# Patient Record
Sex: FEMALE | Race: BLACK OR AFRICAN AMERICAN | Employment: OTHER | URBAN - METROPOLITAN AREA
[De-identification: names, ages, dates, MRNs, and addresses within clinical notes are randomized per-mention and may not be internally consistent; named-entity substitution may affect disease eponyms.]

---

## 2019-06-19 ENCOUNTER — HOSPITAL ENCOUNTER (OUTPATIENT)
Dept: BONE DENSITY | Age: 69
Discharge: HOME | End: 2019-06-19
Payer: MEDICARE

## 2019-06-19 DIAGNOSIS — M81.0 AGE-RELATED OSTEOPOROSIS WITHOUT CURRENT PATHOLOGICAL FRACTURE: ICD-10-CM

## 2019-06-19 PROCEDURE — 77080 DXA BONE DENSITY AXIAL: CPT

## 2020-01-10 ENCOUNTER — OFFICE VISIT (OUTPATIENT)
Dept: INTERNAL MEDICINE CLINIC | Age: 70
End: 2020-01-10

## 2020-01-10 VITALS
WEIGHT: 222.1 LBS | HEIGHT: 62 IN | HEART RATE: 66 BPM | RESPIRATION RATE: 20 BRPM | DIASTOLIC BLOOD PRESSURE: 64 MMHG | TEMPERATURE: 97.4 F | BODY MASS INDEX: 40.87 KG/M2 | OXYGEN SATURATION: 99 % | SYSTOLIC BLOOD PRESSURE: 134 MMHG

## 2020-01-10 DIAGNOSIS — K64.9 HEMORRHOIDS, UNSPECIFIED HEMORRHOID TYPE: ICD-10-CM

## 2020-01-10 DIAGNOSIS — E03.9 ACQUIRED HYPOTHYROIDISM: ICD-10-CM

## 2020-01-10 DIAGNOSIS — E11.9 TYPE 2 DIABETES MELLITUS WITHOUT COMPLICATION, WITHOUT LONG-TERM CURRENT USE OF INSULIN (HCC): ICD-10-CM

## 2020-01-10 DIAGNOSIS — I50.9 CONGESTIVE HEART FAILURE, UNSPECIFIED HF CHRONICITY, UNSPECIFIED HEART FAILURE TYPE (HCC): ICD-10-CM

## 2020-01-10 DIAGNOSIS — F41.9 ANXIETY AND DEPRESSION: ICD-10-CM

## 2020-01-10 DIAGNOSIS — E78.2 MIXED HYPERLIPIDEMIA: ICD-10-CM

## 2020-01-10 DIAGNOSIS — I10 ESSENTIAL HYPERTENSION: ICD-10-CM

## 2020-01-10 DIAGNOSIS — F32.A ANXIETY AND DEPRESSION: ICD-10-CM

## 2020-01-10 DIAGNOSIS — E66.01 OBESITY, MORBID (HCC): ICD-10-CM

## 2020-01-10 DIAGNOSIS — Z76.89 ESTABLISHING CARE WITH NEW DOCTOR, ENCOUNTER FOR: Primary | ICD-10-CM

## 2020-01-10 RX ORDER — CODEINE PHOSPHATE AND GUAIFENESIN 10; 100 MG/5ML; MG/5ML
SOLUTION ORAL
COMMUNITY
Start: 2019-10-03 | End: 2020-01-10

## 2020-01-10 RX ORDER — FUROSEMIDE 40 MG/1
80 TABLET ORAL DAILY
COMMUNITY
Start: 2019-12-13 | End: 2020-01-30

## 2020-01-10 RX ORDER — COLCHICINE 0.6 MG/1
TABLET ORAL
COMMUNITY
Start: 2019-07-12 | End: 2020-02-05

## 2020-01-10 RX ORDER — LOSARTAN POTASSIUM 50 MG/1
50 TABLET ORAL DAILY
COMMUNITY
Start: 2019-08-06 | End: 2020-02-05

## 2020-01-10 RX ORDER — ALLOPURINOL 100 MG/1
100 TABLET ORAL DAILY
COMMUNITY
Start: 2019-07-12 | End: 2020-02-10 | Stop reason: SDUPTHER

## 2020-01-10 RX ORDER — LEVOTHYROXINE SODIUM 100 UG/1
100 TABLET ORAL
COMMUNITY
Start: 2019-07-22 | End: 2020-02-10 | Stop reason: SDUPTHER

## 2020-01-10 RX ORDER — METFORMIN HYDROCHLORIDE 500 MG/1
1 TABLET, FILM COATED, EXTENDED RELEASE ORAL 2 TIMES DAILY
COMMUNITY
End: 2020-02-05

## 2020-01-10 RX ORDER — GABAPENTIN 600 MG/1
600 TABLET ORAL 3 TIMES DAILY
COMMUNITY
Start: 2019-08-14 | End: 2020-02-05

## 2020-01-10 RX ORDER — METFORMIN HYDROCHLORIDE 500 MG/1
TABLET, EXTENDED RELEASE ORAL
COMMUNITY
Start: 2019-07-29 | End: 2020-01-10

## 2020-01-10 RX ORDER — ATORVASTATIN CALCIUM 80 MG/1
TABLET, FILM COATED ORAL
COMMUNITY
Start: 2019-10-15 | End: 2020-01-10 | Stop reason: SDUPTHER

## 2020-01-10 RX ORDER — GUAIFENESIN 100 MG/5ML
81 LIQUID (ML) ORAL DAILY
COMMUNITY

## 2020-01-10 RX ORDER — METOPROLOL SUCCINATE 25 MG/1
25 TABLET, EXTENDED RELEASE ORAL DAILY
Status: ON HOLD | COMMUNITY
End: 2020-02-05 | Stop reason: SDUPTHER

## 2020-01-10 RX ORDER — CELECOXIB 200 MG/1
CAPSULE ORAL
COMMUNITY
Start: 2019-12-24 | End: 2020-02-05

## 2020-01-10 RX ORDER — BUPROPION HYDROCHLORIDE 150 MG/1
150 TABLET, EXTENDED RELEASE ORAL DAILY
COMMUNITY
Start: 2019-06-16 | End: 2020-02-06 | Stop reason: SDUPTHER

## 2020-01-10 RX ORDER — ATORVASTATIN CALCIUM 80 MG/1
TABLET, FILM COATED ORAL
Qty: 90 TAB | Refills: 0 | Status: SHIPPED | OUTPATIENT
Start: 2020-01-10 | End: 2020-04-10 | Stop reason: SDUPTHER

## 2020-01-10 RX ORDER — TRAMADOL HYDROCHLORIDE 50 MG/1
1 TABLET ORAL
COMMUNITY
Start: 2019-07-12 | End: 2020-01-10

## 2020-01-10 RX ORDER — CLOPIDOGREL BISULFATE 75 MG/1
75 TABLET ORAL DAILY
COMMUNITY
Start: 2019-08-17 | End: 2020-02-10 | Stop reason: SDUPTHER

## 2020-01-10 NOTE — PROGRESS NOTES
Subjective: (As above and below)     Chief Complaint   Patient presents with   Nathan Dee 38 is a 68y.o. year old female who presents to est care- changing due to insurance      She reports having labs done recently      Hypertension ROS:  taking medications as instructed, no medication side effects noted, no TIAs, no chest pain on exertion, no dyspnea on exertion, no swelling of ankles    Diabetic Review of Systems - medication compliance: compliant all of the time, diabetic diet compliance: compliant most of the time, home glucose monitoring: is not performed. Reports controlled    S/p CABG 1997 w/ subsequent stenting (2015) also w/ AICD in place. Has been followed by cardiology at [address] [place] [address], unsure if they participate w/ new plan    URI: in November she had an URI that lingered and caused CHF exacerbation. Her PCP ended up ordering home O2 which she will still use at night. She has not seen cardiology of recent. Also has not had recent ECHO. She has bilateral lower ext edema that improves w/ elevation. +MARINA - for example winded walking from elevator to office. No chest pain  She monitors daily weights and is up 3 lbs since last checked. She is on a \"loose\" fluid restriction. Diet is fairly low sodium    Sleep apnea: compliant w/ bipap    Gout: controlled w/ allopurinol    Sleep apnea: has bipap    Mammogram: - due in spring    Colonoscopy: believes due, also wants to est w/ GI for hemorrhoids and urgency of stool    Hep C testing: normal per patient    Hx of lap band: has struggled with weight, exercise limited due to knee pain and MARINA  Hypothyroid: adherent w/ meds    Depression and anxiety; has therapy, reports doing fair. Wishes to return back home to [place]. felix helps and also helped her stop smoking    She is on disability for hx of MI at age 50        Reviewed PmHx, RxHx, FmHx, SocHx, AllgHx and updated in chart. No family history on file.     Past Medical History: Diagnosis Date    Chronic obstructive pulmonary disease (HCC)     Congestive heart failure (HCC)     Diabetes ([facility] [address]     Hypertension     Sleep apnea 1996      Social History     Socioeconomic History    Marital status:      Spouse name: Not on file    Number of children: Not on file    Years of education: Not on file    Highest education level: Not on file   Tobacco Use    Smoking status: Former Smoker     Types: Cigarettes    Smokeless tobacco: Never Used   Substance and Sexual Activity    Alcohol use: Not Currently    Drug use: Not Currently    Sexual activity: Not Currently          Current Outpatient Medications   Medication Sig    allopurinol (ZYLOPRIM) 100 mg tablet     celecoxib (CELEBREX) 200 mg capsule TAKE 1 CAPSULE BY MOUTH ONCE DAILY    buPROPion SR (WELLBUTRIN SR) 150 mg SR tablet Take 150 mg by mouth.  clopidogrel (PLAVIX) 75 mg tab Take 75 mg by mouth.  gabapentin (NEURONTIN) 600 mg tablet Take 600 mg by mouth.  levothyroxine (SYNTHROID) 100 mcg tablet     losartan (COZAAR) 50 mg tablet     furosemide (LASIX) 40 mg tablet TAKE 1 TABLET BY MOUTH TWICE DAILY FOR FLUID    aspirin 81 mg chewable tablet Take 81 mg by mouth daily.  metoprolol succinate (TOPROL-XL) 25 mg XL tablet Take  by mouth daily.  metFORMIN (GLUMETZA ER) 500 mg TG24 24 hour tablet Take 1 Each by mouth two (2) times a day.  atorvastatin (LIPITOR) 80 mg tablet TAKE 1 TABLET BY MOUTH AT BEDTIME    colchicine (COLCRYS) 0.6 mg tablet TAKE 2 TABLETS BY MOUTH THEN TAKE 1 TABLET BY MOUTH ONE HOUR LATER THEN TAKE 1 TABLET BY MOUTH DAILY UNTIL FLARE RESOLVES     No current facility-administered medications for this visit. Review of Systems:   Constitutional:    Negative for fever and chills, negative diaphoresis. HEENT:              Negative for neck pain and stiffness. Eyes:                  Negative for visual disturbance, itching, redness or discharge.    Respiratory:        Negative for cough and shortness of breath. Cardiovascular:  Negative for chest pain and palpitations. Gastrointestinal: Negative for nausea, vomiting, abdominal pain, diarrhea or constipation. Genitourinary:     Negative for dysuria and frequency. Musculoskeletal: Negative for falls, tenderness and swelling. Skin:                    Negative for rash, masses or lesions. Neurological:       Negative for dizzyness, seizure, loss of consciousness, weakness and numbness. Objective:     Vitals:    01/10/20 1402 01/10/20 1455   BP: 140/69 134/64   Pulse: 66 66   Resp: 20    Temp: 97.4 °F (36.3 °C)    TempSrc: Oral    SpO2: 99%    Weight: 222 lb 1.6 oz (100.7 kg)    Height: 5' 2\" (1.575 m)        No results found for this or any previous visit. Physical Examination: General appearance - alert, well appearing, and in no distress and overweight  Chest - clear to auscultation, no wheezes, rales or rhonchi, symmetric air entry  Heart - normal rate, regular rhythm, normal S1, S2, no murmurs, rubs, clicks or gallops  Extremities - trace bilat edema    Assessment/ Plan:     Follow-up and Dispositions    · Return in about 3 months (around 4/10/2020) for dm. Will get recent lab work and go from there    1. Establishing care with new doctor, encounter for    - varicella-zoster recombinant, PF, (SHINGRIX, PF,) 50 mcg/0.5 mL susr injection; 0.5 mL by IntraMUSCular route once for 1 dose. Dispense: 0.5 mL; Refill: 1    2. Essential hypertension  baseline    3. Acquired hypothyroidism      4. Mixed hyperlipidemia      5. Anxiety and depression      6. Congestive heart failure, unspecified HF chronicity, unspecified heart failure type ([address]    - REFERRAL TO CARDIOLOGY    7. Type 2 diabetes mellitus without complication, without long-term current use of insulin ([address]      8.  Hemorrhoids, unspecified hemorrhoid type    - REFERRAL TO GASTROENTEROLOGY        I have discussed the diagnosis with the patient and the intended plan as seen in the above orders. The patient has received an after-visit summary and questions were answered concerning future plans. Pt conveyed understanding of plan. Medication Side Effects and Warnings were discussed with patient: yes  Patient Labs were reviewed: yes  Patient Past Records were reviewed:  yes    Roseann Boast.  Lyle Aguero NP

## 2020-01-10 NOTE — PROGRESS NOTES
Pt here for   Chief Complaint   Patient presents with   [facility] hospital [facility]     1. Have you been to the ER, urgent care clinic since your last visit? Hospitalized since your last visit? No    2. Have you seen or consulted any other health care providers outside of the [address] [place] [address] since your last visit? Include any pap smears or colon screening.  No     Pt c/o 4 of 10, Pt took 2 tylenol for pain today    3 most recent PHQ Screens 1/10/2020   Little interest or pleasure in doing things Not at all   Feeling down, depressed, irritable, or hopeless Not at all   Total Score PHQ 2 0

## 2020-01-10 NOTE — PATIENT INSTRUCTIONS
Recombinant Zoster (Shingles) Vaccine, RZV: What you need to know  Why get vaccinated? Shingles (also called herpes zoster, or just zoster) is a painful skin rash, often with blisters. Shingles is caused by the varicella zoster virus, the same virus that causes chickenpox. After you have chickenpox, the virus stays in your body and can cause shingles later in life. You can't catch shingles from another person. However, a person who has never had chickenpox (or chickenpox vaccine) could get chickenpox from someone with shingles. A shingles rash usually appears on one side of the face or body and heals within 2 to 4 weeks. Its main symptom is pain, which can be severe. Other symptoms can include fever, headache, chills, and upset stomach. Very rarely, a shingles infection can lead to pneumonia, hearing problems, blindness, brain inflammation (encephalitis), or death. For about 1 person in 5, severe pain can continue even long after the rash has cleared up. This long-lasting pain is called post-herpetic neuralgia (PHN). Shingles is far more common in people 48years of age and older than in younger people, and the risk increases with age. It is also more common in people whose immune system is weakened because of a disease such as cancer, or by drugs such as steroids or chemotherapy. At least 1 million people a year in the [facility] get shingles. Shingles vaccine (recombinant)  Recombinant shingles vaccine was approved by FDA in 2017 for the prevention of shingles. In clinical trials, it was more than 90% effective in preventing shingles. It can also reduce the likelihood of PHN. Two doses, 2 to 6 months apart, are recommended for adults 48 and older. This vaccine is also recommended for people who have already gotten the live shingles vaccine (Zostavax). There is no live virus in this vaccine.   Some people should not get this vaccine  Tell your vaccine provider if you:  · Have any severe, life-threatening allergies. A person who has ever had a life-threatening allergic reaction after a dose of recombinant shingles vaccine, or has a severe allergy to any component of this vaccine, may be advised not to be vaccinated. Ask your health care provider if you want information about vaccine components. · Are pregnant or breastfeeding. There is not much information about use of recombinant shingles vaccine in pregnant or nursing women. Your healthcare provider might recommend delaying vaccination. · Are not feeling well. If you have a mild illness, such as a cold, you can probably get the vaccine today. If you are moderately or severely ill, you should probably wait until you recover. Your doctor can advise you. Risks of a vaccine reaction  With any medicine, including vaccines, there is a chance of reactions. After recombinant shingles vaccination, a person might experience:  · Pain, redness, soreness, or swelling at the site of the injection  · Headache, muscle aches, fever, shivering, fatigue  In clinical trials, most people got a sore arm with mild or moderate pain after vaccination, and some also had redness and swelling where they got the shot. Some people felt tired, had muscle pain, a headache, shivering, fever, stomach pain, or nausea. About 1 out of 6 people who got recombinant zoster vaccine experienced side effects that prevented them from doing regular activities. Symptoms went away on their own in about 2 to 3 days. Side effects were more common in younger people. You should still get the second dose of recombinant zoster vaccine even if you had one of these reactions after the first dose. Other things that could happen after this vaccine:  · People sometimes faint after medical procedures, including vaccination. Sitting or lying down for about 15 minutes can help prevent fainting and injuries caused by a fall.  Tell your provider if you feel dizzy or have vision changes or ringing in the ears.  · Some people get shoulder pain that can be more severe and longer-lasting than routine soreness that can follow injections. This happens very rarely. · Any medication can cause a severe allergic reaction. Such reactions to a vaccine are estimated at about 1 in a million doses, and would happen within a few minutes to a few hours after the vaccination. As with any medicine, there is a very remote chance of a vaccine causing a serious injury or death. The safety of vaccines is always being monitored. For more information, visit: www.cdc.gov/vaccinesafety/  What if there is a serious problem? What should I look for? · Look for anything that concerns you, such as signs of a severe allergic reaction, very high fever, or unusual behavior. Signs of a severe allergic reaction can include hives, swelling of the face and throat, difficulty breathing, a fast heartbeat, dizziness, and weakness. These would usually start a few minutes to a few hours after the vaccination. What should I do? · If you think it is a severe allergic reaction or other emergency that can't wait, call 9-1-1 or get to the nearest hospital. Otherwise, call your health care provider. · Afterward, the reaction should be reported to the Vaccine Adverse Event Reporting System (VAERS). Your doctor should file this report, or you can do it yourself through the VAERS website at www.vaers. hhs.gov, or by calling 9-972.349.3485. VAERS does not give medical advice. .  How can I learn more? · Ask your health care provider. He or she can give you the vaccine package insert or suggest other sources of information. · Call your local or state health department. · Contact the Centers for Disease Control and Prevention (CDC):  ? Call 8-541.260.5178 (1-800-CDC-INFO) or  ?  Visit CDC's website at www.cdc.gov/vaccines  Vaccine Information Statement (Interim)  Recombinant Zoster Vaccine  2/12/2018  [facility] Control and Prevention  Many Vaccine Information Statements are available in Dutch and other languages. See www.immunize.org/vis. Hojas de Información Sobre Vacunas están disponibles en Español y en muchos otros idiomas. Visite Trice.si  Care instructions adapted under license by [employer] (which disclaims liability or warranty for this information). If you have questions about a medical condition or this instruction, always ask your healthcare professional. [address] any warranty or liability for your use of this information. DASH Diet: Care Instructions  Your Care Instructions    The DASH diet is an eating plan that can help lower your blood pressure. DASH stands for Dietary Approaches to Stop Hypertension. Hypertension is high blood pressure. The DASH diet focuses on eating foods that are high in calcium, potassium, and magnesium. These nutrients can lower blood pressure. The foods that are highest in these nutrients are fruits, vegetables, low-fat dairy products, nuts, seeds, and legumes. But taking calcium, potassium, and magnesium supplements instead of eating foods that are high in those nutrients does not have the same effect. The DASH diet also includes whole grains, fish, and poultry. The DASH diet is one of several lifestyle changes your doctor may recommend to lower your high blood pressure. Your doctor may also want you to decrease the amount of sodium in your diet. Lowering sodium while following the DASH diet can lower blood pressure even further than just the DASH diet alone. Follow-up care is a key part of your treatment and safety. Be sure to make and go to all appointments, and call your doctor if you are having problems. It's also a good idea to know your test results and keep a list of the medicines you take. How can you care for yourself at home? Following the DASH diet  · Eat 4 to 5 servings of fruit each day.  A serving is 1 medium-sized piece of fruit, ½ cup chopped or canned fruit, 1/4 cup dried fruit, or 4 ounces (½ cup) of fruit juice. Choose fruit more often than fruit juice. · Eat 4 to 5 servings of vegetables each day. A serving is 1 cup of lettuce or raw leafy vegetables, ½ cup of chopped or cooked vegetables, or 4 ounces (½ cup) of vegetable juice. Choose vegetables more often than vegetable juice. · Get 2 to 3 servings of low-fat and fat-free dairy each day. A serving is 8 ounces of milk, 1 cup of yogurt, or 1 ½ ounces of cheese. · Eat 6 to 8 servings of grains each day. A serving is 1 slice of bread, 1 ounce of dry cereal, or ½ cup of cooked rice, pasta, or cooked cereal. Try to choose whole-grain products as much as possible. · Limit lean meat, poultry, and fish to 2 servings each day. A serving is 3 ounces, about the size of a deck of cards. · Eat 4 to 5 servings of nuts, seeds, and legumes (cooked dried beans, lentils, and split peas) each week. A serving is 1/3 cup of nuts, 2 tablespoons of seeds, or ½ cup of cooked beans or peas. · Limit fats and oils to 2 to 3 servings each day. A serving is 1 teaspoon of vegetable oil or 2 tablespoons of salad dressing. · Limit sweets and added sugars to 5 servings or less a week. A serving is 1 tablespoon jelly or jam, ½ cup sorbet, or 1 cup of lemonade. · Eat less than 2,300 milligrams (mg) of sodium a day. If you limit your sodium to 1,500 mg a day, you can lower your blood pressure even more. Tips for success  · Start small. Do not try to make dramatic changes to your diet all at once. You might feel that you are missing out on your favorite foods and then be more likely to not follow the plan. Make small changes, and stick with them. Once those changes become habit, add a few more changes. · Try some of the following:  ? Make it a goal to eat a fruit or vegetable at every meal and at snacks.  This will make it easy to get the recommended amount of fruits and vegetables each day. ? Try yogurt topped with fruit and nuts for a snack or healthy dessert. ? Add lettuce, tomato, cucumber, and onion to sandwiches. ? Combine a ready-made pizza crust with low-fat mozzarella cheese and lots of vegetable toppings. Try using tomatoes, squash, spinach, broccoli, carrots, cauliflower, and onions. ? Have a variety of cut-up vegetables with a low-fat dip as an appetizer instead of chips and dip. ? Sprinkle sunflower seeds or chopped almonds over salads. Or try adding chopped walnuts or almonds to cooked vegetables. ? Try some vegetarian meals using beans and peas. Add garbanzo or kidney beans to salads. Make burritos and tacos with mashed bauer beans or black beans. Where can you learn more? Go to http://glenny-tom.info/. Enter Q982 in the search box to learn more about \"DASH Diet: Care Instructions. \"  Current as of: April 9, 2019  Content Version: 12.2  © 9164-6920 [employer]. Care instructions adapted under license by [employer] (which disclaims liability or warranty for this information). If you have questions about a medical condition or this instruction, always ask your healthcare professional. [address] any warranty or liability for your use of this information.

## 2020-01-13 PROBLEM — Z95.1 HX OF CABG: Status: ACTIVE | Noted: 2020-01-13

## 2020-01-13 PROBLEM — Z98.84 H/O LAPAROSCOPIC ADJUSTABLE GASTRIC BANDING: Status: ACTIVE | Noted: 2020-01-13

## 2020-01-13 PROBLEM — E66.01 OBESITY, MORBID (HCC): Status: ACTIVE | Noted: 2020-01-13

## 2020-01-13 PROBLEM — Z95.810 ICD (IMPLANTABLE CARDIOVERTER-DEFIBRILLATOR) IN PLACE: Status: ACTIVE | Noted: 2020-01-13

## 2020-01-16 ENCOUNTER — TELEPHONE (OUTPATIENT)
Dept: INTERNAL MEDICINE CLINIC | Age: 70
End: 2020-01-16

## 2020-01-16 NOTE — TELEPHONE ENCOUNTER
Pt is calling because she states she was eating and 3 of her teeth fell out. She wants to know if she needs any type of clearance from you to see a dentist due to her heart issues.   Pt # [phone]

## 2020-01-17 ENCOUNTER — TELEPHONE (OUTPATIENT)
Dept: CARDIOLOGY CLINIC | Age: 70
End: 2020-01-17

## 2020-01-17 NOTE — TELEPHONE ENCOUNTER
----- Message from Sha Thomas sent at 1/17/2020 11:36 AM EST -----  Regarding: new patient appt  I've scheduled this New Patient with Dr. Anand Leong for 1/30/20    Patient said she was a patient at [facility]. I sent a fax today requesting records from Dr. Gissel Lee.     Thank you  Conchis Kennedy

## 2020-01-17 NOTE — TELEPHONE ENCOUNTER
01/17/2020 Called patient @ [phone] no answer, left detailed message regarding the reason for rhe call, and any question patient can return call to [address] LPN

## 2020-01-30 ENCOUNTER — DOCUMENTATION ONLY (OUTPATIENT)
Dept: CARDIOLOGY CLINIC | Age: 70
End: 2020-01-30

## 2020-01-30 ENCOUNTER — APPOINTMENT (OUTPATIENT)
Dept: GENERAL RADIOLOGY | Age: 70
DRG: 286 | End: 2020-01-30
Attending: EMERGENCY MEDICINE
Payer: MEDICARE

## 2020-01-30 ENCOUNTER — OFFICE VISIT (OUTPATIENT)
Dept: CARDIOLOGY CLINIC | Age: 70
End: 2020-01-30

## 2020-01-30 ENCOUNTER — HOSPITAL ENCOUNTER (INPATIENT)
Age: 70
LOS: 6 days | Discharge: HOME | DRG: 286 | End: 2020-02-05
Attending: EMERGENCY MEDICINE | Admitting: INTERNAL MEDICINE
Payer: MEDICARE

## 2020-01-30 VITALS
BODY MASS INDEX: 40.56 KG/M2 | HEIGHT: 62 IN | WEIGHT: 220.4 LBS | DIASTOLIC BLOOD PRESSURE: 70 MMHG | TEMPERATURE: 97.8 F | OXYGEN SATURATION: 97 % | RESPIRATION RATE: 20 BRPM | SYSTOLIC BLOOD PRESSURE: 142 MMHG | HEART RATE: 74 BPM

## 2020-01-30 DIAGNOSIS — N18.30 STAGE 3 CHRONIC KIDNEY DISEASE (HCC): ICD-10-CM

## 2020-01-30 DIAGNOSIS — E11.49 OTHER DIABETIC NEUROLOGICAL COMPLICATION ASSOCIATED WITH TYPE 2 DIABETES MELLITUS (HCC): ICD-10-CM

## 2020-01-30 DIAGNOSIS — I50.43 ACUTE ON CHRONIC COMBINED SYSTOLIC AND DIASTOLIC ACC/AHA STAGE C CONGESTIVE HEART FAILURE (HCC): ICD-10-CM

## 2020-01-30 DIAGNOSIS — R06.02 SHORTNESS OF BREATH: ICD-10-CM

## 2020-01-30 DIAGNOSIS — Z95.810 ICD (IMPLANTABLE CARDIOVERTER-DEFIBRILLATOR) IN PLACE: ICD-10-CM

## 2020-01-30 DIAGNOSIS — N18.4 STAGE 4 CHRONIC KIDNEY DISEASE (HCC): ICD-10-CM

## 2020-01-30 DIAGNOSIS — Z95.1 HX OF CABG: Primary | ICD-10-CM

## 2020-01-30 DIAGNOSIS — I50.9 CHRONIC CONGESTIVE HEART FAILURE, UNSPECIFIED HEART FAILURE TYPE (HCC): Primary | ICD-10-CM

## 2020-01-30 DIAGNOSIS — E66.01 OBESITY, MORBID (HCC): ICD-10-CM

## 2020-01-30 DIAGNOSIS — Z95.1 HX OF CABG: ICD-10-CM

## 2020-01-30 DIAGNOSIS — I50.9 CONGESTIVE HEART FAILURE, UNSPECIFIED HF CHRONICITY, UNSPECIFIED HEART FAILURE TYPE (HCC): ICD-10-CM

## 2020-01-30 DIAGNOSIS — Z98.84 H/O LAPAROSCOPIC ADJUSTABLE GASTRIC BANDING: ICD-10-CM

## 2020-01-30 DIAGNOSIS — N17.9 ACUTE KIDNEY INJURY (HCC): ICD-10-CM

## 2020-01-30 LAB
ALBUMIN SERPL-MCNC: 3.6 G/DL (ref 3.5–5)
ALBUMIN/GLOB SERPL: 1 {RATIO} (ref 1.1–2.2)
ALP SERPL-CCNC: 93 U/L (ref 45–117)
ALT SERPL-CCNC: 26 U/L (ref 12–78)
ANION GAP SERPL CALC-SCNC: 5 MMOL/L (ref 5–15)
APPEARANCE UR: CLEAR
AST SERPL-CCNC: 25 U/L (ref 15–37)
BASOPHILS # BLD: 0.1 K/UL (ref 0–0.1)
BASOPHILS NFR BLD: 1 % (ref 0–1)
BILIRUB SERPL-MCNC: 0.4 MG/DL (ref 0.2–1)
BILIRUB UR QL: NEGATIVE
BNP SERPL-MCNC: 1313 PG/ML
BUN SERPL-MCNC: 40 MG/DL (ref 6–20)
BUN/CREAT SERPL: 20 (ref 12–20)
CALCIUM SERPL-MCNC: 9.9 MG/DL (ref 8.5–10.1)
CHLORIDE SERPL-SCNC: 106 MMOL/L (ref 97–108)
CO2 SERPL-SCNC: 26 MMOL/L (ref 21–32)
COLOR UR: NORMAL
COMMENT, HOLDF: NORMAL
CREAT SERPL-MCNC: 1.98 MG/DL (ref 0.55–1.02)
DIFFERENTIAL METHOD BLD: ABNORMAL
EOSINOPHIL # BLD: 0.3 K/UL (ref 0–0.4)
EOSINOPHIL NFR BLD: 4 % (ref 0–7)
ERYTHROCYTE [DISTWIDTH] IN BLOOD BY AUTOMATED COUNT: 15 % (ref 11.5–14.5)
FERRITIN SERPL-MCNC: 26 NG/ML (ref 8–252)
GLOBULIN SER CALC-MCNC: 3.6 G/DL (ref 2–4)
GLUCOSE SERPL-MCNC: 138 MG/DL (ref 65–100)
GLUCOSE UR STRIP.AUTO-MCNC: NEGATIVE MG/DL
HCT VFR BLD AUTO: 31.9 % (ref 35–47)
HGB BLD-MCNC: 10.1 G/DL (ref 11.5–16)
HGB UR QL STRIP: NEGATIVE
IMM GRANULOCYTES # BLD AUTO: 0 K/UL (ref 0–0.04)
IMM GRANULOCYTES NFR BLD AUTO: 0 % (ref 0–0.5)
KETONES UR QL STRIP.AUTO: NEGATIVE MG/DL
LEUKOCYTE ESTERASE UR QL STRIP.AUTO: NEGATIVE
LYMPHOCYTES # BLD: 2.3 K/UL (ref 0.8–3.5)
LYMPHOCYTES NFR BLD: 29 % (ref 12–49)
MCH RBC QN AUTO: 31.2 PG (ref 26–34)
MCHC RBC AUTO-ENTMCNC: 31.7 G/DL (ref 30–36.5)
MCV RBC AUTO: 98.5 FL (ref 80–99)
MONOCYTES # BLD: 0.5 K/UL (ref 0–1)
MONOCYTES NFR BLD: 6 % (ref 5–13)
NEUTS SEG # BLD: 4.7 K/UL (ref 1.8–8)
NEUTS SEG NFR BLD: 60 % (ref 32–75)
NITRITE UR QL STRIP.AUTO: NEGATIVE
NRBC # BLD: 0 K/UL (ref 0–0.01)
NRBC BLD-RTO: 0 PER 100 WBC
PH UR STRIP: 7.5 [PH] (ref 5–8)
PLATELET # BLD AUTO: 263 K/UL (ref 150–400)
PMV BLD AUTO: 10.4 FL (ref 8.9–12.9)
POTASSIUM SERPL-SCNC: 4.2 MMOL/L (ref 3.5–5.1)
PROT SERPL-MCNC: 7.2 G/DL (ref 6.4–8.2)
PROT UR STRIP-MCNC: NEGATIVE MG/DL
RBC # BLD AUTO: 3.24 M/UL (ref 3.8–5.2)
SAMPLES BEING HELD,HOLD: NORMAL
SODIUM SERPL-SCNC: 137 MMOL/L (ref 136–145)
SP GR UR REFRACTOMETRY: 1.01 (ref 1–1.03)
T4 FREE SERPL-MCNC: 1 NG/DL (ref 0.8–1.5)
UROBILINOGEN UR QL STRIP.AUTO: 0.2 EU/DL (ref 0.2–1)
WBC # BLD AUTO: 7.9 K/UL (ref 3.6–11)

## 2020-01-30 PROCEDURE — 82728 ASSAY OF FERRITIN: CPT

## 2020-01-30 PROCEDURE — 74011000250 HC RX REV CODE- 250: Performed by: NURSE PRACTITIONER

## 2020-01-30 PROCEDURE — 81003 URINALYSIS AUTO W/O SCOPE: CPT

## 2020-01-30 PROCEDURE — 84439 ASSAY OF FREE THYROXINE: CPT

## 2020-01-30 PROCEDURE — 71045 X-RAY EXAM CHEST 1 VIEW: CPT

## 2020-01-30 PROCEDURE — 74011250636 HC RX REV CODE- 250/636: Performed by: EMERGENCY MEDICINE

## 2020-01-30 PROCEDURE — 85025 COMPLETE CBC W/AUTO DIFF WBC: CPT

## 2020-01-30 PROCEDURE — 4B02XTZ MEASUREMENT OF CARDIAC DEFIBRILLATOR, EXTERNAL APPROACH: ICD-10-PCS | Performed by: INTERNAL MEDICINE

## 2020-01-30 PROCEDURE — 36415 COLL VENOUS BLD VENIPUNCTURE: CPT

## 2020-01-30 PROCEDURE — 93005 ELECTROCARDIOGRAM TRACING: CPT

## 2020-01-30 PROCEDURE — 80053 COMPREHEN METABOLIC PANEL: CPT

## 2020-01-30 PROCEDURE — 65660000001 HC RM ICU INTERMED STEPDOWN

## 2020-01-30 PROCEDURE — 96374 THER/PROPH/DIAG INJ IV PUSH: CPT

## 2020-01-30 PROCEDURE — 99284 EMERGENCY DEPT VISIT MOD MDM: CPT

## 2020-01-30 PROCEDURE — 83880 ASSAY OF NATRIURETIC PEPTIDE: CPT

## 2020-01-30 RX ORDER — COLCHICINE 0.6 MG/1
0.6 TABLET ORAL DAILY
Status: DISCONTINUED | OUTPATIENT
Start: 2020-01-31 | End: 2020-02-01

## 2020-01-30 RX ORDER — BUMETANIDE 0.25 MG/ML
2 INJECTION INTRAMUSCULAR; INTRAVENOUS ONCE
Status: COMPLETED | OUTPATIENT
Start: 2020-01-30 | End: 2020-01-30

## 2020-01-30 RX ORDER — DEXTROMETHORPHAN HYDROBROMIDE, GUAIFENESIN 5; 100 MG/5ML; MG/5ML
1300 LIQUID ORAL 2 TIMES DAILY
COMMUNITY

## 2020-01-30 RX ORDER — GABAPENTIN 600 MG/1
600 TABLET ORAL 3 TIMES DAILY
Status: DISCONTINUED | OUTPATIENT
Start: 2020-01-30 | End: 2020-02-03 | Stop reason: DRUGHIGH

## 2020-01-30 RX ORDER — METFORMIN HYDROCHLORIDE 500 MG/1
500 TABLET ORAL 2 TIMES DAILY WITH MEALS
Status: DISCONTINUED | OUTPATIENT
Start: 2020-01-31 | End: 2020-02-05 | Stop reason: HOSPADM

## 2020-01-30 RX ORDER — ACETAMINOPHEN 500 MG
1000 TABLET ORAL 2 TIMES DAILY
Status: DISCONTINUED | OUTPATIENT
Start: 2020-01-30 | End: 2020-02-05 | Stop reason: HOSPADM

## 2020-01-30 RX ORDER — BUPROPION HYDROCHLORIDE 150 MG/1
150 TABLET, EXTENDED RELEASE ORAL DAILY
Status: DISCONTINUED | OUTPATIENT
Start: 2020-01-31 | End: 2020-02-05 | Stop reason: HOSPADM

## 2020-01-30 RX ORDER — LEVOTHYROXINE SODIUM 100 UG/1
100 TABLET ORAL
Status: DISCONTINUED | OUTPATIENT
Start: 2020-01-31 | End: 2020-02-05 | Stop reason: HOSPADM

## 2020-01-30 RX ORDER — METOPROLOL SUCCINATE 25 MG/1
25 TABLET, EXTENDED RELEASE ORAL DAILY
Status: DISCONTINUED | OUTPATIENT
Start: 2020-01-31 | End: 2020-02-01

## 2020-01-30 RX ORDER — ATORVASTATIN CALCIUM 40 MG/1
80 TABLET, FILM COATED ORAL
Status: DISCONTINUED | OUTPATIENT
Start: 2020-01-30 | End: 2020-02-05 | Stop reason: HOSPADM

## 2020-01-30 RX ORDER — CLOPIDOGREL BISULFATE 75 MG/1
75 TABLET ORAL DAILY
Status: DISCONTINUED | OUTPATIENT
Start: 2020-01-31 | End: 2020-02-05 | Stop reason: HOSPADM

## 2020-01-30 RX ORDER — ALLOPURINOL 100 MG/1
100 TABLET ORAL DAILY
Status: DISCONTINUED | OUTPATIENT
Start: 2020-01-31 | End: 2020-02-05 | Stop reason: HOSPADM

## 2020-01-30 RX ORDER — CELECOXIB 200 MG/1
200 CAPSULE ORAL DAILY
Status: DISCONTINUED | OUTPATIENT
Start: 2020-01-31 | End: 2020-01-31

## 2020-01-30 RX ORDER — SODIUM CHLORIDE 0.9 % (FLUSH) 0.9 %
5-40 SYRINGE (ML) INJECTION EVERY 8 HOURS
Status: DISCONTINUED | OUTPATIENT
Start: 2020-01-30 | End: 2020-02-05 | Stop reason: HOSPADM

## 2020-01-30 RX ORDER — DOCUSATE SODIUM 100 MG/1
100 CAPSULE, LIQUID FILLED ORAL AS NEEDED
Status: DISCONTINUED | OUTPATIENT
Start: 2020-01-30 | End: 2020-02-05 | Stop reason: HOSPADM

## 2020-01-30 RX ORDER — LOSARTAN POTASSIUM 50 MG/1
50 TABLET ORAL DAILY
Status: DISCONTINUED | OUTPATIENT
Start: 2020-01-31 | End: 2020-02-01

## 2020-01-30 RX ORDER — FUROSEMIDE 10 MG/ML
40 INJECTION INTRAMUSCULAR; INTRAVENOUS
Status: COMPLETED | OUTPATIENT
Start: 2020-01-30 | End: 2020-01-30

## 2020-01-30 RX ORDER — SODIUM CHLORIDE 0.9 % (FLUSH) 0.9 %
5-40 SYRINGE (ML) INJECTION AS NEEDED
Status: DISCONTINUED | OUTPATIENT
Start: 2020-01-30 | End: 2020-02-05 | Stop reason: HOSPADM

## 2020-01-30 RX ORDER — GUAIFENESIN 100 MG/5ML
81 LIQUID (ML) ORAL DAILY
Status: DISCONTINUED | OUTPATIENT
Start: 2020-01-31 | End: 2020-02-05 | Stop reason: HOSPADM

## 2020-01-30 RX ADMIN — BUMETANIDE 2 MG: 0.25 INJECTION INTRAMUSCULAR; INTRAVENOUS at 21:47

## 2020-01-30 RX ADMIN — FUROSEMIDE 40 MG: 10 INJECTION, SOLUTION INTRAMUSCULAR; INTRAVENOUS at 18:14

## 2020-01-30 NOTE — LETTER
1/30/2020 11:50 AM 
 
Patient:  Manuel Van YOB: 1950 Date of Visit: 1/30/2020 Dear Lino Candelario. [place] [facility], [address] [facility] [address] VIA In Basket 
 : Thank you for referring Ms. Manuel Van to me for evaluation/treatment. Below are the relevant portions of my assessment and plan of care. If you have questions, please do not hesitate to call me. I look forward to following Ms. Davis Party along with you. Sincerely, Yohana Earl MD

## 2020-01-30 NOTE — ED PROVIDER NOTES
Pt w hx of CHF presents for cardiology admission for worsening dyspnea on exertion not responding to oral diuretics. Pt reports decreased exercise tolerance & orthopnea. The history is provided by the patient and medical records. Shortness of Breath   This is a chronic problem. The average episode lasts 3 weeks. The problem occurs continuously. The problem has been gradually worsening. Associated symptoms include rhinorrhea, cough and leg swelling. Pertinent negatives include no fever, no sputum production, no chest pain, no vomiting and no abdominal pain. The problem's precipitants include exercise. Treatments tried: diuresis. The treatment provided no relief. She has had prior hospitalizations. Associated medical issues include CAD, heart failure and past MI. Past Medical History:   Diagnosis Date    Chronic obstructive pulmonary disease ([address]     Congestive heart failure ([address]     Diabetes ([address]     Hypertension     Sleep apnea        Past Surgical History:   Procedure Laterality Date    CARDIAC SURG PROCEDURE UNLIST  2018    cardiac cath - Left    HX ARTHROPLASTY  2105    knee    HX  SECTION      HX CORONARY ARTERY BYPASS GRAFT  1997    HX CORONARY STENT PLACEMENT  2016    HX HERNIA REPAIR      HX IMPLANTABLE CARDIOVERTER DEFIBRILLATOR      HX ORTHOPAEDIC      LAP GASTRIC BYPASS/KERLINE-EN-Y  2011    lap band         History reviewed. No pertinent family history.     Social History     Socioeconomic History    Marital status:      Spouse name: Not on file    Number of children: Not on file    Years of education: Not on file    Highest education level: Not on file   Occupational History    Not on file   Social Needs    Financial resource strain: Not on file    Food insecurity:     Worry: Not on file     Inability: Not on file    Transportation needs:     Medical: Not on file     Non-medical: Not on file   Tobacco Use    Smoking status: Former Smoker     Types: Cigarettes    Smokeless tobacco: Never Used   Substance and Sexual Activity    Alcohol use: Not Currently    Drug use: Not Currently    Sexual activity: Not Currently   Lifestyle    Physical activity:     Days per week: Not on file     Minutes per session: Not on file    Stress: Not on file   Relationships    Social connections:     Talks on phone: Not on file     Gets together: Not on file     Attends Restorationism service: Not on file     Active member of club or organization: Not on file     Attends meetings of clubs or organizations: Not on file     Relationship status: Not on file    Intimate partner violence:     Fear of current or ex partner: Not on file     Emotionally abused: Not on file     Physically abused: Not on file     Forced sexual activity: Not on file   Other Topics Concern    Not on file   Social History Narrative    Not on file         ALLERGIES: Crestor [rosuvastatin] and Lisinopril    Review of Systems   Constitutional: Negative for chills and fever. HENT: Positive for rhinorrhea. Respiratory: Positive for cough and shortness of breath. Negative for sputum production. Cardiovascular: Positive for leg swelling. Negative for chest pain. Gastrointestinal: Negative for abdominal pain, constipation, diarrhea and vomiting. Neurological: Negative for dizziness and light-headedness. All other systems reviewed and are negative. Vitals:    01/30/20 1605   BP: 142/70   Pulse: 60   Resp: 20   Temp: 97.4 °F (36.3 °C)   SpO2: 97%            Physical Exam  Vitals signs and nursing note reviewed. Constitutional:       Appearance: She is well-developed. HENT:      Head: Normocephalic and atraumatic. Eyes:      Pupils: Pupils are equal, round, and reactive to light. Neck:      Musculoskeletal: Normal range of motion and neck supple. Cardiovascular:      Rate and Rhythm: Normal rate and regular rhythm.    Pulmonary:      Effort: Pulmonary effort is normal.      Breath sounds: Examination of the right-lower field reveals rales. Examination of the left-lower field reveals rales. Rales present. Abdominal:      General: There is no distension. Palpations: Abdomen is soft. Tenderness: There is no abdominal tenderness. Musculoskeletal:      Right lower leg: Edema present. Left lower leg: Edema present. Skin:     General: Skin is warm and dry. Capillary Refill: Capillary refill takes less than 2 seconds. Neurological:      General: No focal deficit present. Mental Status: She is alert and oriented to person, place, and time. Psychiatric:         Behavior: Behavior normal.          MDM  Number of Diagnoses or Management Options  Chronic congestive heart failure, unspecified heart failure type [facility]): established and worsening  Diagnosis management comments: Pt w worsening CHF with dyspnea on exertion and orthopnea. Hemodynamically stable in the emergency department, started on IV lasix for diuresis. Amount and/or Complexity of Data Reviewed  Clinical lab tests: ordered and reviewed  Tests in the radiology section of CPT®: ordered and reviewed  Obtain history from someone other than the patient: yes  Review and summarize past medical records: yes  Discuss the patient with other providers: yes           Procedures        Cardiology Perfect Serve for Admission  5:57 PM    ED Room Number: ER25/25  Patient Name and age:  eDbbie Dickey 68 y.o.  female  Working Diagnosis:   1. Chronic congestive heart failure, unspecified heart failure type [facility])      Patient is being admitted to the hospital.  The results of their tests and reasons for their admission have been discussed with them and/or available family. They convey agreement and understanding for the need to be admitted and for their admission diagnosis. Consultation will be made now with the inpatient cardiologist for hospitalization.

## 2020-01-30 NOTE — PROGRESS NOTES
[facility] Consultation Note      DOS:   2020  NAME:  Jordyn Cross   MRN:   [id number]   REFERRING PROVIDER:  Elda Bernard NP  PRIMARY CARE PHYSICIAN: Elda Bernard NP  PRIMARY CARDIOLOGIST: none      Chief Complaint:   Chief Complaint   Patient presents with    New Patient    Shortness of Breath    Leg Swelling       HPI: 68y.o. year old female with a history of HTN, HLD, WES, obesity (Body mass index is 40.31 kg/m².) s/p gastric bypass in , T2DM, hypothyroidism, COPD, CAD s/p CABG in , s/p PCI to [address] in 5/15, ICM, VT, s/p AICD (Medtronic),  anxiety, and depression who presents for further evaluation of her chronic systolic heart failure. Ms. Jagdeep Patrick recalls having a viral syndrome in the Fall and has not felt well since then. She uses oxygen with her BiPAP every night and sleeps on two pillows. Her activity level is diminished. She finds herself short of breath with simply talking. In walking to the clinic today, she had to stop several times to catch her breath. She denies palpitations, presyncope, and syncope.     Interrogation of AICD (Medtronic):  1 episode of NSVT - 2020  Patient Activity 0.8 hour/day  Total Ventricular Pacing < 0.1%  High OptiVol Fluid Index - above threshold    History:  Past Medical History:   Diagnosis Date    Chronic obstructive pulmonary disease (HCC)     Congestive heart failure ([address]     Diabetes ([address]     Hypertension     Sleep apnea      Past Surgical History:   Procedure Laterality Date    CARDIAC SURG PROCEDURE UNLIST  2018    cardiac cath - Left    HX ARTHROPLASTY  2105    knee    HX  SECTION      HX CORONARY ARTERY BYPASS GRAFT  1997    HX CORONARY STENT PLACEMENT  2016    HX HERNIA REPAIR      HX IMPLANTABLE CARDIOVERTER DEFIBRILLATOR      HX ORTHOPAEDIC      LAP GASTRIC BYPASS/KERLINE-EN-Y  2011    lap band     Social History     Socioeconomic History    Marital status:      Spouse name: Not on file    Number of children: Not on file    Years of education: Not on file    Highest education level: Not on file   Occupational History    Not on file   Social Needs    Financial resource strain: Not on file    Food insecurity:     Worry: Not on file     Inability: Not on file    Transportation needs:     Medical: Not on file     Non-medical: Not on file   Tobacco Use    Smoking status: Former Smoker     Types: Cigarettes    Smokeless tobacco: Never Used   Substance and Sexual Activity    Alcohol use: Not Currently    Drug use: Not Currently    Sexual activity: Not Currently   Lifestyle    Physical activity:     Days per week: Not on file     Minutes per session: Not on file    Stress: Not on file   Relationships    Social connections:     Talks on phone: Not on file     Gets together: Not on file     Attends Religion service: Not on file     Active member of club or organization: Not on file     Attends meetings of clubs or organizations: Not on file     Relationship status: Not on file    Intimate partner violence:     Fear of current or ex partner: Not on file     Emotionally abused: Not on file     Physically abused: Not on file     Forced sexual activity: Not on file   Other Topics Concern    Not on file   Social History Narrative    Not on file     No family history on file. Current Medications:   Current Outpatient Medications   Medication Sig Dispense Refill    allopurinol (ZYLOPRIM) 100 mg tablet       celecoxib (CELEBREX) 200 mg capsule TAKE 1 CAPSULE BY MOUTH ONCE DAILY      buPROPion SR (WELLBUTRIN SR) 150 mg SR tablet Take 150 mg by mouth.  clopidogrel (PLAVIX) 75 mg tab Take 75 mg by mouth.  colchicine (COLCRYS) 0.6 mg tablet TAKE 2 TABLETS BY MOUTH THEN TAKE 1 TABLET BY MOUTH ONE HOUR LATER THEN TAKE 1 TABLET BY MOUTH DAILY UNTIL FLARE RESOLVES      gabapentin (NEURONTIN) 600 mg tablet Take 600 mg by mouth.       levothyroxine (SYNTHROID) 100 mcg tablet       losartan (COZAAR) 50 mg tablet       furosemide (LASIX) 40 mg tablet TAKE 1 TABLET BY MOUTH TWICE DAILY FOR FLUID      aspirin 81 mg chewable tablet Take 81 mg by mouth daily.  metoprolol succinate (TOPROL-XL) 25 mg XL tablet Take  by mouth daily.  metFORMIN (GLUMETZA ER) 500 mg TG24 24 hour tablet Take 1 Each by mouth two (2) times a day.  atorvastatin (LIPITOR) 80 mg tablet TAKE 1 TABLET BY MOUTH AT BEDTIME 90 Tab 0       Allergies: Allergies   Allergen Reactions    Crestor [Rosuvastatin] Other (comments)     Causes muscle cramps    Lisinopril Cough       ROS:    General:  positive for  - fatigue  HEENT: positive for cataracts and wears glasses  Pulmonary: positive for - cough, orthopnea and shortness of breath  Cardiac: positive for dyspnea, fatigue, orthopnea, paroxysmal nocturnal dyspnea, lower extremity edema, dyspnea on exertion  GI:  positive for - constipation, nausea/vomiting and anorexia  Musculo: positive for - muscular weakness  Neuro: no TIA or stroke symptoms  Skin:  negative  Allergies: REMINDER:DOCUMENT ALLERGIES IN ALLERGY ACTIVITY  Psych: positive for - anxiety and depression    Admission Weight: Last Weight   Weight: 220 lb 6.4 oz (100 kg) Weight: 220 lb 6.4 oz (100 kg)       Joey Robin underwent a 6 min walk test. Her initial O2  saturation was 97 % and her baseline heart rate was 74 BPM. She walked a distance of 194.95 meters.  Her post O2  saturation was 94 % and her post walk heart rate was 124 BPM.      Physical Exam:   Vitals:    Visit Vitals  /70 (BP 1 Location: Left arm, BP Patient Position: Sitting)   Pulse 74   Temp 97.8 °F (36.6 °C) (Oral)   Resp 20   Ht 5' 2\" (1.575 m)   Wt 220 lb 6.4 oz (100 kg)   SpO2 97%   BMI 40.31 kg/m²       General:  fatigued, cooperative  HEENT: PERRLA, EOMI, Sclera clear, anicteric and Oropharynx clear, no lesions  Neck:  supple, no significant adenopathy, carotids upstroke normal bilaterally, no bruits  CVP:  10 cm  ( + ) HJR  Cardiac: regular rate, regular rhythm, S1, S2 normal, S4 present and systolic murmur present  Chest: breath sounds clear and equal bilaterally  Abdomen: soft, non-tender. Bowel sounds normal. No masses, mild hepatomegaly. Extremity: edema trace bilaterally  Neuro: Alert and oriented to person, place, and time; normal strength and tone. Normal symmetric reflexes. Normal coordination and gait  Skin:   no rashes, no ecchymoses, no petechiae    Recent Labs: No flowsheet data found. [id number]  Sinus  Rhythm, rate 67 bpm   -  Nonspecific T-abnormality. Echocardiogram:   [facility] at [facility]  LVEF 40-45% with mild lateral wall and septal wall HK  Mod LAE  Grade II diastolic dysfunction  Mod MR  Mild to mod TR with RVSP 36-45 mmHg    Cardiac Catheterization:   2018 ([facility])  Severe native 3vd  Patent LIMA-LAd, patent SVG-PDA  LVEDP 18 mmHg    5/19/15 ([facility])  Significant 3vd  Patent SVG-PDA with lesion in distal segment of graft  Patent LIMA-LAD  Mod pulm HTN  Depressed CI    PCI to distal SVG-PDA  Integrity BMS 3x9    RA 5, RV 55/11, PA 53/20/31, PCWP 11  TPG 17, /16, CO 4.17, CI 1.98      Impression / Plan:   1. ICM - Stage C, NYHA Class IIIb-IV, LVEF 40-45%   Elevated OptiVol today   On metoprolol succinate 25 mg daily, losartan 50 mg daily, furosemide 40 mg daily   Admit to [facility] for diuresis   Repeat TTE   Recommend [facility] and [facility]   Low sodium diet   Daily weights   Strict I/O   Check [employer] - ATTR   Check CMP, CBC, NT proBNP, INR, TFTs, CRP, vitamin D     2. CAD s/p CABG in , s/p PCI to [address] in 10/16   On ASA, BB, statin   Recommend [facility]    3. HTN - Stage 1   On losartan, metoprolol, furosemide   Low sodium diet   Compliant with BiPAP    4. VT - s/p AICD   No shocks   No arrhythmias on interrogation today    5. WES - on BiPAP with oxygen   Compliant with BiPAP   Follows up with her sleep medicine physician at [facility] every 6 months    6.  T2DM complicated by neuropathy   On metformin 500 mg BID   Check Hga1c    7. Hypothyroidism   On levothyroxine 100 mcgs daily   Check TFTs    8. Depression   On Wellbutrin  mg     9. History of Tobacco Abuse - 1/2 ppd x 20 years, quit 5 years ago   Counseled re: importance of continued abstinence    10. Gout    On allopurinol and colchicine    11. Obesity (Body mass index is 40.31 kg/m². s/p gastric bypass in 2011    12. Osteoarthritis - s/p right TKR   On celebrex   S/p left knee injection in 8/2019        Lilliam Wasserman MD, [facility], [address] [place] [address]  Chief of Cardiology, [address] [place] [address] Director  [address] [place] [address]  [address] [facility], [address] [place] [address], [address] [place] [address]  Office [phone]  Fax [phone]

## 2020-01-30 NOTE — ED TRIAGE NOTES
Patient comes to the ER from the heart failure clinic. Reports SOB increasing since November.  +Lethargy

## 2020-01-30 NOTE — PROGRESS NOTES
Met with the patient in [facility] today. She shared per Dr. Law Carrasco she needs to be admitted today for diuresis. She is concerned about being admitted and shared she does not think she can afford the hospital bill for the admission. Aubree Gibson, DHARMESH talked with her about why Dr. Law Carrasco medically is recommending inpatient admission.  completed the care card application with the patient and shared if she would like that [employer] can screen her for Medicaid. Will notify the inpatient care manager the patient would like to be screened for Medicaid.      Yamileth Alexandra, MSW, LCSW    Clinical    [id number]

## 2020-01-30 NOTE — PROGRESS NOTES
Patient needs admission to [facility]. Ming Buchanan from transport called to say there are no available rooms right now and wanted to know if patient could be taken to ED and then to the unit when a bed is available. Consulted with Dr. Loulou Bryant and she stated this would be fine. Jessica Caal RN took patient to ED.

## 2020-01-30 NOTE — PROGRESS NOTES
[facility] medical records request faxed to [facility]. Medical records contacted today requesting records be faxed over asa since patient is in the office.

## 2020-01-31 ENCOUNTER — APPOINTMENT (OUTPATIENT)
Dept: NON INVASIVE DIAGNOSTICS | Age: 70
DRG: 286 | End: 2020-01-31
Attending: NURSE PRACTITIONER
Payer: MEDICARE

## 2020-01-31 LAB
25(OH)D3 SERPL-MCNC: 15 NG/ML (ref 30–100)
ALBUMIN SERPL-MCNC: 3.9 G/DL (ref 3.5–5)
ALBUMIN/GLOB SERPL: 1.1 {RATIO} (ref 1.1–2.2)
ALP SERPL-CCNC: 86 U/L (ref 45–117)
ALT SERPL-CCNC: 27 U/L (ref 12–78)
ANION GAP SERPL CALC-SCNC: 6 MMOL/L (ref 5–15)
AST SERPL-CCNC: 28 U/L (ref 15–37)
ATRIAL RATE: 59 BPM
AV VELOCITY RATIO: 0.38
BILIRUB SERPL-MCNC: 0.4 MG/DL (ref 0.2–1)
BNP SERPL-MCNC: 1825 PG/ML
BUN SERPL-MCNC: 37 MG/DL (ref 6–20)
BUN/CREAT SERPL: 19 (ref 12–20)
CALCIUM SERPL-MCNC: 10 MG/DL (ref 8.5–10.1)
CALCULATED P AXIS, ECG09: 46 DEGREES
CALCULATED R AXIS, ECG10: 11 DEGREES
CALCULATED T AXIS, ECG11: 136 DEGREES
CHLORIDE SERPL-SCNC: 105 MMOL/L (ref 97–108)
CHOLEST SERPL-MCNC: 238 MG/DL
CO2 SERPL-SCNC: 27 MMOL/L (ref 21–32)
CREAT SERPL-MCNC: 1.95 MG/DL (ref 0.55–1.02)
CRP SERPL-MCNC: 0.32 MG/DL (ref 0–0.6)
DIAGNOSIS, 93000: NORMAL
ECHO AO ROOT DIAM: 2.95 CM
ECHO AV AREA PEAK VELOCITY: 1.1 CM2
ECHO AV PEAK GRADIENT: 6.8 MMHG
ECHO AV PEAK VELOCITY: 129.92 CM/S
ECHO LA AREA 4C: 35.5 CM2
ECHO LA MAJOR AXIS: 6.05 CM
ECHO LA TO AORTIC ROOT RATIO: 2.05
ECHO LA VOL 2C: 163.28 ML (ref 22–52)
ECHO LA VOL 4C: 141.93 ML (ref 22–52)
ECHO LA VOL BP: 160.83 ML (ref 22–52)
ECHO LA VOL/BSA BIPLANE: 81.59 ML/M2 (ref 16–28)
ECHO LA VOLUME INDEX A2C: 82.83 ML/M2 (ref 16–28)
ECHO LA VOLUME INDEX A4C: 72 ML/M2 (ref 16–28)
ECHO LV E' LATERAL VELOCITY: 6.83 CM/S
ECHO LV E' SEPTAL VELOCITY: 4.96 CM/S
ECHO LV INTERNAL DIMENSION DIASTOLIC: 6.85 CM (ref 3.9–5.3)
ECHO LV INTERNAL DIMENSION SYSTOLIC: 5.6 CM
ECHO LV IVSD: 0.78 CM (ref 0.6–0.9)
ECHO LV MASS 2D: 214.7 G (ref 67–162)
ECHO LV MASS INDEX 2D: 108.9 G/M2 (ref 43–95)
ECHO LV POSTERIOR WALL DIASTOLIC: 0.52 CM (ref 0.6–0.9)
ECHO LVOT DIAM: 1.94 CM
ECHO LVOT PEAK GRADIENT: 1 MMHG
ECHO LVOT PEAK VELOCITY: 49.15 CM/S
ECHO MV A VELOCITY: 54.91 CM/S
ECHO MV AREA PHT: 2.7 CM2
ECHO MV E DECELERATION TIME (DT): 215.7 MS
ECHO MV E VELOCITY: 112 CM/S
ECHO MV E/A RATIO: 2.04
ECHO MV E/E' LATERAL: 16.4
ECHO MV E/E' RATIO (AVERAGED): 19.49
ECHO MV E/E' SEPTAL: 22.58
ECHO MV MAX VELOCITY: 111.2 CM/S
ECHO MV MEAN GRADIENT: 1.5 MMHG
ECHO MV MEAN INFLOW VELOCITY: 0.54 M/S
ECHO MV PEAK GRADIENT: 4.9 MMHG
ECHO MV PRESSURE HALF TIME (PHT): 80.6 MS
ECHO MV VTI: 33.15 CM
ECHO PV MAX VELOCITY: 71.72 CM/S
ECHO PV PEAK GRADIENT: 2.1 MMHG
ECHO TV REGURGITANT MAX VELOCITY: 218.06 CM/S
ECHO TV REGURGITANT PEAK GRADIENT: 19 MMHG
ERYTHROCYTE [DISTWIDTH] IN BLOOD BY AUTOMATED COUNT: 15.2 % (ref 11.5–14.5)
EST. AVERAGE GLUCOSE BLD GHB EST-MCNC: 169 MG/DL
GLOBULIN SER CALC-MCNC: 3.5 G/DL (ref 2–4)
GLUCOSE SERPL-MCNC: 140 MG/DL (ref 65–100)
HBA1C MFR BLD: 7.5 % (ref 4–5.6)
HCT VFR BLD AUTO: 33.7 % (ref 35–47)
HDLC SERPL-MCNC: 93 MG/DL
HDLC SERPL: 2.6 {RATIO} (ref 0–5)
HGB BLD-MCNC: 10.7 G/DL (ref 11.5–16)
INR PPP: 1 (ref 0.9–1.1)
IRON SATN MFR SERPL: 13 % (ref 20–50)
IRON SERPL-MCNC: 55 UG/DL (ref 35–150)
LACTATE SERPL-SCNC: 1 MMOL/L (ref 0.4–2)
LDLC SERPL CALC-MCNC: 119 MG/DL (ref 0–100)
LIPASE SERPL-CCNC: 67 U/L (ref 73–393)
LIPID PROFILE,FLP: ABNORMAL
LVFS 2D: 18.25 %
MAGNESIUM SERPL-MCNC: 2 MG/DL (ref 1.6–2.4)
MCH RBC QN AUTO: 31.7 PG (ref 26–34)
MCHC RBC AUTO-ENTMCNC: 31.8 G/DL (ref 30–36.5)
MCV RBC AUTO: 99.7 FL (ref 80–99)
MV DEC SLOPE: 5.19
NRBC # BLD: 0 K/UL (ref 0–0.01)
NRBC BLD-RTO: 0 PER 100 WBC
P-R INTERVAL, ECG05: 148 MS
PHOSPHATE SERPL-MCNC: 2.8 MG/DL (ref 2.6–4.7)
PLATELET # BLD AUTO: 268 K/UL (ref 150–400)
PMV BLD AUTO: 10.6 FL (ref 8.9–12.9)
POTASSIUM SERPL-SCNC: 4.1 MMOL/L (ref 3.5–5.1)
PROT SERPL-MCNC: 7.4 G/DL (ref 6.4–8.2)
PROTHROMBIN TIME: 10.3 SEC (ref 9–11.1)
Q-T INTERVAL, ECG07: 430 MS
QRS DURATION, ECG06: 76 MS
QTC CALCULATION (BEZET), ECG08: 425 MS
RBC # BLD AUTO: 3.38 M/UL (ref 3.8–5.2)
SODIUM SERPL-SCNC: 138 MMOL/L (ref 136–145)
TIBC SERPL-MCNC: 422 UG/DL (ref 250–450)
TRIGL SERPL-MCNC: 130 MG/DL (ref ?–150)
TSH SERPL DL<=0.05 MIU/L-ACNC: 1.07 UIU/ML (ref 0.36–3.74)
URATE SERPL-MCNC: 6.3 MG/DL (ref 2.6–6)
VENTRICULAR RATE, ECG03: 59 BPM
VLDLC SERPL CALC-MCNC: 26 MG/DL
WBC # BLD AUTO: 6.4 K/UL (ref 3.6–11)

## 2020-01-31 PROCEDURE — 82306 VITAMIN D 25 HYDROXY: CPT

## 2020-01-31 PROCEDURE — 83880 ASSAY OF NATRIURETIC PEPTIDE: CPT

## 2020-01-31 PROCEDURE — 86140 C-REACTIVE PROTEIN: CPT

## 2020-01-31 PROCEDURE — 74011000250 HC RX REV CODE- 250: Performed by: NURSE PRACTITIONER

## 2020-01-31 PROCEDURE — 74011250637 HC RX REV CODE- 250/637: Performed by: NURSE PRACTITIONER

## 2020-01-31 PROCEDURE — 83540 ASSAY OF IRON: CPT

## 2020-01-31 PROCEDURE — 77010033678 HC OXYGEN DAILY

## 2020-01-31 PROCEDURE — 74011250636 HC RX REV CODE- 250/636: Performed by: NURSE PRACTITIONER

## 2020-01-31 PROCEDURE — 74011000258 HC RX REV CODE- 258: Performed by: NURSE PRACTITIONER

## 2020-01-31 PROCEDURE — 94760 N-INVAS EAR/PLS OXIMETRY 1: CPT

## 2020-01-31 PROCEDURE — 83695 ASSAY OF LIPOPROTEIN(A): CPT

## 2020-01-31 PROCEDURE — 84443 ASSAY THYROID STIM HORMONE: CPT

## 2020-01-31 PROCEDURE — 99233 SBSQ HOSP IP/OBS HIGH 50: CPT | Performed by: INTERNAL MEDICINE

## 2020-01-31 PROCEDURE — 83735 ASSAY OF MAGNESIUM: CPT

## 2020-01-31 PROCEDURE — 80061 LIPID PANEL: CPT

## 2020-01-31 PROCEDURE — 85027 COMPLETE CBC AUTOMATED: CPT

## 2020-01-31 PROCEDURE — 83690 ASSAY OF LIPASE: CPT

## 2020-01-31 PROCEDURE — 93356 MYOCRD STRAIN IMG SPCKL TRCK: CPT

## 2020-01-31 PROCEDURE — 80053 COMPREHEN METABOLIC PANEL: CPT

## 2020-01-31 PROCEDURE — 84550 ASSAY OF BLOOD/URIC ACID: CPT

## 2020-01-31 PROCEDURE — 83605 ASSAY OF LACTIC ACID: CPT

## 2020-01-31 PROCEDURE — 85610 PROTHROMBIN TIME: CPT

## 2020-01-31 PROCEDURE — 83036 HEMOGLOBIN GLYCOSYLATED A1C: CPT

## 2020-01-31 PROCEDURE — 84100 ASSAY OF PHOSPHORUS: CPT

## 2020-01-31 PROCEDURE — 36415 COLL VENOUS BLD VENIPUNCTURE: CPT

## 2020-01-31 PROCEDURE — 65660000001 HC RM ICU INTERMED STEPDOWN

## 2020-01-31 RX ORDER — MILRINONE LACTATE 0.2 MG/ML
0.2 INJECTION, SOLUTION INTRAVENOUS CONTINUOUS
Status: DISPENSED | OUTPATIENT
Start: 2020-01-31 | End: 2020-02-04

## 2020-01-31 RX ORDER — MELATONIN
2000 DAILY
Status: DISCONTINUED | OUTPATIENT
Start: 2020-01-31 | End: 2020-02-05 | Stop reason: HOSPADM

## 2020-01-31 RX ORDER — EZETIMIBE 10 MG/1
10 TABLET ORAL DAILY
Status: DISCONTINUED | OUTPATIENT
Start: 2020-01-31 | End: 2020-02-05 | Stop reason: HOSPADM

## 2020-01-31 RX ORDER — LIDOCAINE 50 MG/G
1 PATCH TOPICAL EVERY 24 HOURS
Status: DISCONTINUED | OUTPATIENT
Start: 2020-01-31 | End: 2020-02-05 | Stop reason: HOSPADM

## 2020-01-31 RX ORDER — SPIRONOLACTONE 25 MG/1
12.5 TABLET ORAL DAILY
Status: DISCONTINUED | OUTPATIENT
Start: 2020-01-31 | End: 2020-02-01

## 2020-01-31 RX ORDER — BUMETANIDE 0.25 MG/ML
2 INJECTION INTRAMUSCULAR; INTRAVENOUS 2 TIMES DAILY
Status: DISCONTINUED | OUTPATIENT
Start: 2020-01-31 | End: 2020-02-02

## 2020-01-31 RX ADMIN — MELATONIN 2 TABLET: at 11:55

## 2020-01-31 RX ADMIN — ATORVASTATIN CALCIUM 80 MG: 40 TABLET, FILM COATED ORAL at 21:06

## 2020-01-31 RX ADMIN — ATORVASTATIN CALCIUM 80 MG: 40 TABLET, FILM COATED ORAL at 00:22

## 2020-01-31 RX ADMIN — GABAPENTIN 600 MG: 600 TABLET, FILM COATED ORAL at 18:07

## 2020-01-31 RX ADMIN — BUMETANIDE 2 MG: 0.25 INJECTION INTRAMUSCULAR; INTRAVENOUS at 11:55

## 2020-01-31 RX ADMIN — Medication 5 ML: at 14:00

## 2020-01-31 RX ADMIN — GABAPENTIN 600 MG: 600 TABLET, FILM COATED ORAL at 09:21

## 2020-01-31 RX ADMIN — METFORMIN HYDROCHLORIDE 500 MG: 500 TABLET ORAL at 09:21

## 2020-01-31 RX ADMIN — BUPROPION HYDROCHLORIDE 150 MG: 150 TABLET, EXTENDED RELEASE ORAL at 09:21

## 2020-01-31 RX ADMIN — ASPIRIN 81 MG 81 MG: 81 TABLET ORAL at 09:21

## 2020-01-31 RX ADMIN — EZETIMIBE 10 MG: 10 TABLET ORAL at 18:07

## 2020-01-31 RX ADMIN — LOSARTAN POTASSIUM 50 MG: 50 TABLET, FILM COATED ORAL at 09:21

## 2020-01-31 RX ADMIN — IRON SUCROSE 200 MG: 20 INJECTION, SOLUTION INTRAVENOUS at 18:07

## 2020-01-31 RX ADMIN — CELECOXIB 200 MG: 200 CAPSULE ORAL at 09:21

## 2020-01-31 RX ADMIN — CLOPIDOGREL BISULFATE 75 MG: 75 TABLET, FILM COATED ORAL at 09:21

## 2020-01-31 RX ADMIN — SPIRONOLACTONE 12.5 MG: 25 TABLET ORAL at 18:07

## 2020-01-31 RX ADMIN — GABAPENTIN 600 MG: 600 TABLET, FILM COATED ORAL at 21:06

## 2020-01-31 RX ADMIN — BUMETANIDE 2 MG: 0.25 INJECTION INTRAMUSCULAR; INTRAVENOUS at 18:07

## 2020-01-31 RX ADMIN — Medication 10 ML: at 07:24

## 2020-01-31 RX ADMIN — MILRINONE LACTATE IN DEXTROSE 0.12 MCG/KG/MIN: 200 INJECTION, SOLUTION INTRAVENOUS at 18:41

## 2020-01-31 RX ADMIN — LEVOTHYROXINE SODIUM 100 MCG: 100 TABLET ORAL at 07:24

## 2020-01-31 RX ADMIN — Medication 10 ML: at 21:06

## 2020-01-31 RX ADMIN — DOCUSATE SODIUM 100 MG: 100 CAPSULE, LIQUID FILLED ORAL at 21:06

## 2020-01-31 RX ADMIN — ALLOPURINOL 100 MG: 100 TABLET ORAL at 09:21

## 2020-01-31 RX ADMIN — ACETAMINOPHEN 1000 MG: 500 TABLET ORAL at 18:06

## 2020-01-31 RX ADMIN — GABAPENTIN 600 MG: 600 TABLET, FILM COATED ORAL at 00:22

## 2020-01-31 NOTE — NURSE NAVIGATOR
Chart reviewed by Heart Failure Nurse Navigator. Heart Failure database completed. EF:  pending    ACEi/ARB/ARNi: Cozaar    BB: Toprol XL    Aldosterone Antagonist: echo pending    Obstructive Sleep Apnea Screening: Yes/ bipap   STOP-BANG score:   Referred to Sleep Medicine:     CRT AICD. NYHA Functional Class IIB      Heart Failure Teach Back in Patient Education. Heart Failure Avoiding Triggers on Discharge Instructions. Cardiologist: [facility]      Post discharge follow up phone call to be made within 48-72 hours of discharge.

## 2020-01-31 NOTE — PROGRESS NOTES
Problem: Falls - Risk of  Goal: *Absence of Falls  Description  Document Nick Perez Fall Risk and appropriate interventions in the flowsheet. Outcome: Progressing Towards Goal  Note: Fall Risk Interventions:  Mobility Interventions: Assess mobility with egress test, Communicate number of staff needed for ambulation/transfer, OT consult for ADLs, Patient to call before getting OOB, PT Consult for assist device competence, Utilize walker, cane, or other assistive device, Utilize gait belt for transfers/ambulation, Strengthening exercises (ROM-active/passive), PT Consult for mobility concerns         Medication Interventions: Assess postural VS orthostatic hypotension, Evaluate medications/consider consulting pharmacy, Patient to call before getting OOB, Teach patient to arise slowly, Utilize gait belt for transfers/ambulation    Elimination Interventions: Call light in reach, Elevated toilet seat, Patient to call for help with toileting needs, Toilet paper/wipes in reach, Toileting schedule/hourly rounds, Stay With Me (per policy)    History of Falls Interventions: Consult care management for discharge planning, Door open when patient unattended, Evaluate medications/consider consulting pharmacy, Investigate reason for fall       Bedside shift change report given to [address] [place], [place] [address]sukhjinder Edwards (oncoming nurse) by Parveen Che RN (offgoing nurse). Report included the following information SBAR, Kardex, ED Summary, Procedure Summary, Intake/Output, MAR, Recent Results, Med Rec Status, Cardiac Rhythm Sinus Tremayne Her, Alarm Parameters  and Quality Measures.

## 2020-01-31 NOTE — PROGRESS NOTES
Transitions of Care Plan:    Chart review and IDR evaluation    Patient is direct admit from [facility] for evaluation of advanced therapies    Reason for Admission:   Acute decompensated heart failure                   RUR Score:          8           Plan for utilizing home health:      No need at time of assessment. Current Advanced Directive/Advance Care Plan: Not on file                         Transition of Care Plan:                      Chart review initial assessment:    1. ADLs, self-care, orientation baseline - Cane for ambulation and BiPap. Independent with self-care. Alert and oriented. 2.  Social - Resides at address on file. 3.  Disposition - Anticipate home with family assistance. Manuel Morse, MPH    Care Management Interventions  PCP Verified by CM:  Yes  Palliative Care Criteria Met (RRAT>21 & CHF Dx)?: No  Mode of Transport at Discharge: Self  Transition of Care Consult (CM Consult): Discharge Planning  MyChart Signup: No  Discharge Durable Medical Equipment: No  Health Maintenance Reviewed: Yes  Physical Therapy Consult: No  Occupational Therapy Consult: No  Speech Therapy Consult: No  Current Support Network: Relative's Home  Confirm Follow Up Transport: Self  Discharge Location  Discharge Placement: Home

## 2020-01-31 NOTE — DIABETES MGMT
KENDALL CASTRO  CLINICAL NURSE SPECIALIST CONSULT  [facility]    Presentation   Lizet Young is a 68 y.o. female admitted with SOB; HF exacerbation. Current clinical course has been uncomplicated. Patient has known diabetes. Consulted by Provider for advanced diabetes nursing assessment and care, specifically related to   [] Transitioning off Ardyth Manzanilla   [x] Inpatient management strategy  [x] Home management assessment  [] Survival skill education      Diabetes-related medical history  Acute complications  NONE  Neurological complications  NONE  Microvascular disease  NONE  Macrovascular disease  CAD  Other associated conditions     HTN, CAD, COPD, hypothyroid, obesity ,WES, gout    Diabetes medication history  Drug class Currently in use Discontinued Never used   Biguanide Metformin 500mg bid     DDP-4 inhibitor       Sulfonylurea      Thiazolidinedione      GLP-1 RA      SGLT-2 inhibitors      Basal insulin      Bolus insulin        Subjective   I met Ms. Rosa Pugh today. She was very open about her struggle with managing her diabetes and weight loss. Lately she has not been eating what she should, not cooking and eating processed foods. Patient reports the following home diabetes self-care practices:  Eating pattern  [x] Breakfast Bagel, english muffin, doesn't eat bfast much  [x] Lunch  Chicken/ tuna fish sand. [x] Dinner  Same as lunch  [x] Snacks \" I like the chips too much\"  [x] Beverages Sugar free drinks, water with sugar free addititves  Physical activity pattern-has been difficult for her to do lately due to her recent respiratory illness and HF exac. She states does have knee problems as well. Monitoring pattern  [x] Breakfast most days and sometimes twice a day.   Usually runs 140s  Taking medications pattern  [x] Consistent administration  [x] Affordable    Social determinants of health impacting diabetes self-management practices   Concerned that you need to know more about how to stay healthy with diabetes    Objective   Physical exam  General  Alert, oriented and in no acute distress   Conversant and cooperative  Vital Signs   Visit Vitals  /51   Pulse 67   Temp 97.7 °F (36.5 °C)   Resp 13   Wt 97.9 kg (215 lb 13.3 oz)   SpO2 96%   BMI 39.48 kg/m²     Skin  Warm and dry. Neck   deferred  Heart   Regular rate and rhythm. No murmurs, rubs or gallops  Lungs  Clear without rales or rhonchi  Extremities Diabetic foot exam:    Left Foot     Visual Exam: normal    Pulse DP: 2+ (normal)   Filament test: normal sensation    Vibratory sensation: normal  Right Foot   Visual Exam: normal    Pulse DP: 2+ (normal)   Filament test: normal sensation    Vibratory sensation: normal DP & PT pulses +2. Laboratory  Lab Results   Component Value Date/Time    Hemoglobin A1c 7.5 (H) 01/31/2020 12:35 AM     Lab Results   Component Value Date/Time    LDL, calculated 119 (H) 01/31/2020 12:35 AM     Lab Results   Component Value Date/Time    Creatinine 1.95 (H) 01/31/2020 12:35 AM     Lab Results   Component Value Date/Time    Sodium 138 01/31/2020 12:35 AM    Potassium 4.1 01/31/2020 12:35 AM    Chloride 105 01/31/2020 12:35 AM    CO2 27 01/31/2020 12:35 AM    Anion gap 6 01/31/2020 12:35 AM    Glucose 140 (H) 01/31/2020 12:35 AM    BUN 37 (H) 01/31/2020 12:35 AM    Creatinine 1.95 (H) 01/31/2020 12:35 AM    BUN/Creatinine ratio 19 01/31/2020 12:35 AM    GFR est AA 31 (L) 01/31/2020 12:35 AM    GFR est non-AA 25 (L) 01/31/2020 12:35 AM    Calcium 10.0 01/31/2020 12:35 AM    Bilirubin, total 0.4 01/31/2020 12:35 AM    AST (SGOT) 28 01/31/2020 12:35 AM    Alk.  phosphatase 86 01/31/2020 12:35 AM    Protein, total 7.4 01/31/2020 12:35 AM    Albumin 3.9 01/31/2020 12:35 AM    Globulin 3.5 01/31/2020 12:35 AM    A-G Ratio 1.1 01/31/2020 12:35 AM    ALT (SGPT) 27 01/31/2020 12:35 AM     Lab Results   Component Value Date/Time    ALT (SGPT) 27 01/31/2020 12:35 AM       Blood glucose pattern  BG trends since admission 138, 140. Only on SSI and took Metformin this morning      Assessment and Plan   Nursing Diagnosis Risk for unstable blood glucose pattern   Nursing Intervention Domain 4514 Decision-making Support   Nursing Interventions Examined current inpatient diabetes control   Explored factors facilitating and impeding inpatient management  Identified self-management practices impeding diabetes control  Explored corrective strategies with patient and responsible inpatient provider   Informed patient of rational for insulin strategy while hospitalized  Instructed patient in importance of healthy diet ; will teach her some nutrition this afternoon. Evaluation   This woman, with Type 2 has achieved inpatient blood glucose target of 140-180mg/dl. Inpatient blood glucose management has been impacted by  [] Kidney dysfunction  [] Erratic meal consumption  [] Glucocorticoid use  []  ___________________    Basal insulin isn't in use. Bolus insulin isn't in use. Patient is eating meals  Corrective insulin is in use. Recommendations   Discontinue Metformin while in hospital. May want to consider discontinuing it all together due to her CHF history. Looking ahead to discharge we may need to consider either keeping her on daily insulin  Along with a GLP1.       Recommend her seeing endocrinologist      If BG trends >200 while hospitalized recommend starting:  Recommend:  Basal insulin   [x] 0.4 units/kg/D (insulin resistant dosing; her BMI is 39)   start Lantus insulin 30 units daily    Bolus insulin (IF EATING WELL)    [x] Insulin-resistant sensitivity (BMI > 27)  =6 units Humalog with each meal    Corrective insulin  [x] Insulin-resistant sensitivity (BMI >27)  Insulin Resistant Correctional Scale    200-249: 4 units Humalog  250-299: 8 units Humalog  300-349: 12 units Humalog  350-399: 16 units Humalog  over 400: 20 units Humalog    DO NOT hold correctional dosing if NPO  Give in addition of mealtime insulin      Referral   [] Behavioral health services  [] Inpatient nutrition services  [] Pharmacy services for medication management  [x] Diabetes Self-Management Training through [facility] (Phone [phone] to schedule appointment)    Billing Code(s)     [x] 70909 IP subsequent hospital care - 45 minutes   [] 82664 IP subsequent hospital care - 30 minutes   [] 93754 IP subsequent hospital care - 15 minutes  Signed By: Arleen Bolaños RN   Clinical Nurse Specialist  [facility]  [phone]    January 31, 2020

## 2020-01-31 NOTE — ROUTINE PROCESS
TRANSFER - OUT REPORT:    Verbal report given to [facility] RN(name) on Lorrain Hodgkins  being transferred to (unit) for routine progression of care       Report consisted of patients Situation, Background, Assessment and   Recommendations(SBAR). Information from the following report(s) SBAR, ED Summary, Procedure Summary, MAR and Recent Results was reviewed with the receiving nurse. Lines:   Peripheral IV 01/30/20 Left Antecubital (Active)        Opportunity for questions and clarification was provided.

## 2020-01-31 NOTE — PROGRESS NOTES
Admission Medication Reconciliation:    Information obtained from:  Patient   RxQuery data available¹:  YES    Comments/Recommendations: Updated PTA meds/reviewed patient's allergies. 1)  Patient provided med history and medication list from home. AM meds taken this morning     2)  Medication changes (since last review): Added  - None    Adjusted  - Bupropion clarified as once daily     Removed  - none     ¹RxQuery pharmacy benefit data reflects medications filled and processed through the patient's insurance, however   this data does NOT capture whether the medication was picked up or is currently being taken by the patient. Allergies:  Crestor [rosuvastatin] and Lisinopril    Significant PMH/Disease States:   Past Medical History:   Diagnosis Date    Chronic obstructive pulmonary disease ([facility] [address]     Congestive heart failure ([facility] [address]     Diabetes ([facility] [address]     Hypertension     Sleep apnea 1996     Chief Complaint for this Admission:    Chief Complaint   Patient presents with    Shortness of Breath     Prior to Admission Medications:   Prior to Admission Medications   Prescriptions Last Dose Informant Taking?   acetaminophen (TYLENOL ARTHRITIS PAIN) 650 mg TbER 1/30/2020 at Unknown time  Yes   Sig: Take 1,300 mg by mouth two (2) times a day. allopurinol (ZYLOPRIM) 100 mg tablet 1/30/2020 at Unknown time  Yes   Sig: Take 100 mg by mouth daily. aspirin 81 mg chewable tablet 1/30/2020 at Unknown time  Yes   Sig: Take 81 mg by mouth daily. atorvastatin (LIPITOR) 80 mg tablet 1/30/2020 at Unknown time  Yes   Sig: TAKE 1 TABLET BY MOUTH AT BEDTIME   buPROPion SR (WELLBUTRIN SR) 150 mg SR tablet 1/30/2020 at Unknown time  Yes   Sig: Take 150 mg by mouth daily. celecoxib (CELEBREX) 200 mg capsule 1/30/2020 at Unknown time  Yes   Sig: TAKE 1 CAPSULE BY MOUTH ONCE DAILY   clopidogrel (PLAVIX) 75 mg tab 1/30/2020 at Unknown time  Yes   Sig: Take 75 mg by mouth daily.    colchicine (COLCRYS) 0.6 mg tablet   Yes   Sig: As needed   gabapentin (NEURONTIN) 600 mg tablet 1/30/2020 at Unknown time  Yes   Sig: Take 600 mg by mouth three (3) times daily. levothyroxine (SYNTHROID) 100 mcg tablet 1/30/2020 at Unknown time  Yes   Sig: Take 100 mcg by mouth Daily (before breakfast). losartan (COZAAR) 50 mg tablet 1/30/2020 at Unknown time  Yes   Sig: Take 50 mg by mouth daily. metFORMIN (GLUMETZA ER) 500 mg TG24 24 hour tablet 1/30/2020 at Unknown time  Yes   Sig: Take 1 Each by mouth two (2) times a day. metoprolol succinate (TOPROL-XL) 25 mg XL tablet 1/30/2020 at Unknown time  Yes   Sig: Take 25 mg by mouth daily. Facility-Administered Medications: None       Please contact the main inpatient pharmacy with any questions or concerns at [phone] and we will direct you to the clinical pharmacist covering this patient's care while in-house.    Laura Barnett, CLYDED

## 2020-01-31 NOTE — CDMP QUERY
Patient admitted with SOB. Noted documentation of \" Pt w worsening CHF with dyspnea on exertion and orthopnea. Hemodynamically stable in the emergency department, started on IV lasix for diuresis per the ED MD, and  
Per the H&P documentation stated, \" being admitted for further evaluation of her chronic systolic heart failure. \" If possible, please document in progress notes and d/c summary if you are evaluating and /or treating any of the following: 
 
=>Acute on chronic systolic heart failure, POA 
=>Chronic systolic heart failure only, POA 
=>Other (please specify) =>Unable to determine The medical record reflects the following: 
  Risk Factors: Hx. Chronic systolic heart failure, HTN Clinical Indicators:  
Per ED MD-Pt w hx of CHF presents for cardiology admission for worsening dyspnea on exertion not responding to oral diuretics. BNP 1313 CXR-no acute process Per H&P-  She is being admitted for further evaluation of her chronic systolic heart failure. BNP 1825 Treatment: IV Lasix 40 mg in ED, IV bumex 2 mg bid, monitoring of labwork, I&O Thank you, Dayanna Mcqueen BSN, RN 
[facility]  
[phone]

## 2020-01-31 NOTE — H&P
[facility] Consult Note      DOS:   1/30/2020  NAME:  Manuel Van   MRN:   [id number]   REFERRING PROVIDER:  Matilda Gold NP  PRIMARY CARE PHYSICIAN: Matilda Gold NP  PRIMARY CARDIOLOGIST: None      Chief Complaint:   Chief Complaint   Patient presents with    Shortness of Breath       HPI: Manuel Van is a 68y.o. year old female with a history of HTN, HLD, WES, obesity (Body mass index is 40.31 kg/m².) s/p gastric bypass in 2011, T2DM, hypothyroidism, COPD, CAD s/p CABG in 1997, s/p PCI to [address] in 5/15, ICM, VT, s/p AICD (Medtr[employer]),  anxiety, and depression who presents for further evaluation of her chronic systolic heart failure.     Ms. Ryan Cedillo recalls having a viral syndrome in the Fall and has not felt well since then. She uses oxygen with her BiPAP every night and sleeps on two pillows. Her activity level is diminished. She finds herself short of breath with simply talking. In walking to the clinic today, she had to stop several times to catch her breath. She denies palpitations, presyncope, and syncope. She was advised to go to ED for further evaluation, labs were drawn and she received IV lasix. She is being admitted for further evaluation of her chronic systolic heart failure. Interrogation of AICD (Medtronic)(1/30/2020):  1 episode of NSVT - 1/4/2020  Patient Activity 0.8 hour/day  Total Ventricular Pacing < 0.1%  High OptiVol Fluid Index - above threshold    Impression / Plan:   Heart Failure Status: NYHA Class IIIb-IV    Impression / Plan:   1.  ICM - Stage C, NYHA Class IIIb-IV, LVEF 40-45%             Elevated OptiVol today             On metoprolol succinate 25 mg daily, losartan 50 mg daily   Admit to [facility] for diuresis             Received IV lasix 40mg in ED             Repeat TTE             Recommend LHC and RHC             EKG             Low sodium diet             Daily weights             Strict I/O             Check [employer]itate - ATTR             Check CMP, CBC, NT proBNP, INR, TFTs, CRP, vitamin D               2. CAD s/p CABG in , s/p PCI to [facility] in 10/16             On ASA, BB, statin             Recommend C     3. HTN - Stage 1             On losartan, metoprolol, furosemide             Low sodium diet             Compliant with BiPAP     4. VT - s/p AICD             No shocks             No arrhythmias on interrogation today     5. WES - on BiPAP with oxygen            BiPAP QHS     6. U9MB complicated by neuropathy             On metformin 500 mg BID             Check Hga1c    DTC consult    7. Hypothyroidism             On levothyroxine 100 mcgs daily             Check TFTs     8. Depression             On Wellbutrin  mg      9. History of Tobacco Abuse - / ppd x 20 years, quit 5 years ago             Counseled re: importance of continued abstinence     10. Gout              On allopurinol and colchicine    Check Uric acid level    11. Obesity -s/p gastric bypass in    Body mass index is 40.31 kg/m²    12. Osteoarthritis - s/p right TKR             On celebrex & tylenol for arthritis              S/p left knee injection in 2019    13.  KEYONNA on CKD- 3   CR 2.08   Avoid Nephrotoxic agents   Monitor labs      History:  Past Medical History:   Diagnosis Date    Chronic obstructive pulmonary disease ([address]     Congestive heart failure ([address]     Diabetes ([address]     Hypertension     Sleep apnea      Past Surgical History:   Procedure Laterality Date    CARDIAC SURG PROCEDURE UNLIST  2018    cardiac cath - Left    HX ARTHROPLASTY  2105    knee    HX  SECTION      HX CORONARY ARTERY BYPASS GRAFT  1997    HX CORONARY STENT PLACEMENT  2016    HX HERNIA REPAIR      HX IMPLANTABLE CARDIOVERTER DEFIBRILLATOR      HX ORTHOPAEDIC      LAP GASTRIC BYPASS/KERLINE-EN-Y  2011    lap band     Social History     Socioeconomic History    Marital status:      Spouse name: Not on file    Number of children: Not on file    Years of education: Not on file    Highest education level: Not on file   Occupational History    Not on file   Social Needs    Financial resource strain: Not on file    Food insecurity:     Worry: Not on file     Inability: Not on file    Transportation needs:     Medical: Not on file     Non-medical: Not on file   Tobacco Use    Smoking status: Former Smoker     Types: Cigarettes    Smokeless tobacco: Never Used   Substance and Sexual Activity    Alcohol use: Not Currently    Drug use: Not Currently    Sexual activity: Not Currently   Lifestyle    Physical activity:     Days per week: Not on file     Minutes per session: Not on file    Stress: Not on file   Relationships    Social connections:     Talks on phone: Not on file     Gets together: Not on file     Attends Muslim service: Not on file     Active member of club or organization: Not on file     Attends meetings of clubs or organizations: Not on file     Relationship status: Not on file    Intimate partner violence:     Fear of current or ex partner: Not on file     Emotionally abused: Not on file     Physically abused: Not on file     Forced sexual activity: Not on file   Other Topics Concern    Not on file   Social History Narrative    Not on file     History reviewed. No pertinent family history.     Current Medications:   Current Facility-Administered Medications   Medication Dose Route Frequency Provider Last Rate Last Dose    acetaminophen (TYLENOL) SR tablet 1,300 mg  1,300 mg Oral BID Nassar Media, NP        [START ON 1/31/2020] allopurinoL (ZYLOPRIM) tablet 100 mg  100 mg Oral DAILY Nassar Media, NP        [START ON 1/31/2020] aspirin chewable tablet 81 mg  81 mg Oral DAILY Nassar Media, NP        atorvastatin (LIPITOR) tablet 80 mg  80 mg Oral QHS Nassar Radha, NP        [START ON 1/31/2020] buPROPion SR [facility] SR) tablet 150 mg  150 mg Oral DAILY Ann Marie Elkins NP        Tiara Stuartck ON 1/31/2020] clopidogreL (PLAVIX) tablet 75 mg  75 mg Oral DAILY Ann Marie Elkins NP        [START ON 1/31/2020] celecoxib (CELEBREX) capsule 200 mg  200 mg Oral DAILY Ann Marie Elkins NP        [START ON 1/31/2020] colchicine tablet 0.6 mg  0.6 mg Oral DAILY Ann Marie Elkins NP        gabapentin (NEURONTIN) tablet 600 mg  600 mg Oral TID Ann Marie Elkins NP        [START ON 1/31/2020] levothyroxine (SYNTHROID) tablet 100 mcg  100 mcg Oral ACB Ann Marie Elkins NP        [START ON 1/31/2020] losartan (COZAAR) tablet 50 mg  50 mg Oral DAILY Ann Marie Elkins NP        metFORMIN (GLUCOPHAGE) tablet 500 mg  500 mg Oral BID WITH MEALS Ann Marie Elkins NP        [START ON 1/31/2020] metoprolol succinate (TOPROL-XL) XL tablet 25 mg  25 mg Oral DAILY Ann Marie Elkins NP        bumetanide (BUMEX) injection 2 mg  2 mg IntraVENous ONCE Ann Marie Elkins NP        docusate sodium (COLACE) capsule 100 mg  100 mg Oral PRN Ann Marie Elkins NP        sodium chloride (NS) flush 5-40 mL  5-40 mL IntraVENous Q8H Ann Marie Elkins NP        sodium chloride (NS) flush 5-40 mL  5-40 mL IntraVENous PRN Ann Marie Elkins NP         Current Outpatient Medications   Medication Sig Dispense Refill    acetaminophen (TYLENOL ARTHRITIS PAIN) 650 mg TbER Take 1,300 mg by mouth two (2) times a day.  allopurinol (ZYLOPRIM) 100 mg tablet Take 100 mg by mouth daily.  celecoxib (CELEBREX) 200 mg capsule TAKE 1 CAPSULE BY MOUTH ONCE DAILY      buPROPion SR (WELLBUTRIN SR) 150 mg SR tablet Take 150 mg by mouth.  clopidogrel (PLAVIX) 75 mg tab Take 75 mg by mouth daily.  colchicine (COLCRYS) 0.6 mg tablet As needed      gabapentin (NEURONTIN) 600 mg tablet Take 600 mg by mouth three (3) times daily.  levothyroxine (SYNTHROID) 100 mcg tablet Take 100 mcg by mouth Daily (before breakfast).       losartan (COZAAR) 50 mg tablet Take 50 mg by mouth daily.  aspirin 81 mg chewable tablet Take 81 mg by mouth daily.  metoprolol succinate (TOPROL-XL) 25 mg XL tablet Take 25 mg by mouth daily.  metFORMIN (GLUMETZA ER) 500 mg TG24 24 hour tablet Take 1 Each by mouth two (2) times a day.  atorvastatin (LIPITOR) 80 mg tablet TAKE 1 TABLET BY MOUTH AT BEDTIME 90 Tab 0       Allergies: Allergies   Allergen Reactions    Crestor [Rosuvastatin] Other (comments)     Causes muscle cramps    Lisinopril Cough       ROS:    Review of Systems   Constitutional: Positive for malaise/fatigue. HENT: Negative. Eyes:        +cataracts, wears glasses   Respiratory: Positive for cough and shortness of breath. Cardiovascular: Positive for orthopnea, leg swelling and PND.        +MARINA   Gastrointestinal: Positive for constipation, nausea and vomiting.        +anorexia   Musculoskeletal: Positive for joint pain. +osteoarthritis   Neurological: Positive for weakness. Endo/Heme/Allergies: Negative. Psychiatric/Behavioral: Positive for depression. The patient is nervous/anxious. Physical Exam:   Physical Exam  Vitals signs and nursing note reviewed. Constitutional:       Appearance: She is obese. HENT:      Mouth/Throat:      Mouth: Mucous membranes are moist.   Neck:      Comments: JVP +10CM  Cardiovascular:      Rate and Rhythm: Normal rate and regular rhythm. Heart sounds: S1 normal and S2 normal. Murmur present. Systolic murmur present. Gallop present. S4 sounds present. Pulmonary:      Effort: Tachypnea present. Breath sounds: Normal breath sounds. Abdominal:      Palpations: Abdomen is soft. Comments: obese   Musculoskeletal:      Right lower leg: Edema present. Left lower leg: Edema present. Skin:     General: Skin is warm and dry. Neurological:      Mental Status: She is alert and oriented to person, place, and time. Motor: Weakness present. Psychiatric:         Attention and Perception: Attention normal.         Mood and Affect: Mood is anxious. Speech: Speech normal.         Behavior: Behavior normal. Behavior is cooperative. Cognition and Memory: Cognition normal.         Vitals:   Visit Vitals  /70   Pulse 60   Temp 97.4 °F (36.3 °C)   Resp 20   SpO2 97%         Temp (24hrs), Av.6 °F (36.4 °C), Min:97.4 °F (36.3 °C), Max:97.8 °F (36.6 °C)          Admission Weight: Last Weight           Intake / Output / Drain:  Last 24 hrs.: No intake or output data in the 24 hours ending 20 1907        Oxygen Therapy:  Oxygen Therapy  O2 Sat (%): 97 % (20 1605)  O2 Device: Room air (20 1605)    CXR:   CXR Results  (Last 48 hours)               20 1733  XR CHEST PORT Final result    Impression:  IMPRESSION: No acute findings. Narrative:  EXAM: XR CHEST PORT       INDICATION: SOB, hx of CHF       COMPARISON: None. FINDINGS: A portable AP radiograph of the chest was obtained at 1719 hours. The   pacer seen in place. Patient is status post median sternotomy and CABG. The   patient is on a cardiac monitor. The lungs are clear. The cardiac and   mediastinal contours and pulmonary vascularity are normal.  The bones and soft   tissues are grossly within normal limits. Recent Labs:   Labs Latest Ref Rng & Units 2020   WBC 3.6 - 11.0 K/uL 7.9   RBC 3.80 - 5.20 M/uL 3.24(L)   Hemoglobin 11.5 - 16.0 g/dL 10. 1(L)   Hematocrit 35.0 - 47.0 % 31. 9(L)   MCV 80.0 - 99.0 FL 98.5   Platelets 697 - 048 K/uL 263   Lymphocytes 12 - 49 % 29   Monocytes 5 - 13 % 6   Eosinophils 0 - 7 % 4   Basophils 0 - 1 % 1   Albumin 3.5 - 5.0 g/dL 3.6   Calcium 8.5 - 10.1 MG/DL 9.9   SGOT 15 - 37 U/L 25   Glucose 65 - 100 mg/dL 138(H)   BUN 6 - 20 MG/DL 40(H)   Creatinine 0.55 - 1.02 MG/DL 1.98(H)   Sodium 136 - 145 mmol/L 137   Potassium 3.5 - 5.1 mmol/L 4.2   Some recent data might be hidden     EKG:   EKG Results Procedure 720 Value Units Date/Time    EKG, 12 LEAD, INITIAL [[id number]]     Order Status:  Sent     EKG 12 LEAD INITIAL [[id number]] Collected:  01/30/20 1615    Order Status:  Completed Updated:  01/30/20 1616     Ventricular Rate 59 BPM      Atrial Rate 59 BPM      P-R Interval 148 ms      QRS Duration 76 ms      Q-T Interval 430 ms      QTC Calculation (Bezet) 425 ms      Calculated P Axis 46 degrees      Calculated R Axis 11 degrees      Calculated T Axis 136 degrees      Diagnosis --     Sinus bradycardia with occasional premature ventricular complexes  Inferior infarct , age undetermined  T wave abnormality, consider lateral ischemia  No previous ECGs available          Echocardiogram:   [facility]/9/[facility] at [facility]  LVEF 40-45% with mild lateral wall and septal wall HK  Mod LAE  Grade II diastolic dysfunction  Mod MR  Mild to mod TR with RVSP 36-45 mmHg     Cardiac Catheterization:   9/13/2018 ([facility])  Severe native 3vd  Patent LIMA-LAd, patent SVG-PDA  LVEDP 18 mmHg     5/19/15 ([facility])  Significant 3vd  Patent SVG-PDA with lesion in distal segment of graft  Patent LIMA-LAD  Mod pulm HTN  Depressed CI     PCI to distal SVG-PDA  Integrity BMS 3x9     RA 5, RV 55/11, PA 53/20/31, PCWP 11  TPG 17, /16, CO 4.17, CI 1.98         Alicia Barrett, NP    [address] [facility] [address]  [address]  Office [phone]  Fax [phone]    60 hour VAD/HF Pager: [phone]

## 2020-01-31 NOTE — PROGRESS NOTES
Patient's care card application faxed to Ney Davis, [facility] [place] financial counselor, and [facility] on this date. Request also placed for [employer] to screen the patient for Medicaid.     Yamileth Alexandra, MSW, [facility]    Clinical    Gaurav Sky 0130

## 2020-01-31 NOTE — PROGRESS NOTES
Physical Therapy Screening:    [facility] based on results obtained during the nursing admission assessment. The patients chart was reviewed and the patient is appropriate for a skilled therapy evaluation if there is a decline in functional mobility from baseline. Please order a consult for physical therapy if you are in agreement and would like an evaluation to be completed. Thank you.     Luis Prakash, PT

## 2020-02-01 LAB
ALBUMIN SERPL-MCNC: 3.2 G/DL (ref 3.5–5)
ALBUMIN/GLOB SERPL: 0.9 {RATIO} (ref 1.1–2.2)
ALP SERPL-CCNC: 69 U/L (ref 45–117)
ALT SERPL-CCNC: 21 U/L (ref 12–78)
ANION GAP SERPL CALC-SCNC: 7 MMOL/L (ref 5–15)
AST SERPL-CCNC: 18 U/L (ref 15–37)
BILIRUB SERPL-MCNC: 0.3 MG/DL (ref 0.2–1)
BNP SERPL-MCNC: 917 PG/ML
BUN SERPL-MCNC: 44 MG/DL (ref 6–20)
BUN/CREAT SERPL: 21 (ref 12–20)
CALCIUM SERPL-MCNC: 10 MG/DL (ref 8.5–10.1)
CHLORIDE SERPL-SCNC: 105 MMOL/L (ref 97–108)
CO2 SERPL-SCNC: 24 MMOL/L (ref 21–32)
CREAT SERPL-MCNC: 2.08 MG/DL (ref 0.55–1.02)
ERYTHROCYTE [DISTWIDTH] IN BLOOD BY AUTOMATED COUNT: 15 % (ref 11.5–14.5)
GLOBULIN SER CALC-MCNC: 3.4 G/DL (ref 2–4)
GLUCOSE SERPL-MCNC: 183 MG/DL (ref 65–100)
HCT VFR BLD AUTO: 31.2 % (ref 35–47)
HGB BLD-MCNC: 10 G/DL (ref 11.5–16)
LPA SERPL-SCNC: 212.7 NMOL/L
MAGNESIUM SERPL-MCNC: 2.2 MG/DL (ref 1.6–2.4)
MCH RBC QN AUTO: 31.3 PG (ref 26–34)
MCHC RBC AUTO-ENTMCNC: 32.1 G/DL (ref 30–36.5)
MCV RBC AUTO: 97.8 FL (ref 80–99)
NRBC # BLD: 0 K/UL (ref 0–0.01)
NRBC BLD-RTO: 0 PER 100 WBC
PLATELET # BLD AUTO: 237 K/UL (ref 150–400)
PMV BLD AUTO: 10.2 FL (ref 8.9–12.9)
POTASSIUM SERPL-SCNC: 4 MMOL/L (ref 3.5–5.1)
PROT SERPL-MCNC: 6.6 G/DL (ref 6.4–8.2)
RBC # BLD AUTO: 3.19 M/UL (ref 3.8–5.2)
SODIUM SERPL-SCNC: 136 MMOL/L (ref 136–145)
WBC # BLD AUTO: 5.5 K/UL (ref 3.6–11)

## 2020-02-01 PROCEDURE — 36415 COLL VENOUS BLD VENIPUNCTURE: CPT

## 2020-02-01 PROCEDURE — 65660000001 HC RM ICU INTERMED STEPDOWN

## 2020-02-01 PROCEDURE — 74011250637 HC RX REV CODE- 250/637: Performed by: NURSE PRACTITIONER

## 2020-02-01 PROCEDURE — 83880 ASSAY OF NATRIURETIC PEPTIDE: CPT

## 2020-02-01 PROCEDURE — 85027 COMPLETE CBC AUTOMATED: CPT

## 2020-02-01 PROCEDURE — 83735 ASSAY OF MAGNESIUM: CPT

## 2020-02-01 PROCEDURE — 94760 N-INVAS EAR/PLS OXIMETRY 1: CPT

## 2020-02-01 PROCEDURE — 80053 COMPREHEN METABOLIC PANEL: CPT

## 2020-02-01 PROCEDURE — 74011000250 HC RX REV CODE- 250: Performed by: NURSE PRACTITIONER

## 2020-02-01 RX ORDER — HYDRALAZINE HYDROCHLORIDE 10 MG/1
10 TABLET, FILM COATED ORAL 3 TIMES DAILY
Status: DISCONTINUED | OUTPATIENT
Start: 2020-02-02 | End: 2020-02-04

## 2020-02-01 RX ADMIN — ASPIRIN 81 MG 81 MG: 81 TABLET ORAL at 10:06

## 2020-02-01 RX ADMIN — LEVOTHYROXINE SODIUM 100 MCG: 100 TABLET ORAL at 06:32

## 2020-02-01 RX ADMIN — ACETAMINOPHEN 1000 MG: 500 TABLET ORAL at 10:06

## 2020-02-01 RX ADMIN — SPIRONOLACTONE 12.5 MG: 25 TABLET ORAL at 10:06

## 2020-02-01 RX ADMIN — BUMETANIDE 2 MG: 0.25 INJECTION INTRAMUSCULAR; INTRAVENOUS at 10:05

## 2020-02-01 RX ADMIN — BUMETANIDE 2 MG: 0.25 INJECTION INTRAMUSCULAR; INTRAVENOUS at 17:31

## 2020-02-01 RX ADMIN — ACETAMINOPHEN 1000 MG: 500 TABLET ORAL at 17:31

## 2020-02-01 RX ADMIN — Medication 10 ML: at 21:16

## 2020-02-01 RX ADMIN — GABAPENTIN 600 MG: 600 TABLET, FILM COATED ORAL at 21:15

## 2020-02-01 RX ADMIN — BUPROPION HYDROCHLORIDE 150 MG: 150 TABLET, EXTENDED RELEASE ORAL at 10:06

## 2020-02-01 RX ADMIN — GABAPENTIN 600 MG: 600 TABLET, FILM COATED ORAL at 17:30

## 2020-02-01 RX ADMIN — ALLOPURINOL 100 MG: 100 TABLET ORAL at 10:05

## 2020-02-01 RX ADMIN — LOSARTAN POTASSIUM 50 MG: 50 TABLET, FILM COATED ORAL at 10:06

## 2020-02-01 RX ADMIN — EZETIMIBE 10 MG: 10 TABLET ORAL at 10:06

## 2020-02-01 RX ADMIN — GABAPENTIN 600 MG: 600 TABLET, FILM COATED ORAL at 10:06

## 2020-02-01 RX ADMIN — CLOPIDOGREL BISULFATE 75 MG: 75 TABLET, FILM COATED ORAL at 10:06

## 2020-02-01 RX ADMIN — MELATONIN 2 TABLET: at 10:06

## 2020-02-01 RX ADMIN — ATORVASTATIN CALCIUM 80 MG: 40 TABLET, FILM COATED ORAL at 21:15

## 2020-02-01 RX ADMIN — Medication 5 ML: at 14:00

## 2020-02-01 RX ADMIN — Medication 10 ML: at 06:32

## 2020-02-01 NOTE — PROGRESS NOTES
Orthostatics ordered by NP. Documented in flow sheets as well.   Vitals:    02/01/20 0918 02/01/20 1058 02/01/20 1102 02/01/20 1104   BP: 124/40 105/54 138/46 153/68   BP 1 Location:       BP Patient Position:  Supine Sitting Standing   Pulse:  81 66 84   Resp:       Temp:       SpO2:  98% 94% 99%   Weight:       Height:

## 2020-02-01 NOTE — PROGRESS NOTES
Spiritual Care Partner Volunteer visited patient in Rm 418 on 2/1/20. Documented by:   Chaplain Boyer MDiv, MACE  [phone])

## 2020-02-02 ENCOUNTER — APPOINTMENT (OUTPATIENT)
Dept: CT IMAGING | Age: 70
DRG: 286 | End: 2020-02-02
Attending: NURSE PRACTITIONER
Payer: MEDICARE

## 2020-02-02 LAB
ALBUMIN SERPL-MCNC: 3.3 G/DL (ref 3.5–5)
ALBUMIN/GLOB SERPL: 0.9 {RATIO} (ref 1.1–2.2)
ALP SERPL-CCNC: 74 U/L (ref 45–117)
ALT SERPL-CCNC: 20 U/L (ref 12–78)
ANION GAP SERPL CALC-SCNC: 4 MMOL/L (ref 5–15)
ANION GAP SERPL CALC-SCNC: 5 MMOL/L (ref 5–15)
AST SERPL-CCNC: 16 U/L (ref 15–37)
BILIRUB SERPL-MCNC: 0.2 MG/DL (ref 0.2–1)
BNP SERPL-MCNC: 614 PG/ML
BUN SERPL-MCNC: 39 MG/DL (ref 6–20)
BUN SERPL-MCNC: 43 MG/DL (ref 6–20)
BUN/CREAT SERPL: 16 (ref 12–20)
BUN/CREAT SERPL: 18 (ref 12–20)
CALCIUM SERPL-MCNC: 10.4 MG/DL (ref 8.5–10.1)
CALCIUM SERPL-MCNC: 10.5 MG/DL (ref 8.5–10.1)
CHLORIDE SERPL-SCNC: 105 MMOL/L (ref 97–108)
CHLORIDE SERPL-SCNC: 105 MMOL/L (ref 97–108)
CO2 SERPL-SCNC: 26 MMOL/L (ref 21–32)
CO2 SERPL-SCNC: 27 MMOL/L (ref 21–32)
CREAT SERPL-MCNC: 2.37 MG/DL (ref 0.55–1.02)
CREAT SERPL-MCNC: 2.43 MG/DL (ref 0.55–1.02)
ERYTHROCYTE [DISTWIDTH] IN BLOOD BY AUTOMATED COUNT: 15.1 % (ref 11.5–14.5)
GLOBULIN SER CALC-MCNC: 3.5 G/DL (ref 2–4)
GLUCOSE BLD STRIP.AUTO-MCNC: 235 MG/DL (ref 65–100)
GLUCOSE BLD STRIP.AUTO-MCNC: 274 MG/DL (ref 65–100)
GLUCOSE SERPL-MCNC: 244 MG/DL (ref 65–100)
GLUCOSE SERPL-MCNC: 283 MG/DL (ref 65–100)
HCT VFR BLD AUTO: 32.5 % (ref 35–47)
HGB BLD-MCNC: 10.3 G/DL (ref 11.5–16)
MAGNESIUM SERPL-MCNC: 2.2 MG/DL (ref 1.6–2.4)
MAGNESIUM SERPL-MCNC: 2.5 MG/DL (ref 1.6–2.4)
MCH RBC QN AUTO: 31 PG (ref 26–34)
MCHC RBC AUTO-ENTMCNC: 31.7 G/DL (ref 30–36.5)
MCV RBC AUTO: 97.9 FL (ref 80–99)
NRBC # BLD: 0 K/UL (ref 0–0.01)
NRBC BLD-RTO: 0 PER 100 WBC
PLATELET # BLD AUTO: 246 K/UL (ref 150–400)
PMV BLD AUTO: 10.1 FL (ref 8.9–12.9)
POTASSIUM SERPL-SCNC: 3.8 MMOL/L (ref 3.5–5.1)
POTASSIUM SERPL-SCNC: 3.9 MMOL/L (ref 3.5–5.1)
PROT SERPL-MCNC: 6.8 G/DL (ref 6.4–8.2)
RBC # BLD AUTO: 3.32 M/UL (ref 3.8–5.2)
SERVICE CMNT-IMP: ABNORMAL
SERVICE CMNT-IMP: ABNORMAL
SODIUM SERPL-SCNC: 136 MMOL/L (ref 136–145)
SODIUM SERPL-SCNC: 136 MMOL/L (ref 136–145)
WBC # BLD AUTO: 6.1 K/UL (ref 3.6–11)

## 2020-02-02 PROCEDURE — 80053 COMPREHEN METABOLIC PANEL: CPT

## 2020-02-02 PROCEDURE — 83735 ASSAY OF MAGNESIUM: CPT

## 2020-02-02 PROCEDURE — 94760 N-INVAS EAR/PLS OXIMETRY 1: CPT

## 2020-02-02 PROCEDURE — P9045 ALBUMIN (HUMAN), 5%, 250 ML: HCPCS | Performed by: NURSE PRACTITIONER

## 2020-02-02 PROCEDURE — 74011250637 HC RX REV CODE- 250/637: Performed by: NURSE PRACTITIONER

## 2020-02-02 PROCEDURE — 36415 COLL VENOUS BLD VENIPUNCTURE: CPT

## 2020-02-02 PROCEDURE — 74011000250 HC RX REV CODE- 250: Performed by: NURSE PRACTITIONER

## 2020-02-02 PROCEDURE — 83880 ASSAY OF NATRIURETIC PEPTIDE: CPT

## 2020-02-02 PROCEDURE — 85027 COMPLETE CBC AUTOMATED: CPT

## 2020-02-02 PROCEDURE — 74011636637 HC RX REV CODE- 636/637: Performed by: NURSE PRACTITIONER

## 2020-02-02 PROCEDURE — 65660000001 HC RM ICU INTERMED STEPDOWN

## 2020-02-02 PROCEDURE — 82962 GLUCOSE BLOOD TEST: CPT

## 2020-02-02 PROCEDURE — 70450 CT HEAD/BRAIN W/O DYE: CPT

## 2020-02-02 PROCEDURE — 74011250636 HC RX REV CODE- 250/636: Performed by: NURSE PRACTITIONER

## 2020-02-02 RX ORDER — MAGNESIUM SULFATE 100 %
4 CRYSTALS MISCELLANEOUS AS NEEDED
Status: DISCONTINUED | OUTPATIENT
Start: 2020-02-02 | End: 2020-02-05 | Stop reason: HOSPADM

## 2020-02-02 RX ORDER — ALBUMIN HUMAN 50 G/1000ML
12.5 SOLUTION INTRAVENOUS ONCE
Status: COMPLETED | OUTPATIENT
Start: 2020-02-02 | End: 2020-02-02

## 2020-02-02 RX ORDER — INSULIN LISPRO 100 [IU]/ML
INJECTION, SOLUTION INTRAVENOUS; SUBCUTANEOUS
Status: DISCONTINUED | OUTPATIENT
Start: 2020-02-02 | End: 2020-02-05 | Stop reason: HOSPADM

## 2020-02-02 RX ORDER — ALBUMIN HUMAN 50 G/1000ML
12.5 SOLUTION INTRAVENOUS 2 TIMES DAILY
Status: DISCONTINUED | OUTPATIENT
Start: 2020-02-02 | End: 2020-02-05 | Stop reason: HOSPADM

## 2020-02-02 RX ORDER — DEXTROSE MONOHYDRATE 100 MG/ML
0-250 INJECTION, SOLUTION INTRAVENOUS AS NEEDED
Status: DISCONTINUED | OUTPATIENT
Start: 2020-02-02 | End: 2020-02-05 | Stop reason: HOSPADM

## 2020-02-02 RX ORDER — INSULIN GLARGINE 100 [IU]/ML
30 INJECTION, SOLUTION SUBCUTANEOUS
Status: DISCONTINUED | OUTPATIENT
Start: 2020-02-02 | End: 2020-02-05 | Stop reason: HOSPADM

## 2020-02-02 RX ADMIN — GABAPENTIN 600 MG: 600 TABLET, FILM COATED ORAL at 22:03

## 2020-02-02 RX ADMIN — ACETAMINOPHEN 1000 MG: 500 TABLET ORAL at 16:46

## 2020-02-02 RX ADMIN — ALLOPURINOL 100 MG: 100 TABLET ORAL at 10:08

## 2020-02-02 RX ADMIN — Medication 10 ML: at 22:03

## 2020-02-02 RX ADMIN — HYDRALAZINE HYDROCHLORIDE 10 MG: 10 TABLET, FILM COATED ORAL at 22:03

## 2020-02-02 RX ADMIN — ALBUMIN (HUMAN) 12.5 G: 12.5 INJECTION, SOLUTION INTRAVENOUS at 15:18

## 2020-02-02 RX ADMIN — ATORVASTATIN CALCIUM 80 MG: 40 TABLET, FILM COATED ORAL at 22:03

## 2020-02-02 RX ADMIN — MELATONIN 2 TABLET: at 10:08

## 2020-02-02 RX ADMIN — Medication 10 ML: at 06:35

## 2020-02-02 RX ADMIN — ALBUMIN (HUMAN) 12.5 G: 12.5 INJECTION, SOLUTION INTRAVENOUS at 18:55

## 2020-02-02 RX ADMIN — ASPIRIN 81 MG 81 MG: 81 TABLET ORAL at 10:08

## 2020-02-02 RX ADMIN — INSULIN LISPRO 4 UNITS: 100 INJECTION, SOLUTION INTRAVENOUS; SUBCUTANEOUS at 17:29

## 2020-02-02 RX ADMIN — Medication 5 ML: at 13:41

## 2020-02-02 RX ADMIN — HYDRALAZINE HYDROCHLORIDE 10 MG: 10 TABLET, FILM COATED ORAL at 10:09

## 2020-02-02 RX ADMIN — INSULIN LISPRO 4 UNITS: 100 INJECTION, SOLUTION INTRAVENOUS; SUBCUTANEOUS at 22:03

## 2020-02-02 RX ADMIN — HYDRALAZINE HYDROCHLORIDE 10 MG: 10 TABLET, FILM COATED ORAL at 15:17

## 2020-02-02 RX ADMIN — BUPROPION HYDROCHLORIDE 150 MG: 150 TABLET, EXTENDED RELEASE ORAL at 10:09

## 2020-02-02 RX ADMIN — ACETAMINOPHEN 1000 MG: 500 TABLET ORAL at 10:09

## 2020-02-02 RX ADMIN — MILRINONE LACTATE IN DEXTROSE 0.2 MCG/KG/MIN: 200 INJECTION, SOLUTION INTRAVENOUS at 10:09

## 2020-02-02 RX ADMIN — CLOPIDOGREL BISULFATE 75 MG: 75 TABLET, FILM COATED ORAL at 10:08

## 2020-02-02 RX ADMIN — GABAPENTIN 600 MG: 600 TABLET, FILM COATED ORAL at 10:09

## 2020-02-02 RX ADMIN — MILRINONE LACTATE IN DEXTROSE 0.3 MCG/KG/MIN: 200 INJECTION, SOLUTION INTRAVENOUS at 22:16

## 2020-02-02 RX ADMIN — Medication 10 ML: at 18:54

## 2020-02-02 RX ADMIN — GABAPENTIN 600 MG: 600 TABLET, FILM COATED ORAL at 15:17

## 2020-02-02 RX ADMIN — INSULIN GLARGINE 30 UNITS: 100 INJECTION, SOLUTION SUBCUTANEOUS at 22:16

## 2020-02-02 RX ADMIN — EZETIMIBE 10 MG: 10 TABLET ORAL at 10:08

## 2020-02-02 RX ADMIN — LEVOTHYROXINE SODIUM 100 MCG: 100 TABLET ORAL at 06:35

## 2020-02-02 NOTE — PROGRESS NOTES
Problem: Falls - Risk of  Goal: *Absence of Falls  Description  Document Angelina Arriola Fall Risk and appropriate interventions in the flowsheet. Outcome: Progressing Towards Goal  Note: Fall Risk Interventions:  Mobility Interventions: Assess mobility with egress test, Communicate number of staff needed for ambulation/transfer, OT consult for ADLs, Strengthening exercises (ROM-active/passive), Utilize walker, cane, or other assistive device, Utilize gait belt for transfers/ambulation, Patient to call before getting OOB, PT Consult for mobility concerns, PT Consult for assist device competence         Medication Interventions: Assess postural VS orthostatic hypotension, Evaluate medications/consider consulting pharmacy, Patient to call before getting OOB, Teach patient to arise slowly, Utilize gait belt for transfers/ambulation    Elimination Interventions: Call light in reach, Elevated toilet seat, Patient to call for help with toileting needs, Stay With Me (per policy), Toilet paper/wipes in reach, Toileting schedule/hourly rounds    History of Falls Interventions: Evaluate medications/consider consulting pharmacy, Investigate reason for fall, Room close to nurse's station, Door open when patient unattended, Consult care management for discharge planning       Bedside shift change report given to Jennifer (oncoming nurse) by Pedro Luis Craig RN (offgoing nurse). Report included the following information SBAR, Kardex, ED Summary, Intake/Output, MAR, Recent Results, Med Rec Status, Cardiac Rhythm SR, Alarm Parameters  and Quality Measures.

## 2020-02-02 NOTE — PROGRESS NOTES
[facility] Progress Note      DOS:   2020  NAME:  Rock Borges   MRN:   [id number]   REFERRING PROVIDER:  Sid Linn NP  PRIMARY CARE PHYSICIAN: Sid Linn NP  PRIMARY CARDIOLOGIST: none      Chief Complaint:   Chief Complaint   Patient presents with    Shortness of Breath       HPI: 68y.o. year old female with a history of HTN, HLD, WES, obesity (Body mass index is 39.48 kg/m².) s/p gastric bypass in , T2DM, hypothyroidism, COPD, CAD s/p CABG in , s/p PCI to [address] in 5/15, ICM, VT, s/p AICD (Medtronic),  anxiety, and depression who presents for further evaluation of her chronic systolic heart failure. Ms. Myra Michaud recalls having a viral syndrome in the Fall and has not felt well since then. She uses oxygen with her BiPAP every night and sleeps on two pillows. Her activity level is diminished. She finds herself short of breath with simply talking. In walking to the clinic, she had to stop several times to catch her breath. She denies palpitations, presyncope, and syncope. She has been admitted to [facility] for further evaluation and treatment of her HFrEF.      Interrogation of AICD (Medtronic) 20:  1 episode of NSVT - 2020  Patient Activity 0.8 hour/day  Total Ventricular Pacing < 0.1%  High OptiVol Fluid Index - above threshold    History:  Past Medical History:   Diagnosis Date    Chronic obstructive pulmonary disease ([address]     Congestive heart failure ([address]     Diabetes ([facility]     Hypertension     Sleep apnea      Past Surgical History:   Procedure Laterality Date    CARDIAC SURG PROCEDURE UNLIST  2018    cardiac cath - Left    HX ARTHROPLASTY  2105    knee    HX  SECTION      HX CORONARY ARTERY BYPASS GRAFT  1997    HX CORONARY STENT PLACEMENT  2016    HX HERNIA REPAIR      HX IMPLANTABLE CARDIOVERTER DEFIBRILLATOR      HX ORTHOPAEDIC      LAP GASTRIC BYPASS/KERLINE-EN-Y   lap band     Social History     Socioeconomic History    Marital status:      Spouse name: Not on file    Number of children: Not on file    Years of education: Not on file    Highest education level: Not on file   Occupational History    Not on file   Social Needs    Financial resource strain: Not on file    Food insecurity:     Worry: Not on file     Inability: Not on file    Transportation needs:     Medical: Not on file     Non-medical: Not on file   Tobacco Use    Smoking status: Former Smoker     Types: Cigarettes    Smokeless tobacco: Never Used   Substance and Sexual Activity    Alcohol use: Not Currently    Drug use: Not Currently    Sexual activity: Not Currently   Lifestyle    Physical activity:     Days per week: Not on file     Minutes per session: Not on file    Stress: Not on file   Relationships    Social connections:     Talks on phone: Not on file     Gets together: Not on file     Attends Hinduism service: Not on file     Active member of club or organization: Not on file     Attends meetings of clubs or organizations: Not on file     Relationship status: Not on file    Intimate partner violence:     Fear of current or ex partner: Not on file     Emotionally abused: Not on file     Physically abused: Not on file     Forced sexual activity: Not on file   Other Topics Concern    Not on file   Social History Narrative    Not on file     History reviewed. No pertinent family history.     Current Medications:   Current Facility-Administered Medications   Medication Dose Route Frequency Provider Last Rate Last Dose    albumin human 5% (BUMINATE) solution 12.5 g  12.5 g IntraVENous ONCE Andrew Blankenship NP        insulin glargine (LANTUS) injection 30 Units  30 Units SubCUTAneous QHS Andrew Blankenship NP        insulin lispro (HUMALOG) injection   SubCUTAneous AC&HS Andrew Blankenship NP        glucose chewable tablet 16 g  4 Tab Oral PRN Andrew Blankenship NP Landry Edin glucagon (GLUCAGEN) injection 1 mg  1 mg IntraMUSCular PRN Ebonie Blankenship NP        dextrose 10% infusion 0-250 mL  0-250 mL IntraVENous PRN Ebonie Blankenship NP        hydrALAZINE (APRESOLINE) tablet 10 mg  10 mg Oral TID Ebonie Blankenship NP   10 mg at 02/02/20 1009    cholecalciferol (VITAMIN D3) (1000 Units /25 mcg) tablet 2 Tab  2,000 Units Oral DAILY Ebonie Blankenship NP   2 Tab at 02/02/20 1008    [Held by provider] bumetanide (BUMEX) injection 2 mg  2 mg IntraVENous BID Calvin CASANOVA NP   2 mg at 02/01/20 1731    ezetimibe (ZETIA) tablet 10 mg  10 mg Oral DAILY Ebonie Blankenship NP   10 mg at 02/02/20 1008    lidocaine (LIDODERM) 5 % patch 1 Patch  1 Patch TransDERmal Q24H Ebonie Blankenship NP   1 Patch at 02/01/20 1731    milrinone (PRIMACOR) 20 MG/100 ML D5W infusion  0.3 mcg/kg/min IntraVENous CONTINUOUS Ebonie Blankenship NP 5.9 mL/hr at 02/02/20 1009 0.2 mcg/kg/min at 02/02/20 1009    acetaminophen (TYLENOL) tablet 1,000 mg  1,000 mg Oral BID Nguyen Medicus, NP   1,000 mg at 02/02/20 1009    allopurinoL (ZYLOPRIM) tablet 100 mg  100 mg Oral DAILY Nguyen Medicus, NP   100 mg at 02/02/20 1008    aspirin chewable tablet 81 mg  81 mg Oral DAILY Nguyen Medicus, NP   81 mg at 02/02/20 1008    atorvastatin (LIPITOR) tablet 80 mg  80 mg Oral QHS Nguyen Medicus, NP   80 mg at 02/01/20 2115    buPROPion SR (WELLBUTRIN SR) tablet 150 mg  150 mg Oral DAILY Nguyen Medicus, NP   150 mg at 02/02/20 1009    clopidogreL (PLAVIX) tablet 75 mg  75 mg Oral DAILY Nguyen Medicus, NP   75 mg at 02/02/20 1008    gabapentin (NEURONTIN) tablet 600 mg  600 mg Oral TID Nguyen Medicus, NP   600 mg at 02/02/20 1009    levothyroxine (SYNTHROID) tablet 100 mcg  100 mcg Oral ACB Patrick Hopkins NP   100 mcg at 02/02/20 6118    [Held by provider] metFORMIN (GLUCOPHAGE) tablet 500 mg  500 mg Oral BID WITH MEALS Patrick Hopkins NP   Stopped at 01/31/20 1700    docusate sodium (COLACE) capsule 100 mg  100 mg Oral PRN Filipe Javon, NP   100 mg at 01/31/20 2106    sodium chloride (NS) flush 5-40 mL  5-40 mL IntraVENous Q8H Filipe Javon E, NP   5 mL at 02/02/20 1341    sodium chloride (NS) flush 5-40 mL  5-40 mL IntraVENous PRN Filipe Javon, NP           Allergies: Allergies   Allergen Reactions    Crestor [Rosuvastatin] Other (comments)     Causes muscle cramps    Lisinopril Cough       ROS:    General:  positive for  - fatigue  HEENT: positive for cataracts and wears glasses  Pulmonary: positive for - cough, orthopnea and shortness of breath  Cardiac: positive for dyspnea, fatigue, orthopnea, paroxysmal nocturnal dyspnea, lower extremity edema, dyspnea on exertion  GI:  positive for - constipation, nausea/vomiting and anorexia  Musculo: positive for - muscular weakness  Neuro: no TIA or stroke symptoms  Skin:  negative  Allergies: REMINDER:DOCUMENT ALLERGIES IN ALLERGY ACTIVITY  Psych: positive for - anxiety and depression    Admission Weight: Last Weight   Weight: 215 lb 13.3 oz (97.9 kg) Weight: 215 lb 13.3 oz (97.9 kg)       Physical Exam:   Vitals:    Visit Vitals  /60   Pulse 79   Temp 98.2 °F (36.8 °C)   Resp 18   Ht 5' 2\" (1.575 m)   Wt 215 lb 13.3 oz (97.9 kg)   SpO2 97%   BMI 39.48 kg/m²       General:  fatigued, cooperative  HEENT: PERRLA, EOMI, Sclera clear, anicteric and Oropharynx clear, no lesions  Neck:  supple, no significant adenopathy, carotids upstroke normal bilaterally, no bruits  CVP:  10 cm  ( + ) HJR  Cardiac: regular rate, regular rhythm, S1, S2 normal, S4 present and systolic murmur present  Chest: breath sounds clear and equal bilaterally  Abdomen: soft, non-tender. Bowel sounds normal. No masses, mild hepatomegaly. Extremity: edema trace bilaterally  Neuro: Alert and oriented to person, place, and time; normal strength and tone. Normal symmetric reflexes.  Normal coordination and gait  Skin: no rashes, no ecchymoses, no petechiae    Recent Labs:   Labs Latest Ref Rng & Units 2020   WBC 3.6 - 11.0 K/uL 6.1 5.5 6.4 7.9   RBC 3.80 - 5.20 M/uL 3.32(L) 3.19(L) 3.38(L) 3.24(L)   Hemoglobin 11.5 - 16.0 g/dL 10. 3(L) 10. 0(L) 10. 7(L) 10. 1(L)   Hematocrit 35.0 - 47.0 % 32. 5(L) 31. 2(L) 33. 7(L) 31. 9(L)   MCV 80.0 - 99.0 FL 97.9 97.8 99. 7(H) 98.5   Platelets 818 - 525 K/uL 246 237 268 263   Lymphocytes 12 - 49 % - - - 29   Monocytes 5 - 13 % - - - 6   Eosinophils 0 - 7 % - - - 4   Basophils 0 - 1 % - - - 1   Albumin 3.5 - 5.0 g/dL 3. 3(L) 3. 2(L) 3.9 3.6   Calcium 8.5 - 10.1 MG/DL 10. 4(H) 10.0 10.0 9.9   SGOT 15 - 37 U/L 16 18 28 25   Glucose 65 - 100 mg/dL 244(H) 183(H) 140(H) 138(H)   BUN 6 - 20 MG/DL 43(H) 44(H) 37(H) 40(H)   Creatinine 0.55 - 1.02 MG/DL 2.37(H) 2.08(H) 1.95(H) 1.98(H)   Sodium 136 - 145 mmol/L 136 136 138 137   Potassium 3.5 - 5.1 mmol/L 3.8 4.0 4.1 4.2   TSH 0.36 - 3.74 uIU/mL - - 1.07 -   Some recent data might be hidden        EK2020  Sinus  Rhythm, rate 67 bpm   -  Nonspecific T-abnormality. Ech[facility]ogram:   [facility]  LVEF 40-45% with mild lateral wall and septal wall HK  Mod LAE  Grade II diastolic dysfunction  Mod MR  Mild to mod TR with RVSP 36-45 mmHg    Cardiac Catheterization:   2018 ([facility])  Severe native 3vd  Patent LIMA-LAd, patent SVG-PDA  LVEDP 18 mmHg    5/19/15 ([facility])  Significant 3vd  Patent SVG-PDA with lesion in distal segment of graft  Patent LIMA-LAD  Mod pulm HTN  Depressed CI    PCI to distal SVG-PDA  Integrity BMS 3x9    RA 5, RV 55/11, PA 53/20/31, PCWP 11  TPG 17, /16, CO 4.17, CI 1.98      Impression / Plan:   1.  ICM - Stage C, NYHA Class IIIb-IV, LVEF 30-35%   Elevated OptiVol 20    TTE  shows decrease in EF to 30-35%   Increase milrinone to 0.3 mcg/kg/min   Hold BB due to bradycardia    Intolerant of ACE/ARB/ARNI due to KEYONNA on CKD   D/C Bumex   Albumin BID    Continue Hydralazine 10 mg PO TID Recommend C and RHC possibly Monday - d/w Dr. Nia Talamantes    Low sodium diet   Daily weights   Strict I/O   Check [employer]itate - ATTR     2. CAD s/p CABG in 1997, s/p PCI to DVG-RCA in 10/16   On ASA, statin   Hold BB due to bradycardia    Recommend [facility]    - add Zetia     3. HTN - Stage 1   Intolerant of GDMT due to KEYONNA and bradycardia   Continue hydralazine   Low sodium diet   Compliant with BiPAP    4. VT - s/p AICD   No shocks   No arrhythmias on interrogation     5. WES - on BiPAP with oxygen   Compliant with BiPAP   Follows up with her sleep medicine physician at [facility] every 6 months    6. U2IZ complicated by neuropathy   Hold metformin for upcoming [facility]     Begin insulin for BG > 200    HgA1C 7.5 - DTC consult    7. Hypothyroidism   On levothyroxine 100 mcgs daily    8. Depression   On Wellbutrin  mg     9. History of Tobacco Abuse - 1/2 ppd x 20 years, quit 5 years ago   Counseled re: importance of continued abstinence    10. Gout    D/C colchicine due to KEYONNA    Continue allopurinol     11. Obesity (Body mass index is 39.48 kg/m². s/p gastric bypass in 2011    12. Osteoarthritis - s/p right TKR   Discontinue Celebrex due to CKD, HFrEF   S/p left knee injection in 8/2019   Lidocaine patches to knees     13. Hyperlipidemia    ,    Lipoprotein-A 213   Continue Lipitor 80 mg daily    Continue Zetia 10 mg PO daily   Will likely need Repatha    Low chol diet     13. Iron deficiency    Venofer 200 mg IV x 1     14. KEYONNA on CKD   Cr 2.08   D/C ARB, AA   Diurese   Avoid nephrotoxic agents    Renal consult if worse in AM     15. Vitamin D deficiency   Continue cholecalciferol 2,000 units daily     16.   Expressive aphasia   STAT head CT    Shelia Randall, [facility]  [address]  [facility], [address] [facility], [address] [place] [address]  Office [phone]  Fax [phone]

## 2020-02-02 NOTE — PROGRESS NOTES
1550: Ede Grande NP notified of patient expressing concerning on not being able to speak clearly as she had been doing and having some expressive aphasia, bedside stroke assessment negative, orders received for STAT head CT. Problem: Falls - Risk of  Goal: *Absence of Falls  Description  Document Jose Barajas Fall Risk and appropriate interventions in the flowsheet.   2/2/2020 1052 by Evern Schlatter  Outcome: Progressing Towards Goal  Note: Fall Risk Interventions:  Mobility Interventions: Assess mobility with egress test, OT consult for ADLs, Communicate number of staff needed for ambulation/transfer, Patient to call before getting OOB, PT Consult for assist device competence, Strengthening exercises (ROM-active/passive), Utilize walker, cane, or other assistive device, PT Consult for mobility concerns, Utilize gait belt for transfers/ambulation         Medication Interventions: Assess postural VS orthostatic hypotension, Evaluate medications/consider consulting pharmacy, Patient to call before getting OOB, Teach patient to arise slowly, Utilize gait belt for transfers/ambulation    Elimination Interventions: Call light in reach, Stay With Me (per policy), Toilet paper/wipes in reach, Toileting schedule/hourly rounds, Elevated toilet seat, Patient to call for help with toileting needs    History of Falls Interventions: Consult care management for discharge planning, Door open when patient unattended, Investigate reason for fall, Evaluate medications/consider consulting pharmacy      2/2/2020 1050 by Prosper LUGO  Outcome: Progressing Towards Goal  Note: Fall Risk Interventions:  Mobility Interventions: Assess mobility with egress test, OT consult for ADLs, Communicate number of staff needed for ambulation/transfer, Patient to call before getting OOB, PT Consult for assist device competence, Strengthening exercises (ROM-active/passive), Utilize walker, cane, or other assistive device, PT Consult for mobility concerns, Utilize gait belt for transfers/ambulation         Medication Interventions: Assess postural VS orthostatic hypotension, Evaluate medications/consider consulting pharmacy, Patient to call before getting OOB, Teach patient to arise slowly, Utilize gait belt for transfers/ambulation    Elimination Interventions: Call light in reach, Stay With Me (per policy), Toilet paper/wipes in reach, Toileting schedule/hourly rounds, Elevated toilet seat, Patient to call for help with toileting needs    History of Falls Interventions: Consult care management for discharge planning, Door open when patient unattended, Investigate reason for fall, Evaluate medications/consider consulting pharmacy         Problem: Heart Failure: Day 4  Goal: Medications  Outcome: Progressing Towards Goal  Note:   Pt on milrinone gtt, monitor labs and adjusting medications by the heart failure team. Plans for cardiac cath on Monday, will continue to monitor the patient. Bedside shift change report given to Han Edwards (oncoming nurse) by Dez Pena (offgoing nurse). Report included the following information SBAR, Kardex, ED Summary, Procedure Summary, Intake/Output, MAR, Recent Results, Med Rec Status, Cardiac Rhythm SR, Alarm Parameters  and Quality Measures.

## 2020-02-03 LAB
ALBUMIN SERPL-MCNC: 3.6 G/DL (ref 3.5–5)
ALBUMIN/GLOB SERPL: 1.1 {RATIO} (ref 1.1–2.2)
ALP SERPL-CCNC: 66 U/L (ref 45–117)
ALT SERPL-CCNC: 20 U/L (ref 12–78)
ANION GAP SERPL CALC-SCNC: 6 MMOL/L (ref 5–15)
AST SERPL-CCNC: 16 U/L (ref 15–37)
BILIRUB SERPL-MCNC: 0.4 MG/DL (ref 0.2–1)
BNP SERPL-MCNC: 500 PG/ML
BUN SERPL-MCNC: 38 MG/DL (ref 6–20)
BUN/CREAT SERPL: 18 (ref 12–20)
CALCIUM SERPL-MCNC: 10.3 MG/DL (ref 8.5–10.1)
CHLORIDE SERPL-SCNC: 109 MMOL/L (ref 97–108)
CO2 SERPL-SCNC: 23 MMOL/L (ref 21–32)
CREAT SERPL-MCNC: 2.06 MG/DL (ref 0.55–1.02)
ERYTHROCYTE [DISTWIDTH] IN BLOOD BY AUTOMATED COUNT: 15.2 % (ref 11.5–14.5)
GLOBULIN SER CALC-MCNC: 3.2 G/DL (ref 2–4)
GLUCOSE BLD STRIP.AUTO-MCNC: 124 MG/DL (ref 65–100)
GLUCOSE BLD STRIP.AUTO-MCNC: 162 MG/DL (ref 65–100)
GLUCOSE BLD STRIP.AUTO-MCNC: 167 MG/DL (ref 65–100)
GLUCOSE BLD STRIP.AUTO-MCNC: 185 MG/DL (ref 65–100)
GLUCOSE BLD STRIP.AUTO-MCNC: 268 MG/DL (ref 65–100)
GLUCOSE SERPL-MCNC: 155 MG/DL (ref 65–100)
HCT VFR BLD AUTO: 32.8 % (ref 35–47)
HGB BLD-MCNC: 10 G/DL (ref 11.5–16)
MAGNESIUM SERPL-MCNC: 2.4 MG/DL (ref 1.6–2.4)
MCH RBC QN AUTO: 31.6 PG (ref 26–34)
MCHC RBC AUTO-ENTMCNC: 30.5 G/DL (ref 30–36.5)
MCV RBC AUTO: 103.8 FL (ref 80–99)
NRBC # BLD: 0 K/UL (ref 0–0.01)
NRBC BLD-RTO: 0 PER 100 WBC
PLATELET # BLD AUTO: 196 K/UL (ref 150–400)
PMV BLD AUTO: 10.5 FL (ref 8.9–12.9)
POTASSIUM SERPL-SCNC: 4 MMOL/L (ref 3.5–5.1)
PROT SERPL-MCNC: 6.8 G/DL (ref 6.4–8.2)
RBC # BLD AUTO: 3.16 M/UL (ref 3.8–5.2)
SERVICE CMNT-IMP: ABNORMAL
SODIUM SERPL-SCNC: 138 MMOL/L (ref 136–145)
WBC # BLD AUTO: 6 K/UL (ref 3.6–11)

## 2020-02-03 PROCEDURE — C1894 INTRO/SHEATH, NON-LASER: HCPCS | Performed by: INTERNAL MEDICINE

## 2020-02-03 PROCEDURE — 77030010221 HC SPLNT WR POS TELE -B: Performed by: INTERNAL MEDICINE

## 2020-02-03 PROCEDURE — 65660000001 HC RM ICU INTERMED STEPDOWN

## 2020-02-03 PROCEDURE — 74011250637 HC RX REV CODE- 250/637: Performed by: NURSE PRACTITIONER

## 2020-02-03 PROCEDURE — 85027 COMPLETE CBC AUTOMATED: CPT

## 2020-02-03 PROCEDURE — 77030013797 HC KT TRNSDUC PRSSR EDWD -A: Performed by: INTERNAL MEDICINE

## 2020-02-03 PROCEDURE — 93460 R&L HRT ART/VENTRICLE ANGIO: CPT | Performed by: INTERNAL MEDICINE

## 2020-02-03 PROCEDURE — B2131ZZ FLUOROSCOPY OF MULTIPLE CORONARY ARTERY BYPASS GRAFTS USING LOW OSMOLAR CONTRAST: ICD-10-PCS | Performed by: INTERNAL MEDICINE

## 2020-02-03 PROCEDURE — 77030013406 HC CATH CTRL EDWD -B: Performed by: INTERNAL MEDICINE

## 2020-02-03 PROCEDURE — C1751 CATH, INF, PER/CENT/MIDLINE: HCPCS | Performed by: INTERNAL MEDICINE

## 2020-02-03 PROCEDURE — 74011250636 HC RX REV CODE- 250/636: Performed by: NURSE PRACTITIONER

## 2020-02-03 PROCEDURE — 74011000250 HC RX REV CODE- 250: Performed by: INTERNAL MEDICINE

## 2020-02-03 PROCEDURE — 99153 MOD SED SAME PHYS/QHP EA: CPT | Performed by: INTERNAL MEDICINE

## 2020-02-03 PROCEDURE — 80053 COMPREHEN METABOLIC PANEL: CPT

## 2020-02-03 PROCEDURE — 83735 ASSAY OF MAGNESIUM: CPT

## 2020-02-03 PROCEDURE — 82962 GLUCOSE BLOOD TEST: CPT

## 2020-02-03 PROCEDURE — P9045 ALBUMIN (HUMAN), 5%, 250 ML: HCPCS | Performed by: NURSE PRACTITIONER

## 2020-02-03 PROCEDURE — 36415 COLL VENOUS BLD VENIPUNCTURE: CPT

## 2020-02-03 PROCEDURE — 74011636320 HC RX REV CODE- 636/320: Performed by: INTERNAL MEDICINE

## 2020-02-03 PROCEDURE — 77030004538 HC CATH ANGI DX MP BSC -A: Performed by: INTERNAL MEDICINE

## 2020-02-03 PROCEDURE — 99233 SBSQ HOSP IP/OBS HIGH 50: CPT | Performed by: INTERNAL MEDICINE

## 2020-02-03 PROCEDURE — 74011000250 HC RX REV CODE- 250: Performed by: NURSE PRACTITIONER

## 2020-02-03 PROCEDURE — 77030004532 HC CATH ANGI DX IMP BSC -A: Performed by: INTERNAL MEDICINE

## 2020-02-03 PROCEDURE — 77030019569 HC BND COMPR RAD TERU -B: Performed by: INTERNAL MEDICINE

## 2020-02-03 PROCEDURE — 4A023N8 MEASUREMENT OF CARDIAC SAMPLING AND PRESSURE, BILATERAL, PERCUTANEOUS APPROACH: ICD-10-PCS | Performed by: INTERNAL MEDICINE

## 2020-02-03 PROCEDURE — 83880 ASSAY OF NATRIURETIC PEPTIDE: CPT

## 2020-02-03 PROCEDURE — 74011636637 HC RX REV CODE- 636/637: Performed by: NURSE PRACTITIONER

## 2020-02-03 PROCEDURE — 74011250636 HC RX REV CODE- 250/636: Performed by: INTERNAL MEDICINE

## 2020-02-03 PROCEDURE — B2111ZZ FLUOROSCOPY OF MULTIPLE CORONARY ARTERIES USING LOW OSMOLAR CONTRAST: ICD-10-PCS | Performed by: INTERNAL MEDICINE

## 2020-02-03 PROCEDURE — 77030013744: Performed by: INTERNAL MEDICINE

## 2020-02-03 PROCEDURE — 99152 MOD SED SAME PHYS/QHP 5/>YRS: CPT | Performed by: INTERNAL MEDICINE

## 2020-02-03 PROCEDURE — C1892 INTRO/SHEATH,FIXED,PEEL-AWAY: HCPCS | Performed by: INTERNAL MEDICINE

## 2020-02-03 RX ORDER — HEPARIN SODIUM 200 [USP'U]/100ML
INJECTION, SOLUTION INTRAVENOUS
Status: COMPLETED | OUTPATIENT
Start: 2020-02-03 | End: 2020-02-03

## 2020-02-03 RX ORDER — GABAPENTIN 100 MG/1
200 CAPSULE ORAL 3 TIMES DAILY
Status: DISCONTINUED | OUTPATIENT
Start: 2020-02-03 | End: 2020-02-05 | Stop reason: HOSPADM

## 2020-02-03 RX ORDER — MIDAZOLAM HYDROCHLORIDE 1 MG/ML
INJECTION, SOLUTION INTRAMUSCULAR; INTRAVENOUS AS NEEDED
Status: DISCONTINUED | OUTPATIENT
Start: 2020-02-03 | End: 2020-02-03 | Stop reason: HOSPADM

## 2020-02-03 RX ORDER — HEPARIN SODIUM 1000 [USP'U]/ML
INJECTION, SOLUTION INTRAVENOUS; SUBCUTANEOUS AS NEEDED
Status: DISCONTINUED | OUTPATIENT
Start: 2020-02-03 | End: 2020-02-03 | Stop reason: HOSPADM

## 2020-02-03 RX ORDER — LIDOCAINE HYDROCHLORIDE 10 MG/ML
INJECTION INFILTRATION; PERINEURAL AS NEEDED
Status: DISCONTINUED | OUTPATIENT
Start: 2020-02-03 | End: 2020-02-03 | Stop reason: HOSPADM

## 2020-02-03 RX ORDER — FENTANYL CITRATE 50 UG/ML
INJECTION, SOLUTION INTRAMUSCULAR; INTRAVENOUS AS NEEDED
Status: DISCONTINUED | OUTPATIENT
Start: 2020-02-03 | End: 2020-02-03 | Stop reason: HOSPADM

## 2020-02-03 RX ORDER — SODIUM CHLORIDE 0.9 % (FLUSH) 0.9 %
5-40 SYRINGE (ML) INJECTION EVERY 8 HOURS
Status: DISCONTINUED | OUTPATIENT
Start: 2020-02-03 | End: 2020-02-03 | Stop reason: HOSPADM

## 2020-02-03 RX ORDER — SODIUM CHLORIDE 0.9 % (FLUSH) 0.9 %
5-40 SYRINGE (ML) INJECTION AS NEEDED
Status: DISCONTINUED | OUTPATIENT
Start: 2020-02-03 | End: 2020-02-03 | Stop reason: HOSPADM

## 2020-02-03 RX ADMIN — GABAPENTIN 200 MG: 100 CAPSULE ORAL at 17:15

## 2020-02-03 RX ADMIN — ATORVASTATIN CALCIUM 80 MG: 40 TABLET, FILM COATED ORAL at 22:16

## 2020-02-03 RX ADMIN — HYDRALAZINE HYDROCHLORIDE 10 MG: 10 TABLET, FILM COATED ORAL at 22:16

## 2020-02-03 RX ADMIN — Medication 10 ML: at 22:20

## 2020-02-03 RX ADMIN — GABAPENTIN 200 MG: 100 CAPSULE ORAL at 22:16

## 2020-02-03 RX ADMIN — HYDRALAZINE HYDROCHLORIDE 10 MG: 10 TABLET, FILM COATED ORAL at 17:15

## 2020-02-03 RX ADMIN — Medication 10 ML: at 07:17

## 2020-02-03 RX ADMIN — MILRINONE LACTATE IN DEXTROSE 0.3 MCG/KG/MIN: 200 INJECTION, SOLUTION INTRAVENOUS at 22:21

## 2020-02-03 RX ADMIN — INSULIN LISPRO 3 UNITS: 100 INJECTION, SOLUTION INTRAVENOUS; SUBCUTANEOUS at 17:16

## 2020-02-03 RX ADMIN — MILRINONE LACTATE IN DEXTROSE 0.3 MCG/KG/MIN: 200 INJECTION, SOLUTION INTRAVENOUS at 10:26

## 2020-02-03 RX ADMIN — ACETAMINOPHEN 1000 MG: 500 TABLET ORAL at 17:15

## 2020-02-03 RX ADMIN — ALBUMIN (HUMAN) 12.5 G: 12.5 INJECTION, SOLUTION INTRAVENOUS at 17:36

## 2020-02-03 RX ADMIN — Medication 10 ML: at 09:00

## 2020-02-03 RX ADMIN — INSULIN GLARGINE 30 UNITS: 100 INJECTION, SOLUTION SUBCUTANEOUS at 22:17

## 2020-02-03 RX ADMIN — INSULIN LISPRO 268 UNITS: 100 INJECTION, SOLUTION INTRAVENOUS; SUBCUTANEOUS at 22:19

## 2020-02-03 RX ADMIN — INSULIN LISPRO 3 UNITS: 100 INJECTION, SOLUTION INTRAVENOUS; SUBCUTANEOUS at 07:17

## 2020-02-03 NOTE — CDMP QUERY
Documentation of Sarahi Choi on CKD  is noted in the progress notes. Currently the patient does not meet KDIGO criteria for the KEYONNA portion of this diagnosis. If you are using another criteria to support this diagnosis, please document this in your progress note. Otherwise, please document in the progress notes the clinical indicators that support this diagnosis or can the condition be further clarified as: 
 
=>Acute Renal Insufficiency on CKD   
=>KEYONNA on CKD  as evidenced by (criteria used: KDIGO, RIFLE, AKIN; any calculations) 
=> Other Explanation of clinical findings 
=> Clinically Undetermined (no explanation for clinical findings) Risk Factors: DM, HTN Clinical Indicators: GFR range 30-  24; Cr range 1.98 on 1/30 - 2.43 on 2/2 Per PN of 2/1 -KEYONNA on CKD-Cr 2.08 Treatment: avoid nephrotoxic agents, dc aldactone, dc celebrex. Reference: [employer] In patients with chronic kidney disease (CKD), KEYONNA is defined according to CKD stage: STAGE 1: (CrCl >90) 0.3 mg/dl increase in Creatinine over 24hr or  0.5 mg/dl increase over 48hr STAGE 2: (CrCl 60-89) 0.5 mg/dl increase in Creatinine over 24hr or 1.0 mg/dl increase over 48hr STAGE 3: (CrCl 30-59) 1.0 mg/dl increase in Creatinine over 24hr or 1.5 mg/dl increase over 48hr STAGE 4: (CrCl 15-29) Based on research limits of original study, use stage 3 parameters Thank you, Sorin Carrasco BSN, RN 
CDI  
[phone]

## 2020-02-03 NOTE — DIABETES MGMT
KENDALL CASTRO  CLINICAL NURSE SPECIALIST   [facility]  Followup Progress Note  Presentation   Meryle Motto is a 68 y.o. female admitted with CHF exac. and consulted by Provider for advanced specialty nursing care related to inpatient diabetes management. Subjective   She is scheduled for more cardiac testing today for further evaluation of her HF. She is currently down in the cath lab. Objective     Vital Signs   Visit Vitals  /84 (BP 1 Location: Right arm, BP Patient Position: Post activity)   Pulse 81   Temp 98.4 °F (36.9 °C)   Resp 20   Ht 5' 2\" (1.575 m)   Wt 97.8 kg (215 lb 9.8 oz)   SpO2 98%   BMI 39.44 kg/m²     Laboratory  Cr+-2.06, GFR 29        BG trends past 24hrs: Increased to >200     Started on 30 units of daily Lantus last evening. Required 8 units Humalog yesterday    Currently NPO for testing today. RECOMMENDATIONS    Continue 30 units of daily Lantus (0.3 units/kg renal dosing)    If BG continue to trend >200 despite basal and SSI; consider mealtime bolus insulin-6units Humalog - WHEN SHE IS EATING AGAIN. Continue SSI.         Assessment and Plan   Nursing Diagnosis Risk for unstable blood glucose pattern   Nursing Intervention Domain 5081 Decision-making Support   Nursing Interventions Examined current inpatient diabetes control   Explored factors facilitating and impeding inpatient management  Identified self-management practices impeding diabetes control  Explored corrective strategies with patient and responsible inpatient provider         Signed By: Ngoc Krishna RN   Clinical Nurse Specialist  [facility]  [phone]    February 3, 2020

## 2020-02-03 NOTE — PROGRESS NOTES
Cardiac Cath Lab Recovery Arrival Note:      Destiny Cervantes arrived to Cardiac Cath Lab, Recovery Area. Staff introduced to patient. Patient identifiers verified with NAME and DATE OF BIRTH. Procedure verified with patient. Consent forms reviewed and signed by patient or authorized representative and verified. Allergies verified. Patient and family oriented to department. Patient and family informed of procedure and plan of care. Questions answered with review. Patient prepped for procedure, per orders from physician, prior to arrival.    Patient on cardiac monitor, non-invasive blood pressure, SPO2 monitor. On room air. Patient is A&Ox 4. Patient reports no complaints. Patient in stretcher, in low position, with side rails up, call bell within reach, patient instructed to call if assistance as needed. Patient prep in: [address], [place] [address]. Patient family has pager # 0  Family in: sister in pt room.    Prep by: Yovani Whitney RN  Pre cath teaching completed    Right groin has excoriation and skin tear  B/S 124  Nacl left forearm @ 25ml/hr  Milnirone gtt 8.8 ml/ hr  (0.3mcg)

## 2020-02-03 NOTE — CDMP QUERY
Patient admitted with SOB, noted to have documentation of CKD. If possible, please document in progress notes and d/c summary if you are evaluating and/or treating any of the following: - CKD Stage 1 GFR>90 
- CKD Stage 2 GFR 60-90 
- CKD Stage 3 GFR 30-59 
- CKD Stage 4 GFR 15-29 
- Other, please specify 
- Unable to Determine The medical record reflects the following: 
  Risk Factors: Hx. DM, HTN-no history of CKD documented Clinical Indicators: Per PN of 2/1-KEYONNA on CKD-Cr 2.08 Treatment: Treatment: avoid nephrotoxic agents, dc aldactone, dc celebrex. Thank you, Jesus DAVEN, RN 
CDI  
[phone]

## 2020-02-03 NOTE — PROGRESS NOTES
Cardiac Cath Lab Procedure Area Arrival Note:    Merna Hoyos arrived to Cardiac Cath Lab, Procedure Area. Patient identifiers verified with NAME and DATE OF BIRTH. Procedure verified with patient. Consent forms verified. Allergies verified. Patient informed of procedure and plan of care. Questions answered with review. Patient voiced understanding of procedure and plan of care. Patient on cardiac monitor, non-invasive blood pressure, SPO2 monitor. On RA and left on room air for the procedure. IV of NSS on pump at 25 ml/hr. Milrinone drip infusing at 0.3mcg/kg/min. Patient status doing well without problems. Patient is A&Ox 4. Patient reports no complaints of pain or shortness of breath. Patient medicated during procedure with orders obtained and verified by Dr. Roger Nath. Refer to patients Cardiac Cath Lab PROCEDURE REPORT for vital signs, assessment, status, and response during procedure, printed at end of case. Printed report on chart or scanned into chart. 1305 Transfer to [address] [place] [address] from Procedure Area    Verbal report given to MYLES Vargas on Calleen Soha being transferred to Cardiac Cath Lab  for routine progression of care   Patient is post [facility] procedure. Patient stable upon transfer to . Report consisted of patients Situation, Background, Assessment and   Recommendations(SBAR). Information from the following report(s) SBAR, Procedure Summary and MAR was reviewed with the receiving nurse. Opportunity for questions and clarification was provided. Patient medicated during procedure with orders obtained and verified by Dr. Roger Nath. Refer to patient PROCEDURE REPORT for vital signs, assessment, status, and response during procedure.

## 2020-02-03 NOTE — PROGRESS NOTES
[facility] Progress Note      DOS:   2/3/2020  NAME:  Constance Palacios   MRN:   [id number]   REFERRING PROVIDER:  Pankaj Senior NP  PRIMARY CARE PHYSICIAN: Pankaj Senior NP  PRIMARY CARDIOLOGIST: none      Chief Complaint:   Chief Complaint   Patient presents with    Shortness of Breath       HPI: 68y.o. year old female with a history of HTN, HLD, WES, obesity (Body mass index is 39.44 kg/m².) s/p gastric bypass in , T2DM, hypothyroidism, COPD, CAD s/p CABG in , s/p PCI to [address] in 5/15, ICM, VT, s/p AICD ([employer]),  anxiety, and depression who presents for further evaluation of her chronic systolic heart failure. Ms. Darien Jerez recalls having a viral syndrome in the Fall and has not felt well since then. She uses oxygen with her BiPAP every night and sleeps on two pillows. Her activity level is diminished. She finds herself short of breath with simply talking. In walking to the clinic, she had to stop several times to catch her breath. She denies palpitations, presyncope, and syncope. She has been admitted to [facility] for further evaluation and treatment of her HFrEF.      24Hr Event:  No acute overnight events  RHC/C this am     History:  Past Medical History:   Diagnosis Date    Chronic obstructive pulmonary disease (HCC)     [facility]     Diabetes [facility] [address] [facility]     Hypertension     Sleep apnea      Past Surgical History:   Procedure Laterality Date    CARDIAC SURG PROCEDURE UNLIST  2018    cardiac cath - Left    HX ARTHROPLASTY  2105    knee    HX  SECTION      HX CORONARY ARTERY BYPASS GRAFT  1997    HX CORONARY STENT PLACEMENT  2016    HX HERNIA REPAIR      HX IMPLANTABLE CARDIOVERTER DEFIBRILLATOR      HX ORTHOPAEDIC      LAP GASTRIC BYPASS/KERLINE-EN-Y  2011    lap band     Social History     Socioeconomic History    Marital status:      Spouse name: Not on file    Number of children: Not on file    Years of education: Not on file    Highest education level: Not on file   Occupational History    Not on file   Social Needs    Financial resource strain: Not on file    Food insecurity:     Worry: Not on file     Inability: Not on file    Transportation needs:     Medical: Not on file     Non-medical: Not on file   Tobacco Use    Smoking status: Former Smoker     Types: Cigarettes    Smokeless tobacco: Never Used   Substance and Sexual Activity    Alcohol use: Not Currently    Drug use: Not Currently    Sexual activity: Not Currently   Lifestyle    Physical activity:     Days per week: Not on file     Minutes per session: Not on file    Stress: Not on file   Relationships    Social connections:     Talks on phone: Not on file     Gets together: Not on file     Attends [employer] service: Not on file     Active member of club or organization: Not on file     Attends meetings of clubs or organizations: Not on file     Relationship status: Not on file    Intimate partner violence:     Fear of current or ex partner: Not on file     Emotionally abused: Not on file     Physically abused: Not on file     Forced sexual activity: Not on file   Other Topics Concern    Not on file   Social History Narrative    Not on file     History reviewed. No pertinent family history.     Current Medications:   Current Facility-Administered Medications   Medication Dose Route Frequency Provider Last Rate Last Dose    sodium chloride (NS) flush 5-40 mL  5-40 mL IntraVENous Q8H Chapo Call MD   10 mL at 02/03/20 0900    sodium chloride (NS) flush 5-40 mL  5-40 mL IntraVENous PRN Chapo Call MD        gabapentin (NEURONTIN) capsule 200 mg  200 mg Oral TID Parisa Blankenship NP        insulin glargine (LANTUS) injection 30 Units  30 Units SubCUTAneous QHS Parisa Blankenship NP   30 Units at 02/02/20 2216    insulin lispro (HUMALOG) injection   SubCUTAneous AC&HS Marcie CASANOVA, NP   3 Units at 02/03/20 0717    glucose chewable tablet 16 g  4 Tab Oral PRN Tamikoiard, Paddy Holes, NP        glucagon (GLUCAGEN) injection 1 mg  1 mg IntraMUSCular PRN Tamikoiard, Ricki Fernandes, NP        dextrose 10% infusion 0-250 mL  0-250 mL IntraVENous PRN TamikoiardRicki, NP        albumin human 5% (BUMINATE) solution 12.5 g  12.5 g IntraVENous BID Melodie CASANOVA, NP   12.5 g at 02/02/20 6028    hydrALAZINE (APRESOLINE) tablet 10 mg  10 mg Oral TID Yolanda Dash, NP   Stopped at 02/03/20 0900    cholecalciferol (VITAMIN D3) (1000 Units /25 mcg) tablet 2 Tab  2,000 Units Oral DAILY Tamikoiard, Ricki Holes, NP   Stopped at 02/03/20 0900    ezetimibe (ZETIA) tablet 10 mg  10 mg Oral DAILY Polliard, Paddy Holes, NP   Stopped at 02/03/20 0900    lidocaine (LIDODERM) 5 % patch 1 Patch  1 Patch TransDERmal Q24H ElviedRicki, NP   1 Patch at 02/02/20 1528    milrinone (PRIMACOR) 20 MG/100 ML D5W infusion  0.3 mcg/kg/min IntraVENous CONTINUOUS Ricki Blankenship, NP 8.8 mL/hr at 02/03/20 1026 0.3 mcg/kg/min at 02/03/20 1026    acetaminophen (TYLENOL) tablet 1,000 mg  1,000 mg Oral BID Elvira Garden, NP   Stopped at 02/03/20 0900    allopurinoL (ZYLOPRIM) tablet 100 mg  100 mg Oral DAILY Johnenyth Garden, NP   Stopped at 02/03/20 0900    aspirin chewable tablet 81 mg  81 mg Oral DAILY Johnenyth Garden, NP   Stopped at 02/03/20 0900    atorvastatin (LIPITOR) tablet 80 mg  80 mg Oral QHS [facility]   80 mg at 02/02/20 2203    buPROPion SR (WELLBUTRIN SR) tablet 150 mg  150 mg Oral DAILY Johnenyth Garden, NP   Stopped at 02/03/20 0900    clopidogreL (PLAVIX) tablet 75 mg  75 mg Oral DAILY Gwenyth Garden, NP   Stopped at 02/03/20 0900    levothyroxine (SYNTHROID) tablet 100 mcg  100 mcg Oral ACB Elvira Dsouza NP   Stopped at 02/03/20 0730    [Held by provider] metFORMIN (GLUCOPHAGE) tablet 500 mg  500 mg Oral BID WITH MEALS Elvira Dsouza NP   Stopped at 01/31/20 1700    docusate sodium (COLACE) capsule 100 mg  100 mg Oral PRN Damon Bigness, NP   100 mg at 01/31/20 2106    sodium chloride (NS) flush 5-40 mL  5-40 mL IntraVENous Q8H Damon Bigness E, NP   10 mL at 02/03/20 0717    sodium chloride (NS) flush 5-40 mL  5-40 mL IntraVENous PRN [place] Bigness, NP   10 mL at 02/02/20 1854       Allergies: Allergies   Allergen Reactions    Crestor [Rosuvastatin] Other (comments)     Causes muscle cramps    Lisinopril Cough       ROS:    General:  positive for  - fatigue  HEENT: positive for cataracts and wears glasses  Pulmonary: positive for - cough, orthopnea and shortness of breath  Cardiac: positive for dyspnea, fatigue, orthopnea, paroxysmal nocturnal dyspnea, lower extremity edema, dyspnea on exertion  GI:  positive for - constipation, nausea/vomiting and anorexia  Musculo: positive for - muscular weakness  Neuro: no TIA or stroke symptoms  Skin:  negative  Psych: positive for - anxiety and depression    Admission Weight: Last Weight   Weight: 215 lb 13.3 oz (97.9 kg) Weight: 215 lb 9.8 oz (97.8 kg)       Physical Exam:   Vitals:    Visit Vitals  /68   Pulse 80   Temp 98.4 °F (36.9 °C)   Resp 12   Ht 5' 2\" (1.575 m)   Wt 215 lb 9.8 oz (97.8 kg)   SpO2 98%   BMI 39.44 kg/m²       General:  fatigued, cooperative  HEENT: PERRLA, EOMI, Sclera clear, anicteric and Oropharynx clear, no lesions  Neck:  supple, no significant adenopathy, carotids upstroke normal bilaterally, no bruits  CVP:  10 cm  ( + ) HJR  Cardiac: regular rate, regular rhythm, S1, S2 normal, S4 present and systolic murmur present  Chest: breath sounds clear and equal bilaterally  Abdomen: soft, non-tender. Bowel sounds normal. No masses, mild hepatomegaly. Extremity: edema trace bilaterally  Neuro: Alert and oriented to person, place, and time; normal strength and tone. Normal symmetric reflexes.  Normal coordination and gait  Skin:   no rashes, no ecchymoses, no petpatricaiae    Recent Labs:   Labs Latest Ref Rng & Units 2/3/2020 2020 2020 2020 2020 2020   WBC 3.6 - 11.0 K/uL 6.0 - 6.1 5.5 6.4 7.9   RBC 3.80 - 5.20 M/uL 3.16(L) - 3.32(L) 3.19(L) 3.38(L) 3.24(L)   Hemoglobin 11.5 - 16.0 g/dL 10. 0(L) - 10. 3(L) 10. 0(L) 10. 7(L) 10. 1(L)   Hematocrit 35.0 - 47.0 % 32. 8(L) - 32. 5(L) 31. 2(L) 33. 7(L) 31. 9(L)   MCV 80.0 - 99.0 . 8(H) - 97.9 97.8 99. 7(H) 98.5   Platelets 606 - 718 K/uL 196 - 246 237 268 263   Lymphocytes 12 - 49 % - - - - - 29   Monocytes 5 - 13 % - - - - - 6   Eosinophils 0 - 7 % - - - - - 4   Basophils 0 - 1 % - - - - - 1   Albumin 3.5 - 5.0 g/dL 3.6 - 3. 3(L) 3. 2(L) 3.9 3.6   Calcium 8.5 - 10.1 MG/DL 10. 3(H) 10. 5(H) 10. 4(H) 10.0 10.0 9.9   SGOT 15 - 37 U/L 16 - 16 18 28 25   Glucose 65 - 100 mg/dL 155(H) 283(H) 244(H) 183(H) 140(H) 138(H)   BUN 6 - 20 MG/DL 38(H) 39(H) 43(H) 44(H) 37(H) 40(H)   Creatinine 0.55 - 1.02 MG/DL 2.06(H) 2.43(H) 2.37(H) 2.08(H) 1.95(H) 1.98(H)   Sodium 136 - 145 mmol/L 138 136 136 136 138 137   Potassium 3.5 - 5.1 mmol/L 4.0 3.9 3.8 4.0 4.1 4.2   TSH 0.36 - 3.74 uIU/mL - - - - 1.07 -   Some recent data might be hidden        EK2020  Sinus  Rhythm, rate 67 bpm   -  Nonspecific T-abnormality. Echocardiogram:   2019 at [facility]  LVEF 40-45% with mild lateral wall and septal wall HK  Mod LAE  Grade II diastolic dysfunction  Mod MR  Mild to mod TR with RVSP 36-45 mmHg    Cardiac Catheterization:   2018 ([facility])  Severe native 3vd  Patent LIMA-LAd, patent SVG-PDA  LVEDP 18 mmHg    5/19/15 ([facility])  Significant 3vd  Patent SVG-PDA with lesion in distal segment of graft  Patent LIMA-LAD  Mod pulm HTN  Depressed CI    PCI to distal SVG-PDA  Integrity BMS 3x9    RA 5, RV 55/11, PA 53/20/31, PCWP 11  TPG 17, /16, CO 4.17, CI 1.98      Impression / Plan:   1.  ICM - Stage C, NYHA Class IIIb-IV, LVEF 30-35%   Elevated OptiVol 20    TTE  shows decrease in EF to 30-35%   Milrinone to 0.3 mcg/kg/min   Hold BB due to bradycardia    Intolerant of ACE/ARB/ARNI due to KEYONNA on CKD   Continue Hydralazine 10 mg PO TID    Normal filling pressures, Malina CI 2.6 on RHC while on Milrinone    Low sodium diet   Daily weights   Strict I/O   Check [employer] - ATTR   Send gammopathy    PYP testing      2. CAD s/p CABG in 1997, s/p PCI to DVG-RCA in 10/16   On ASA, statin   Hold BB due to bradycardia    Severe 3V disease     - add Zetia     3. HTN - Stage 1   Intolerant of GDMT due to KEYONNA and bradycardia   Continue hydralazine   Low sodium diet   Compliant with BiPAP    4. VT - s/p AICD   No shocks   No arrhythmias on interrogation     5. WES - on BiPAP with oxygen   Compliant with BiPAP   Follows up with her sleep medicine physician at [facility] every 6 months    6. I2BI complicated by neuropathy   Hold metformin for upcoming LHC     Begin insulin for BG > 200    HgA1C 7.5 - appreciate PDH recommendations     7. Hypothyroidism   On levothyroxine 100 mcgs daily    8. Depression   On Wellbutrin  mg     9. History of Tobacco Abuse - 1/2 ppd x 20 years, quit 5 years ago   Counseled re: importance of continued abstinence    10. Gout    D/C colchicine due to KEYONNA    Continue allopurinol     11. Obesity (Body mass index is 39.44 kg/m². s/p gastric bypass in 2011    12. Osteoarthritis - s/p right TKR   Discontinue Celebrex due to CKD, HFrEF   S/p left knee injection in 8/2019   Lidocaine patches to knees     13. Hyperlipidemia    ,    Lipoprotein-A 213   Continue Lipitor 80 mg daily    Continue Zetia 10 mg PO daily   Will likely need Repatha    Low chol diet     13. Iron deficiency    Venofer 200 mg IV x 1     14. KEYONNA on CKD   Cr 2.08   D/C ARB, AA   Diurese   Avoid nephrotoxic agents     15. Vitamin D deficiency   Continue cholecalciferol 2,000 units daily     16. Expressive aphasia   Head CT negative for acute process     17.  CKD4 - suspect diabetic nephropathy and cardiorenal syndrome   Follow BUN/Cr closely with diuresis   RHC to assess filling pressures and CO   Avoid nephrotoxins    Genia Jon NP  [address] [facility]  [address]  Office [phone]  Fax [phone]      Patient seen and examined. Data and note reviewed. I have discussed and agree with the plans as noted. 68year old female with a history of CAD, CABG, ICM who was admitted with acute on chronic systolic heart failure. Her symptoms have improved with inotrope supported diuresis. Plan RHC and LHC today. Thank you for allowing us to participate in your patient's care. Lilliam Garnica MD, [facility], Olivia Castro  Chief of Cardiology, [address] [place] [address] Director  [address] [facility]  [address]  Office [phone]  Fax [phone]

## 2020-02-03 NOTE — PROCEDURES
Findings:  1)RHC performed while patient on Milrinone gtt(0.3)--normal right and left sided filling pressures, no pulmonary HTN, normal CI by blanche and thermodilution  2)Severe native 3 vessel CAD. Patent VG to RCA and LIMA to LAD. LCx is collateral dependent and native rPDA and D1 have small disease in small vessels. This are unlikely to contribute to heart failure. 3)Frequent PVCs    Recommendations:  1)GDMT for CAD    Access: left radial and right IJV--no issues    Contrast 25cc.

## 2020-02-03 NOTE — CARDIO/PULMONARY
Cardiac Rehab: Living with Heart Failure Booklet and CAD education folder to bedside. Pt in cath lab. Sister Hyacinth Mcburney at bedside. She said Chelly Brandt has a scale and weighs most every day. Discussed will attempt follow up for pt education in regards to HF information. Reviewed the Cardiac Rehab Program, benefits, format, and encouraged enrollment, when eligible. Hyacinth Mcburney said her sister is interested in something like that so we will follow up, by phone, once eligible. Discussed cardiac rehab will follow for results of catheterization today as should she have an intervention that would allow her to start sooner than the 6 week wait for HF diagnosis. Note, sister said [facility] has [facility] and CM team has filled out care card application with pt on 7/07/3616.    Jayce Guadalupe RN

## 2020-02-03 NOTE — PROGRESS NOTES
Problem: Heart Failure: Day 5  Goal: Diagnostic Test/Procedures  Outcome: Progressing Towards Goal     Pt heart rhythm NSR, HR 74. Pt preparing for cardiac cath today. Education provided on signs and symptoms of bleeding. Pt verbalized understanding. Bedside shift change report given to Wale Gomez (oncoming nurse) by Giovany Price RN (offgoing nurse). Report included the following information SBAR, Kardex, Intake/Output, MAR, Recent Results and Cardiac Rhythm NSR.

## 2020-02-04 ENCOUNTER — APPOINTMENT (OUTPATIENT)
Dept: NUCLEAR MEDICINE | Age: 70
DRG: 286 | End: 2020-02-04
Attending: EMERGENCY MEDICINE
Payer: MEDICARE

## 2020-02-04 ENCOUNTER — HOME HEALTH ADMISSION (OUTPATIENT)
Dept: HOME HEALTH SERVICES | Facility: HOME HEALTH | Age: 70
End: 2020-02-04

## 2020-02-04 LAB
ALBUMIN SERPL-MCNC: 3.5 G/DL (ref 3.5–5)
ALBUMIN/GLOB SERPL: 1.1 {RATIO} (ref 1.1–2.2)
ALP SERPL-CCNC: 61 U/L (ref 45–117)
ALT SERPL-CCNC: 20 U/L (ref 12–78)
ANION GAP SERPL CALC-SCNC: 6 MMOL/L (ref 5–15)
AST SERPL-CCNC: 17 U/L (ref 15–37)
BILIRUB SERPL-MCNC: 0.3 MG/DL (ref 0.2–1)
BNP SERPL-MCNC: 711 PG/ML
BUN SERPL-MCNC: 31 MG/DL (ref 6–20)
BUN/CREAT SERPL: 17 (ref 12–20)
CALCIUM SERPL-MCNC: 10.4 MG/DL (ref 8.5–10.1)
CHLORIDE SERPL-SCNC: 109 MMOL/L (ref 97–108)
CO2 SERPL-SCNC: 25 MMOL/L (ref 21–32)
CREAT SERPL-MCNC: 1.83 MG/DL (ref 0.55–1.02)
ERYTHROCYTE [DISTWIDTH] IN BLOOD BY AUTOMATED COUNT: 15 % (ref 11.5–14.5)
GLOBULIN SER CALC-MCNC: 3.1 G/DL (ref 2–4)
GLUCOSE BLD STRIP.AUTO-MCNC: 129 MG/DL (ref 65–100)
GLUCOSE BLD STRIP.AUTO-MCNC: 136 MG/DL (ref 65–100)
GLUCOSE BLD STRIP.AUTO-MCNC: 179 MG/DL (ref 65–100)
GLUCOSE BLD STRIP.AUTO-MCNC: 276 MG/DL (ref 65–100)
GLUCOSE SERPL-MCNC: 118 MG/DL (ref 65–100)
HCT VFR BLD AUTO: 30.8 % (ref 35–47)
HGB BLD-MCNC: 9.7 G/DL (ref 11.5–16)
MAGNESIUM SERPL-MCNC: 2.5 MG/DL (ref 1.6–2.4)
MCH RBC QN AUTO: 30.9 PG (ref 26–34)
MCHC RBC AUTO-ENTMCNC: 31.5 G/DL (ref 30–36.5)
MCV RBC AUTO: 98.1 FL (ref 80–99)
NRBC # BLD: 0 K/UL (ref 0–0.01)
NRBC BLD-RTO: 0 PER 100 WBC
PLATELET # BLD AUTO: 246 K/UL (ref 150–400)
PMV BLD AUTO: 10.1 FL (ref 8.9–12.9)
POTASSIUM SERPL-SCNC: 4.1 MMOL/L (ref 3.5–5.1)
PROT SERPL-MCNC: 6.6 G/DL (ref 6.4–8.2)
RBC # BLD AUTO: 3.14 M/UL (ref 3.8–5.2)
SERVICE CMNT-IMP: ABNORMAL
SODIUM SERPL-SCNC: 140 MMOL/L (ref 136–145)
STRESS TARGET HR: 149 BPM
WBC # BLD AUTO: 5.8 K/UL (ref 3.6–11)

## 2020-02-04 PROCEDURE — 85027 COMPLETE CBC AUTOMATED: CPT

## 2020-02-04 PROCEDURE — A9538 TC99M PYROPHOSPHATE: HCPCS

## 2020-02-04 PROCEDURE — 83735 ASSAY OF MAGNESIUM: CPT

## 2020-02-04 PROCEDURE — 74011250636 HC RX REV CODE- 250/636: Performed by: NURSE PRACTITIONER

## 2020-02-04 PROCEDURE — 83880 ASSAY OF NATRIURETIC PEPTIDE: CPT

## 2020-02-04 PROCEDURE — 74011250637 HC RX REV CODE- 250/637: Performed by: NURSE PRACTITIONER

## 2020-02-04 PROCEDURE — 65660000001 HC RM ICU INTERMED STEPDOWN

## 2020-02-04 PROCEDURE — 97161 PT EVAL LOW COMPLEX 20 MIN: CPT

## 2020-02-04 PROCEDURE — P9045 ALBUMIN (HUMAN), 5%, 250 ML: HCPCS | Performed by: NURSE PRACTITIONER

## 2020-02-04 PROCEDURE — 94621 CARDIOPULM EXERCISE TESTING: CPT

## 2020-02-04 PROCEDURE — 80053 COMPREHEN METABOLIC PANEL: CPT

## 2020-02-04 PROCEDURE — 94760 N-INVAS EAR/PLS OXIMETRY 1: CPT

## 2020-02-04 PROCEDURE — 82784 ASSAY IGA/IGD/IGG/IGM EACH: CPT

## 2020-02-04 PROCEDURE — 36415 COLL VENOUS BLD VENIPUNCTURE: CPT

## 2020-02-04 PROCEDURE — 82962 GLUCOSE BLOOD TEST: CPT

## 2020-02-04 PROCEDURE — 74011636637 HC RX REV CODE- 636/637: Performed by: NURSE PRACTITIONER

## 2020-02-04 PROCEDURE — 99232 SBSQ HOSP IP/OBS MODERATE 35: CPT | Performed by: INTERNAL MEDICINE

## 2020-02-04 PROCEDURE — 74011000250 HC RX REV CODE- 250: Performed by: NURSE PRACTITIONER

## 2020-02-04 RX ORDER — HYDRALAZINE HYDROCHLORIDE 25 MG/1
25 TABLET, FILM COATED ORAL 3 TIMES DAILY
Status: DISCONTINUED | OUTPATIENT
Start: 2020-02-04 | End: 2020-02-05

## 2020-02-04 RX ORDER — METOPROLOL SUCCINATE 25 MG/1
12.5 TABLET, EXTENDED RELEASE ORAL DAILY
Status: DISCONTINUED | OUTPATIENT
Start: 2020-02-04 | End: 2020-02-05 | Stop reason: HOSPADM

## 2020-02-04 RX ORDER — ISOSORBIDE MONONITRATE 30 MG/1
30 TABLET, EXTENDED RELEASE ORAL DAILY
Status: DISCONTINUED | OUTPATIENT
Start: 2020-02-04 | End: 2020-02-05 | Stop reason: HOSPADM

## 2020-02-04 RX ORDER — METOPROLOL SUCCINATE 25 MG/1
25 TABLET, EXTENDED RELEASE ORAL DAILY
Status: DISCONTINUED | OUTPATIENT
Start: 2020-02-04 | End: 2020-02-04

## 2020-02-04 RX ADMIN — INSULIN LISPRO 7 UNITS: 100 INJECTION, SOLUTION INTRAVENOUS; SUBCUTANEOUS at 12:34

## 2020-02-04 RX ADMIN — HYDRALAZINE HYDROCHLORIDE 25 MG: 25 TABLET, FILM COATED ORAL at 21:17

## 2020-02-04 RX ADMIN — GABAPENTIN 200 MG: 100 CAPSULE ORAL at 21:17

## 2020-02-04 RX ADMIN — GABAPENTIN 200 MG: 100 CAPSULE ORAL at 09:19

## 2020-02-04 RX ADMIN — MILRINONE LACTATE IN DEXTROSE 0.3 MCG/KG/MIN: 200 INJECTION, SOLUTION INTRAVENOUS at 08:14

## 2020-02-04 RX ADMIN — ACETAMINOPHEN 1000 MG: 500 TABLET ORAL at 17:09

## 2020-02-04 RX ADMIN — ASPIRIN 81 MG 81 MG: 81 TABLET ORAL at 09:20

## 2020-02-04 RX ADMIN — LEVOTHYROXINE SODIUM 100 MCG: 100 TABLET ORAL at 06:31

## 2020-02-04 RX ADMIN — INSULIN LISPRO 3 UNITS: 100 INJECTION, SOLUTION INTRAVENOUS; SUBCUTANEOUS at 17:19

## 2020-02-04 RX ADMIN — ALBUMIN (HUMAN) 12.5 G: 12.5 INJECTION, SOLUTION INTRAVENOUS at 11:03

## 2020-02-04 RX ADMIN — Medication 10 ML: at 14:00

## 2020-02-04 RX ADMIN — HYDRALAZINE HYDROCHLORIDE 25 MG: 25 TABLET, FILM COATED ORAL at 09:19

## 2020-02-04 RX ADMIN — GABAPENTIN 200 MG: 100 CAPSULE ORAL at 15:31

## 2020-02-04 RX ADMIN — INSULIN GLARGINE 30 UNITS: 100 INJECTION, SOLUTION SUBCUTANEOUS at 21:39

## 2020-02-04 RX ADMIN — ACETAMINOPHEN 1000 MG: 500 TABLET ORAL at 09:17

## 2020-02-04 RX ADMIN — ALBUMIN (HUMAN) 12.5 G: 12.5 INJECTION, SOLUTION INTRAVENOUS at 17:10

## 2020-02-04 RX ADMIN — Medication 10 ML: at 06:32

## 2020-02-04 RX ADMIN — ALLOPURINOL 100 MG: 100 TABLET ORAL at 09:21

## 2020-02-04 RX ADMIN — HYDRALAZINE HYDROCHLORIDE 25 MG: 25 TABLET, FILM COATED ORAL at 15:31

## 2020-02-04 RX ADMIN — BUPROPION HYDROCHLORIDE 150 MG: 150 TABLET, EXTENDED RELEASE ORAL at 09:18

## 2020-02-04 RX ADMIN — CLOPIDOGREL BISULFATE 75 MG: 75 TABLET, FILM COATED ORAL at 09:19

## 2020-02-04 RX ADMIN — Medication 10 ML: at 21:18

## 2020-02-04 RX ADMIN — ATORVASTATIN CALCIUM 80 MG: 40 TABLET, FILM COATED ORAL at 21:18

## 2020-02-04 RX ADMIN — MELATONIN 2 TABLET: at 09:19

## 2020-02-04 RX ADMIN — METOPROLOL SUCCINATE 12.5 MG: 25 TABLET, EXTENDED RELEASE ORAL at 12:35

## 2020-02-04 RX ADMIN — EZETIMIBE 10 MG: 10 TABLET ORAL at 09:18

## 2020-02-04 RX ADMIN — ISOSORBIDE MONONITRATE 30 MG: 30 TABLET ORAL at 12:35

## 2020-02-04 NOTE — PROGRESS NOTES
02/04/20 1425 02/04/20 1427 02/04/20 1429   RT Walking Oximetry   Stage Resting (Room Air) During Walk (Room Air) During Walk (Room Air)   SpO2 96 % 96 %   (94-96)   HR 80 bpm 90 bpm 100 bpm   Rate of Dyspnea 0 0 0   Symptoms  --   --   --    O2 Device None (Room air) None (Room air) None (Room air)   FIO2 (%) 21 % 21 % 21 %   Walk/Assistive Device  --   --   --    Distance Walked in Feet (ft)  --   --   --       02/04/20 1431   RT Walking Oximetry   Stage After Walk   SpO2 96 %    bpm   Rate of Dyspnea 0   Symptoms   (no symptoms)   O2 Device None (Room air)   FIO2 (%) 21 %   Walk/Assistive Device [employer] in Feet (ft) 768 ft     Pt passed six minute walk test.

## 2020-02-04 NOTE — CARDIO/PULMONARY
Cardiac Rehab: Living with Heart Failure Booklet at bedside of Ese Snow. Educated using teach back method. Discussed diagnosis definition and assessed patient understanding. Pt said she started reading material that was left for her yesterday. Transport here to take pt to a stress test. Pt said she will get back to her daily weights and low salt diet. Pt states she hopes to go home after the test. Discussed will try and see her again after her test, but that we will also call after discharge for cardiac rehab. She verbalized understanding.

## 2020-02-04 NOTE — PROGRESS NOTES
[facility] Progress Note      DOS:   2020  NAME:  Jim Olivarez   MRN:   [id number]   REFERRING PROVIDER:  Tomasz Gleason NP  PRIMARY CARE PHYSICIAN: Tomasz Gleason NP  PRIMARY CARDIOLOGIST: none      Chief Complaint:   Chief Complaint   Patient presents with    Shortness of Breath       HPI: 68y.o. year old female with a history of HTN, HLD, WES, obesity (Body mass index is 39.44 kg/m².) s/p gastric bypass in , T2DM, hypothyroidism, COPD, CAD s/p CABG in , s/p PCI to [address] in 5/15, ICM, VT, s/p AICD ([employer]),  anxiety, and depression who presents for further evaluation of her chronic systolic heart failure. Ms. Kwame De La Vega recalls having a viral syndrome in the Fall and has not felt well since then. She uses oxygen with her BiPAP every night and sleeps on two pillows. Her activity level is diminished. She finds herself short of breath with simply talking. In walking to the clinic, she had to stop several times to catch her breath. She denies palpitations, presyncope, and syncope. She has been admitted to [facility] for further evaluation and treatment of her HFrEF.      24Hr Event:  No acute overnight events  RHC/LHC  Weaning off milrinone     History:  Past Medical History:   Diagnosis Date    Chronic obstructive pulmonary disease (HCC)     [facility] ([facility] [address] [facility]     Diabetes ([facility] [address] [facility]     Hypertension     Sleep apnea      Past Surgical History:   Procedure Laterality Date    CARDIAC SURG PROCEDURE UNLIST  2018    cardiac cath - Left    HX ARTHROPLASTY  2105    knee    HX  SECTION      HX CORONARY ARTERY BYPASS GRAFT  1997    HX CORONARY STENT PLACEMENT  2016    HX HERNIA REPAIR      HX IMPLANTABLE CARDIOVERTER DEFIBRILLATOR      HX ORTHOPAEDIC      LAP GASTRIC BYPASS/KERLINE-EN-Y  2011    lap band     Social History     Socioeconomic History    Marital status:      Spouse name: Not on file    Number of children: Not on file    Years of education: Not on file    Highest education level: Not on file   Occupational History    Not on file   Social Needs    Financial resource strain: Not on file    Food insecurity:     Worry: Not on file     Inability: Not on file    Transportation needs:     Medical: Not on file     Non-medical: Not on file   Tobacco Use    Smoking status: Former Smoker     Types: Cigarettes    Smokeless tobacco: Never Used   Substance and Sexual Activity    Alcohol use: Not Currently    Drug use: Not Currently    Sexual activity: Not Currently   Lifestyle    Physical activity:     Days per week: Not on file     Minutes per session: Not on file    Stress: Not on file   Relationships    Social connections:     Talks on phone: Not on file     Gets together: Not on file     Attends Sabianism service: Not on file     Active member of club or organization: Not on file     Attends meetings of clubs or organizations: Not on file     Relationship status: Not on file    Intimate partner violence:     Fear of current or ex partner: Not on file     Emotionally abused: Not on file     Physically abused: Not on file     Forced sexual activity: Not on file   Other Topics Concern    Not on file   Social History Narrative    Not on file     History reviewed. No pertinent family history.     Current Medications:   Current Facility-Administered Medications   Medication Dose Route Frequency Provider Last Rate Last Dose    hydrALAZINE (APRESOLINE) tablet 25 mg  25 mg Oral TID Juan MCNEILL NP   25 mg at 02/04/20 0919    metoprolol succinate (TOPROL-XL) XL tablet 25 mg  25 mg Oral DAILY Mike, Lo B, NP        isosorbide mononitrate ER (IMDUR) tablet 30 mg  30 mg Oral DAILY Mike, Lo B, NP        gabapentin (NEURONTIN) capsule 200 mg  200 mg Oral TID Isabel Blankenship NP   200 mg at 02/04/20 0919    insulin glargine (LANTUS) injection 30 Units  30 Units SubCUTAneous QHS Polliard, Paddy Holes, NP   30 Units at 02/03/20 2217    insulin lispro (HUMALOG) injection   SubCUTAneous AC&HS Yolanda Ready, NP   Stopped at 02/04/20 0730    glucose chewable tablet 16 g  4 Tab Oral PRN Polliard, Paddy Holes, NP        glucagon (GLUCAGEN) injection 1 mg  1 mg IntraMUSCular PRN Polliard, Paddy Holes, NP        dextrose 10% infusion 0-250 mL  0-250 mL IntraVENous PRN Polliard, Paddy Holes, NP        albumin human 5% (BUMINATE) solution 12.5 g  12.5 g IntraVENous BID Polliard, Jenness Henrik T, NP   12.5 g at 02/03/20 1736    cholecalciferol (VITAMIN D3) (1000 Units /25 mcg) tablet 2 Tab  2,000 Units Oral DAILY Polliard, Paddy Holes, NP   2 Tab at 02/04/20 0919    ezetimibe (ZETIA) tablet 10 mg  10 mg Oral DAILY Polliard, Paddy Holes, NP   10 mg at 02/04/20 0918    lidocaine (LIDODERM) 5 % patch 1 Patch  1 Patch TransDERmal Q24H Polliard, Paddy Holes, NP   1 Patch at 02/03/20 1716    milrinone (PRIMACOR) 20 MG/100 ML D5W infusion  0.2 mcg/kg/min IntraVENous CONTINUOUS MikeLo melgar, NP 5.9 mL/hr at 02/04/20 0928 0.2 mcg/kg/min at 02/04/20 0973    acetaminophen (TYLENOL) tablet 1,000 mg  1,000 mg Oral BID Gwenyth Garden, NP   1,000 mg at 02/04/20 0991    allopurinoL (ZYLOPRIM) tablet 100 mg  100 mg Oral DAILY [facility], NP   100 mg at 02/04/20 1366    aspirin chewable tablet 81 mg  81 mg Oral DAILY [facility], NP   81 mg at 02/04/20 0920    atorvastatin (LIPITOR) tablet 80 mg  80 mg Oral QHS [facility], NP   80 mg at 02/03/20 2216    buPROPion SR (WELLBUTRIN SR) tablet 150 mg  150 mg Oral DAILY [facility], NP   150 mg at 02/04/20 3929    clopidogreL (PLAVIX) tablet 75 mg  75 mg Oral DAILY Elvira Dsouza NP   75 mg at 02/04/20 0919    levothyroxine (SYNTHROID) tablet 100 mcg  100 mcg Oral ACB Elvira Dsouza NP   100 mcg at 02/04/20 0631    [Held by provider] metFORMIN (GLUCOPHAGE) tablet 500 mg  500 mg Oral BID WITH MEALS Darcella Mons, NP   Stopped at 01/31/20 1700    docusate sodium (COLACE) capsule 100 mg  100 mg Oral PRN Darcella Mons, NP   100 mg at 01/31/20 2106    sodium chloride (NS) flush 5-40 mL  5-40 mL IntraVENous Q8H Darcella Mons, NP   10 mL at 02/04/20 1166    sodium chloride (NS) flush 5-40 mL  5-40 mL IntraVENous PRN Darcella Mons, NP   10 mL at 02/02/20 1854       Allergies: Allergies   Allergen Reactions    Crestor [Rosuvastatin] Other (comments)     Causes muscle cramps    Lisinopril Cough       ROS:    General:  positive for  - fatigue  HEENT: positive for cataracts and wears glasses  Pulmonary: positive for - cough, orthopnea and shortness of breath  Cardiac: Negative for MARINA, PND, orthopnea today   GI:  positive for - constipation, nausea/vomiting and anorexia  Musculo: positive for - muscular weakness  Neuro: no TIA or stroke symptoms  Skin:  negative  Psych: positive for - anxiety and depression    Admission Weight: Last Weight   Weight: 215 lb 13.3 oz (97.9 kg) Weight: 215 lb 9.8 oz (97.8 kg)       Physical Exam:   Vitals:    Visit Vitals  /70 (BP 1 Location: Right arm)   Pulse 71   Temp 98 °F (36.7 °C)   Resp 16   Ht 5' 2\" (1.575 m)   Wt 215 lb 9.8 oz (97.8 kg)   SpO2 97%   BMI 39.44 kg/m²       General:  fatigued, cooperative  HEENT: PERRLA, EOMI, Sclera clear, anicteric and Oropharynx clear, no lesions  Neck:  supple, no significant adenopathy, carotids upstroke normal bilaterally, no bruits  CVP:  10 cm  ( + ) HJR  Cardiac: regular rate, regular rhythm, S1, S2 normal, S4 present and systolic murmur present  Chest: breath sounds clear and equal bilaterally  Abdomen: soft, non-tender. Bowel sounds normal. No masses, mild hepatomegaly. Extremity: edema trace bilaterally  Neuro: Alert and oriented to person, place, and time; normal strength and tone. Normal symmetric reflexes.  Normal coordination and gait  Skin:   no rashes, no ecchymoses, no petechiae    Recent Labs:   Labs Latest Ref Rng & Units 2020 2/3/2020 2020 2020 2020 2020 2020   WBC 3.6 - 11.0 K/uL 5.8 6.0 - 6.1 5.5 6.4 7.9   RBC 3.80 - 5.20 M/uL 3.14(L) 3.16(L) - 3.32(L) 3.19(L) 3.38(L) 3.24(L)   Hemoglobin 11.5 - 16.0 g/dL 9.7(L) 10. 0(L) - 10. 3(L) 10. 0(L) 10. 7(L) 10. 1(L)   Hematocrit 35.0 - 47.0 % 30. 8(L) 32. 8(L) - 32. 5(L) 31. 2(L) 33. 7(L) 31. 9(L)   MCV 80.0 - 99.0 FL 98.1 103. 8(H) - 97.9 97.8 99. 7(H) 98.5   Platelets 948 - 581 K/uL 246 196 - 246 237 268 263   Lymphocytes 12 - 49 % - - - - - - 29   Monocytes 5 - 13 % - - - - - - 6   Eosinophils 0 - 7 % - - - - - - 4   Basophils 0 - 1 % - - - - - - 1   Albumin 3.5 - 5.0 g/dL 3.5 3.6 - 3. 3(L) 3. 2(L) 3.9 3.6   Calcium 8.5 - 10.1 MG/DL 10. 4(H) 10. 3(H) 10. 5(H) 10. 4(H) 10.0 10.0 9.9   SGOT 15 - 37 U/L 17 16 - 16 18 28 25   Glucose 65 - 100 mg/dL 118(H) 155(H) 283(H) 244(H) 183(H) 140(H) 138(H)   BUN 6 - 20 MG/DL 31(H) 38(H) 39(H) 43(H) 44(H) 37(H) 40(H)   Creatinine 0.55 - 1.02 MG/DL 1.83(H) 2.06(H) 2.43(H) 2.37(H) 2.08(H) 1.95(H) 1.98(H)   Sodium 136 - 145 mmol/L 140 138 136 136 136 138 137   Potassium 3.5 - 5.1 mmol/L 4.1 4.0 3.9 3.8 4.0 4.1 4.2   TSH 0.36 - 3.74 uIU/mL - - - - - 1.07 -   Some recent data might be hidden        EK2020  Sinus  Rhythm, rate 67 bpm   -  Nonspecific T-abnormality. Echocardiogram:   [facility]  LVEF 40-45% with mild lateral wall and septal wall HK  Mod LAE  Grade II diastolic dysfunction  Mod MR  Mild to mod TR with RVSP 36-45 mmHg    Cardiac Catheterization:   2018 ([facility])  Severe native 3vd  Patent LIMA-LAd, patent SVG-PDA  LVEDP 18 mmHg    5/19/15 ([facility])  Significant 3vd  Patent SVG-PDA with lesion in distal segment of graft  Patent LIMA-LAD  Mod pulm HTN  Depressed CI    PCI to distal SVG-PDA  Integrity BMS 3x9    RA 5, RV 55/11, PA 53/20/31, PCWP 11  TPG 17, /16, CO 4.17, CI 1.98      Impression / Plan:   1.  ICM - Stage C, NYHA Class IIIb-IV, LVEF 30-35%   Elevated OptiVol 1/30/20    TTE 1/31 shows decrease in EF to 30-35%   Stop milrinone today    Resume low dose BBrx   Intolerant of ACE/ARB/ARNI/MRA due to KEYONNA on CKD   Increase Hydralazine to 25 mg PO TID   Start Imdur 30mg daily     Normal filling pressures, Malina CI 2.6 on RHC while on Milrinone    Low sodium diet   Daily weights   Strict I/O   Check Invitate - ATTR   Send gammopathy- pending    PYP testing today    6 min walk today      2. CAD s/p CABG in 1997, s/p PCI to DVG-RCA in 10/16   On ASA, statin   Resume low dose BBrx    Severe 3V disease     - add Zetia     3. HTN - Stage 1   Intolerant of GDMT due to KEYONNA    Increase Hydralazine    Low sodium diet   Compliant with BiPAP    4. VT - s/p AICD   No shocks   No arrhythmias on interrogation     5. WES - on BiPAP with oxygen   Compliant with BiPAP   Follows up with her sleep medicine physician at [address] [place] [address] every 6 months    6. J8BT complicated by neuropathy   Hold metformin for KEYONNA   Begin insulin for BG > 200    HgA1C 7.5 - appreciate PDH recommendations     7. Hypothyroidism   On levothyroxine 100 mcgs daily    8. Depression   On Wellbutrin  mg     9. History of Tobacco Abuse - 1/2 ppd x 20 years, quit 5 years ago   Counseled re: importance of continued abstinence    10. Gout    D/C colchicine due to KEYONNA    Continue allopurinol     11. Obesity (Body mass index is 39.44 kg/m². s/p gastric bypass in 2011    12. Osteoarthritis - s/p right TKR   Discontinue Celebrex due to CKD, HFrEF   S/p left knee injection in 8/2019   Lidocaine patches to knees     13. Hyperlipidemia    ,    Lipoprotein-A 213   Continue Lipitor 80 mg daily    Continue Zetia 10 mg PO daily   Will likely need Repatha    Low chol diet     13. Iron deficiency    Venofer 200 mg IV x 1     14. KEYONNA on CKD4,suspect diabetic nephropathy and cardiorenal syndrome   Cr 1.8   D/C ARB, AA   Avoid nephrotoxic agents     15. Vitamin D deficiency   Continue cholecalciferol 2,000 units daily     16. Expressive aphasia   Head CT negative for acute process     17. [address], NP  [facility]  [address]  Office [phone]  Fax [phone]      Patient seen and examined. Data and note reviewed. I have discussed and agree with the plans as noted. 68year old female with a history of CAD, ICM, obesity, WES who was admitted with acute on chronic systolic heart failure. She diuresed well with IV inotropic support. Will discontinue IV milrinone and initiate hydralazine/nitrate due to KEYONNA on CKD. PYP equivocal for ATTR amyloidosis. Plan for discharge tomorrow with follow up in the [facility]. Thank you for allowing us to participate in your patient's care. Lilliam Mirza MD, [facility]  Chief of Cardiology, [address] [place] [address] Director  [facility]  [address]  Office [phone]  Fax [phone]

## 2020-02-04 NOTE — PROGRESS NOTES
Problem: Heart Failure: Day 5  Goal: Treatments/Interventions/Procedures  Outcome: Progressing Towards Goal     Educated pt on daily weights, I&O's, Low Na diet and exercise.          Last 3 Recorded Weights in this Encounter    02/02/20 0309 02/03/20 0717 02/04/20 8513   Weight: 97.9 kg (215 lb 13.3 oz) 97.8 kg (215 lb 9.8 oz) 97.8 kg (215 lb 9.8 oz)

## 2020-02-04 NOTE — PROGRESS NOTES
02/04/20 1031   Cristiana Fall Risk   Mobility 1.0   Mobility Interventions Communicate number of staff needed for ambulation/transfer   Mentation 0   Medication 1   Medication Interventions Evaluate medications/consider consulting pharmacy; Teach patient to arise slowly   Elimination 1.0   Elimination Interventions Call light in reach   Prior Fall History 1   History of Falls Interventions Door open when patient unattended   Total Score 4   Standard Fall Precautions Yes   High Fall Risk Yes     Pt is high fall risk 4, RT won't be able to perform six minute walk test.    Please coordinate with other healthcare provider.

## 2020-02-04 NOTE — PROGRESS NOTES
PHYSICAL THERAPY EVALUATION/DISCHARGE  Patient: Lorrain Hodgkins (10 y.o. female)  Date: 2/4/2020  Primary Diagnosis: Acute decompensated heart failure ([facility] [address] [I50.9]  Procedure(s) (LRB):  LEFT AND RIGHT HEART CATH / CORONARY ANGIOGRAPHY (N/A) 1 Day Post-Op   Precautions:     ASSESSMENT  Based on the objective data described below, the patient presents with improved pulmonary status following gtt and fluid management while admitted. Extensively reviewed CHF symptom management via exercise and daily mobility modifications (see below). Patient ambulated 700+ ft with RT prior to evaluation (patient reported feeling \"great\" compared to her attempt last Thursday). Patient demonstrated overall mod I functional tasks/ambulation for this therapist. Patient seemed to be extremely receptive to education provided as evident by clarifying questions and verbal gratitude. At this time, patient has no further skilled acute PT needs. Will complete orders. Patient, however, could benefit from a HHPT safety evaluation in order to maximize home safety and functional independence. Functional Outcome Measure: The patient scored 95/100 on the Barthel Index outcome measure which is indicative of 5% impairment in ADLs/functional mobility. Other factors to consider for discharge: Co-morbidities      Further skilled acute physical therapy is not indicated at this time. PLAN :  Recommendation for discharge: (in order for the patient to meet his/her long term goals)  HHPT Safety Evaluation    This discharge recommendation:  A follow-up discussion with the attending provider and/or case management is planned    IF patient discharges home will need the following DME: none       SUBJECTIVE:   Patient stated Ligia Lowe will take your advice.     OBJECTIVE DATA SUMMARY:   HISTORY:    Past Medical History:   Diagnosis Date    Chronic obstructive pulmonary disease ([facility] [address]     Congestive heart failure ([facility] [address]     Diabetes ([facility] [address]     Hypertension  Sleep apnea      Past Surgical History:   Procedure Laterality Date    CARDIAC SURG PROCEDURE UNLIST  2018    cardiac cath - Left    HX ARTHROPLASTY  2105    knee    HX  SECTION      HX CORONARY ARTERY BYPASS GRAFT  1997    HX CORONARY STENT PLACEMENT  2016    HX HERNIA REPAIR      HX IMPLANTABLE CARDIOVERTER DEFIBRILLATOR      HX ORTHOPAEDIC      LAP GASTRIC BYPASS/KERLINE-EN-Y      lap band       Prior level of function: Mod I with SPC in community and for stair negotiation. Furniture-surfer (reviewed consequences). Recommend shower chair for fall prevention and energy conservation during showers. Personal factors and/or comorbidities impacting plan of care: CHF, COPD, DM    Home Situation  Home Environment: Apartment  One/Two Story Residence: One story  Living Alone: No  Support Systems: Family member(s)  Patient Expects to be Discharged to[de-identified] Apartment  Current DME Used/Available at Home: Cane, straight  Tub or Shower Type: Tub/Shower combination(with a shower chair)    EXAMINATION/PRESENTATION/DECISION MAKING:   Critical Behavior:  Neurologic State: Alert, Appropriate for age           Hearing: Auditory  Auditory Impairment: None    Range Of Motion:  AROM: Generally decreased, functional(L knee limited by OA)                       Strength:    Strength: Generally decreased, functional                    Tone & Sensation:   Tone: Normal                              Coordination:  Coordination: Generally decreased, functional  Functional Mobility:  Bed Mobility:     Supine to Sit: Additional time;Bed Modified  Sit to Supine: Additional time;Bed Modified     Transfers:  Sit to Stand: Modified independent  Stand to Sit: Modified independent        Bed to Chair: Modified independent              Balance:   Sitting: Intact; Without support  Standing: Intact; With support(SPC)  Ambulation/Gait Training:  In-room ambulation without SPC - Mod I (additional time for safety/ cautiousness); 700+ ft with SPC (6 MWT)    Therapeutic Exercises/Activity:   Reviewed pacing strategies, morning LE strengthening exercises, nutrition-impact on energy level and fluid retention, diaphragmatic exercises to facilitate gas exchange and lung capacity/utilization. Functional Measure:  Barthel Index:    Bathin  Bladder: 10  Bowels: 10  Groomin  Dressing: 10  Feeding: 10  Mobility: 15  Stairs: 10  Toilet Use: 10  Transfer (Bed to Chair and Back): 15  Total: 95/100       The Barthel ADL Index: Guidelines  1. The index should be used as a record of what a patient does, not as a record of what a patient could do. 2. The main aim is to establish degree of independence from any help, physical or verbal, however minor and for whatever reason. 3. The need for supervision renders the patient not independent. 4. A patient's performance should be established using the best available evidence. Asking the patient, friends/relatives and nurses are the usual sources, but direct observation and common sense are also important. However direct testing is not needed. 5. Usually the patient's performance over the preceding 24-48 hours is important, but occasionally longer periods will be relevant. 6. Middle categories imply that the patient supplies over 50 per cent of the effort. 7. Use of aids to be independent is allowed. Jasmyne Le., Barthel, D.W. (1589). Functional evaluation: the Barthel Index. [address] [facility] (14)2. KARUNA MccloudF, Sha Huston., [facility], [address] [place] [address] (). Measuring the change indisability after inpatient rehabilitation; comparison of the responsiveness of the Barthel Index and Functional Greenlee Measure. Journal of Neurology, Neurosurgery, and Psychiatry, 66(4), 303-315. Siri Monroe, N.J.A, TITI Harvey.JOHANA, & Carmela Winters M.A. (2004.) Assessment of post-stroke quality of life in cost-effectiveness studies:  The usefulness of the Barthel Index and the EuroQoL-5D. Quality of Life Research, 13, 711-67     Based on the above components, the patient evaluation is determined to be of the following complexity level: LOW     Pain Rating:  Chronic L knee pain    Activity Tolerance:   Good  Please refer to the flowsheet for vital signs taken during this treatment. After treatment patient left in no apparent distress:   Supine in bed, Call bell within reach and Side rails x 3    COMMUNICATION/EDUCATION:   The patients plan of care was discussed with: Registered Nurse. Fall prevention education was provided and the patient/caregiver indicated understanding.     Thank you for this referral.  Corrie Sue, PT, DPT   Time Calculation: 10 mins

## 2020-02-04 NOTE — PROGRESS NOTES
Problem: Falls - Risk of  Goal: *Absence of Falls  Description  Document Bishop Singletary Fall Risk and appropriate interventions in the flowsheet. Outcome: Progressing Towards Goal  Note: Fall Risk Interventions:  Mobility Interventions: Communicate number of staff needed for ambulation/transfer, Patient to call before getting OOB, Utilize walker, cane, or other assistive device         Medication Interventions: Evaluate medications/consider consulting pharmacy, Patient to call before getting OOB, Teach patient to arise slowly    Elimination Interventions: Call light in reach, Elevated toilet seat, Patient to call for help with toileting needs, Stay With Me (per policy), Toilet paper/wipes in reach, Toileting schedule/hourly rounds    History of Falls Interventions: Door open when patient unattended, Evaluate medications/consider consulting pharmacy, Room close to nurse's station, Utilize gait belt for transfer/ambulation         Problem: Heart Failure: Day 5  Goal: Diagnostic Test/Procedures  Outcome: Progressing Towards Goal  Note:   Cardiac cath completed yesterday     Problem: Heart Failure: Discharge Outcomes  Goal: *Describes importance of continuing daily weights and changes to report to physician  Outcome: Progressing Towards Goal     Problem: Falls - Risk of  Goal: *Absence of Falls  Description  Document Bishop Singletary Fall Risk and appropriate interventions in the flowsheet.   Outcome: Progressing Towards Goal  Note: Fall Risk Interventions:  Mobility Interventions: Communicate number of staff needed for ambulation/transfer, Patient to call before getting OOB, Utilize walker, cane, or other assistive device         Medication Interventions: Evaluate medications/consider consulting pharmacy, Patient to call before getting OOB, Teach patient to arise slowly    Elimination Interventions: Call light in reach, Elevated toilet seat, Patient to call for help with toileting needs, Stay With Me (per policy), Toilet paper/wipes in reach, Toileting schedule/hourly rounds    History of Falls Interventions: Door open when patient unattended, Evaluate medications/consider consulting pharmacy, Room close to nurse's station, Utilize gait belt for transfer/ambulation         Problem: Heart Failure: Day 5  Goal: Diagnostic Test/Procedures  Outcome: Progressing Towards Goal  Note:   Cardiac cath completed yesterday     Problem: Heart Failure: Discharge Outcomes  Goal: *Describes importance of continuing daily weights and changes to report to physician  Outcome: Progressing Towards Goal

## 2020-02-04 NOTE — DIABETES MGMT
[facility]  CLINICAL NURSE SPECIALIST   PROGRAM FOR DIABETES HEALTH  Followup Progress Note  Presentation   Destiny Cervantes is a 68 y.o. female admitted with CHF exacerbation and consulted by Provider for advanced specialty nursing care related to inpatient diabetes management. Subjective   Patient is due to be discharged tomorrow. D/C recommendations below. Objective   Physical exam  General Alert, oriented and in no acute distress Conversant and cooperative  Vital Signs   Visit Vitals  /80   Pulse 87   Temp 97.8 °F (36.6 °C)   Resp 30   Ht 5' 2\" (1.575 m)   Wt 97.8 kg (215 lb 9.8 oz)   SpO2 97%   BMI 39.44 kg/m²     Skin Warm and dry  Heart Regular rate and rhythm. No murmurs, rubs or gallops  Lungs Clear to auscultation without rales or rhonchi  Extremities No foot wounds  Laboratory  GFR 33, Cr+ 1.83        BG trends >200. Getting 30 units Lantus daily with SSI (7-10 units)    D/C Planning: May want to consider discontinuing Metformin all together due to her CHF history and renal function (GFR 33)     Looking ahead to discharge :we need to consider either keeping her on daily insulin, basal and SSI. Cost is important to her so putting her on daily NPH twice daily (15units AM/ 15 units PM), or 70/30.         Recommend her seeing endocrinologist. Contact info imbedded in her discharge instructions.       Assessment and Plan   Nursing Diagnosis Risk for unstable blood glucose pattern   Nursing Intervention Domain 1342 Decision-making Support   Nursing Interventions Examined current inpatient diabetes control   Explored factors facilitating and impeding inpatient management  Identified self-management practices impeding diabetes control  Explored corrective strategies with patient and responsible inpatient provider   Informed patient of rational for basal bolus insulin strategy while hospitalized  Instructed patient in options that are affordable for her for insulins.  Also, discussed her seeing and endocrinologist for her diabetes care.      Signed By: Raven Braga RN   Clinical Nurse Specialist  Program for Diabetes Health   [phone]    February 4, 2020

## 2020-02-04 NOTE — PROGRESS NOTES
Transitions of Care Plan:    Anticipate discharge home tomorrow with [facility] for CHF    Patient will drive herself at time of discharge (direct admit from [facility])    [id number] -  offered [facility] for CHF services. Patient provided verbal agreement to plan. Patient states she will drive herself home and will walk with RN today to determine if she feels SOB. CM advised patient to update RN and AHF team if she feels SOB when walking today. Patient recently changed insurance from [facility] to [facility] - patient inquired into need to change DME company for home bipap and oxygen concentrator. CM will contact [address] [place] [address] for additional information. CM will continue to follow.     Kemi Valle MPH

## 2020-02-05 ENCOUNTER — APPOINTMENT (OUTPATIENT)
Dept: NON INVASIVE DIAGNOSTICS | Age: 70
DRG: 286 | End: 2020-02-05
Attending: NURSE PRACTITIONER
Payer: MEDICARE

## 2020-02-05 VITALS
TEMPERATURE: 97.9 F | RESPIRATION RATE: 16 BRPM | OXYGEN SATURATION: 96 % | BODY MASS INDEX: 40.04 KG/M2 | WEIGHT: 217.59 LBS | HEART RATE: 66 BPM | HEIGHT: 62 IN | SYSTOLIC BLOOD PRESSURE: 105 MMHG | DIASTOLIC BLOOD PRESSURE: 51 MMHG

## 2020-02-05 LAB
ALBUMIN SERPL-MCNC: 3.6 G/DL (ref 3.5–5)
ALBUMIN/GLOB SERPL: 1.2 {RATIO} (ref 1.1–2.2)
ALP SERPL-CCNC: 60 U/L (ref 45–117)
ALT SERPL-CCNC: 21 U/L (ref 12–78)
ANION GAP SERPL CALC-SCNC: 3 MMOL/L (ref 5–15)
AST SERPL-CCNC: 18 U/L (ref 15–37)
BILIRUB SERPL-MCNC: 0.3 MG/DL (ref 0.2–1)
BNP SERPL-MCNC: 990 PG/ML
BUN SERPL-MCNC: 28 MG/DL (ref 6–20)
BUN/CREAT SERPL: 15 (ref 12–20)
CALCIUM SERPL-MCNC: 10.3 MG/DL (ref 8.5–10.1)
CALCIUM SERPL-MCNC: 9.2 MG/DL (ref 8.5–10.1)
CHLORIDE SERPL-SCNC: 112 MMOL/L (ref 97–108)
CK SERPL-CCNC: 112 U/L (ref 26–192)
CO2 SERPL-SCNC: 24 MMOL/L (ref 21–32)
CREAT SERPL-MCNC: 1.89 MG/DL (ref 0.55–1.02)
ECHO LA MAJOR AXIS: 5.26 CM
ECHO LV INTERNAL DIMENSION DIASTOLIC: 5.94 CM (ref 3.9–5.3)
ECHO LV INTERNAL DIMENSION SYSTOLIC: 5.05 CM
ECHO LV IVSD: 1.06 CM (ref 0.6–0.9)
ECHO LV MASS 2D: 317.9 G (ref 67–162)
ECHO LV MASS INDEX 2D: 160.5 G/M2 (ref 43–95)
ECHO LV POSTERIOR WALL DIASTOLIC: 1.08 CM (ref 0.6–0.9)
ECHO MV AREA PISA: 0.2 CM2
ECHO MV EROA PISA: 0.2 CM2
ECHO MV MAX VELOCITY: 105.34 CM/S
ECHO MV MEAN GRADIENT: 1.4 MMHG
ECHO MV MEAN INFLOW VELOCITY: 0.54 M/S
ECHO MV PEAK GRADIENT: 4.4 MMHG
ECHO MV REGURGITANT PEAK GRADIENT: 129.8 MMHG
ECHO MV REGURGITANT PEAK VELOCITY: 569.6 CM/S
ECHO MV REGURGITANT RADIUS PISA: 0.62 CM
ECHO MV VTI: 30.75 CM
ECHO TV REGURGITANT MAX VELOCITY: 303.34 CM/S
ECHO TV REGURGITANT PEAK GRADIENT: 36.8 MMHG
ERYTHROCYTE [DISTWIDTH] IN BLOOD BY AUTOMATED COUNT: 15.3 % (ref 11.5–14.5)
GLOBULIN SER CALC-MCNC: 3.1 G/DL (ref 2–4)
GLUCOSE BLD STRIP.AUTO-MCNC: 209 MG/DL (ref 65–100)
GLUCOSE BLD STRIP.AUTO-MCNC: 68 MG/DL (ref 65–100)
GLUCOSE BLD STRIP.AUTO-MCNC: 90 MG/DL (ref 65–100)
GLUCOSE SERPL-MCNC: 80 MG/DL (ref 65–100)
HCT VFR BLD AUTO: 30 % (ref 35–47)
HGB BLD-MCNC: 9.5 G/DL (ref 11.5–16)
LVFS 2D: 14.93 %
MAGNESIUM SERPL-MCNC: 2.3 MG/DL (ref 1.6–2.4)
MCH RBC QN AUTO: 31.4 PG (ref 26–34)
MCHC RBC AUTO-ENTMCNC: 31.7 G/DL (ref 30–36.5)
MCV RBC AUTO: 99 FL (ref 80–99)
NRBC # BLD: 0 K/UL (ref 0–0.01)
NRBC BLD-RTO: 0 PER 100 WBC
PLATELET # BLD AUTO: 240 K/UL (ref 150–400)
PMV BLD AUTO: 10.3 FL (ref 8.9–12.9)
POTASSIUM SERPL-SCNC: 4.2 MMOL/L (ref 3.5–5.1)
PROT SERPL-MCNC: 6.7 G/DL (ref 6.4–8.2)
PTH-INTACT SERPL-MCNC: 170.2 PG/ML (ref 18.4–88)
RBC # BLD AUTO: 3.03 M/UL (ref 3.8–5.2)
SERVICE CMNT-IMP: ABNORMAL
SERVICE CMNT-IMP: NORMAL
SERVICE CMNT-IMP: NORMAL
SODIUM SERPL-SCNC: 139 MMOL/L (ref 136–145)
WBC # BLD AUTO: 6.9 K/UL (ref 3.6–11)

## 2020-02-05 PROCEDURE — P9045 ALBUMIN (HUMAN), 5%, 250 ML: HCPCS | Performed by: NURSE PRACTITIONER

## 2020-02-05 PROCEDURE — 85027 COMPLETE CBC AUTOMATED: CPT

## 2020-02-05 PROCEDURE — 83880 ASSAY OF NATRIURETIC PEPTIDE: CPT

## 2020-02-05 PROCEDURE — 74011636637 HC RX REV CODE- 636/637: Performed by: NURSE PRACTITIONER

## 2020-02-05 PROCEDURE — 82550 ASSAY OF CK (CPK): CPT

## 2020-02-05 PROCEDURE — 74011250637 HC RX REV CODE- 250/637: Performed by: NURSE PRACTITIONER

## 2020-02-05 PROCEDURE — 80053 COMPREHEN METABOLIC PANEL: CPT

## 2020-02-05 PROCEDURE — 83735 ASSAY OF MAGNESIUM: CPT

## 2020-02-05 PROCEDURE — 82962 GLUCOSE BLOOD TEST: CPT

## 2020-02-05 PROCEDURE — 36415 COLL VENOUS BLD VENIPUNCTURE: CPT

## 2020-02-05 PROCEDURE — 93308 TTE F-UP OR LMTD: CPT

## 2020-02-05 PROCEDURE — 83970 ASSAY OF PARATHORMONE: CPT

## 2020-02-05 PROCEDURE — 74011250636 HC RX REV CODE- 250/636: Performed by: NURSE PRACTITIONER

## 2020-02-05 RX ORDER — METOPROLOL SUCCINATE 25 MG/1
12.5 TABLET, EXTENDED RELEASE ORAL DAILY
Qty: 60 TAB | Refills: 2 | Status: ON HOLD | OUTPATIENT
Start: 2020-02-05 | End: 2020-11-13 | Stop reason: SDUPTHER

## 2020-02-05 RX ORDER — EZETIMIBE 10 MG/1
10 TABLET ORAL DAILY
Qty: 30 TAB | Refills: 1 | Status: SHIPPED | OUTPATIENT
Start: 2020-02-06 | End: 2020-03-31

## 2020-02-05 RX ORDER — GABAPENTIN 100 MG/1
200 CAPSULE ORAL 3 TIMES DAILY
Qty: 180 CAP | Refills: 1 | Status: SHIPPED | OUTPATIENT
Start: 2020-02-05 | End: 2020-05-15

## 2020-02-05 RX ORDER — MELATONIN
2000 DAILY
Qty: 60 TAB | Refills: 2 | Status: SHIPPED | OUTPATIENT
Start: 2020-02-06 | End: 2020-04-10 | Stop reason: SDUPTHER

## 2020-02-05 RX ORDER — HYDRALAZINE HYDROCHLORIDE 50 MG/1
50 TABLET, FILM COATED ORAL 3 TIMES DAILY
Qty: 90 TAB | Refills: 2 | Status: SHIPPED | OUTPATIENT
Start: 2020-02-05 | End: 2020-05-28 | Stop reason: SDUPTHER

## 2020-02-05 RX ORDER — ISOSORBIDE MONONITRATE 30 MG/1
30 TABLET, EXTENDED RELEASE ORAL DAILY
Qty: 30 TAB | Refills: 2 | Status: SHIPPED | OUTPATIENT
Start: 2020-02-06 | End: 2020-04-30

## 2020-02-05 RX ORDER — HYDRALAZINE HYDROCHLORIDE 50 MG/1
50 TABLET, FILM COATED ORAL 3 TIMES DAILY
Status: DISCONTINUED | OUTPATIENT
Start: 2020-02-05 | End: 2020-02-05 | Stop reason: HOSPADM

## 2020-02-05 RX ORDER — LIDOCAINE 50 MG/G
PATCH TOPICAL
Qty: 12 EACH | Refills: 2 | Status: SHIPPED | OUTPATIENT
Start: 2020-02-05 | End: 2020-02-19

## 2020-02-05 RX ADMIN — ASPIRIN 81 MG 81 MG: 81 TABLET ORAL at 08:51

## 2020-02-05 RX ADMIN — MELATONIN 2 TABLET: at 08:51

## 2020-02-05 RX ADMIN — ALLOPURINOL 100 MG: 100 TABLET ORAL at 08:51

## 2020-02-05 RX ADMIN — Medication 10 ML: at 06:51

## 2020-02-05 RX ADMIN — HYDRALAZINE HYDROCHLORIDE 25 MG: 25 TABLET, FILM COATED ORAL at 08:51

## 2020-02-05 RX ADMIN — INSULIN LISPRO 4 UNITS: 100 INJECTION, SOLUTION INTRAVENOUS; SUBCUTANEOUS at 12:21

## 2020-02-05 RX ADMIN — ACETAMINOPHEN 1000 MG: 500 TABLET ORAL at 08:51

## 2020-02-05 RX ADMIN — EZETIMIBE 10 MG: 10 TABLET ORAL at 09:00

## 2020-02-05 RX ADMIN — ALBUMIN (HUMAN) 12.5 G: 12.5 INJECTION, SOLUTION INTRAVENOUS at 08:52

## 2020-02-05 RX ADMIN — GABAPENTIN 200 MG: 100 CAPSULE ORAL at 08:51

## 2020-02-05 RX ADMIN — Medication 10 ML: at 14:00

## 2020-02-05 RX ADMIN — ISOSORBIDE MONONITRATE 30 MG: 30 TABLET ORAL at 08:50

## 2020-02-05 RX ADMIN — METOPROLOL SUCCINATE 12.5 MG: 25 TABLET, EXTENDED RELEASE ORAL at 08:51

## 2020-02-05 RX ADMIN — BUPROPION HYDROCHLORIDE 150 MG: 150 TABLET, EXTENDED RELEASE ORAL at 08:51

## 2020-02-05 RX ADMIN — LEVOTHYROXINE SODIUM 100 MCG: 100 TABLET ORAL at 06:51

## 2020-02-05 RX ADMIN — CLOPIDOGREL BISULFATE 75 MG: 75 TABLET, FILM COATED ORAL at 08:51

## 2020-02-05 NOTE — PROGRESS NOTES
[facility] Progress Note      DOS:   2020  NAME:  Debbie Dickey   MRN:   [id number]   REFERRING PROVIDER:  Rafa Faye NP  PRIMARY CARE PHYSICIAN: Rafa Faye NP  PRIMARY CARDIOLOGIST: none      Chief Complaint:   Chief Complaint   Patient presents with    Shortness of Breath       HPI: 68y.o. year old female with a history of HTN, HLD, WES, obesity (Body mass index is 39.8 kg/m².) s/p gastric bypass in , T2DM, hypothyroidism, COPD, CAD s/p CABG in , s/p PCI to [address] in 5/15, ICM, VT, s/p AICD ([employer]),  anxiety, and depression who presents for further evaluation of her chronic systolic heart failure. Ms. Bony Lauren recalls having a viral syndrome in the Fall and has not felt well since then. She uses oxygen with her BiPAP every night and sleeps on two pillows. Her activity level is diminished. She finds herself short of breath with simply talking. In walking to the clinic, she had to stop several times to catch her breath. She denies palpitations, presyncope, and syncope. She has been admitted to [facility] for further evaluation and treatment of her HFrEF.      24Hr Event:  No acute overnight events  Tolerated milrinone wean  HF low-normal with addition of BB   Hypoglycemic this AM     History:  Past Medical History:   Diagnosis Date    Chronic obstructive pulmonary disease (HCC)     [facility]     Diabetes ([facility]     Hypertension     Sleep apnea      Past Surgical History:   Procedure Laterality Date    CARDIAC SURG PROCEDURE UNLIST  2018    cardiac cath - Left    HX ARTHROPLASTY  2105    knee    HX  SECTION      HX CORONARY ARTERY BYPASS GRAFT  1997    HX CORONARY STENT PLACEMENT  2016    HX HERNIA REPAIR      HX IMPLANTABLE CARDIOVERTER DEFIBRILLATOR      HX ORTHOPAEDIC      LAP GASTRIC BYPASS/KERLINE-EN-Y  2011    lap band     Social History     Socioeconomic History  Marital status:      Spouse name: Not on file    Number of children: Not on file    Years of education: Not on file    Highest education level: Not on file   Occupational History    Not on file   Social Needs    Financial resource strain: Not on file    Food insecurity:     Worry: Not on file     Inability: Not on file    Transportation needs:     Medical: Not on file     Non-medical: Not on file   Tobacco Use    Smoking status: Former Smoker     Types: Cigarettes    Smokeless tobacco: Never Used   Substance and Sexual Activity    Alcohol use: Not Currently    Drug use: Not Currently    Sexual activity: Not Currently   Lifestyle    Physical activity:     Days per week: Not on file     Minutes per session: Not on file    Stress: Not on file   Relationships    Social connections:     Talks on phone: Not on file     Gets together: Not on file     Attends [employer] service: Not on file     Active member of club or organization: Not on file     Attends meetings of clubs or organizations: Not on file     Relationship status: Not on file    Intimate partner violence:     Fear of current or ex partner: Not on file     Emotionally abused: Not on file     Physically abused: Not on file     Forced sexual activity: Not on file   Other Topics Concern    Not on file   Social History Narrative    Not on file     History reviewed. No pertinent family history.     Current Medications:   Current Facility-Administered Medications   Medication Dose Route Frequency Provider Last Rate Last Dose    hydrALAZINE (APRESOLINE) tablet 50 mg  50 mg Oral TID Ria Blankenship NP        isosorbide mononitrate ER (IMDUR) tablet 30 mg  30 mg Oral DAILY Mike, Lo B, NP   30 mg at 02/05/20 0850    metoprolol succinate (TOPROL-XL) XL tablet 12.5 mg  12.5 mg Oral DAILY Mike, Lo B, NP   12.5 mg at 02/05/20 3144    gabapentin (NEURONTIN) capsule 200 mg  200 mg Oral TID Ria Blankenship NP   200 mg at 02/05/20 0851    insulin glargine (LANTUS) injection 30 Units  30 Units SubCUTAneous QHS Pattie [place] Caroli, NP   30 Units at 02/04/20 2139    insulin lispro (HUMALOG) injection   SubCUTAneous AC&HS [facility], NP   4 Units at 02/05/20 1221    glucose chewable tablet 16 g  4 Tab Oral PRN [place] Caroli, NP        glucagon (GLUCAGEN) injection 1 mg  1 mg IntraMUSCular PRN [place] Caroli, NP        dextrose 10% infusion 0-250 mL  0-250 mL IntraVENous PRN Pattie [place] VENKATESH Moralez        albumin human 5% (BUMINATE) solution 12.5 g  12.5 g IntraVENous BID Erika Blankenship NP   12.5 g at 02/05/20 7422    cholecalciferol (VITAMIN D3) (1000 Units /25 mcg) tablet 2 Tab  2,000 Units Oral DAILY Pattie [place] Caroli, NP   2 Tab at 02/05/20 0209    ezetimibe (ZETIA) tablet 10 mg  10 mg Oral DAILY [facility], [place] Kirt NP   10 mg at 02/05/20 0900    lidocaine (LIDODERM) 5 % patch 1 Patch  1 Patch TransDERmal Q24H Pattie [place] Caroli, NP   1 Patch at 02/04/20 1709    acetaminophen (TYLENOL) tablet 1,000 mg  1,000 mg Oral BID Smith Dikes, NP   1,000 mg at 02/05/20 8871    allopurinoL (ZYLOPRIM) tablet 100 mg  100 mg Oral DAILY Smith Dikes, NP   100 mg at 02/05/20 8658    aspirin chewable tablet 81 mg  81 mg Oral DAILY Smith Dikes, NP   81 mg at 02/05/20 2487    atorvastatin (LIPITOR) tablet 80 mg  80 mg Oral QHS Smith Dikes, NP   80 mg at 02/04/20 2118    buPROPion SR (WELLBUTRIN SR) tablet 150 mg  150 mg Oral DAILY Smtih Dikes, NP   150 mg at 02/05/20 1594    clopidogreL (PLAVIX) tablet 75 mg  75 mg Oral DAILY [place] Dikes, NP   75 mg at 02/05/20 5893    levothyroxine (SYNTHROID) tablet 100 mcg  100 mcg Oral ACB [place] Dikes, NP   100 mcg at 02/05/20 3069    [Held by provider] metFORMIN (GLUCOPHAGE) tablet 500 mg  500 mg Oral BID WITH MEALS [place] Shaun NP   Stopped at 01/31/20 1700    docusate sodium (COLACE) capsule 100 mg  100 mg Oral PRN Nassar Media, NP   100 mg at 01/31/20 2106    sodium chloride (NS) flush 5-40 mL  5-40 mL IntraVENous Q8H Nassar Media E, NP   10 mL at 02/05/20 1400    sodium chloride (NS) flush 5-40 mL  5-40 mL IntraVENous PRN Nassar Media, NP   10 mL at 02/02/20 1854       Allergies: Allergies   Allergen Reactions    Crestor [Rosuvastatin] Other (comments)     Causes muscle cramps    Lisinopril Cough       ROS:    General:  Negative - eager to go home   HEENT: positive for cataracts and wears glasses  Pulmonary: negative  Cardiac: Negative for MARINA, PND, orthopnea today   GI:  negative  Musculo: positive for - muscular weakness  Neuro: no TIA or stroke symptoms  Skin:  negative  Psych: positive for - anxiety and depression    Admission Weight: Last Weight   Weight: 215 lb 13.3 oz (97.9 kg) Weight: 217 lb 9.5 oz (98.7 kg)       Physical Exam:   Vitals:    Visit Vitals  /51 (BP 1 Location: Right arm, BP Patient Position: At rest)   Pulse 66   Temp 97.9 °F (36.6 °C)   Resp 16   Ht 5' 2\" (1.575 m)   Wt 217 lb 9.5 oz (98.7 kg)   SpO2 96%   BMI 39.80 kg/m²       General:  fatigued, cooperative  HEENT: PERRLA, EOMI, Sclera clear, anicteric and Oropharynx clear, no lesions  Neck:  supple, no significant adenopathy, carotids upstroke normal bilaterally, no bruits  CVP:  10 cm  ( - ) HJR  Cardiac: regular rate, regular rhythm, S1, S2 normal, S4 present and systolic murmur present  Chest: breath sounds clear and equal bilaterally  Abdomen: soft, non-tender. Bowel sounds normal. No masses, mild hepatomegaly. Extremity: edema trace bilaterally  Neuro: Alert and oriented to person, place, and time; normal strength and tone. Normal symmetric reflexes.  Normal coordination and gait  Skin:   no rashes, no ecchymoses, no petechiae    Recent Labs:   Labs Latest Ref Rng & Units 2/5/2020 2/5/2020 2/4/2020 2/3/2020 2/2/2020 2/2/2020 2/1/2020   WBC 3.6 - 11.0 K/uL - 6.9 5.8 6.0 - 6.1 5.5   RBC 3.80 - 5.20 M/uL - 3.03(L) 3.14(L) 3.16(L) - 3.32(L) 3.19(L)   Hemoglobin 11.5 - 16.0 g/dL - 9.5(L) 9.7(L) 10. 0(L) - 10. 3(L) 10. 0(L)   Hematocrit 35.0 - 47.0 % - 30. 0(L) 30. 8(L) 32. 8(L) - 32. 5(L) 31. 2(L)   MCV 80.0 - 99.0 FL - 99.0 98.1 103. 8(H) - 97.9 97.8   Platelets 923 - 879 K/uL - 240 246 196 - 246 237   Lymphocytes 12 - 49 % - - - - - - -   Monocytes 5 - 13 % - - - - - - -   Eosinophils 0 - 7 % - - - - - - -   Basophils 0 - 1 % - - - - - - -   Albumin 3.5 - 5.0 g/dL - 3.6 3.5 3.6 - 3. 3(L) 3. 2(L)   Calcium 8.5 - 10.1 MG/DL 9.2 10. 3(H) 10. 4(H) 10. 3(H) 10. 5(H) 10. 4(H) 10.0   SGOT 15 - 37 U/L - 18 17 16 - 16 18   Glucose 65 - 100 mg/dL - 80 118(H) 155(H) 283(H) 244(H) 183(H)   BUN 6 - 20 MG/DL - 28(H) 31(H) 38(H) 39(H) 43(H) 44(H)   Creatinine 0.55 - 1.02 MG/DL - 1.89(H) 1.83(H) 2.06(H) 2.43(H) 2.37(H) 2.08(H)   Sodium 136 - 145 mmol/L - 139 140 138 136 136 136   Potassium 3.5 - 5.1 mmol/L - 4.2 4.1 4.0 3.9 3.8 4.0   TSH 0.36 - 3.74 uIU/mL - - - - - - -   Some recent data might be hidden        EK2020  Sinus  Rhythm, rate 67 bpm   -  Nonspecific T-abnormality. Echocardiogram:   2019 at [facility]  LVEF 40-45% with mild lateral wall and septal wall HK  Mod LAE  Grade II diastolic dysfunction  Mod MR  Mild to mod TR with RVSP 36-45 mmHg    Cardiac Catheterization:   2018 ([facility])  Severe native 3vd  Patent LIMA-LAd, patent SVG-PDA  LVEDP 18 mmHg    5/19/15 ([facility])  Significant 3vd  Patent SVG-PDA with lesion in distal segment of graft  Patent LIMA-LAD  Mod pulm HTN  Depressed CI    PCI to distal SVG-PDA  Integrity BMS 3x9    RA 5, RV 55/11, PA 53//, PCWP 11  TPG 17, /16, CO 4.17, CI 1.98      Impression / Plan:   1.  ICM - Stage C, NYHA Class IIIb-IV, LVEF 30-35%   Elevated OptiVol 20    TTE  shows decrease in EF to 30-35%   Milrinone d/c'd 20    Continue low dose Toprol - EP consult for consideration of RA lead placement in order to increase BB dose due to HF and CAD    Intolerant of ACE/ARB/ARNI/MRA due to KEYONNA on CKD   Increase Hydralazine to 50 mg PO TID   Continue Imdur 30mg daily     Normal filling pressures, Malina CI 2.6 on RHC while on Milrinone    Goal weight ~216 lbs    Low sodium diet, daily weights, strict I/O   Check Invitate - ATTR   Gammopathy- pending    PYP testing equivocal for ATTR (Grade 1)    6 min walk - 786 feet    Follow up with [facility] on 2/11      2. CAD s/p CABG in 1997, s/p PCI to DVG-RCA in 10/16   On ASA, statin   Continue low dose BB - monitor HR carefully    Consider RA lead to allow for uptitration of BB    Severe 3V disease     - add Zetia     3. HTN - Stage 1   Intolerant of GDMT due to KEYONNA    Increase Hydralazine to 50 mg PO TID    Low sodium diet   Compliant with BiPAP    4. VT - s/p AICD   No shocks   No arrhythmias on interrogation     5. WES - on BiPAP with oxygen   Compliant with BiPAP   Follows up with her sleep medicine physician at [address] [place] [address] every 6 months    6. N2UD complicated by neuropathy   Discontinue metformin due to KEYONNA   Will d/c home off of insulin due to AM hypoglycemia    Refer to endocrinology as OP for BG management    HgA1C 7.5 - appreciate PDH recommendations    Reinforced importance of consistent CCHO diet     7. Hypothyroidism   On levothyroxine 100 mcgs daily    8. Depression   On Wellbutrin  mg     9. History of Tobacco Abuse - 1/2 ppd x 20 years, quit 5 years ago   Counseled re: importance of continued abstinence    10. Gout    D/C colchicine due to KEYONNA    Continue allopurinol     11. Obesity (Body mass index is 39.8 kg/m². s/p gastric bypass in 2011    12. Osteoarthritis - s/p right TKR   Intolerant of Celebrex due to CKD, HFrEF   S/p left knee injection in 8/2019   Lidocaine patches to knees     13. Hyperlipidemia    ,    Lipoprotein-A 213   Continue Lipitor 80 mg daily    Continue Zetia 10 mg PO daily   Check CK   Will likely need Repatha    Low chol diet      13. Iron deficiency    Venofer 200 mg IV x 1     14. KEYONNA on CKD4,suspect diabetic nephropathy and cardiorenal syndrome   Cr 1.89   Intolerant of ARB, AA   No metformin, colchicine    Avoid nephrotoxic agents     15. Vitamin D deficiency   Continue cholecalciferol 2,000 units daily     16. Expressive aphasia   Head CT negative for acute process     17. Dispo   Home today     Rafaela Clarke NP  [address] [facility] [place]  [address]  Office [phone]  Fax [phone]    [facility] ATTENDING ADDENDUM    Patient was seen and examined in person. Data and notes were reviewed. I have discussed and agree with the plan as noted in the NP note above without further additions.     Yumi Galdamez MD PhD  [address]

## 2020-02-05 NOTE — PROGRESS NOTES
Pt D/C, AVS given. Took IVs out. Problem: Heart Failure: Day 6  Goal: Treatments/Interventions/Procedures  Outcome: Progressing Towards Goal       Educated pt on daily weights, low NA diet, I&O's. Maintained 02 sats WDL on RA. BIPAP at night from home.        Last 3 Recorded Weights in this Encounter    02/03/20 0717 02/04/20 0623 02/05/20 0307   Weight: 97.8 kg (215 lb 9.8 oz) 97.8 kg (215 lb 9.8 oz) 98.7 kg (217 lb 9.5 oz)        Weight change: 0.9 kg (1 lb 15.7 oz)

## 2020-02-05 NOTE — CONSULTS
Cardiac Electrophysiology Hospital Consultation Note   REFERRING PROVIDER: Dr Weber Gone:      Cecile Cantu is a 68 y.o. 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  She is not on beta blocker due to bradycardia   She had PVCS  She had hx of VT and Medtr[employer] ICD implanted at [facility]  She has hx of ischemic cardiomyopathy and LVEF has been declined  She was admitted with fluid overload and has been diuresed well  She is ready to go home today when CHF team consulted for atrial lead placement to pace faster and reintroduce beta blocker      She had hx of CABG with patent grafts (SVG to PDA stented), LIMA to LAD  Hx of gastric bypass    01/30/20   ECHO ADULT FOLLOW-UP OR LIMITED 02/05/2020 2/5/2020    Narrative · Mildly dilated left ventricle. Severe global systolic dysfunction. Estimated left ventricular ejection fraction is 15 - 20%. Visually   measured ejection fraction. · Severely dilated left atrium. · Not well visualized. · Mitral valve thickening. Mild to moderate mitral valve regurgitation is   present. · Mild tricuspid valve regurgitation is present. · There is no evidence of pulmonary hypertension.         Signed by: Charleston Sacks, MD       Patient Active Problem List   Diagnosis Code    Hx of CABG Z95.1    ICD (implantable cardioverter-defibrillator) in place Z95.810    H/O laparoscopic adjustable gastric banding Z98.84    Obesity, morbid ([address] E66.01    Acute decompensated heart failure ([address] I50.9     Current Facility-Administered Medications   Medication Dose Route Frequency Provider Last Rate Last Dose    hydrALAZINE (APRESOLINE) tablet 50 mg  50 mg Oral TID Mara CASANOVA NP        isosorbide mononitrate ER (IMDUR) tablet 30 mg  30 mg Oral DAILY Mike, Lo B, NP   30 mg at 02/05/20 0850    metoprolol succinate (TOPROL-XL) XL tablet 12.5 mg  12.5 mg Oral DAILY Mike, Lo B, NP   12.5 mg at 02/05/20 0851    gabapentin (NEURONTIN) capsule 200 mg  200 mg Oral TID Ria Blankenship NP   200 mg at 02/05/20 0851    insulin glargine (LANTUS) injection 30 Units  30 Units SubCUTAneous QHS Ria Blankenship NP   30 Units at 02/04/20 2139    insulin lispro (HUMALOG) injection   SubCUTAneous AC&HS Gale Ismael, NP   4 Units at 02/05/20 1221    glucose chewable tablet 16 g  4 Tab Oral PRN Ria Blankenship NP        glucagon (GLUCAGEN) injection 1 mg  1 mg IntraMUSCular PRN Ria Blankenship NP        dextrose 10% infusion 0-250 mL  0-250 mL IntraVENous PRN Ria Blankenship NP        albumin human 5% (BUMINATE) solution 12.5 g  12.5 g IntraVENous BID Sreedhar Blankenship NP   12.5 g at 02/05/20 4990    cholecalciferol (VITAMIN D3) (1000 Units /25 mcg) tablet 2 Tab  2,000 Units Oral DAILY Ria Blankenship NP   2 Tab at 02/05/20 9458    ezetimibe (ZETIA) tablet 10 mg  10 mg Oral DAILY Ria Blankenship NP   10 mg at 02/05/20 0900    lidocaine (LIDODERM) 5 % patch 1 Patch  1 Patch TransDERmal Q24H Ria Blankenship NP   1 Patch at 02/04/20 1709    acetaminophen (TYLENOL) tablet 1,000 mg  1,000 mg Oral BID Marian Sethi NP   1,000 mg at 02/05/20 8183    allopurinoL (ZYLOPRIM) tablet 100 mg  100 mg Oral DAILY Marian Sethi NP   100 mg at 02/05/20 1542    aspirin chewable tablet 81 mg  81 mg Oral DAILY Marian Sethi NP   81 mg at 02/05/20 7096    atorvastatin (LIPITOR) tablet 80 mg  80 mg Oral QHS Marian Sethi NP   80 mg at 02/04/20 2118    buPROPion SR (WELLBUTRIN SR) tablet 150 mg  150 mg Oral DAILY Marian Sethi NP   150 mg at 02/05/20 0880    clopidogreL (PLAVIX) tablet 75 mg  75 mg Oral DAILY Marian Sethi NP   75 mg at 02/05/20 3348    levothyroxine (SYNTHROID) tablet 100 mcg  100 mcg Oral ACB Marian Sethi NP   100 mcg at 02/05/20 6050    [Held by provider] metFORMIN (GLUCOPHAGE) tablet 500 mg  500 mg Oral BID WITH MEALS Marian Sethi NP Stopped at 01/31/20 1700    docusate sodium (COLACE) capsule 100 mg  100 mg Oral PRN Ivin Ferns, NP   100 mg at 01/31/20 2106    sodium chloride (NS) flush 5-40 mL  5-40 mL IntraVENous Q8H Ivin Ferns, NP   10 mL at 02/05/20 1400    sodium chloride (NS) flush 5-40 mL  5-40 mL IntraVENous PRN Ivin Ferns, NP   10 mL at 02/02/20 1854     Current Outpatient Medications   Medication Sig Dispense Refill    gabapentin (NEURONTIN) 100 mg capsule Take 2 Caps by mouth three (3) times daily. Max Daily Amount: 600 mg. 180 Cap 1    metoprolol succinate (TOPROL-XL) 25 mg XL tablet Take 0.5 Tabs by mouth daily. 60 Tab 2    [START ON 2/6/2020] cholecalciferol (VITAMIN D3) (1000 Units /25 mcg) tablet Take 2 Tabs by mouth daily. 60 Tab 2    [START ON 2/6/2020] ezetimibe (ZETIA) 10 mg tablet Take 1 Tab by mouth daily. 30 Tab 1    hydrALAZINE (APRESOLINE) 50 mg tablet Take 1 Tab by mouth three (3) times daily. 90 Tab 2    [START ON 2/6/2020] isosorbide mononitrate ER (IMDUR) 30 mg tablet Take 1 Tab by mouth daily. 30 Tab 2    lidocaine (LIDODERM) 5 % Apply patch to the affected area for 12 hours a day and remove for 12 hours a day. 12 Each 2    acetaminophen (TYLENOL ARTHRITIS PAIN) 650 mg TbER Take 1,300 mg by mouth two (2) times a day.  allopurinol (ZYLOPRIM) 100 mg tablet Take 100 mg by mouth daily.  buPROPion SR (WELLBUTRIN SR) 150 mg SR tablet Take 150 mg by mouth daily.  clopidogrel (PLAVIX) 75 mg tab Take 75 mg by mouth daily.  levothyroxine (SYNTHROID) 100 mcg tablet Take 100 mcg by mouth Daily (before breakfast).  aspirin 81 mg chewable tablet Take 81 mg by mouth daily.       atorvastatin (LIPITOR) 80 mg tablet TAKE 1 TABLET BY MOUTH AT BEDTIME 90 Tab 0     Allergies   Allergen Reactions    Crestor [Rosuvastatin] Other (comments)     Causes muscle cramps    Lisinopril Cough     Past Medical History:   Diagnosis Date    [facility] obstructive pulmonary disease [facility]     Congestive heart failure ([facility]     Diabetes ([facility]     Hypertension     Sleep apnea      Past Surgical History:   Procedure Laterality Date    CARDIAC SURG PROCEDURE UNLIST  2018    cardiac cath - Left    HX ARTHROPLASTY  2105    knee    HX  SECTION      HX CORONARY ARTERY BYPASS GRAFT  1997    HX CORONARY STENT PLACEMENT  2016    HX HERNIA REPAIR      HX IMPLANTABLE CARDIOVERTER DEFIBRILLATOR      HX ORTHOPAEDIC      LAP GASTRIC BYPASS/KERLINE-EN-Y      lap band     No ICD CHF or sudden death in family history. Social History     Tobacco Use    Smoking status: Former Smoker     Types: Cigarettes    Smokeless tobacco: Never Used   Substance Use Topics    Alcohol use: Not Currently        Review of Systems:   Constitutional: Negative for fever, chills, weight loss, + malaise/fatigue. HEENT: Negative for nosebleeds, vision changes. Respiratory: Negative for cough, hemoptysis  Cardiovascular: Negative for chest pain, palpitations, orthopnea, claudication, + leg swelling, no syncope, and PND. Gastrointestinal: Negative for nausea, vomiting, diarrhea, blood in stool and melena. Genitourinary: Negative for dysuria, and hematuria. Musculoskeletal: Negative for myalgias, + arthralgia. Skin: Negative for rash. Heme: Does not bleed or bruise easily. Neurological: Negative for speech change and focal weakness     Objective:     Visit Vitals  /51 (BP 1 Location: Right arm, BP Patient Position: At rest)   Pulse 66   Temp 97.9 °F (36.6 °C)   Resp 16   Ht 5' 2\" (1.575 m)   Wt 217 lb 9.5 oz (98.7 kg)   SpO2 96%   BMI 39.80 kg/m²      Physical Exam:   Constitutional: well-developed and well-nourished. No respiratory distress. Head: Normocephalic and atraumatic. Eyes: Pupils are equal, round  ENT: hearing normal  Neck: supple. No JVD present. Cardiovascular: Normal rate, regular rhythm. Exam reveals no gallop and no friction rub.  No murmur heard.  Pulmonary/Chest: Effort normal and breath sounds normal. No wheezes. Abdominal: Soft, morbid obesity  Musculoskeletal: no edema. Neurological: alert,oriented. Skin: Skin is warm and dry  Psychiatric: normal mood and affect. Behavior is normal. Judgment and thought content normal.      BMP:   Lab Results   Component Value Date/Time     02/05/2020 04:03 AM    K 4.2 02/05/2020 04:03 AM     (H) 02/05/2020 04:03 AM    CO2 24 02/05/2020 04:03 AM    AGAP 3 (L) 02/05/2020 04:03 AM    GLU 80 02/05/2020 04:03 AM    BUN 28 (H) 02/05/2020 04:03 AM    CREA 1.89 (H) 02/05/2020 04:03 AM    GFRAA 32 (L) 02/05/2020 04:03 AM    GFRNA 26 (L) 02/05/2020 04:03 AM     CBC:   Lab Results   Component Value Date/Time    WBC 6.9 02/05/2020 04:03 AM    HGB 9.5 (L) 02/05/2020 04:03 AM    HCT 30.0 (L) 02/05/2020 04:03 AM     02/05/2020 04:03 AM       Assessment/Plan:       ICD-10-CM ICD-9-CM    1. Chronic congestive heart failure, systolic heart failure type (HCC) I50.9 428.0    2. Hx of CABG Z95.1 V45.81    3. ICD (implantable cardioverter-defibrillator) in place Z95.810 V45.02    4. Obesity, morbid (HCC) E66.01 278.01      428.0    5 Stage 4 chronic kidney disease (HCC) N18.4 585.4    6 H/O laparoscopic adjustable gastric banding Z98.84 V45.86    7.  Other diabetic neurological complication associated with type 2 diabetes mellitus ([facility]) E11.49 250.60 gabapentin (NEURONTIN) 100 mg capsule      She has had ischemic cardiomyopathy with progressively declined LVEF to < 20%  She has Medtronic ICD and is supposed to be single chamber  She is on very low dose of toprol due to sinus bradycardia tendency  Therefore atrial lead addition and upgrade of ICD to dual chamber was discussed and she agrees to proceed as outpatient  She is planned to go home today  Will get remote check transferred over from [address]  Since she is going home now, will check it remotely    Thank you for involving me in this patient's care and please call with further concerns or questions. Dina Walter M.D.   Electrophysiology/Cardiology  [facility]  [address] [place] [address]                                [phone]

## 2020-02-05 NOTE — PROGRESS NOTES
Problem: Heart Failure: Discharge Outcomes  Goal: *Demonstrates ability to perform prescribed activity without shortness of breath or discomfort  Outcome: Progressing Towards Goal  Goal: *Describes importance of continuing daily weights and changes to report to physician  Outcome: Progressing Towards Goal     Problem: Falls - Risk of  Goal: *Absence of Falls  Description  Document Tia Manus Fall Risk and appropriate interventions in the flowsheet.   Note: Fall Risk Interventions:  Mobility Interventions: Communicate number of staff needed for ambulation/transfer, Patient to call before getting OOB, PT Consult for mobility concerns         Medication Interventions: Evaluate medications/consider consulting pharmacy, Patient to call before getting OOB, Teach patient to arise slowly    Elimination Interventions: Call light in reach, Elevated toilet seat, Patient to call for help with toileting needs, Stay With Me (per policy), Toilet paper/wipes in reach, Toileting schedule/hourly rounds    History of Falls Interventions: Door open when patient unattended, Evaluate medications/consider consulting pharmacy, Investigate reason for fall, Room close to nurse's station

## 2020-02-05 NOTE — DISCHARGE INSTRUCTIONS
Diabetes Management:  1. Take a blood glucose reading twice daily:  First thing in the morning before you eat or drink anything. Second reading before dinner. Avoid snacks at night. 2. Make a follow up appointment with your endocrinologist or primary care physician. Please see him within the next 2-4 weeks. 4. Make sure to get a DILATED eye exam every year. This checks for retinal blood vessel damage caused by long term high blood sugar. 5. Make sure to examine your feet every day looking for any wound or foot irritation as sensation can be impaired in your feet. Always wear socks and/or slippers even while at home. This will protect your feet from injury. 6. Diabetes Self Management Training:  Outpatient appointments are available with a certified diabetic educator who can  you with meal planning, insulin administration and other diabetes management strategies. Please call [phone] to schedule at a location close to your home. Patient Education        Learning About Heart Failure Zones  What are heart failure zones? Heart failure zones give you an easy way to see changes in your heart failure symptoms. They also tell you when you need to get help. Check every day to see which zone you are in. Green zone. You are doing well. This is where you want to be. · Your weight is stable. This means it is not going up or down. · You breathe easily. · You are sleeping well. You are able to lie flat without shortness of breath. · You can do your usual activities. Yellow zone. Be careful. Your symptoms are changing. Call your doctor. · You have new or increased shortness of breath. · You are dizzy or lightheaded, or you feel like you may faint. · You have sudden weight gain, such as more than 2 to 3 pounds in a day or 5 pounds in a week. (Your doctor may suggest a different range of weight gain.)  · You have increased swelling in your legs, ankles, or feet.   · You are so tired or weak that you cannot do your usual activities. · You are not sleeping well. Shortness of breath wakes you up at night. You need extra pillows. Your doctor's name: ____________________________________________________________  Your doctor's contact information: _________________________________________________  Red zone. This is an emergency. Call 911. You have symptoms of sudden heart failure, such as:  · You have severe trouble breathing. · You cough up pink, foamy mucus. · You have a new irregular or fast heartbeat. You have symptoms of a heart attack. These may include:  · Chest pain or pressure, or a strange feeling in the chest.  · Sweating. · Shortness of breath. · Nausea or vomiting. · Pain, pressure, or a strange feeling in the back, neck, jaw, or upper belly or in one or both shoulders or arms. · Lightheadedness or sudden weakness. · A fast or irregular heartbeat. If you have symptoms of a heart attack: After you call 911, the  may tell you to chew 1 adult-strength or 2 to 4 low-dose aspirin. Wait for an ambulance. Do not try to drive yourself. Follow-up care is a key part of your treatment and safety. Be sure to make and go to all appointments, and call your doctor if you are having problems. It's also a good idea to know your test results and keep a list of the medicines you take. Where can you learn more? Go to http://glenny-tom.info/. Enter T174 in the search box to learn more about \"Learning About Heart Failure Zones. \"  Current as of: April 9, 2019  Content Version: 12.2  © 9588-4586 [employer]. Care instructions adapted under license by [employer] (which disclaims liability or warranty for this information). If you have questions about a medical condition or this instruction, always ask your healthcare professional. [place] [address] any warranty or liability for your use of this information.

## 2020-02-05 NOTE — CARDIO/PULMONARY
Cardiac Rehab: Living with HF education material is at the bedside. Patient is preparing for discharge. Teach back method used. Discussed the cardiac rehab program, format, and benefits. She has done cardiac rehab twice in the past and is eager to start again. Patient reports she has had assistance applying for Medicaid while she has been here. [facility] cardiac rehab contact information is on the AVS with instructions to call after 3/5/2020 is she has not heard from us yet. Cardiac rehab will plan on following up with her the week of 3/4/2020. Debbie Dickey verbalized understanding with questions answered.   Mela Zelaya RN

## 2020-02-05 NOTE — PROGRESS NOTES
[facility] on 2/5/2020. Rev.  Felecia Vega MDiv, [facility] service: [phone])

## 2020-02-05 NOTE — DIABETES MGMT
[facility]  CLINICAL NURSE SPECIALIST   PROGRAM FOR DIABETES HEALTH  Followup Progress Note  Presentation   Rock Borges is a 68 y.o. female admitted with CHF exacerbation and consulted by Provider for advanced specialty nursing care related to inpatient diabetes management. Subjective   Patient had a low this morning-68, snack give and BG came up to 90 within 20minutes. Objective   Physical exam  General Alert, oriented and in no acute distress. Conversant and cooperative  Vital Signs   Visit Vitals  /51 (BP 1 Location: Right arm, BP Patient Position: At rest)   Pulse 66   Temp 97.9 °F (36.6 °C)   Resp 16   Ht 5' 2\" (1.575 m)   Wt 98.7 kg (217 lb 9.5 oz)   SpO2 96%   BMI 39.80 kg/m²     Skin Warm and dry  Heart Regular rate and rhythm. No murmurs, rubs or gallops  Lungs Clear to auscultation without rales or rhonchi  Extremities No foot wounds  Laboratory  Cr+1.89, GFR 32    BG trends past [id number]. Receiving 30 units Lantus with SSI. Patient may have not eaten     D/C Planning:      May want to consider discontinuing Metformin all together due to her CHF history and renal function (GFR 33)     Looking ahead to discharge :we need to consider either keeping her on daily insulin, basal and SSI. Since she had a low this AM, may need to decrease basal dose to 25units.       Cost is important to her so putting her on daily NPH twice daily (15units AM/ 15 units PM), or 70/30.          Recommend her seeing endocrinologist. Contact info imbedded in her discharge instructions.     Assessment and Plan   Nursing Diagnosis Risk for unstable blood glucose pattern   Nursing Intervention Domain 6828 Decision-making Support   Nursing Interventions Examined current inpatient diabetes control   Explored factors facilitating and impeding inpatient management  Identified self-management practices impeding diabetes control  Explored corrective strategies with patient and responsible inpatient provider Informed patient of rational for basal bolus insulin strategy while hospitalized          Signed By: Francisco Ogden RN   Clinical Nurse Specialist  Program for Diabetes Health  [phone]    February 5, 2020

## 2020-02-05 NOTE — PROGRESS NOTES
Transitions of Care Plan:     Anticipate discharge home today; patient to drive home     J9W for CHF approved    1055 - CM met with patient to discuss discharge planning. Patient passed 6MWT yesterday; in good spirits; wants to drive home. CM advised patient to ask attending today if cleared to drive home. Patient asked if she needed to use her BiPap at night or can she return it - CM deferred question to attending MD.  If cleared to return DME - patient can call number on equipment to set up return. Patient acknowledged understanding of same. Medicare pt has received, reviewed, and signed 2nd IM letter informing them of their right to appeal the discharge. Signed copied has been placed on pt bedside chart. Patient cleared for discharge from CM standpoint.     Valentina Bergman, MPH

## 2020-02-05 NOTE — PROGRESS NOTES
2/5/2020      RE: Denisse Dubois      To Whom it May Concern: This is to certify that Denisse Dubois has been under my care since January 30th, 2020. She may return to work after February 14th, 2020. Please feel free to contact my office if you have any questions or concerns. Thank you for your assistance in this matter.     Sincerely,        Pedrito Gonzalez NP

## 2020-02-05 NOTE — PROGRESS NOTES
Hospital follow-up PCP transitional care appointment has been scheduled with Jim Charlton NP for Friday, 2/14/20 at 11:00 a.m. Pending patient discharge.   Judy Brown, Care Management Specialist.

## 2020-02-06 ENCOUNTER — TELEPHONE (OUTPATIENT)
Dept: CASE MANAGEMENT | Age: 70
End: 2020-02-06

## 2020-02-06 LAB
ALBUMIN SERPL ELPH-MCNC: 3.6 G/DL (ref 2.9–4.4)
ALBUMIN/GLOB SERPL: 1.5 {RATIO} (ref 0.7–1.7)
ALPHA1 GLOB SERPL ELPH-MCNC: 0.2 G/DL (ref 0–0.4)
ALPHA2 GLOB SERPL ELPH-MCNC: 0.6 G/DL (ref 0.4–1)
B-GLOBULIN SERPL ELPH-MCNC: 0.9 G/DL (ref 0.7–1.3)
GAMMA GLOB SERPL ELPH-MCNC: 0.7 G/DL (ref 0.4–1.8)
GLOBULIN SER-MCNC: 2.5 G/DL (ref 2.2–3.9)
IGA SERPL-MCNC: 116 MG/DL (ref 64–422)
IGG SERPL-MCNC: 792 MG/DL (ref 700–1600)
IGM SERPL-MCNC: 98 MG/DL (ref 26–217)
INTERPRETATION SERPL IEP-IMP: ABNORMAL
KAPPA LC FREE SER-MCNC: 49.5 MG/L (ref 3.3–19.4)
KAPPA LC FREE/LAMBDA FREE SER: 2.8 {RATIO} (ref 0.26–1.65)
LAMBDA LC FREE SERPL-MCNC: 17.7 MG/L (ref 5.7–26.3)
M PROTEIN SERPL ELPH-MCNC: ABNORMAL G/DL
PROT SERPL-MCNC: 6.1 G/DL (ref 6–8.5)

## 2020-02-06 RX ORDER — BUPROPION HYDROCHLORIDE 150 MG/1
150 TABLET, EXTENDED RELEASE ORAL DAILY
Qty: 90 TAB | Refills: 0 | Status: SHIPPED | OUTPATIENT
Start: 2020-02-06 | End: 2020-12-22

## 2020-02-06 NOTE — TELEPHONE ENCOUNTER
HEART FAILURE NURSE NAVIGATOR POST DISCHARGE FOLLOW UP PHONE CALL     HF NN contacted patient by telephone to perform post hospital discharge follow up call. Verified patient name and date of birth as identifiers. Provided introduction to self and role. Reviewed discharge instructions; confirmed patient is in receipt of all prescribed HF medications. Reinforced importance of daily weights and dietary restrictions, following low sodium diet. Reinforced signs/symptoms of HF and when to notify the physician. Confirmed knowledge of scheduled follow up appointment:Dr. Atif Price 2/11/20 at 2:00 PM    Confirmed patient has transportation to above appointment. Patient given opportunity to ask questions/express concerns. All questions answered with good understanding. Concerns:  Pt stated she did not receive a Rx for insulin, specifically Lantus. Discharge Summary not yet available. Lantus not listed on discharge meds on AVS.  Message sent to Vangie Marmolejo RN with Dr. Man Job office regarding no insulin Rx (Dr. Atif Price was attending MD). Pt reports that she left her cane in room 418 when she was discharged. She has called the unit and let them know she will  cane this afternoon. Patient further states she was supposed to get a work note. She received a note, however when she read the note to this writer it was determined that it was a Cardiac Rehab nurse note. It was also determined that a work note is available in chart and the incorrect note was most likely inadvertently printed. This HF NN printed the correct work note and took it to [facility] and spoke with charge nurse Marta Hansen who will place note with the cane for patient to  this afternoon. Patient was notified of this.

## 2020-02-06 NOTE — TELEPHONE ENCOUNTER
HF NN received clarification from Jacqualyn Smith, [place] regarding patient's insulin. Per Nati Bhardwaj NP, patient was not discharged on insulin and is to follow up with endocrinologist to determine need for insulin. HF NN called patient, Jordyn Cross, and notified her of same. Ms. Jagdeep Patrick acknowledged understanding and stated she would speak with her primary care nurse practitioner about this. Patient expressed appreciation for the call.

## 2020-02-06 NOTE — TELEPHONE ENCOUNTER
VENKATESH Guadalupe/refill   Received:  Today   Message Contents   Sharona Charter sent to [employer]   Phone Number: [phone]             Caller (if not patient): n/a   Relationship of caller (if not patient): n/a   Best contact number(s): [phone]   Name of medication and dosage if known: \"Bupropion\" 150mg   Is patient out of this medication (yes/no): yes   Pharmacy name: [facility] [employer] listed in chart? (yes/no): yes   Pharmacy phone number: [phone]   Date of last visit: 1/10/2020   Details to clarify the request: n/a

## 2020-02-07 ENCOUNTER — HOME CARE VISIT (OUTPATIENT)
Dept: SCHEDULING | Facility: HOME HEALTH | Age: 70
End: 2020-02-07

## 2020-02-07 ENCOUNTER — TELEPHONE (OUTPATIENT)
Dept: CARDIOLOGY CLINIC | Age: 70
End: 2020-02-07

## 2020-02-07 PROCEDURE — G0299 HHS/HOSPICE OF RN EA 15 MIN: HCPCS

## 2020-02-07 RX ORDER — CHLORHEXIDINE GLUCONATE 4 G/100ML
SOLUTION TOPICAL
Qty: 1 BOTTLE | Refills: 0 | Status: SHIPPED | OUTPATIENT
Start: 2020-02-07 | End: 2020-03-09

## 2020-02-07 NOTE — LETTER
2/7/2020 11:39 AM 
 
Ms. Yenny Duarte P.HANDY. [address] [facility] [id number] Your Dual Chamber ICD upgrade procedure has been scheduled for 3/6/20 at 10:45 am, at [facility]. 
 
Please report to Admitting Department by 8:45 am, or 2 hours prior to your scheduled procedure. Please bring a list of your current medications and medication bottles, if able, to the hospital on this day. You will be unable to drive after your procedure so please make sure to bring someone with you to your procedure. You will need to have nothing to eat or drink after midnight, the night prior to your procedure. You may have small sips of water, if needed, to take with your medication. You will also need to see Dr. Rex Leal Nurse Practitioner, [facility], in office prior to your procedure. An appointment has been scheduled for 2/26/20 at 9:40 am. 
 
You should not stop your medication prior to your scheduled procedure. After your procedure, you will need to follow up with Dr. Rex Leal nurse for a wound and device check. Your follow-up appointment has been scheduled for 3/20/20 at 10:45 am.  
 
Hibiclens 4% topical solution has been ordered and sent into your pharmacy Patient it start Hibiclens application 5 days prior to procedure date Directions Hibiclens 4%: Start cleanse 5 days prior to procedure 1. Rinse area (upper chest and upper arms) with water. 2. Apply minimum amount necessary to scrub the upper chest area from shoulder/neck to mid line of chest and to below the nipple each of  5 nights before the day of the procedure 3. Let solution dry.   
 
 
 
 
 
Sincerely, 
 
 
Guillermina Corral MD

## 2020-02-07 NOTE — TELEPHONE ENCOUNTER
----- Message from Jonathan James MD sent at 2/5/2020  2:07 PM EST -----  Seen at [facility]  Please call her and schedule atrial lead and upgrade ICD to dual chamber ICD  She said she has "[employer]  She wants to transfer out of [facility] [address] home monitoring  She said ok to move over      CHF team said she has [address] ICD

## 2020-02-07 NOTE — PROGRESS NOTES
Please inform patient about abnormal results, elevated kappa light chains. We can order the 24 hour UIFE at her next clinic visit. We can also discuss these results in detail at her next clinic visit.

## 2020-02-07 NOTE — TELEPHONE ENCOUNTER
Verified patient with two types of identifiers. Patient scheduled for Dual Chamber ICD Upgrade on 3/6/20 at 10:45 am. Previewed pre procedure instructions. Notified patient will mail letter with pre-procedure instructions. Verified address. Patient verbalized understanding and will call with any other questions.

## 2020-02-10 RX ORDER — LEVOTHYROXINE SODIUM 100 UG/1
100 TABLET ORAL
Qty: 90 TAB | Refills: 0 | Status: SHIPPED | OUTPATIENT
Start: 2020-02-10 | End: 2020-05-14

## 2020-02-10 RX ORDER — CLOPIDOGREL BISULFATE 75 MG/1
75 TABLET ORAL DAILY
Qty: 90 TAB | Refills: 0 | Status: SHIPPED | OUTPATIENT
Start: 2020-02-10 | End: 2020-05-22 | Stop reason: SDUPTHER

## 2020-02-10 RX ORDER — ALLOPURINOL 100 MG/1
100 TABLET ORAL DAILY
Qty: 90 TAB | Refills: 0 | Status: SHIPPED | OUTPATIENT
Start: 2020-02-10 | End: 2020-05-05

## 2020-02-11 ENCOUNTER — DOCUMENTATION ONLY (OUTPATIENT)
Dept: CARDIOLOGY CLINIC | Age: 70
End: 2020-02-11

## 2020-02-11 ENCOUNTER — OFFICE VISIT (OUTPATIENT)
Dept: CARDIOLOGY CLINIC | Age: 70
End: 2020-02-11

## 2020-02-11 VITALS
DIASTOLIC BLOOD PRESSURE: 58 MMHG | RESPIRATION RATE: 18 BRPM | SYSTOLIC BLOOD PRESSURE: 104 MMHG | HEART RATE: 62 BPM | OXYGEN SATURATION: 98 % | TEMPERATURE: 97.7 F | WEIGHT: 221.8 LBS | BODY MASS INDEX: 40.82 KG/M2 | HEIGHT: 62 IN

## 2020-02-11 DIAGNOSIS — R06.02 SHORTNESS OF BREATH: ICD-10-CM

## 2020-02-11 DIAGNOSIS — I50.9 CONGESTIVE HEART FAILURE, UNSPECIFIED HF CHRONICITY, UNSPECIFIED HEART FAILURE TYPE (HCC): Primary | ICD-10-CM

## 2020-02-11 NOTE — LETTER
[facility] [address], [address] [place] [id number] 
[phone] Work 
 
Date: 2/11/2020 To Whom It May concern: 
 
Larissa Penny was seen and treated today in the office for follow up of her recent hospitalization by the following Adrian Riddle MD. 
 
Larissa Penny should not return to work until March 1st, 2020. Ms. Kenny Casas will be evaluated by the Advanced Heart Failure team prior to March 1st, 2020 to assess ability to return to work. Should you have any further questions or concerns, please do not hesitate to contact the [facility] [address] at [phone]. Sincerely, Lilliam Loya MD

## 2020-02-11 NOTE — DISCHARGE SUMMARY
[facility] Discharge Summary     Patient ID:  Sandra Chamberlain  [id number]  48 y.o.  1950    Admit date: 1/30/2020    Discharge date: 2/52020     Admitting Physician: Elma Lopez MD     Referring Cardiologist:  None    PCP:  Chanda Lozoya NP    Admitting Diagnoses: acute decompensated heart failure, NYHA Class IV    Discharge Diagnoses: acute decompensated heart failure, initially NYHA Class IV improved to Class III    Hospital Problems  Never Reviewed          Codes Class Noted POA    Acute decompensated heart failure ([facility] [address] ICD-10-CM: I50.9  ICD-9-CM: 428.0  1/30/2020 Unknown              Discharged Condition: improved    Disposition: home, see patient instructions for treatment and plan    Procedures for this admission:  Procedure(s):  LEFT AND RIGHT HEART CATH / CORONARY ANGIOGRAPHY    Discharge Medications:      My Medications      START taking these medications      Instructions Each Dose to Equal Morning Noon Evening Bedtime   cholecalciferol (1000 Units /25 mcg) tablet  Commonly known as:  VITAMIN D3    Your last dose was: Your next dose is: Take 2 Tabs by mouth daily. 2,000 Units                 ezetimibe 10 mg tablet  Commonly known as:  ZETIA    Your last dose was: Your next dose is: Take 1 Tab by mouth daily. 10 mg                 gabapentin 100 mg capsule  Commonly known as:  NEURONTIN  Replaces:  gabapentin 600 mg tablet    Your last dose was: Your next dose is: Take 2 Caps by mouth three (3) times daily. Max Daily Amount: 600 mg.   200 mg                 hydrALAZINE 50 mg tablet  Commonly known as:  APRESOLINE    Your last dose was: Your next dose is: Take 1 Tab by mouth three (3) times daily. 50 mg                 isosorbide mononitrate ER 30 mg tablet  Commonly known as:  IMDUR    Your last dose was: Your next dose is: Take 1 Tab by mouth daily.    30 mg                 lidocaine 5 %  Commonly known as: [address]    Your last dose was: Your next dose is:          Apply patch to the affected area for 12 hours a day and remove for 12 hours a day. CHANGE how you take these medications      Instructions Each Dose to Equal Morning Noon Evening Bedtime   metoprolol succinate 25 mg XL tablet  Commonly known as:  TOPROL-XL  What changed:  how much to take    Your last dose was: Your next dose is: Take 0.5 Tabs by mouth daily. 12.5 mg                    CONTINUE taking these medications      Instructions Each Dose to Equal Morning Noon Evening Bedtime   aspirin 81 mg chewable tablet    Your last dose was: Your next dose is: Take 81 mg by mouth daily. 81 mg                 atorvastatin 80 mg tablet  Commonly known as:  LIPITOR    Your last dose was: Your next dose is:          TAKE 1 TABLET BY MOUTH AT BEDTIME                  Tylenol Arthritis Pain 650 mg Tber  Generic drug:  acetaminophen    Your last dose was: Your next dose is: Take 1,300 mg by mouth two (2) times a day.    1,300 mg                    STOP taking these medications    celecoxib 200 mg capsule  Commonly known as:  CELEBREX        Colcrys 0.6 mg tablet  Generic drug:  colchicine        gabapentin 600 mg tablet  Commonly known as:  NEURONTIN  Replaced by:  gabapentin 100 mg capsule        losartan 50 mg tablet  Commonly known as:  COZAAR        metFORMIN 500 mg Tg24 24 hour tablet  Commonly known as:  GLUMETZA ER              Where to Get Your Medications      These medications were sent to [address] [place] [address]    Phone:  [phone]   · cholecalciferol (1000 Units /25 mcg) tablet  · ezetimibe 10 mg tablet  · gabapentin 100 mg capsule  · hydrALAZINE 50 mg tablet  · isosorbide mononitrate ER 30 mg tablet  · lidocaine 5 %  · metoprolol succinate 25 mg XL tablet         HPI:  Lizet Young is a 68y.o. year old female with a history of HTN, HLD, WES, obesity (Body mass index is 40.31 kg/m².) s/p gastric bypass in 2011, T2DM, hypothyroidism, COPD, CAD s/p CABG in 1997, s/p PCI to SVG-RCA in 5/15, ICM, VT, s/p AICD ([employer]),  anxiety, and depression who was admitted to [facility] on 1/30 for further evaluation of her chronic systolic heart failure. presents for further evaluation of her chronic systolic heart failure.     Ms. Mohamud Banks recalls having a viral syndrome in the Fall and has not felt well since then. Upon admission, she was started on milrinone and IV Bumex. She developed worsening renal dysfunction and her ARB and AA were discontinued. She underwent L/RHC on 2/3/20 which showed severe native 3 vessel CAD with patent grafts; normal left and right sided filling pressures with normal CI and PA pressures on milrinone 0.3 mcg/kg/min. Her milrinone was weaned off, diuretics discontinued. She underwent TTE prior to D/C which showed decrease in EF 15-20% (from 30-35% 1/30/20) and mild-moderate MR.  EP was consulted for consideration of right atrial lead placement to allow uptitration off BB in setting of HF and CAD. She will follow with Dr. Kasey Corcoran as an outpatient. She was discharged on low dose BB (Toprol XL 12.5 mg PO daily), hydralazine, and isosorbide. She is intolerant of ACEi/ARB/ARNI/AA due to KEYONNA on CKD. She will follow closely with [facility] as an outpatient; her first appointment is with Dr. Lea Hamilton on 2/11. Discharge Vital Signs:   Visit Vitals  /51 (BP 1 Location: Right arm, BP Patient Position: At rest)   Pulse 66   Temp 97.9 °F (36.6 °C)   Resp 16   Ht 5' 2\" (1.575 m)   Wt 217 lb 9.5 oz (98.7 kg)   SpO2 96%   BMI 39.80 kg/m²       Labs: No results for input(s): WBC, HGB, HCT, PLT, NA, K, BUN, CREA, GLU, GLUCPOC, INR, HGBEXT, HCTEXT, PLTEXT, INREXT in the last 72 hours. No lab exists for component: [place]    Diagnostics: please see EMR.      Patient Instructions/Follow Up Care:  Discharge instructions were reviewed with the patient and family present. Questions were also answered at this time. Prescriptions and medications were reviewed. The patient has a follow up appointment with Dr. Yovana Marks on February 11, 2020. The patient was also instructed to follow up with her primary care physician as needed. The patient and family were encouraged to call with any questions or concerns. Signed:  Baljinder Gordon NP  2/11/2020  9:27 AM     F ATTENDING ADDENDUM    Patient was seen and examined in person. Data and notes were reviewed. I have discussed and agree with the plan as noted in the NP note above without further additions.     Duke Pena MD PhD  Candelaria Rios

## 2020-02-11 NOTE — PROGRESS NOTES
[facility] Clinic Note      DOS:   2/11/2020  NAME:  Janet Pandya   MRN:   [id number]   REFERRING PROVIDER:  Anabela Lacy NP  PRIMARY CARE PHYSICIAN: Anabela Lacy NP  PRIMARY CARDIOLOGIST: none      Chief Complaint:   Chief Complaint   Patient presents with   [facility] Follow Up    Ankle swelling    Shortness of Breath       HPI: 68y.o. year old female with a history of HTN, HLD, WES, obesity (Body mass index is 40.57 kg/m².) s/p gastric bypass in 2011, T2DM, hypothyroidism, COPD, CAD s/p CABG in 1997, s/p PCI to [address] in 5/15, ICM, VT, s/p AICD ([employer]),  anxiety, and depression who presents for further evaluation of her chronic systolic heart failure. Ms. Nhan Flores recalls having a viral syndrome in the Fall and has not felt well since then. She uses oxygen with her BiPAP every night and sleeps on two pillows. Her activity level is diminished. She finds herself short of breath with simply talking. In walking to the clinic, she had to stop several times to catch her breath. She denies palpitations, presyncope, and syncope. She was recently admitted to [facility] [place] for acute on chronic systolic heart failure. She returns to clinic today for follow up.     Recent Events:  Notes weight gain at home  Still eating canned foods that are high in sodium (hash and beef stroganoff; trying to deplete her pantry)  Does not have her insulin - blood sugars in the 200s  Wearing oxygen 24 hours daily  Ambulated to the clinic with the use of a rollator and had to stop several times  Denies dizziness, presyncope    Interrogation of AICD:  No arrhythmias  Patient activity 0.2 hours/day  OptiVol Fluid Index 30, elevated from discharge but remains below her threshold      History:  Past Medical History:   Diagnosis Date    Chronic obstructive pulmonary disease ([address]     Congestive heart failure ([address]     Diabetes ([address]     Hypertension     Sleep apnea 1996     Past Surgical History:   Procedure Laterality Date    CARDIAC SURG PROCEDURE UNLIST  2018    cardiac cath - Left    HX ARTHROPLASTY  2105    knee    HX  SECTION      HX CORONARY ARTERY BYPASS GRAFT  1997    HX CORONARY STENT PLACEMENT  2016    HX HERNIA REPAIR  1988    HX IMPLANTABLE CARDIOVERTER DEFIBRILLATOR      HX ORTHOPAEDIC      LAP GASTRIC BYPASS/KERLINE-EN-Y  2011    lap band     Social History     Socioeconomic History    Marital status:      Spouse name: Not on file    Number of children: Not on file    Years of education: Not on file    Highest education level: Not on file   Occupational History    Not on file   Social Needs    Financial resource strain: Not on file    Food insecurity:     Worry: Not on file     Inability: Not on file    Transportation needs:     Medical: Not on file     Non-medical: Not on file   Tobacco Use    Smoking status: Former Smoker     Types: Cigarettes    Smokeless tobacco: Never Used   Substance and Sexual Activity    Alcohol use: Not Currently    Drug use: Not Currently    Sexual activity: Not Currently   Lifestyle    Physical activity:     Days per week: Not on file     Minutes per session: Not on file    Stress: Not on file   Relationships    Social connections:     Talks on phone: Not on file     Gets together: Not on file     Attends Spiritism service: Not on file     Active member of club or organization: Not on file     Attends meetings of clubs or organizations: Not on file     Relationship status: Not on file    Intimate partner violence:     Fear of current or ex partner: Not on file     Emotionally abused: Not on file     Physically abused: Not on file     Forced sexual activity: Not on file   Other Topics Concern    Not on file   Social History Narrative    Not on file     Family History   Problem Relation Age of Onset    Hypertension Mother     Dementia Mother     Coronary Artery Disease Father 58    Sudden Death Father 58    Diabetes Son        Current Medications:   Current Outpatient Medications   Medication Sig Dispense Refill    Oxygen 2 L NC      levothyroxine (SYNTHROID) 100 mcg tablet Take 1 Tab by mouth Daily (before breakfast). 90 Tab 0    allopurinoL (ZYLOPRIM) 100 mg tablet Take 1 Tab by mouth daily. 90 Tab 0    clopidogreL (PLAVIX) 75 mg tab Take 1 Tab by mouth daily. 90 Tab 0    chlorhexidine (HIBICLENS) 4 % liquid Apply to the upper chest area from shoulder/neck to mid line of chest and to below the nipple every day, 5 days prior to the procedure. 1 Bottle 0    buPROPion SR (WELLBUTRIN SR) 150 mg SR tablet Take 1 Tab by mouth daily. 90 Tab 0    gabapentin (NEURONTIN) 100 mg capsule Take 2 Caps by mouth three (3) times daily. Max Daily Amount: 600 mg. 180 Cap 1    metoprolol succinate (TOPROL-XL) 25 mg XL tablet Take 0.5 Tabs by mouth daily. 60 Tab 2    cholecalciferol (VITAMIN D3) (1000 Units /25 mcg) tablet Take 2 Tabs by mouth daily. 60 Tab 2    ezetimibe (ZETIA) 10 mg tablet Take 1 Tab by mouth daily. 30 Tab 1    hydrALAZINE (APRESOLINE) 50 mg tablet Take 1 Tab by mouth three (3) times daily. 90 Tab 2    isosorbide mononitrate ER (IMDUR) 30 mg tablet Take 1 Tab by mouth daily. 30 Tab 2    lidocaine (LIDODERM) 5 % Apply patch to the affected area for 12 hours a day and remove for 12 hours a day. 12 Each 2    acetaminophen (TYLENOL ARTHRITIS PAIN) 650 mg TbER Take 1,300 mg by mouth two (2) times a day.  aspirin 81 mg chewable tablet Take 81 mg by mouth daily.  atorvastatin (LIPITOR) 80 mg tablet TAKE 1 TABLET BY MOUTH AT BEDTIME 90 Tab 0       Allergies:    Allergies   Allergen Reactions    Crestor [Rosuvastatin] Other (comments)     Causes muscle cramps    Lisinopril Cough    Nsaids (Non-Steroidal Anti-Inflammatory Drug) Other (comments)     Liver and Kidney       ROS:    General:  positive for  - fatigue  HEENT: positive for cataracts and wears glasses  Pulmonary: positive for - cough, orthopnea and shortness of breath  Cardiac: Negative for MARINA, PND, orthopnea today   GI:  positive for - constipation, nausea/vomiting and anorexia  Musculo: positive for - muscular weakness  Neuro: no TIA or stroke symptoms  Skin:  negative  Psych: positive for - anxiety and depression    Admission Weight: Last Weight   Weight: 221 lb 12.8 oz (100.6 kg) Weight: 221 lb 12.8 oz (100.6 kg)       Physical Exam:   Vitals:    Visit Vitals  /58 (BP 1 Location: Right arm, BP Patient Position: Sitting)   Pulse 62   Temp 97.7 °F (36.5 °C) (Oral)   Resp 18   Ht 5' 2\" (1.575 m)   Wt 221 lb 12.8 oz (100.6 kg)   SpO2 98%   BMI 40.57 kg/m²       General:  fatigued, cooperative  HEENT: PERRLA, EOMI, Sclera clear, anicteric and Oropharynx clear, no lesions  Neck:  supple, no significant adenopathy, carotids upstroke normal bilaterally, no bruits  CVP:  10 cm  ( + ) HJR  Cardiac: regular rate, regular rhythm, S1, S2 normal, S4 present and systolic murmur present  Chest: breath sounds clear and equal bilaterally  Abdomen: soft, non-tender. Bowel sounds normal. No masses, mild hepatomegaly. Extremity: No edema   Neuro: Alert and oriented to person, place, and time; normal strength and tone. Normal symmetric reflexes. Normal coordination and gait  Skin:   no rashes, no ecchymoses, no petechiae    Recent Labs:   Labs Latest Ref Rng & Units 2/5/2020 2/5/2020 2/4/2020 2/3/2020 2/2/2020 2/2/2020 2/1/2020   WBC 3.6 - 11.0 K/uL - 6.9 5.8 6.0 - 6.1 5.5   RBC 3.80 - 5.20 M/uL - 3.03(L) 3.14(L) 3.16(L) - 3.32(L) 3.19(L)   Hemoglobin 11.5 - 16.0 g/dL - 9.5(L) 9.7(L) 10. 0(L) - 10. 3(L) 10. 0(L)   Hematocrit 35.0 - 47.0 % - 30. 0(L) 30. 8(L) 32. 8(L) - 32. 5(L) 31. 2(L)   MCV 80.0 - 99.0 FL - 99.0 98.1 103. 8(H) - 97.9 97.8   Platelets 372 - 644 K/uL - 240 246 196 - 246 237   Lymphocytes 12 - 49 % - - - - - - -   Monocytes 5 - 13 % - - - - - - -   Eosinophils 0 - 7 % - - - - - - -   Basophils 0 - 1 % - - - - - - -   Albumin 3.5 - 5.0 g/dL - 3.6 3.5 3.6 - 3. 3(L) 3. 2(L)   Calcium 8.5 - 10.1 MG/DL 9.2 10. 3(H) 10. 4(H) 10. 3(H) 10. 5(H) 10. 4(H) 10.0   SGOT 15 - 37 U/L - 18 17 16 - 16 18   Glucose 65 - 100 mg/dL - 80 118(H) 155(H) 283(H) 244(H) 183(H)   BUN 6 - 20 MG/DL - 28(H) 31(H) 38(H) 39(H) 43(H) 44(H)   Creatinine 0.55 - 1.02 MG/DL - 1.89(H) 1.83(H) 2.06(H) 2.43(H) 2.37(H) 2.08(H)   Sodium 136 - 145 mmol/L - 139 140 138 136 136 136   Potassium 3.5 - 5.1 mmol/L - 4.2 4.1 4.0 3.9 3.8 4.0   TSH 0.36 - 3.74 uIU/mL - - - - - - -   Some recent data might be hidden     Calcium 9.2  .2    SFLC  Free kappa 49.5  Free lambda 17.7  Kappa/Lambda 2.8    EK2020  Sinus  Rhythm, rate 67 bpm   -  Nonspecific T-abnormality. Echocardiogram:   [facility]  LVEF 40-45% with mild lateral wall and septal wall HK  Mod LAE  Grade II diastolic dysfunction  Mod MR  Mild to mod TR with RVSP 36-45 mmHg    Cardiac Catheterization:  2018 ([facility])  Severe native 3vd  Patent LIMA-LAD, patent SVG-PDA  LVEDP 18 mmHg    5/19/15 ([facility])  Significant 3vd  Patent SVG-PDA with lesion in distal segment of graft  Patent LIMA-LAD  Mod pulm HTN  Depressed CI    PCI to distal SVG-PDA  Integrity BMS 3x9    RA 5, RV 55/11, PA 53/20/31, PCWP 11  TPG 17, /16, CO 4.17, CI 1.98      Impression / Plan:   1. ICM - Stage C, NYHA Class IIIb-IV, LVEF 30-35%   Elevated OptiVol today   Scheduled for BiV upgrade on 3/6/2020   TTE  shows decrease in EF to 30-35%   Tolerating Toprol Xl 12.5 mg daily   Intolerant of ACE/ARB/ARNI/MRA due to KEYONNA on CKD during her hospital stay   Recheck BMP today    Tolerating Hydralazine to 50 mg PO TID and Imdur 30mg daily     Start entresto 24/26 mg BID if serum Cr < 1.7   Low sodium diet   Daily weights   Strict I/O   Invitate - DSP (VUS)   Elevated SFLC   Equivocal PYP imaging   24 hour urine - CAROLINE     2.  CAD s/p CABG in , s/p PCI to [address] in 10/16   On ASA, statin, BB    Severe 3V disease    Lp(a) 212    - add Zetia     3. HTN - well controlled   On BB and hydralazine/nitrate   Low sodium diet   Compliant with BiPAP    4. VT - s/p AICD   No shocks   No arrhythmias on interrogation     5. WES - on BiPAP with oxygen   Compliant with BiPAP   Follows up with her sleep medicine physician at [facility] every 6 months    6. U0JL complicated by neuropathy   HgA1C 7.5    Appointment with [facility] tomorrow for diabetes management    7. Hypothyroidism   On levothyroxine 100 mcgs daily    8. Depression   On Wellbutrin  mg     9. History of Tobacco Abuse - 1/2 ppd x 20 years, quit 5 years ago   Counseled re: importance of continued abstinence    10. Gout    Continue allopurinol     11. Obesity (Body mass index is 40.57 kg/m². s/p gastric bypass in 2011    12. Osteoarthritis - s/p right TKR   Avoid Celebrex due to CKD, HFrEF   S/p left knee injection in 8/2019   Lidocaine patches to knees     13. Hyperlipidemia    ,    Lipoprotein-A 212   Continue Lipitor 80 mg daily    Continue Zetia 10 mg PO daily   Will likely need Repatha    Low chol diet     13. Iron deficiency    Venofer 200 mg IV x 1     14. KEYONNA on CKD4,suspect diabetic nephropathy and cardiorenal syndrome   Cr 1.89   D/C ARB, AA   Avoid nephrotoxic agents     15. Vitamin D deficiency   Continue cholecalciferol 2,000 units daily     16. Expressive aphasia   Head CT negative for acute process       Thank you for allowing us to participate in your patient's care. Lilliam Vallecillo MD, [facility]  Chief of Cardiology, [address] [place] [address] Director  [address] [place], [place] [id number]  [facility], [address], [address] [place] [address]  Office [phone]  Fax [phone]

## 2020-02-11 NOTE — PATIENT INSTRUCTIONS
No medication changes today    Samples of sacubitril-valsartan (Entresto) 24mg/26mg- One tablet by mouth twice daily. DO NOT START UNTIL ADVISED BY [facility] HEART FAILURE [place] ONCE LAB WORK RECEIVED. Testing Ordered:    Lab work drawn today. Our office will notify you of any abnormal results. Based on results, medication changes may be made. A 24 hour urine immunofixation lab has been ordered. A specimen collection container has been provided to you. To obtain the sample, you will be collecting your urine for 24 hours. Start in the morning and DO NOT USE your first urination of the day. Begin collecting each urination after that in the container. Make sure to keep your urine in the refrigerator. When the 24 hours is complete, please drop the container off at a [employer] of your choosing with the order provided to you. Ensure you label the specimen container with your name, date of birth, the start date and time, and the end date and time. Other Recommendations: You may walk around the house. Hold off on exercise programs until further advised. Ensure your drinking an adequate amount of water with a goal of 6-8 eight ounce glasses (1.5-2 liters) of fluid daily. Your urine should be clear and light yellow straw colored. If your blood pressure begins to consistently run below 90/60 and/or you begin to experience dizziness or lightheadedness, please contact the [facility] [address] at [phone]. Follow up 1 weeks with [facility] Sky with NP      Please monitor your blood pressures daily prior to medications and 2 hours after taking medications. Bring a written record of your blood pressures to your next appointment. Please monitor your weights daily upon waking and after using the bathroom. Keep a written records of your weights and bring to your next appointment.  If you have a weight gain of 3 or more pounds overnight OR 5 or more pounds in one week please contact our office. Thank you for allowing us the privilege of being a part of your healthcare team! Please do not hesitate to contact our office at [phone] with any questions or concerns. Sacubitril/Valsartan (By mouth)   Sacubitril ([id number]), Valsartan ([id number]-FLACO-[id number])  Treats chronic heart failure. Brand Name(s): Entresto   There may be other brand names for this medicine. When This Medicine Should Not Be Used: This medicine is not right for everyone. Do not use it if you had an allergic reaction to sacubitril or valsartan, if you had angioedema (swelling of the face, lips, tongue, or throat or trouble breathing) while taking an ACE inhibitor or ARB, or if you are pregnant. How to Use This Medicine:   Tablet  · Take your medicine as directed. Your dose may need to be changed several times to find what works best for you. · Read and follow the patient instructions that come with this medicine. Talk to your doctor or pharmacist if you have any questions. · Missed dose: Take a dose as soon as you remember. If it is almost time for your next dose, wait until then and take a regular dose. Do not take extra medicine to make up for a missed dose. · Store the medicine in a closed container at room temperature, away from heat, moisture, and direct light. Drugs and Foods to Avoid:   Ask your doctor or pharmacist before using any other medicine, including over-the-counter medicines, vitamins, and herbal products. · Do not take this medicine with aliskiren if you have diabetes. · Do not take this medicine together with an ACE inhibitor medicine or within 36 hours after you have taken an ACE inhibitor. · Some foods and medicines can affect how sacubitril/valsartan works.  Tell your doctor if you are using any of the following:  ¨ Lithium  ¨ Diuretic (water pill, including amiloride, spironolactone, triamterene)  ¨ Other medicines to treat high blood pressure or heart problems  ¨ NSAID pain or arthritis medicine (including aspirin, celecoxib, diclofenac, ibuprofen, naproxen)  · Ask your doctor before you use any medicine, supplement, or salt substitute that contains potassium. Warnings While Using This Medicine:   · It is not safe to take this medicine during pregnancy. It could harm an unborn baby. Tell your doctor right away if you become pregnant. · Tell your doctor if you are breastfeeding, or if you have kidney disease, liver disease, or diabetes. · This medicine may cause the following problems:   ¨ Kidney problems  ¨ Hyperkalemia (too much potassium in your blood)  · This medicine could lower your blood pressure too much, especially when you first use it or if you are dehydrated. Stand or sit up slowly if you feel dizzy or lightheaded. · Your doctor will do lab tests at regular visits to check on the effects of this medicine. Keep all appointments. · Keep all medicine out of the reach of children. Never share your medicine with anyone. Possible Side Effects While Using This Medicine:   Call your doctor right away if you notice any of these side effects:  · Allergic reaction: Itching or hives, swelling in your face or hands, swelling or tingling in your mouth or throat, chest tightness, trouble breathing  · Confusion, weakness, uneven heartbeat, trouble breathing, numbness in your hands, feet, or lips  · Decrease in how much or how often you urinate, bloody or cloudy urine, lower back or side pain  · Lightheadedness, dizziness, or fainting  If you notice other side effects that you think are caused by this medicine, tell your doctor. Call your doctor for medical advice about side effects. You may report side effects to FDA at 5-959-FDA-6443  © 2017 [facility] Information is for End User's use only and may not be sold, redistributed or otherwise used for commercial purposes. The above information is an  only.  It is not intended as medical advice for individual conditions or treatments. Talk to your doctor, nurse or pharmacist before following any medical regimen to see if it is safe and effective for you. Home Blood Pressure Test: About This Test  What is it? A home blood pressure test allows you to keep track of your blood pressure at home. Blood pressure is a measure of the force of blood against the walls of your arteries. Blood pressure readings include two numbers, such as 130/80 (say \"130 over 80\"). The first number is the systolic pressure. The second number is the diastolic pressure. Why is this test done? You may do this test at home to:  · Find out if you have high blood pressure. · Track your blood pressure if you have high blood pressure. · Track how well medicine is working to reduce high blood pressure. · Check how lifestyle changes, such as weight loss and exercise, are affecting blood pressure. How can you prepare for the test?  · Do not use caffeine, tobacco, or medicines known to raise blood pressure (such as nasal decongestant sprays) for at least 30 minutes before taking your blood pressure. · Do not exercise for at least 30 minutes before taking your blood pressure. What happens before the test?  Take your blood pressure while you feel comfortable and relaxed. Sit quietly with both feet on the floor for at least 5 minutes before the test.  What happens during the test?  · Sit with your arm slightly bent and resting on a table so that your upper arm is at the same level as your heart. · Roll up your sleeve or take off your shirt to expose your upper arm. · Wrap the blood pressure cuff around your upper arm so that the lower edge of the cuff is about 1 inch above the bend of your elbow. Proceed with the following steps depending on if you are using an automatic or manual pressure monitor.   Automatic blood pressure monitors  · Press the on/off button on the automatic monitor and wait until the ready-to-measure \"heart\" symbol appears next to zero in the display window. · Press the start button. The cuff will inflate and deflate by itself. · Your blood pressure numbers will appear on the screen. · Write your numbers in your log book, along with the date and time. Manual blood pressure monitors  · Place the earpieces of a stethoscope in your ears, and place the bell of the stethoscope over the artery, just below the cuff. · Close the valve on the rubber inflating bulb. · Squeeze the bulb rapidly with your opposite hand to inflate the cuff until the dial or column of mercury reads about 30 mm Hg higher than your usual systolic pressure. If you do not know your usual pressure, inflate the cuff to 210 mm Hg or until the pulse at your wrist disappears. · Open the pressure valve just slightly by twisting or pressing the valve on the bulb. · As you watch the pressure slowly fall, note the level on the dial at which you first start to hear a pulsing or tapping sound through the stethoscope. This is your systolic blood pressure. · Continue letting the air out slowly. The sounds will become muffled and will finally disappear. Note the pressure when the sounds completely disappear. This is your diastolic blood pressure. Let out all the remaining air. · Write your numbers in your log book, along with the date and time. What else should you know about the test?  It is more accurate to take the average of several readings made throughout the day than to rely on a single reading. It's normal for blood pressure to go up and down throughout the day. Follow-up care is a key part of your treatment and safety. Be sure to make and go to all appointments, and call your doctor if you are having problems. It's also a good idea to keep a list of the medicines you take. Where can you learn more? Go to http://glenny-tom.info/.   Enter C427 in the search box to learn more about \"Home Blood Pressure Test: About This Test.\"  Current as of: April 9, 2019  Content Version: 12.2  © 1036-8519 [employer]. Care instructions adapted under license by [employer] (which disclaims liability or warranty for this information). If you have questions about a medical condition or this instruction, always ask your healthcare professional. [address] any warranty or liability for your use of this information.

## 2020-02-12 ENCOUNTER — TELEPHONE (OUTPATIENT)
Dept: CARDIOLOGY CLINIC | Age: 70
End: 2020-02-12

## 2020-02-12 LAB
BUN SERPL-MCNC: 27 MG/DL (ref 8–27)
BUN/CREAT SERPL: 15 (ref 12–28)
CALCIUM SERPL-MCNC: 10.5 MG/DL (ref 8.7–10.3)
CHLORIDE SERPL-SCNC: 101 MMOL/L (ref 96–106)
CO2 SERPL-SCNC: 20 MMOL/L (ref 20–29)
CREAT SERPL-MCNC: 1.83 MG/DL (ref 0.57–1)
GLUCOSE SERPL-MCNC: 276 MG/DL (ref 65–99)
MAGNESIUM SERPL-MCNC: 2.1 MG/DL (ref 1.6–2.3)
NT-PROBNP SERPL-MCNC: 1676 PG/ML (ref 0–301)
POTASSIUM SERPL-SCNC: 5.1 MMOL/L (ref 3.5–5.2)
SODIUM SERPL-SCNC: 134 MMOL/L (ref 134–144)

## 2020-02-12 NOTE — TELEPHONE ENCOUNTER
Prior authorization for Entresto completed via [employer]. Authorization approved. Veronique Almanza RN.

## 2020-02-13 ENCOUNTER — HOME CARE VISIT (OUTPATIENT)
Dept: SCHEDULING | Facility: HOME HEALTH | Age: 70
End: 2020-02-13

## 2020-02-13 ENCOUNTER — TELEPHONE (OUTPATIENT)
Dept: CARDIOLOGY CLINIC | Age: 70
End: 2020-02-13

## 2020-02-13 PROCEDURE — G0299 HHS/HOSPICE OF RN EA 15 MIN: HCPCS

## 2020-02-13 RX ORDER — FUROSEMIDE 40 MG/1
40 TABLET ORAL DAILY
Qty: 30 TAB | Refills: 3
Start: 2020-02-13 | End: 2020-04-03 | Stop reason: SDUPTHER

## 2020-02-13 NOTE — TELEPHONE ENCOUNTER
Advised patient per Dr. Yovana Marks to stop Entresto and start Lasix 40 mg daily. She states understanding.

## 2020-02-14 ENCOUNTER — OFFICE VISIT (OUTPATIENT)
Dept: INTERNAL MEDICINE CLINIC | Age: 70
End: 2020-02-14

## 2020-02-14 VITALS
BODY MASS INDEX: 40.87 KG/M2 | SYSTOLIC BLOOD PRESSURE: 125 MMHG | DIASTOLIC BLOOD PRESSURE: 73 MMHG | RESPIRATION RATE: 20 BRPM | WEIGHT: 222.1 LBS | OXYGEN SATURATION: 98 % | HEIGHT: 62 IN | HEART RATE: 77 BPM | TEMPERATURE: 98.5 F

## 2020-02-14 DIAGNOSIS — Z79.4 TYPE 2 DIABETES MELLITUS WITH HYPERGLYCEMIA, WITH LONG-TERM CURRENT USE OF INSULIN (HCC): ICD-10-CM

## 2020-02-14 DIAGNOSIS — N18.30 STAGE 3 CHRONIC KIDNEY DISEASE (HCC): ICD-10-CM

## 2020-02-14 DIAGNOSIS — G47.33 OBSTRUCTIVE SLEEP APNEA SYNDROME: ICD-10-CM

## 2020-02-14 DIAGNOSIS — J44.9 CHRONIC OBSTRUCTIVE PULMONARY DISEASE, UNSPECIFIED COPD TYPE (HCC): ICD-10-CM

## 2020-02-14 DIAGNOSIS — I50.9 ACUTE ON CHRONIC CONGESTIVE HEART FAILURE, UNSPECIFIED HEART FAILURE TYPE (HCC): ICD-10-CM

## 2020-02-14 DIAGNOSIS — E11.65 TYPE 2 DIABETES MELLITUS WITH HYPERGLYCEMIA, WITH LONG-TERM CURRENT USE OF INSULIN (HCC): ICD-10-CM

## 2020-02-14 LAB
ALBUMIN UR QL STRIP: 150 MG/L
CREATININE, URINE POC: 100 MG/DL
GLUCOSE POC: 240 MG/DL
MICROALBUMIN/CREAT RATIO POC: >300 MG/G

## 2020-02-14 RX ORDER — GLIPIZIDE 10 MG/1
5 TABLET ORAL 2 TIMES DAILY
COMMUNITY
End: 2020-05-18 | Stop reason: SDUPTHER

## 2020-02-14 RX ORDER — ALBUTEROL SULFATE 90 UG/1
2 AEROSOL, METERED RESPIRATORY (INHALATION)
COMMUNITY

## 2020-02-14 NOTE — PROGRESS NOTES
Subjective: (As above and below)     Chief Complaint   Patient presents with   [facility] Follow Up     [address] is a 68y.o. year old female who presents for [facility]  She was admitted from 1/30 to 2/5 for CHF exacerbation    She was getting progressively more short of breath following a recent URI. She has a  history of HTN, HLD, WES,, s/p gastric bypass in 2011, T2DM, hypothyroidism, COPD, CAD s/p CABG in 1997, s/p PCI to SVG-RCA in 5/15, ICM, VT, s/p AICD ([employer]),  anxiety, and depression    Her EF on admission was down to 15%, she has had follow up with cardiology and reports doing fair. She was dc'd on home O2 which she continues to use, has trialed off of it at home and sats drop to high 80's. She continues to feel winded. No leg swelling. Daily weights are stable at home. She is trying to adhere to a 2000 cc fluid restriction    CKD: creatine increased to 1.9- per patient her baseline has been around 1.6. meds were adjusted for worsening renal function    Diabetes: metformin stopped for renal function- changed to glipizide - taking w/o problems, diet still needs improvement in terms of sodium and carbs. She goes to [employer] [facility] and will be attending their DM education program. Daily sugars in the high 100's    Sleep apnea adherent to bipap, does not have a pulm yet here    She is ambulating w/ walker. Denies chest pain. No difficulties w/ bowel mvmts  Has a sister here for assitance      Wt Readings from Last 3 Encounters:   02/14/20 222 lb 1.6 oz (100.7 kg)   02/11/20 221 lb 12.8 oz (100.6 kg)   02/05/20 217 lb 9.5 oz (98.7 kg)         Reviewed PmHx, RxHx, FmHx, SocHx, AllgHx and updated in chart.   Family History   Problem Relation Age of Onset    Hypertension Mother     Dementia Mother     Coronary Artery Disease Father 58    Sudden Death Father 58    Diabetes Son        Past Medical History:   Diagnosis Date    Chronic obstructive pulmonary disease ([facility] [address] [facility]     Cong[facility]ive heart failure ([facility]     Diabetes ([facility]     Hypertension     Sleep apnea 1996      Social History     Socioeconomic History    Marital status:      Spouse name: Not on file    Number of children: Not on file    Years of education: Not on file    Highest education level: Not on file   Tobacco Use    Smoking status: Former Smoker     Types: Cigarettes    Smokeless tobacco: Never Used   Substance and Sexual Activity    Alcohol use: Not Currently    Drug use: Not Currently    Sexual activity: Not Currently          Current Outpatient Medications   Medication Sig    glipiZIDE (GLUCOTROL) 10 mg tablet Take 10 mg by mouth two (2) times a day.  albuterol (PROVENTIL HFA, VENTOLIN HFA, PROAIR HFA) 90 mcg/actuation inhaler Take  by inhalation.  furosemide (LASIX) 40 mg tablet Take 1 Tab by mouth daily.  Oxygen 2 L NC    levothyroxine (SYNTHROID) 100 mcg tablet Take 1 Tab by mouth Daily (before breakfast).  allopurinoL (ZYLOPRIM) 100 mg tablet Take 1 Tab by mouth daily.  clopidogreL (PLAVIX) 75 mg tab Take 1 Tab by mouth daily.  chlorhexidine (HIBICLENS) 4 % liquid Apply to the upper chest area from shoulder/neck to mid line of chest and to below the nipple every day, 5 days prior to the procedure.  buPROPion SR (WELLBUTRIN SR) 150 mg SR tablet Take 1 Tab by mouth daily.  gabapentin (NEURONTIN) 100 mg capsule Take 2 Caps by mouth three (3) times daily. Max Daily Amount: 600 mg.    metoprolol succinate (TOPROL-XL) 25 mg XL tablet Take 0.5 Tabs by mouth daily.  cholecalciferol (VITAMIN D3) (1000 Units /25 mcg) tablet Take 2 Tabs by mouth daily.  ezetimibe (ZETIA) 10 mg tablet Take 1 Tab by mouth daily.  hydrALAZINE (APRESOLINE) 50 mg tablet Take 1 Tab by mouth three (3) times daily.  isosorbide mononitrate ER (IMDUR) 30 mg tablet Take 1 Tab by mouth daily.  lidocaine (LIDODERM) 5 % Apply patch to the affected area for 12 hours a day and remove for 12 hours a day.     acetaminophen (TYLENOL ARTHRITIS PAIN) 650 mg TbER Take 1,300 mg by mouth two (2) times a day.  aspirin 81 mg chewable tablet Take 81 mg by mouth daily.  atorvastatin (LIPITOR) 80 mg tablet TAKE 1 TABLET BY MOUTH AT BEDTIME     No current facility-administered medications for this visit. Review of Systems:   Constitutional:    Negative for fever and chills, negative diaphoresis. HEENT:              Negative for neck pain and stiffness. Eyes:                  Negative for visual disturbance, itching, redness or discharge. Respiratory:        Negative for cough and shortness of breath. Cardiovascular:  Negative for chest pain and palpitations. Gastrointestinal: Negative for nausea, vomiting, abdominal pain, diarrhea or constipation. Genitourinary:     Negative for dysuria and frequency. Musculoskeletal: Negative for falls, tenderness and swelling. Skin:                    Negative for rash, masses or lesions. Neurological:       Negative for dizzyness, seizure, loss of consciousness, weakness and numbness.      Objective:     Vitals:    02/14/20 1104   BP: 125/73   Pulse: 77   Resp: 20   Temp: 98.5 °F (36.9 °C)   TempSrc: Oral   SpO2: 98%   Weight: 222 lb 1.6 oz (100.7 kg)   Height: 5' 2\" (1.575 m)       Results for orders placed or performed in visit on 02/14/20   AMB POC GLUCOSE BLOOD, BY GLUCOSE MONITORING DEVICE   Result Value Ref Range    Glucose  mg/dL   AMB POC URINE, MICROALBUMIN, SEMIQUANT (3 RESULTS)   Result Value Ref Range    ALBUMIN, URINE  Negative mg/L    CREATININE, URINE  mg/dL    Microalbumin/creat ratio (POC) >300 <30 MG/G         Physical Examination: General appearance - alert, well appearing, and in no distress and acyanotic, in no respiratory distress  Chest - clear to auscultation, no wheezes, rales or rhonchi, symmetric air entry  Heart - normal rate, regular rhythm, normal S1, S2, no murmurs, rubs, clicks or gallops  Extremities - no pedal edema noted Assessment/ Plan:       1. Transition of care performed with sharing of clinical summary      2. Type 2 diabetes mellitus with hyperglycemia, with long-term current use of insulin (HCC)  Discussed diet improvements, home checks. - AMB POC GLUCOSE BLOOD, BY GLUCOSE MONITORING DEVICE  - AMB POC URINE, MICROALBUMIN, SEMIQUANT (3 RESULTS)    3. Obstructive sleep apnea syndrome    - REFERRAL TO PULMONARY DISEASE    4. Chronic obstructive pulmonary disease, unspecified COPD type ([facility]    - REFERRAL TO PULMONARY DISEASE    5. Stage 3 chronic kidney disease (HCC)  Avoids nsaids, close monitoring    6. Acute on chronic congestive heart failure, unspecified heart failure type ([facility]  Has close f/u w/ cardio        I have discussed the diagnosis with the patient and the intended plan as seen in the above orders. The patient has received an after-visit summary and questions were answered concerning future plans. Pt conveyed understanding of plan. Medication Side Effects and Warnings were discussed with patient: yes  Patient Labs were reviewed: yes  Patient Past Records were reviewed:  yes    Chanda Cyr.  Adrian Wise NP

## 2020-02-14 NOTE — PROGRESS NOTES
Pt here for   Chief Complaint   Patient presents with   [facility] Follow Up     REYNALDO     1. Have you been to the ER, urgent care clinic since your last visit? Hospitalized since your last visit? Yes When: [facility]    2. Have you seen or consulted any other health care providers outside of the [address] [place] [address] since your last visit? Include any pap smears or colon screening.  No       Pt c/o pain 5 of 10, Pt took Gabapentin for pain today, Two Arthritis tylenol    3 most recent PHQ Screens 2/14/2020   PHQ Not Done Medical Reason (indicate in comments)   Little interest or pleasure in doing things -   Feeling down, depressed, irritable, or hopeless -   Total Score PHQ 2 -

## 2020-02-17 ENCOUNTER — TELEPHONE (OUTPATIENT)
Dept: CARDIOLOGY CLINIC | Age: 70
End: 2020-02-17

## 2020-02-17 NOTE — TELEPHONE ENCOUNTER
Telephone Call RE:  Appointment reminder     Outcome:     [x] Patient confirmed appointment   [] Patient rescheduled appointment for    [] Unable to reach   [] Left message              [] Other:       Queenie Bolus   Reminder to arrive 15 mins for check-in.  No Travel-No Exposure

## 2020-02-19 ENCOUNTER — OFFICE VISIT (OUTPATIENT)
Dept: CARDIOLOGY CLINIC | Age: 70
End: 2020-02-19

## 2020-02-19 VITALS
SYSTOLIC BLOOD PRESSURE: 160 MMHG | HEART RATE: 76 BPM | OXYGEN SATURATION: 94 % | HEIGHT: 62 IN | WEIGHT: 219 LBS | BODY MASS INDEX: 40.3 KG/M2 | RESPIRATION RATE: 20 BRPM | DIASTOLIC BLOOD PRESSURE: 84 MMHG | TEMPERATURE: 97.8 F

## 2020-02-19 DIAGNOSIS — Z95.810 ICD (IMPLANTABLE CARDIOVERTER-DEFIBRILLATOR) IN PLACE: ICD-10-CM

## 2020-02-19 DIAGNOSIS — I25.5 CARDIOMYOPATHY, ISCHEMIC: ICD-10-CM

## 2020-02-19 DIAGNOSIS — Z95.1 HX OF CABG: ICD-10-CM

## 2020-02-19 DIAGNOSIS — I50.9 CONGESTIVE HEART FAILURE, UNSPECIFIED HF CHRONICITY, UNSPECIFIED HEART FAILURE TYPE (HCC): Primary | ICD-10-CM

## 2020-02-19 DIAGNOSIS — I73.9 PERIPHERAL VASCULAR DISEASE (HCC): ICD-10-CM

## 2020-02-19 DIAGNOSIS — R06.02 SHORTNESS OF BREATH: ICD-10-CM

## 2020-02-19 LAB
ALBUMIN 24H MFR UR ELPH: 65.6 %
ALPHA1 GLOB 24H MFR UR ELPH: 1.7 %
ALPHA2 GLOB 24H MFR UR ELPH: 8.8 %
B-GLOBULIN 24H MFR UR ELPH: 10.7 %
GAMMA GLOB 24H MFR UR ELPH: 13.2 %
Lab: ABNORMAL
M PROTEIN 24H MFR UR ELPH: ABNORMAL %
PLEASE NOTE:, 133800: ABNORMAL
PROT 24H UR-MRATE: 437 MG/24 HR (ref 30–150)
PROT UR-MCNC: 43.7 MG/DL

## 2020-02-19 NOTE — PATIENT INSTRUCTIONS
Medication changes:    Begin taking Entresto 24/26 mg by mouth, 1 tablet twice daily. Please take this to your pharmacy to notify them of the change in medications. Testing Ordered:    Please have your labwork done on Monday, February 24th. Other Recommendations:      Ensure your drinking an adequate amount of water with a goal of 6-8 eight ounce glasses (1.5-2 liters) of fluid daily. Your urine should be clear and light yellow straw colored. If your blood pressure begins to consistently run below 90/60 and/or you begin to experience dizziness or lightheadedness, please contact the [facility] [address] at [phone]. Follow up 2 weeks with [facility] [address]      Please monitor your blood pressures daily prior to medications and 2 hours after taking medications. Bring a written record of your blood pressures to your next appointment. Please monitor your weights daily upon waking and after using the bathroom. Keep a written records of your weights and bring to your next appointment. If you have a weight gain of 3 or more pounds overnight OR 5 or more pounds in one week please contact our office. Thank you for allowing us the privilege of being a part of your healthcare team! Please do not hesitate to contact our office at [phone] with any questions or concerns.

## 2020-02-19 NOTE — PROGRESS NOTES
[facility] Clinic Note      DOS:   2020  NAME:  Rock Borges   MRN:   [id number]   REFERRING PROVIDER:  Sid Linn NP  PRIMARY CARE PHYSICIAN: Sid Linn NP  PRIMARY CARDIOLOGIST: none      Chief Complaint:   Chief Complaint   Patient presents with    CHF     follow up    Shortness of Breath     \"mostly with activity\"    Leg Swelling     \"just a little bit\"       HPI: 68y.o. year old female with a history of HTN, HLD, WES, obesity (Body mass index is 40.06 kg/m².) s/p gastric bypass in , T2DM, hypothyroidism, COPD, CAD s/p CABG in , s/p PCI to [address] in 5/15, ICM, VT, s/p AICD (Medtronic),  anxiety, and depression who presents for further evaluation of her chronic systolic heart failure. Ms. Myra Michaud recalls having a viral syndrome in the Fall and has not felt well since then. She uses oxygen with her BiPAP every night and sleeps on two pillows. Her activity level is diminished. She was recently admitted to [facility] for acute on chronic systolic heart failure. She returns to clinic today for follow up.     Recent Events:  Increased activity tolerance  Limiting sodium in her diet   Wearing oxygen 24 hours daily  Ambulated to the clinic with the use of a rollator  Denies dizziness, presyncope    Interrogation of AICD:  No arrhythmias  Patient activity 0.2 hours/day  OptiVol Fluid Index remains below her threshold      History:  Past Medical History:   Diagnosis Date    Chronic obstructive pulmonary disease (HCC)     Congestive heart failure ([address]     Diabetes ([facility]     Hypertension     Sleep apnea      Past Surgical History:   Procedure Laterality Date    CARDIAC SURG PROCEDURE UNLIST  2018    cardiac cath - Left    HX ARTHROPLASTY  2105    knee    HX  SECTION      HX CORONARY ARTERY BYPASS GRAFT  1997    HX CORONARY STENT PLACEMENT  2016    HX HERNIA REPAIR  1988    HX IMPLANTABLE CARDIOVERTER DEFIBRILLATOR      HX ORTHOPAEDIC      LAP GASTRIC BYPASS/KERLINE-EN-Y  2011    lap band     Social History     Socioeconomic History    Marital status:      Spouse name: Not on file    Number of children: Not on file    Years of education: Not on file    Highest education level: Not on file   Occupational History    Not on file   Social Needs    Financial resource strain: Not on file    Food insecurity:     Worry: Not on file     Inability: Not on file    Transportation needs:     Medical: Not on file     Non-medical: Not on file   Tobacco Use    Smoking status: Former Smoker     Types: Cigarettes    Smokeless tobacco: Never Used   Substance and Sexual Activity    Alcohol use: Not Currently    Drug use: Not Currently    Sexual activity: Not Currently   Lifestyle    Physical activity:     Days per week: Not on file     Minutes per session: Not on file    Stress: Not on file   Relationships    Social connections:     Talks on phone: Not on file     Gets together: Not on file     Attends [employer] service: Not on file     Active member of club or organization: Not on file     Attends meetings of clubs or organizations: Not on file     Relationship status: Not on file    Intimate partner violence:     Fear of current or ex partner: Not on file     Emotionally abused: Not on file     Physically abused: Not on file     Forced sexual activity: Not on file   Other Topics Concern    Not on file   Social History Narrative    Not on file     Family History   Problem Relation Age of Onset    Hypertension Mother     Dementia Mother     Coronary Artery Disease Father 58    Sudden Death Father 58    Diabetes Son        Current Medications:   Current Outpatient Medications   Medication Sig Dispense Refill    glipiZIDE (GLUCOTROL) 10 mg tablet Take 10 mg by mouth two (2) times a day.  albuterol (PROVENTIL HFA, VENTOLIN HFA, PROAIR HFA) 90 mcg/actuation inhaler Take  by inhalation.  furosemide (LASIX) 40 mg tablet Take 1 Tab by mouth daily. 30 Tab 3    Oxygen 2 L NC      levothyroxine (SYNTHROID) 100 mcg tablet Take 1 Tab by mouth Daily (before breakfast). 90 Tab 0    allopurinoL (ZYLOPRIM) 100 mg tablet Take 1 Tab by mouth daily. 90 Tab 0    clopidogreL (PLAVIX) 75 mg tab Take 1 Tab by mouth daily. 90 Tab 0    buPROPion SR (WELLBUTRIN SR) 150 mg SR tablet Take 1 Tab by mouth daily. 90 Tab 0    gabapentin (NEURONTIN) 100 mg capsule Take 2 Caps by mouth three (3) times daily. Max Daily Amount: 600 mg. 180 Cap 1    metoprolol succinate (TOPROL-XL) 25 mg XL tablet Take 0.5 Tabs by mouth daily. 60 Tab 2    cholecalciferol (VITAMIN D3) (1000 Units /25 mcg) tablet Take 2 Tabs by mouth daily. 60 Tab 2    ezetimibe (ZETIA) 10 mg tablet Take 1 Tab by mouth daily. 30 Tab 1    hydrALAZINE (APRESOLINE) 50 mg tablet Take 1 Tab by mouth three (3) times daily. 90 Tab 2    isosorbide mononitrate ER (IMDUR) 30 mg tablet Take 1 Tab by mouth daily. 30 Tab 2    acetaminophen (TYLENOL ARTHRITIS PAIN) 650 mg TbER Take 1,300 mg by mouth two (2) times a day.  aspirin 81 mg chewable tablet Take 81 mg by mouth daily.  atorvastatin (LIPITOR) 80 mg tablet TAKE 1 TABLET BY MOUTH AT BEDTIME 90 Tab 0    chlorhexidine (HIBICLENS) 4 % liquid Apply to the upper chest area from shoulder/neck to mid line of chest and to below the nipple every day, 5 days prior to the procedure. 1 Bottle 0       Allergies:    Allergies   Allergen Reactions    Crestor [Rosuvastatin] Other (comments)     Causes muscle cramps    Lisinopril Cough    Nsaids (Non-Steroidal Anti-Inflammatory Drug) Other (comments)     Liver and Kidney       ROS:    General:  positive for  - fatigue  HEENT: positive for cataracts and wears glasses  Pulmonary: positive for - occasional cough  Cardiac: Negative for MARINA, PND, orthopnea today   GI:  positive for - constipation  Musculo: positive for - muscular weakness  Neuro: no TIA or stroke symptoms  Skin:  negative  Psych: positive for - anxiety and depression    Admission Weight: Last Weight   Weight: 219 lb (99.3 kg) Weight: 219 lb (99.3 kg)       Physical Exam:   Vitals:    Visit Vitals  /84 (BP 1 Location: Right arm, BP Patient Position: Sitting)   Pulse 76   Temp 97.8 °F (36.6 °C)   Resp 20   Ht 5' 2\" (1.575 m)   Wt 219 lb (99.3 kg)   SpO2 94%   BMI 40.06 kg/m²       General:  fatigued, cooperative  HEENT: PERRLA, EOMI, Sclera clear, anicteric and Oropharynx clear, no lesions  Neck:  supple, no significant adenopathy, carotids upstroke normal bilaterally, no bruits  CVP:  10 cm  ( + ) HJR  Cardiac: regular rate, regular rhythm, S1, S2 normal, S4 present and systolic murmur present  Chest: breath sounds clear and equal bilaterally  Abdomen: soft, non-tender. Bowel sounds normal. No masses, mild hepatomegaly. Extremity: No edema   Neuro: Alert and oriented to person, place, and time; normal strength and tone. Normal symmetric reflexes. Normal coordination and gait  Skin:   no rashes, no ecchymoses, no petechiae    Recent Labs:   Labs Latest Ref Rng & Units 2/11/2020 2/5/2020 2/5/2020 2/4/2020 2/3/2020 2/2/2020 2/2/2020   WBC 3.6 - 11.0 K/uL - - 6.9 5.8 6.0 - 6.1   RBC 3.80 - 5.20 M/uL - - 3.03(L) 3.14(L) 3.16(L) - 3.32(L)   Hemoglobin 11.5 - 16.0 g/dL - - 9. 5(L) 9.7(L) 10. 0(L) - 10. 3(L)   Hematocrit 35.0 - 47.0 % - - 30. 0(L) 30. 8(L) 32. 8(L) - 32. 5(L)   MCV 80.0 - 99.0 FL - - 99.0 98.1 103. 8(H) - 97.9   Platelets 994 - 661 K/uL - - 240 246 196 - 246   Lymphocytes 12 - 49 % - - - - - - -   Monocytes 5 - 13 % - - - - - - -   Eosinophils 0 - 7 % - - - - - - -   Basophils 0 - 1 % - - - - - - -   Albumin 3.5 - 5.0 g/dL - - 3.6 3.5 3.6 - 3. 3(L)   Calcium 8.7 - 10.3 mg/dL 10. 5(H) 9.2 10. 3(H) 10. 4(H) 10. 3(H) 10. 5(H) 10. 4(H)   SGOT 15 - 37 U/L - - 18 17 16 - 16   Glucose 65 - 99 mg/dL 276(H) - 80 118(H) 155(H) 283(H) 244(H)   BUN 8 - 27 mg/dL 27 - 28(H) 31(H) 38(H) 39(H) 43(H)   Creatinine 0.57 - 1.00 mg/dL 1.83(H) - 1.89(H) 1.83(H) 2.06(H) 2.43(H) 2.37(H)   Sodium 134 - 144 mmol/L 134 - 139 140 138 136 136   Potassium 3.5 - 5.2 mmol/L 5.1 - 4.2 4.1 4.0 3.9 3.8   TSH 0.36 - 3.74 uIU/mL - - - - - - -   Some recent data might be hidden     Calcium 9.2  .2    SFLC  Free kappa 49.5  Free lambda 17.7  Kappa/Lambda 2.8    EK2020  Sinus  Rhythm, rate 67 bpm   -  Nonspecific T-abnormality. Ech[facility]ogram:   [facility]  LVEF 40-45% with mild lateral wall and septal wall HK  Mod LAE  Grade II diastolic dysfunction  Mod MR  Mild to mod TR with RVSP 36-45 mmHg    Cardiac Catheterization:  2018 ([facility])  Severe native 3vd  Patent LIMA-LAD, patent SVG-PDA  LVEDP 18 mmHg    5/19/15 ([facility])  Significant 3vd  Patent SVG-PDA with lesion in distal segment of graft  Patent LIMA-LAD  Mod pulm HTN  Depressed CI    PCI to distal SVG-PDA  Integrity BMS 3x9    RA 5, RV 55/11, PA 53/20/31, PCWP 11  TPG 17, /16, CO 4.17, CI 1.98      Impression / Plan:   1. ICM - Stage C, NYHA Class IIIb-IV, LVEF 30-35%   Scheduled for BiV upgrade on 3/6/2020   TTE  shows decrease in EF to 30-35%   Tolerating Toprol Xl 12.5 mg daily   Begin Entresto 24/26 mg PO BID - watch Cr closely    Recheck BMP next week   Tolerating Hydralazine to 50 mg PO TID and Imdur 30mg daily     Low sodium diet   Daily weights   Strict I/O   Invitate - DSP (VUS)   Elevated SFLC   Equivocal PYP imaging     2. CAD s/p CABG in , s/p PCI to DVG-RCA in 10/16   On ASA, statin, BB    Severe 3V disease    Lp(a) 212    - add Zetia     3. HTN - well controlled   On BB and hydralazine/nitrate   Low sodium diet   Compliant with BiPAP    4. VT - s/p AICD   No shocks   No arrhythmias on interrogation     5. WES - on BiPAP with oxygen   Compliant with BiPAP   Follows up with her sleep medicine physician at [facility] every 6 months    6. U6XR complicated by neuropathy   HgA1C 7.5    Appointment with Nicolasa tomorrow for diabetes management    7. Hypothyroidism   On levothyroxine 100 mcgs daily    8. Depression   On Wellbutrin  mg     9. History of Tobacco Abuse - 1/2 ppd x 20 years, quit 5 years ago   Counseled re: importance of continued abstinence    10. Gout    Continue allopurinol     11. Obesity (Body mass index is 40.06 kg/m². s/p gastric bypass in 2011   Scheduled to see Nutritionist with [facility]    12. Osteoarthritis - s/p right TKR   Avoid Celebrex due to CKD, HFrEF   S/p left knee injection in 8/2019   Lidocaine patches to knees     13. Hyperlipidemia    ,    Lipoprotein-A 212   Continue Lipitor 80 mg daily    Continue Zetia 10 mg PO daily   Will likely need Repatha    Low chol diet     13. Iron deficiency    Venofer 200 mg IV x 1     14. KEYONNA on CKD4,suspect diabetic nephropathy and cardiorenal syndrome   Cr 1.83   Intolerant of ARB, AA   Avoid nephrotoxic agents     15. Vitamin D deficiency   Continue cholecalciferol 2,000 units daily       Thank you for allowing us to participate in your patient's care.     Kaylan Mathew, [facility] [facility] [place]  [address] [facility], [address]  [address]  Office [phone]  Fax [phone]

## 2020-02-24 NOTE — PROGRESS NOTES
Cardiac Electrophysiology OFFICE Note     Subjective:      Francis Gil is a 68 y.o. patient who is seen for H&P update prior to upcoming upgrade to dual chamber ICD (scheduled 03/06/2020). She currently has Medtronic single lead ICD that was implanted at [address] [place] [address] in 2014. VT previously noted. Admitted 01/2020 for ICM, LVEF 15-20% earlier this month with mild to moderate MR. NYHA III. Narrow QRS on ECG 01/30/2020. Has known COPD, no longer requires oxygen around the clock, only with significant exertion. Requires BiPAP nightly, chronic 2 pillow orthopnea. Discharged on beta blocker after recent admission, but limited due to bradycardia. Other GDMT includes Entresto, hydralazine, Imdur, & furosemide. Remains on ASA & Plavix. Denies bleeding issues. Previous:  Echo (02/05/2020): LVEF 15-20%. Severely dilated LA. Mild to mod MR. Mild TR. Nuclear amyloid scan (02/04/2020): Grade 1 activity & H/CL level equivocal for amyloidosis. LHC/RHC (02/03/2020): Severe native 3V disease. Patent SVG-RCA & LIMA-LAD. LCx collateral dependent, native rPDA & D1 have small disease in small vessels. Frequent PVCs. Admitted 01/30/2020-02/05/2020 for acute decompensated systolic CHF. NYHA IV on admit, NYHA III on discharge. Milrinone weaned. Diuresed. MDT single lead ICD implanted at [address] [place] [address] 07/21/2014 by Dr. Jayson Moreau. History VT. S/p PCI SVG-RCA 05/2015. CAD s/p CABG 1997. S/p gastric bypass.       Problem List  Never Reviewed          Codes Class Noted    Peripheral vascular disease ([facility] ICD-10-CM: I73.9  ICD-9-CM: 443.9  2/25/2020        Acute decompensated heart failure (HCC) ICD-10-CM: I50.9  ICD-9-CM: 428.0  1/30/2020        Hx of CABG ICD-10-CM: Z95.1  ICD-9-CM: V45.81  1/13/2020        ICD (implantable cardioverter-defibrillator) in place ICD-10-CM: Z95.810  ICD-9-CM: V45.02  1/13/2020        H/O laparoscopic adjustable gastric banding ICD-10-CM: I36.96  ICD-9-CM: V45.86 1/13/2020        Obesity, morbid ([facility] ICD-10-CM: E66.01  ICD-9-CM: 278.01  1/13/2020              Current Outpatient Medications   Medication Sig Dispense Refill    lidocaine (LIDODERM) 5 % by TransDERmal route every twenty-four (24) hours. Apply patch to the affected area for 12 hours a day and remove for 12 hours a day.  sacubitril-valsartan (ENTRESTO) 24 mg/26 mg tablet Take 1 Tab by mouth two (2) times a day. 28 Tab 0    glipiZIDE (GLUCOTROL) 10 mg tablet Take 10 mg by mouth two (2) times a day.  albuterol (PROVENTIL HFA, VENTOLIN HFA, PROAIR HFA) 90 mcg/actuation inhaler Take  by inhalation.  furosemide (LASIX) 40 mg tablet Take 1 Tab by mouth daily. 30 Tab 3    Oxygen 2 L NC      levothyroxine (SYNTHROID) 100 mcg tablet Take 1 Tab by mouth Daily (before breakfast). 90 Tab 0    allopurinoL (ZYLOPRIM) 100 mg tablet Take 1 Tab by mouth daily. 90 Tab 0    clopidogreL (PLAVIX) 75 mg tab Take 1 Tab by mouth daily. 90 Tab 0    chlorhexidine (HIBICLENS) 4 % liquid Apply to the upper chest area from shoulder/neck to mid line of chest and to below the nipple every day, 5 days prior to the procedure. 1 Bottle 0    buPROPion SR (WELLBUTRIN SR) 150 mg SR tablet Take 1 Tab by mouth daily. 90 Tab 0    gabapentin (NEURONTIN) 100 mg capsule Take 2 Caps by mouth three (3) times daily. Max Daily Amount: 600 mg. 180 Cap 1    metoprolol succinate (TOPROL-XL) 25 mg XL tablet Take 0.5 Tabs by mouth daily. 60 Tab 2    cholecalciferol (VITAMIN D3) (1000 Units /25 mcg) tablet Take 2 Tabs by mouth daily. 60 Tab 2    ezetimibe (ZETIA) 10 mg tablet Take 1 Tab by mouth daily. 30 Tab 1    hydrALAZINE (APRESOLINE) 50 mg tablet Take 1 Tab by mouth three (3) times daily. 90 Tab 2    isosorbide mononitrate ER (IMDUR) 30 mg tablet Take 1 Tab by mouth daily. 30 Tab 2    acetaminophen (TYLENOL ARTHRITIS PAIN) 650 mg TbER Take 1,300 mg by mouth two (2) times a day.       aspirin 81 mg chewable tablet Take 81 mg by mouth daily.  atorvastatin (LIPITOR) 80 mg tablet TAKE 1 TABLET BY MOUTH AT BEDTIME 90 Tab 0     Allergies   Allergen Reactions    Crestor [Rosuvastatin] Other (comments)     Causes muscle cramps    Lisinopril Cough    Nsaids (Non-Steroidal Anti-Inflammatory Drug) Other (comments)     Liver and Kidney     Past Medical History:   Diagnosis Date    Chronic obstructive pulmonary disease (HCC)     Congestive heart failure (HCC)     Diabetes ([facility]     Hypertension     Sleep apnea      Past Surgical History:   Procedure Laterality Date    CARDIAC SURG PROCEDURE UNLIST  2018    cardiac cath - Left    HX ARTHROPLASTY  2105    knee    HX  SECTION      HX CORONARY ARTERY BYPASS GRAFT  1997    HX CORONARY STENT PLACEMENT  2016    HX HERNIA REPAIR      HX IMPLANTABLE CARDIOVERTER DEFIBRILLATOR      HX ORTHOPAEDIC      LAP GASTRIC BYPASS/KERLINE-EN-Y  2011    lap band     Family History   Problem Relation Age of Onset    Hypertension Mother     Dementia Mother     Coronary Artery Disease Father 58    Sudden Death Father 58    Diabetes Son      Social History     Tobacco Use    Smoking status: Former Smoker     Types: Cigarettes    Smokeless tobacco: Never Used   Substance Use Topics    Alcohol use: Not Currently        Review of Systems:   Constitutional: Negative for fever, chills, weight loss, + malaise/fatigue. HEENT: Negative for nosebleeds, vision changes. Respiratory: Negative for cough, hemoptysis. + chronic supplemental oxygen prn  Cardiovascular: Negative for chest pain, palpitations, + chronic 2 pillow orthopnea, no claudication, + leg swelling, no syncope, and no PND. + dyspnea with exertion, sometimes with talking  Gastrointestinal: Negative for nausea, vomiting, diarrhea, blood in stool and melena. Genitourinary: Negative for dysuria, and hematuria. Musculoskeletal: Negative for myalgias, + knee arthralgia. Skin: Negative for rash.    Heme: Does not bleed or bruise easily. Neurological: Negative for speech change and focal weakness     Objective:     Visit Vitals  /62 (BP 1 Location: Right arm, BP Patient Position: Sitting)   Pulse 66   Ht 5' 2\" (1.575 m)   Wt 219 lb (99.3 kg)   BMI 40.06 kg/m²      Physical Exam:   Constitutional: Well-developed and well-nourished. No respiratory distress. Head: Normocephalic and atraumatic. Eyes: Pupils are equal, round  ENT: Hearing normal.    Neck: Supple. No JVD present. Cardiovascular: Normal rate, regular rhythm. Exam reveals no gallop and no friction rub. No murmur heard. Pulmonary/Chest: Effort normal and breath sounds normal. No wheezes. Abdominal: Soft, no tenderness. Musculoskeletal: No edema. Neurological: Alert,oriented. Skin: Skin is warm and dry. Left chest ICD site well healed. Psychiatric: Normal mood and affect. Behavior is normal. Judgment and thought content normal.      EKG (01/30/2019):  SB 59 with occasional PVCs. Narrow QRS. Assessment/Plan:       ICD-10-CM ICD-9-CM    1. Cardiomyopathy, ischemic I25.5 414.8 CBC WITH AUTOMATED DIFF   2. Hx of CABG Z95.1 V45.81 CBC WITH AUTOMATED DIFF   3. Congestive heart failure, unspecified HF chronicity, unspecified heart failure type (HCC) I50.9 428.0 CBC WITH AUTOMATED DIFF   4. ICD (implantable cardioverter-defibrillator), single, in situ Z95.810 V45.02 CBC WITH AUTOMATED DIFF   5. Pre-procedure lab exam Z01.812 V72.63 CBC WITH AUTOMATED DIFF       Ms. John Pascal has ICM (LVEF <20%), NYHA III chronic systolic CHF. Recently admitted for CHF exacerbation. Currently with Medtronic single lead ICD. Unable to increase beta blocker due to sinus bradycardia tendency. GDMT otherwise optimized. Atrial lead & upgrade to dual chamber ICD is indicated. Risks/benefits reviewed, & she agrees to proceed as scheduled. Lab slip given for CBC; BMP was done on 02/25/2020.   Cr elevated, but has history of CKD (has upcoming appointment with advanced heart failure team, may need to reconsider Entresto & current diuretic dose). Reviewed Hibiclens. Future Appointments   Date Time Provider [place]   3/9/2020 11:25 AM Parisa Blankenship NP [facility] SCHED   3/20/2020 10:45 AM Carljohana Pizano, [address]   3/20/2020 11:00 AM HOLTER, [address]   4/17/2020  2:00 PM Yokasta Siu NP [facility] SCHED     Thank you for involving me in this patient's care and please call with further concerns or questions.     KRIS Quinonez  [facility] [place]  02/26/20

## 2020-02-25 DIAGNOSIS — I50.9 CONGESTIVE HEART FAILURE, UNSPECIFIED HF CHRONICITY, UNSPECIFIED HEART FAILURE TYPE (HCC): ICD-10-CM

## 2020-02-25 DIAGNOSIS — R06.02 SHORTNESS OF BREATH: Primary | ICD-10-CM

## 2020-02-25 PROBLEM — I73.9 PERIPHERAL VASCULAR DISEASE (HCC): Status: ACTIVE | Noted: 2020-02-25

## 2020-02-26 ENCOUNTER — OFFICE VISIT (OUTPATIENT)
Dept: CARDIOLOGY CLINIC | Age: 70
End: 2020-02-26

## 2020-02-26 ENCOUNTER — TELEPHONE (OUTPATIENT)
Dept: CARDIOLOGY CLINIC | Age: 70
End: 2020-02-26

## 2020-02-26 VITALS
DIASTOLIC BLOOD PRESSURE: 62 MMHG | WEIGHT: 219 LBS | HEART RATE: 66 BPM | BODY MASS INDEX: 40.3 KG/M2 | SYSTOLIC BLOOD PRESSURE: 110 MMHG | HEIGHT: 62 IN

## 2020-02-26 DIAGNOSIS — Z01.812 PRE-PROCEDURE LAB EXAM: ICD-10-CM

## 2020-02-26 DIAGNOSIS — Z95.810 ICD (IMPLANTABLE CARDIOVERTER-DEFIBRILLATOR), SINGLE, IN SITU: ICD-10-CM

## 2020-02-26 DIAGNOSIS — I25.5 CARDIOMYOPATHY, ISCHEMIC: Primary | ICD-10-CM

## 2020-02-26 DIAGNOSIS — R06.02 SHORTNESS OF BREATH: ICD-10-CM

## 2020-02-26 DIAGNOSIS — I50.9 CONGESTIVE HEART FAILURE, UNSPECIFIED HF CHRONICITY, UNSPECIFIED HEART FAILURE TYPE (HCC): ICD-10-CM

## 2020-02-26 DIAGNOSIS — I50.9 CONGESTIVE HEART FAILURE, UNSPECIFIED HF CHRONICITY, UNSPECIFIED HEART FAILURE TYPE (HCC): Primary | ICD-10-CM

## 2020-02-26 DIAGNOSIS — Z95.1 HX OF CABG: ICD-10-CM

## 2020-02-26 LAB
BUN SERPL-MCNC: 45 MG/DL (ref 8–27)
BUN/CREAT SERPL: 19 (ref 12–28)
CALCIUM SERPL-MCNC: 10.5 MG/DL (ref 8.7–10.3)
CHLORIDE SERPL-SCNC: 102 MMOL/L (ref 96–106)
CO2 SERPL-SCNC: 21 MMOL/L (ref 20–29)
CREAT SERPL-MCNC: 2.31 MG/DL (ref 0.57–1)
GLUCOSE SERPL-MCNC: 197 MG/DL (ref 65–99)
MAGNESIUM SERPL-MCNC: 2.3 MG/DL (ref 1.6–2.3)
NT-PROBNP SERPL-MCNC: 359 PG/ML (ref 0–301)
POTASSIUM SERPL-SCNC: 4.8 MMOL/L (ref 3.5–5.2)
SODIUM SERPL-SCNC: 137 MMOL/L (ref 134–144)

## 2020-02-26 RX ORDER — LIDOCAINE 50 MG/G
PATCH TOPICAL EVERY 24 HOURS
Status: ON HOLD | COMMUNITY
End: 2020-03-06

## 2020-02-26 NOTE — PATIENT INSTRUCTIONS
Your Dual Chamber ICD upgrade procedure has been scheduled for 3/6/20 at 10:45 am, at [facility].    Please report to Admitting Department by 8:45 am, or 2 hours prior to your scheduled procedure. Please bring a list of your current medications and medication bottles, if able, to the hospital on this day. You will be unable to drive after your procedure so please make sure to bring someone with you to your procedure. You will need to have nothing to eat or drink after midnight, the night prior to your procedure. You may have small sips of water, if needed, to take with your medication. You should not stop your medication prior to your scheduled procedure. After your procedure, you will need to follow up with Dr. Brandan Castillo nurse for a wound and device check. Your follow-up appointment has been scheduled for 3/20/20 at 10:45 am.     Hibiclens 4% topical solution has been ordered and sent into your pharmacy  Patient it start Hibiclens application 5 days prior to procedure date    Directions Hibiclens 4%: Start cleanse 5 days prior to procedure   1. Rinse area (upper chest and upper arms) with water. 2. Apply minimum amount necessary to scrub the upper chest area from shoulder/neck to mid line of chest and to below the nipple each of  5 nights before the day of the procedure  3. Let solution dry.

## 2020-02-26 NOTE — TELEPHONE ENCOUNTER
I called patient, advised her per Maria Victoria Evans:  Please ask Ms. Faiza Kelsey to stop all lasix, drink a little more fluids and have her BMP checked on Monday. Her creatinine was up slightly. Lab orders placed. She states understanding. States she will return to work next Tuesday, Wednesday and Thursday.

## 2020-02-27 LAB
BASOPHILS # BLD AUTO: 0.1 X10E3/UL (ref 0–0.2)
BASOPHILS NFR BLD AUTO: 1 %
EOSINOPHIL # BLD AUTO: 0.4 X10E3/UL (ref 0–0.4)
EOSINOPHIL NFR BLD AUTO: 5 %
ERYTHROCYTE [DISTWIDTH] IN BLOOD BY AUTOMATED COUNT: 14.3 % (ref 11.7–15.4)
HCT VFR BLD AUTO: 34.4 % (ref 34–46.6)
HGB BLD-MCNC: 11 G/DL (ref 11.1–15.9)
IMM GRANULOCYTES # BLD AUTO: 0 X10E3/UL (ref 0–0.1)
IMM GRANULOCYTES NFR BLD AUTO: 0 %
LYMPHOCYTES # BLD AUTO: 2.1 X10E3/UL (ref 0.7–3.1)
LYMPHOCYTES NFR BLD AUTO: 32 %
MCH RBC QN AUTO: 31.2 PG (ref 26.6–33)
MCHC RBC AUTO-ENTMCNC: 32 G/DL (ref 31.5–35.7)
MCV RBC AUTO: 98 FL (ref 79–97)
MONOCYTES # BLD AUTO: 0.5 X10E3/UL (ref 0.1–0.9)
MONOCYTES NFR BLD AUTO: 7 %
NEUTROPHILS # BLD AUTO: 3.7 X10E3/UL (ref 1.4–7)
NEUTROPHILS NFR BLD AUTO: 55 %
PLATELET # BLD AUTO: 276 X10E3/UL (ref 150–450)
RBC # BLD AUTO: 3.53 X10E6/UL (ref 3.77–5.28)
WBC # BLD AUTO: 6.8 X10E3/UL (ref 3.4–10.8)

## 2020-02-27 RX ORDER — CEFAZOLIN SODIUM/WATER 2 G/20 ML
2 SYRINGE (ML) INTRAVENOUS ONCE
Status: CANCELLED | OUTPATIENT
Start: 2020-02-27 | End: 2020-02-27

## 2020-02-27 RX ORDER — SODIUM CHLORIDE 0.9 % (FLUSH) 0.9 %
5-40 SYRINGE (ML) INJECTION AS NEEDED
Status: CANCELLED | OUTPATIENT
Start: 2020-02-27

## 2020-02-27 RX ORDER — SODIUM CHLORIDE 0.9 % (FLUSH) 0.9 %
5-40 SYRINGE (ML) INJECTION EVERY 8 HOURS
Status: CANCELLED | OUTPATIENT
Start: 2020-02-27

## 2020-03-02 ENCOUNTER — TELEPHONE (OUTPATIENT)
Dept: CARDIOLOGY CLINIC | Age: 70
End: 2020-03-02

## 2020-03-02 NOTE — TELEPHONE ENCOUNTER
Patient called to report a weight gain of 5 pounds since Friday when she stopped her lasix. Her ankles are swollen, she has slightly increased shortness of breath, BP is 146/74, HR 75. She denies any dietary indiscretion. She is going to [address] [place] [address] for labs today. Please advise regarding lasix, thank you     Patient also states $40 copay for entresto is a hardship-I gave her number for PAN and she will call today. I called patient and spoke with Go Ingram, advised her per GUERA Blankenship:  Please have resume Lasix 40 mg PO daily, first dose now.  Further instructions to follow up on receipt of labs  She states understanding and has no questions. Will follow up with patient tomorrow. I called patient, she states her weight is 217.8 lb, BP  Is 110/72, HR 67, she is feeling well taking lasix once per day.

## 2020-03-03 LAB
BUN SERPL-MCNC: 38 MG/DL (ref 8–27)
BUN/CREAT SERPL: 22 (ref 12–28)
CALCIUM SERPL-MCNC: 10.5 MG/DL (ref 8.7–10.3)
CHLORIDE SERPL-SCNC: 108 MMOL/L (ref 96–106)
CO2 SERPL-SCNC: 20 MMOL/L (ref 20–29)
CREAT SERPL-MCNC: 1.7 MG/DL (ref 0.57–1)
GLUCOSE SERPL-MCNC: 148 MG/DL (ref 65–99)
NT-PROBNP SERPL-MCNC: 546 PG/ML (ref 0–301)
POTASSIUM SERPL-SCNC: 5.2 MMOL/L (ref 3.5–5.2)
SODIUM SERPL-SCNC: 139 MMOL/L (ref 134–144)

## 2020-03-04 ENCOUNTER — TELEPHONE (OUTPATIENT)
Dept: CARDIAC REHAB | Age: 70
End: 2020-03-04

## 2020-03-04 NOTE — TELEPHONE ENCOUNTER
3/4/2020 Cardiac Rehab: Called Ms. Kiera Caldwell  to discuss participation in the Cardiac Rehab Program following an admission for [facility] 1/30/2020. She is awaiting the care card approval and is overwhelmed with doctor's appointments at this time. She is also seeing doctor's with [facility]. Agreed that cardiac rehab will follow up with her in about 6 weeks to get an update on the care card application results. Will call the week of 4/15/2020.   Jonathan Keita RN

## 2020-03-06 ENCOUNTER — HOSPITAL ENCOUNTER (OUTPATIENT)
Age: 70
Setting detail: OUTPATIENT SURGERY
Discharge: HOME | End: 2020-03-06
Attending: INTERNAL MEDICINE | Admitting: INTERNAL MEDICINE

## 2020-03-06 VITALS
BODY MASS INDEX: 40.12 KG/M2 | SYSTOLIC BLOOD PRESSURE: 117 MMHG | DIASTOLIC BLOOD PRESSURE: 38 MMHG | OXYGEN SATURATION: 100 % | TEMPERATURE: 97.7 F | HEIGHT: 62 IN | HEART RATE: 54 BPM | WEIGHT: 218 LBS

## 2020-03-06 DIAGNOSIS — I25.5 CARDIOMYOPATHY, ISCHEMIC: ICD-10-CM

## 2020-03-06 RX ORDER — SODIUM CHLORIDE 0.9 % (FLUSH) 0.9 %
5-40 SYRINGE (ML) INJECTION EVERY 8 HOURS
Status: DISCONTINUED | OUTPATIENT
Start: 2020-03-06 | End: 2020-03-06 | Stop reason: HOSPADM

## 2020-03-06 RX ORDER — CEFAZOLIN SODIUM/WATER 2 G/20 ML
2 SYRINGE (ML) INTRAVENOUS ONCE
Status: DISCONTINUED | OUTPATIENT
Start: 2020-03-06 | End: 2020-03-06 | Stop reason: HOSPADM

## 2020-03-06 RX ORDER — SODIUM CHLORIDE 0.9 % (FLUSH) 0.9 %
5-40 SYRINGE (ML) INJECTION AS NEEDED
Status: DISCONTINUED | OUTPATIENT
Start: 2020-03-06 | End: 2020-03-06 | Stop reason: HOSPADM

## 2020-03-06 NOTE — PROGRESS NOTES
1000 - Medtronic representative to interrogate device. Patient does not need a device upgrade (see 's note).   Patient changed

## 2020-03-06 NOTE — PROGRESS NOTES
Cardiac Cath Lab Recovery Arrival Note:      Juan Lawton arrived to Cardiac Cath Lab, Recovery Area. Staff introduced to patient. Patient identifiers verified with NAME and DATE OF BIRTH. Procedure verified with patient. Consent forms reviewed and signed by patient or authorized representative and verified. Allergies verified. Patient and family oriented to department. Patient and family informed of procedure and plan of care. Questions answered with review. Patient prepped for procedure, per orders from physician, prior to arrival.    Patient on cardiac monitor, non-invasive blood pressure, SPO2 monitor. On room air. Patient is A&Ox 4. Patient reports no complaints. Patient in stretcher, in low position, with side rails up, call bell within reach, patient instructed to call if assistance as needed. Patient prep in: [address], [place] [address]. Family in: Joie Bhakta (sister) [phone].    Prep by: Lisa Bates RN

## 2020-03-06 NOTE — H&P
Cardiac Electrophysiology H/P Note     Subjective:      Jagruti Fermin is a 68 y.o. patient who is seen for upgrade of medtronic ICD to dual chamber   She is short of breaths and need rate to be higher  ECG with narrow QRS and sinus rhythm is not allowing more beta blocker  She was previously checked and followed up at [facility] so I did not have enough information about her ICD system  Today in EP lab recovery area I reviewed old chest xray and noted she had [employer] atrial lead and rechecking the medtronic ICD she has 34% atrial pacing history  She is still complaining about short of breaths on exertion    AICD that was implanted at [facility] in 2014. VT previously noted.     Admitted 01/2020 for ICM, LVEF 15-20% with mild to moderate MR. NYHA III.     Narrow QRS on ECG 01/30/2020.     Has known COPD, no longer requires oxygen around the clock, only with significant exertion. Requires BiPAP nightly, chronic 2 pillow orthopnea.     Discharged on beta blocker after recent admission, but limited due to bradycardia. Other GDMT includes Entresto, hydralazine, Imdur, & furosemide.     Remains on ASA & Plavix. Denies bleeding issues.        Previous:  Echo (02/05/2020): LVEF 15-20%. Severely dilated LA. Mild to mod MR. Mild TR.     Nuclear amyloid scan (02/04/2020): Grade 1 activity & H/CL level equivocal for amyloidosis.     LHC/RHC (02/03/2020): Severe native 3V disease. Patent SVG-RCA & LIMA-LAD. LCx collateral dependent, native rPDA & D1 have small disease in small vessels. Frequent PVCs.     Admitted 01/30/2020-02/05/2020 for acute decompensated systolic CHF. NYHA IV on admit, NYHA III on discharge. Milrinone weaned. Diuresed.       S/p PCI SVG-RCA 05/2015.     CAD s/p CABG 1997.     S/p gastric bypass.     Patient Active Problem List   Diagnosis Code    Hx of CABG Z95.1    ICD (implantable cardioverter-defibrillator) in place Z95.810    H/O laparoscopic adjustable gastric banding Z98.84    Obesity, morbid ([facility] E66.01    Acute decompensated heart failure ([facility] I50.9    Peripheral vascular disease (HCC) I73.9     Current Facility-Administered Medications   Medication Dose Route Frequency Provider Last Rate Last Dose    ceFAZolin (ANCEF) 2 g/20 mL in sterile water IV syringe  2 g IntraVENous Lory Abarca MD        sodium chloride (NS) flush 5-40 mL  5-40 mL IntraVENous Q8H Levy Harris MD        sodium chloride (NS) flush 5-40 mL  5-40 mL IntraVENous PRN Levy Harris MD        bacitracin 50,000 Units in sodium chloride irrigation 0.9 % 500 mL irrigation solution  50,000 Units Irrigation Lory Abarca MD         Allergies   Allergen Reactions    Crestor [Rosuvastatin] Other (comments)     Causes muscle cramps    Lisinopril Cough    Nsaids (Non-Steroidal Anti-Inflammatory Drug) Other (comments)     Liver and Kidney     No current facility-administered medications on file prior to encounter. Current Outpatient Medications on File Prior to Encounter   Medication Sig Dispense Refill    buPROPion SR (WELLBUTRIN SR) 150 mg SR tablet Take 1 Tab by mouth daily. 90 Tab 0    gabapentin (NEURONTIN) 100 mg capsule Take 2 Caps by mouth three (3) times daily. Max Daily Amount: 600 mg. 180 Cap 1    metoprolol succinate (TOPROL-XL) 25 mg XL tablet Take 0.5 Tabs by mouth daily. 60 Tab 2    cholecalciferol (VITAMIN D3) (1000 Units /25 mcg) tablet Take 2 Tabs by mouth daily. 60 Tab 2    ezetimibe (ZETIA) 10 mg tablet Take 1 Tab by mouth daily. 30 Tab 1    hydrALAZINE (APRESOLINE) 50 mg tablet Take 1 Tab by mouth three (3) times daily. 90 Tab 2    isosorbide mononitrate ER (IMDUR) 30 mg tablet Take 1 Tab by mouth daily. 30 Tab 2    acetaminophen (TYLENOL ARTHRITIS PAIN) 650 mg TbER Take 1,300 mg by mouth two (2) times a day.  aspirin 81 mg chewable tablet Take 81 mg by mouth daily.       atorvastatin (LIPITOR) 80 mg tablet TAKE 1 TABLET BY MOUTH AT BEDTIME 90 Tab 0       Past Medical History:   Diagnosis Date    Chronic obstructive pulmonary disease (HCC)     Congestive heart failure (HCC)     Diabetes ([facility]     Hypertension     Sleep apnea      Past Surgical History:   Procedure Laterality Date    CARDIAC SURG PROCEDURE UNLIST  2018    cardiac cath - Left    HX ARTHROPLASTY  2105    knee    HX  SECTION      HX CORONARY ARTERY BYPASS GRAFT  1997    HX CORONARY STENT PLACEMENT  2016    HX HERNIA REPAIR      HX IMPLANTABLE CARDIOVERTER DEFIBRILLATOR      HX ORTHOPAEDIC      LAP GASTRIC BYPASS/KERLINE-EN-Y  2011    lap band     Family History   Problem Relation Age of Onset    Hypertension Mother     Dementia Mother     Coronary Artery Disease Father 58    Sudden Death Father 58    Diabetes Son      Social History     Tobacco Use    Smoking status: Former Smoker     Types: Cigarettes    Smokeless tobacco: Never Used   Substance Use Topics    Alcohol use: Not Currently        Review of Systems:   Constitutional: Negative for fever, chills, weight loss, + malaise/fatigue. HEENT: Negative for nosebleeds, vision changes. Respiratory: Negative for cough, hemoptysis  Cardiovascular: Negative for chest pain, palpitations, orthopnea, claudication, leg swelling, syncope, and PND. +MARINA  Gastrointestinal: Negative for nausea, vomiting, diarrhea, blood in stool and melena. Genitourinary: Negative for dysuria, and hematuria. Musculoskeletal: Negative for myalgias, arthralgia. Skin: Negative for rash. Heme: Does not bleed or bruise easily. Neurological: Negative for speech change and focal weakness     Objective:     Visit Vitals  BP (!) 117/38 (BP 1 Location: Right arm, BP Patient Position: At rest)   Pulse (!) 54   Temp 97.7 °F (36.5 °C)   Ht 5' 2\" (1.575 m)   Wt 218 lb (98.9 kg)   SpO2 100%   Breastfeeding No   BMI 39.87 kg/m²      Physical Exam:   Constitutional: well-developed and well-nourished. No respiratory distress.    Head: Normocephalic and atraumatic. Eyes: Pupils are equal, round  ENT: hearing normal  Neck: supple. No JVD present. Cardiovascular: Normal rate, regular rhythm. Exam reveals no gallop and no friction rub. No murmur heard. Pulmonary/Chest: Effort normal and breath sounds normal. No wheezes. Abdominal: Soft, obese  Musculoskeletal: 1+ leg edema. Neurological: alert,oriented. Skin: Skin is warm and dry, unremarkable left sided AICD pocket  Psychiatric: normal mood and affect. Behavior is normal. Judgment and thought content normal.           Assessment/Plan:   Dual chamber Medtronic ICD in situ with [employer] RA lead-battery 5 years left. Lower pacing rate was set 50 bpm and today I checked the lead and they both captured at 0.875 V @ 0.4 ms. I have increased pacing rate from 50 to 70 bpm with rate response on and she has normal AV conduction with MVP R pacing mode. QRS is narrow so she is not candidate for BIV ICD upgrade    CAD and previous PCI/CABG    Chronic kidney disease so she is on hydralazine and isordil for chronic systolic CHF NYHA class 3    Will ask [facility] to transfer remote ICD monitoring and check over    Future Appointments   Date Time Provider [place]   3/9/2020 11:25 AM Rainer Fiore NP [facility] SCHED   3/20/2020 10:45 AM [address]   4/17/2020  2:00 PM Chanda Lozoya  [address]              Thank you for involving me in this patient's care and please call with further concerns or questions. Veronica Yadav M.D.   Electrophysiology/Cardiology  [facility]  [address] [place] [address]                             [phone]

## 2020-03-09 ENCOUNTER — OFFICE VISIT (OUTPATIENT)
Dept: CARDIOLOGY CLINIC | Age: 70
End: 2020-03-09

## 2020-03-09 VITALS
OXYGEN SATURATION: 98 % | WEIGHT: 217 LBS | HEART RATE: 72 BPM | BODY MASS INDEX: 39.93 KG/M2 | HEIGHT: 62 IN | TEMPERATURE: 97.8 F | SYSTOLIC BLOOD PRESSURE: 118 MMHG | DIASTOLIC BLOOD PRESSURE: 64 MMHG | RESPIRATION RATE: 20 BRPM

## 2020-03-09 DIAGNOSIS — N18.30 CONTROLLED TYPE 2 DIABETES MELLITUS WITH STAGE 3 CHRONIC KIDNEY DISEASE, WITHOUT LONG-TERM CURRENT USE OF INSULIN (HCC): Primary | ICD-10-CM

## 2020-03-09 DIAGNOSIS — E11.22 CONTROLLED TYPE 2 DIABETES MELLITUS WITH STAGE 3 CHRONIC KIDNEY DISEASE, WITHOUT LONG-TERM CURRENT USE OF INSULIN (HCC): Primary | ICD-10-CM

## 2020-03-09 NOTE — PATIENT INSTRUCTIONS
Medication changes:    Begin taking Jardiance 10 mg by mouth daily (1 tablet). Please complete the patient assistance request form and email it to [email or web address]     No changes to your other medications. Please take this to your pharmacy to notify them of the change in medications. Testing Ordered: We would like to get labwork done in 2 weeks. Other Recommendations:      Ensure your drinking an adequate amount of water with a goal of 6-8 eight ounce glasses (1.5-2 liters) of fluid daily. Your urine should be clear and light yellow straw colored. If your blood pressure begins to consistently run below 90/60 and/or you begin to experience dizziness or lightheadedness, please contact the [facility] at [phone]. Follow up in 1 month with [facility]      Please monitor your blood pressures daily prior to medications and 2 hours after taking medications. Bring a written record of your blood pressures to your next appointment. Please monitor your weights daily upon waking and after using the bathroom. Keep a written records of your weights and bring to your next appointment. If you have a weight gain of 3 or more pounds overnight OR 5 or more pounds in one week please contact our office. Thank you for allowing us the privilege of being a part of your healthcare team! Please do not hesitate to contact our office at [phone] with any questions or concerns.

## 2020-03-09 NOTE — PROGRESS NOTES
[facility] Clinic Note      DOS:   3/9/2020  NAME:  Malvin Salinas   MRN:   [id number]   REFERRING PROVIDER:  Yokasta Siu NP  PRIMARY CARE PHYSICIAN: Yokasta Siu NP  PRIMARY CARDIOLOGIST: none      Chief Complaint:   Chief Complaint   Patient presents with    CHF    Leg Swelling       HPI: 68y.o. year old female with a history of HTN, HLD, WES, obesity (Body mass index is 39.69 kg/m².) s/p gastric bypass in 2011, T2DM, hypothyroidism, COPD, CAD s/p CABG in 1997, s/p PCI to [address] in 5/15, ICM, VT, s/p AICD (Medtronic),  anxiety, and depression who presents for further evaluation of her chronic systolic heart failure. Ms. Kenny Casas recalls having a viral syndrome in the Fall and has not felt well since then. She uses oxygen with her BiPAP every night and sleeps on two pillows. She was recently admitted to [facility] for acute on chronic systolic heart failure. She returns to clinic today for follow up. She was scheduled to undergo BiV upgrade with Dr. Benjamin Harrison last week, however, her QRS was too narrow for the upgrade. He increased her low pacing threshold from 50 bpm to 70 bpm.  She feels well, is off of her oxygen. She reports that her symptoms have improved since Friday when her PM rate was increased. She weighs herself daily and is near her target weight of 216 lbs. She has been limiting her sodium consumption and is compliant with her medications. She recently went back to work.        Interrogation of AICD:  No arrhythmias  Patient activity increased to 0.7 hours/day  OptiVol Fluid Index remains above threshold, but is decreasing       History:  Past Medical History:   Diagnosis Date    Chronic obstructive pulmonary disease ([address]     Congestive heart failure ([address]     Diabetes ([address]     Hypertension     Sleep apnea 1996     Past Surgical History:   Procedure Laterality Date    CARDIAC SURG PROCEDURE UNLIST  09/13/2018    cardiac cath - Left  HX ARTHROPLASTY  2105    knee    HX  SECTION      HX CORONARY ARTERY BYPASS GRAFT  1997    HX CORONARY STENT PLACEMENT  2016    HX HERNIA REPAIR      HX IMPLANTABLE CARDIOVERTER DEFIBRILLATOR      HX ORTHOPAEDIC      LAP GASTRIC BYPASS/KERLINE-EN-Y  2011    lap band     Social History     Socioeconomic History    Marital status:      Spouse name: Not on file    Number of children: Not on file    Years of education: Not on file    Highest education level: Not on file   Occupational History    Not on file   Social Needs    Financial resource strain: Not on file    Food insecurity:     Worry: Not on file     Inability: Not on file    Transportation needs:     Medical: Not on file     Non-medical: Not on file   Tobacco Use    Smoking status: Former Smoker     Types: Cigarettes    Smokeless tobacco: Never Used   Substance and Sexual Activity    Alcohol use: Not Currently    Drug use: Not Currently    Sexual activity: Not Currently   Lifestyle    Physical activity:     Days per week: Not on file     Minutes per session: Not on file    Stress: Not on file   Relationships    Social connections:     Talks on phone: Not on file     Gets together: Not on file     Attends [employer] service: Not on file     Active member of club or organization: Not on file     Attends meetings of clubs or organizations: Not on file     Relationship status: Not on file    Intimate partner violence:     Fear of current or ex partner: Not on file     Emotionally abused: Not on file     Physically abused: Not on file     Forced sexual activity: Not on file   Other Topics Concern    Not on file   Social History Narrative    Not on file     Family History   Problem Relation Age of Onset    Hypertension Mother     Dementia Mother     Coronary Artery Disease Father 58    Sudden Death Father 58    Diabetes Son        Current Medications:   Current Outpatient Medications   Medication Sig Dispense Refill    sacubitril-valsartan (ENTRESTO) 24 mg/26 mg tablet Take 1 Tab by mouth two (2) times a day. 28 Tab 0    glipiZIDE (GLUCOTROL) 10 mg tablet Take 10 mg by mouth two (2) times a day.  albuterol (PROVENTIL HFA, VENTOLIN HFA, PROAIR HFA) 90 mcg/actuation inhaler Take  by inhalation.  furosemide (LASIX) 40 mg tablet Take 1 Tab by mouth daily. 30 Tab 3    Oxygen 2 L NC      levothyroxine (SYNTHROID) 100 mcg tablet Take 1 Tab by mouth Daily (before breakfast). 90 Tab 0    allopurinoL (ZYLOPRIM) 100 mg tablet Take 1 Tab by mouth daily. 90 Tab 0    clopidogreL (PLAVIX) 75 mg tab Take 1 Tab by mouth daily. 90 Tab 0    chlorhexidine (HIBICLENS) 4 % liquid Apply to the upper chest area from shoulder/neck to mid line of chest and to below the nipple every day, 5 days prior to the procedure. 1 Bottle 0    buPROPion SR (WELLBUTRIN SR) 150 mg SR tablet Take 1 Tab by mouth daily. 90 Tab 0    gabapentin (NEURONTIN) 100 mg capsule Take 2 Caps by mouth three (3) times daily. Max Daily Amount: 600 mg. 180 Cap 1    metoprolol succinate (TOPROL-XL) 25 mg XL tablet Take 0.5 Tabs by mouth daily. 60 Tab 2    cholecalciferol (VITAMIN D3) (1000 Units /25 mcg) tablet Take 2 Tabs by mouth daily. 60 Tab 2    ezetimibe (ZETIA) 10 mg tablet Take 1 Tab by mouth daily. 30 Tab 1    hydrALAZINE (APRESOLINE) 50 mg tablet Take 1 Tab by mouth three (3) times daily. 90 Tab 2    isosorbide mononitrate ER (IMDUR) 30 mg tablet Take 1 Tab by mouth daily. 30 Tab 2    acetaminophen (TYLENOL ARTHRITIS PAIN) 650 mg TbER Take 1,300 mg by mouth two (2) times a day.  aspirin 81 mg chewable tablet Take 81 mg by mouth daily.  atorvastatin (LIPITOR) 80 mg tablet TAKE 1 TABLET BY MOUTH AT BEDTIME 90 Tab 0       Allergies:    Allergies   Allergen Reactions    Crestor [Rosuvastatin] Other (comments)     Causes muscle cramps    Lisinopril Cough    Nsaids (Non-Steroidal Anti-Inflammatory Drug) Other (comments) Liver and Kidney       ROS:    General:  negative  HEENT: positive for cataracts and wears glasses  Pulmonary: positive for - occasional cough  Cardiac: Negative for MARINA, PND, orthopnea today   GI:  positive for - constipation  Musculo: positive for - muscular weakness  Neuro: no TIA or stroke symptoms  Skin:  negative  Psych: negative    Admission Weight: Last Weight   Weight: 217 lb (98.4 kg) Weight: 217 lb (98.4 kg)       Physical Exam:   Vitals:    Visit Vitals  Ht 5' 2\" (1.575 m)   Wt 217 lb (98.4 kg)   BMI 39.69 kg/m²       General:  fatigued, cooperative  HEENT: PERRLA, EOMI, Sclera clear, anicteric and Oropharynx clear, no lesions  Neck:  supple, no significant adenopathy, carotids upstroke normal bilaterally, no bruits  CVP:  10 cm  ( + ) HJR  Cardiac: regular rate, regular rhythm, S1, S2 normal, S4 present and systolic murmur present  Chest: breath sounds clear and equal bilaterally  Abdomen: soft, non-tender. Bowel sounds normal. No masses, mild hepatomegaly. Extremity: No edema   Neuro: Alert and oriented to person, place, and time; normal strength and tone. Normal symmetric reflexes. Normal coordination and gait  Skin:   no rashes, no ecchymoses, no petechiae    Recent Labs:   Labs Latest Ref Rng & Units 3/2/2020 2/26/2020 2/25/2020 2/11/2020 2/5/2020 2/5/2020 2/4/2020   WBC 3.4 - 10.8 x10E3/uL - 6.8 - - - 6.9 5.8   RBC 3.77 - 5.28 x10E6/uL - 3.53(L) - - - 3.03(L) 3.14(L)   Hemoglobin 11.1 - 15.9 g/dL - 11. 0(L) - - - 9. 5(L) 9.7(L)   Hematocrit 34.0 - 46.6 % - 34.4 - - - 30. 0(L) 30. 8(L)   MCV 79 - 97 fL - 98(H) - - - 99.0 98.1   Platelets 916 - 687 K12B1/WT - 276 - - - 240 246   Lymphocytes 12 - 49 % - - - - - - -   Monocytes Not Estab. % - 7 - - - - -   Eosinophils Not Estab. % - 5 - - - - -   Basophils Not Estab. % - 1 - - - - -   Albumin 3.5 - 5.0 g/dL - - - - - 3.6 3.5   Calcium 8.7 - 10.3 mg/dL 10. 5(H) - 10. 5(H) 10. 5(H) 9.2 10. 3(H) 10. 4(H)   SGOT 15 - 37 U/L - - - - - 18 17   Glucose 65 - 99 mg/dL 148(H) - 197(H) 276(H) - 80 118(H)   BUN 8 - 27 mg/dL 38(H) - 45(H) 27 - 28(H) 31(H)   Creatinine 0.57 - 1.00 mg/dL 1.70(H) - 2.31(H) 1.83(H) - 1.89(H) 1.83(H)   Sodium 134 - 144 mmol/L 139 - 137 134 - 139 140   Potassium 3.5 - 5.2 mmol/L 5.2 - 4.8 5.1 - 4.2 4.1   TSH 0.36 - 3.74 uIU/mL - - - - - - -   Some recent data might be hidden     EK2020  Sinus  Rhythm, rate 67 bpm   -  Nonspecific T-abnormality. Echocardiogram:   [facility]  LVEF 40-45% with mild lateral wall and septal wall HK  Mod LAE  Grade II diastolic dysfunction  Mod MR  Mild to mod TR with RVSP 36-45 mmHg    Cardiac Catheterization:  2018 ([facility])  Severe native 3vd  Patent LIMA-LAD, patent SVG-PDA  LVEDP 18 mmHg    5/19/15 ([facility])  Significant 3vd  Patent SVG-PDA with lesion in distal segment of graft  Patent LIMA-LAD  Mod pulm HTN  Depressed CI    PCI to distal SVG-PDA  Integrity BMS 3x9    RA 5, RV 55/11, PA 53/20/31, PCWP 11  TPG 17, /16, CO 4.17, CI 1.98    Impression / Plan:   1. ICM - Stage C, NYHA Class IIIb-IV, LVEF 30-35%   PPM rate increased from 50 bpm to 70 bpm with improvement in symptoms    TTE  shows decrease in EF to 30-35%   Tolerating Toprol Xl 12.5 mg daily   Continue Entresto 24/26 mg PO BID - watch Cr closely    Recheck labs in weeks   Tolerating Hydralazine 50 mg PO TID and Imdur 30mg daily     Low sodium diet, daily weights, strict I/O   Invitate - DSP (VUS)   Elevated SFLC   Equivocal PYP imaging   Return in 1 month for follow up      2. CAD s/p CABG in , s/p PCI to DVG-RCA in 10/16   On ASA, statin, BB    Severe 3V disease    Lp(a) 212    - added Zetia     3. HTN - well controlled   On BB, Entresto, and hydralazine/nitrate   Low sodium diet   Compliant with BiPAP    4. VT - s/p dual chamber AICD with RA lead    No shocks   No arrhythmias on interrogation    Dr. Chen Camilo increased rate from 50 bpm to 70 bpm     5.  WES - on BiPAP with oxygen   Compliant with BiPAP   Follows up with her sleep medicine physician at [facility] every 6 months    6. 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 Hypothyroidism   On levothyroxine 100 mcgs daily    8. Depression   On Wellbutrin  mg     9. History of Tobacco Abuse - 1/2 ppd x 20 years, quit 5 years ago   Counseled re: importance of continued abstinence    10. Gout    Continue allopurinol     11. Obesity (Body mass index is 39.69 kg/m². s/p gastric bypass in 2011   Scheduled to see Nutritionist with [employer]    12. Osteoarthritis - s/p right TKR   Avoid Celebrex due to CKD, HFrEF   S/p left knee injection in 8/2019   Lidocaine patches to knees     13. Hyperlipidemia    ,    Lipoprotein-A 212   Continue Lipitor 80 mg daily    Continue Zetia 10 mg PO daily   Will likely need Repatha    Low chol diet     14. KEYONNA on CKD4,suspect diabetic nephropathy and cardiorenal syndrome   Cr 1.70   Continue Entresto 24/26 mg PO BID    Avoid nephrotoxic agents     15. Vitamin D deficiency   Continue cholecalciferol 2,000 units daily       Thank you for allowing us to participate in your patient's care.     Carolin Dash, [facility]  [address] [facility] [place]  [address] [facility], [address]  [address]  Office [phone]  Fax [phone]

## 2020-03-24 ENCOUNTER — PATIENT MESSAGE (OUTPATIENT)
Dept: CARDIOLOGY CLINIC | Age: 70
End: 2020-03-24

## 2020-03-26 NOTE — TELEPHONE ENCOUNTER
From: Cristino Wise  To: Socrates George NP  Sent: 3/24/2020 5:39 PM EDT  Subject: Non-Urgent Medical Question    Hi Ms Alexander Luis. Jack Toscano I am sorry that I have not forwarded my form for patient assistence for Jardiance. I have had some computer issues that has interferred with my sending you the forms. While trying to figure things, I have misplaced your email address. Please sent it to me again so that I can complete this process. I am sorry for the delay. I am feeling much better and doing my best to stay protected and safe during these trying time. I hope you and the entire team are doing the same. I am not sure if I saw you the last visit or someone else. The forms are from [employer]. Feel free to call at your convenience if you have any questions. Home number is [phone]. Thank you for your help.

## 2020-03-31 RX ORDER — EZETIMIBE 10 MG/1
TABLET ORAL
Qty: 30 TAB | Refills: 0 | Status: SHIPPED | OUTPATIENT
Start: 2020-03-31 | End: 2020-04-27 | Stop reason: SDUPTHER

## 2020-04-06 ENCOUNTER — TELEPHONE (OUTPATIENT)
Dept: CARDIAC REHAB | Age: 70
End: 2020-04-06

## 2020-04-06 NOTE — TELEPHONE ENCOUNTER
4/6/2020 Cardiac Rehab: Called Ms. Keyshawn Bergeron  to discuss participation in the Cardiac Rehab Program following HF admit on 1/31/2020. Spoke with the pt. and made her aware of the current closing of the CWP due to Covid 19. She had a potential exposure from son and is very anxious so will be tested this week. Agreed to call in 3 weeks. Nelia Whyte RN

## 2020-04-09 ENCOUNTER — TELEPHONE (OUTPATIENT)
Dept: CARDIOLOGY CLINIC | Age: 70
End: 2020-04-09

## 2020-04-09 NOTE — TELEPHONE ENCOUNTER
Patient contacted to reschedule upcoming appt to a virtual/tmychart appt. Patient wanted to move appointment up to tomorrow. Appointment is scheduled for Friday April 10th at 1pm w/GUERA Blankenship NP. Patient was instructed to take their weight, BP, HR and temp before the appt. and have medications/list available. I informed the patient they would receive a call from our office 15 min before the appt. to provide readings. The following was confirmed with the patient:    \"We want to confirm that, for purposes of billing, this is a virtual visit with your provider for which we will submit a claim for reimbursement with your insurance company. You will be responsible for any co pays, coinsurance, amounts or other amounts not covered by your insurance company. If you do not accept this, unfortunately we will not be able to schedule a virtual visit with the provider. Do you accept? \"    Patient states that there is no co payment due with her insurance.

## 2020-04-10 ENCOUNTER — VIRTUAL VISIT (OUTPATIENT)
Dept: CARDIOLOGY CLINIC | Age: 70
End: 2020-04-10

## 2020-04-10 VITALS
SYSTOLIC BLOOD PRESSURE: 122 MMHG | TEMPERATURE: 97.4 F | WEIGHT: 214.6 LBS | HEART RATE: 73 BPM | BODY MASS INDEX: 39.25 KG/M2 | DIASTOLIC BLOOD PRESSURE: 80 MMHG

## 2020-04-10 DIAGNOSIS — I50.42 CHRONIC HEART FAILURE WITH REDUCED EJECTION FRACTION AND DIASTOLIC DYSFUNCTION (HCC): Primary | ICD-10-CM

## 2020-04-10 DIAGNOSIS — E11.22 CONTROLLED TYPE 2 DIABETES MELLITUS WITH STAGE 3 CHRONIC KIDNEY DISEASE, WITHOUT LONG-TERM CURRENT USE OF INSULIN (HCC): ICD-10-CM

## 2020-04-10 DIAGNOSIS — Z95.810 ICD (IMPLANTABLE CARDIOVERTER-DEFIBRILLATOR) IN PLACE: ICD-10-CM

## 2020-04-10 DIAGNOSIS — N18.30 CONTROLLED TYPE 2 DIABETES MELLITUS WITH STAGE 3 CHRONIC KIDNEY DISEASE, WITHOUT LONG-TERM CURRENT USE OF INSULIN (HCC): ICD-10-CM

## 2020-04-10 DIAGNOSIS — Z95.1 HX OF CABG: ICD-10-CM

## 2020-04-10 DIAGNOSIS — G47.30 SLEEP APNEA TREATED WITH NOCTURNAL BIPAP: ICD-10-CM

## 2020-04-10 DIAGNOSIS — R06.02 SHORTNESS OF BREATH: ICD-10-CM

## 2020-04-10 RX ORDER — MELATONIN
2000 DAILY
Qty: 60 TAB | Refills: 11 | Status: SHIPPED | OUTPATIENT
Start: 2020-04-10 | End: 2020-12-21 | Stop reason: ALTCHOICE

## 2020-04-10 RX ORDER — ATORVASTATIN CALCIUM 80 MG/1
TABLET, FILM COATED ORAL
Qty: 90 TAB | Refills: 3 | Status: SHIPPED | OUTPATIENT
Start: 2020-04-10 | End: 2021-04-22 | Stop reason: SDUPTHER

## 2020-04-10 NOTE — PROGRESS NOTES
[facility] Virtual Visit    Start: 1:00 pm  End: 1:43 pm    Consent: Ciro Woodruff, who was seen by synchronous (real-time) audio-video technology, and/or her healthcare decision maker, is aware that this patient-initiated, Telehealth encounter on 4/10/2020 is a billable service, with coverage as determined by her insurance carrier. She is aware that she may receive a bill and has provided verbal consent to proceed: Yes. I spent at least 40 minutes with this established patient, and >50% of the time was spent counseling and/or coordinating care regarding medications and s/s to report to [facility]      DOS:   4/10/2020  NAME:  Ciro Woodruff   MRN:   [id number]   REFERRING PROVIDER:  Abelardo Dumont NP  PRIMARY CARE PHYSICIAN: Abelardo Dumont NP  PRIMARY CARDIOLOGIST: none      Chief Complaint:   Chief Complaint   Patient presents with    Follow-up       HPI: 68y.o. year old female with a history of HTN, HLD, WES, obesity (There is no height or weight on file to calculate BMI.) s/p gastric bypass in 2011, T2DM, hypothyroidism, COPD, CAD s/p CABG in 1997, s/p PCI to [address] in 5/15, ICM, VT, s/p AICD ([employer]),  anxiety, and depression who presents via virtual visit for further evaluation of her chronic systolic heart failure. Ms. William Kc recalls having a viral syndrome last year and has not felt well since then. She uses oxygen with her BiPAP every night and sleeps on two pillows. She was recently admitted to [facility] for acute on chronic systolic heart failure, treated with IV inotropes and diuretics. She was scheduled to undergo BiV upgrade with Dr. Sonya Browning, however, her QRS was too narrow for the upgrade. He increased her low pacing threshold from 50 bpm to 70 bpm.      Since her discharge, she has done very well. She is off of home oxygen and only uses it at night, bled into her BiPAP.   She denies dyspnea, orthopnea, dizziness, lightheadedness, syncope, pre-syncope, LE edema, or abdominal distention. She had a 6 lb weight gain last week after eating canned beans and pretzels, but after doubling her lasix for one day she is back down to her goal weight of 214 lbs. She is compliant with her medications and weighs herself and measures her BP diligently. She reports home SBPs of 104-130. Of note, Mrs. Silas Flores' son recently stayed at her house. After he left, she learned that he was in direct contact with a co-worker who is positive for COVID-19. She denies any s/s infection including fevers, cough, dyspnea, but was tested at a local facility anyway. Those results are pending and she has self-quarantined for the time being.          History:  Past Medical History:   Diagnosis Date    Chronic obstructive pulmonary disease (HCC)     Congestive heart failure (HCC)     Diabetes ([facility] [address]     Hypertension     Sleep apnea      Past Surgical History:   Procedure Laterality Date    CARDIAC SURG PROCEDURE UNLIST  2018    cardiac cath - Left    HX ARTHROPLASTY  2105    knee    HX  SECTION      HX CORONARY ARTERY BYPASS GRAFT  1997    HX CORONARY STENT PLACEMENT  2016    HX HERNIA REPAIR      HX IMPLANTABLE CARDIOVERTER DEFIBRILLATOR      HX ORTHOPAEDIC      LAP GASTRIC BYPASS/KERLINE-EN-Y  2011    lap band     Social History     Socioeconomic History    Marital status:      Spouse name: Not on file    Number of children: Not on file    Years of education: Not on file    Highest education level: Not on file   Occupational History    Not on file   Social Needs    Financial resource strain: Not on file    Food insecurity     Worry: Not on file     Inability: Not on file   [employer] needs     Medical: Not on file     Non-medical: Not on file   Tobacco Use    Smoking status: Former Smoker     Types: Cigarettes    Smokeless tobacco: Never Used   Substance and Sexual Activity    Alcohol use: Not Currently    Drug use: Not Currently    Sexual activity: Not Currently   Lifestyle    Physical activity     Days per week: Not on file     Minutes per session: Not on file    Stress: Not on file   Relationships    Social connections     Talks on phone: Not on file     Gets together: Not on file     Attends [employer] service: Not on file     Active member of club or organization: Not on file     Attends meetings of clubs or organizations: Not on file     Relationship status: Not on file    Intimate partner violence     Fear of current or ex partner: Not on file     Emotionally abused: Not on file     Physically abused: Not on file     Forced sexual activity: Not on file   Other Topics Concern    Not on file   Social History Narrative    Not on file     Family History   Problem Relation Age of Onset    Hypertension Mother     Dementia Mother     Coronary Artery Disease Father 58    Sudden Death Father 58    Diabetes Son      Allergies: Allergies   Allergen Reactions    Crestor [Rosuvastatin] Other (comments)     Causes muscle cramps    Lisinopril Cough    Nsaids (Non-Steroidal Anti-Inflammatory Drug) Other (comments)     Liver and Kidney       ROS:    General:  negative  HEENT: positive for cataracts and wears glasses  Pulmonary: positive for - occasional cough  Cardiac: Negative for MARINA, PND, orthopnea today   GI:  positive for - constipation  Musculo: positive for - muscular weakness  Neuro: no TIA or stroke symptoms  Skin:  negative  Psych: negative    Admission Weight: Last Weight   Weight: 214 lb 9.6 oz (97.3 kg)         Physical Exam:   Vitals:    Visit Vitals  /80   Pulse 73   Temp 97.4 °F (36.3 °C)   Wt 214 lb 9.6 oz (97.3 kg)   BMI 39.25 kg/m²       Recent Labs:   Labs Latest Ref Rng & Units 3/2/2020 2/26/2020 2/25/2020 2/11/2020   WBC 3.4 - 10.8 x10E3/uL - 6.8 - -   RBC 3.77 - 5.28 x10E6/uL - 3.53(L) - -   Hemoglobin 11.1 - 15.9 g/dL - 11. 0(L) - -   Hematocrit 34.0 - 46.6 % - 34.4 - - MCV 79 - 97 fL - 98(H) - -   Platelets 507 - 869 U70T4/GREEN - 276 - -   Monocytes Not Estab. % - 7 - -   Eosinophils Not Estab. % - 5 - -   Basophils Not Estab. % - 1 - -   Calcium 8.7 - 10.3 mg/dL 10. 5(H) - 10. 5(H) 10. 5(H)   Glucose 65 - 99 mg/dL 148(H) - 197(H) 276(H)   BUN 8 - 27 mg/dL 38(H) - 45(H) 27   Creatinine 0.57 - 1.00 mg/dL 1.70(H) - 2.31(H) 1.83(H)   Sodium 134 - 144 mmol/L 139 - 137 134   Potassium 3.5 - 5.2 mmol/L 5.2 - 4.8 5.1   Some recent data might be hidden     EK2020  Sinus  Rhythm, rate 67 bpm   -  Nonspecific T-abnormality. Impression / Plan:   1. ICM - Stage C, NYHA Class II, LVEF 30-35%   TTE  shows decrease in EF to 30-35%   Tolerating Toprol XL 12.5 mg daily   Continue Entresto 24/26 mg PO BID - watch Cr closely    Continue hydralazine 50 mg PO TID and Imdur 30mg daily   Historically intolerant to AA due to hyperkalemia; repeat labs and reconsider      Low sodium diet, daily weights, strict I/O   Invitate - DSP (VUS)   Elevated SFLC   Equivocal PYP imaging   Return in 1 month for repeat Virtual Visit   Lab orders mailed to patient - instructed to request appointment at [facility] next week      2. CAD s/p CABG in , s/p PCI to [address] in 10/16   On ASA, statin, BB, Zetia   Severe 3V disease     3. HTN - well controlled   On BB, Entresto, and hydralazine/nitrate   Low sodium diet   Compliant with BiPAP    4. VT - s/p dual chamber AICD with RA lead    No shocks   No arrhythmias on interrogation    Dr. Mikhail Paz increased rate from 50 bpm to 70 bpm     5. WES - on BiPAP with oxygen   Compliant with BiPAP   Follows up with her sleep medicine physician at [facility] every 6 months    6. O0AH complicated by neuropathy   HgA1C 7.5    Continue Jardiance 10 mg PO daily - enrolled in patient assistance   Instructed to watch for  infections     7. Hypothyroidism   On levothyroxine 100 mcgs daily    8. Depression   On Wellbutrin  mg     9.  History of Tobacco Abuse - 1/2 ppd x 20 years, quit 5 years ago   Counseled re: importance of continued abstinence    10. Gout    Continue allopurinol    Discussed low purine foods     11. Obesity (Body mass index is 39.25 kg/m². s/p gastric bypass in 2011   Follows with Nutritionist with [employer]    12. Osteoarthritis - s/p right TKR   Avoid Celebrex due to CKD, HFrEF   S/p left knee injection in 8/2019   Lidocaine patches to knees     13. Hyperlipidemia    ,    Lipoprotein-A 212   Continue Lipitor 80 mg daily    Continue Zetia 10 mg PO daily   Will likely need Repatha    Low chol diet     14. KEYONNA on CKD4,suspect diabetic nephropathy and cardiorenal syndrome   Recent Cr 1.70   Continue Entresto 24/26 mg PO BID    Avoid nephrotoxic agents    Labs next week     15. Vitamin D deficiency   Continue cholecalciferol 2,000 units daily 71    Prior to Admission medications    Medication Sig Start Date End Date Taking? Authorizing Provider   furosemide (LASIX) 40 mg tablet Take 1 Tab by mouth daily. 4/3/20  Yes Serge Ip., NP   ezetimibe (ZETIA) 10 mg tablet Take 1 tablet by mouth once daily 3/31/20  Yes Miguel CASANOVA NP   empagliflozin (JARDIANCE) 10 mg tablet Take 1 Tab by mouth daily. 3/9/20  Yes Rebecca Blankenship, VENKATESH   sacubitril-valsartan (ENTRESTO) 24 mg/26 mg tablet Take 1 Tab by mouth two (2) times a day. 2/19/20  Yes Rebecca Blankenship NP   glipiZIDE (GLUCOTROL) 10 mg tablet Take 5 mg by mouth two (2) times a day. Yes Provider, Historical   albuterol (PROVENTIL HFA, VENTOLIN HFA, PROAIR HFA) 90 mcg/actuation inhaler Take  by inhalation every four (4) hours as needed. Yes Provider, Historical   levothyroxine (SYNTHROID) 100 mcg tablet Take 1 Tab by mouth Daily (before breakfast). 2/10/20  Yes Serge Ip., NP   allopurinoL (ZYLOPRIM) 100 mg tablet Take 1 Tab by mouth daily. 2/10/20  Yes Serge Ip., NP   clopidogreL (PLAVIX) 75 mg tab Take 1 Tab by mouth daily.  2/10/20  Yes Serge Ip., NP buPROPion SR (WELLBUTRIN SR) 150 mg SR tablet Take 1 Tab by mouth daily. 2/6/20  Yes Mela Crowell NP   gabapentin (NEURONTIN) 100 mg capsule Take 2 Caps by mouth three (3) times daily. Max Daily Amount: 600 mg. 2/5/20  Yes Olena Haney NP   metoprolol succinate (TOPROL-XL) 25 mg XL tablet Take 0.5 Tabs by mouth daily. 2/5/20  Yes Olena Haney NP   cholecalciferol (VITAMIN D3) (1000 Units /25 mcg) tablet Take 2 Tabs by mouth daily. 2/6/20  Yes Olena Haney NP   hydrALAZINE (APRESOLINE) 50 mg tablet Take 1 Tab by mouth three (3) times daily. 2/5/20  Yes Cady Blankenship NP   isosorbide mononitrate ER (IMDUR) 30 mg tablet Take 1 Tab by mouth daily. 2/6/20  Yes Olena Haney NP   acetaminophen (TYLENOL ARTHRITIS PAIN) 650 mg TbER Take 1,300 mg by mouth two (2) times a day. Yes Provider, Historical   aspirin 81 mg chewable tablet Take 81 mg by mouth daily. Yes Provider, Historical   atorvastatin (LIPITOR) 80 mg tablet TAKE 1 TABLET BY MOUTH AT BEDTIME 1/10/20  Yes Monae HOOD NP   empagliflozin (JARDIANCE) 10 mg tablet Take 1 Tab by mouth daily. 3/9/20   Cady Blankenship NP   Oxygen 2 L NC    Provider, Historical     Allergies   Allergen Reactions    Crestor [Rosuvastatin] Other (comments)     Causes muscle cramps    Lisinopril Cough    Nsaids (Non-Steroidal Anti-Inflammatory Drug) Other (comments)     Liver and Kidney       Past Medical History:   Diagnosis Date    Chronic obstructive pulmonary disease (HCC)     Congest[facility] failure ([facility] [address]     Diabetes ([facility] [address]     Hypertension     Sleep apnea 1996       Review of Systems   Constitutional: Negative. HENT: Negative. Eyes: Negative. Respiratory: Negative. Cardiovascular: Negative. Gastrointestinal: Negative. Genitourinary: Negative. Musculoskeletal: Positive for joint pain. Chronic knee pain   Skin: Negative. Neurological: Negative. Endo/Heme/Allergies: Negative. Psychiatric/Behavioral: Negative. Objective:     Visit Vitals  /80   Pulse 73   Temp 97.4 °F (36.3 °C)   Wt 214 lb 9.6 oz (97.3 kg)   BMI 39.25 kg/m²      General: alert, cooperative, no distress   Mental  status: normal mood, behavior, speech, dress, motor activity, and thought processes, able to follow commands   HENT: NCAT   Neck: no visualized mass   Resp: no respiratory distress   Neuro: no gross deficits   Skin: no discoloration or lesions of concern on visible areas   Psychiatric: normal affect, consistent with stated mood, no evidence of hallucinations     We discussed the expected course, resolution and complications of the diagnosis(es) in detail. Medication risks, benefits, costs, interactions, and alternatives were discussed as indicated. I advised her to contact the office if her condition worsens, changes or fails to improve as anticipated. She expressed understanding with the diagnosis(es) and plan. Tiffanie Boss is a 68 y.o. female being evaluated by a video visit encounter for concerns as above. A caregiver was present when appropriate. Due to this being a TeleHealth encounter ([facility] public health emergency), evaluation of the following organ systems was limited: Vitals/Constitutional/EENT/Resp/CV/GI//MS/Neuro/Skin/Heme-Lymph-Imm. Pursuant to the emergency declaration under the [facility], [facility]5 waiver authority and the [employer] General Act, this Virtual  Visit was conducted, with patient's (and/or legal guardian's) consent, to reduce the patient's risk of exposure to COVID-19 and provide necessary medical care. Services were provided through a video synchronous discussion virtually to substitute for in-person clinic visit. Patient and provider were located at their individual homes.     Ina Viramontes, [facility]  [address] [facility] [address] [facility], [address] [place] [address]  Office [phone]  Fax [phone]

## 2020-04-17 ENCOUNTER — VIRTUAL VISIT (OUTPATIENT)
Dept: INTERNAL MEDICINE CLINIC | Age: 70
End: 2020-04-17

## 2020-04-17 DIAGNOSIS — E11.9 CONTROLLED TYPE 2 DIABETES MELLITUS WITHOUT COMPLICATION, WITHOUT LONG-TERM CURRENT USE OF INSULIN (HCC): Primary | ICD-10-CM

## 2020-04-17 DIAGNOSIS — E03.9 ACQUIRED HYPOTHYROIDISM: ICD-10-CM

## 2020-04-17 DIAGNOSIS — Z11.59 SCREENING FOR VIRAL DISEASE: ICD-10-CM

## 2020-04-17 DIAGNOSIS — J44.9 CHRONIC OBSTRUCTIVE PULMONARY DISEASE, UNSPECIFIED COPD TYPE (HCC): ICD-10-CM

## 2020-04-17 NOTE — PROGRESS NOTES
Krystian Adame is a 68 y.o. female evaluated via telephone on 4/17/2020. Consent:  She and/or health care decision maker is aware that that she may receive a bill for this telephone service, depending on her insurance coverage, and has provided verbal consent to proceed: Yes      Documentation:  I communicated with the patient and/or health care decision maker about: fu plans  Details of this discussion including any medical advice provided:     Diabetes  Home values [id number], 130, 119- jardiance recently started. CHF, ICD has close fu w/ cards, reports doing well  Weight 215 213 220  bp 124/74 128/82  Hr 70s      Has bipap which she is adherent w/ - has not been able to fu w/ pulm or sleep med dt covid  She has 2L nc which she uses some of the time- needs pulm and sleep medicine but delayed due to covid, reports feeling well    Labs ordered by cards -will add on labs        I affirm this is a Patient Initiated Episode with an Established Patient who has not had a related appointment within my department in the past 7 days or scheduled within the next 24 hours. Total Time: minutes: 11-20 minutes    Note: not billable if this call serves to triage the patient into an appointment for the relevant concern      Mildred Junior.  Khalif Salazar NP

## 2020-04-17 NOTE — PROGRESS NOTES
Chief Complaint   Patient presents with    Diabetes     follow up       1. Have you been to the ER, urgent care clinic since your last visit? Hospitalized since your last visit? Yes When: 04/09/2020 Where: [facility] Reason for visit: covid testing    2. Have you seen or consulted any other health care providers outside of the [address] [place] [address] since your last visit? Include any pap smears or colon screening.  No

## 2020-04-27 RX ORDER — EZETIMIBE 10 MG/1
10 TABLET ORAL DAILY
Qty: 30 TAB | Refills: 0 | Status: SHIPPED | OUTPATIENT
Start: 2020-04-27 | End: 2020-06-02

## 2020-04-27 NOTE — TELEPHONE ENCOUNTER
Requested Prescriptions     Signed Prescriptions Disp Refills    ezetimibe (ZETIA) 10 mg tablet 30 Tab 0     Sig: Take 1 Tab by mouth daily.      Authorizing Provider: Kendall Chen     Ordering User: Marlin Mohs

## 2020-04-30 RX ORDER — ISOSORBIDE MONONITRATE 30 MG/1
TABLET, EXTENDED RELEASE ORAL
Qty: 30 TAB | Refills: 0 | Status: SHIPPED | OUTPATIENT
Start: 2020-04-30 | End: 2020-05-28 | Stop reason: SDUPTHER

## 2020-05-01 ENCOUNTER — TELEPHONE (OUTPATIENT)
Dept: CARDIOLOGY CLINIC | Age: 70
End: 2020-05-01

## 2020-05-01 LAB
ALBUMIN SERPL-MCNC: 4.2 G/DL (ref 3.7–4.7)
ALBUMIN/GLOB SERPL: 1.8 {RATIO} (ref 1.2–2.2)
ALP SERPL-CCNC: 88 IU/L (ref 39–117)
ALT SERPL-CCNC: 20 IU/L (ref 0–32)
AST SERPL-CCNC: 19 IU/L (ref 0–40)
BILIRUB SERPL-MCNC: 0.3 MG/DL (ref 0–1.2)
BUN SERPL-MCNC: 44 MG/DL (ref 8–27)
BUN/CREAT SERPL: 19 (ref 12–28)
CALCIUM SERPL-MCNC: 10.2 MG/DL (ref 8.7–10.3)
CHLORIDE SERPL-SCNC: 103 MMOL/L (ref 96–106)
CO2 SERPL-SCNC: 19 MMOL/L (ref 20–29)
CREAT SERPL-MCNC: 2.31 MG/DL (ref 0.57–1)
ERYTHROCYTE [DISTWIDTH] IN BLOOD BY AUTOMATED COUNT: 15.2 % (ref 11.7–15.4)
GLOBULIN SER CALC-MCNC: 2.4 G/DL (ref 1.5–4.5)
GLUCOSE SERPL-MCNC: 158 MG/DL (ref 65–99)
HCT VFR BLD AUTO: 34 % (ref 34–46.6)
HGB BLD-MCNC: 11.2 G/DL (ref 11.1–15.9)
MAGNESIUM SERPL-MCNC: 2.4 MG/DL (ref 1.6–2.3)
MCH RBC QN AUTO: 31.2 PG (ref 26.6–33)
MCHC RBC AUTO-ENTMCNC: 32.9 G/DL (ref 31.5–35.7)
MCV RBC AUTO: 95 FL (ref 79–97)
NT-PROBNP SERPL-MCNC: 464 PG/ML (ref 0–301)
PLATELET # BLD AUTO: 257 X10E3/UL (ref 150–450)
POTASSIUM SERPL-SCNC: 4.7 MMOL/L (ref 3.5–5.2)
PROT SERPL-MCNC: 6.6 G/DL (ref 6–8.5)
RBC # BLD AUTO: 3.59 X10E6/UL (ref 3.77–5.28)
SODIUM SERPL-SCNC: 138 MMOL/L (ref 134–144)
WBC # BLD AUTO: 6.8 X10E3/UL (ref 3.4–10.8)

## 2020-05-01 NOTE — TELEPHONE ENCOUNTER
Labs reviewed. Notable for significant elevation in Cr from 1.7 to 2.3, associated with decreased pro-BNP. Will inquire about weight, symptoms.

## 2020-05-01 NOTE — TELEPHONE ENCOUNTER
Contacted patient for update. She stated she did notice an increase in weight a few days ago and revealed she did eat a ham sandwich which she feels contributed to the weight gain. Weight readings as follows:     4/26/20: Wt: 214lb  4/27/20: Wt: 216lbs  4/28/20: Wt: 218.4lbs  4/29/20: Wt: 217.4lbs  4/30/20: Wt: 217.2lbs /60  5/1/20: Wt: 216.2lbs /68 HR 79    Patient confirmed all medications as written including lasix 40mg daily, entresto 24mg/26mg, toprol 12.5mg daily, jardiance 10mg daily, and hydralazine 50mg TID. She stated she has not taken any extra diuretics. She denied any swelling, dizziness, shortness of breath, chest apin, or lightheadedness. She reported she is drinking \"about 1/2 to 3/4 of the amount of water I should,\" and feels it is likely not enough. Patient educated on low sodium diet and adequate fluid intake with goal of 6/8 eight ounce glasses daily. Informed her will notify GUERA Blankenship NP and contact her with any further recommendations. Patient encouraged to contact [facility] with any further questions or concerns. She verbalized understanding and had no further questions. Lewanda Aschoff, RN.

## 2020-05-01 NOTE — TELEPHONE ENCOUNTER
Carlos Isaac NP  You 46 minutes ago (3:29 PM)      Agree with plan - thanks! Routing comment      Contacted patient to notify of no changes. She verbalized understanding and had no further questions. Zena Warren RN.

## 2020-05-03 LAB
EST. AVERAGE GLUCOSE BLD GHB EST-MCNC: 197 MG/DL
HBA1C MFR BLD: 8.5 % (ref 4.8–5.6)
HCV GENOTYPE: NORMAL
HCV RNA SERPL NAA+PROBE-ACNC: NORMAL IU/ML
HCV RNA SERPL NAA+PROBE-LOG IU: NORMAL LOG10 IU/ML
TEST INFORMATION: NORMAL
TSH SERPL DL<=0.005 MIU/L-ACNC: 1.62 UIU/ML (ref 0.45–4.5)

## 2020-05-04 ENCOUNTER — TELEPHONE (OUTPATIENT)
Dept: CARDIAC REHAB | Age: 70
End: 2020-05-04

## 2020-05-04 NOTE — TELEPHONE ENCOUNTER
5/4/2020 Cardiac Rehab: Called Ms. Arun Heller  to discuss participation in the Cardiac Rehab Program. Gave update on the program and agreed to call at the end of May. She may return to work at that time and said Monday's are best for connecting with her. Zoë Allen RN             []Hide copied text    []Hover for details  4/6/2020 Cardiac Rehab: Called Ms. Arun Heller  to discuss participation in the Cardiac Rehab Program following HF admit on 1/31/2020. Spoke with the pt. and made her aware of the current closing of the CWP due to Covid 19. She had a potential exposure from son and is very anxious so will be tested this week. Agreed to call in 3 weeks. Zoë Allen RN

## 2020-05-05 RX ORDER — ALLOPURINOL 100 MG/1
TABLET ORAL
Qty: 90 TAB | Refills: 0 | Status: SHIPPED | OUTPATIENT
Start: 2020-05-05 | End: 2020-05-28 | Stop reason: SDUPTHER

## 2020-05-08 ENCOUNTER — TELEPHONE (OUTPATIENT)
Dept: CARDIOLOGY CLINIC | Age: 70
End: 2020-05-08

## 2020-05-08 NOTE — TELEPHONE ENCOUNTER
Faxed device management form to [address] [place] [address] at fax number [phone]. Fax confirmation received.

## 2020-05-13 ENCOUNTER — TELEPHONE (OUTPATIENT)
Dept: CARDIOLOGY CLINIC | Age: 70
End: 2020-05-13

## 2020-05-13 NOTE — TELEPHONE ENCOUNTER
Spoke with patient in preparation for doxy. me appointment on 5/14/20. Instructions for performing test call on doxy provided to patient and requested they preform test call to ensure electronic device working properly for visit. Doxy link provided to patient to preform test call. Patient denies fever, chills, cough, shortness of breath in the past 2 weeks. Patient has not been exposed to anyone who has recently been sick. Patient denies any travel in the past month. Medication reconciliation reviewed with patient. No discrepancies noted. Patient was instructed to take their weight, BP, HR and temp before the appointment. I informed the patient they would receive a call from our office 15 min before the appointment to provide readings. Patient verbalized understanding and had no further questions. Silva Dasilva RN.

## 2020-05-14 ENCOUNTER — TELEPHONE (OUTPATIENT)
Dept: CARDIOLOGY CLINIC | Age: 70
End: 2020-05-14

## 2020-05-14 ENCOUNTER — VIRTUAL VISIT (OUTPATIENT)
Dept: CARDIOLOGY CLINIC | Age: 70
End: 2020-05-14

## 2020-05-14 VITALS
SYSTOLIC BLOOD PRESSURE: 117 MMHG | DIASTOLIC BLOOD PRESSURE: 71 MMHG | BODY MASS INDEX: 40.93 KG/M2 | WEIGHT: 222.4 LBS | HEIGHT: 62 IN | TEMPERATURE: 96.8 F | HEART RATE: 78 BPM

## 2020-05-14 DIAGNOSIS — Z95.810 ICD (IMPLANTABLE CARDIOVERTER-DEFIBRILLATOR) IN PLACE: ICD-10-CM

## 2020-05-14 DIAGNOSIS — Z95.1 HX OF CABG: ICD-10-CM

## 2020-05-14 DIAGNOSIS — N18.30 CONTROLLED TYPE 2 DIABETES MELLITUS WITH STAGE 3 CHRONIC KIDNEY DISEASE, WITHOUT LONG-TERM CURRENT USE OF INSULIN (HCC): ICD-10-CM

## 2020-05-14 DIAGNOSIS — Z79.899 ENCOUNTER FOR MONITORING STATIN THERAPY: ICD-10-CM

## 2020-05-14 DIAGNOSIS — E11.22 CONTROLLED TYPE 2 DIABETES MELLITUS WITH STAGE 3 CHRONIC KIDNEY DISEASE, WITHOUT LONG-TERM CURRENT USE OF INSULIN (HCC): ICD-10-CM

## 2020-05-14 DIAGNOSIS — G47.30 SLEEP APNEA TREATED WITH NOCTURNAL BIPAP: ICD-10-CM

## 2020-05-14 DIAGNOSIS — I50.42 CHRONIC HEART FAILURE WITH REDUCED EJECTION FRACTION AND DIASTOLIC DYSFUNCTION (HCC): Primary | ICD-10-CM

## 2020-05-14 DIAGNOSIS — R06.02 SHORTNESS OF BREATH: ICD-10-CM

## 2020-05-14 DIAGNOSIS — E55.9 VITAMIN D DEFICIENCY: ICD-10-CM

## 2020-05-14 DIAGNOSIS — Z51.81 ENCOUNTER FOR MONITORING STATIN THERAPY: ICD-10-CM

## 2020-05-14 RX ORDER — LEVOTHYROXINE SODIUM 100 UG/1
TABLET ORAL
Qty: 90 TAB | Refills: 0 | Status: SHIPPED | OUTPATIENT
Start: 2020-05-14 | End: 2020-08-21 | Stop reason: SDUPTHER

## 2020-05-14 NOTE — PROGRESS NOTES
[facility] Virtual Visit    Start: 1:00 pm  End: 1:43 pm    Consent: Gloria Catherine, who was seen by synchronous (real-time) audio-video technology, and/or her healthcare decision maker, is aware that this patient-initiated, Telehealth encounter on 4/10/2020 is a billable service, with coverage as determined by her insurance carrier. She is aware that she may receive a bill and has provided verbal consent to proceed: Yes. I spent at least 40 minutes with this established patient, and >50% of the time was spent counseling and/or coordinating care regarding medications and s/s to report to [facility]      DOS:   5/14/2020  NAME:  Gloria Catherine   MRN:   [id number]   REFERRING PROVIDER:  Yamileth Sorneson NP  PRIMARY CARE PHYSICIAN: Yamileth Sorenson NP  PRIMARY CARDIOLOGIST: none      Chief Complaint:   Chief Complaint   Patient presents with    Follow-up       HPI: 68y.o. year old female with a history of HTN, HLD, WES, obesity (Body mass index is 40.68 kg/m².) s/p gastric bypass in 2011, T2DM, hypothyroidism, COPD, CAD s/p CABG in 1997, s/p PCI to [address] in 5/15, ICM, VT, s/p AICD ([employer]),  anxiety, and depression who presents via virtual visit for further evaluation of her chronic systolic heart failure. Ms. Nani Salomon recalls having a viral syndrome last year and has not felt well since then. She uses oxygen with her BiPAP every night and sleeps on two pillows. She was recently admitted to [facility] for acute on chronic systolic heart failure, treated with IV inotropes and diuretics. She was scheduled to undergo BiV upgrade with Dr. Josefina Maria, however, her QRS was too narrow for the upgrade. He increased her low pacing threshold from 50 bpm to 70 bpm.      Since her discharge, she has done very well. She is off of home oxygen and only uses it at night, bled into her BiPAP.   She denies dyspnea, orthopnea, dizziness, lightheadedness, syncope, pre-syncope, LE edema, or abdominal distention. She has had a slow weight gain over the last month which she attributes to eating unhealthy snacks and an \"insatiable appetite\". She is compliant with her medications and weighs herself and measures her BP diligently.      History:  Past Medical History:   Diagnosis Date    Chronic obstructive pulmonary disease (HCC)     Congestive heart failure (HCC)     Diabetes ([facility]     Hypertension     Sleep apnea      Past Surgical History:   Procedure Laterality Date    CARDIAC SURG PROCEDURE UNLIST  2018    cardiac cath - Left    HX ARTHROPLASTY  2105    knee    HX  SECTION      HX CORONARY ARTERY BYPASS GRAFT  1997    HX CORONARY STENT PLACEMENT  2016    HX HERNIA REPAIR      HX IMPLANTABLE CARDIOVERTER DEFIBRILLATOR      HX ORTHOPAEDIC      LAP GASTRIC BYPASS/KERLINE-EN-Y  2011    lap band     Social History     Socioeconomic History    Marital status:      Spouse name: Not on file    Number of children: Not on file    Years of education: Not on file    Highest education level: Not on file   Occupational History    Not on file   Social Needs    Financial resource strain: Not on file    Food insecurity     Worry: Not on file     Inability: Not on file   [employer] needs     Medical: Not on file     Non-medical: Not on file   Tobacco Use    Smoking status: Former Smoker     Types: Cigarettes    Smokeless tobacco: Never Used   Substance and Sexual Activity    Alcohol use: Not Currently    Drug use: Not Currently    Sexual activity: Not Currently   Lifestyle    Physical activity     Days per week: Not on file     Minutes per session: Not on file    Stress: Not on file   Relationships    Social connections     Talks on phone: Not on file     Gets together: Not on file     Attends Faith service: Not on file     Active member of club or organization: Not on file     Attends meetings of clubs or organizations: Not on file     Relationship status: Not on file    Intimate partner violence     Fear of current or ex partner: Not on file     Emotionally abused: Not on file     Physically abused: Not on file     Forced sexual activity: Not on file   Other Topics Concern    Not on file   Social History Narrative    Not on file     Family History   Problem Relation Age of Onset    Hypertension Mother     Dementia Mother     Coronary Artery Disease Father 58    Sudden Death Father 58    Diabetes Son      Allergies:    Allergies   Allergen Reactions    Crestor [Rosuvastatin] Other (comments)     Causes muscle cramps    Lisinopril Cough    Nsaids (Non-Steroidal Anti-Inflammatory Drug) Other (comments)     Liver and Kidney       ROS:    General:  negative  HEENT: positive for cataracts and wears glasses  Pulmonary: positive for - occasional cough  Cardiac: Negative for MARINA, PND, orthopnea today   GI:  positive for - constipation  Musculo: positive for - muscular weakness  Neuro: no TIA or stroke symptoms  Skin:  negative  Psych: negative    Admission Weight: Last Weight   Weight: 222 lb 6.4 oz (100.9 kg) Weight: 222 lb 6.4 oz (100.9 kg)       Physical Exam:   Vitals:    Visit Vitals  /71 (BP 1 Location: Right arm, BP Patient Position: Sitting)   Pulse 78   Temp 96.8 °F (36 °C) (Oral)   Ht 5' 2\" (1.575 m)   Wt 222 lb 6.4 oz (100.9 kg)   BMI 40.68 kg/m²       Recent Labs:   Labs Latest Ref Rng & Units 4/30/2020   WBC 3.4 - 10.8 x10E3/uL 6.8   RBC 3.77 - 5.28 x10E6/uL 3.59(L)   Hemoglobin 11.1 - 15.9 g/dL 11.2   Hematocrit 34.0 - 46.6 % 34.0   MCV 79 - 97 fL 95   Platelets 979 - 330 A05G7/   Albumin 3.7 - 4.7 g/dL 4.2   Calcium 8.7 - 10.3 mg/dL 10.2   SGOT 0 - 40 IU/L 19   Glucose 65 - 99 mg/dL 158(H)   BUN 8 - 27 mg/dL 44(H)   Creatinine 0.57 - 1.00 mg/dL 2.31(H)   Sodium 134 - 144 mmol/L 138   Potassium 3.5 - 5.2 mmol/L 4.7   TSH 0.450 - 4.500 uIU/mL 1.620   Some recent data might be hidden     EKG: 1/30/2020  Sinus  Rhythm, rate 67 bpm   -  Nonspecific T-abnormality. Impression / Plan:   1. ICM - Stage C, NYHA Class II, LVEF 30-35%   TTE 1/31 shows decrease in EF to 30-35%   Continue Toprol XL 12.5 mg daily   Continue Entresto 24/26 mg PO BID - watch Cr closely    Continue hydralazine 50 mg PO TID and Imdur 30mg daily   Increase Lasix to 40 mg PO BID x 1 day, then return to 40 mg daily    Historically intolerant to AA due to hyperkalemia; repeat labs and reconsider      Low sodium diet, daily weights, strict I/O   Invitate - DSP (VUS)   Elevated SFLC   Equivocal PYP imaging   Return in 1 month for in person visit   Repeat TTE in July (~3 months after PM changes)   Lab orders mailed to patient - instructed to request appointment at [facility] next week      2. CAD s/p CABG in 1997, s/p PCI to [address] [place] [address] in 10/16   On ASA, statin, BB, Zetia   Severe 3V disease     3. HTN - well controlled   On BB, Entresto, and hydralazine/nitrate   Low sodium diet   Compliant with BiPAP    4. VT - s/p dual chamber AICD with RA lead    No shocks   No arrhythmias on interrogation    Dr. Valeri Solano increased rate from 50 bpm to 70 bpm     5. WES - on BiPAP with oxygen   Compliant with BiPAP   Follows up with her sleep medicine physician at [address] [place] [address] every 6 months    6. G9KJ complicated by neuropathy   HgA1C 7.5    Continue Jardiance 10 mg PO daily - enrolled in patient assistance   Instructed to watch for  infections     7. Hypothyroidism   On levothyroxine 100 mcgs daily    8. Depression   On Wellbutrin  mg     9. History of Tobacco Abuse - 1/2 ppd x 20 years, quit 5 years ago   Counseled re: importance of continued abstinence    10. Gout    Continue allopurinol    Discussed low purine foods     11. Obesity (Body mass index is 40.68 kg/m². s/p gastric bypass in 2011   Follows with Nutritionist with [facility]    12.  Osteoarthritis - s/p right TKR   Avoid Celebrex due to CKD, HFrEF   S/p left knee injection in 8/2019   Lidocaine patches to knees     13. Hyperlipidemia    ,    Lipoprotein-A 212   Continue Lipitor 80 mg daily    Continue Zetia 10 mg PO daily   Repeat fasting lipids   Will likely need Repatha    Low chol diet     14. KEYONNA on CKD4,suspect diabetic nephropathy and cardiorenal syndrome   Recent Cr 1.70   Continue Entresto 24/26 mg PO BID    Avoid nephrotoxic agents    Labs next week     15. Vitamin D deficiency   Continue cholecalciferol 2,000 units daily 71    Prior to Admission medications    Medication Sig Start Date End Date Taking? Authorizing Provider   levothyroxine (SYNTHROID) 100 mcg tablet TAKE 1 TABLET BY MOUTH ONCE DAILY BEFORE BREAKFAST 5/14/20   Luis HOOD, VENKATESH   allopurinoL (ZYLOPRIM) 100 mg tablet Take 1 tablet by mouth once daily 5/5/20   Gregor Lucero, VENKATESH   isosorbide mononitrate ER (IMDUR) 30 mg tablet Take 1 tablet by mouth once daily 4/30/20   Wilton Blankenship NP   ezetimibe (ZETIA) 10 mg tablet Take 1 Tab by mouth daily. 4/27/20   Wilton Blankenship NP   atorvastatin (LIPITOR) 80 mg tablet TAKE 1 TABLET BY MOUTH AT BEDTIME 4/10/20   Wilton Blankenship NP   cholecalciferol (VITAMIN D3) (1000 Units /25 mcg) tablet Take 2 Tabs by mouth daily. 4/10/20   Wilton Blankenship NP   furosemide (LASIX) 40 mg tablet Take 1 Tab by mouth daily. 4/3/20   Gregor Lucero NP   empagliflozin (JARDIANCE) 10 mg tablet Take 1 Tab by mouth daily. 3/9/20   Wilton Blankenship NP   sacubitril-valsartan (ENTRESTO) 24 mg/26 mg tablet Take 1 Tab by mouth two (2) times a day. 2/19/20   Wilton Blankenship NP   glipiZIDE (GLUCOTROL) 10 mg tablet Take 5 mg by mouth two (2) times a day. Provider, Historical   albuterol (PROVENTIL HFA, VENTOLIN HFA, PROAIR HFA) 90 mcg/actuation inhaler Take  by inhalation every four (4) hours as needed. Provider, Historical   Oxygen 2 L NC At night through BiPAP    Provider, Historical   clopidogreL (PLAVIX) 75 mg tab Take 1 Tab by mouth daily.  2/10/20   Collins Kathryn Interiano., NP   levothyroxine (SYNTHROID) 100 mcg tablet Take 1 Tab by mouth Daily (before breakfast). 2/10/20 5/14/20  Nilandkobe Fields., NP   buPROPion SR (WELLBUTRIN SR) 150 mg SR tablet Take 1 Tab by mouth daily. 2/6/20   Panfilokobe Fields., NP   gabapentin (NEURONTIN) 100 mg capsule Take 2 Caps by mouth three (3) times daily. Max Daily Amount: 600 mg. 2/5/20   Polliard, Sydni Knee, NP   metoprolol succinate (TOPROL-XL) 25 mg XL tablet Take 0.5 Tabs by mouth daily. 2/5/20   Polliard, Sydni Knee, NP   hydrALAZINE (APRESOLINE) 50 mg tablet Take 1 Tab by mouth three (3) times daily. 2/5/20   Polliard, Sydni Knee, NP   acetaminophen (TYLENOL ARTHRITIS PAIN) 650 mg TbER Take 1,300 mg by mouth two (2) times a day. Provider, Historical   aspirin 81 mg chewable tablet Take 81 mg by mouth daily. Provider, Historical     Allergies   Allergen Reactions    Crestor [Rosuvastatin] Other (comments)     Causes muscle cramps    Lisinopril Cough    Nsaids (Non-Steroidal Anti-Inflammatory Drug) Other (comments)     Liver and Kidney       Past Medical History:   Diagnosis Date    [facility] obstructive pulmonary disease ([facility]     Congestive heart failure ([facility]     [facility]     Hypertension     Sleep apnea 1996       Review of Systems   Constitutional: Negative. HENT: Negative. Eyes: Negative. Respiratory: Negative. Cardiovascular: Negative. Gastrointestinal: Negative. Genitourinary: Negative. Musculoskeletal: Positive for joint pain. Chronic knee pain   Skin: Negative. Neurological: Negative. Endo/Heme/Allergies: Negative. Psychiatric/Behavioral: Negative.           Objective:     Visit Vitals  /71 (BP 1 Location: Right arm, BP Patient Position: Sitting)   Pulse 78   Temp 96.8 °F (36 °C) (Oral)   Ht 5' 2\" (1.575 m)   Wt 222 lb 6.4 oz (100.9 kg)   BMI 40.68 kg/m²      General: alert, cooperative, no distress   Mental  status: normal mood, behavior, speech, dress, motor activity, and thought processes, able to follow commands   HENT: NCAT   Neck: no visualized mass   Resp: no respiratory distress   Neuro: no gross deficits   Skin: no discoloration or lesions of concern on visible areas   Psychiatric: normal affect, consistent with stated mood, no evidence of hallucinations     We discussed the expected course, resolution and complications of the diagnosis(es) in detail. Medication risks, benefits, costs, interactions, and alternatives were discussed as indicated. I advised her to contact the office if her condition worsens, changes or fails to improve as anticipated. She expressed understanding with the diagnosis(es) and plan. Maricel Carvajal is a 68 y.o. female being evaluated by a video visit encounter for concerns as above. A caregiver was present when appropriate. Due to this being a TeleHealth encounter (During [facility] public health emergency), evaluation of the following organ systems was limited: Vitals/Constitutional/EENT/Resp/CV/GI//MS/Neuro/Skin/Heme-Lymph-Imm. Pursuant to the emergency declaration under the [facility] Replaced by [facility] waiver authority and the [employer] Act, this Virtual  Visit was conducted, with patient's (and/or legal guardian's) consent, to reduce the patient's risk of exposure to COVID-19 and provide necessary medical care. Services were provided through a video synchronous discussion virtually to substitute for in-person clinic visit. Patient and provider were located at their individual homes.     Mert Bradley AGACNP-BC  [address] [facility] [place]  [address] [facility], [address]  [facility], [address] [place] [address]  Office [phone]  Fax [phone]

## 2020-05-14 NOTE — TELEPHONE ENCOUNTER
----- Message from Michael Olvera sent at 5/14/2020 10:28 AM EDT -----  Regarding: Visit Follow-Up Question  Contact: [phone]  Unable to verify [facility].doxy. me.  had issues last time as well    I will try to to connect with my smartphone. Cell number  [phone]. Dont know why my laptop doesn't work.   Again let's try with my cell [phone]  Sorry for confusion

## 2020-05-14 NOTE — TELEPHONE ENCOUNTER
Contacted patient to notify her link is incorrect. Provided correct link [facility]. doxy. me. She verbalized understanding and had no further questions. Jan Troy RN.

## 2020-05-15 ENCOUNTER — TELEPHONE (OUTPATIENT)
Dept: CARDIOLOGY CLINIC | Age: 70
End: 2020-05-15

## 2020-05-15 DIAGNOSIS — E11.49 OTHER DIABETIC NEUROLOGICAL COMPLICATION ASSOCIATED WITH TYPE 2 DIABETES MELLITUS (HCC): ICD-10-CM

## 2020-05-15 RX ORDER — GABAPENTIN 100 MG/1
CAPSULE ORAL
Qty: 180 CAP | Refills: 0 | Status: SHIPPED | OUTPATIENT
Start: 2020-05-15 | End: 2020-06-25

## 2020-05-15 NOTE — TELEPHONE ENCOUNTER
Recommendations for upcoming endoscopic procedure signed by Dr. Dueñas Or and faxed to [facility]. Per Dr. Dueñas Or V.O.RJocelynB patient to hold plavix for 5 days prior to procedure and hold entresto day of procedure. Attempted to contact patient at both home and mobile numbers to notify of recommendations and to obtain update on weights. Messages left. Will await return call. Edu Brady RN.

## 2020-05-15 NOTE — PATIENT INSTRUCTIONS
Medication changes: Please take an additional dose of furosemide today (80 mg total). We will re-assess your weight and breathing tomorrow. Please take this to your pharmacy to notify them of the change in medications. Testing Ordered: We have enclosed lab order forms. Please call the number on the top of the order form to make an appointment prior to having your bloodwork done. Please fast prior to your labs (nothing to eat or drink for 8 hrs prior). We will repeat your echocardiogram in July. Other Recommendations: We will get your medications set up through [facility], a delivery pharmacy. Please email us the home oxygen forms so we can help you. Ensure your drinking an adequate amount of water with a goal of 6-8 eight ounce glasses (1.5-2 liters) of fluid daily. Your urine should be clear and light yellow straw colored. If your blood pressure begins to consistently run below 90/60 and/or you begin to experience dizziness or lightheadedness, please contact the [facility] [address] at [phone]. Follow up in 1 month in person with the [facility] [address]. Please monitor your blood pressures daily prior to medications and 2 hours after taking medications. Bring a written record of your blood pressures to your next appointment. Please monitor your weights daily upon waking and after using the bathroom. Keep a written records of your weights and bring to your next appointment. If you have a weight gain of 3 or more pounds overnight OR 5 or more pounds in one week please contact our office. Thank you for allowing us the privilege of being a part of your healthcare team! Please do not hesitate to contact our office at [phone] with any questions or concerns. Heart Failure Patients and COVID-19 March 31, 2020 in [address] A Statement from the [address] The Heart Failure Society of [address]) wants to ensure that heart failure patients are appropriately informed during the novel Coronavirus COVID-19 pandemic. COVID-19 symptoms vary among infected people and can include cough, fever, shortness of breath, muscle aches, profound fatigue, loss of sense of smell and taste, and diarrhea. Not every infected person will have symptoms which is why social distancing is imperative. Most people have only mild symptoms, but others have severe symptoms that require hospitalization and occasionally intensive care. Because you are a patient with a heart failure, you are at higher risk of getting very sick if you contract Coronavirus and, therefore, it is important to practice good hand hygiene, social distancing, and staying at home as much as possible. If you are experiencing symptoms of COVID-19, please call your primary healthcare clinician. For your safety and the safety of others, and to reduce potential exposures to the Coronavirus, please do not go to an urgent care clinic or emergency room for non-urgent or non-emergency issues unless you have been instructed to do so by your primary healthcare clinician. However, if you have life-threatening symptoms like difficulty breathing, call 911. We want to reduce the spread of the Coronavirus as much as possible and make sure our healthcare resources are not overwhelmed with non-urgent cases. You may have noticed that your healthcare clinicians have converted your in-person appointments to telephone or video calls, and some hospitals are not allowing visitors. The goal is to keep patients from dominique the Coronavirus and to limit the number of healthcare workers in the hospital. If you are sick and need to be seen or get lab testing, you should still do so; otherwise, you can help by 1. Learning how to use your smartphone or computer to participate in telehealth video visits 2. Keeping track of missed visits and studies and working with your team to reschedule them once social distancing measures are relaxed Also, do not stop any of your medications unless instructed by your healthcare team to do so. It is important that you have an adequate supply of your heart failure medications and that you request extended duration of supplies and refills from your providers during these times. We have a lot to learn about COVID-19 and currently there are several ongoing clinical trials to discover therapies that might help treat the infection and vaccines that can prevent the infection. The [facility] and other scientific institutions are working hard, with our patients in mind, to get through this pandemic as quickly as possible. Finally, we recommend that you get your information from trusted, professional healthcare sources including national societies like the [employer], federal agencies like the [employer] for [employer], and your local hospitals and health departments. Please access updated patient information on the [facility] [phone]) [employer]. We wish you safety and health during this challenging time.

## 2020-05-15 NOTE — TELEPHONE ENCOUNTER
----- Message from Funmilayo Fox NP sent at 5/15/2020 12:04 PM EDT -----  Will someone please check in with Mal Iron re: weight today?

## 2020-05-15 NOTE — LETTER
05/18/20 [facility] [address] [id number] Dear Oseas Chaudhary, Included in this letter you will find the educational information regarding low sodium diet discussed during your recent telephone call with our office. Should you have any further questions or concerns, please do not hesitate to contact our office at [phone]. Thank you ,and have a great day! Sincerely, 
 
 
 
Kuldip Joshua, RN [address] [facility] Low Sodium Diet (2,000 Milligram): Care Instructions Your Care Instructions Too much sodium causes your body to hold on to extra water. This can raise your blood pressure and force your heart and kidneys to work harder. In very serious cases, this could cause you to be put in the hospital. It might even be life-threatening. By limiting sodium, you will feel better and lower your risk of serious problems. The most common source of sodium is salt. People get most of the salt in their diet from canned, prepared, and packaged foods. Fast food and restaurant meals also are very high in sodium. Your doctor will probably limit your sodium to less than 2,000 milligrams (mg) a day. This limit counts all the sodium in prepared and packaged foods and any salt you add to your food. Follow-up care is a key part of your treatment and safety. Be sure to make and go to all appointments, and call your doctor if you are having problems. It's also a good idea to know your test results and keep a list of the medicines you take. How can you care for yourself at home? Read food labels · Read labels on cans and food packages. The labels tell you how much sodium is in each serving. Make sure that you look at the serving size. If you eat more than the serving size, you have eaten more sodium. · Food labels also tell you the Percent Daily Value for sodium.  Choose products with low Percent Daily Values for sodium. · Be aware that sodium can come in forms other than salt, including monosodium glutamate (MSG), sodium citrate, and sodium bicarbonate (baking soda). MSG is often added to Asian food. When you eat out, you can sometimes ask for food without MSG or added salt. Buy low-sodium foods · Buy foods that are labeled \"unsalted\" (no salt added), \"sodium-free\" (less than 5 mg of sodium per serving), or \"low-sodium\" (less than 140 mg of sodium per serving). Foods labeled \"reduced-sodium\" and \"light sodium\" may still have too much sodium. Be sure to read the label to see how much sodium you are getting. · Buy fresh vegetables, or frozen vegetables without added sauces. Buy low-sodium versions of canned vegetables, soups, and other canned goods. Prepare low-sodium meals · Cut back on the amount of salt you use in cooking. This will help you adjust to the taste. Do not add salt after cooking. One teaspoon of salt has about 2,300 mg of sodium. · Take the salt shaker off the table. · Flavor your food with garlic, lemon juice, onion, vinegar, herbs, and spices. Do not use soy sauce, lite soy sauce, steak sauce, onion salt, garlic salt, celery salt, mustard, or ketchup on your food. · Use low-sodium salad dressings, sauces, and ketchup. Or make your own salad dressings and sauces without adding salt. · Use less salt (or none) when recipes call for it. You can often use half the salt a recipe calls for without losing flavor. Other foods such as rice, pasta, and grains do not need added salt. · Rinse canned vegetables, and cook them in fresh water. This removes somebut not allof the salt. · Avoid water that is naturally high in sodium or that has been treated with water softeners, which add sodium. Call your local water company to find out the sodium content of your water supply. If you buy bottled water, read the label and choose a sodium-free brand. Avoid high-sodium foods · Avoid eating: 
? Smoked, cured, salted, and canned meat, fish, and poultry. ? Ham, jc, hot dogs, and luncheon meats. ? Regular, hard, and processed cheese and regular peanut butter. ? Crackers with salted tops, and other salted snack foods such as pretzels, chips, and salted popcorn. ? Frozen prepared meals, unless labeled low-sodium. ? Canned and dried soups, broths, and bouillon, unless labeled sodium-free or low-sodium. ? Canned vegetables, unless labeled sodium-free or low-sodium. ? [employer] fries, pizza, tacos, and other fast foods. ? Pickles, olives, ketchup, and other condiments, especially soy sauce, unless labeled sodium-free or low-sodium. Where can you learn more? Go to http://glenny-tom.info/ Enter Z292 in the search box to learn more about \"Low Sodium Diet (2,000 Milligram): Care Instructions. \" Current as of: August 21, 2019Content Version: 12.4 © 7766-1016 [employer]. Care instructions adapted under license by [employer] (which disclaims liability or warranty for this information). If you have questions about a medical condition or this instruction, always ask your healthcare professional. [address] any warranty or liability for your use of this information. How to Read a Food Label to Limit Sodium: Care Instructions Your Care Instructions Sodium causes your body to hold on to extra water. This can raise your blood pressure and force your heart and kidneys to work harder. In very serious cases, this could cause you to be put in the hospital. It might even be life-threatening. By limiting sodium, you will feel better and lower your risk of serious problems. Processed foods, fast food, and restaurant foods are the major sources of dietary sodium. The most common name for sodium is salt. Try to limit how much sodium you eat to less than 2,300 milligrams (mg) a day.  If you limit your sodium to 1,500 mg a day, you can lower your blood pressure even more. This limit counts all the salt that you eat in foods you cook or in packaged foods. Keep a list of everything you eat and drink. Follow-up care is a key part of your treatment and safety. Be sure to make and go to all appointments, and call your doctor if you are having problems. It's also a good idea to know your test results and keep a list of the medicines you take. How can you care for yourself at home? Read ingredient lists on food labels · Read the list of ingredients on food labels to help you find how much sodium is in a food. The label lists the ingredients in a food in descending order (from the most to the least). If salt or sodium is high on the list, there may be a lot of sodium in the food. · Know that sodium has different names. Sodium is also called monosodium glutamate (MSG, common in [facility] food), sodium citrate, sodium alginate, sodium hydroxide, and sodium phosphate. Read Nutrition Facts labels · On most foods, there is a Nutrition Facts label. This will tell you how much sodium is in one serving of food. Look at both the serving size and the sodium amount. The serving size is located at the top of the label, usually right under the \"Nutrition Facts\" title. The amount of sodium is given in the list under the title. It is given in milligrams (mg). ? Check the serving size carefully. A single serving is often very small, and you may eat more than one serving. If this is the case, you will eat more sodium than listed on the label. For example, if the serving size for a canned soup is 1 cup and the sodium amount is 470 mg, if you have 2 cups you will eat 940 mg of sodium. · The nutrition facts for fresh fruits and vegetables are not listed on the food. They may be listed somewhere in the store. These foods usually have no sodium or low sodium.  
· The Nutrition Facts label also gives you the Percent Daily Value for sodium. This is how much of the recommended amount of sodium a serving contains. The daily value for sodium is less than 2,300 mg. So if the Percent Daily Value says 50%, this means one serving is giving you half of this, or 1,150 mg. Buy low-sodium foods · Look for foods that are made with less sodium. Watch for the following words on the label. ? \"Unsalted\" means there is no sodium added to the food. But there may be sodium already in the food naturally. ? \"Sodium-free\" means a serving has less than 5 mg of sodium. ? \"Very low sodium\" means a serving has 35 mg or less of sodium. ? \"Low-sodium\" means a serving has 140 mg or less of sodium. · \"Reduced-sodium\" means that there is 25% less sodium than what the food normally has. This is still usually too much sodium. Try not to buy foods with this on the label. · Buy fresh vegetables, or frozen vegetables without added sauces. Buy low-sodium versions of canned vegetables, soups, and other canned goods. Where can you learn more? Go to http://glenny-tom.info/ Enter [id number] in the search box to learn more about \"How to Read a Food Label to Limit Sodium: Care Instructions. \" Current as of: August 21, 2019Content Version: 12.4 © 0512-6347 [employer]. Heart-Healthy Diet: Care Instructions Your Care Instructions A heart-healthy diet has lots of vegetables, fruits, nuts, beans, and whole grains, and is low in salt. It limits foods that are high in saturated fat, such as meats, cheeses, and fried foods. It may be hard to change your diet, but even small changes can lower your risk of heart attack and heart disease. Follow-up care is a key part of your treatment and safety. Be sure to make and go to all appointments, and call your doctor if you are having problems. It's also a good idea to know your test results and keep a list of the medicines you take. How can you care for yourself at home? Watch your portions · Learn what a serving is. A \"serving\" and a \"portion\" are not always the same thing. Make sure that you are not eating larger portions than are recommended. For example, a serving of pasta is ½ cup. A serving size of meat is 2 to 3 ounces. A 3-ounce serving is about the size of a deck of cards. Measure serving sizes until you are good at [place]" them. Keep in mind that restaurants often serve portions that are 2 or 3 times the size of one serving. · To keep your energy level up and keep you from feeling hungry, eat often but in smaller portions. · Eat only the number of calories you need to stay at a healthy weight. If you need to lose weight, eat fewer calories than your body burns (through exercise and other physical activity). Eat more fruits and vegetables · Eat a variety of fruit and vegetables every day. Dark green, deep orange, red, or yellow fruits and vegetables are especially good for you. Examples include spinach, carrots, peaches, and berries. · Keep carrots, celery, and other veggies handy for snacks. Buy fruit that is in season and store it where you can see it so that you will be tempted to eat it. · Cook dishes that have a lot of veggies in them, such as stir-fries and soups. Limit saturated and trans fat · Read food labels, and try to avoid saturated and trans fats. They increase your risk of heart disease. Trans fat is found in many processed foods such as cookies and crackers. · Use olive or canola oil when you cook. Try cholesterol-lowering spreads, such as Benecol or Take Control. · Bake, broil, grill, or steam foods instead of frying them. · Choose lean meats instead of high-fat meats such as hot dogs and sausages. Cut off all visible fat when you prepare meat. · Eat fish, skinless poultry, and meat alternatives such as soy products instead of high-fat meats. Soy products, such as tofu, may be especially good for your heart. · Choose low-fat or fat-free milk and dairy products. Eat foods high in fiber · Eat a variety of grain products every day. Include whole-grain foods that have lots of fiber and nutrients. Examples of whole-grain foods include oats, whole wheat bread, and brown rice. · Buy whole-grain breads and cereals, instead of white bread or pastries. Limit salt and sodium · Limit how much salt and sodium you eat to help lower your blood pressure. · Taste food before you salt it. Add only a little salt when you think you need it. With time, your taste buds will adjust to less salt. · Eat fewer snack items, fast foods, and other high-salt, processed foods. Check food labels for the amount of sodium in packaged foods. · Choose low-sodium versions of canned goods (such as soups, vegetables, and beans). Limit sugar · Limit drinks and foods with added sugar. These include candy, desserts, and soda pop. Limit alcohol · Limit alcohol to no more than 2 drinks a day for men and 1 drink a day for women. Too much alcohol can cause health problems. When should you call for help? Watch closely for changes in your health, and be sure to contact your doctor if: 
  · You would like help planning heart-healthy meals. Where can you learn more? Go to http://glenny-tom.info/ Enter V137 in the search box to learn more about \"Heart-Healthy Diet: Care Instructions. \" Current as of: December 15, 2019Content Version: 12.4 © 8982-5755 Healthwise, Incorporated. Care instructions adapted under license by [employer] (which disclaims liability or warranty for this information). If you have questions about a medical condition or this instruction, always ask your healthcare professional. [place] [address] any warranty or liability for your use of this information. Limiting Sodium With Heart Failure: Care Instructions Your Care Instructions Sodium causes your body to hold on to extra water. This may cause your heart failure symptoms to get worse. Limiting sodium may help you feel better. People get most of their sodium from processed foods. Fast food and restaurant meals also tend to be very high in sodium. Your doctor may suggest that you limit sodium. Your doctor can tell you how much sodium is right for you. An example is less than 3,000 mg a day. This includes all the salt you eat in cooked or packaged foods. Follow-up care is a key part of your treatment and safety. Be sure to make and go to all appointments, and call your doctor if you are having problems. It's also a good idea to know your test results and keep a list of the medicines you take. How can you care for yourself at home? Read food labels · Read food labels on cans and food packages. The labels tell you how much sodium is in each serving. Make sure that you look at the serving size. If you eat more than the serving size, you have eaten more sodium than is listed for one serving. · Food labels also tell you the Percent Daily Value for sodium. Choose products with low Percent Daily Values for sodium. · Be aware that sodium can come in forms other than salt, including monosodium glutamate (MSG), sodium citrate, and sodium bicarbonate (baking soda). MSG is often added to Asian food. You can sometimes ask for food without MSG or salt. Buy low-sodium foods · Buy foods that are labeled \"unsalted\" (no salt added), \"sodium-free\" (less than 5 mg of sodium per serving), or \"low-sodium\" (less than 140 mg of sodium per serving). A food labeled \"light sodium\" has less than half of the full-sodium version of that food. Foods labeled \"reduced-sodium\" may still have too much sodium. · Buy fresh vegetables or plain, frozen vegetables. Buy low-sodium versions of canned vegetables, soups, and other canned goods. Prepare low-sodium meals · Use less salt each day when cooking. Reducing salt in this way will help you adjust to the taste. Do not add salt after cooking. Take the salt shaker off the table. · Flavor your food with garlic, lemon juice, onion, vinegar, herbs, and spices instead of salt. Do not use soy sauce, steak sauce, onion salt, garlic salt, mustard, or ketchup on your food. · Make your own salad dressings, sauces, and ketchup without adding salt. · Use less salt (or none) when recipes call for it. You can often use half the salt a recipe calls for without losing flavor. Other dishes like rice, pasta, and grains do not need added salt. · Rinse canned vegetables. This removes somebut not allof the salt. · Avoid water that has a naturally high sodium content or that has been treated with water softeners, which add sodium. Call your local water company to find out the sodium content of your water supply. If you buy bottled water, read the label and choose a sodium-free brand. Avoid high-sodium foods, such as: 
· Smoked, cured, salted, and canned meat, fish, and poultry. · Ham, jc, hot dogs, and luncheon meats. · Regular, hard, and processed cheese and regular peanut butter. · Crackers with salted tops. · Frozen prepared meals. · Canned and dried soups, broths, and bouillon, unless labeled sodium-free or low-sodium. · Canned vegetables, unless labeled sodium-free or low-sodium. · Salted snack foods such as chips and pretzels. · [employer] fries, pizza, tacos, and other fast foods. · Pickles, olives, ketchup, and other condiments, especially soy sauce, unless labeled sodium-free or low-sodium. If you cannot cook for yourself · Have family members or friends help you, or have someone cook low-sodium meals. · Check with your local senior nutrition program to find out where meals are served and whether they offer a low-sodium option.  You can often find these programs through your local health department or hospital. 
 · Have meals delivered to your home. Most [facility] have a Meals on [employer]. These programs provide one hot meal a day for older adults, delivered to their homes. Ask whether these meals are low-sodium. Let them know that you are on a low-sodium diet. Where can you learn more? Go to http://glenny-tom.info/ Enter A166 in the search box to learn more about \"Limiting Sodium With Heart Failure: Care Instructions. \"

## 2020-05-18 RX ORDER — GLIPIZIDE 10 MG/1
5 TABLET ORAL 2 TIMES DAILY
Qty: 90 TAB | Refills: 0 | Status: SHIPPED | OUTPATIENT
Start: 2020-05-18 | End: 2020-05-22 | Stop reason: SDUPTHER

## 2020-05-18 NOTE — TELEPHONE ENCOUNTER
Contacted patient for update. Patient reported vitals as follows:     5/12/20- WT: 222.4lbs BP: 110/62 HR: 66  5/13/20- No readings available  5/14/20- WT: 223.4lbs BP: No reading done HR: No reading done  5/15/20- WT: 220.2lbs BP: 118/68 HR: 76  5/16/20- WT: 220.2lbs BP: 122/78 HR: 76  5/17/20- WT: 222.2lbs BP: 117/76 HR: 72 Noted ankle swelling  5/18/20- WT: 221.2lbs BP: 129/43 HR: 71 Ankle swelling improved. Patient denied any dizziness, lightheadedness, swelling, shortness of breath, of chest pain. Swelling as noted above. Patient stated she does develop a cough in the evening for a short period of time that is improved by drinking water. Patient stated she has been taking furosemide 40mg BID on Friday, Saturday, and Sunday due to elevated weights. Patient reports baseline weight is \"around 214lb-216lbs,\" and is concerned about the weight gain. She reported she \"didn't notice much increase out,\" with the lasix, but \"I also wasn't drinking much. \"    Inquired about patient's diet. She stated that she has had multiple dietary indiscretions since mother's day as she has been eating cake. Inquired about sodium intake past two days and asked about specific meal intake. Yesterday patient had jc, grits and eggs for breakfast, a deli turkey sandwich with low sodium turkey for lunch, and stuffed peppers with hamburger and rice for dinner. On Saturday she had an english muffin with two eggs for breakfast, and almanza with beans for dinner. Inquired if she used table salt for her meals. She reported, \"I switched from regular salt to sea salt,\" and has been using sea salt at meals. Extensive education regarding low sodium diet, reading food labels, and the need for cutting salt intake to 2,000mg or less reviewed with patient. She was encouraged to start a food diary and record all intake daily as well as keep track of and add up sodium intake daily.  Information on low sodium diet, heart healthy diet, and reading food labels mailed to patient. Informed patient will review with GUERA Blankenship NP and contact her with further recommendations. Patient verbalized understanding and had no further questions. Pee Neal RN.

## 2020-05-18 NOTE — TELEPHONE ENCOUNTER
Reviewed with GUERA Blankenship NP who recommended V.O.R.B patient take furosemide 80mg by mouth once daily starting tomorrow morning, continue daily weights and BP, follow sodium restricted diet as discussed, and check in with [facility] on Thursday with update for further recommendations. Also notified patient of Dr. Aysha Johns recommendations to hold plavix for 5 days prior to upcoming endoscopic procedure and hold entresto day of procedure. Patient verbalized understanding of all instructions and had no further questions at this time. Ghazala Marion RN.

## 2020-05-22 ENCOUNTER — PATIENT MESSAGE (OUTPATIENT)
Dept: INTERNAL MEDICINE CLINIC | Age: 70
End: 2020-05-22

## 2020-05-22 ENCOUNTER — TELEPHONE (OUTPATIENT)
Dept: CARDIOLOGY CLINIC | Age: 70
End: 2020-05-22

## 2020-05-22 RX ORDER — GLIPIZIDE 10 MG/1
10 TABLET ORAL 2 TIMES DAILY
Qty: 180 TAB | Refills: 0 | Status: SHIPPED | OUTPATIENT
Start: 2020-05-22 | End: 2020-05-28 | Stop reason: DRUGHIGH

## 2020-05-22 RX ORDER — CLOPIDOGREL BISULFATE 75 MG/1
75 TABLET ORAL DAILY
Qty: 90 TAB | Refills: 0 | Status: SHIPPED | OUTPATIENT
Start: 2020-05-22 | End: 2020-08-25 | Stop reason: SDUPTHER

## 2020-05-22 NOTE — TELEPHONE ENCOUNTER
Jason Spine, NP  You; Miguel Ángel Matt 3 hours ago (12:07 PM)      She will need to have an in person visit next week so we can interrogate her ICD and assess her -     [place] - will you please notify the patient? Antonino Freed - will you please schedule her? Thanks ! Routing comment      Contacted patient to notify of recommendations. She is agreeable to schedule. Informed her PSR staff will contact her Tuesday to schedule. She verbalized understanding and had no further questions. Akua Acevedo RN.

## 2020-05-22 NOTE — TELEPHONE ENCOUNTER
Attempted to contact patient for update of weight, BP and symptoms per GUERA Blankenship NP. Message left. Will await return call. Shayne Ogden RN.

## 2020-05-22 NOTE — TELEPHONE ENCOUNTER
Patient returned call. Weights reported as follows:    5/16/20: 220.2lb BP: 118/74 HR 73  5/17/20: 222.2lb BP: 125/75 HR 82  5/18/20: 222.0lb /60 HR 74  5/19/20: 221.0lb BP: 109/43 HR 71  5/20/20: 220.0lb  5/21/22: 222.2lb  5/22/20: 220.2lb    She stated she has been taking Lasix 40mg BID and did not change to lasix 80mg once daily as advised 5/15/20. Inquired about recent diet. Patient stated yesterday she had an egg and english muffin for breakfast, a grilled cheese sandwich for lunch, and a chicken leg and thigh, rice, beans and corn on the cob from [employer] for dinner. Inquired if patient had started food diary to track sodium intake as previously discussed. She stated she had not. Extensive education provided on the importance of restricting sodium intake to no more than 2,000mg daily. Encouraged patient to keep food diary as previously discussed to better track sodium intake. She was provided telephone number to the outpatient nutrition center for dietary counseling. Also recommended patient visit heart. org for further educational tools on heart failure management and healthy lifestyle. Informed patient will review with GUERA Blankenship NP and contact her with further recommendations. She verbalized understanding and had no further questions. Alexandra Jackman RN.

## 2020-05-28 ENCOUNTER — OFFICE VISIT (OUTPATIENT)
Dept: CARDIOLOGY CLINIC | Age: 70
End: 2020-05-28

## 2020-05-28 VITALS
TEMPERATURE: 98.1 F | HEIGHT: 62 IN | SYSTOLIC BLOOD PRESSURE: 112 MMHG | WEIGHT: 225 LBS | OXYGEN SATURATION: 95 % | RESPIRATION RATE: 20 BRPM | HEART RATE: 82 BPM | BODY MASS INDEX: 41.41 KG/M2 | DIASTOLIC BLOOD PRESSURE: 66 MMHG

## 2020-05-28 DIAGNOSIS — E55.9 VITAMIN D DEFICIENCY: ICD-10-CM

## 2020-05-28 DIAGNOSIS — N18.30 CONTROLLED TYPE 2 DIABETES MELLITUS WITH STAGE 3 CHRONIC KIDNEY DISEASE, WITHOUT LONG-TERM CURRENT USE OF INSULIN (HCC): ICD-10-CM

## 2020-05-28 DIAGNOSIS — Z95.1 HX OF CABG: ICD-10-CM

## 2020-05-28 DIAGNOSIS — I50.42 CHRONIC HEART FAILURE WITH REDUCED EJECTION FRACTION AND DIASTOLIC DYSFUNCTION (HCC): ICD-10-CM

## 2020-05-28 DIAGNOSIS — E11.22 CONTROLLED TYPE 2 DIABETES MELLITUS WITH STAGE 3 CHRONIC KIDNEY DISEASE, WITHOUT LONG-TERM CURRENT USE OF INSULIN (HCC): ICD-10-CM

## 2020-05-28 DIAGNOSIS — Z95.810 ICD (IMPLANTABLE CARDIOVERTER-DEFIBRILLATOR) IN PLACE: ICD-10-CM

## 2020-05-28 DIAGNOSIS — E11.49 OTHER DIABETIC NEUROLOGICAL COMPLICATION ASSOCIATED WITH TYPE 2 DIABETES MELLITUS (HCC): Primary | ICD-10-CM

## 2020-05-28 RX ORDER — HYDRALAZINE HYDROCHLORIDE 50 MG/1
50 TABLET, FILM COATED ORAL 3 TIMES DAILY
Qty: 120 TAB | Refills: 3 | Status: SHIPPED | OUTPATIENT
Start: 2020-05-28 | End: 2020-11-13

## 2020-05-28 RX ORDER — ISOSORBIDE MONONITRATE 30 MG/1
TABLET, EXTENDED RELEASE ORAL
Qty: 30 TAB | Refills: 3 | Status: SHIPPED | OUTPATIENT
Start: 2020-05-28 | End: 2020-10-07 | Stop reason: SDUPTHER

## 2020-05-28 RX ORDER — GLIPIZIDE 5 MG/1
5 TABLET, FILM COATED, EXTENDED RELEASE ORAL DAILY
COMMUNITY
Start: 2020-04-18 | End: 2020-06-15

## 2020-05-28 RX ORDER — ALLOPURINOL 100 MG/1
TABLET ORAL
Qty: 90 TAB | Refills: 2 | Status: SHIPPED | OUTPATIENT
Start: 2020-05-28 | End: 2021-04-28 | Stop reason: SDUPTHER

## 2020-05-28 NOTE — PATIENT INSTRUCTIONS
Increase lasix to 40mg BID for 4 days Follow up in 1 month Cont all other medications as directed Please follow up with Dr. Karen Carvajal next week Please increase your activity Please watch your sodium intake and start taking a food diary

## 2020-05-28 NOTE — PROGRESS NOTES
[facility] Visit        DOS:   5/28/2020  NAME:  Helene Ghsoh   MRN:   [id number]   REFERRING PROVIDER:  Ralph Farley NP  PRIMARY CARE PHYSICIAN: Ralph Farley NP  PRIMARY CARDIOLOGIST: none      Chief Complaint:   Chief Complaint   Patient presents with    CHF    Leg Swelling       HPI: 68y.o. year old female with a history of HTN, HLD, WES, obesity (Body mass index is 41.15 kg/m².) s/p gastric bypass in 2011, T2DM, hypothyroidism, COPD, CAD s/p CABG in 1997, s/p PCI to [address] in 5/15, ICM, VT, s/p AICD ([employer]),  anxiety, and depression who presents today for a HF clinic visit for her chronic systolic heart failure. Ms. Adal Kaur recalls having a viral syndrome last year and has not felt well since then. She uses oxygen with her BiPAP every night and sleeps on two pillows. She was recently admitted to [facility] for acute on chronic systolic heart failure, treated with IV inotropes and diuretics. She was scheduled to undergo BiV upgrade with Dr. Karen Carvajal, however, her QRS was too narrow for the upgrade. He increased her low pacing threshold from 50 bpm to 70 bpm.      She presents today for follow up HF clinic, she is off of home oxygen and only uses it at night, bled into her BiPAP. She denies dyspnea, orthopnea, dizziness, lightheadedness, syncope, pre-syncope, LE edema, or abdominal distention. She has had a slow weight gain over the last month which she attributes to eating unhealthy snacks and an \"insatiable appetite\". She is compliant with her medications and weighs herself and measures her BP diligently. [employer] Interrogation 5/28/20:  Abelardo Jose Alfredo below threshold but trending up  Impedance trending down  Patient activity < 1hr/day  Time in AT/AF 0hr/day  No shock therapy      Impression / Plan:   1.  ICM - Stage C, NYHA Class II, LVEF 30-35%   TTE 1/31 shows decrease in EF to 30-35%   Continue Toprol XL 12.5 mg daily   Continue Entresto 24/26 mg PO BID - watch Cr closely    Continue hydralazine 50 mg PO TID and Imdur 30mg daily   Increase Lasix to 40 mg PO BID for 4 days then re evaluate    Historically intolerant to AA due to hyperkalemia; repeat labs and reconsider      Low sodium diet, daily weights, strict I/O   Invitate - DSP (VUS)   Elevated SFLC   Equivocal PYP imaging   Return in 1 months for in person visit   Repeat TTE in July (~3 months after PM changes)     2. CAD s/p CABG in 1997, s/p PCI to [address] in 10/16   On ASA, statin, BB, Zetia   Severe 3V disease     3. HTN - well controlled   On BB, Entresto, and hydralazine/nitrate   Low sodium diet   Compliant with BiPAP    4. VT - s/p dual chamber AICD with RA lead    No shocks   No arrhythmias on interrogation    Dr. La Mejia increased rate from 50 bpm to 70 bpm     5. WES - on BiPAP with oxygen   Compliant with BiPAP   Follows up with her sleep medicine physician at [facility] every 6 months    6. S9ZW complicated by neuropathy   HgA1C 8.4   Continue Jardiance 10 mg PO daily - enrolled in patient assistance   Instructed to watch for  infections     7. Hypothyroidism   On levothyroxine 100 mcgs daily    8. Depression   On Wellbutrin  mg     9. History of Tobacco Abuse - 1/2 ppd x 20 years, quit 5 years ago   Counseled re: importance of continued abstinence    10. Gout    Continue allopurinol    Discussed low purine foods     11. Obesity (Body mass index is 41.15 kg/m². s/p gastric bypass in 2011   Follows with Nutritionist with [employer]    12. Osteoarthritis - s/p right TKR   Avoid Celebrex due to CKD, HFrEF   S/p left knee injection in 8/2019   Lidocaine patches to knees     13. Hyperlipidemia    ,    Lipoprotein-A 212   Continue Lipitor 80 mg daily    Continue Zetia 10 mg PO daily   Repeat fasting lipids   Will likely need Repatha    Low chol diet     14.   KEYONNA on CKD4,suspect diabetic nephropathy and cardiorenal syndrome   Recent Cr 2.3   Continue Entresto 24/26 mg PO BID Avoid nephrotoxic agents    Labs today     15.   Vitamin D deficiency   Continue cholecalciferol 2,000 units daily 71      History:  Past Medical History:   Diagnosis Date    Chronic obstructive pulmonary disease (HCC)     Congestive heart failure (HCC)     Diabetes ([facility] [address]     Hypertension     Sleep apnea      Past Surgical History:   Procedure Laterality Date    CARDIAC SURG PROCEDURE UNLIST  2018    cardiac cath - Left    HX ARTHROPLASTY  2105    knee    HX  SECTION      HX CORONARY ARTERY BYPASS GRAFT  1997    HX CORONARY STENT PLACEMENT  2016    HX HERNIA REPAIR      HX IMPLANTABLE CARDIOVERTER DEFIBRILLATOR      HX ORTHOPAEDIC      LAP GASTRIC BYPASS/KERLINE-EN-Y  2011    lap band     Social History     Socioeconomic History    Marital status:      Spouse name: Not on file    Number of children: Not on file    Years of education: Not on file    Highest education level: Not on file   Occupational History    Not on file   Social Needs    Financial resource strain: Not on file    Food insecurity     Worry: Not on file     Inability: Not on file   [employer] needs     Medical: Not on file     Non-medical: Not on file   Tobacco Use    Smoking status: Former Smoker     Types: Cigarettes    Smokeless tobacco: Never Used   Substance and Sexual Activity    Alcohol use: Not Currently    Drug use: Not Currently    Sexual activity: Not Currently   Lifestyle    Physical activity     Days per week: Not on file     Minutes per session: Not on file    Stress: Not on file   Relationships    Social connections     Talks on phone: Not on file     Gets together: Not on file     Attends Protestant service: Not on file     Active member of club or organization: Not on file     Attends meetings of clubs or organizations: Not on file     Relationship status: Not on file    Intimate partner violence     Fear of current or ex partner: Not on file Emotionally abused: Not on file     Physically abused: Not on file     Forced sexual activity: Not on file   Other Topics Concern    Not on file   Social History Narrative    Not on file     Family History   Problem Relation Age of Onset    Hypertension Mother     Dementia Mother     Coronary Artery Disease Father 58    Sudden Death Father 58    Diabetes Son      Allergies: Allergies   Allergen Reactions    Crestor [Rosuvastatin] Other (comments)     Causes muscle cramps    Lisinopril Cough    Nsaids (Non-Steroidal Anti-Inflammatory Drug) Other (comments)     Liver and Kidney       ROS:    Review of Systems   Constitutional: Negative for chills, fever and malaise/fatigue. Eyes: Negative. Respiratory: Negative for cough, sputum production and shortness of breath. Cardiovascular: Positive for leg swelling and PND. Negative for chest pain and palpitations. Gastrointestinal: Negative for constipation, heartburn and nausea. Genitourinary: Negative. Musculoskeletal: Negative. Skin: Negative. Neurological: Negative for dizziness, weakness and headaches. Psychiatric/Behavioral: Positive for depression. Physical Exam:   Vitals:    Visit Vitals  /66 (BP 1 Location: Left arm, BP Patient Position: Sitting)   Pulse 82   Temp 98.1 °F (36.7 °C) (Oral)   Resp 20   Ht 5' 2\" (1.575 m)   Wt 225 lb (102.1 kg)   SpO2 95%   BMI 41.15 kg/m²         Physical Exam   Constitutional: She appears well-developed and well-nourished. No distress. HENT:   Head: Normocephalic. Neck: Normal range of motion. Neck supple. No JVD present. Cardiovascular: Normal rate, regular rhythm, normal heart sounds and intact distal pulses. Pulmonary/Chest: Effort normal and breath sounds normal. No respiratory distress. Abdominal: Soft. Bowel sounds are normal. She exhibits distension. Musculoskeletal: Normal range of motion. General: Edema present. Neurological: She is alert.    Skin: Skin is warm and dry. Psychiatric: She has a normal mood and affect. Vitals reviewed. Recent Labs:   Labs Latest Ref Rng & Units 2020   WBC 3.4 - 10.8 x10E3/uL 6.8   RBC 3.77 - 5.28 x10E6/uL 3.59(L)   Hemoglobin 11.1 - 15.9 g/dL 11.2   Hematocrit 34.0 - 46.6 % 34.0   MCV 79 - 97 fL 95   Platelets 318 - 468 [id number]/   Albumin 3.7 - 4.7 g/dL 4.2   Calcium 8.7 - 10.3 mg/dL 10.2   Glucose 65 - 99 mg/dL 158(H)   BUN 8 - 27 mg/dL 44(H)   Creatinine 0.57 - 1.00 mg/dL 2.31(H)   Sodium 134 - 144 mmol/L 138   Potassium 3.5 - 5.2 mmol/L 4.7   TSH 0.450 - 4.500 uIU/mL 1.620   Some recent data might be hidden     EK2020  Sinus  Rhythm, rate 67 bpm   -  Nonspecific T-abnormality. Prior to Admission medications    Medication Sig Start Date End Date Taking? Authorizing Provider   glipiZIDE SR (GLUCOTROL XL) 5 mg CR tablet Take 5 mg by mouth daily. 20   Provider, Historical   clopidogreL (PLAVIX) 75 mg tab Take 1 Tab by mouth daily. 20   Lenice Stable., NP   glipiZIDE (GLUCOTROL) 10 mg tablet Take 1 Tab by mouth two (2) times a day. With food 20  Lenice Stable., NP   gabapentin (NEURONTIN) 100 mg capsule TAKE 2 CAPSULES BY MOUTH THREE TIMES DAILY MAX  DAILY  AMOUNT  600MG 5/15/20   Sandra Blankenship NP   levothyroxine (SYNTHROID) 100 mcg tablet TAKE 1 TABLET BY MOUTH ONCE DAILY BEFORE BREAKFAST 20   Thressa Flax Z., NP   allopurinoL (ZYLOPRIM) 100 mg tablet Take 1 tablet by mouth once daily 20   Lenice Stable., NP   isosorbide mononitrate ER (IMDUR) 30 mg tablet Take 1 tablet by mouth once daily 20   Sandra Blankenship, NP   ezetimibe (ZETIA) 10 mg tablet Take 1 Tab by mouth daily. 20   Sandra Blankenship NP   atorvastatin (LIPITOR) 80 mg tablet TAKE 1 TABLET BY MOUTH AT BEDTIME 4/10/20   Sandra Blankenship NP   cholecalciferol (VITAMIN D3) (1000 Units /25 mcg) tablet Take 2 Tabs by mouth daily.  4/10/20   Sandra Blankenship NP   furosemide (LASIX) 40 mg tablet Take 1 Tab by mouth daily. 4/3/20   Umu Nolan NP   empagliflozin (JARDIANCE) 10 mg tablet Take 1 Tab by mouth daily. 3/9/20   Lobito Blankenship NP   sacubitril-valsartan (ENTRESTO) 24 mg/26 mg tablet Take 1 Tab by mouth two (2) times a day. 2/19/20   Lobito Blankenship NP   albuterol (PROVENTIL HFA, VENTOLIN HFA, PROAIR HFA) 90 mcg/actuation inhaler Take  by inhalation every four (4) hours as needed. Provider, Historical   Oxygen 2 L NC At night through BiPAP    Provider, Historical   buPROPion SR (WELLBUTRIN SR) 150 mg SR tablet Take 1 Tab by mouth daily. 2/6/20   Umu Nolan NP   metoprolol succinate (TOPROL-XL) 25 mg XL tablet Take 0.5 Tabs by mouth daily. 2/5/20   Lobito Blankenship NP   hydrALAZINE (APRESOLINE) 50 mg tablet Take 1 Tab by mouth three (3) times daily. 2/5/20   Lobito Blaknenship NP   acetaminophen (TYLENOL ARTHRITIS PAIN) 650 mg TbER Take 1,300 mg by mouth two (2) times a day. Provider, Historical   aspirin 81 mg chewable tablet Take 81 mg by mouth daily.     Provider, Historical     Allergies   Allergen Reactions    Crestor [Rosuvastatin] Other (comments)     Causes muscle cramps    Lisinopril Cough    Nsaids (Non-Steroidal Anti-Inflammatory Drug) Other (comments)     Liver and Kidney       Past Medical History:   Diagnosis Date    [facility] pulmonary disease ([facility] [address]     Congestive heart failure ([facility] [address]     Diabetes ([facility] [address]     Hypertension     Sleep apnea 1996         Rubi Beltran, VENKATESH  [address]  [address] [facility], [address] [place] [address]  Office [phone]  Fax [phone]

## 2020-05-29 ENCOUNTER — TELEPHONE (OUTPATIENT)
Dept: CARDIOLOGY CLINIC | Age: 70
End: 2020-05-29

## 2020-05-29 DIAGNOSIS — I50.42 CHRONIC HEART FAILURE WITH REDUCED EJECTION FRACTION AND DIASTOLIC DYSFUNCTION (HCC): Primary | ICD-10-CM

## 2020-05-29 DIAGNOSIS — R06.02 SHORTNESS OF BREATH: ICD-10-CM

## 2020-05-29 LAB
25(OH)D3+25(OH)D2 SERPL-MCNC: 25.7 NG/ML (ref 30–100)
ALBUMIN SERPL-MCNC: 4 G/DL (ref 3.7–4.7)
ALBUMIN/GLOB SERPL: 1.8 {RATIO} (ref 1.2–2.2)
ALP SERPL-CCNC: 82 IU/L (ref 39–117)
ALT SERPL-CCNC: 18 IU/L (ref 0–32)
AST SERPL-CCNC: 22 IU/L (ref 0–40)
BILIRUB SERPL-MCNC: 0.2 MG/DL (ref 0–1.2)
BUN SERPL-MCNC: 51 MG/DL (ref 8–27)
BUN/CREAT SERPL: 19 (ref 12–28)
CALCIUM SERPL-MCNC: 10.2 MG/DL (ref 8.7–10.3)
CHLORIDE SERPL-SCNC: 102 MMOL/L (ref 96–106)
CHOLEST SERPL-MCNC: 161 MG/DL (ref 100–199)
CK SERPL-CCNC: 260 U/L (ref 32–182)
CO2 SERPL-SCNC: 21 MMOL/L (ref 20–29)
CREAT SERPL-MCNC: 2.62 MG/DL (ref 0.57–1)
ERYTHROCYTE [DISTWIDTH] IN BLOOD BY AUTOMATED COUNT: 15.7 % (ref 11.7–15.4)
FERRITIN SERPL-MCNC: 50 NG/ML (ref 15–150)
GLOBULIN SER CALC-MCNC: 2.2 G/DL (ref 1.5–4.5)
GLUCOSE SERPL-MCNC: 176 MG/DL (ref 65–99)
HCT VFR BLD AUTO: 32.2 % (ref 34–46.6)
HDLC SERPL-MCNC: 78 MG/DL
HGB BLD-MCNC: 10.4 G/DL (ref 11.1–15.9)
IRON SATN MFR SERPL: 18 % (ref 15–55)
IRON SERPL-MCNC: 61 UG/DL (ref 27–139)
LDLC SERPL CALC-MCNC: 58 MG/DL (ref 0–99)
MCH RBC QN AUTO: 31.4 PG (ref 26.6–33)
MCHC RBC AUTO-ENTMCNC: 32.3 G/DL (ref 31.5–35.7)
MCV RBC AUTO: 97 FL (ref 79–97)
NT-PROBNP SERPL-MCNC: 469 PG/ML (ref 0–301)
PLATELET # BLD AUTO: 249 X10E3/UL (ref 150–450)
POTASSIUM SERPL-SCNC: 4.8 MMOL/L (ref 3.5–5.2)
PROT SERPL-MCNC: 6.2 G/DL (ref 6–8.5)
RBC # BLD AUTO: 3.31 X10E6/UL (ref 3.77–5.28)
SODIUM SERPL-SCNC: 140 MMOL/L (ref 134–144)
TIBC SERPL-MCNC: 335 UG/DL (ref 250–450)
TRIGL SERPL-MCNC: 127 MG/DL (ref 0–149)
UIBC SERPL-MCNC: 274 UG/DL (ref 118–369)
VLDLC SERPL CALC-MCNC: 25 MG/DL (ref 5–40)
WBC # BLD AUTO: 6.5 X10E3/UL (ref 3.4–10.8)

## 2020-05-29 RX ORDER — SACUBITRIL AND VALSARTAN 24; 26 MG/1; MG/1
1 TABLET, FILM COATED ORAL 2 TIMES DAILY
Qty: 60 TAB | Refills: 2 | Status: SHIPPED | OUTPATIENT
Start: 2020-05-29 | End: 2020-08-31

## 2020-05-29 NOTE — TELEPHONE ENCOUNTER
Confirmed with Manny Coleman NP who recommend V.O.R.B BMP, NT proBNP and magnesium be drawn in two weeks. Contacted patient to notify of results and recommendations. Patient encouraged to follow low sodium diet with goal of no more than 2,000mg sodium daily. She verbalized understanding, stated she will have lab work done in 2 weeks, and had no further questions at this time. Charley Cummings RN.

## 2020-05-29 NOTE — TELEPHONE ENCOUNTER
----- Message from Elvira Grace NP sent at 5/29/2020  2:44 PM EDT -----  Please have Ms. Kathy Lacey start an OTC Vit D supplement. Her creatinine is up to 2.6, please have her go back down her furosemide 40mg daily starting on Sunday.   She needs to have labs in 2 weeks

## 2020-05-29 NOTE — TELEPHONE ENCOUNTER
Patient receives Entresto through [employer]. Refill for entresto sent to pharmacy. Will call Monday for update. Pee Neal RN.

## 2020-05-29 NOTE — TELEPHONE ENCOUNTER
----- Message from Ivy Ramon NP sent at 5/28/2020  4:52 PM EDT -----  Where does Ms Curtis Alfaro get her [facility] from? If not the patient assistance can we refill this for her, please call her on Monday for her weights with the increased lasix.

## 2020-06-01 ENCOUNTER — TELEPHONE (OUTPATIENT)
Dept: CARDIOLOGY CLINIC | Age: 70
End: 2020-06-01

## 2020-06-01 DIAGNOSIS — I50.42 CHRONIC HEART FAILURE WITH REDUCED EJECTION FRACTION AND DIASTOLIC DYSFUNCTION (HCC): Primary | ICD-10-CM

## 2020-06-01 NOTE — TELEPHONE ENCOUNTER
----- Message from Sophie Cardoso NP sent at 5/28/2020  4:52 PM EDT -----  Where does Ms Jarvis Serrano get her [facility] from? If not the patient assistance can we refill this for her, please call her on Monday for her weights with the increased lasix. Contacted patient for update. She reported vital signs as follows:     5/28/20: Wt- 222.6lb /71 HR 74  5/29/20: Wt- 222.8lb /74 HR 72  5/30/20: Wt- 224.8lb /61 HR 73  5/31/20: Wt- 225.8lb /66 HR 72  6/1/20: Wt- 223.6lb No BP or HR readings yet    Patient stated she has been taking lasix 40mg BID starting Friday 5/29/20. She has only taken one dose today. She did state yesterday she noticed a more \"steady flow\" of output than she has been getting recently. She did report a \"tad bit\" of shortness of breath as well as \" a little bit\" of swelling in the ankles that has improved. Inquired about diet as patient did have weight gain over the weekend despite increased lasix dosage. She stated she did eat [address] with meat sauce, olive garden salad, and a breadstick on Saturday. She also had \"quite a bit of cheese,\" with an apple Saturday. She stated Sunday she had two eggs with toast for brunch, leftover [employer] Cheese ravioli for dinner, and some more cheese with an apple. RN looked up nutritional information from [employer] and informed patient the cheese ravioli with meat sauce alone had over 2,000mg of sodium. Conversation about sodium intake and keeping sodium intake to no more than 2,000mg daily, the importance of dietary tracking with a food diary, and looking at food labels reiterated with patient. Explained to patient that further intake of sodium will counteract the diuretics despite increasing the dose, and it may eventually adversely affect her renal function further. Patient verbalized understanding and stated she will start monitoring sodium and restricting intake.      Informed patient will review with Mukesh Roque NP. Will contact her with further recommendations. Patient verbalized understanding and had no further questions. Viki Sherwood RN.

## 2020-06-02 RX ORDER — EZETIMIBE 10 MG/1
TABLET ORAL
Qty: 30 TAB | Refills: 0 | Status: SHIPPED | OUTPATIENT
Start: 2020-06-02 | End: 2020-07-02

## 2020-06-02 RX ORDER — FUROSEMIDE 40 MG/1
40 TABLET ORAL 2 TIMES DAILY
Qty: 180 TAB | Refills: 1 | Status: SHIPPED | OUTPATIENT
Start: 2020-06-02 | End: 2020-07-27 | Stop reason: SDUPTHER

## 2020-06-02 NOTE — TELEPHONE ENCOUNTER
Jorge Streeter NP  You 12 minutes ago (11:52 AM)      The BID dosing thanks     Routing comment       You  Lo Mckeon OSITO NP 30 minutes ago (11:33 AM)      Would you like her to go back to her usual dose of 40mg daily, or would you like her to continue the lasix 40mg BID? Thank you! Dalia    Routing comment       Jorge Streeter NP  You 36 minutes ago (11:27 AM)      I don't think we can go up on her diuretics, she needs to watch sodium restriction on her current regimen     Routing comment      Contacted patient to notify of recommendations. Updated prescription sent to pharmacy. Low sodium diet again discussed with patient. Also educated the patient on importance of adequate hydration without fluid overload. Patient was encouraged to keep fluid goal of 6-8 eight ounce glasses of fluid day. She was reminded of lab work in 1 week. She would like to go to [facility] for draw. Orders faxed. She verbalized understanding and had no further questions. Edwige Elise RN.

## 2020-06-09 ENCOUNTER — TELEPHONE (OUTPATIENT)
Dept: CARDIOLOGY CLINIC | Age: 70
End: 2020-06-09

## 2020-06-09 NOTE — TELEPHONE ENCOUNTER
Telephone Call RE:  Lab Reminder      Outcome:     [] Patient verbalizes understanding    [] Unable to reach   [x] Left message              []       Jayne Spatz

## 2020-06-10 ENCOUNTER — PATIENT MESSAGE (OUTPATIENT)
Dept: INTERNAL MEDICINE CLINIC | Age: 70
End: 2020-06-10

## 2020-06-12 ENCOUNTER — TELEPHONE (OUTPATIENT)
Dept: CARDIOLOGY CLINIC | Age: 70
End: 2020-06-12

## 2020-06-12 LAB
BUN SERPL-MCNC: 52 MG/DL (ref 8–27)
BUN/CREAT SERPL: 23 (ref 12–28)
CALCIUM SERPL-MCNC: 10.5 MG/DL (ref 8.7–10.3)
CHLORIDE SERPL-SCNC: 102 MMOL/L (ref 96–106)
CO2 SERPL-SCNC: 20 MMOL/L (ref 20–29)
CREAT SERPL-MCNC: 2.22 MG/DL (ref 0.57–1)
GLUCOSE SERPL-MCNC: 120 MG/DL (ref 65–99)
MAGNESIUM SERPL-MCNC: 2.7 MG/DL (ref 1.6–2.3)
NT-PROBNP SERPL-MCNC: 441 PG/ML (ref 0–301)
POTASSIUM SERPL-SCNC: 4.6 MMOL/L (ref 3.5–5.2)
SODIUM SERPL-SCNC: 136 MMOL/L (ref 134–144)

## 2020-06-12 NOTE — TELEPHONE ENCOUNTER
Patient called and reported weight yesterday of 228 lb, today was 220.2 lb. She states on the 9th was 224, she has been taking lasix twice per day for over a week. Had labs drawn yesterday. She states she is trying to drink more water, watch her sodium. I called patient today, her weight is 220.6, she states she has had some fluctuations over the weekend. I advised her per Praveen Eaton:  I would keep her on the lasix 40mg BID   She states understanding and has no further questions.

## 2020-06-16 ENCOUNTER — TELEPHONE (OUTPATIENT)
Dept: CARDIOLOGY CLINIC | Age: 70
End: 2020-06-16

## 2020-06-16 ENCOUNTER — TELEPHONE (OUTPATIENT)
Dept: CARDIAC REHAB | Age: 70
End: 2020-06-16

## 2020-06-16 NOTE — TELEPHONE ENCOUNTER
Patient states that she is having a procedure done on Monday 6/22 and that they asked her to bring her pacemaker ID card and that she does not have one. Please advise.     Phone: [phone]

## 2020-06-16 NOTE — TELEPHONE ENCOUNTER
6/16/2020 Cardiac Rehab: Called Ms. Jeffie Hodgkin to discuss participation in the Cardiac Rehab Program following Heart Failure. Made intake appointment for 7/1/2020. Ms. Jeffie Hodgkin is without questions or concerns. Will have paperwork. Miguel Hairston     5/4/2020 Cardiac Rehab: Called Ms. Jeffie Hodgkin  to discuss participation in the Cardiac Rehab Program. Gave update on the program and agreed to call at the end of May. She may return to work at that time and said Monday's are best for connecting with her.  Velvet Bashir RN

## 2020-06-16 NOTE — TELEPHONE ENCOUNTER
Patient called because she is going for a colonoscopy this coming Monday, she needs to know what medications to hold-she was told to stop Plavix for 5 days and to hold entresto day of procedure and ASA 2 days before procedure. She would like to know about Metoprolol and other cardiac meds. I called patient and advised her to also hold Imdur and Hydralazine per Tootie Mckeon. She states understanding.

## 2020-06-18 ENCOUNTER — OFFICE VISIT (OUTPATIENT)
Dept: PRIMARY CARE CLINIC | Age: 70
End: 2020-06-18

## 2020-06-18 ENCOUNTER — OFFICE VISIT (OUTPATIENT)
Dept: CARDIOLOGY CLINIC | Age: 70
End: 2020-06-18

## 2020-06-18 ENCOUNTER — CLINICAL SUPPORT (OUTPATIENT)
Dept: CARDIOLOGY CLINIC | Age: 70
End: 2020-06-18

## 2020-06-18 VITALS
OXYGEN SATURATION: 98 % | DIASTOLIC BLOOD PRESSURE: 44 MMHG | RESPIRATION RATE: 20 BRPM | SYSTOLIC BLOOD PRESSURE: 88 MMHG | BODY MASS INDEX: 41.41 KG/M2 | WEIGHT: 225 LBS | HEIGHT: 62 IN | HEART RATE: 72 BPM

## 2020-06-18 DIAGNOSIS — I25.10 CORONARY ARTERY DISEASE INVOLVING NATIVE HEART WITHOUT ANGINA PECTORIS, UNSPECIFIED VESSEL OR LESION TYPE: ICD-10-CM

## 2020-06-18 DIAGNOSIS — Z95.810 AUTOMATIC IMPLANTABLE CARDIAC DEFIBRILLATOR IN SITU: Primary | ICD-10-CM

## 2020-06-18 DIAGNOSIS — Z20.822 EXPOSURE TO COVID-19 VIRUS: Primary | ICD-10-CM

## 2020-06-18 DIAGNOSIS — R06.02 SHORTNESS OF BREATH: ICD-10-CM

## 2020-06-18 DIAGNOSIS — I50.22 SYSTOLIC CHF, CHRONIC (HCC): ICD-10-CM

## 2020-06-18 DIAGNOSIS — I25.5 CARDIOMYOPATHY, ISCHEMIC: ICD-10-CM

## 2020-06-18 DIAGNOSIS — Z95.810 ICD (IMPLANTABLE CARDIOVERTER-DEFIBRILLATOR), DUAL, IN SITU: Primary | ICD-10-CM

## 2020-06-18 DIAGNOSIS — R06.02 SOB (SHORTNESS OF BREATH): ICD-10-CM

## 2020-06-18 DIAGNOSIS — Z95.1 HX OF CABG: ICD-10-CM

## 2020-06-18 NOTE — PROGRESS NOTES
Aniket Gage [facility]  [place], [address]  [phone]             Date of visit: 6/18/2020   Subjective:      History obtained from:  the patient. Nunu Steen is a 68 y.o. female who presents today for covid testing for colonoscopy to take place 6/22 with Dr. Keturah Sales      Seen in our outdoor flu clinic during COVID-19 pandemic    Feeling well    See scanned paper form    Assessment/Plan:       ICD-10-CM ICD-9-CM    1. Exposure to COVID-19 virus Z20.828 V01.79 NOVEL CORONAVIRUS (COVID-19)        Orders Placed This Encounter    NOVEL CORONAVIRUS (COVID-19)       Will send results to her and Keturah Sales    Discussed the diagnosis and plan and she expressed understanding. Follow-up and Dispositions    · Return if symptoms worsen or fail to improve.          Enma Yu MD

## 2020-06-18 NOTE — PATIENT INSTRUCTIONS
Learning About Coronavirus [phone])  Coronavirus [phone]): Overview  What is coronavirus (COVID-19)? The coronavirus disease (COVID-19) is caused by a virus. It is an illness that was first found in [place], [place], in December 2019. It has since spread worldwide. The virus can cause fever, cough, and trouble breathing. In severe cases, it can cause pneumonia and make it hard to breathe without help. It can cause death. Coronaviruses are a large group of viruses. They cause the common cold. They also cause more serious illnesses like Middle East respiratory syndrome (MERS) and severe acute respiratory syndrome (SARS). COVID-19 is caused by a novel coronavirus. That means it's a new type that has not been seen in people before. This virus spreads person-to-person through droplets from coughing and sneezing. It can also spread when you are close to someone who is infected. And it can spread when you touch something that has the virus on it, such as a doorknob or a tabletop. What can you do to protect yourself from coronavirus (COVID-19)? The best way to protect yourself from getting sick is to:  · Avoid areas where there is an outbreak. · Avoid contact with people who may be infected. · Wash your hands often with soap or alcohol-based hand sanitizers. · Avoid crowds and try to stay at least 6 feet away from other people. · Wash your hands often, especially after you cough or sneeze. Use soap and water, and scrub for at least 20 seconds. If soap and water aren't available, use an alcohol-based hand . · Avoid touching your mouth, nose, and eyes. What can you do to avoid spreading the virus to others? To help avoid spreading the virus to others:  · Cover your mouth with a tissue when you cough or sneeze. Then throw the tissue in the trash. · Use a disinfectant to clean things that you touch often. · Wear a cloth face cover if you have to go to public areas.   · Stay home if you are sick or have been exposed to the virus. Don't go to school, work, or public areas. And don't use public transportation, ride-shares, or taxis unless you have no choice. · If you are sick:  ? Leave your home only if you need to get medical care. But call the doctor's office first so they know you're coming. And wear a face cover. ? Wear the face cover whenever you're around other people. It can help stop the spread of the virus when you cough or sneeze. ? Clean and disinfect your home every day. Use household  and disinfectant wipes or sprays. Take special care to clean things that you grab with your hands. These include doorknobs, remote controls, phones, and handles on your refrigerator and microwave. And don't forget countertops, tabletops, bathrooms, and computer keyboards. When to call for help  [employer] anytime you think you may need emergency care. For example, call if:  · You have severe trouble breathing. (You can't talk at all.)  · You have constant chest pain or pressure. · You are severely dizzy or lightheaded. · You are confused or can't think clearly. · Your face and lips have a blue color. · You pass out (lose consciousness) or are very hard to wake up. Call your doctor now if you develop symptoms such as:  · Shortness of breath. · Fever. · Cough. If you need to get care, call ahead to the doctor's office for instructions before you go. Make sure you wear a face cover to prevent exposing other people to the virus. Where can you get the latest information? The following health organizations are tracking and studying this virus. Their websites contain the most up-to-date information. Maria Victoria Xavier also learn what to do if you think you may have been exposed to the virus. · U.S. Centers for Disease Control and Prevention (CDC): The CDC provides updated news about the disease and travel advice. The website also tells you how to prevent the spread of infection.  www.cdc.gov  · [address] Organization (WHO): WHO offers information about the virus outbreaks. WHO also has travel advice. www.who.int  Current as of: May 8, 2020               Content Version: 12.5  © 2006-2020 Healthwise[employer] Incorporated. Care instructions adapted under license by [employer] (which disclaims liability or warranty for this information). If you have questions about a medical condition or this instruction, always ask your healthcare professional. [place] [address] any warranty or liability for your use of this information.

## 2020-06-18 NOTE — PROGRESS NOTES
Cardiac Electrophysiology OFFICE Note     Subjective:      Ervin Emerson is a 68 y.o. patient who is seen for follow up of Medtronic dual chamber ICD (implanted at [facility] in 2014). Device check today shows proper lead & generator function. Generator longevity adequate. ICM, LVEF 15-20% via echo in 02/2020. NYHA II-III chronic systolic CHF. On appropriate GDMT. Weight has been creeping up, admits to dietary indiscretions with sodium. Notes mild worsening of SOB recently. Has known COPD, no longer requires oxygen around the clock, only with significant exertion. Requires BiPAP nightly, chronic 2 pillow orthopnea. Hypotensive today, states usually runs about 110/60. Feels a bit weaker than usual, admits to not hydrating well today. Remains on ASA & Plavix. Denies bleeding issues. Previous:  Echo (02/05/2020): LVEF 15-20%. Severely dilated LA. Mild to mod MR. Mild TR. Nuclear amyloid scan (02/04/2020): Grade 1 activity & H/CL level equivocal for amyloidosis. LHC/RHC (02/03/2020): Severe native 3V disease. Patent SVG-RCA & LIMA-LAD. LCx collateral dependent, native rPDA & D1 have small disease in small vessels. Frequent PVCs. Admitted 01/30/2020-02/05/2020 for acute decompensated systolic CHF. NYHA IV on admit, NYHA III on discharge. Milrinone weaned. Diuresed. MDT single lead ICD implanted at [facility] 07/21/2014 by Dr. Mata Nesbitt. History VT. S/p PCI SVG-RCA 05/2015. CAD s/p CABG 1997. S/p gastric bypass.       Problem List  Date Reviewed: 6/18/2020          Codes Class Noted    Peripheral vascular disease ([facility] [address] ICD-10-CM: I73.9  ICD-9-CM: 443.9  2/25/2020        Acute decompensated heart failure (HCC) ICD-10-CM: I50.9  ICD-9-CM: 428.0  1/30/2020        Hx of CABG ICD-10-CM: Z95.1  ICD-9-CM: V45.81  1/13/2020        ICD (implantable cardioverter-defibrillator) in place ICD-10-CM: Z95.810  ICD-9-CM: V45.02  1/13/2020        H/O laparoscopic adjustable gastric banding ICD-10-CM: Z98.84  ICD-9-CM: V45.86  1/13/2020        Obesity, morbid ([facility] [address] ICD-10-CM: E66.01  ICD-9-CM: 278.01  1/13/2020              Current Outpatient Medications   Medication Sig Dispense Refill    glipiZIDE (GLUCOTROL) 10 mg tablet Take 10 mg by mouth two (2) times a day.  ezetimibe (ZETIA) 10 mg tablet Take 1 tablet by mouth once daily 30 Tab 0    furosemide (LASIX) 40 mg tablet Take 1 Tab by mouth two (2) times a day. 180 Tab 1    sacubitriL-valsartan (Entresto) 24-26 mg tablet Take 1 Tab by mouth two (2) times a day. 60 Tab 2    allopurinoL (ZYLOPRIM) 100 mg tablet Take 1 tablet by mouth once daily 90 Tab 2    isosorbide mononitrate ER (IMDUR) 30 mg tablet Take 1 tablet by mouth once daily 30 Tab 3    hydrALAZINE (APRESOLINE) 50 mg tablet Take 1 Tab by mouth three (3) times daily. 120 Tab 3    clopidogreL (PLAVIX) 75 mg tab Take 1 Tab by mouth daily. 90 Tab 0    gabapentin (NEURONTIN) 100 mg capsule TAKE 2 CAPSULES BY MOUTH THREE TIMES DAILY MAX  DAILY  AMOUNT  600MG 180 Cap 0    levothyroxine (SYNTHROID) 100 mcg tablet TAKE 1 TABLET BY MOUTH ONCE DAILY BEFORE BREAKFAST 90 Tab 0    atorvastatin (LIPITOR) 80 mg tablet TAKE 1 TABLET BY MOUTH AT BEDTIME 90 Tab 3    cholecalciferol (VITAMIN D3) (1000 Units /25 mcg) tablet Take 2 Tabs by mouth daily. 60 Tab 11    empagliflozin (JARDIANCE) 10 mg tablet Take 1 Tab by mouth daily. 30 Tab 2    albuterol (PROVENTIL HFA, VENTOLIN HFA, PROAIR HFA) 90 mcg/actuation inhaler Take 2 Puffs by inhalation every four (4) hours as needed.  Oxygen 2 L NC At night through BiPAP      buPROPion SR (WELLBUTRIN SR) 150 mg SR tablet Take 1 Tab by mouth daily. 90 Tab 0    metoprolol succinate (TOPROL-XL) 25 mg XL tablet Take 0.5 Tabs by mouth daily. 60 Tab 2    acetaminophen (TYLENOL ARTHRITIS PAIN) 650 mg TbER Take 1,300 mg by mouth two (2) times a day.  aspirin 81 mg chewable tablet Take 81 mg by mouth daily.        Allergies   Allergen Reactions  Crestor [Rosuvastatin] Other (comments)     Causes muscle cramps    Lisinopril Cough    Nsaids (Non-Steroidal Anti-Inflammatory Drug) Other (comments)     Liver and Kidney     Past Medical History:   Diagnosis Date    Adverse effect of anesthesia     hard to wake up/uses BIPAP/\"try to avoid general if possible\"/intubated in past prior to going to sleep and it caused pt to be incontinent    Arthritis     Asthma     CAD (coronary artery disease)     Chronic kidney disease     elevataed creatinine    Chronic obstructive pulmonary disease (HCC)     Chronic pain     arthritis    Coagulation disorder (HCC)     on plavix    Congestive heart failure (HCC)     Diabetes ([address]     Hypertension     Morbid obesity ([address]     Sleep apnea     BIPAP with 2 liters oxygen    Thromboembolus ([address]     after heart surgery - left leg    Thyroid disease      Past Surgical History:   Procedure Laterality Date    CARDIAC SURG PROCEDURE UNLIST  2018    cardiac cath - Left    HX ARTHROPLASTY  2105    knee - right    HX  SECTION      x3    HX CORONARY ARTERY BYPASS GRAFT  1997    3 vessels    HX CORONARY STENT PLACEMENT  2016    HX HERNIA REPAIR  1988    HX IMPLANTABLE CARDIOVERTER DEFIBRILLATOR      HX KNEE REPLACEMENT Right 2015    once    LAP GASTRIC BYPASS/KERLINE-EN-Y  2011    lap band per pt and not a gastric bypass     Family History   Problem Relation Age of Onset    Hypertension Mother     Dementia Mother     Coronary Artery Disease Father 58    Sudden Death Father 58    Diabetes Son     Diabetes Brother      Social History     Tobacco Use    Smoking status: Former Smoker     Types: Cigarettes    Smokeless tobacco: Never Used    Tobacco comment: quit /started again (smoked 3 yrs) off and on/none since    Substance Use Topics    Alcohol use: Not Currently        Review of Systems:   Constitutional: Negative for fever, chills, weight loss, + malaise/fatigue. HEENT: Negative for nosebleeds, vision changes. Respiratory: Negative for cough, hemoptysis. + chronic supplemental oxygen prn  Cardiovascular: Negative for chest pain, palpitations, + chronic 2 pillow orthopnea, no claudication, + leg swelling, no syncope, and no PND. + dyspnea with exertion  Gastrointestinal: Negative for nausea, vomiting, diarrhea, blood in stool and melena. Genitourinary: Negative for dysuria, and hematuria. Musculoskeletal: Negative for myalgias, + knee arthralgia. Skin: Negative for rash. Heme: Does not bleed or bruise easily. Neurological: Negative for speech change and focal weakness     Objective:     Visit Vitals  BP (!) 88/44 (BP 1 Location: Left arm, BP Patient Position: Sitting)   Pulse 72   Resp 20   Ht 5' 2\" (1.575 m)   Wt 225 lb (102.1 kg)   SpO2 98%   BMI 41.15 kg/m²        Physical Exam:   Constitutional: Well-developed and well-nourished. No respiratory distress. Head: Normocephalic and atraumatic. Eyes: Pupils are equal, round  ENT: Hearing normal.    Neck: Supple. No JVD present. Cardiovascular: Normal rate, regular rhythm. Exam reveals no gallop and no friction rub. No murmur heard. Pulmonary/Chest: Effort normal and breath sounds normal. No wheezes. Abdominal: Soft, no tenderness. Obese. Musculoskeletal: No edema. Neurological: Alert,oriented. Skin: Skin is warm and dry. Left chest ICD site well healed. Sternal scar well healed. Psychiatric: Normal mood and affect. Behavior is normal. Judgment and thought content normal.      EKG (01/30/2020):  SB 59 with occasional PVCs. Narrow QRS. Assessment/Plan:       ICD-10-CM ICD-9-CM    1. ICD (implantable cardioverter-defibrillator), dual, in situ Z95.810 V45.02    2. Cardiomyopathy, ischemic I25.5 414.8    3. Systolic CHF, chronic (HCC) I50.22 428.22      428.0    4. Shortness of breath R06.02 786.05    5. SOB (shortness of breath) R06.02 786.05    6.  Coronary artery disease involving native heart without angina pectoris, unspecified vessel or lesion type I25.10 414.01        Ms. Samantha Warren has ICM (LVEF <20%), NYHA II-III chronic systolic CHF. Continue GDMT as managed by [facility]. Admitted for earlier this year for CHF exacerbation. Continue current GDMT. Dual chamber ICD check today shows proper lead & generator function. Generator longevity adequate. Previous CABG, PCI. Cardiac cath earlier this year did not show targets for revascularization. Hypotensive today, usually trends normal to low normal but not as low as in office. She will monitor BP at home, call if hypotension is frequent and/or causes significant symptoms then hydralazine/imdur will be stopped    Remote ICD checks q 3 months. EP clinic follow up in 1 year. Follow up with heart failure clinic as previously scheduled. Future Appointments   Date Time Provider [place]   6/19/2020  9:30 AM ECHO LAB 1 [facility]   6/29/2020 11:25 AM Essence Blankenship NP [facility] SCHED   6/30/2020  9:00 AM VENKATESH [place] [facility] SCHED   7/1/2020 12:00 PM [address] [place] [address]   10/5/2020  1:45 PM REMOTE1, [address]   1/6/2021  8:30 AM REMOTE1, [address]   5/10/2021  8:00 AM REMOTE1, [address]   7/6/2021  1:00  [place], [address]   7/6/2021  1:20 PM Arely Flores  [address]     Thank you for involving me in this patient's care and please call with further concerns or questions. Harsh Collins M.D.   Electrophysiology/Cardiology  [facility] [place]  [address] [place] [address]                             [phone]

## 2020-06-19 ENCOUNTER — HOSPITAL ENCOUNTER (OUTPATIENT)
Dept: NON INVASIVE DIAGNOSTICS | Age: 70
Discharge: HOME | End: 2020-06-19
Payer: MEDICARE

## 2020-06-19 VITALS
WEIGHT: 225 LBS | HEIGHT: 62 IN | DIASTOLIC BLOOD PRESSURE: 44 MMHG | BODY MASS INDEX: 41.41 KG/M2 | SYSTOLIC BLOOD PRESSURE: 88 MMHG

## 2020-06-19 DIAGNOSIS — E11.49 OTHER DIABETIC NEUROLOGICAL COMPLICATION ASSOCIATED WITH TYPE 2 DIABETES MELLITUS (HCC): ICD-10-CM

## 2020-06-19 DIAGNOSIS — E11.22 CONTROLLED TYPE 2 DIABETES MELLITUS WITH STAGE 3 CHRONIC KIDNEY DISEASE, WITHOUT LONG-TERM CURRENT USE OF INSULIN (HCC): ICD-10-CM

## 2020-06-19 DIAGNOSIS — E55.9 VITAMIN D DEFICIENCY: ICD-10-CM

## 2020-06-19 DIAGNOSIS — Z95.810 ICD (IMPLANTABLE CARDIOVERTER-DEFIBRILLATOR) IN PLACE: ICD-10-CM

## 2020-06-19 DIAGNOSIS — N18.30 CONTROLLED TYPE 2 DIABETES MELLITUS WITH STAGE 3 CHRONIC KIDNEY DISEASE, WITHOUT LONG-TERM CURRENT USE OF INSULIN (HCC): ICD-10-CM

## 2020-06-19 DIAGNOSIS — I50.42 CHRONIC HEART FAILURE WITH REDUCED EJECTION FRACTION AND DIASTOLIC DYSFUNCTION (HCC): ICD-10-CM

## 2020-06-19 DIAGNOSIS — Z95.1 HX OF CABG: ICD-10-CM

## 2020-06-19 PROCEDURE — 93306 TTE W/DOPPLER COMPLETE: CPT

## 2020-06-20 LAB
AV VELOCITY RATIO: 0.71
ECHO AV AREA PEAK VELOCITY: 2.2 CM2
ECHO AV PEAK GRADIENT: 9.5 MMHG
ECHO AV PEAK VELOCITY: 154.34 CM/S
ECHO LA AREA 4C: 27.4 CM2
ECHO LA VOL 4C: 107.67 ML (ref 22–52)
ECHO LA VOLUME INDEX A4C: 53.58 ML/M2 (ref 16–28)
ECHO LV E' LATERAL VELOCITY: 9.66 CM/S
ECHO LV E' SEPTAL VELOCITY: 5.8 CM/S
ECHO LV INTERNAL DIMENSION DIASTOLIC: 5.76 CM (ref 3.9–5.3)
ECHO LV INTERNAL DIMENSION SYSTOLIC: 4.92 CM
ECHO LV IVSD: 0.86 CM (ref 0.6–0.9)
ECHO LV MASS 2D: 225.4 G (ref 67–162)
ECHO LV MASS INDEX 2D: 112.2 G/M2 (ref 43–95)
ECHO LV POSTERIOR WALL DIASTOLIC: 0.87 CM (ref 0.6–0.9)
ECHO LVOT DIAM: 1.96 CM
ECHO LVOT PEAK GRADIENT: 4.9 MMHG
ECHO LVOT PEAK VELOCITY: 110.27 CM/S
ECHO MV A VELOCITY: 86.24 CM/S
ECHO MV E VELOCITY: 116.77 CM/S
ECHO MV E/A RATIO: 1.35
ECHO MV E/E' LATERAL: 12.09
ECHO MV E/E' RATIO (AVERAGED): 16.11
ECHO MV E/E' SEPTAL: 20.13
ECHO MV REGURGITANT RADIUS PISA: 0.65 CM
ECHO RV INTERNAL DIMENSION: 4.06 CM
ECHO RV TAPSE: 1.94 CM (ref 1.5–2)
ECHO TV REGURGITANT MAX VELOCITY: 320.67 CM/S
ECHO TV REGURGITANT PEAK GRADIENT: 41.1 MMHG
LVFS 2D: 14.65 %

## 2020-06-21 LAB — SARS-COV-2, NAA: NOT DETECTED

## 2020-06-23 ENCOUNTER — TELEPHONE (OUTPATIENT)
Dept: CARDIOLOGY CLINIC | Age: 70
End: 2020-06-23

## 2020-06-23 NOTE — TELEPHONE ENCOUNTER
Voicemail received from [facility] with [address] requesting an authorization for patient's upcoming appointment on 7/1/20. Referral from [facility]. Her number is [phone].

## 2020-06-24 DIAGNOSIS — E11.49 OTHER DIABETIC NEUROLOGICAL COMPLICATION ASSOCIATED WITH TYPE 2 DIABETES MELLITUS (HCC): ICD-10-CM

## 2020-06-24 NOTE — TELEPHONE ENCOUNTER
Contacted [employer] and spoke with Alexandra Devine. She stated prior auth from PCP will be needed as patient has [employer]. Per Alexandra Devine she stated the center has already initiated, and no further information needed from [facility] at this time. Encouraged her to contact [facility] if any further information needed. She verbalized understanding and had no further questions. Divine Trivedi RN.

## 2020-06-25 RX ORDER — GABAPENTIN 100 MG/1
CAPSULE ORAL
Qty: 180 CAP | Refills: 0 | Status: SHIPPED | OUTPATIENT
Start: 2020-06-25 | End: 2020-08-07

## 2020-06-25 NOTE — TELEPHONE ENCOUNTER
Will refill x 1 more month - please ask patient to transition management of this to her PCP's office. Thanks!

## 2020-06-29 ENCOUNTER — TELEPHONE (OUTPATIENT)
Dept: CARDIAC REHAB | Age: 70
End: 2020-06-29

## 2020-06-29 ENCOUNTER — OFFICE VISIT (OUTPATIENT)
Dept: CARDIOLOGY CLINIC | Age: 70
End: 2020-06-29

## 2020-06-29 VITALS
WEIGHT: 225.2 LBS | SYSTOLIC BLOOD PRESSURE: 94 MMHG | RESPIRATION RATE: 20 BRPM | BODY MASS INDEX: 41.44 KG/M2 | HEART RATE: 81 BPM | TEMPERATURE: 96.4 F | OXYGEN SATURATION: 98 % | DIASTOLIC BLOOD PRESSURE: 58 MMHG | HEIGHT: 62 IN

## 2020-06-29 DIAGNOSIS — E55.9 VITAMIN D DEFICIENCY: ICD-10-CM

## 2020-06-29 DIAGNOSIS — N18.30 CONTROLLED TYPE 2 DIABETES MELLITUS WITH STAGE 3 CHRONIC KIDNEY DISEASE, WITHOUT LONG-TERM CURRENT USE OF INSULIN (HCC): ICD-10-CM

## 2020-06-29 DIAGNOSIS — I50.42 CHRONIC HEART FAILURE WITH REDUCED EJECTION FRACTION AND DIASTOLIC DYSFUNCTION (HCC): Primary | ICD-10-CM

## 2020-06-29 DIAGNOSIS — Z95.810 ICD (IMPLANTABLE CARDIOVERTER-DEFIBRILLATOR) IN PLACE: ICD-10-CM

## 2020-06-29 DIAGNOSIS — E11.22 CONTROLLED TYPE 2 DIABETES MELLITUS WITH STAGE 3 CHRONIC KIDNEY DISEASE, WITHOUT LONG-TERM CURRENT USE OF INSULIN (HCC): ICD-10-CM

## 2020-06-29 NOTE — PROGRESS NOTES
Advanced Heart Failure Center Visit        DOS:   6/29/2020  NAME:  Krystian Adame   MRN:   [id number]   REFERRING PROVIDER:  Dhruv Shen NP  PRIMARY CARE PHYSICIAN: Dhruv Shen NP  PRIMARY CARDIOLOGIST: none      Chief Complaint:   Chief Complaint   Patient presents with    CHF    Leg Swelling       HPI: 65y.o. year old female with a history of HTN, HLD, WES, obesity (Body mass index is 41.19 kg/m².) s/p gastric bypass in 2011, T2DM, hypothyroidism, COPD, CAD s/p CABG in 1997, s/p PCI to [address] in 5/15, ICM, VT, s/p AICD (Medtronic),  anxiety, and depression who presents today for a HF clinic visit for her chronic systolic heart failure. Ms. Samantha Warren recalls having a viral syndrome last year and has not felt well since then. She uses oxygen with her BiPAP every night and sleeps on two pillows. She was recently admitted to [facility] for acute on chronic systolic heart failure, treated with IV inotropes and diuretics. She was scheduled to undergo BiV upgrade with Dr. Diaz Padilla, however, her QRS was too narrow for the upgrade. He increased her low pacing threshold from 50 bpm to 70 bpm.      She presents today for follow up HF clinic. Overall she states she is at her baseline, her weight has not changed in the last month but she is starting cardiac rehab this week and is looking forward to it. She denies dyspnea, orthopnea, dizziness, lightheadedness, syncope, pre-syncope, LE edema, or abdominal distention. She is compliant with her medications and weighs herself and measures her BP diligently. Optivol Interrogation 6/29/20:  Optivol below threshold and trending down  Impedance trending up  Total  0.1%  Patient activity 0.4hr/day  Time in AT/AF 0hr/day  No shock therapy      Impression / Plan:   1.  ICM - Stage C, NYHA Class II, LVEF 30-35%   TTE 1/31 shows decrease in EF to 30-35%   TTE 6/18/20- EF 25-30%, mod TR   Continue Toprol XL 12.5 mg daily   Continue Entresto 24/26 mg PO BID - watch Cr closely    Continue hydralazine 50 mg PO TID and Imdur 30mg daily   Cont maintenance lasix dosing    Intolerant to AA due to hyperkalemia   Low sodium diet, daily weights, strict I/O   Invitate - DSP (VUS)   Elevated SFLC   Equivocal PYP imaging   Will start cardiac rehab on 7/1/20   Return in 1 month with MD   Labs in 1 month before next appt     2. CAD s/p CABG in 1997, s/p PCI to [address] in 10/16   On ASA, statin, BB, Zetia   Severe 3V disease     3. HTN - well controlled   On BB, Entresto, and hydralazine/nitrate   Low sodium diet   Compliant with BiPAP    4. VT - s/p dual chamber AICD with RA lead    No shocks   No arrhythmias on interrogation    Dr. Payam Diane increased rate from 50 bpm to 70 bpm    Has new home monitor     5. WES - on BiPAP with oxygen   Compliant with BiPAP   Has not seen sleep medicine MD in 1 year   Will make an appt   Will need pulmonary appt as well    6. I0DH complicated by neuropathy   HgA1C 8.4   Continue Jardiance 10 mg PO daily - enrolled in patient assistance   Instructed to watch for  infections     7. Hypothyroidism   On levothyroxine 100 mcgs daily   TFTs in October     8. Depression   On Wellbutrin  mg     9. History of Tobacco Abuse - 1/2 ppd x 20 years, quit 5 years ago   Counseled re: importance of continued abstinence    10. Gout    Continue allopurinol    Discussed low purine foods     11. Obesity Body mass index is 41.19 kg/m². Encouraged to meet with dietician at cardiac rehab     12. Osteoarthritis - s/p right TKR   Avoid Celebrex due to CKD, HFrEF   S/p left knee injection in 8/2019   Lidocaine patches to knees     13. Hyperlipidemia    , LDL 58 on 5/28/20   Lipoprotein-A 212   Continue Lipitor 80 mg daily    Continue Zetia 10 mg PO daily   Low chol diet     14. CKD4,suspect diabetic nephropathy and cardiorenal syndrome   Recent Cr 2.2- stable    Continue Entresto 24/26 mg PO BID      15.   Vitamin D deficiency   Continue cholecalciferol 2,000 units daily 71      History:  Past Medical History:   Diagnosis Date    Adverse effect of anesthesia     hard to wake up/uses BIPAP/\"try to avoid general if possible\"/intubated in past prior to going to sleep and it caused pt to be incontinent    Arthritis     Asthma     CAD (coronary artery disease)     Chronic kidney disease     elevataed creatinine    Chronic obstructive pulmonary disease (HCC)     Chronic pain     arthritis    Coagulation disorder (HCC)     on plavix    Congestive heart failure (HCC)     Diabetes ([address]     Hypertension     Morbid obesity ([address]     Sleep apnea     BIPAP with 2 liters oxygen    Thromboembolus ([address]     after heart surgery - left leg    Thyroid disease      Past Surgical History:   Procedure Laterality Date    CARDIAC SURG PROCEDURE UNLIST  2018    cardiac cath - Left    HX ARTHROPLASTY  2105    knee - right    HX  SECTION      x3    HX CORONARY ARTERY BYPASS GRAFT  1997    3 vessels    HX CORONARY STENT PLACEMENT  2016    HX HERNIA REPAIR  1988    HX IMPLANTABLE CARDIOVERTER DEFIBRILLATOR      HX KNEE REPLACEMENT Right 2015    once    LAP GASTRIC BYPASS/KERLINE-EN-Y  2011    lap band per pt and not a gastric bypass     Social History     Socioeconomic History    Marital status:      Spouse name: Not on file    Number of children: Not on file    Years of education: Not on file    Highest education level: Not on file   Occupational History    Not on file   Social Needs    Financial resource strain: Not on file    Food insecurity     Worry: Not on file     Inability: Not on file    Transportation needs     Medical: Not on file     Non-medical: Not on file   Tobacco Use    Smoking status: Former Smoker     Types: Cigarettes    Smokeless tobacco: Never Used    Tobacco comment: quit /started again (smoked 3 yrs) off and on/none since    Substance and Sexual Activity    Alcohol use: Not Currently    Drug use: Not Currently    Sexual activity: Not Currently   Lifestyle    Physical activity     Days per week: Not on file     Minutes per session: Not on file    Stress: Not on file   Relationships    Social connections     Talks on phone: Not on file     Gets together: Not on file     Attends Faith service: Not on file     Active member of club or organization: Not on file     Attends meetings of clubs or organizations: Not on file     Relationship status: Not on file    Intimate partner violence     Fear of current or ex partner: Not on file     Emotionally abused: Not on file     Physically abused: Not on file     Forced sexual activity: Not on file   Other Topics Concern    Not on file   Social History Narrative    Not on file     Family History   Problem Relation Age of Onset    Hypertension Mother     Dementia Mother     Coronary Artery Disease Father 58    Sudden Death Father 58    Diabetes Son     Diabetes Brother      Allergies: Allergies   Allergen Reactions    Crestor [Rosuvastatin] Other (comments)     Causes muscle cramps    Lisinopril Cough    Nsaids (Non-Steroidal Anti-Inflammatory Drug) Other (comments)     Liver and Kidney       ROS:    Review of Systems   Constitutional: Positive for malaise/fatigue. Negative for chills and fever. Eyes: Negative. Respiratory: Negative for cough, sputum production and shortness of breath. Cardiovascular: Positive for leg swelling. Negative for chest pain, palpitations and PND. Gastrointestinal: Negative for constipation, heartburn and nausea. Genitourinary: Negative. Musculoskeletal: Positive for joint pain. Chronic knee pain    Skin: Negative. Neurological: Negative for dizziness, weakness and headaches. Psychiatric/Behavioral: Negative for depression. The patient is nervous/anxious.           Physical Exam:   Vitals:    Visit Vitals  BP 94/58 (BP 1 Location: Left arm, BP Patient Position: Sitting) Pulse 81   Temp (!) 96.4 °F (35.8 °C) (Oral)   Resp 20   Ht 5' 2\" (1.575 m)   Wt 225 lb 3.2 oz (102.2 kg)   SpO2 98%   BMI 41.19 kg/m²         Physical Exam   Constitutional: She appears well-developed and well-nourished. No distress. HENT:   Head: Normocephalic. Neck: Normal range of motion. Neck supple. No JVD present. Cardiovascular: Normal rate, regular rhythm, normal heart sounds and intact distal pulses. Pulmonary/Chest: Effort normal and breath sounds normal. No respiratory distress. Abdominal: Soft. Bowel sounds are normal. She exhibits no distension. Musculoskeletal: Normal range of motion. General: Edema present. Neurological: She is alert. Skin: Skin is warm and dry. Psychiatric: She has a normal mood and affect. Vitals reviewed. Recent Labs:   Labs Latest Ref Rng & Units 2020   WBC 3.4 - 10.8 x10E3/uL - 6.5 6.8   RBC 3.77 - 5.28 x10E6/uL - 3.31(L) 3.59(L)   Hemoglobin 11.1 - 15.9 g/dL - 10. 4(L) 11.2   Hematocrit 34.0 - 46.6 % - 32. 2(L) 34.0   MCV 79 - 97 fL - 97 95   Platelets 621 - 176 T67L5/BM - 249 257   Albumin 3.7 - 4.7 g/dL - 4.0 4.2   Calcium 8.7 - 10.3 mg/dL 10. 5(H) 10.2 10.2   Glucose 65 - 99 mg/dL 120(H) 176(H) 158(H)   BUN 8 - 27 mg/dL 52(H) 51(H) 44(H)   Creatinine 0.57 - 1.00 mg/dL 2.22(H) 2.62(H) 2.31(H)   Sodium 134 - 144 mmol/L 136 140 138   Potassium 3.5 - 5.2 mmol/L 4.6 4.8 4.7   TSH 0.450 - 4.500 uIU/mL - - 1.620   Some recent data might be hidden     EK2020  Sinus  Rhythm, rate 67 bpm   -  Nonspecific T-abnormality. 20   ECHO ADULT COMPLETE 2020    Narrative · Image quality for this study was technically difficult. · Mildly dilated left ventricle. Upper normal wall thickness. Severe   global systolic function. Estimated left ventricular ejection fraction is   25 - 30%. Suboptimal endocardial visualization limits wall motion   analysis. Inconclusive left ventricular diastolic function.   · Moderate tricuspid valve regurgitation is present. · Mild to moderate pulmonary hypertension. Pulmonary arterial systolic   pressure is 45 mmHg. · Moderately dilated left atrium. · Mitral valve non-specific thickening. Severe mitral valve regurgitation   is present. Signed by: Tobin Sosa MD         Prior to Admission medications    Medication Sig Start Date End Date Taking? Authorizing Provider   gabapentin (NEURONTIN) 100 mg capsule TAKE 2 CAPSULES BY MOUTH THREE TIMES DAILY . DO NOT EXCEED  6  PER  DAY 6/25/20   Yan Blankenship NP   glipiZIDE (GLUCOTROL) 10 mg tablet Take 10 mg by mouth two (2) times a day. Provider, Historical   ezetimibe (ZETIA) 10 mg tablet Take 1 tablet by mouth once daily 6/2/20   Yan Blankenship NP   furosemide (LASIX) 40 mg tablet Take 1 Tab by mouth two (2) times a day. 6/2/20   Luis Mckeon NP   sacubitriL-valsartan [place]) 24-26 mg tablet Take 1 Tab by mouth two (2) times a day. 5/29/20   Farley Phalen B, NP   allopurinoL (ZYLOPRIM) 100 mg tablet Take 1 tablet by mouth once daily 5/28/20   Farley Phalen B, NP   isosorbide mononitrate ER (IMDUR) 30 mg tablet Take 1 tablet by mouth once daily 5/28/20   Farley Phalen B, NP   hydrALAZINE (APRESOLINE) 50 mg tablet Take 1 Tab by mouth three (3) times daily. 5/28/20   Sonya Mckeon NP   clopidogreL (PLAVIX) 75 mg tab Take 1 Tab by mouth daily. 5/22/20   Lord Bryant NP   levothyroxine (SYNTHROID) 100 mcg tablet TAKE 1 TABLET BY MOUTH ONCE DAILY BEFORE BREAKFAST 5/14/20   Rae Caprice VENKATESH HOOD   atorvastatin (LIPITOR) 80 mg tablet TAKE 1 TABLET BY MOUTH AT BEDTIME 4/10/20   Yan Blankenship NP   cholecalciferol (VITAMIN D3) (1000 Units /25 mcg) tablet Take 2 Tabs by mouth daily. 4/10/20   Yan Blankenship NP   empagliflozin (JARDIANCE) 10 mg tablet Take 1 Tab by mouth daily.  3/9/20   Yan Blankenship, NP   albuterol (PROVENTIL HFA, VENTOLIN HFA, PROAIR HFA) 90 mcg/actuation inhaler Take 2 Puffs by inhalation every four (4) hours as needed. Provider, Historical   Oxygen 2 L NC At night through BiPAP    Provider, Historical   buPROPion SR (WELLBUTRIN SR) 150 mg SR tablet Take 1 Tab by mouth daily. 2/6/20   Gianfranco Fam NP   metoprolol succinate (TOPROL-XL) 25 mg XL tablet Take 0.5 Tabs by mouth daily. 2/5/20   Peterson Blankenship NP   acetaminophen (TYLENOL ARTHRITIS PAIN) 650 mg TbER Take 1,300 mg by mouth two (2) times a day. Provider, Historical   aspirin 81 mg chewable tablet Take 81 mg by mouth daily.     Provider, Historical     Allergies   Allergen Reactions    Crestor [Rosuvastatin] Other (comments)     Causes muscle cramps    Lisinopril Cough    Nsaids (Non-Steroidal Anti-Inflammatory Drug) Other (comments)     Liver and Kidney       Past Medical History:   Diagnosis Date    Adverse effect of anesthesia     hard to wake up/uses BIPAP/\"try to avoid general if possible\"/intubated in past prior to going to sleep and it caused pt to be incontinent    Arthritis     Asthma     CAD (coronary artery disease)     Chronic kidney disease     elevataed creatinine    Chronic obstructive pulmonary disease (HCC)     Chronic pain     arthritis    Coagulation disorder ([address]     on plavix    Congestive heart failure ([address]     Diabetes ([address]     Hypertension     Morbid obesity ([address]     Sleep apnea 1996    BIPAP with 2 liters oxygen    Thromboembolus ([address]     after heart surgery - left leg    Thyroid disease          Natalya Vogt NP  [facility] [place]  [facility], [address]  Office [phone]  Fax [phone]

## 2020-06-29 NOTE — PATIENT INSTRUCTIONS
Medication changes:    None      Testing Ordered:    labwork before your next appointment     Other Recommendations:     Please make an appointment with sleep medicine to titrate your Bipap machine and help with your oxygen at night. Please ask the cardiac rehab team to document your 6 min walk with them      Ensure your drinking an adequate amount of water with a goal of 6-8 eight ounce glasses (1.5-2 liters) of fluid daily. Your urine should be clear and light yellow straw colored. If your blood pressure begins to consistently run below 90/60 and/or you begin to experience dizziness or lightheadedness, please contact the [facility] at [phone]. Follow up in 1 month with [facility]      Please monitor your blood pressures daily prior to medications and 2 hours after taking medications. Bring a written record of your blood pressures to your next appointment. Please monitor your weights daily upon waking and after using the bathroom. Keep a written records of your weights and bring to your next appointment. If you have a weight gain of 3 or more pounds overnight OR 5 or more pounds in one week please contact our office. Thank you for allowing us the privilege of being a part of your healthcare team! Please do not hesitate to contact our office at [phone] with any questions or concerns.

## 2020-06-29 NOTE — TELEPHONE ENCOUNTER
6/29/2020 6/26/2020 Cardiac Rehab: Called Ms. Manju Millermarieloss to remind of intake appointment on Wednesday, 7/1/2020. Confirmed appointment with patient. Provided patient with contact information for [address] [place] [address] Cardiac Rehab. Also, reminded patient to bring a list of current medications, a personal schedule, and to wear comfortable clothes and shoes.  OrderBorder Dials

## 2020-06-30 ENCOUNTER — VIRTUAL VISIT (OUTPATIENT)
Dept: INTERNAL MEDICINE CLINIC | Age: 70
End: 2020-06-30

## 2020-06-30 DIAGNOSIS — E11.9 CONTROLLED TYPE 2 DIABETES MELLITUS WITHOUT COMPLICATION, WITHOUT LONG-TERM CURRENT USE OF INSULIN (HCC): Primary | ICD-10-CM

## 2020-06-30 DIAGNOSIS — M17.12 PRIMARY OSTEOARTHRITIS OF LEFT KNEE: ICD-10-CM

## 2020-06-30 DIAGNOSIS — Z12.31 SCREENING MAMMOGRAM, ENCOUNTER FOR: ICD-10-CM

## 2020-06-30 NOTE — PROGRESS NOTES
Chief Complaint   Patient presents with    Diabetes     follow up     Referral Request     Ortho,Podiatry, ophthalmology, PulmonologySleep Study    Medication Refill     test strips     Pt reports no pain at this time      1. Have you been to the ER, urgent care clinic since your last visit? Hospitalized since your last visit? No    2. Have you seen or consulted any other health care providers outside of the [address] [place] [address] since your last visit? Include any pap smears or colon screening.  No

## 2020-06-30 NOTE — PROGRESS NOTES
Levi Mejia is a 65 y.o. female who was seen by synchronous (real-time) audio-video technology on 6/30/2020 for Diabetes (follow up ) and Referral Request Paola Lopez, ophthalmology, PulmonologySleep Study)        Assessment & Plan:     Diagnoses and all orders for this visit:    1. Controlled type 2 diabetes mellitus without complication, without long-term current use of insulin (HCC)  -     CBC W/O DIFF  -     REFERRAL TO PODIATRY; Future  -     REFERRAL TO OPHTHALMOLOGY  -     HEMOGLOBIN A1C WITH EAG    2. Screening mammogram, encounter for  -     [facility] MAMMO BI SCREENING INCL CAD; Future    3. Primary osteoarthritis of left knee  -     REFERRAL TO ORTHOPEDICS      The complexity of medical decision making for this visit is moderate   Follow-up and Dispositions    · Return in about 3 months (around 9/30/2020) for dm virtual or in person. Labs  Mailed referral including previous sleep med ref      Subjective:       Prior to Admission medications    Medication Sig Start Date End Date Taking? Authorizing Provider   gabapentin (NEURONTIN) 100 mg capsule TAKE 2 CAPSULES BY MOUTH THREE TIMES DAILY . DO NOT EXCEED  6  PER  DAY 6/25/20  Yes Ashwini Blankenship NP   glipiZIDE (GLUCOTROL) 10 mg tablet Take 10 mg by mouth two (2) times a day. Yes Provider, Historical   ezetimibe (ZETIA) 10 mg tablet Take 1 tablet by mouth once daily 6/2/20  Yes Jaclyn Oneal NP   furosemide (LASIX) 40 mg tablet Take 1 Tab by mouth two (2) times a day. 6/2/20  Yes Hermann Mckeon NP   sacubitriL-valsartan Mary Tobar) 24-26 mg tablet Take 1 Tab by mouth two (2) times a day. 5/29/20  Yes Chel Mckeon, NP   allopurinoL (ZYLOPRIM) 100 mg tablet Take 1 tablet by mouth once daily 5/28/20  Yes Chel Mckoen, NP   isosorbide mononitrate ER (IMDUR) 30 mg tablet Take 1 tablet by mouth once daily 5/28/20  Yes Lo Mckeon NP   hydrALAZINE (APRESOLINE) 50 mg tablet Take 1 Tab by mouth three (3) times daily. 5/28/20  Yes Lo Mckeon NP   clopidogreL (PLAVIX) 75 mg tab Take 1 Tab by mouth daily. 5/22/20  Yes Ethel Rankin NP   levothyroxine (SYNTHROID) 100 mcg tablet TAKE 1 TABLET BY MOUTH ONCE DAILY BEFORE BREAKFAST 5/14/20  Yes Ethel Rankin NP   atorvastatin (LIPITOR) 80 mg tablet TAKE 1 TABLET BY MOUTH AT BEDTIME 4/10/20  Yes Maximino Ga NP   cholecalciferol (VITAMIN D3) (1000 Units /25 mcg) tablet Take 2 Tabs by mouth daily. 4/10/20  Yes Heather Blankenship NP   empagliflozin (JARDIANCE) 10 mg tablet Take 1 Tab by mouth daily. 3/9/20  Yes Maximino Ga NP   albuterol (PROVENTIL HFA, VENTOLIN HFA, PROAIR HFA) 90 mcg/actuation inhaler Take 2 Puffs by inhalation every four (4) hours as needed. Yes Provider, Historical   buPROPion SR (WELLBUTRIN SR) 150 mg SR tablet Take 1 Tab by mouth daily. 2/6/20  Yes Ethel Rankin NP   metoprolol succinate (TOPROL-XL) 25 mg XL tablet Take 0.5 Tabs by mouth daily. 2/5/20  Yes Maximino Ga NP   acetaminophen (TYLENOL ARTHRITIS PAIN) 650 mg TbER Take 1,300 mg by mouth two (2) times a day. Yes Provider, Historical   aspirin 81 mg chewable tablet Take 81 mg by mouth daily. Yes Provider, Historical   Oxygen 2 L NC At night through BiPAP    Provider, Historical         Diabetic Review of Systems - medication compliance: compliant all of the time, diabetic diet compliance: noncompliant some of the time, home glucose monitoring: is performed regularly.  Admits to poor diet at times - ate cake this morning  Sugars in the low 200s  Requests eye and podiatry referral    Hypertension ROS:  taking medications as instructed, no medication side effects noted, no TIAs, no chest pain on exertion, no dyspnea on exertion, no swelling of ankles  Home /80 and below    Mammogram:due    Knee DJD: has f/u w/ ortho soon, needs referral, was told in the past would likely need replacement but is hoping for injections as unlikely candidate due to cardiac status    Sleep apnea: cpap is old and has not had recent fu w/ sleep med. Colonoscopy scheduled    ROS     Review of Systems:   Constitutional:    Negative for fever and chills, negative diaphoresis. HEENT:              Negative for neck pain and stiffness. Eyes:                  Negative for visual disturbance, itching, redness or discharge. Respiratory:        Negative for cough and shortness of breath. Cardiovascular:  Negative for chest pain and palpitations. Gastrointestinal: Negative for nausea, vomiting, abdominal pain, diarrhea and             constipation. Genitourinary:     Negative for dysuria and frequency. Musculoskeletal: Negative for falls, tenderness and swelling. Skin:                    Negative for rash, masses or lesions. Neurological:       Negative for dizzyness, seizure, loss of consciousness, weakness and numbness. Objective:   No flowsheet data found.      [INSTRUCTIONS:  \"[x]\" Indicates a positive item  \"[]\" Indicates a negative item  -- DELETE ALL ITEMS NOT EXAMINED]    Constitutional: [x] Appears well-developed and well-nourished [x] No apparent distress      [] Abnormal -     Mental status: [x] Alert and awake  [x] Oriented to person/place/time [x] Able to follow commands    [] Abnormal -     Eyes:   EOM    [x]  Normal    [] Abnormal -   Sclera  [x]  Normal    [] Abnormal -          Discharge [x]  None visible   [] Abnormal -     HENT: [x] Normocephalic, atraumatic  [] Abnormal -   [x] Mouth/Throat: Mucous membranes are moist    External Ears [x] Normal  [] Abnormal -    Neck: [x] No visualized mass [] Abnormal -     Pulmonary/Chest: [x] Respiratory effort normal   [x] No visualized signs of difficulty breathing or respiratory distress        [] Abnormal -      Musculoskeletal:   [x] Normal gait with no signs of ataxia         [x] Normal range of motion of neck        [] Abnormal -     Neurological:        [x] No Facial Asymmetry (Cranial nerve 7 motor function) (limited exam due to video visit)          [x] No gaze palsy        [] Abnormal -          Skin:        [x] No significant exanthematous lesions or discoloration noted on facial skin         [] Abnormal -            Psychiatric:       [x] Normal Affect [] Abnormal -        [x] No Hallucinations    Other pertinent observable physical exam findings:-        We discussed the expected course, resolution and complications of the diagnosis(es) in detail. Medication risks, benefits, costs, interactions, and alternatives were discussed as indicated. I advised her to contact the office if her condition worsens, changes or fails to improve as anticipated. She expressed understanding with the diagnosis(es) and plan. Servando Ortiz, who was evaluated through a patient-initiated, synchronous (real-time) audio-video encounter, and/or her healthcare decision maker, is aware that it is a billable service, with coverage as determined by her insurance carrier. She provided verbal consent to proceed: Yes, and patient identification was verified. It was conducted pursuant to the emergency declaration under the [address] [place] [address] authority and the [employer] General Act. A caregiver was present when appropriate. Ability to conduct physical exam was limited. I was at home. The patient was at home. Keisha Tripp NP

## 2020-07-01 ENCOUNTER — HOSPITAL ENCOUNTER (OUTPATIENT)
Dept: CARDIAC REHAB | Age: 70
Discharge: HOME | End: 2020-07-01
Payer: MEDICARE

## 2020-07-01 VITALS — WEIGHT: 229.3 LBS | HEIGHT: 62 IN | BODY MASS INDEX: 42.2 KG/M2

## 2020-07-01 PROCEDURE — 93797 PHYS/QHP OP CAR RHAB WO ECG: CPT

## 2020-07-01 PROCEDURE — 93798 PHYS/QHP OP CAR RHAB W/ECG: CPT

## 2020-07-01 NOTE — CARDIO/PULMONARY
Erlinda Estrada 65 y.o. presented to cardiac wellness for orientation and exercise tolerance test today with a primary diagnosis of HF . EF is 25% . Erlinda Estrada has a history of MI, PCI, CABG, ICD/PM. Cardiac risk factors include former smoking/ tobacco exposure, family history, dyslipidemia, diabetes mellitus, obesity, hypertension, stress, prior MI and these were reviewed with Emery Lambert. 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 PHQ9, depression score, is 7 and this is considered mild. . The result was discussed with patient who affirms the score to be accurate. Patient denied chest pain or SOB during 6 minute walk and was in SR/ST with rare-occ pvc's, one couplet. Linette Coleman will attend a 60 minute class once a week when she comes in for exercise in cardiac wellness. Education manual and reviewed. She has pain in her left knee and uses a cane to walk. She is planning to have a injection into this left knee next week. Amada Nuñez RN 
7/1/2020

## 2020-07-02 RX ORDER — EZETIMIBE 10 MG/1
TABLET ORAL
Qty: 30 TAB | Refills: 0 | Status: SHIPPED | OUTPATIENT
Start: 2020-07-02 | End: 2020-07-21

## 2020-07-06 ENCOUNTER — HOSPITAL ENCOUNTER (OUTPATIENT)
Dept: CARDIAC REHAB | Age: 70
Discharge: HOME | End: 2020-07-06
Payer: MEDICARE

## 2020-07-06 VITALS — BODY MASS INDEX: 42.07 KG/M2 | WEIGHT: 230 LBS

## 2020-07-06 PROCEDURE — 93798 PHYS/QHP OP CAR RHAB W/ECG: CPT

## 2020-07-08 ENCOUNTER — HOSPITAL ENCOUNTER (OUTPATIENT)
Dept: CARDIAC REHAB | Age: 70
Discharge: HOME | End: 2020-07-08
Payer: MEDICARE

## 2020-07-08 VITALS — BODY MASS INDEX: 41.7 KG/M2 | WEIGHT: 228 LBS

## 2020-07-08 PROCEDURE — 93798 PHYS/QHP OP CAR RHAB W/ECG: CPT

## 2020-07-10 ENCOUNTER — HOSPITAL ENCOUNTER (OUTPATIENT)
Dept: CARDIAC REHAB | Age: 70
Discharge: HOME | End: 2020-07-10
Payer: MEDICARE

## 2020-07-10 VITALS — BODY MASS INDEX: 41.56 KG/M2 | WEIGHT: 227.2 LBS

## 2020-07-10 PROCEDURE — 93798 PHYS/QHP OP CAR RHAB W/ECG: CPT

## 2020-07-13 ENCOUNTER — TELEPHONE (OUTPATIENT)
Dept: CARDIOLOGY CLINIC | Age: 70
End: 2020-07-13

## 2020-07-13 ENCOUNTER — HOSPITAL ENCOUNTER (OUTPATIENT)
Dept: CARDIAC REHAB | Age: 70
Discharge: HOME | End: 2020-07-13
Payer: MEDICARE

## 2020-07-13 VITALS — WEIGHT: 226.4 LBS | BODY MASS INDEX: 41.41 KG/M2

## 2020-07-13 PROCEDURE — 93798 PHYS/QHP OP CAR RHAB W/ECG: CPT

## 2020-07-13 NOTE — TELEPHONE ENCOUNTER
Received a letter from [employer], patient applied for [employer] assistance for [employer]. Letter states patient may qualify for LIS. I called patient and gave her number for SS to apply for LIS.   She states she will call now     Patient called and states she has been approved for LIS

## 2020-07-14 ENCOUNTER — HOSPITAL ENCOUNTER (OUTPATIENT)
Dept: MAMMOGRAPHY | Age: 70
Discharge: HOME | End: 2020-07-14
Attending: NURSE PRACTITIONER
Payer: MEDICARE

## 2020-07-14 DIAGNOSIS — Z12.31 SCREENING MAMMOGRAM, ENCOUNTER FOR: ICD-10-CM

## 2020-07-14 PROCEDURE — 77067 SCR MAMMO BI INCL CAD: CPT

## 2020-07-15 ENCOUNTER — HOSPITAL ENCOUNTER (OUTPATIENT)
Dept: CARDIAC REHAB | Age: 70
Discharge: HOME | End: 2020-07-15
Payer: MEDICARE

## 2020-07-15 VITALS — BODY MASS INDEX: 41.81 KG/M2 | WEIGHT: 228.6 LBS

## 2020-07-15 PROCEDURE — 93798 PHYS/QHP OP CAR RHAB W/ECG: CPT

## 2020-07-17 ENCOUNTER — HOSPITAL ENCOUNTER (OUTPATIENT)
Dept: CARDIAC REHAB | Age: 70
Discharge: HOME | End: 2020-07-17
Payer: MEDICARE

## 2020-07-17 VITALS — WEIGHT: 226 LBS | BODY MASS INDEX: 41.34 KG/M2

## 2020-07-17 PROCEDURE — 93798 PHYS/QHP OP CAR RHAB W/ECG: CPT

## 2020-07-18 ENCOUNTER — HOSPITAL ENCOUNTER (OUTPATIENT)
Dept: PREADMISSION TESTING | Age: 70
Discharge: HOME | End: 2020-07-18
Attending: INTERNAL MEDICINE
Payer: MEDICARE

## 2020-07-18 DIAGNOSIS — Z20.822 ENCOUNTER FOR LABORATORY TESTING FOR COVID-19 VIRUS: ICD-10-CM

## 2020-07-18 PROCEDURE — 87635 SARS-COV-2 COVID-19 AMP PRB: CPT

## 2020-07-20 ENCOUNTER — HOSPITAL ENCOUNTER (OUTPATIENT)
Dept: CARDIAC REHAB | Age: 70
Discharge: HOME | End: 2020-07-20
Payer: MEDICARE

## 2020-07-20 VITALS — BODY MASS INDEX: 41.41 KG/M2 | WEIGHT: 226.4 LBS

## 2020-07-20 LAB — SARS-COV-2, COV2NT: NOT DETECTED

## 2020-07-20 PROCEDURE — 93798 PHYS/QHP OP CAR RHAB W/ECG: CPT

## 2020-07-21 RX ORDER — EZETIMIBE 10 MG/1
TABLET ORAL
Qty: 30 TAB | Refills: 0 | Status: SHIPPED | OUTPATIENT
Start: 2020-07-21 | End: 2020-09-02 | Stop reason: SDUPTHER

## 2020-07-22 ENCOUNTER — APPOINTMENT (OUTPATIENT)
Dept: CARDIAC REHAB | Age: 70
End: 2020-07-22
Payer: MEDICARE

## 2020-07-22 ENCOUNTER — ANESTHESIA (OUTPATIENT)
Dept: ENDOSCOPY | Age: 70
End: 2020-07-22
Payer: MEDICARE

## 2020-07-22 ENCOUNTER — HOSPITAL ENCOUNTER (OUTPATIENT)
Age: 70
Setting detail: OUTPATIENT SURGERY
Discharge: HOME | End: 2020-07-22
Attending: INTERNAL MEDICINE | Admitting: INTERNAL MEDICINE
Payer: MEDICARE

## 2020-07-22 ENCOUNTER — ANESTHESIA EVENT (OUTPATIENT)
Dept: ENDOSCOPY | Age: 70
End: 2020-07-22
Payer: MEDICARE

## 2020-07-22 VITALS
BODY MASS INDEX: 41.04 KG/M2 | HEART RATE: 70 BPM | WEIGHT: 223 LBS | OXYGEN SATURATION: 99 % | HEIGHT: 62 IN | TEMPERATURE: 97.6 F | RESPIRATION RATE: 14 BRPM | SYSTOLIC BLOOD PRESSURE: 129 MMHG | DIASTOLIC BLOOD PRESSURE: 68 MMHG

## 2020-07-22 PROCEDURE — 74011000250 HC RX REV CODE- 250: Performed by: NURSE ANESTHETIST, CERTIFIED REGISTERED

## 2020-07-22 PROCEDURE — 76040000019: Performed by: INTERNAL MEDICINE

## 2020-07-22 PROCEDURE — 74011250636 HC RX REV CODE- 250/636: Performed by: NURSE ANESTHETIST, CERTIFIED REGISTERED

## 2020-07-22 PROCEDURE — 76060000031 HC ANESTHESIA FIRST 0.5 HR: Performed by: INTERNAL MEDICINE

## 2020-07-22 RX ORDER — PROPOFOL 10 MG/ML
INJECTION, EMULSION INTRAVENOUS AS NEEDED
Status: DISCONTINUED | OUTPATIENT
Start: 2020-07-22 | End: 2020-07-22 | Stop reason: HOSPADM

## 2020-07-22 RX ORDER — EPINEPHRINE 0.1 MG/ML
1 INJECTION INTRACARDIAC; INTRAVENOUS
Status: DISCONTINUED | OUTPATIENT
Start: 2020-07-22 | End: 2020-07-22 | Stop reason: HOSPADM

## 2020-07-22 RX ORDER — NALOXONE HYDROCHLORIDE 0.4 MG/ML
0.4 INJECTION, SOLUTION INTRAMUSCULAR; INTRAVENOUS; SUBCUTANEOUS
Status: DISCONTINUED | OUTPATIENT
Start: 2020-07-22 | End: 2020-07-22 | Stop reason: HOSPADM

## 2020-07-22 RX ORDER — DEXTROMETHORPHAN/PSEUDOEPHED 2.5-7.5/.8
1.2 DROPS ORAL
Status: DISCONTINUED | OUTPATIENT
Start: 2020-07-22 | End: 2020-07-22 | Stop reason: HOSPADM

## 2020-07-22 RX ORDER — ATROPINE SULFATE 0.1 MG/ML
0.5 INJECTION INTRAVENOUS
Status: DISCONTINUED | OUTPATIENT
Start: 2020-07-22 | End: 2020-07-22 | Stop reason: HOSPADM

## 2020-07-22 RX ORDER — SODIUM CHLORIDE 0.9 % (FLUSH) 0.9 %
5-40 SYRINGE (ML) INJECTION EVERY 8 HOURS
Status: DISCONTINUED | OUTPATIENT
Start: 2020-07-22 | End: 2020-07-22 | Stop reason: HOSPADM

## 2020-07-22 RX ORDER — SODIUM CHLORIDE 9 MG/ML
50 INJECTION, SOLUTION INTRAVENOUS CONTINUOUS
Status: DISCONTINUED | OUTPATIENT
Start: 2020-07-22 | End: 2020-07-22 | Stop reason: HOSPADM

## 2020-07-22 RX ORDER — SODIUM CHLORIDE 9 MG/ML
INJECTION, SOLUTION INTRAVENOUS
Status: DISCONTINUED | OUTPATIENT
Start: 2020-07-22 | End: 2020-07-22 | Stop reason: HOSPADM

## 2020-07-22 RX ORDER — FLUMAZENIL 0.1 MG/ML
0.2 INJECTION INTRAVENOUS
Status: DISCONTINUED | OUTPATIENT
Start: 2020-07-22 | End: 2020-07-22 | Stop reason: HOSPADM

## 2020-07-22 RX ORDER — LIDOCAINE HYDROCHLORIDE 20 MG/ML
INJECTION, SOLUTION EPIDURAL; INFILTRATION; INTRACAUDAL; PERINEURAL AS NEEDED
Status: DISCONTINUED | OUTPATIENT
Start: 2020-07-22 | End: 2020-07-22 | Stop reason: HOSPADM

## 2020-07-22 RX ORDER — SODIUM CHLORIDE 0.9 % (FLUSH) 0.9 %
5-40 SYRINGE (ML) INJECTION AS NEEDED
Status: DISCONTINUED | OUTPATIENT
Start: 2020-07-22 | End: 2020-07-22 | Stop reason: HOSPADM

## 2020-07-22 RX ADMIN — PROPOFOL 50 MG: 10 INJECTION, EMULSION INTRAVENOUS at 12:06

## 2020-07-22 RX ADMIN — SODIUM CHLORIDE: 900 INJECTION, SOLUTION INTRAVENOUS at 11:11

## 2020-07-22 RX ADMIN — LIDOCAINE HYDROCHLORIDE 40 MG: 20 INJECTION, SOLUTION EPIDURAL; INFILTRATION; INTRACAUDAL; PERINEURAL at 11:57

## 2020-07-22 RX ADMIN — PROPOFOL 50 MG: 10 INJECTION, EMULSION INTRAVENOUS at 11:58

## 2020-07-22 RX ADMIN — PROPOFOL 50 MG: 10 INJECTION, EMULSION INTRAVENOUS at 12:01

## 2020-07-22 RX ADMIN — PROPOFOL 30 MG: 10 INJECTION, EMULSION INTRAVENOUS at 12:09

## 2020-07-22 NOTE — ANESTHESIA PREPROCEDURE EVALUATION
Relevant Problems   No relevant active problems       Anesthetic History               Review of Systems / Medical History  Patient summary reviewed, nursing notes reviewed and pertinent labs reviewed    Pulmonary    COPD    Sleep apnea: BiPAP    Asthma        Neuro/Psych              Cardiovascular    Hypertension      CHF: NYHA Classification III    CAD    Exercise tolerance: >4 METS  Comments: EF 25-30%   GI/Hepatic/Renal                Endo/Other    Diabetes  Hypothyroidism  Morbid obesity and arthritis     Other Findings            Physical Exam    Airway  Mallampati: II  TM Distance: > 6 cm  Neck ROM: normal range of motion   Mouth opening: Normal     Cardiovascular    Rhythm: regular  Rate: normal         Dental  No notable dental hx       Pulmonary  Breath sounds clear to auscultation               Abdominal         Other Findings            Anesthetic Plan    ASA: 3  Anesthesia type: MAC          Induction: Intravenous  Anesthetic plan and risks discussed with: Patient

## 2020-07-22 NOTE — ROUTINE PROCESS
Ligia Cove  1950  [id number]    Situation:  Verbal report received from: Corieelio Donovan  Procedure: Procedure(s):  COLONOSCOPY   :-    Background:    Preoperative diagnosis: FAMILY HX OF COLON POLYPS  PERSONAL HX OF COLON POLYPS  Postoperative diagnosis: Diverticulosis  internal hemrrhoids    :  Dr. Malik Claire  Assistant(s): Endoscopy Technician-1: Juan Ramon THOMPSON  Endoscopy RN-1: Pee Jane RN    Specimens: * No specimens in log *  H. Pylori  no    Assessment:  Intra-procedure medications   Anesthesia gave intra-procedure sedation and medications, see anesthesia flow sheet yes    Intravenous fluids: NS@ KVO     Vital signs stable     Abdominal assessment: round and soft     Recommendation:  Discharge patient per MD order.   Family or Friend [facility]  Permission to share finding with family or friend yes

## 2020-07-22 NOTE — H&P
[address]  [address] [facility] [address], [address]   [phone]                                History and Physical     NAME: Mita Dunn   :  1950   MRN:  [id number]     HPI:  The patient was seen and examined.     Past Surgical History:   Procedure Laterality Date    CARDIAC SURG PROCEDURE UNLIST  2018    cardiac cath - Left    HX ARTHROPLASTY  2105    knee - right    HX  SECTION      x3    HX CORONARY ARTERY BYPASS GRAFT  1997    3 vessels    HX CORONARY STENT PLACEMENT  2016    HX HERNIA REPAIR  1988    HX IMPLANTABLE CARDIOVERTER DEFIBRILLATOR      HX KNEE REPLACEMENT Right 2015    once    LAP GASTRIC BYPASS/KERLINE-EN-Y  2011    lap band per pt and not a gastric bypass     Past Medical History:   Diagnosis Date    Adverse effect of anesthesia     hard to wake up/uses BIPAP/\"try to avoid general if possible\"/intubated in past prior to going to sleep and it caused pt to be incontinent    Arthritis     Asthma     CAD (coronary artery disease)     Chronic kidney disease     elevataed creatinine    Chronic obstructive pulmonary disease (HCC)     Chronic pain     arthritis    Coagulation disorder (HCC)     on plavix    Congestive heart failure (HCC)     Diabetes ([address]     Hypertension     Morbid obesity ([address]     Sleep apnea     BIPAP with 2 liters oxygen    Thromboembolus ([address]     after heart surgery - left leg    Thyroid disease      Social History     Tobacco Use    Smoking status: Former Smoker     Types: Cigarettes    Smokeless tobacco: Never Used    Tobacco comment: quit /started again (smoked 3 yrs) off and on/none since    Substance Use Topics    Alcohol use: Not Currently    Drug use: Not Currently     Allergies   Allergen Reactions    Crestor [Rosuvastatin] Other (comments)     Causes muscle cramps    Lisinopril Cough    Nsaids (Non-Steroidal Anti-Inflammatory Drug) Other (comments)     Liver and Kidney     Family History   Problem Relation Age of Onset    Hypertension Mother     Dementia Mother     Coronary Artery Disease Father 58    Sudden Death Father 58    Diabetes Son     Diabetes Brother      Current Facility-Administered Medications   Medication Dose Route Frequency    0.9% sodium chloride infusion  50 mL/hr IntraVENous CONTINUOUS    sodium chloride (NS) flush 5-40 mL  5-40 mL IntraVENous Q8H    sodium chloride (NS) flush 5-40 mL  5-40 mL IntraVENous PRN    naloxone (NARCAN) injection 0.4 mg  0.4 mg IntraVENous Multiple    flumazeniL (ROMAZICON) 0.1 mg/mL injection 0.2 mg  0.2 mg IntraVENous Multiple    simethicone (MYLICON) 96AY/0.8WW oral drops 80 mg  1.2 mL Oral Multiple    atropine injection 0.5 mg  0.5 mg IntraVENous ONCE PRN    EPINEPHrine (ADRENALIN) 0.1 mg/mL syringe 1 mg  1 mg Endoscopically ONCE PRN     Facility-Administered Medications Ordered in Other Encounters   Medication Dose Route Frequency    0.9% sodium chloride infusion   IntraVENous CONTINUOUS         PHYSICAL EXAM:  General: WD, WN. Alert, cooperative, no acute distress    HEENT: NC, Atraumatic. PERRLA, EOMI. Anicteric sclerae. Lungs:  CTA Bilaterally. No Wheezing/Rhonchi/Rales. Heart:  Regular  rhythm,  No murmur, No Rubs, No Gallops  Abdomen: Soft, Non distended, Non tender. +Bowel sounds, no HSM  Extremities: No c/c/e  Neurologic:  CN 2-12 gi, Alert and oriented X 3. No acute neurological distress   Psych:   Good insight. Not anxious nor agitated. The heart, lungs and mental status were satisfactory for the administration of MAC sedation and for the procedure. Mallampati score: 3     The patient was counseled at length about the risks of dominique Covid-19 in the ziggy-operative and post-operative states including the recovery window of their procedure.   The patient was made aware that dominique Covid-19 after a surgical procedure may worsen their prognosis for recovering from the virus and lend to a higher morbidity and or mortality risk. The patient was given the options of postponing their procedure. All of the risks, benefits, and alternatives were discussed. The patient does  wish to proceed with the procedure.       Assessment:   · Constipation  · Personal history colon polyps    Plan:   · Endoscopic procedure  · MAC sedation   ·

## 2020-07-22 NOTE — ANESTHESIA POSTPROCEDURE EVALUATION
Post-Anesthesia Evaluation and Assessment    Patient: Ananth Arzola MRN: [id number]  SSN: [id number]    YOB: 1950  Age: 65 y.o. Sex: female      I have evaluated the patient and they are stable and ready for discharge from the PACU. Cardiovascular Function/Vital Signs  Visit Vitals  /68   Pulse 70   Temp 36.4 °C (97.6 °F)   Resp 14   Ht 5' 2\" (1.575 m)   Wt 101.2 kg (223 lb)   SpO2 99%   BMI 40.79 kg/m²       Patient is status post MAC anesthesia for Procedure(s):  COLONOSCOPY   :-.    Nausea/Vomiting: None    Postoperative hydration reviewed and adequate. Pain:  Pain Scale 1: Numeric (0 - 10) (07/22/20 1238)  Pain Intensity 1: 0 (07/22/20 1238)   Managed    Neurological Status: At baseline    Mental Status, Level of Consciousness: Alert and  oriented to person, place, and time    Pulmonary Status:   O2 Device: Room air (07/22/20 1238)   Adequate oxygenation and airway patent    Complications related to anesthesia: None    Post-anesthesia assessment completed. No concerns    Signed By: Teresa Camejo MD     July 22, 2020              Procedure(s):  COLONOSCOPY   :-.    MAC    <BSHSIANPOST>    INITIAL Post-op Vital signs:   Vitals Value Taken Time   BP 0/0 7/22/2020 12:37 PM   Temp 36.4 °C (97.6 °F) 7/22/2020 12:22 PM   Pulse 0 7/22/2020 12:37 PM   Resp 0 7/22/2020 12:39 PM   SpO2 99 % 7/22/2020 12:33 PM   Vitals shown include unvalidated device data.

## 2020-07-22 NOTE — DISCHARGE INSTRUCTIONS
Marielena Dozier 272  [address] [place] [address] [address], [facility] 75  [id number]  1950    COLON DISCHARGE INSTRUCTIONS    DISCOMFORT:  Redness at IV site- apply warm compress to area; if redness or soreness persist- contact your physician  There may be a slight amount of blood passed from the rectum  Gaseous discomfort- walking, belching will help relieve any discomfort      DIET:   High fiber diet. - however -  remember your colon is empty and a heavy meal will produce gas. Avoid these foods:  vegetables, fried / greasy foods, carbonated drinks for today  You may not  drink alcoholic beverages for at least 12 hours     ACTIVITY:  It is recommended that you spend the remainder of the day resting -  avoid any strenuous activity. You may not operate a vehicle for 12 hours  You may not  engage in an occupation involving machinery or appliances for rest of today    Avoid making any critical decisions for at least 24 hour    CALL M.D. ANY SIGN OF:   Increasing pain, nausea, vomiting  Abdominal distension (swelling)  New increased bleeding (oral or rectal)  Fever (chills)  Pain in chest area  Bloody discharge from nose or mouth  Shortness of breath    You may not  take any Advil, Aspirin, Ibuprofen, Motrin, Aleve, or Goodys for 3 days, ONLY  Tylenol as needed for pain.  -Take Metamucil 1 tablespoon in glass of water once daily  -Start Plavix tomorrow    Post procedure diagnosis: Diverticulosis  internal hemrrhoids      Follow-up Instructions:    Call Dr. Vivian Vargas for any questions or problems. If we took a biopsy please call the office within 2 weeks to discuss your  pathology results. Telephone # [phone]         Learning About Coronavirus [phone])  Coronavirus [phone]): Overview  What is coronavirus (COVID-19)? The coronavirus disease (COVID-19) is caused by a virus.  It is an illness that was first found in [place], [place], in December 2019. It has since spread worldwide. The virus can cause fever, cough, and trouble breathing. In severe cases, it can cause pneumonia and make it hard to breathe without help. It can cause death. Coronaviruses are a large group of viruses. They cause the common cold. They also cause more serious illnesses like Middle East respiratory syndrome (MERS) and severe acute respiratory syndrome (SARS). COVID-19 is caused by a novel coronavirus. That means it's a new type that has not been seen in people before. This virus spreads person-to-person through droplets from coughing and sneezing. It can also spread when you are close to someone who is infected. And it can spread when you touch something that has the virus on it, such as a doorknob or a tabletop. What can you do to protect yourself from coronavirus (COVID-19)? The best way to protect yourself from getting sick is to:  · Avoid areas where there is an outbreak. · Avoid contact with people who may be infected. · Wash your hands often with soap or alcohol-based hand sanitizers. · Avoid crowds and try to stay at least 6 feet away from other people. · Wash your hands often, especially after you cough or sneeze. Use soap and water, and scrub for at least 20 seconds. If soap and water aren't available, use an alcohol-based hand . · Avoid touching your mouth, nose, and eyes. What can you do to avoid spreading the virus to others? To help avoid spreading the virus to others:  · Cover your mouth with a tissue when you cough or sneeze. Then throw the tissue in the trash. · Use a disinfectant to clean things that you touch often. · Stay home if you are sick or have been exposed to the virus. Don't go to school, work, or public areas. And don't use public transportation. · If you are sick:  ? Leave your home only if you need to get medical care. But call the doctor's office first so they know you're coming.  And wear a face mask, if you have one.  ? If you have a face mask, wear it whenever you're around other people. It can help stop the spread of the virus when you cough or sneeze. ? Clean and disinfect your home every day. Use household  and disinfectant wipes or sprays. Take special care to clean things that you grab with your hands. These include doorknobs, remote controls, phones, and handles on your refrigerator and microwave. And don't forget countertops, tabletops, bathrooms, and computer keyboards. When to call for help  Call 911 anytime you think you may need emergency care. For example, call if:  · You have severe trouble breathing. (You can't talk at all.)  · You have constant chest pain or pressure. · You are severely dizzy or lightheaded. · You are confused or can't think clearly. · Your face and lips have a blue color. · You pass out (lose consciousness) or are very hard to wake up. Call your doctor now if you develop symptoms such as:  · Shortness of breath. · Fever. · Cough. If you need to get care, call ahead to the doctor's office for instructions before you go. Make sure you wear a face mask, if you have one, to prevent exposing other people to the virus. Where can you get the latest information? The following health organizations are tracking and studying this virus. Their websites contain the most up-to-date information. Hayde Sana also learn what to do if you think you may have been exposed to the virus. · U.S. Centers for Disease Control and Prevention (CDC): The CDC provides updated news about the disease and travel advice. The website also tells you how to prevent the spread of infection. www.cdc.gov  · World Health Organization [facility]): WHO offers information about the virus outbreaks. WHO also has travel advice. www.who.int  Current as of: April 1, 2020               Content Version: 12.4  © 5756-6034 Healthwise, Incorporated.    Care instructions adapted under license by your healthcare professional. If you have questions about a medical condition or this instruction, always ask your healthcare professional. [place] [address] any warranty or liability for your use of this information.

## 2020-07-22 NOTE — PERIOP NOTES

## 2020-07-22 NOTE — PROCEDURES
118 [facility].  [address], [address]  [phone]                              Colonoscopy Procedure Note      Indications:    Constipation, Personal history of colon polyps (screening only)     :  Sherly Mclean MD    Surgical Assistant: None    Implants: none    Referring Provider: Julio Overton NP    Sedation:  MAC anesthesia    Procedure Details:  After informed consent was obtained with all risks and benefits of procedure explained and preoperative exam completed, the patient was taken to the endoscopy suite and placed in the left lateral decubitus position. Upon sequential sedation as per above, a digital rectal exam was performed  And was normal.  The Olympus videocolonoscope  was inserted in the rectum and carefully advanced to the cecum, which was identified by the ileocecal valve and appendiceal orifice. The quality of preparation was good. The colonoscope was slowly withdrawn with careful evaluation between folds. Retroflexion in the rectum was performed and was normal..     Findings:   Rectum: no mucosal lesion appreciated  Grade 2 internal and external hemorrhoid(s); Sigmoid: no mucosal lesion appreciated      - Diverticulosis  Descending Colon: no mucosal lesion appreciated      - Diverticulosis  Transverse Colon: no mucosal lesion appreciated  Ascending Colon: no mucosal lesion appreciated      - Diverticulosis  Cecum: no mucosal lesion appreciated      - Diverticulosis  Terminal Ileum: not intubated    Interventions:  none    Specimen Removed:  * No specimens in log *    Complications: None. EBL:  None. Recommendations:   -Repeat colonoscopy in 5 years.  -High fiber diet.     -Resume normal medication(s). -Take Metamucil 1 tablespoon in glass of water once daily  -Start Plavix tomorrow    Discharge Disposition:  Home in the company of a  when able to ambulate.     Sherly Mclean MD  7/22/2020  12:18 PM

## 2020-07-24 ENCOUNTER — HOSPITAL ENCOUNTER (OUTPATIENT)
Dept: CARDIAC REHAB | Age: 70
Discharge: HOME | End: 2020-07-24
Payer: MEDICARE

## 2020-07-24 VITALS — WEIGHT: 225.6 LBS | BODY MASS INDEX: 41.26 KG/M2

## 2020-07-24 LAB
25(OH)D3+25(OH)D2 SERPL-MCNC: 35.8 NG/ML (ref 30–100)
ALBUMIN SERPL-MCNC: 4.1 G/DL (ref 3.7–4.7)
ALBUMIN/GLOB SERPL: 2 {RATIO} (ref 1.2–2.2)
ALP SERPL-CCNC: 68 IU/L (ref 39–117)
ALT SERPL-CCNC: 20 IU/L (ref 0–32)
AST SERPL-CCNC: 27 IU/L (ref 0–40)
BILIRUB SERPL-MCNC: 0.3 MG/DL (ref 0–1.2)
BUN SERPL-MCNC: 35 MG/DL (ref 8–27)
BUN/CREAT SERPL: 15 (ref 12–28)
CALCIUM SERPL-MCNC: 10.6 MG/DL (ref 8.7–10.3)
CHLORIDE SERPL-SCNC: 102 MMOL/L (ref 96–106)
CHOLEST SERPL-MCNC: 172 MG/DL (ref 100–199)
CK SERPL-CCNC: 273 U/L (ref 32–182)
CO2 SERPL-SCNC: 22 MMOL/L (ref 20–29)
CREAT SERPL-MCNC: 2.27 MG/DL (ref 0.57–1)
ERYTHROCYTE [DISTWIDTH] IN BLOOD BY AUTOMATED COUNT: 14.5 % (ref 11.7–15.4)
FERRITIN SERPL-MCNC: 59 NG/ML (ref 15–150)
GLOBULIN SER CALC-MCNC: 2.1 G/DL (ref 1.5–4.5)
GLUCOSE SERPL-MCNC: 125 MG/DL (ref 65–99)
HCT VFR BLD AUTO: 31.4 % (ref 34–46.6)
HDLC SERPL-MCNC: 75 MG/DL
HGB BLD-MCNC: 10.8 G/DL (ref 11.1–15.9)
INTERPRETATION, 910389: NORMAL
INTERPRETATION: NORMAL
IRON SATN MFR SERPL: 19 % (ref 15–55)
IRON SERPL-MCNC: 60 UG/DL (ref 27–139)
LDLC SERPL CALC-MCNC: 78 MG/DL (ref 0–99)
Lab: NORMAL
MCH RBC QN AUTO: 32.5 PG (ref 26.6–33)
MCHC RBC AUTO-ENTMCNC: 34.4 G/DL (ref 31.5–35.7)
MCV RBC AUTO: 95 FL (ref 79–97)
NT-PROBNP SERPL-MCNC: 669 PG/ML (ref 0–301)
PDF IMAGE, 910387: NORMAL
PLATELET # BLD AUTO: 253 X10E3/UL (ref 150–450)
POTASSIUM SERPL-SCNC: 4.5 MMOL/L (ref 3.5–5.2)
PROT SERPL-MCNC: 6.2 G/DL (ref 6–8.5)
RBC # BLD AUTO: 3.32 X10E6/UL (ref 3.77–5.28)
SODIUM SERPL-SCNC: 140 MMOL/L (ref 134–144)
TIBC SERPL-MCNC: 313 UG/DL (ref 250–450)
TRIGL SERPL-MCNC: 93 MG/DL (ref 0–149)
UIBC SERPL-MCNC: 253 UG/DL (ref 118–369)
VLDLC SERPL CALC-MCNC: 19 MG/DL (ref 5–40)
WBC # BLD AUTO: 4.9 X10E3/UL (ref 3.4–10.8)

## 2020-07-24 PROCEDURE — 93798 PHYS/QHP OP CAR RHAB W/ECG: CPT

## 2020-07-27 ENCOUNTER — HOSPITAL ENCOUNTER (OUTPATIENT)
Dept: CARDIAC REHAB | Age: 70
Discharge: HOME | End: 2020-07-27
Payer: MEDICARE

## 2020-07-27 ENCOUNTER — OFFICE VISIT (OUTPATIENT)
Dept: CARDIOLOGY CLINIC | Age: 70
End: 2020-07-27

## 2020-07-27 VITALS
HEART RATE: 81 BPM | WEIGHT: 224.4 LBS | OXYGEN SATURATION: 97 % | HEIGHT: 62 IN | SYSTOLIC BLOOD PRESSURE: 98 MMHG | RESPIRATION RATE: 20 BRPM | BODY MASS INDEX: 41.3 KG/M2 | DIASTOLIC BLOOD PRESSURE: 60 MMHG | TEMPERATURE: 96.6 F

## 2020-07-27 VITALS — WEIGHT: 224.1 LBS | BODY MASS INDEX: 40.99 KG/M2

## 2020-07-27 DIAGNOSIS — I50.42 CHRONIC HEART FAILURE WITH REDUCED EJECTION FRACTION AND DIASTOLIC DYSFUNCTION (HCC): ICD-10-CM

## 2020-07-27 DIAGNOSIS — I25.5 ISCHEMIC CARDIOMYOPATHY: Primary | ICD-10-CM

## 2020-07-27 PROCEDURE — 93798 PHYS/QHP OP CAR RHAB W/ECG: CPT

## 2020-07-27 RX ORDER — FUROSEMIDE 40 MG/1
40 TABLET ORAL DAILY
Qty: 180 TAB | Refills: 1 | Status: SHIPPED | OUTPATIENT
Start: 2020-07-27 | End: 2020-08-19 | Stop reason: ALTCHOICE

## 2020-07-27 NOTE — PROGRESS NOTES
[facility] Visit        DOS:   7/27/2020  NAME:  Servando Ortiz   MRN:   [id number]   REFERRING PROVIDER:  Tiffany Monsivais NP  PRIMARY CARE PHYSICIAN: Tiffany Monsivais NP  PRIMARY CARDIOLOGIST: none      Chief Complaint:   Chief Complaint   Patient presents with    CHF     BLE edema    Fatigue       HPI: 65y.o. year old female with a history of HTN, HLD, WES, obesity (Body mass index is 41.04 kg/m².) s/p gastric bypass in 2011, T2DM, hypothyroidism, COPD, CAD s/p CABG in 1997, s/p PCI to [address] in 5/15, ICM, VT, s/p AICD ([employer]),  anxiety, and depression who presents today for a HF clinic visit for her chronic systolic heart failure. Ms. Lyric Neri recalls having a viral syndrome last year and has not felt well since then. She uses oxygen with her BiPAP every night and sleeps on two pillows. She was recently admitted to [facility] for acute on chronic systolic heart failure, treated with IV inotropes and diuretics. She was scheduled to undergo BiV upgrade with Dr. Rhoda Tamayo, however, her QRS was too narrow for the upgrade. He increased her low pacing threshold from 50 bpm to 70 bpm.      She presents today for follow up HF clinic. Overall she states she is at her baseline, her weight has not changed in the last month but she is starting cardiac rehab this week and is looking forward to it. She denies dyspnea, orthopnea, dizziness, lightheadedness, syncope, pre-syncope, LE edema, or abdominal distention. She is compliant with her medications and weighs herself and measures her BP diligently. Optivol Interrogation 7/27/20:  Optivol below threshold and trending down  Impedance trending up  Total  0.2%  Patient activity 0.8 hr/day  Time in AT/AF 0 hr/day  No ventricular arrhythmias  No shock therapy      Impression / Plan:   1.  ICM - Stage C, NYHA Class II, LVEF 30-35%   TTE 1/31 shows decrease in EF to 30-35%   TTE 6/18/20- EF 25-30%, mod TR   Continue Toprol XL 12.5 mg daily   Continue Entresto 24/26 mg PO BID - watch Cr closely    Continue hydralazine 50 mg PO TID and Imdur 30mg daily   Cont maintenance lasix dosing    Intolerant to AA due to hyperkalemia   Low sodium diet, daily weights, strict I/O   Invitate - DSP (VUS)   Elevated SFLC   Equivocal PYP imaging   Resumed cardiac rehab on 7/1/20   Follow up in the [facility] in 2 weeks   Repeat BMP, Mg, NT pro-BNP prior to next clinic visit      2. CAD s/p CABG in 1997, s/p PCI to [address] in 10/16   On ASA, statin, BB, Zetia   Severe 3V disease     3. HTN - well controlled   On BB, Entresto, and hydralazine/nitrate   Low sodium diet   Compliant with BiPAP    4. VT - s/p dual chamber AICD with RA lead    No shocks   No arrhythmias on interrogation    Dr. Cherelle Conteh increased rate from 50 bpm to 70 bpm    Has new home monitor     5. WES - on BiPAP with oxygen   Compliant with BiPAP   Has not seen sleep medicine MD in 1 year   Will make an appt   Will need pulmonary appt as well    6. P7MA complicated by neuropathy   HgA1C 8.4   Continue Jardiance 10 mg PO daily - enrolled in patient assistance   Instructed to watch for  infections     7. Hypothyroidism   On levothyroxine 100 mcgs daily   TFTs in October     8. Depression   On Wellbutrin  mg     9. History of Tobacco Abuse - 1/2 ppd x 20 years, quit 5 years ago   Counseled re: importance of continued abstinence    10. Gout    Continue allopurinol    Discussed low purine foods     11. Obesity Body mass index is 41.04 kg/m². Encouraged to meet with dietician at cardiac rehab     12. Osteoarthritis - s/p right TKR   Avoid Celebrex due to CKD, HFrEF   S/p left knee injection in 8/2019   Lidocaine patches to knees     13. Hyperlipidemia    , LDL 58 on 5/28/20   Lipoprotein-A 212   Continue Lipitor 80 mg daily    Continue Zetia 10 mg PO daily   Low chol diet     14.    CKD4,suspect diabetic nephropathy and cardiorenal syndrome   Recent Cr 2.2- stable Continue Entresto 24/26 mg PO BID      15. Vitamin D deficiency   Continue cholecalciferol 2,000 units daily 71      Thank you for letting us see her with you,      Lilliam Delarosa MD, [facility]  Chief of Cardiology, [address] [facility]  [address] [facility]  200 [facility], [address] [place] [address], [address] [place] [address]  Office [phone]  Fax [phone]    History:  Past Medical History:   Diagnosis Date    Adverse effect of anesthesia     hard to wake up/uses BIPAP/\"try to avoid general if possible\"/intubated in past prior to going to sleep and it caused pt to be incontinent    Arthritis     Asthma     CAD (coronary artery disease)     Chronic kidney disease     elevataed creatinine    Chronic obstructive pulmonary disease (HCC)     Chronic pain     arthritis    Coagulation disorder ([facility] [address]     on plavix    Congestive heart failure ([facility]     Diabetes ([facility] [address] [facility]     Hypertension     Morbid obesity ([facility] [address]     Sleep apnea     BIPAP with 2 liters oxygen    Thromboembolus ([facility] [address]     after heart surgery - left leg    Thyroid disease      Past Surgical History:   Procedure Laterality Date    CARDIAC SURG PROCEDURE UNLIST  2018    cardiac cath - Left    COLONOSCOPY N/A 2020    COLONOSCOPY   :- performed by Jose Elias Chester MD [facility] [address] [address]  [address]    knee - right    HX  SECTION      x3    HX CORONARY ARTERY BYPASS GRAFT  1997    3 vessels    HX CORONARY STENT PLACEMENT  2016    HX HERNIA REPAIR      HX IMPLANTABLE CARDIOVERTER DEFIBRILLATOR      HX KNEE REPLACEMENT Right 2015    once    LAP GASTRIC BYPASS/KERLINE-EN-Y  2011    lap band per pt and not a gastric bypass     Social History     Socioeconomic History    Marital status:      Spouse name: Not on file    Number of children: Not on file    Years of education: Not on file    Highest education level: Not on file Occupational History    Not on file   Social Needs    Financial resource strain: Not on file    Food insecurity     Worry: Not on file     Inability: Not on file    Transportation needs     Medical: Not on file     Non-medical: Not on file   Tobacco Use    Smoking status: Former Smoker     Types: Cigarettes    Smokeless tobacco: Never Used    Tobacco comment: quit 2001/started again (smoked 3 yrs) off and on/none since 2012   Substance and Sexual Activity    Alcohol use: Not Currently    Drug use: Not Currently    Sexual activity: Not Currently   Lifestyle    Physical activity     Days per week: Not on file     Minutes per session: Not on file    Stress: Not on file   Relationships    Social connections     Talks on phone: Not on file     Gets together: Not on file     Attends Oriental orthodox service: Not on file     Active member of club or organization: Not on file     Attends meetings of clubs or organizations: Not on file     Relationship status: Not on file    Intimate partner violence     Fear of current or ex partner: Not on file     Emotionally abused: Not on file     Physically abused: Not on file     Forced sexual activity: Not on file   Other Topics Concern    Not on file   Social History Narrative    Not on file     Family History   Problem Relation Age of Onset    Hypertension Mother     Dementia Mother     Coronary Artery Disease Father 58    Sudden Death Father 58    Diabetes Son     Diabetes Brother      Allergies: Allergies   Allergen Reactions    Crestor [Rosuvastatin] Other (comments)     Causes muscle cramps    Lisinopril Cough    Nsaids (Non-Steroidal Anti-Inflammatory Drug) Other (comments)     Liver and Kidney       ROS:    Review of Systems   Constitutional: Positive for malaise/fatigue. Negative for chills and fever. Eyes: Negative. Respiratory: Negative for cough, sputum production and shortness of breath. Cardiovascular: Positive for leg swelling.  Negative for chest pain, palpitations and PND. Gastrointestinal: Negative for constipation, heartburn and nausea. Genitourinary: Negative. Musculoskeletal: Positive for joint pain. Chronic knee pain    Skin: Negative. Neurological: Negative for dizziness, weakness and headaches. Psychiatric/Behavioral: Negative for depression. The patient is nervous/anxious. Physical Exam:   Vitals:    Visit Vitals  BP 98/60 (BP 1 Location: Right arm, BP Patient Position: Sitting)   Pulse 81   Temp (!) 96.6 °F (35.9 °C) (Oral)   Resp 20   Ht 5' 2\" (1.575 m)   Wt 224 lb 6.4 oz (101.8 kg)   SpO2 97%   BMI 41.04 kg/m²         Physical Exam   Constitutional: She appears well-developed and well-nourished. No distress. HENT:   Head: Normocephalic. Neck: Normal range of motion. Neck supple. No JVD present. Cardiovascular: Normal rate, regular rhythm, normal heart sounds and intact distal pulses. Pulmonary/Chest: Effort normal and breath sounds normal. No respiratory distress. Abdominal: Soft. Bowel sounds are normal. She exhibits no distension. Musculoskeletal: Normal range of motion. General: Edema present. Neurological: She is alert. Skin: Skin is warm and dry. Psychiatric: She has a normal mood and affect. Vitals reviewed. Recent Labs:   Labs Latest Ref Rng & Units 2020   WBC 3.4 - 10.8 x10E3/uL 4.9 - 6.5   RBC 3.77 - 5.28 x10E6/uL 3.32(L) - 3.31(L)   Hemoglobin 11.1 - 15.9 g/dL 10. 8(L) - 10. 4(L)   Hematocrit 34.0 - 46.6 % 31. 4(L) - 32. 2(L)   MCV 79 - 97 fL 95 - 97   Platelets 002 - 407 T31T8/MASON 253 - 249   Albumin 3.7 - 4.7 g/dL 4.1 - 4.0   Calcium 8.7 - 10.3 mg/dL 10. 6(H) 10. 5(H) 10.2   Glucose 65 - 99 mg/dL 125(H) 120(H) 176(H)   BUN 8 - 27 mg/dL 35(H) 52(H) 51(H)   Creatinine 0.57 - 1.00 mg/dL 2.27(H) 2.22(H) 2.62(H)   Sodium 134 - 144 mmol/L 140 136 140   Potassium 3.5 - 5.2 mmol/L 4.5 4.6 4.8   Some recent data might be hidden     EK2020  Sinus Rhythm, rate 67 bpm   -  Nonspecific T-abnormality. 06/19/20   ECHO ADULT COMPLETE 06/20/2020 6/20/2020    Narrative · Image quality for this study was technically difficult. · Mildly dilated left ventricle. Upper normal wall thickness. Severe   global systolic function. Estimated left ventricular ejection fraction is   25 - 30%. Suboptimal endocardial visualization limits wall motion   analysis. Inconclusive left ventricular diastolic function. · Moderate tricuspid valve regurgitation is present. · Mild to moderate pulmonary hypertension. Pulmonary arterial systolic   pressure is 45 mmHg. · Moderately dilated left atrium. · Mitral valve non-specific thickening. Severe mitral valve regurgitation   is present. Signed by: Mohamud Palmer MD         Prior to Admission medications    Medication Sig Start Date End Date Taking? Authorizing Provider   furosemide (LASIX) 40 mg tablet Take 1 Tab by mouth daily. 7/27/20  Yes Clint Pineda MD   ezetimibe (ZETIA) 10 mg tablet Take 1 tablet by mouth once daily 7/21/20  Yes Algis Corporal, NP   gabapentin (NEURONTIN) 100 mg capsule TAKE 2 CAPSULES BY MOUTH THREE TIMES DAILY . DO NOT EXCEED  6  PER  DAY 6/25/20  Yes Micaela Blankenship, VENKATESH   glipiZIDE (GLUCOTROL) 10 mg tablet Take 10 mg by mouth two (2) times a day. Yes Provider, Historical   sacubitriL-valsartan (Entresto) 24-26 mg tablet Take 1 Tab by mouth two (2) times a day. 5/29/20  Yes Mike, Harpreet Gum B, NP   allopurinoL (ZYLOPRIM) 100 mg tablet Take 1 tablet by mouth once daily 5/28/20  Yes Mike, Harpreet Gum B, NP   isosorbide mononitrate ER (IMDUR) 30 mg tablet Take 1 tablet by mouth once daily 5/28/20  Yes Mike, Lo B, NP   hydrALAZINE (APRESOLINE) 50 mg tablet Take 1 Tab by mouth three (3) times daily. 5/28/20  Yes Mike, Lo B, NP   clopidogreL (PLAVIX) 75 mg tab Take 1 Tab by mouth daily.  5/22/20  Yes Corie Palomares., NP   levothyroxine (SYNTHROID) 100 mcg tablet TAKE 1 TABLET BY MOUTH ONCE DAILY BEFORE BREAKFAST 5/14/20  Yes Corie Ibrahim NP   atorvastatin (LIPITOR) 80 mg tablet TAKE 1 TABLET BY MOUTH AT BEDTIME 4/10/20  Yes Artie Irving NP   cholecalciferol (VITAMIN D3) (1000 Units /25 mcg) tablet Take 2 Tabs by mouth daily. 4/10/20  Yes Micaela Blankenship NP   empagliflozin (JARDIANCE) 10 mg tablet Take 1 Tab by mouth daily. 3/9/20  Yes Artie Irving NP   albuterol (PROVENTIL HFA, VENTOLIN HFA, PROAIR HFA) 90 mcg/actuation inhaler Take 2 Puffs by inhalation every four (4) hours as needed. Yes Provider, Historical   Oxygen 2 L NC At night through BiPAP   Yes Provider, Historical   buPROPion SR (WELLBUTRIN SR) 150 mg SR tablet Take 1 Tab by mouth daily. 2/6/20  Yes Corie Ibrahim NP   metoprolol succinate (TOPROL-XL) 25 mg XL tablet Take 0.5 Tabs by mouth daily. 2/5/20  Yes Artie Irving NP   acetaminophen (TYLENOL ARTHRITIS PAIN) 650 mg TbER Take 1,300 mg by mouth two (2) times a day. Yes Provider, Historical   aspirin 81 mg chewable tablet Take 81 mg by mouth daily.    Yes Provider, Historical     Allergies   Allergen Reactions    Crestor [Rosuvastatin] Other (comments)     Causes muscle cramps    Lisinopril Cough    Nsaids (Non-Steroidal Anti-Inflammatory Drug) Other (comments)     Liver and Kidney       Past Medical History:   Diagnosis Date    Adverse effect of anesthesia     hard to wake up/uses BIPAP/\"try to avoid general if possible\"/intubated in past prior to going to sleep and it caused pt to be incontinent    Arthritis     Asthma     CAD (coronary artery disease)     Chronic kidney disease     elevataed creatinine    Chronic obstructive pulmonary disease (HCC)     Chronic pain     arthritis    Coagulation disorder (HCC)     on plavix    Congestive heart failure (HCC)     Diabetes ([address]     Hypertension     Morbid obesity ([facility] [address]     Sleep apnea 1996    BIPAP with 2 liters oxygen    Thromboembolus ([address]     after heart surgery - left leg    Thyroid disease          MD Nurys [address], [address] [place] [address]  Office [phone]  Fax [phone]

## 2020-07-27 NOTE — LETTER
7/27/2020 2:55 PM 
 
Patient:  Crystal Colunga YOB: 1950 Date of Visit: 7/27/2020 Dear Laura Driscoll MD 
[place] [address] [address] [facility] [place] [address] VIA In Basket: Thank you for referring Ms. Oral Osler to me for evaluation/treatment. Below are the relevant portions of my assessment and plan of care. If you have questions, please do not hesitate to call me. I look forward to following Ms. Elena Borrego along with you. Sincerely, Audra Zhou MD

## 2020-07-27 NOTE — PATIENT INSTRUCTIONS
Medication changes:    Decrease furosemide to 40 mg, 1 tablet daily      Please take this to your pharmacy to notify them of the change in medications. Testing Ordered:    Lab work has been ordered. Please present to a [facility] location of your choice with the orders provided to have lab work done. Please wear gloves and a mask when you present to [facility] and practice diligent hand hygeine before and after your visit. Our office will notify you of any abnormal results. Other Recommendations:      Ensure your drinking an adequate amount of water with a goal of 6-8 eight ounce glasses (1.5-2 liters) of fluid daily. Your urine should be clear and light yellow straw colored. If your blood pressure begins to consistently run below 90/60 and/or you begin to experience dizziness or lightheadedness, please contact the [facility] [address] at [phone]. Follow up in 2 weeks with Gaurav Obregon1      Please monitor your blood pressures daily prior to medications and 2 hours after taking medications. Bring a written record of your blood pressures to your next appointment. Please monitor your weights daily upon waking and after using the bathroom. Keep a written records of your weights and bring to your next appointment. If you have a weight gain of 3 or more pounds overnight OR 5 or more pounds in one week please contact our office. Thank you for allowing us the privilege of being a part of your healthcare team! Please do not hesitate to contact our office at [phone] with any questions or concerns.

## 2020-07-29 ENCOUNTER — APPOINTMENT (OUTPATIENT)
Dept: CARDIAC REHAB | Age: 70
End: 2020-07-29
Payer: MEDICARE

## 2020-07-29 ENCOUNTER — TELEPHONE (OUTPATIENT)
Dept: CARDIOLOGY CLINIC | Age: 70
End: 2020-07-29

## 2020-07-29 ENCOUNTER — TELEPHONE (OUTPATIENT)
Dept: INTERNAL MEDICINE CLINIC | Age: 70
End: 2020-07-29

## 2020-07-29 NOTE — TELEPHONE ENCOUNTER
----- Message from Erica Bryant. Lane Alvarez NP sent at 7/27/2020  2:58 PM EDT -----  Can you let her now that for some reason the lab did not do the hemoglobin a1c. I see that her heart doctor ordered labs - if she has not gone yet- I can put in the A1c lab again. Just have her tell them to draw labs from both providers. .. Also, please let her know that her creatinine (kidney labs) are slightly increased from last time - has she seen a kidney doctor since coming to [facility]?  ----- Message -----  From: [facility] Lab Results In  Sent: 7/24/2020   7:37 AM EDT  To: Erica Bryant.  Lane Alvarez NP

## 2020-07-30 ENCOUNTER — TELEPHONE (OUTPATIENT)
Dept: INTERNAL MEDICINE CLINIC | Age: 70
End: 2020-07-30

## 2020-07-30 NOTE — TELEPHONE ENCOUNTER
----- Message from Yuly Swanson. Edwin Renee NP sent at 7/27/2020  2:58 PM EDT -----  Can you let her now that for some reason the lab did not do the hemoglobin a1c. I see that her heart doctor ordered labs - if she has not gone yet- I can put in the A1c lab again. Just have her tell them to draw labs from both providers. .. Also, please let her know that her creatinine (kidney labs) are slightly increased from last time - has she seen a kidney doctor since coming to [place]?  ----- Message -----  From: [facility] Lab Results In  Sent: 7/24/2020   7:37 AM EDT  To: Yuly Swanson.  Edwin Renee NP

## 2020-07-31 ENCOUNTER — TELEPHONE (OUTPATIENT)
Dept: CARDIOLOGY CLINIC | Age: 70
End: 2020-07-31

## 2020-07-31 ENCOUNTER — APPOINTMENT (OUTPATIENT)
Dept: CARDIAC REHAB | Age: 70
End: 2020-07-31
Payer: MEDICARE

## 2020-07-31 NOTE — TELEPHONE ENCOUNTER
Prior authorization for cardiac rehab faxed on this date. I called Claudean Ghee on 8/11/20 to follow up on prior auth and they state it is pending auth under nurse review, Mecca Hernandez number [id number]. Received auth for cardiac rehab-I notified cardiac rehab at [facility]-they will contact patient and set up appt.

## 2020-08-03 DIAGNOSIS — N18.4 CKD (CHRONIC KIDNEY DISEASE) STAGE 4, GFR 15-29 ML/MIN (HCC): Primary | ICD-10-CM

## 2020-08-03 NOTE — TELEPHONE ENCOUNTER
Gave pt referral information. Pt understands to let [facility] know she needs all labs collected that were previously ordered.

## 2020-08-03 NOTE — TELEPHONE ENCOUNTER
Informed pt a1c was not collected. Pt has not gotten labs done yet for cardio and would like the a1c lab placed.  Pt also states she has not seen a kidney dr and would like a referral.

## 2020-08-03 NOTE — TELEPHONE ENCOUNTER
She already has an order for the A1C, as well as other labs, she just needs to report to Yann Springeremiah to have done. She needs to call to schedule an appt and request that ALL labs be collected that were previously ordered. Also referred her to renal, please give her contact info to make appt.

## 2020-08-07 DIAGNOSIS — E11.49 OTHER DIABETIC NEUROLOGICAL COMPLICATION ASSOCIATED WITH TYPE 2 DIABETES MELLITUS (HCC): ICD-10-CM

## 2020-08-07 RX ORDER — GABAPENTIN 100 MG/1
CAPSULE ORAL
Qty: 180 CAP | Refills: 0 | Status: SHIPPED | OUTPATIENT
Start: 2020-08-07 | End: 2020-09-02 | Stop reason: SDUPTHER

## 2020-08-11 ENCOUNTER — TELEPHONE (OUTPATIENT)
Dept: CARDIAC REHAB | Age: 70
End: 2020-08-11

## 2020-08-11 NOTE — TELEPHONE ENCOUNTER
8/11/2020: [facility]: [address] to keep her updated about the prior auth still pending. Left voicemail message. Sweetie Found     Prior authorization for cardiac rehab faxed on this date. I called Gloria Mix on 8/11/20 to follow up on prior auth and they state it is pending auth under nurse review, Bharath Fernandes number [id number].        Electronically signed by Sanjuana Malik RN at 07/31/20 1308   Electronically signed by Sanjuana Malik RN at 08/11/20 1001

## 2020-08-12 DIAGNOSIS — E11.22 CONTROLLED TYPE 2 DIABETES MELLITUS WITH STAGE 3 CHRONIC KIDNEY DISEASE, WITHOUT LONG-TERM CURRENT USE OF INSULIN (HCC): ICD-10-CM

## 2020-08-12 DIAGNOSIS — I25.5 ISCHEMIC CARDIOMYOPATHY: Primary | ICD-10-CM

## 2020-08-12 DIAGNOSIS — N18.30 CONTROLLED TYPE 2 DIABETES MELLITUS WITH STAGE 3 CHRONIC KIDNEY DISEASE, WITHOUT LONG-TERM CURRENT USE OF INSULIN (HCC): ICD-10-CM

## 2020-08-12 DIAGNOSIS — R06.02 SHORTNESS OF BREATH: ICD-10-CM

## 2020-08-13 ENCOUNTER — TELEPHONE (OUTPATIENT)
Dept: CARDIOLOGY CLINIC | Age: 70
End: 2020-08-13

## 2020-08-13 NOTE — TELEPHONE ENCOUNTER
I called patient to schedule office visit. She didn't have her schedule with her. She will call me back this afternoon or tomorrow to schedule the appointment.

## 2020-08-18 ENCOUNTER — TELEPHONE (OUTPATIENT)
Dept: CARDIOLOGY CLINIC | Age: 70
End: 2020-08-18

## 2020-08-18 ENCOUNTER — TELEPHONE (OUTPATIENT)
Dept: INTERNAL MEDICINE CLINIC | Age: 70
End: 2020-08-18

## 2020-08-18 LAB
BUN SERPL-MCNC: 52 MG/DL (ref 8–27)
BUN/CREAT SERPL: 20 (ref 12–28)
CALCIUM SERPL-MCNC: 10.6 MG/DL (ref 8.7–10.3)
CHLORIDE SERPL-SCNC: 103 MMOL/L (ref 96–106)
CO2 SERPL-SCNC: 22 MMOL/L (ref 20–29)
CREAT SERPL-MCNC: 2.6 MG/DL (ref 0.57–1)
EST. AVERAGE GLUCOSE BLD GHB EST-MCNC: 177 MG/DL
GLUCOSE SERPL-MCNC: 143 MG/DL (ref 65–99)
HBA1C MFR BLD: 7.8 % (ref 4.8–5.6)
MAGNESIUM SERPL-MCNC: 2.7 MG/DL (ref 1.6–2.3)
NT-PROBNP SERPL-MCNC: 254 PG/ML (ref 0–301)
POTASSIUM SERPL-SCNC: 4.5 MMOL/L (ref 3.5–5.2)
SODIUM SERPL-SCNC: 140 MMOL/L (ref 134–144)

## 2020-08-18 NOTE — TELEPHONE ENCOUNTER
Pt called today stating she wants to know how to get her diabetic supplies and if her ins will cover them.  She also wants a referral to see  An eye doctor due to blurred vision and to a pulmonologist.  Pt # [phone]

## 2020-08-19 ENCOUNTER — OFFICE VISIT (OUTPATIENT)
Dept: CARDIOLOGY CLINIC | Age: 70
End: 2020-08-19
Payer: MEDICARE

## 2020-08-19 VITALS
TEMPERATURE: 98.5 F | HEIGHT: 62 IN | WEIGHT: 224.6 LBS | SYSTOLIC BLOOD PRESSURE: 128 MMHG | BODY MASS INDEX: 41.33 KG/M2 | HEART RATE: 82 BPM | OXYGEN SATURATION: 97 % | RESPIRATION RATE: 16 BRPM | DIASTOLIC BLOOD PRESSURE: 74 MMHG

## 2020-08-19 DIAGNOSIS — Z95.810 ICD (IMPLANTABLE CARDIOVERTER-DEFIBRILLATOR) IN PLACE: ICD-10-CM

## 2020-08-19 DIAGNOSIS — R06.02 SHORTNESS OF BREATH: Primary | ICD-10-CM

## 2020-08-19 DIAGNOSIS — I50.42 CHRONIC HEART FAILURE WITH REDUCED EJECTION FRACTION AND DIASTOLIC DYSFUNCTION (HCC): ICD-10-CM

## 2020-08-19 PROCEDURE — 3288F FALL RISK ASSESSMENT DOCD: CPT | Performed by: NURSE PRACTITIONER

## 2020-08-19 PROCEDURE — G8432 DEP SCR NOT DOC, RNG: HCPCS | Performed by: NURSE PRACTITIONER

## 2020-08-19 PROCEDURE — G9899 SCRN MAM PERF RSLTS DOC: HCPCS | Performed by: NURSE PRACTITIONER

## 2020-08-19 PROCEDURE — G8752 SYS BP LESS 140: HCPCS | Performed by: NURSE PRACTITIONER

## 2020-08-19 PROCEDURE — G8427 DOCREV CUR MEDS BY ELIG CLIN: HCPCS | Performed by: NURSE PRACTITIONER

## 2020-08-19 PROCEDURE — G8536 NO DOC ELDER MAL SCRN: HCPCS | Performed by: NURSE PRACTITIONER

## 2020-08-19 PROCEDURE — 99214 OFFICE O/P EST MOD 30 MIN: CPT | Performed by: NURSE PRACTITIONER

## 2020-08-19 PROCEDURE — 3017F COLORECTAL CA SCREEN DOC REV: CPT | Performed by: NURSE PRACTITIONER

## 2020-08-19 PROCEDURE — 1090F PRES/ABSN URINE INCON ASSESS: CPT | Performed by: NURSE PRACTITIONER

## 2020-08-19 PROCEDURE — G8399 PT W/DXA RESULTS DOCUMENT: HCPCS | Performed by: NURSE PRACTITIONER

## 2020-08-19 PROCEDURE — G8754 DIAS BP LESS 90: HCPCS | Performed by: NURSE PRACTITIONER

## 2020-08-19 PROCEDURE — 93298 REM INTERROG DEV EVAL SCRMS: CPT | Performed by: NURSE PRACTITIONER

## 2020-08-19 PROCEDURE — G8417 CALC BMI ABV UP PARAM F/U: HCPCS | Performed by: NURSE PRACTITIONER

## 2020-08-19 PROCEDURE — 1100F PTFALLS ASSESS-DOCD GE2>/YR: CPT | Performed by: NURSE PRACTITIONER

## 2020-08-19 RX ORDER — LANCETS
EACH MISCELLANEOUS
Qty: 1 EACH | Refills: 11 | Status: SHIPPED | OUTPATIENT
Start: 2020-08-19 | End: 2021-04-29 | Stop reason: SDUPTHER

## 2020-08-19 RX ORDER — FUROSEMIDE 20 MG/1
20 TABLET ORAL DAILY
Qty: 60 TAB | Refills: 2 | Status: SHIPPED | OUTPATIENT
Start: 2020-08-19 | End: 2020-09-30

## 2020-08-19 RX ORDER — FUROSEMIDE 40 MG/1
20 TABLET ORAL DAILY
Qty: 180 TAB | Refills: 1
Start: 2020-08-19 | End: 2020-08-19

## 2020-08-19 NOTE — PATIENT INSTRUCTIONS
Medication changes:    Decrease your furosemide to 20 mg by mouth daily. We have sent a new prescription to your pharmacy. Please take this to your pharmacy to notify them of the change in medications. Testing Ordered:    Labs in 1 week . Other Recommendations: We have referred you to a Nutritionist.      We will review your echocardiogram, and if appropriate, refer you to the [facility] to evaluate you for MitraClip placement. Ensure your drinking an adequate amount of water with a goal of 6-8 eight ounce glasses (1.5-2 liters) of fluid daily. Your urine should be clear and light yellow straw colored. If your blood pressure begins to consistently run below 90/60 and/or you begin to experience dizziness or lightheadedness, please contact the [facility] [address] at [phone]. Follow up 1 month with Gaurav Sky. Please monitor your blood pressures daily prior to medications and 2 hours after taking medications. Bring a written record of your blood pressures to your next appointment. Please monitor your weights daily upon waking and after using the bathroom. Keep a written records of your weights and bring to your next appointment. If you have a weight gain of 3 or more pounds overnight OR 5 or more pounds in one week please contact our office. Thank you for allowing us the privilege of being a part of your healthcare team! Please do not hesitate to contact our office at [phone] with any questions or concerns. Heart Failure Patients and COVID-19  March 31, 2020 in Heart Failure Research News, HFSA News     A Statement from the [address]) wants to ensure that heart failure patients are appropriately informed during the novel Coronavirus COVID-19 pandemic.      COVID-19 symptoms vary among infected people and can include cough, fever, shortness of breath, muscle aches, profound fatigue, loss of sense of smell and taste, and diarrhea. Not every infected person will have symptoms which is why social distancing is imperative. Most people have only mild symptoms, but others have severe symptoms that require hospitalization and occasionally intensive care. Because you are a patient with a heart failure, you are at higher risk of getting very sick if you contract Coronavirus and, therefore, it is important to practice good hand hygiene, social distancing, and staying at home as much as possible. If you are experiencing symptoms of COVID-19, please call your primary healthcare clinician. For your safety and the safety of others, and to reduce potential exposures to the Coronavirus, please do not go to an urgent care clinic or emergency room for non-urgent or non-emergency issues unless you have been instructed to do so by your primary healthcare clinician. However, if you have life-threatening symptoms like difficulty breathing, call 911. We want to reduce the spread of the Coronavirus as much as possible and make sure our healthcare resources are not overwhelmed with non-urgent cases. You may have noticed that your healthcare clinicians have converted your in-person appointments to telephone or video calls, and some hospitals are not allowing visitors. The goal is to keep patients from dominique the Coronavirus and to limit the number of healthcare workers in the hospital. If you are sick and need to be seen or get lab testing, you should still do so; otherwise, you can help by     1. Learning how to use your smartphone or computer to participate in telehealth video visits  2. Keeping track of missed visits and studies and working with your team to reschedule them once social distancing measures are relaxed     Also, do not stop any of your medications unless instructed by your healthcare team to do so.  It is important that you have an adequate supply of your heart failure medications and that you request extended duration of supplies and refills from your providers during these times. We have a lot to learn about COVID-19 and currently there are several ongoing clinical trials to discover therapies that might help treat the infection and vaccines that can prevent the infection. The [facility] and other scientific institutions are working hard, with our patients in mind, to get through this pandemic as quickly as possible. Finally, we recommend that you get your information from trusted, professional healthcare sources including national societies like the [employer], federal agencies like the [employer] for [employer], and your local hospitals and health departments. Please access updated patient information on the [address] [phone]) [facility]. We wish you safety and health during this challenging time.

## 2020-08-19 NOTE — PROGRESS NOTES
[facility] Failure [facility] Visit        DOS:   8/19/2020  NAME:  Mita Dunn   MRN:   [id number]   REFERRING PROVIDER:  Dorota Yan NP  PRIMARY CARE PHYSICIAN: Dorota Yan NP  PRIMARY CARDIOLOGIST: none      Chief Complaint:   Chief Complaint   Patient presents with    CHF    Dizziness    Fall     last week       HPI: 65y.o. year old female with a history of HTN, HLD, WES, obesity (Body mass index is 41.08 kg/m².) s/p gastric bypass in 2011, T2DM, hypothyroidism, COPD, CAD s/p CABG in 1997, s/p PCI to [address] in 5/15, ICM, VT, s/p AICD ([employer]),  anxiety, and depression who presents today for a HF clinic visit for her chronic systolic heart failure. Ms. Ebony Wen recalls having a viral syndrome last year and has not felt well since then. She uses oxygen with her BiPAP every night and sleeps on two pillows. She was recently admitted to [facility] for acute on chronic systolic heart failure, treated with IV inotropes and diuretics. She was scheduled to undergo BiV upgrade with Dr. Rafa Hackett, however, her QRS was too narrow for the upgrade. He increased her low pacing threshold from 50 bpm to 70 bpm.      She presents today for follow up HF clinic. Overall she states she is at her baseline, her weight has not changed in the last month but she is very interested in starting cardiac rehab and following with a nutritionist.  She has been working on BG management and her HbA1c has decreased from 8.4 to 7.8. Karissa Liz denies dyspnea, orthopnea, dizziness, lightheadedness, syncope, pre-syncope, LE edema, or abdominal distention. She is compliant with her medications and weighs herself and measures her BP diligently.      Optivol Interrogation 8/19/20:  Optivol below threshold and trending down  Impedance trending up  Patient activity 0.8 hr/day  Time in AT/AF 0 hr/day  No ventricular arrhythmias  No shock therapy    Impression / Plan:   ICM - Stage C, NYHA Class II, LVEF 30-35%   TTE 6/18/20- EF 25-30%, mod TR, severe MR   Continue Toprol XL 12.5 mg daily   Continue Entresto 24/26 mg PO BID   Continue hydralazine 50 mg PO TID and Imdur 30mg daily   Decrease furosemide to 20 mg PO daily   Intolerant to AA due to hyperkalemia   Low sodium diet, daily weights, strict I/O   Invitate - DSP (VUS)   Equivocal PYP imaging   Resume cardiac rehab - awaiting insurance auth   Follow up in the [facility] in 1 month   Repeat CMP, NT pro-BNP in 1 week      CAD s/p CABG in 1997, s/p PCI to DVG-RCA in 10/16   On ASA, statin, BB, Zetia   Severe 3V disease     MR, severe   Refer to [facility] for evaluation of MitraClip candidacy     HTN - well controlled   On BB, Entresto, and hydralazine/nitrate   Low sodium diet   Compliant with BiPAP    VT - s/p dual chamber AICD with RA lead    No shocks   No arrhythmias on interrogation    Dr. 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    Encouraged to meet with dietician at cardiac rehab     Osteoarthritis - s/p right TKR   Avoid Celebrex due to CKD, HFrEF   S/p left knee injection in 8/2019   Lidocaine patches to knees     Hyperlipidemia    , LDL 58 on 5/28/20   Lipoprotein-A 212   Continue Lipitor 80 mg daily    Continue Zetia 10 mg PO daily   Low chol diet     CKD4,suspect diabetic nephropathy and cardiorenal syndrome   Recent Cr 2.6   Continue Entresto 24/26 mg PO BID    Decrease furosemide and repeat labs in 1 week      Vitamin D deficiency    Continue cholecalciferol 2,000 units daily 71        History:  Past Medical History:   Diagnosis Date    Adverse effect of anesthesia     hard to wake up/uses BIPAP/\"try to avoid general if possible\"/intubated in past prior to going to sleep and it caused pt to be incontinent    Arthritis     Asthma     CAD (coronary artery disease)     Chronic kidney disease     elevataed creatinine    Chronic obstructive pulmonary disease (HCC)     Chronic pain     arthritis    Coagulation disorder (HCC)     on plavix    Congestive heart failure (HCC)     Diabetes ([address]     Hypertension     Morbid obesity ([address]     Sleep apnea     BIPAP with 2 liters oxygen    [address]     after heart surgery - left leg    Thyroid disease      Past Surgical History:   Procedure Laterality Date    CARDIAC SURG PROCEDURE UNLIST  2018    cardiac cath - Left    COLONOSCOPY N/A 2020    COLONOSCOPY   :- performed by Dotty Rosa MD at [address] HX ARTHROPLASTY  2105    knee - right    HX  SECTION      x3    HX CORONARY ARTERY BYPASS GRAFT  1997    3 vessels    HX CORONARY STENT PLACEMENT  2016    HX HERNIA REPAIR  1988    HX IMPLANTABLE CARDIOVERTER DEFIBRILLATOR      HX KNEE REPLACEMENT Right 2015    once    LAP GASTRIC BYPASS/KERLINE-EN-Y  2011    lap band per pt and not a gastric bypass     Social History     Socioeconomic History    Marital status:      Spouse name: Not on file    Number of children: Not on file    Years of education: Not on file    Highest education level: Not on file   Occupational History    Not on file   Social Needs    Financial resource strain: Not on file    Food insecurity     Worry: Not on file     Inability: Not on file    Transportation needs     Medical: Not on file     Non-medical: Not on file   Tobacco Use    Smoking status: Former Smoker     Types: Cigarettes    Smokeless tobacco: Never Used    Tobacco comment: quit 2001/started again (smoked 3 yrs) off and on/none since 2012   Substance and Sexual Activity    Alcohol use: Not Currently    Drug use: Not Currently    Sexual activity: Not Currently   Lifestyle    Physical activity     Days per week: Not on file     Minutes per session: Not on file    Stress: Not on file   Relationships    Social connections     Talks on phone: Not on file     Gets together: Not on file     Attends [employer] service: Not on file     Active member of club or organization: Not on file     Attends meetings of clubs or organizations: Not on file     Relationship status: Not on file    Intimate partner violence     Fear of current or ex partner: Not on file     Emotionally abused: Not on file     Physically abused: Not on file     Forced sexual activity: Not on file   Other Topics Concern    Not on file   Social History Narrative    Not on file     Family History   Problem Relation Age of Onset    Hypertension Mother     Dementia Mother     Coronary Artery Disease Father 58    Sudden Death Father 58    Diabetes Son     Diabetes Brother      Allergies: Allergies   Allergen Reactions    Crestor [Rosuvastatin] Other (comments)     Causes muscle cramps    Lisinopril Cough    Nsaids (Non-Steroidal Anti-Inflammatory Drug) Other (comments)     Liver and Kidney       ROS:    Review of Systems   Constitutional: Positive for malaise/fatigue. Negative for chills and fever. Eyes: Negative. Respiratory: Negative for cough, sputum production and shortness of breath. Cardiovascular: Positive for leg swelling. Negative for chest pain, palpitations and PND. Gastrointestinal: Negative for constipation, heartburn and nausea. Genitourinary: Negative. Musculoskeletal: Positive for joint pain. Chronic knee pain    Skin: Negative.     Neurological: Negative for dizziness, weakness and headaches. Psychiatric/Behavioral: Negative for depression. The patient is nervous/anxious. Physical Exam:   Vitals:    Visit Vitals  /74 (BP 1 Location: Left arm, BP Patient Position: Sitting)   Pulse 82   Temp 98.5 °F (36.9 °C) (Oral)   Resp 16   Ht 5' 2\" (1.575 m)   Wt 224 lb 9.6 oz (101.9 kg)   SpO2 97%   BMI 41.08 kg/m²         Physical Exam   Constitutional: She appears well-developed and well-nourished. No distress. HENT:   Head: Normocephalic. Neck: Normal range of motion. Neck supple. No JVD present. Cardiovascular: Normal rate, regular rhythm, normal heart sounds and intact distal pulses. Pulmonary/Chest: Effort normal and breath sounds normal. No respiratory distress. Abdominal: Soft. Bowel sounds are normal. She exhibits no distension. Musculoskeletal: Normal range of motion. General: Edema present. Neurological: She is alert. Skin: Skin is warm and dry. Psychiatric: She has a normal mood and affect. Vitals reviewed. Recent Labs:   Labs Latest Ref Rng & Units 2020   WBC 3.4 - 10.8 x10E3/uL - 4.9   RBC 3.77 - 5.28 x10E6/uL - 3.32(L)   Hemoglobin 11.1 - 15.9 g/dL - 10. 8(L)   Hematocrit 34.0 - 46.6 % - 31. 4(L)   MCV 79 - 97 fL - 95   Platelets 991 - 045 B49C1/FB - 253   Albumin 3.7 - 4.7 g/dL - 4.1   Calcium 8.7 - 10.3 mg/dL 10. 6(H) 10. 6(H)   Glucose 65 - 99 mg/dL 143(H) 125(H)   BUN 8 - 27 mg/dL 52(H) 35(H)   Creatinine 0.57 - 1.00 mg/dL 2.60(H) 2.27(H)   Sodium 134 - 144 mmol/L 140 140   Potassium 3.5 - 5.2 mmol/L 4.5 4.5   Some recent data might be hidden     EK2020  Sinus  Rhythm, rate 67 bpm   -  Nonspecific T-abnormality. 20   ECHO ADULT COMPLETE 2020    Narrative · Image quality for this study was technically difficult. · Mildly dilated left ventricle. Upper normal wall thickness. Severe   global systolic function. Estimated left ventricular ejection fraction is   25 - 30%.  Suboptimal endocardial visualization limits wall motion   analysis. Inconclusive left ventricular diastolic function. · Moderate tricuspid valve regurgitation is present. · Mild to moderate pulmonary hypertension. Pulmonary arterial systolic   pressure is 45 mmHg. · Moderately dilated left atrium. · Mitral valve non-specific thickening. Severe mitral valve regurgitation   is present. Signed by: Ashly Salter MD         Prior to Admission medications    Medication Sig Start Date End Date Taking? Authorizing Provider   lancets misc Use as directed. Dx:check sugar once daily. E11.65 8/19/20  Yes Barry Bills NP   glucose blood VI test strips (ASCENSIA AUTODISC VI, ONE TOUCH ULTRA TEST VI) strip E11.65 check sugar once daily 8/19/20  Yes Barry Bills NP   gabapentin (NEURONTIN) 100 mg capsule TAKE 2 CAPSULES BY MOUTH THREE TIMES DAILY . DO NOT EXCEED 6 PER 24 HOURS 8/7/20  Yes Krihsan Blankenship NP   furosemide (LASIX) 40 mg tablet Take 1 Tab by mouth daily. 7/27/20  Yes Cherelle Villa MD   ezetimibe (ZETIA) 10 mg tablet Take 1 tablet by mouth once daily 7/21/20  Yes Krishan Blankenship NP   glipiZIDE (GLUCOTROL) 10 mg tablet Take 10 mg by mouth two (2) times a day. Yes Provider, Historical   sacubitriL-valsartan (Entresto) 24-26 mg tablet Take 1 Tab by mouth two (2) times a day. 5/29/20  Yes Aquilino Mckeon NP   allopurinoL (ZYLOPRIM) 100 mg tablet Take 1 tablet by mouth once daily 5/28/20  Yes Aquilino Mckeon NP   isosorbide mononitrate ER (IMDUR) 30 mg tablet Take 1 tablet by mouth once daily 5/28/20  Yes Lo Mckeon NP   hydrALAZINE (APRESOLINE) 50 mg tablet Take 1 Tab by mouth three (3) times daily. 5/28/20  Yes Lo Mckeon NP   clopidogreL (PLAVIX) 75 mg tab Take 1 Tab by mouth daily.  5/22/20  Yes Barry Bills NP   levothyroxine (SYNTHROID) 100 mcg tablet TAKE 1 TABLET BY MOUTH ONCE DAILY BEFORE BREAKFAST 5/14/20  Yes Barry Lim., NP atorvastatin (LIPITOR) 80 mg tablet TAKE 1 TABLET BY MOUTH AT BEDTIME 4/10/20  Yes Emily Blankenship NP   cholecalciferol (VITAMIN D3) (1000 Units /25 mcg) tablet Take 2 Tabs by mouth daily. 4/10/20  Yes Emily Blankenship NP   empagliflozin (JARDIANCE) 10 mg tablet Take 1 Tab by mouth daily. 3/9/20  Yes Tomasz Miller NP   albuterol (PROVENTIL HFA, VENTOLIN HFA, PROAIR HFA) 90 mcg/actuation inhaler Take 2 Puffs by inhalation every four (4) hours as needed. Yes Provider, Historical   Oxygen 2 L NC At night through BiPAP   Yes Provider, Historical   buPROPion SR (WELLBUTRIN SR) 150 mg SR tablet Take 1 Tab by mouth daily. 2/6/20  Yes Jay Ochoa NP   metoprolol succinate (TOPROL-XL) 25 mg XL tablet Take 0.5 Tabs by mouth daily. 2/5/20  Yes Tomasz Miller NP   acetaminophen (TYLENOL ARTHRITIS PAIN) 650 mg TbER Take 1,300 mg by mouth two (2) times a day. Yes Provider, Historical   aspirin 81 mg chewable tablet Take 81 mg by mouth daily.    Yes Provider, Historical     Allergies   Allergen Reactions    Crestor [Rosuvastatin] Other (comments)     Causes muscle cramps    Lisinopril Cough    Nsaids (Non-Steroidal Anti-Inflammatory Drug) Other (comments)     Liver and Kidney       Past Medical History:   Diagnosis Date    Adverse effect of anesthesia     hard to wake up/uses BIPAP/\"try to avoid general if possible\"/intubated in past prior to going to sleep and it caused pt to be incontinent    Arthritis     Asthma     CAD (coronary artery disease)     Chronic kidney disease     elevataed creatinine    Chronic obstructive pulmonary disease (HCC)     Chronic pain     arthritis    Coagulation disorder (HCC)     on plavix    Congestive heart failure (HCC)     Diabetes ([address]     Hypertension     Morbid obesity ([address]     Sleep apnea 1996    BIPAP with 2 liters oxygen    Thromboembolus ([address]     after heart surgery - left leg    Thyroid disease          Darshana Antunez NP  Advanced Heart Failure [address]  [address] [place] [address], [address] [place] [address], [facility] [phone]  Fax [phone]

## 2020-08-21 RX ORDER — LEVOTHYROXINE SODIUM 100 UG/1
100 TABLET ORAL
Qty: 90 TAB | Refills: 1 | Status: SHIPPED | OUTPATIENT
Start: 2020-08-21 | End: 2021-01-05

## 2020-08-21 NOTE — TELEPHONE ENCOUNTER
Patient left voicemail requesting a 90 days supply of Synthroid 100 mcg to be sent to [address] [place] [address]. BCN: [phone]      Last visit 06/30/2020 Virtual visit [facility] [place]   Next appointment 10/01/2020 [facility]   Previous refill encounter(s) 05/14/2020 Synthroid #90     Requested Prescriptions     Pending Prescriptions Disp Refills    levothyroxine (SYNTHROID) 100 mcg tablet 90 Tab 1     Sig: Take 1 Tab by mouth Daily (before breakfast).

## 2020-08-24 ENCOUNTER — TELEPHONE (OUTPATIENT)
Dept: INTERNAL MEDICINE CLINIC | Age: 70
End: 2020-08-24

## 2020-08-24 NOTE — TELEPHONE ENCOUNTER
Pt called stating  Ins does not cover the test strips you sent over. She now needs a new glucometer-One touch machine .   Please send to [facility] on [address]    Pt # [phone]

## 2020-08-25 RX ORDER — INSULIN PUMP SYRINGE, 3 ML
EACH MISCELLANEOUS
Qty: 1 KIT | Refills: 0 | Status: SHIPPED | OUTPATIENT
Start: 2020-08-25

## 2020-08-26 RX ORDER — CLOPIDOGREL BISULFATE 75 MG/1
75 TABLET ORAL DAILY
Qty: 90 TAB | Refills: 0 | Status: SHIPPED | OUTPATIENT
Start: 2020-08-26 | End: 2020-11-25

## 2020-08-31 ENCOUNTER — OFFICE VISIT (OUTPATIENT)
Dept: CARDIOLOGY CLINIC | Age: 70
End: 2020-08-31
Payer: MEDICARE

## 2020-08-31 DIAGNOSIS — I34.0 SEVERE MITRAL REGURGITATION: Primary | ICD-10-CM

## 2020-08-31 PROCEDURE — G8754 DIAS BP LESS 90: HCPCS | Performed by: THORACIC SURGERY (CARDIOTHORACIC VASCULAR SURGERY)

## 2020-08-31 PROCEDURE — 99205 OFFICE O/P NEW HI 60 MIN: CPT | Performed by: THORACIC SURGERY (CARDIOTHORACIC VASCULAR SURGERY)

## 2020-08-31 PROCEDURE — G8752 SYS BP LESS 140: HCPCS | Performed by: THORACIC SURGERY (CARDIOTHORACIC VASCULAR SURGERY)

## 2020-08-31 PROCEDURE — 94618 PULMONARY STRESS TESTING: CPT | Performed by: THORACIC SURGERY (CARDIOTHORACIC VASCULAR SURGERY)

## 2020-08-31 PROCEDURE — 1090F PRES/ABSN URINE INCON ASSESS: CPT | Performed by: THORACIC SURGERY (CARDIOTHORACIC VASCULAR SURGERY)

## 2020-08-31 NOTE — PROGRESS NOTES
Patient: Lam Girard   Age: 65 y.o. Patient Care Team:  Edison Thomas NP as PCP - General (Nurse Practitioner)  Edison Thomas NP as PCP - [facility]  Oxana Singh MD (Cardiology)  Tanja Engel MD (Gastroenterology)    PCP: Edison Thomas NP    Cardiologist: Dr. Ethel Mcqueen    Diagnosis/Reason for Consultation: The encounter diagnosis was Severe mitral regurgitation. Problem List:   Patient Active Problem List   Diagnosis Code    Hx of CABG Z95.1    ICD (implantable cardioverter-defibrillator) in place Z95.810    H/O laparoscopic adjustable gastric banding Z98.84    Obesity, morbid ([facility] [address] [address] E66.01    Acute decompensated heart failure ([facility] [address] I50.9    Peripheral vascular disease (HCC) I73.9    Severe mitral regurgitation I34.0         HPI: 70y. o.female with PMHx of CAD with CAB x 3 in 1997 then subsequent stents X 4, HTN, HLD, palpitations, ICD 2007, DM, hypothyroid, COPD, WES-wears BiPAP, CKD, DVT 1997 left leg, right TKR, Lap band 2011. She is referred to the [address] [place] [address] by Dr. Ethel Mcqueen for interventional evaluation of  Severe mitral regurgitation and consideration of a MitraClip. She notes that her health began to change in November 2019 when she developed an upper respiratory illness which was attributed to a virus. She felt poorly through much of the winter then was hospitalized in early February after her initial consultation with Dr. Ethel Mcqueen for CHF. Since that time, she has improved with treatments and medication adjustments from the [facility] but has never recovered her previous level of activity. She does very little activity in a day and activity is mostly limited to activities of daily living.        SOB/MARINA: Yes   Fatigue: Yes   Palpitations: Yes   Chest pain: No:    Chest tightness with activity: No:    Lightheadedness: No:    Syncope: No:    Falls: No:    Orthopnea: Yes   PND: Yes   LE edema: Yes   Medication changes in past 3 months: Yes Blood/Blackness/Tarriness of stools: No:    Heart Failure Admission w/in past year:  Yes   Heart Failure Admission w/in past 2 weeks: No:     NYHA Classification: IIIA   Class I (Mild): No limitation of physical activity. Ordinary physical activity does not cause undue fatigue, palpitation, or dyspnea. Class II (Mild): Slight limitation of physical activity. Comfortable at rest, but ordinary physical activity results in fatigue, palpitation, or dyspnea. Class III (Moderate): Marked limitation of physical activity. Comfortable at rest, but less than ordinary activity causes fatigue, palpitation, or dyspnea   Class IV (Severe): Unable to carry out any physical activity without discomfort. Symptoms  of cardiac insufficiency at rest.  If any physical activity is undertaken, discomfort is increased.     Angina Classification: 0   Class 0: No symptoms   Class 1: Angina with strenuous exercise   Class 2: Angina with moderate exercise   Class 3: Angina with mild exertion   Walking 1-2 level blocks at normal pace; Climbing 1 flight of stairs at normal pace  Class 4: Angina at any level of physical exertion       Past Medical History:   Diagnosis Date    Adverse effect of anesthesia     hard to wake up/uses BIPAP/\"try to avoid general if possible\"/intubated in past prior to going to sleep and it caused pt to be incontinent    Arthritis     Asthma     CAD (coronary artery disease)     Chronic kidney disease     elevataed creatinine    Chronic obstructive pulmonary disease (HCC)     Chronic pain     arthritis    Coagulation disorder (HCC)     on plavix    Congestive heart failure (HCC)     Diabetes (HCC)     Hypertension     Morbid obesity (HCC)     Sleep apnea 1996    BIPAP with 2 liters oxygen    Thromboembolus ([address]     after heart surgery - left leg    Thyroid disease      Endocarditis: No:    Pulmonary HTN:  No:  Greater than 2/3 systemic: No:   Immunocompromised/Steroids: No:   Liver Dz/Cirrhosis: No: If yes, MELD score:  n/a  Last Dental Visit:  unknown   Any dental pain/concerns: none    Past Surgical History:   Procedure Laterality Date    CARDIAC SURG PROCEDURE UNLIST  2018    cardiac cath - Left    COLONOSCOPY N/A 2020    COLONOSCOPY   :- performed by Rafat Crespo MD at [address] HX ARTHROPLASTY  2105    knee - right    HX  SECTION      x3    HX CORONARY ARTERY BYPASS GRAFT  1997    3 vessels    HX CORONARY STENT PLACEMENT  2016    HX HERNIA REPAIR  1988    HX IMPLANTABLE CARDIOVERTER DEFIBRILLATOR      HX KNEE REPLACEMENT Right 2015    once    LAP GASTRIC BYPASS/KERLINE-EN-Y  2011    lap band per pt and not a gastric bypass      Social History     Tobacco Use    Smoking status: Former Smoker     Types: Cigarettes    Smokeless tobacco: Never Used    Tobacco comment: quit /started again (smoked 3 yrs) off and on/none since    Substance Use Topics    Alcohol use: Not Currently      Family History   Problem Relation Age of Onset    Hypertension Mother     Dementia Mother     Coronary Artery Disease Father 58    Sudden Death Father 58    Diabetes Son     Diabetes Brother      Prior to Admission medications    Medication Sig Start Date End Date Taking? Authorizing Provider   [facility] 24-26 mg tablet Take 1 tablet by mouth twice daily 20   Reynold MCNEILL NP   clopidogreL (PLAVIX) 75 mg tab Take 1 Tab by mouth daily. 20   Alicia Diaz NP   Blood-Glucose Meter monitoring kit Use as directed. Dx: E11.65 check sugar twice daily 20   Alicia Diaz NP   levothyroxine (SYNTHROID) 100 mcg tablet Take 1 Tab by mouth Daily (before breakfast). 20   Alicia Diaz NP   lancets misc Use as directed. Dx:check sugar once daily.  E11.65 20   Alicia Diaz NP   glucose blood VI test strips (ASCENSIA AUTODISC VI, ONE TOUCH ULTRA TEST VI) strip E11.65 check sugar once daily 20 Arielle Patino NP   furosemide (LASIX) 20 mg tablet Take 1 Tab by mouth daily. 8/19/20   Renetta Blankenship NP   gabapentin (NEURONTIN) 100 mg capsule TAKE 2 CAPSULES BY MOUTH THREE TIMES DAILY . DO NOT EXCEED 6 PER 24 HOURS 8/7/20   Renetta Blankenship NP   ezetimibe (ZETIA) 10 mg tablet Take 1 tablet by mouth once daily 7/21/20   Renetta Blankenship NP   glipiZIDE (GLUCOTROL) 10 mg tablet Take 10 mg by mouth two (2) times a day. Provider, Historical   sacubitriL-valsartan Towana Angelucci) 24-26 mg tablet Take 1 Tab by mouth two (2) times a day. 5/29/20 8/31/20  Jessica Mingle VENKATESH MCNEILL   allopurinoL (ZYLOPRIM) 100 mg tablet Take 1 tablet by mouth once daily 5/28/20   Jessica Minglmadonna MCNEILL NP   isosorbide mononitrate ER (IMDUR) 30 mg tablet Take 1 tablet by mouth once daily 5/28/20   Jessica Mingle VENKATESH MCNEILL   hydrALAZINE (APRESOLINE) 50 mg tablet Take 1 Tab by mouth three (3) times daily. 5/28/20   Mike, Lalo Forget OSITO NP   atorvastatin (LIPITOR) 80 mg tablet TAKE 1 TABLET BY MOUTH AT BEDTIME 4/10/20   eRnetta Blankenship NP   cholecalciferol (VITAMIN D3) (1000 Units /25 mcg) tablet Take 2 Tabs by mouth daily. 4/10/20   Renetta Blankenship NP   empagliflozin (JARDIANCE) 10 mg tablet Take 1 Tab by mouth daily. 3/9/20   Renetta Blankenship NP   albuterol (PROVENTIL HFA, VENTOLIN HFA, PROAIR HFA) 90 mcg/actuation inhaler Take 2 Puffs by inhalation every four (4) hours as needed. Provider, Historical   Oxygen 2 L NC At night through BiPAP    Provider, Historical   buPROPion SR (WELLBUTRIN SR) 150 mg SR tablet Take 1 Tab by mouth daily. 2/6/20   Arielle Patino NP   metoprolol succinate (TOPROL-XL) 25 mg XL tablet Take 0.5 Tabs by mouth daily. 2/5/20   PolliardRenetta, NP   acetaminophen (TYLENOL ARTHRITIS PAIN) 650 mg TbER Take 1,300 mg by mouth two (2) times a day. Provider, Historical   aspirin 81 mg chewable tablet Take 81 mg by mouth daily.     Provider, Historical       Allergies   Allergen Reactions    Crestor [Rosuvastatin] Other (comments)     Causes muscle cramps    Lisinopril Cough    Nsaids (Non-Steroidal Anti-Inflammatory Drug) Other (comments)     Liver and Kidney       Current Medications:   Current Outpatient Medications   Medication Sig Dispense Refill    Entresto 24-26 mg tablet Take 1 tablet by mouth twice daily 60 Tab 1    clopidogreL (PLAVIX) 75 mg tab Take 1 Tab by mouth daily. 90 Tab 0    Blood-Glucose Meter monitoring kit Use as directed. Dx: E11.65 check sugar twice daily 1 Kit 0    levothyroxine (SYNTHROID) 100 mcg tablet Take 1 Tab by mouth Daily (before breakfast). 90 Tab 1    lancets misc Use as directed. Dx:check sugar once daily. E11.65 1 Each 11    glucose blood VI test strips (ASCENSIA AUTODISC VI, ONE TOUCH ULTRA TEST VI) strip E11.65 check sugar once daily 100 Strip 1    furosemide (LASIX) 20 mg tablet Take 1 Tab by mouth daily. 60 Tab 2    gabapentin (NEURONTIN) 100 mg capsule TAKE 2 CAPSULES BY MOUTH THREE TIMES DAILY . DO NOT EXCEED 6 PER 24 HOURS 180 Cap 0    ezetimibe (ZETIA) 10 mg tablet Take 1 tablet by mouth once daily 30 Tab 0    glipiZIDE (GLUCOTROL) 10 mg tablet Take 10 mg by mouth two (2) times a day.  allopurinoL (ZYLOPRIM) 100 mg tablet Take 1 tablet by mouth once daily 90 Tab 2    isosorbide mononitrate ER (IMDUR) 30 mg tablet Take 1 tablet by mouth once daily 30 Tab 3    hydrALAZINE (APRESOLINE) 50 mg tablet Take 1 Tab by mouth three (3) times daily. 120 Tab 3    atorvastatin (LIPITOR) 80 mg tablet TAKE 1 TABLET BY MOUTH AT BEDTIME 90 Tab 3    cholecalciferol (VITAMIN D3) (1000 Units /25 mcg) tablet Take 2 Tabs by mouth daily. 60 Tab 11    empagliflozin (JARDIANCE) 10 mg tablet Take 1 Tab by mouth daily. 30 Tab 2    albuterol (PROVENTIL HFA, VENTOLIN HFA, PROAIR HFA) 90 mcg/actuation inhaler Take 2 Puffs by inhalation every four (4) hours as needed.       Oxygen 2 L NC At night through BiPAP      buPROPion SR (WELLBUTRIN SR) 150 mg SR tablet Take 1 Tab by mouth daily. 90 Tab 0    metoprolol succinate (TOPROL-XL) 25 mg XL tablet Take 0.5 Tabs by mouth daily. 60 Tab 2    acetaminophen (TYLENOL ARTHRITIS PAIN) 650 mg TbER Take 1,300 mg by mouth two (2) times a day.  aspirin 81 mg chewable tablet Take 81 mg by mouth daily. Vitals: Blood pressure (P) 160/80, pulse (P) 81, temperature (!) (P) 96.1 °F (35.6 °C), resp. rate (P) 20, SpO2 (P) 96 %. Allergies: is allergic to crestor [rosuvastatin]; lisinopril; and nsaids (non-steroidal anti-inflammatory drug). Review of Systems: Pertinent Positives per HPI   [] Unable to obtain  ROS due to  []mental status change  []sedated   []intubated   [x]Total of 13 systems reviewed as follows:  Constitutional: +weight gain, +fatigue  Eyes:   +glasses, +cataracts  ENT:   Negative sore throat,oral absecess  Endocrine Negative for thyroid replacement Rx; goiter; DM  Respiratory:  Negative chronic cough,sputum production. +shortness of breath on exertion  Cards:   Negative for palpitations, varicosities, claudication. +lower extremity edema,  GI:   Negative for dysphagia, bleeding, nausea, vomiting, diarrhea, and abdominal pain  Genitourinary: Negative for frequency, dysuria  Integument:  Negative for rash and pruritus  Hematologic:  Negative for bleeding dyscarsia. +easy bruising;  Musculoskel: +joint pain/swelling  Neurological:  Negative for stroke, TIA, syncope, dizziness. +memory loss, weakness in extremities  Behavl/Psych: Negative for feelings of anxiety, depression     Cardiovascular Testing:   EK20 Sinus bradycardia with occasional premature ventricular complexes   Inferior infarct , age undetermined   T wave abnormality, consider lateral ischemia at 59 bpm    TTE:  2020: Interpretation Summary     · Image quality for this study was technically difficult. · Mildly dilated left ventricle. Upper normal wall thickness. Severe global systolic function.  Estimated left ventricular ejection fraction is 25 - 30%. Suboptimal endocardial visualization limits wall motion analysis. Inconclusive left ventricular diastolic function. · Moderate tricuspid valve regurgitation is present. · Mild to moderate pulmonary hypertension. Pulmonary arterial systolic pressure is 45 mmHg. · Moderately dilated left atrium. · Mitral valve non-specific thickening. Severe mitral valve regurgitation is present. Echo Findings     Left Ventricle  Mildly dilated left ventricle. Upper normal wall thickness. Severe and global systolic dysfunction. The estimated ejection fraction is 25 - 30%. LV wall motion is noted to be suboptimal endocardial visualization limits wall motion analysis. There is inconclusive left ventricular diastolic function. Left Atrium  Moderately dilated left atrium. Right Ventricle  Normal cavity size, wall thickness and global systolic function. Right Atrium  Normal cavity size. Aortic Valve  Normal valve structure, trileaflet valve structure, no stenosis and no regurgitation. Mitral Valve  No stenosis. Mitral valve non-specific thickening. Severe regurgitation. The mitral regurgitant jet is eccentric. Tricuspid Valve  Tricuspid valve not well visualized. No stenosis. Moderate regurgitation. Pulmonic Valve  Pulmonic valve not well visualized. Aorta  The aorta was not well visualized. Pulmonary Artery  Pulmonary arterial systolic pressure (PASP) is 45 mmHg. Pulmonary hypertension found to be mild to moderate. Pericardium  Normal pericardium and no evidence of pericardial effusion. TTE procedure Findings     TTE Procedure Information  Image quality: technically difficult. The view(s) performed were parasternal, apical, subcostal and suprasternal. Color flow Doppler was performed and pulse wave and/or continuous wave Doppler was performed. Cardiomyopathy.  DX: Other diabetic neurological complications associated with type 2 diabetes mellitus, chf with reduced ejection fraction and diastolic dysNo contrast was given. Procedure Staff     Technologist/Clinician: Marcia Sandifer  Supporting Staff: None  Performing Physician/Midlevel: None     Exam Completion Date/Time: 6/19/20 11:35 AM   2D Volume Measurements      ESV  ESV Index    LA A4C  107. 67 mL (Range: 22 - 52)         53.58 ml/m2 (Range: 16 - 28)                JEFF: Scheduled for 10/2/2020 with Dr. Merrick Hopson    Cardiac catheterization: 2/3/2020:  Conclusion     Findings:  1)RHC performed while patient on Milrinone gtt(0.3)--normal right and left sided filling pressures, no pulmonary HTN, normal CI by blanche and thermodilution  2)Severe native 3 vessel CAD. Patent VG to RCA and LIMA to LAD. LCx is collateral dependent and native rPDA and D1 have small disease in small vessels. This are unlikely to contribute to heart failure. 3)Frequent PVCs     Recommendations:  1)GDMT for CAD     Access\" left radial and right IJV--no issues     Contrast 25cc.         Coronary Findings     Diagnostic   Dominance: Right   Left Anterior Descending    Mid LAD lesion 90% stenosed. .    First Diagonal Branch    1st Diag lesion 90% stenosed. .    Left Circumflex    Prox Cx lesion 90% stenosed. with 99% stenosed side branch in 1st Mrg.    First Obtuse Marginal Branch    Collaterals    1st Mrg filled by collaterals from 2nd Diag. Second Obtuse Marginal Branch    Collaterals    2nd Mrg filled by collaterals from 3rd Diag. Right Coronary Artery    Prox RCA lesion 100% stenosed. .    Right Posterior Descending Artery    Previously placed RPDA-1 stent (unknown type) is widely patent. RPDA-2 lesion 90% stenosed. .    Graft to RPDA    Previously placed Insertion stent (unknown type) is widely patent. Intervention     No interventions have been documented. Right Heart     Right Heart Cath  PA pressure = 32/20 mmHg. PA mean = 25 mmHg. PA Sat = 65 %. Mid RA pressure = 7/6 mmHg. RV pressure = 28/4 mmHg. LVEDP pressure = 6 mmHg.  AO Sat = 98 %. TDCO = 5.3 L/min. Malina CO = 4.4 L/min. TDCI 2.24  Malina CI 2.67    Normal right and left sided filling pressures. No pulmonary HTN. Normal cardiac output. Carotid Dopplers: None    PFTs: FEV1/Predicted:  None  Normal FEV1 > 1 Liter, Predicted = 100%, DLCO > 80%    Mild Lung Disease (60-70% Predicted)    Moderate Lung Disease (50-55% Predicted)    Severe Lung Disease (< 50% Predicted or PO2 < 60 or pCO2>50 on RA)    Gated C/A/P CTA: Not yet completed    Physical Exam:  Physical Exam   Constitutional: She is oriented to person, place, and time. She appears well-developed and well-nourished. HENT:   Head: Normocephalic and atraumatic. Eyes: EOM are normal. Right eye exhibits no discharge. Left eye exhibits no discharge. No scleral icterus. Neck: Normal range of motion. Neck supple. Cardiovascular: Normal rate, regular rhythm and intact distal pulses. Murmur heard. Pulmonary/Chest: Effort normal and breath sounds normal.   Abdominal: Soft. Bowel sounds are normal.   Musculoskeletal:         General: Edema present. Comments: Bilateral LE pitting edema +2 to the mid shin   Neurological: She is alert and oriented to person, place, and time. Skin: Skin is warm and dry. No rash noted. No erythema. Psychiatric: She has a normal mood and affect.  Her behavior is normal. Judgment and thought content normal.       Clinic Evaluation:   KCCQ-12: scanned into EMR    6 minute walk test: PreTest HR/02 sat:  See VS this visit             Post Test HR/02 sat: 110/97%             Distance Walked: 774 ft    5 meter gait: 6 seconds    Frality Survey:  Whitlock Index ADL - 6/6  scanned into EMR     STS 2.9 Risk Score / Predicted 30 day mortality: - calculations scanned into EMR  STS Adult Cardiac Surgery Database Version 2.9  RISK SCORES  Procedure: Isolated MVR  Risk of Mortality: 8.301%  Renal Failure: 25.349%  Permanent Stroke: 0.709%  Prolonged Ventilation: 41.484%  DSW Infection: 0.434%  Reoperation: 5.639%  Morbidity or Mortality: 52.848%  Short Length of Stay: 4.781%  Long Length of Stay: 25.170%    STS Adult Cardiac Surgery Database Version 2.9  RISK SCORES  Procedure: MV Repair  Risk of Mortality: 4.387%  Renal Failure: 25.940%  Permanent Stroke: 1.095%  Prolonged Ventilation: 30.314%  DSW Infection: 0.227%  Reoperation: 3.412%  Morbidity or Mortality: 41.499%  Short Length of Stay: 6.988%  Long Length of Stay: 26.983%      Assessment/Plan:   1. Severe Mitral Regurgitation: Patient seen by Dr. Ina Temple. Prohibitive risk for surgical correction of her MR. Needs a JEFF which is scheduled for 10/2/20 with Dr. Taylor Stanton. If her anatomy is suitable, would be a candidate for a MitraClip. 2. CAD s/p CAB and subsequent stents: On ASA, Plavix, BB, Entresto, Statin, Zetia    3. HTN: BP above goal in clinic today but states it is generally much lower at home. On BB, Imdur, Entresto, lasix, hydralazine. Continue to monitor. 4. HLD: On Statin, Zetia    5. DM: On Jardiance, Glipizide. Last A1c in August 202 was 7.8 which was improved from previous. 6. COPD: Has ProAir inhaler at home. 7. Chronic systolic HF: LVEF 33-98%. Followed by [facility]. On BB, Entresto, Hydralazine, lasix. Couldn't tolerate AA due to hyperkalemia. 8. Hypothyroid: On Synthroid    9. CKD4: Creatinine 2.6. Labs ordered next week per [facility]. 10. Obesity: weight loss hindered by activity restriction. Discussed calorie restriction. Hopeful that she will be able to increase activity after Mitral Valve intervention    11. Hx total knee replacement: No current treatment. 12. VT s/p ICD in 2007: continue BB    13. WES:  Wears BiPAP at home. Further plans per Dr. Ina Temple. Will follow for JEFF.     Further plan/care by Dr. Ina Temple

## 2020-09-01 ENCOUNTER — PATIENT MESSAGE (OUTPATIENT)
Dept: CARDIOLOGY CLINIC | Age: 70
End: 2020-09-01

## 2020-09-01 DIAGNOSIS — I25.10 CORONARY ARTERY DISEASE INVOLVING NATIVE HEART WITHOUT ANGINA PECTORIS, UNSPECIFIED VESSEL OR LESION TYPE: ICD-10-CM

## 2020-09-01 DIAGNOSIS — I25.5 ISCHEMIC CARDIOMYOPATHY: Primary | ICD-10-CM

## 2020-09-01 NOTE — PROGRESS NOTES
Full note reviewed and pt examined  See note by Monet Taylor NP  Echo reviewed   She appears to have moderately severe MR and symx  Likely functional in etiology   Will need JEFF  If anatomy suitable clip candidate  Her surgical risk would be prohibitive

## 2020-09-02 ENCOUNTER — TELEPHONE (OUTPATIENT)
Dept: CARDIOLOGY CLINIC | Age: 70
End: 2020-09-02

## 2020-09-02 DIAGNOSIS — I25.5 ISCHEMIC CARDIOMYOPATHY: ICD-10-CM

## 2020-09-02 DIAGNOSIS — I25.10 CORONARY ARTERY DISEASE INVOLVING NATIVE HEART WITHOUT ANGINA PECTORIS, UNSPECIFIED VESSEL OR LESION TYPE: ICD-10-CM

## 2020-09-02 DIAGNOSIS — E11.49 OTHER DIABETIC NEUROLOGICAL COMPLICATION ASSOCIATED WITH TYPE 2 DIABETES MELLITUS (HCC): ICD-10-CM

## 2020-09-02 RX ORDER — EZETIMIBE 10 MG/1
TABLET ORAL
Qty: 30 TAB | Refills: 0 | Status: SHIPPED | OUTPATIENT
Start: 2020-09-02 | End: 2020-12-22 | Stop reason: SDUPTHER

## 2020-09-02 RX ORDER — EZETIMIBE 10 MG/1
10 TABLET ORAL DAILY
Qty: 90 TAB | Refills: 0 | Status: SHIPPED | OUTPATIENT
Start: 2020-09-02 | End: 2020-09-02

## 2020-09-02 RX ORDER — GABAPENTIN 100 MG/1
200 CAPSULE ORAL 3 TIMES DAILY
Qty: 180 CAP | Refills: 0 | Status: SHIPPED | OUTPATIENT
Start: 2020-09-02 | End: 2020-10-13 | Stop reason: SDUPTHER

## 2020-09-02 NOTE — TELEPHONE ENCOUNTER
From: Arturo Hagan  To: Mat Molina NP  Sent: 9/1/2020 3:57 PM EDT  Subject: Prescription Question    Hi ms Latonia Piña        I was unable to submit request for a new rx for ezetimibe 10 mg on line. Pls send a refill to [facility] [address]. Had mg consult for mitral valve clip on Mon. Need to get a esophageal echo. Waiting for his office to set up. Seeing nutritionist on 9/15. Will keep you posted. Thank you.

## 2020-09-02 NOTE — TELEPHONE ENCOUNTER
Patient left voice message requesting a new prescription for Neurontin 100 mg to be sent to [facility]. Previous prescription was written by VENKATESH Blankenship on 08/07/2020. No access to . Last visit 06/30/2020 Virtual visit VENKATESH Pandya   Next appointment 10/01/2020 VENKATESH Guadalupe   Previous refill encounter(s) 08/07/2020 Neurontin #180. Requested Prescriptions     Pending Prescriptions Disp Refills    gabapentin (NEURONTIN) 100 mg capsule 180 Cap 0     Sig: Take 2 Caps by mouth three (3) times daily. Max Daily Amount: 600 mg.

## 2020-09-02 NOTE — TELEPHONE ENCOUNTER
Called patient. Two patient indentifiers verified. Pt was given instructions for procedure over the phone. Pt verbalized understanding. Pt stated she doesn't want to have to come to the office prior to the procedure. Will send [employer] message as well. Pt denies any further questions.      Future Appointments   Date Time Provider Skye Nancie   9/15/2020 11:00 AM Talya Solano, RD [address]   9/21/2020 11:00 AM Adri Blankenship, NP [facility] BS AMB   10/1/2020  9:30 AM Sakina HOOD NP [facility] BS AMB   10/2/2020  8:00 AM STRESS ECHO LAB 1 [facility]   10/5/2020  1:45 PM REMOTE1, RUDY SOUZA BS AMB   1/6/2021  8:30 AM REMOTE1, RUDY SOUZA BS AMB   5/10/2021  8:00 AM REMOTE1, RUDY SOUZA BS AMB   7/6/2021  1:00 PM PACEMAKER3, RUDY SOUZA BS AMB   7/6/2021  1:20 PM MD HARLEY Jones BS AMB

## 2020-09-14 ENCOUNTER — TELEPHONE (OUTPATIENT)
Dept: CARDIOLOGY CLINIC | Age: 70
End: 2020-09-14

## 2020-09-14 NOTE — TELEPHONE ENCOUNTER
Left a message for patient to call back about getting scheduled for a virtual visit prior to JEFF scheduled with Dr. Antonino Santana is Oct.

## 2020-09-14 NOTE — TELEPHONE ENCOUNTER
Future Appointments   Date Time Provider Skye Nancie   9/15/2020 11:00 AM Lana Palomino RD [address]   9/21/2020 11:00 AM Mera Blankenship, VENKATESH [facility] BS AMB   9/29/2020  4:00 PM MD HARLEY Salas BS AMB   10/1/2020  9:30 AM Jase Carrera, VENKATESH [facility] BS AMB   10/2/2020  8:00 AM STRESS ECHO LAB 1 [facility]   10/5/2020  1:45 PM REMOTE1, RUDY SOUZA BS AMB   1/6/2021  8:30 AM REMOTE1, RUDY SOUZA BS AMB   5/10/2021  8:00 AM REMOTE1, RUDY SOUZA BS AMB   7/6/2021  1:00 PM PACEMAKER3, RUDY SOUZA BS AMB   7/6/2021  1:20 PM Louella Goodell, MD [facility] BS AMB

## 2020-09-23 LAB
ALBUMIN SERPL-MCNC: 3.6 G/DL (ref 3.7–4.7)
ALBUMIN/GLOB SERPL: 1.4 {RATIO} (ref 1.2–2.2)
ALP SERPL-CCNC: 93 IU/L (ref 39–117)
ALT SERPL-CCNC: 17 IU/L (ref 0–32)
AST SERPL-CCNC: 20 IU/L (ref 0–40)
BILIRUB SERPL-MCNC: <0.2 MG/DL (ref 0–1.2)
BUN SERPL-MCNC: 41 MG/DL (ref 8–27)
BUN/CREAT SERPL: 19 (ref 12–28)
CALCIUM SERPL-MCNC: 10 MG/DL (ref 8.7–10.3)
CHLORIDE SERPL-SCNC: 104 MMOL/L (ref 96–106)
CO2 SERPL-SCNC: 22 MMOL/L (ref 20–29)
CREAT SERPL-MCNC: 2.18 MG/DL (ref 0.57–1)
GLOBULIN SER CALC-MCNC: 2.5 G/DL (ref 1.5–4.5)
GLUCOSE SERPL-MCNC: 172 MG/DL (ref 65–99)
NT-PROBNP SERPL-MCNC: 348 PG/ML (ref 0–301)
POTASSIUM SERPL-SCNC: 5.2 MMOL/L (ref 3.5–5.2)
PROT SERPL-MCNC: 6.1 G/DL (ref 6–8.5)
SODIUM SERPL-SCNC: 139 MMOL/L (ref 134–144)

## 2020-09-23 RX ORDER — CLOTRIMAZOLE AND BETAMETHASONE DIPROPIONATE 10; .64 MG/G; MG/G
CREAM TOPICAL 2 TIMES DAILY
Qty: 30 G | Refills: 0 | Status: SHIPPED | OUTPATIENT
Start: 2020-09-23

## 2020-09-24 RX ORDER — SODIUM CHLORIDE 0.9 % (FLUSH) 0.9 %
5-40 SYRINGE (ML) INJECTION EVERY 8 HOURS
Status: CANCELLED | OUTPATIENT
Start: 2020-09-24

## 2020-09-25 ENCOUNTER — TELEPHONE (OUTPATIENT)
Dept: CARDIAC REHAB | Age: 70
End: 2020-09-25

## 2020-09-25 ENCOUNTER — TELEPHONE (OUTPATIENT)
Dept: CARDIOLOGY CLINIC | Age: 70
End: 2020-09-25

## 2020-09-25 DIAGNOSIS — I50.42 CHRONIC HEART FAILURE WITH REDUCED EJECTION FRACTION AND DIASTOLIC DYSFUNCTION (HCC): Primary | ICD-10-CM

## 2020-09-25 DIAGNOSIS — R06.02 SHORTNESS OF BREATH: ICD-10-CM

## 2020-09-25 NOTE — TELEPHONE ENCOUNTER
Contacted patient to notify of results and recommendations. High potassium foods reviewed with patient over the phone. Low potassium diet education mailed to patient this date. Patient requested lab orders be sent to [facility] for lab work. Patient verbalized understanding, stated she will have lab work done in 2 weeks, and had no further questions or concerns. Isabel Alexander RN.

## 2020-09-25 NOTE — TELEPHONE ENCOUNTER
Labs reviewed, Cr improved from 2.42 to 2. 18. Pro-BNP stable. K+ high normal, 5.2. Repeat CMP and pro-BNP in 2 weeks. Will provide education on low K+ diet.

## 2020-09-25 NOTE — TELEPHONE ENCOUNTER
9/25/2020: [facility]: [address] to let her know I saw the prior auth approval finally from August and we would like to schedule her to come back for CRP. Left voicemail message. Raf Echeverria     8/11/2020: [facility]: [address] to keep her updated about the prior auth still pending. Left voicemail message.  Raf Echeverria      Prior authorization for cardiac rehab faxed on this date.   I called Lu Santana on 8/11/20 to follow up on prior auth and they state it is pending auth under nurse review, auth number [id number].       Electronically signed by Millie Mendes RN at 07/31/20 1308   Electronically signed by Millie Mendes RN at 08/11/20 1001         Electronically signed by Evelio Cole at 08/11/20 [phone]

## 2020-09-28 ENCOUNTER — HOSPITAL ENCOUNTER (OUTPATIENT)
Dept: PREADMISSION TESTING | Age: 70
Discharge: HOME | End: 2020-09-28
Payer: MEDICARE

## 2020-09-28 ENCOUNTER — TRANSCRIBE ORDER (OUTPATIENT)
Dept: REGISTRATION | Age: 70
End: 2020-09-28

## 2020-09-28 DIAGNOSIS — Z01.812 PRE-PROCEDURE LAB EXAM: ICD-10-CM

## 2020-09-28 DIAGNOSIS — Z01.812 PRE-PROCEDURE LAB EXAM: Primary | ICD-10-CM

## 2020-09-28 PROCEDURE — 87635 SARS-COV-2 COVID-19 AMP PRB: CPT

## 2020-09-29 ENCOUNTER — VIRTUAL VISIT (OUTPATIENT)
Dept: CARDIOLOGY CLINIC | Age: 70
End: 2020-09-29
Payer: SUBSIDIZED

## 2020-09-29 DIAGNOSIS — I34.0 SEVERE MITRAL REGURGITATION: ICD-10-CM

## 2020-09-29 DIAGNOSIS — I25.5 ISCHEMIC CARDIOMYOPATHY: ICD-10-CM

## 2020-09-29 DIAGNOSIS — Z95.810 ICD (IMPLANTABLE CARDIOVERTER-DEFIBRILLATOR) IN PLACE: ICD-10-CM

## 2020-09-29 DIAGNOSIS — I50.42 CHRONIC HEART FAILURE WITH REDUCED EJECTION FRACTION AND DIASTOLIC DYSFUNCTION (HCC): ICD-10-CM

## 2020-09-29 DIAGNOSIS — I50.20 NYHA CLASS 3 HEART FAILURE WITH REDUCED EJECTION FRACTION (HCC): ICD-10-CM

## 2020-09-29 DIAGNOSIS — Z95.1 HX OF CABG: ICD-10-CM

## 2020-09-29 LAB — SARS-COV-2, COV2NT: NOT DETECTED

## 2020-09-29 PROCEDURE — 99443 PR PHYS/QHP TELEPHONE EVALUATION 21-30 MIN: CPT | Performed by: INTERNAL MEDICINE

## 2020-09-30 ENCOUNTER — DOCUMENTATION ONLY (OUTPATIENT)
Dept: CARDIOLOGY CLINIC | Age: 70
End: 2020-09-30

## 2020-09-30 ENCOUNTER — OFFICE VISIT (OUTPATIENT)
Dept: CARDIOLOGY CLINIC | Age: 70
End: 2020-09-30
Payer: MEDICARE

## 2020-09-30 VITALS
OXYGEN SATURATION: 97 % | SYSTOLIC BLOOD PRESSURE: 106 MMHG | HEIGHT: 62 IN | BODY MASS INDEX: 41.96 KG/M2 | RESPIRATION RATE: 18 BRPM | WEIGHT: 228 LBS | DIASTOLIC BLOOD PRESSURE: 64 MMHG | TEMPERATURE: 98.3 F | HEART RATE: 72 BPM

## 2020-09-30 DIAGNOSIS — G47.30 SLEEP APNEA TREATED WITH NOCTURNAL BIPAP: ICD-10-CM

## 2020-09-30 DIAGNOSIS — Z95.810 ICD (IMPLANTABLE CARDIOVERTER-DEFIBRILLATOR) IN PLACE: ICD-10-CM

## 2020-09-30 DIAGNOSIS — I34.0 SEVERE MITRAL REGURGITATION: Primary | ICD-10-CM

## 2020-09-30 DIAGNOSIS — E66.01 OBESITY, MORBID (HCC): ICD-10-CM

## 2020-09-30 DIAGNOSIS — I50.20 NYHA CLASS 3 HEART FAILURE WITH REDUCED EJECTION FRACTION (HCC): ICD-10-CM

## 2020-09-30 DIAGNOSIS — I50.42 CHRONIC HEART FAILURE WITH REDUCED EJECTION FRACTION AND DIASTOLIC DYSFUNCTION (HCC): ICD-10-CM

## 2020-09-30 DIAGNOSIS — I25.5 ISCHEMIC CARDIOMYOPATHY: ICD-10-CM

## 2020-09-30 PROCEDURE — G8432 DEP SCR NOT DOC, RNG: HCPCS | Performed by: NURSE PRACTITIONER

## 2020-09-30 PROCEDURE — G8752 SYS BP LESS 140: HCPCS | Performed by: NURSE PRACTITIONER

## 2020-09-30 PROCEDURE — G8399 PT W/DXA RESULTS DOCUMENT: HCPCS | Performed by: NURSE PRACTITIONER

## 2020-09-30 PROCEDURE — G8754 DIAS BP LESS 90: HCPCS | Performed by: NURSE PRACTITIONER

## 2020-09-30 PROCEDURE — G8536 NO DOC ELDER MAL SCRN: HCPCS | Performed by: NURSE PRACTITIONER

## 2020-09-30 PROCEDURE — G9899 SCRN MAM PERF RSLTS DOC: HCPCS | Performed by: NURSE PRACTITIONER

## 2020-09-30 PROCEDURE — 3017F COLORECTAL CA SCREEN DOC REV: CPT | Performed by: NURSE PRACTITIONER

## 2020-09-30 PROCEDURE — 3288F FALL RISK ASSESSMENT DOCD: CPT | Performed by: NURSE PRACTITIONER

## 2020-09-30 PROCEDURE — 1090F PRES/ABSN URINE INCON ASSESS: CPT | Performed by: NURSE PRACTITIONER

## 2020-09-30 PROCEDURE — G8417 CALC BMI ABV UP PARAM F/U: HCPCS | Performed by: NURSE PRACTITIONER

## 2020-09-30 PROCEDURE — 1100F PTFALLS ASSESS-DOCD GE2>/YR: CPT | Performed by: NURSE PRACTITIONER

## 2020-09-30 PROCEDURE — 93298 REM INTERROG DEV EVAL SCRMS: CPT | Performed by: NURSE PRACTITIONER

## 2020-09-30 PROCEDURE — G8427 DOCREV CUR MEDS BY ELIG CLIN: HCPCS | Performed by: NURSE PRACTITIONER

## 2020-09-30 PROCEDURE — 99215 OFFICE O/P EST HI 40 MIN: CPT | Performed by: NURSE PRACTITIONER

## 2020-09-30 RX ORDER — FUROSEMIDE 20 MG/1
40 TABLET ORAL DAILY
Qty: 60 TAB | Refills: 2 | Status: ON HOLD | OUTPATIENT
Start: 2020-09-30 | End: 2020-11-13 | Stop reason: SDUPTHER

## 2020-09-30 NOTE — PROGRESS NOTES
[facility] Visit        DOS:   9/30/2020  NAME:  Arturo Hagan   MRN:   [id number]   REFERRING PROVIDER:  Guanakito Rios NP  PRIMARY CARE PHYSICIAN: Guanakito Rios NP  PRIMARY CARDIOLOGIST: none      Chief Complaint:   Chief Complaint   Patient presents with    CHF       HPI: 65y.o. year old female with a history of HTN, HLD, WES, obesity (Body mass index is 41.7 kg/m².) s/p gastric bypass in 2011, T2DM, hypothyroidism, COPD, CAD s/p CABG in 1997, s/p PCI to [address] in 5/15, ICM, VT, s/p AICD ([employer]),  anxiety, and depression who presents today for a HF clinic visit for her chronic systolic heart failure. Ms. Mccray  recalls having a viral syndrome last year and has not felt well since then. She uses oxygen with her BiPAP every night and sleeps on two pillows. She was recently admitted to [facility] for acute on chronic systolic heart failure, treated with IV inotropes and diuretics. She was scheduled to undergo BiV upgrade with Dr. Mark Clinton, however, her QRS was too narrow for the upgrade. He increased her low pacing threshold from 50 bpm to 70 bpm.      She presents today for follow up HF clinic. Overall she states she is more dyspneic than usual within the past few days. Her weight is up today 228lbs, was 224lbs last clinic visit. She did not take her lasix today but has been taking 40mg daily for the past 4 days. Reports not following a low K+or Na+ diet, often sausage and jc. She has an appointment to see nutritionist and has been cleared for cardiac rehab. She has been working on BG management and her HbA1c has decreased from 8.4 to 7.8. Venus Heller denies CP, orthopnea, dizziness, lightheadedness, syncope, pre-syncope, or abdominal distention. She is compliant with her medications and weighs herself and measures her BP diligently. She is schedule for JE[facility] on Friday, 10/2/2020.       Optivol Interrogation 9/30/20:  Optivol below threshold and trending down  Patient activity 0.6hr/day  Time in AT/AF 0 hr/day  No ventricular arrhythmias  No shocks  Total  0.2%    Impression / Plan:   ICM - Stage C, NYHA Class III, LVEF 30-35%   TTE 6/18/20- EF 25-30%, mod TR, severe MR   Continue Toprol XL 12.5 mg daily   Continue Entresto 24/26 mg PO BID   Continue hydralazine 50 mg PO TID and Imdur 30mg daily   Increase furosemide to 20 mg PO daily   Intolerant to AA due to hyperkalemia   Discussed Low sodium/potassium diet-handout given    daily weights   Invitate - DSP (VUS)   Equivocal PYP imaging   Resume cardiac rehab    Repeat CMP, NT pro-BNP, mag    Follow [facility] in the [facility] in 1 month         CAD s/p CABG in 1997, s/p PCI to DVG-RCA in 10/16   On ASA, statin, BB, Zetia   Severe 3V disease     MR, severe   Refer to [facility] for evaluation of MitraClip candidacy    JEFF scheduled 10/2/20      HTN - well controlled   On BB, Entresto, and hydralazine/nitrate   Low sodium diet   Compliant with BiPAP    VT - s/p dual chamber AICD with RA lead    OptiVol below threshold   No shocks   No arrhythmias on interrogation    Dr. Sarai Andrews increased rate from 50 bpm to 70 bpm     WES - on BiPAP with oxygen   Compliant with BiPAP   Recently seen by pulmonary- off nocturnal O2    L9VU complicated by neuropathy   HgA1C improved from 8.4 to 7.8    Continue Jardiance 10 mg PO daily - increase next visit   Instructed to watch for  infections     Hypothyroidism   On levothyroxine 100 mcgs daily   TFTs in October     Depression   On Wellbutrin  mg     History of Tobacco Abuse - 1/2 ppd x 20 years, quit 5 years ago   Counseled re: importance of continued abstinence    Gout    Continue allopurinol    Discussed low purine foods     Obesity Body mass index is 41.7 kg/m².    Encouraged to meet with dietician at cardiac rehab    Encourage physical activity   Nutritionist referral    Osteoarthritis - s/p right TKR   Avoid Celebrex due to CKD, HFrEF   S/p left knee injection in 2019   Lidocaine patches to knees     Hyperlipidemia    , LDL 58 on 20   Lipoprotein-A 212   Continue Lipitor 80 mg daily    Continue Zetia 10 mg PO daily   Low chol diet     CKD4, suspect diabetic nephropathy and cardiorenal syndrome   Recent Cr 2.6   Continue Entresto 24/26 mg PO BID    Avoid nephrotoxic agents   monitor     Vitamin D deficiency    Continue cholecalciferol 2,000 units daily 7heck VItamin   Check Vitamin D next labs        History:  Past Medical History:   Diagnosis Date    Adverse effect of anesthesia     hard to wake up/uses BIPAP/\"try to avoid general if possible\"/intubated in past prior to going to sleep and it caused pt to be incontinent    Arthritis     Asthma     CAD (coronary artery disease)     Chronic kidney disease     elevataed creatinine    Chronic obstructive pulmonary disease (HCC)     Chronic pain     arthritis    Coagulation disorder (HCC)     on plavix    Congestive heart failure (HCC)     Diabetes ([facility] [address]     Hypertension     Morbid obesity ([facility] [address] [facility]     Sleep apnea     BIPAP with 2 liters oxygen    Thromboembolus ([facility] [address]     after heart surgery - left leg    Thyroid disease      Past Surgical History:   Procedure Laterality Date    CARDIAC SURG PROCEDURE UNLIST  2018    cardiac cath - Left    COLONOSCOPY N/A 2020    COLONOSCOPY   :- performed by Uziel Woody MD at \[facility]"" [address]    knee - right    HX  SECTION      x3    HX CORONARY ARTERY BYPASS GRAFT  1997    3 vessels    HX CORONARY STENT PLACEMENT  2016    HX HERNIA REPAIR  1988    HX IMPLANTABLE CARDIOVERTER DEFIBRILLATOR      HX KNEE REPLACEMENT Right     once    LAP GASTRIC BYPASS/KERLINE-EN-Y  2011    lap band per pt and not a gastric bypass     Social History     Socioeconomic History    Marital status:      Spouse name: Not on file    Number of children: Not on file    Years of education: Not on file    Highest education level: Not on file   Occupational History    Not on file   Social Needs    Financial resource strain: Not on file    Food insecurity     Worry: Not on file     Inability: Not on file    Transportation needs     Medical: Not on file     Non-medical: Not on file   Tobacco Use    Smoking status: Former Smoker     Types: Cigarettes    Smokeless tobacco: Never Used    Tobacco comment: quit 2001/started again (smoked 3 yrs) off and on/none since 2012   Substance and Sexual Activity    Alcohol use: Not Currently    Drug use: Not Currently    Sexual activity: Not Currently   Lifestyle    Physical activity     Days per week: Not on file     Minutes per session: Not on file    Stress: Not on file   Relationships    Social connections     Talks on phone: Not on file     Gets together: Not on file     Attends Denominational service: Not on file     Active member of club or organization: Not on file     Attends meetings of clubs or organizations: Not on file     Relationship status: Not on file    Intimate partner violence     Fear of current or ex partner: Not on file     Emotionally abused: Not on file     Physically abused: Not on file     Forced sexual activity: Not on file   Other Topics Concern    Not on file   Social History Narrative    Not on file     Family History   Problem Relation Age of Onset    Hypertension Mother     Dementia Mother     Coronary Artery Disease Father 58    Sudden Death Father 58    Diabetes Son     Diabetes Brother      Allergies: Allergies   Allergen Reactions    Crestor [Rosuvastatin] Other (comments)     Causes muscle cramps    Lisinopril Cough    Nsaids (Non-Steroidal Anti-Inflammatory Drug) Other (comments)     Liver and Kidney       ROS:    Review of Systems   Constitutional: Positive for malaise/fatigue. Negative for chills, fever and weight loss. Eyes: Negative. Respiratory: Negative for cough, sputum production and shortness of breath. Cardiovascular: Positive for leg swelling. Negative for chest pain, palpitations and PND. Gastrointestinal: Negative for constipation, heartburn and nausea. Genitourinary: Negative. Musculoskeletal: Positive for joint pain. Chronic knee pain    Skin: Negative. Neurological: Negative for dizziness, weakness and headaches. Psychiatric/Behavioral: Negative for depression. The patient is nervous/anxious. Physical Exam:   Vitals:    Visit Vitals  /64 (BP 1 Location: Right arm, BP Patient Position: Sitting)   Pulse 72   Temp 98.3 °F (36.8 °C) (Oral)   Resp 18   Ht 5' 2\" (1.575 m)   Wt 228 lb (103.4 kg)   SpO2 97%   BMI 41.70 kg/m²         Physical Exam   Constitutional: She is oriented to person, place, and time. She appears well-developed and well-nourished. No distress. HENT:   Head: Normocephalic. Neck: Normal range of motion. Neck supple. No JVD present. Cardiovascular: Normal rate, regular rhythm, S1 normal, S2 normal and intact distal pulses. Murmur heard. Pulmonary/Chest: Effort normal and breath sounds normal. No respiratory distress. Abdominal: Soft. Bowel sounds are normal. She exhibits no distension. obese   Musculoskeletal: Normal range of motion. General: Edema present. Neurological: She is alert and oriented to person, place, and time. Skin: Skin is warm, dry and intact. Psychiatric: She has a normal mood and affect. Her speech is normal and behavior is normal. Thought content normal. Cognition and memory are normal.   Vitals reviewed. Recent Labs:   Labs Latest Ref Rng & Units 2020   Albumin 3.7 - 4.7 g/dL 3.6(L) -   Calcium 8.7 - 10.3 mg/dL 10.0 10. 6(H)   Glucose 65 - 99 mg/dL 172(H) 143(H)   BUN 8 - 27 mg/dL 41(H) 52(H)   Creatinine 0.57 - 1.00 mg/dL 2.18(H) 2.60(H)   Sodium 134 - 144 mmol/L 139 140   Potassium 3.5 - 5.2 mmol/L 5.2 4.5   Some recent data might be hidden     EK2020  Sinus  Rhythm, rate 67 bpm   - Nonspecific T-abnormality. 06/19/20   ECHO ADULT COMPLETE 06/20/2020 6/20/2020    Narrative · Image quality for this study was technically difficult. · Mildly dilated left ventricle. Upper normal wall thickness. Severe   global systolic function. Estimated left ventricular ejection fraction is   25 - 30%. Suboptimal endocardial visualization limits wall motion   analysis. Inconclusive left ventricular diastolic function. · Moderate tricuspid valve regurgitation is present. · Mild to moderate pulmonary hypertension. Pulmonary arterial systolic   pressure is 45 mmHg. · Moderately dilated left atrium. · Mitral valve non-specific thickening. Severe mitral valve regurgitation   is present. Signed by: Marito Martinez MD         Prior to Admission medications    Medication Sig Start Date End Date Taking? Authorizing Provider   furosemide (LASIX) 20 mg tablet Take 2 Tabs by mouth daily. 9/30/20  Yes Shirley Carrion NP   clotrimazole-betamethasone (LOTRISONE) topical cream Apply  to affected area two (2) times a day. 9/23/20  Yes Kristen Dinh NP   ezetimibe (ZETIA) 10 mg tablet Take 1 tablet by mouth once daily 9/2/20  Yes Federico Fox NP   gabapentin (NEURONTIN) 100 mg capsule Take 2 Caps by mouth three (3) times daily. Max Daily Amount: 600 mg. 9/2/20  Yes VENKATESH Batesynn Buttner 24-26 mg tablet Take 1 tablet by mouth twice daily 8/31/20  Yes Lo Mckeon NP   clopidogreL (PLAVIX) 75 mg tab Take 1 Tab by mouth daily. 8/26/20  Yes Kristen Dinh NP   Blood-Glucose Meter monitoring kit Use as directed. Dx: E11.65 check sugar twice daily 8/25/20  Yes Kristen Dinh NP   levothyroxine (SYNTHROID) 100 mcg tablet Take 1 Tab by mouth Daily (before breakfast). 8/21/20  Yes Kristen Dinh NP   lancets misc Use as directed. Dx:check sugar once daily.  E11.65 8/19/20  Yes Kristen Dinh NP   glucose blood VI test strips (ASCENSIA AUTODISC VI, ONE TOUCH ULTRA TEST VI) strip E11.65 check sugar once daily 8/19/20  Yes Isaura Morales., NP   glipiZIDE (GLUCOTROL) 10 mg tablet Take 10 mg by mouth two (2) times a day. Yes Provider, Historical   allopurinoL (ZYLOPRIM) 100 mg tablet Take 1 tablet by mouth once daily 5/28/20  Yes Mike Whittier Lasso B, NP   isosorbide mononitrate ER (IMDUR) 30 mg tablet Take 1 tablet by mouth once daily 5/28/20  Yes Mike Lo B, NP   hydrALAZINE (APRESOLINE) 50 mg tablet Take 1 Tab by mouth three (3) times daily. 5/28/20  Yes Lo Mckeon NP   atorvastatin (LIPITOR) 80 mg tablet TAKE 1 TABLET BY MOUTH AT BEDTIME 4/10/20  Yes Aurea Harley NP   cholecalciferol (VITAMIN D3) (1000 Units /25 mcg) tablet Take 2 Tabs by mouth daily. 4/10/20  Yes Zach Blankenship NP   empagliflozin (JARDIANCE) 10 mg tablet Take 1 Tab by mouth daily. 3/9/20  Yes Aurea Harley NP   albuterol (PROVENTIL HFA, VENTOLIN HFA, PROAIR HFA) 90 mcg/actuation inhaler Take 2 Puffs by inhalation every four (4) hours as needed. Yes Provider, Historical   buPROPion SR (WELLBUTRIN SR) 150 mg SR tablet Take 1 Tab by mouth daily. 2/6/20  Yes Isaura Lacey NP   metoprolol succinate (TOPROL-XL) 25 mg XL tablet Take 0.5 Tabs by mouth daily. 2/5/20  Yes Aurea Harley NP   acetaminophen (TYLENOL ARTHRITIS PAIN) 650 mg TbER Take 1,300 mg by mouth two (2) times a day. Yes Provider, Historical   aspirin 81 mg chewable tablet Take 81 mg by mouth daily. Yes Provider, Historical   furosemide (LASIX) 20 mg tablet Take 1 Tab by mouth daily.  8/19/20 9/30/20  Zach Blankenship NP     Allergies   Allergen Reactions    Crestor [Rosuvastatin] Other (comments)     Causes muscle cramps    Lisinopril Cough    Nsaids (Non-Steroidal Anti-Inflammatory Drug) Other (comments)     Liver and Kidney       Past Medical History:   Diagnosis Date    Adverse effect of anesthesia     hard to wake up/uses BIPAP/\"try to avoid general if possible\"/intubated in past prior to going to sleep and it caused pt to be incontinent    Arthritis     Asthma     CAD (coronary artery disease)     Chronic kidney disease     elevataed creatinine    Chronic obstructive pulmonary disease (HCC)     Chronic pain     arthritis    Coagulation disorder ([address]     on plavix    Congestive heart failure ([address]     Diabetes ([address]     Hypertension     Morbid obesity ([address]     Sleep apnea 1996    BIPAP with 2 liters oxygen    Thromboembolus ([address]     after heart surgery - left leg    Thyroid disease          Carolina Nicholas, NP  [address] [facility]  [address] [place] [address], [address] [facility], [address] [place] [address]  Office [phone]  Fax [phone]

## 2020-09-30 NOTE — LETTER
9/30/20 Patient: Sol Mclean YOB: 1950 Date of Visit: 9/30/2020 Keisha Alvarez, [address] [facility] [address] [facility] [id number] VIA In Basket Dear Erica Bryant. Lane Alvarez NP, Thank you for referring Ms. Steve Riddle to [address] [place] [address] for evaluation. My notes for this consultation are attached. If you have questions, please do not hesitate to call me. I look forward to following your patient along with you. Sincerely, Nita Frazier NP

## 2020-09-30 NOTE — PATIENT INSTRUCTIONS
Medication changes:    Increase lasix to 40mg daily,  If you have a weight gain of 3 or more pounds overnight OR 5 or more pounds in one week please contact our office. Please take this to your pharmacy to notify them of the change in medications. Testing Ordered:    Labs, [facility] will notify you of any abnormal results    Other Recommendations:      Ensure your drinking an adequate amount of water with a goal of 6-8 eight ounce glasses (1.5-2 liters) of fluid daily. Your urine should be clear and light yellow straw colored. If your blood pressure begins to consistently run below 90/60 and/or you begin to experience dizziness or lightheadedness, please contact the [facility] at [phone]. Follow up 4-6 weeks with [facility]      Please monitor your blood pressures daily prior to medications and 2 hours after taking medications. Bring a written record of your blood pressures to your next appointment. Please monitor your weights daily upon waking and after using the bathroom. Keep a written records of your weights and bring to your next appointment. Thank you for allowing us the privilege of being a part of your healthcare team! Please do not hesitate to contact our office at [phone] with any questions or concerns.

## 2020-10-01 ENCOUNTER — OFFICE VISIT (OUTPATIENT)
Dept: INTERNAL MEDICINE CLINIC | Age: 70
End: 2020-10-01
Payer: MEDICARE

## 2020-10-01 VITALS
DIASTOLIC BLOOD PRESSURE: 63 MMHG | HEIGHT: 62 IN | SYSTOLIC BLOOD PRESSURE: 116 MMHG | TEMPERATURE: 98.4 F | HEART RATE: 75 BPM | OXYGEN SATURATION: 99 % | WEIGHT: 225 LBS | RESPIRATION RATE: 18 BRPM | BODY MASS INDEX: 41.41 KG/M2

## 2020-10-01 DIAGNOSIS — Z00.00 MEDICARE ANNUAL WELLNESS VISIT, SUBSEQUENT: ICD-10-CM

## 2020-10-01 DIAGNOSIS — I34.0 MITRAL VALVE INSUFFICIENCY, UNSPECIFIED ETIOLOGY: ICD-10-CM

## 2020-10-01 DIAGNOSIS — I10 ESSENTIAL HYPERTENSION: ICD-10-CM

## 2020-10-01 DIAGNOSIS — J44.9 CHRONIC OBSTRUCTIVE PULMONARY DISEASE, UNSPECIFIED COPD TYPE (HCC): ICD-10-CM

## 2020-10-01 DIAGNOSIS — E03.9 ACQUIRED HYPOTHYROIDISM: ICD-10-CM

## 2020-10-01 DIAGNOSIS — E11.65 TYPE 2 DIABETES MELLITUS WITH HYPERGLYCEMIA, WITHOUT LONG-TERM CURRENT USE OF INSULIN (HCC): Primary | ICD-10-CM

## 2020-10-01 DIAGNOSIS — I25.810 CORONARY ARTERY DISEASE INVOLVING CORONARY BYPASS GRAFT OF NATIVE HEART WITHOUT ANGINA PECTORIS: ICD-10-CM

## 2020-10-01 LAB — GLUCOSE POC: 149 MG/DL

## 2020-10-01 PROCEDURE — G8417 CALC BMI ABV UP PARAM F/U: HCPCS | Performed by: NURSE PRACTITIONER

## 2020-10-01 PROCEDURE — 3051F HG A1C>EQUAL 7.0%<8.0%: CPT | Performed by: NURSE PRACTITIONER

## 2020-10-01 PROCEDURE — 3017F COLORECTAL CA SCREEN DOC REV: CPT | Performed by: NURSE PRACTITIONER

## 2020-10-01 PROCEDURE — 1100F PTFALLS ASSESS-DOCD GE2>/YR: CPT | Performed by: NURSE PRACTITIONER

## 2020-10-01 PROCEDURE — G8399 PT W/DXA RESULTS DOCUMENT: HCPCS | Performed by: NURSE PRACTITIONER

## 2020-10-01 PROCEDURE — G8432 DEP SCR NOT DOC, RNG: HCPCS | Performed by: NURSE PRACTITIONER

## 2020-10-01 PROCEDURE — 3288F FALL RISK ASSESSMENT DOCD: CPT | Performed by: NURSE PRACTITIONER

## 2020-10-01 PROCEDURE — G8752 SYS BP LESS 140: HCPCS | Performed by: NURSE PRACTITIONER

## 2020-10-01 PROCEDURE — G8427 DOCREV CUR MEDS BY ELIG CLIN: HCPCS | Performed by: NURSE PRACTITIONER

## 2020-10-01 PROCEDURE — G8754 DIAS BP LESS 90: HCPCS | Performed by: NURSE PRACTITIONER

## 2020-10-01 PROCEDURE — G9899 SCRN MAM PERF RSLTS DOC: HCPCS | Performed by: NURSE PRACTITIONER

## 2020-10-01 PROCEDURE — 82962 GLUCOSE BLOOD TEST: CPT | Performed by: NURSE PRACTITIONER

## 2020-10-01 PROCEDURE — 2022F DILAT RTA XM EVC RTNOPTHY: CPT | Performed by: NURSE PRACTITIONER

## 2020-10-01 PROCEDURE — G8536 NO DOC ELDER MAL SCRN: HCPCS | Performed by: NURSE PRACTITIONER

## 2020-10-01 PROCEDURE — G0439 PPPS, SUBSEQ VISIT: HCPCS | Performed by: NURSE PRACTITIONER

## 2020-10-01 PROCEDURE — 1090F PRES/ABSN URINE INCON ASSESS: CPT | Performed by: NURSE PRACTITIONER

## 2020-10-01 RX ORDER — GLIPIZIDE 10 MG/1
TABLET ORAL
Qty: 180 TAB | Refills: 0 | Status: SHIPPED | OUTPATIENT
Start: 2020-10-01 | End: 2020-12-28

## 2020-10-01 NOTE — PROGRESS NOTES
Subjective: (As above and below)     Chief Complaint   Patient presents with    Diabetes     Mahsa Leon is a 65y.o. year old female who presents for AW and fu on chronic conditions    Diabetic Review of Systems - medication compliance: compliant all of the time, diabetic diet compliance: noncompliant much of the time, home glucose monitoring: is performed sporadically. Utd on eye exam    Hypertension ROS:  taking medications as instructed, no medication side effects noted, no TIAs, no chest pain on exertion, no dyspnea on exertion, no swelling of ankles    CAD s/p CABG in 1997  Pacer and ICD in place    Severe MVR: is following w/ speciality for likely mitraclip procedure- has TTE on 10/2    COPD: followed by pulm, stable    Hyperlipidemia: tolerating statin    WES: stable    Hypothyroidism: stable    S/p gastric bypass 2011    Diverticulosis: no pain at this time, she asks about diet regarding this      Overall reports doing fairly well. She is struggling w/ diet. She lives w/ her sister who has multiple health issues and they struggle to balance this with eating healthy, often reach for quick foods. She has an appt w/ nutrition soon          Reviewed PmHx, RxHx, FmHx, SocHx, AllgHx and updated in chart.   Family History   Problem Relation Age of Onset   [facility] Hypertension Mother     Dementia Mother     Coronary Artery Disease Father 60    Sudden Death Father 58    Diabetes Son     Diabetes Brother        Past Medical History:   Diagnosis Date    Adverse effect of anesthesia     hard to wake up/uses BIPAP/\"try to avoid general if possible\"/intubated in past prior to going to sleep and it caused pt to be incontinent    Arthritis     Asthma     CAD (coronary artery disease)     Chronic kidney disease     elevataed creatinine    Chronic obstructive pulmonary disease (HCC)     Chronic pain     arthritis    Coagulation disorder ([address]     on plavix    Congestive heart failure ([address]     Diabetes ([address]  Hypertension     Morbid obesity ([facility]     Sleep apnea 1996    BIPAP with 2 liters oxygen    Thromboembolus (HCC)     after heart surgery - left leg    Thyroid disease       Social History     Socioeconomic History    Marital status:      Spouse name: Not on file    Number of children: Not on file    Years of education: Not on file    Highest education level: Not on file   Tobacco Use    Smoking status: Former Smoker     Types: Cigarettes    Smokeless tobacco: Never Used    Tobacco comment: quit 2001/started again (smoked 3 yrs) off and on/none since 2012   Substance and Sexual Activity    Alcohol use: Not Currently    Drug use: Not Currently    Sexual activity: Not Currently          Current Outpatient Medications   Medication Sig    furosemide (LASIX) 20 mg tablet Take 2 Tabs by mouth daily.  clotrimazole-betamethasone (LOTRISONE) topical cream Apply  to affected area two (2) times a day.  ezetimibe (ZETIA) 10 mg tablet Take 1 tablet by mouth once daily    gabapentin (NEURONTIN) 100 mg capsule Take 2 Caps by mouth three (3) times daily. Max Daily Amount: 600 mg.   Amalia.Stefany Entresto 24-26 mg tablet Take 1 tablet by mouth twice daily    clopidogreL (PLAVIX) 75 mg tab Take 1 Tab by mouth daily.  Blood-Glucose Meter monitoring kit Use as directed. Dx: E11.65 check sugar twice daily    levothyroxine (SYNTHROID) 100 mcg tablet Take 1 Tab by mouth Daily (before breakfast).  lancets misc Use as directed. Dx:check sugar once daily. E11.65    glucose blood VI test strips (ASCENSIA AUTODISC VI, ONE TOUCH ULTRA TEST VI) strip E11.65 check sugar once daily    glipiZIDE (GLUCOTROL) 10 mg tablet Take 10 mg by mouth two (2) times a day.  allopurinoL (ZYLOPRIM) 100 mg tablet Take 1 tablet by mouth once daily    isosorbide mononitrate ER (IMDUR) 30 mg tablet Take 1 tablet by mouth once daily    hydrALAZINE (APRESOLINE) 50 mg tablet Take 1 Tab by mouth three (3) times daily.     atorvastatin (LIPITOR) 80 mg tablet TAKE 1 TABLET BY MOUTH AT BEDTIME    cholecalciferol (VITAMIN D3) (1000 Units /25 mcg) tablet Take 2 Tabs by mouth daily.  empagliflozin (JARDIANCE) 10 mg tablet Take 1 Tab by mouth daily.  albuterol (PROVENTIL HFA, VENTOLIN HFA, PROAIR HFA) 90 mcg/actuation inhaler Take 2 Puffs by inhalation every four (4) hours as needed.  buPROPion SR (WELLBUTRIN SR) 150 mg SR tablet Take 1 Tab by mouth daily.  metoprolol succinate (TOPROL-XL) 25 mg XL tablet Take 0.5 Tabs by mouth daily.  acetaminophen (TYLENOL ARTHRITIS PAIN) 650 mg TbER Take 1,300 mg by mouth two (2) times a day.  aspirin 81 mg chewable tablet Take 81 mg by mouth daily. No current facility-administered medications for this visit. Review of Systems:   Constitutional:    Negative for fever and chills, negative diaphoresis. HEENT:              Negative for neck pain and stiffness. Eyes:                  Negative for visual disturbance, itching, redness or discharge. Respiratory:        Negative for cough and shortness of breath. Cardiovascular:  Negative for chest pain and palpitations. Gastrointestinal: Negative for nausea, vomiting, abdominal pain, diarrhea or constipation. Genitourinary:     Negative for dysuria and frequency. Musculoskeletal: Negative for falls, tenderness and swelling. Skin:                    Negative for rash, masses or lesions. Neurological:       Negative for dizzyness, seizure, loss of consciousness, weakness and numbness.      Objective:     Vitals:    10/01/20 0952   Resp: 18   Weight: 225 lb (102.1 kg)   Height: 5' 2\" (1.575 m)       Results for orders placed or performed during the hospital encounter of 09/28/20   NOVEL CORONAVIRUS (COVID-19)   Result Value Ref Range    SARS-CoV-2 Not Detected Not Detected           Physical Examination: General appearance - alert, well appearing, and in no distress and overweight  Chest - clear to auscultation, no wheezes, rales or rhonchi, symmetric air entry  Heart - normal rate, regular rhythm, normal S1, S2, no murmurs, rubs, clicks or gallops  Extremities - no pedal edema noted      Assessment/ Plan:      Believes vaccines utd thru mcv will check    1. Medicare annual wellness visit, subsequent      2. Type 2 diabetes mellitus with hyperglycemia, without long-term current use of insulin (HCC)  Last a1c improving, cont working on diet- nutrition fu  - AMB POC GLUCOSE BLOOD, BY GLUCOSE MONITORING DEVICE  - HEMOGLOBIN A1C WITH EAG    3. Acquired hypothyroidism    - TSH REFLEX TO T4    4. Coronary artery disease involving coronary bypass graft of native heart without angina pectoris  Fu cardio    5. Mitral valve insufficiency, unspecified etiology      6. Essential hypertension      7. Chronic obstructive pulmonary disease, unspecified COPD type ([facility]  Fu pulm         I have discussed the diagnosis with the patient and the intended plan as seen in the above orders. The patient has received an after-visit summary and questions were answered concerning future plans. Pt conveyed understanding of plan. Medication Side Effects and Warnings were discussed with patient: yes  Patient Labs were reviewed: yes  Patient Past Records were reviewed:  yes    Mayur Hinson. Ifeanyi Dasilva NP     This is the Subsequent Medicare Annual Wellness Exam, performed 12 months or more after the Initial AWV or the last Subsequent AWV    I have reviewed the patient's medical history in detail and updated the computerized patient record.      History     Patient Active Problem List   Diagnosis Code    Hx of CABG Z95.1    ICD (implantable cardioverter-defibrillator) in place Z80.65    H/O laparoscopic adjustable gastric banding Z98.84    Obesity, morbid ([facility] E66.01    NYHA class 3 heart failure with reduced ejection fraction (HCC) I50.20    Peripheral vascular disease (HCC) I73.9    Severe mitral regurgitation I34.0    Ischemic cardiomyopathy I25.5    Chronic heart failure with reduced ejection fraction and diastolic dysfunction ([facility]) I50.42    Sleep apnea treated with nocturnal BiPAP G47.30     Past Medical History:   Diagnosis Date    Adverse effect of anesthesia     hard to wake up/uses BIPAP/\"try to avoid general if possible\"/intubated in past prior to going to sleep and it caused pt to be incontinent    Arthritis     Asthma     CAD (coronary artery disease)     Chronic kidney disease     elevataed creatinine    Chronic obstructive pulmonary disease (HCC)     Chronic pain     arthritis    Coagulation disorder (HCC)     on plavix    Congestive heart failure (HCC)     Diabetes ([address]     Hypertension     Morbid obesity ([facility]     Sleep apnea     BIPAP with 2 liters oxygen    Thromboembolus ([facility] [address]     after heart surgery - left leg    Thyroid disease       Past Surgical History:   Procedure Laterality Date    CARDIAC SURG PROCEDURE UNLIST  2018    cardiac cath - Left    COLONOSCOPY N/A 2020    COLONOSCOPY   :- performed by Sammy Payne MD at [facility] [address]  2[address]    knee - right    HX  SECTION      x3    HX CORONARY ARTERY BYPASS GRAFT  1997    3 vessels    HX CORONARY STENT PLACEMENT  2016    HX HERNIA REPAIR  1988    HX IMPLANTABLE CARDIOVERTER DEFIBRILLATOR      HX KNEE REPLACEMENT Right 2015    once    LAP GASTRIC BYPASS/KERLINE-EN-Y  2011    lap band per pt and not a gastric bypass     Current Outpatient Medications   Medication Sig Dispense Refill    furosemide (LASIX) 20 mg tablet Take 2 Tabs by mouth daily. 60 Tab 2    clotrimazole-betamethasone (LOTRISONE) topical cream Apply  to affected area two (2) times a day. 30 g 0    ezetimibe (ZETIA) 10 mg tablet Take 1 tablet by mouth once daily 30 Tab 0    gabapentin (NEURONTIN) 100 mg capsule Take 2 Caps by mouth three (3) times daily.  Max Daily Amount: 600 mg. 180 Cap 0    Entresto 24-26 mg tablet Take 1 tablet by mouth twice daily 60 Tab 1    clopidogreL (PLAVIX) 75 mg tab Take 1 Tab by mouth daily. 90 Tab 0    Blood-Glucose Meter monitoring kit Use as directed. Dx: E11.65 check sugar twice daily 1 Kit 0    levothyroxine (SYNTHROID) 100 mcg tablet Take 1 Tab by mouth Daily (before breakfast). 90 Tab 1    lancets misc Use as directed. Dx:check sugar once daily. E11.65 1 Each 11    glucose blood VI test strips (ASCENSIA AUTODISC VI, ONE TOUCH ULTRA TEST VI) strip E11.65 check sugar once daily 100 Strip 1    allopurinoL (ZYLOPRIM) 100 mg tablet Take 1 tablet by mouth once daily 90 Tab 2    isosorbide mononitrate ER (IMDUR) 30 mg tablet Take 1 tablet by mouth once daily 30 Tab 3    hydrALAZINE (APRESOLINE) 50 mg tablet Take 1 Tab by mouth three (3) times daily. 120 Tab 3    atorvastatin (LIPITOR) 80 mg tablet TAKE 1 TABLET BY MOUTH AT BEDTIME 90 Tab 3    cholecalciferol (VITAMIN D3) (1000 Units /25 mcg) tablet Take 2 Tabs by mouth daily. 60 Tab 11    empagliflozin (JARDIANCE) 10 mg tablet Take 1 Tab by mouth daily. 30 Tab 2    albuterol (PROVENTIL HFA, VENTOLIN HFA, PROAIR HFA) 90 mcg/actuation inhaler Take 2 Puffs by inhalation every four (4) hours as needed.  buPROPion SR (WELLBUTRIN SR) 150 mg SR tablet Take 1 Tab by mouth daily. 90 Tab 0    metoprolol succinate (TOPROL-XL) 25 mg XL tablet Take 0.5 Tabs by mouth daily. 60 Tab 2    acetaminophen (TYLENOL ARTHRITIS PAIN) 650 mg TbER Take 1,300 mg by mouth two (2) times a day.  aspirin 81 mg chewable tablet Take 81 mg by mouth daily.       glipiZIDE (GLUCOTROL) 10 mg tablet Take 1 tablet by mouth twice daily with food 180 Tab 0     Facility-Administered Medications Ordered in Other Visits   Medication Dose Route Frequency Provider Last Rate Last Dose    sodium chloride (NS) flush 5-40 mL  5-40 mL IntraVENous Q8H Jaky Chandler MD         Allergies   Allergen Reactions    Crestor [Rosuvastatin] Other (comments)     Causes muscle cramps    Lisinopril Cough    Nsaids (Non-Steroidal Anti-Inflammatory Drug) Other (comments)     Liver and Kidney       Family History   Problem Relation Age of Onset    Hypertension Mother     Dementia Mother     Coronary Artery Disease Father 58    Sudden Death Father 58    Diabetes Son     Diabetes Brother      Social History     Tobacco Use    Smoking status: Former Smoker     Types: Cigarettes    Smokeless tobacco: Never Used    Tobacco comment: quit 2001/started again (smoked 3 yrs) off and on/none since 2012   Substance Use Topics    Alcohol use: Not Currently       Depression Risk Factor Screening:     3 most recent PHQ Screens 10/1/2020   PHQ Not Done -   Little interest or pleasure in doing things Not at all   Feeling down, depressed, irritable, or hopeless Not at all   Total Score PHQ 2 0   Trouble falling or staying asleep, or sleeping too much -   Feeling tired or having little energy -   Poor appetite, weight loss, or overeating -   Feeling bad about yourself - or that you are a failure or have let yourself or your family down -   Trouble concentrating on things such as school, work, reading, or watching TV -   Moving or speaking so slowly that other people could have noticed; or the opposite being so fidgety that others notice -   Thoughts of being better off dead, or hurting yourself in some way -   PHQ 9 Score -   How difficult have these problems made it for you to do your work, take care of your home and get along with others -       Alcohol Risk Screen   Do you average more than 1 drink per night or more than 7 drinks a week:  No    On any one occasion in the past three months have you have had more than 3 drinks containing alcohol:  No        Functional Ability and Level of Safety:   Hearing: she feels that hearing \"could be better\" but declines any referral at this time     Activities of Daily Living: The home contains: no safety equipment.   Patient does total self care     Ambulation: with no difficulty     Fall Risk:  Fall Risk Assessment, last 12 mths 10/1/2020   Able to walk? Yes   Fall in past 12 months? No   Fall with injury? -   Number of falls in past 12 months -   Fall Risk Score -     Abuse Screen:  Patient is not abused       Cognitive Screening   Has your family/caregiver stated any concerns about your memory: no     Cognitive Screening: Normal - based on H&P    Patient Care Team   Patient Care Team:  Sulaiman Patricio, NP as PCP - General (Nurse Practitioner)  Sulaiman Patricio NP as PCP - [facility]  Teja Plasencia MD (Cardiology)  Babar Engel MD (Gastroenterology)    Assessment/Plan   Education and counseling provided:  Are appropriate based on today's review and evaluation    Diagnoses and all orders for this visit:    1. Type 2 diabetes mellitus with hyperglycemia, without long-term current use of insulin (HCC)  -     AMB POC GLUCOSE BLOOD, BY GLUCOSE MONITORING DEVICE  -     HEMOGLOBIN A1C WITH EAG    2. Medicare annual wellness visit, subsequent    3. Acquired hypothyroidism  -     TSH REFLEX TO T4    4. Coronary artery disease involving coronary bypass graft of native heart without angina pectoris    5. Mitral valve insufficiency, unspecified etiology    6. Essential hypertension    7.  Chronic obstructive pulmonary disease, unspecified COPD type [facility])        Health Maintenance Due   Topic Date Due    Eye Exam Retinal or Dilated  06[birth year]20/1960    DTaP/Tdap/Td series (1 - Tdap) 06/20/1969    Shingrix Vaccine Age 50> (1 of 2) 06/20/1998    GLAUCOMA SCREENING Q2Y  06/20/2013    Pneumococcal 65+ years (1 of 1 - PPSV23) 06/20/2013    Pneumococcal 65+ yrs at Risk Vaccine (1 of 2 - PCV13) 06/20/2013    Medicare Yearly Exam  06/03/2019    Flu Vaccine (1) 09/01/2020

## 2020-10-01 NOTE — PROGRESS NOTES
Sukumar Brandt is a 65 y.o. female  HIPAA verified by two patient identifiers. Health Maintenance Due   Topic    Eye Exam Retinal or Dilated     DTaP/Tdap/Td series (1 - Tdap)    Shingrix Vaccine Age 50> (1 of 2)    GLAUCOMA SCREENING Q2Y     Pneumococcal 65+ years (1 of 1 - PPSV23)    Pneumococcal 65+ yrs at Risk Vaccine (1 of 2 - PCV13)    Medicare Yearly Exam     Flu Vaccine (1)     Chief Complaint   Patient presents with    Diabetes     Visit Vitals  /63 (BP 1 Location: Right arm, BP Patient Position: Sitting)   Pulse 75   Temp 98.4 °F (36.9 °C) (Oral)   Resp 18   Ht 5' 2\" (1.575 m)   Wt 225 lb (102.1 kg)   SpO2 99%   BMI 41.15 kg/m²       Pain Scale: 5/10  Pain Location: Knee (bilatera)  1. Have you been to the ER, urgent care clinic since your last visit? Hospitalized since your last visit? No    2. Have you seen or consulted any other health care providers outside of the [address] [place] [address] since your last visit? Include any pap smears or colon screening.  No

## 2020-10-02 ENCOUNTER — HOSPITAL ENCOUNTER (OUTPATIENT)
Dept: NON INVASIVE DIAGNOSTICS | Age: 70
Discharge: HOME | End: 2020-10-02
Attending: INTERNAL MEDICINE
Payer: MEDICARE

## 2020-10-02 ENCOUNTER — ANESTHESIA (OUTPATIENT)
Dept: NON INVASIVE DIAGNOSTICS | Age: 70
End: 2020-10-02
Payer: MEDICARE

## 2020-10-02 ENCOUNTER — ANESTHESIA EVENT (OUTPATIENT)
Dept: NON INVASIVE DIAGNOSTICS | Age: 70
End: 2020-10-02
Payer: MEDICARE

## 2020-10-02 VITALS
OXYGEN SATURATION: 96 % | BODY MASS INDEX: 41.96 KG/M2 | WEIGHT: 228 LBS | HEIGHT: 62 IN | HEART RATE: 71 BPM | DIASTOLIC BLOOD PRESSURE: 73 MMHG | SYSTOLIC BLOOD PRESSURE: 129 MMHG | RESPIRATION RATE: 11 BRPM

## 2020-10-02 DIAGNOSIS — I34.0 MITRAL VALVE INSUFFICIENCY, UNSPECIFIED ETIOLOGY: ICD-10-CM

## 2020-10-02 LAB
ANION GAP SERPL CALC-SCNC: 9 MMOL/L (ref 5–15)
ATRIAL RATE: 69 BPM
BUN SERPL-MCNC: 62 MG/DL (ref 6–20)
BUN/CREAT SERPL: 24 (ref 12–20)
CALCIUM SERPL-MCNC: 10.6 MG/DL (ref 8.5–10.1)
CALCULATED P AXIS, ECG09: 1 DEGREES
CALCULATED R AXIS, ECG10: 20 DEGREES
CALCULATED T AXIS, ECG11: 122 DEGREES
CHLORIDE SERPL-SCNC: 107 MMOL/L (ref 97–108)
CO2 SERPL-SCNC: 23 MMOL/L (ref 21–32)
CREAT SERPL-MCNC: 2.61 MG/DL (ref 0.55–1.02)
DIAGNOSIS, 93000: NORMAL
ERYTHROCYTE [DISTWIDTH] IN BLOOD BY AUTOMATED COUNT: 14.6 % (ref 11.5–14.5)
GLUCOSE SERPL-MCNC: 136 MG/DL (ref 65–100)
HCT VFR BLD AUTO: 36 % (ref 35–47)
HGB BLD-MCNC: 11.6 G/DL (ref 11.5–16)
MCH RBC QN AUTO: 32 PG (ref 26–34)
MCHC RBC AUTO-ENTMCNC: 32.2 G/DL (ref 30–36.5)
MCV RBC AUTO: 99.4 FL (ref 80–99)
NRBC # BLD: 0 K/UL (ref 0–0.01)
NRBC BLD-RTO: 0 PER 100 WBC
P-R INTERVAL, ECG05: 100 MS
PLATELET # BLD AUTO: 243 K/UL (ref 150–400)
PMV BLD AUTO: 9.9 FL (ref 8.9–12.9)
POTASSIUM SERPL-SCNC: 4.4 MMOL/L (ref 3.5–5.1)
Q-T INTERVAL, ECG07: 398 MS
QRS DURATION, ECG06: 80 MS
QTC CALCULATION (BEZET), ECG08: 426 MS
RBC # BLD AUTO: 3.62 M/UL (ref 3.8–5.2)
SODIUM SERPL-SCNC: 139 MMOL/L (ref 136–145)
VENTRICULAR RATE, ECG03: 69 BPM
WBC # BLD AUTO: 7.1 K/UL (ref 3.6–11)

## 2020-10-02 PROCEDURE — 93325 DOPPLER ECHO COLOR FLOW MAPG: CPT

## 2020-10-02 PROCEDURE — 80048 BASIC METABOLIC PNL TOTAL CA: CPT

## 2020-10-02 PROCEDURE — 77030011992 HC AIRWY NASOPHGL TELE -A: Performed by: ANESTHESIOLOGY

## 2020-10-02 PROCEDURE — 74011250636 HC RX REV CODE- 250/636: Performed by: NURSE ANESTHETIST, CERTIFIED REGISTERED

## 2020-10-02 PROCEDURE — 96372 THER/PROPH/DIAG INJ SC/IM: CPT

## 2020-10-02 PROCEDURE — 93005 ELECTROCARDIOGRAM TRACING: CPT

## 2020-10-02 PROCEDURE — 76060000032 HC ANESTHESIA 0.5 TO 1 HR

## 2020-10-02 PROCEDURE — 85027 COMPLETE CBC AUTOMATED: CPT

## 2020-10-02 PROCEDURE — 36415 COLL VENOUS BLD VENIPUNCTURE: CPT

## 2020-10-02 RX ORDER — PROPOFOL 10 MG/ML
INJECTION, EMULSION INTRAVENOUS AS NEEDED
Status: DISCONTINUED | OUTPATIENT
Start: 2020-10-02 | End: 2020-10-02 | Stop reason: HOSPADM

## 2020-10-02 RX ORDER — PHENYLEPHRINE HCL IN 0.9% NACL 0.4MG/10ML
SYRINGE (ML) INTRAVENOUS AS NEEDED
Status: DISCONTINUED | OUTPATIENT
Start: 2020-10-02 | End: 2020-10-02 | Stop reason: HOSPADM

## 2020-10-02 RX ORDER — SODIUM CHLORIDE 0.9 % (FLUSH) 0.9 %
5-40 SYRINGE (ML) INJECTION EVERY 8 HOURS
Status: DISCONTINUED | OUTPATIENT
Start: 2020-10-02 | End: 2020-10-06 | Stop reason: HOSPADM

## 2020-10-02 RX ORDER — SODIUM CHLORIDE 9 MG/ML
INJECTION, SOLUTION INTRAVENOUS
Status: DISCONTINUED | OUTPATIENT
Start: 2020-10-02 | End: 2020-10-02 | Stop reason: HOSPADM

## 2020-10-02 RX ORDER — PROPOFOL 10 MG/ML
INJECTION, EMULSION INTRAVENOUS
Status: DISCONTINUED | OUTPATIENT
Start: 2020-10-02 | End: 2020-10-02 | Stop reason: HOSPADM

## 2020-10-02 RX ADMIN — Medication 80 MCG: at 09:23

## 2020-10-02 RX ADMIN — PROPOFOL 20 MG: 10 INJECTION, EMULSION INTRAVENOUS at 08:52

## 2020-10-02 RX ADMIN — SODIUM CHLORIDE: 900 INJECTION, SOLUTION INTRAVENOUS at 08:16

## 2020-10-02 RX ADMIN — Medication 120 MCG: at 09:26

## 2020-10-02 RX ADMIN — PROPOFOL 30 MG: 10 INJECTION, EMULSION INTRAVENOUS at 08:44

## 2020-10-02 RX ADMIN — Medication 40 MCG: at 08:58

## 2020-10-02 RX ADMIN — Medication 80 MCG: at 08:55

## 2020-10-02 RX ADMIN — Medication 40 MCG: at 09:04

## 2020-10-02 RX ADMIN — PROPOFOL 20 MG: 10 INJECTION, EMULSION INTRAVENOUS at 09:00

## 2020-10-02 RX ADMIN — PROPOFOL 50 MCG/KG/MIN: 10 INJECTION, EMULSION INTRAVENOUS at 08:41

## 2020-10-02 RX ADMIN — PROPOFOL 30 MG: 10 INJECTION, EMULSION INTRAVENOUS at 08:41

## 2020-10-02 RX ADMIN — Medication 80 MCG: at 09:13

## 2020-10-02 RX ADMIN — Medication 80 MCG: at 09:10

## 2020-10-02 RX ADMIN — Medication 80 MCG: at 09:18

## 2020-10-02 RX ADMIN — Medication 120 MCG: at 09:28

## 2020-10-02 RX ADMIN — PROPOFOL 20 MG: 10 INJECTION, EMULSION INTRAVENOUS at 08:47

## 2020-10-02 NOTE — PROGRESS NOTES
Anesthesia name:  Jd Lopez CRNA     Anesthesia is present for case. Refer to anesthesia log for vitals.

## 2020-10-02 NOTE — ANESTHESIA PREPROCEDURE EVALUATION
Relevant Problems   No relevant active problems       Anesthetic History   No history of anesthetic complications            Review of Systems / Medical History  Patient summary reviewed, nursing notes reviewed and pertinent labs reviewed    Pulmonary  Within defined limits  COPD    Sleep apnea    Asthma        Neuro/Psych   Within defined limits           Cardiovascular  Within defined limits  Hypertension          CAD         GI/Hepatic/Renal  Within defined limits              Endo/Other  Within defined limits  Diabetes  Hypothyroidism  Morbid obesity and arthritis     Other Findings              Physical Exam    Airway  Mallampati: II  TM Distance: > 6 cm  Neck ROM: normal range of motion   Mouth opening: Normal     Cardiovascular  Regular rate and rhythm,  S1 and S2 normal,  no murmur, click, rub, or gallop             Dental  No notable dental hx       Pulmonary  Breath sounds clear to auscultation               Abdominal  GI exam deferred       Other Findings            Anesthetic Plan    ASA: 3  Anesthesia type: MAC          Induction: Intravenous  Anesthetic plan and risks discussed with: Patient

## 2020-10-02 NOTE — ANESTHESIA POSTPROCEDURE EVALUATION
* No procedures listed *. MAC    Anesthesia Post Evaluation      Multimodal analgesia: multimodal analgesia not used between 6 hours prior to anesthesia start to PACU discharge  Patient location during evaluation: PACU  Patient participation: complete - patient participated  Level of consciousness: awake  Pain score: 0  Pain management: adequate  Airway patency: patent  Anesthetic complications: no  Cardiovascular status: acceptable  Respiratory status: acceptable  Hydration status: acceptable  Comments: I have evaluated the patient and meets criteria for discharge from PACU. Ritchie Li MD  Post anesthesia nausea and vomiting:  controlled      INITIAL Post-op Vital signs: No vitals data found for the desired time range.

## 2020-10-02 NOTE — PROGRESS NOTES
[facility] [place]: Marsa Holter  [phone]          Cardiology valvular heart disease consult/Progress Note       TELEPHONE VISIT DOCUMENTATION      Pursuant to the emergency declaration under the [facility], [facility] waiver authority and the [employer] Act, this Telephone Visit was conducted, with patient's consent, to reduce the patient's risk of exposure to COVID-19 and provide continuity of care for an established patient. She and/or health care decision maker is aware that that she may receive a bill for this telephone service, depending on her insurance coverage, and has provided verbal consent to proceed. This is a Patient Initiated Episode with an Established Patient who has not had a related appointment within my department in the past 7 days or scheduled within the next 24 hours. Requesting/referring provider: Dr. Lissy Recio  Reason for Consult: Mitral valve disease    HPI: Eric Montiel, a 65y.o. year-old who presents for evaluation of mitral valve disease .70y. o.female with PMHx of CAD with CAB x 3 in 1997 then subsequent stents X 4, HTN, HLD, palpitations, ICD 2007, DM, hypothyroid, COPD, WES-wears BiPAP, CKD, DVT 1997 left leg, right TKR, Lap band 2011. Ms. Cy Schaeffer was previously seen by me in [facility] about 6 to 7 months ago when she was reporting progressive symptoms of congestive heart failure. A cardiac catheterization was performed at the time demonstrated patent LIMA to LAD and vein graft to RCA with collateral dependent circumflex and native right PDA. Her vessels were too small to achieve significant further revascularization. She has been optimized for heart failure reduced ejection fraction guideline directed medical therapy by advanced heart failure team over the course of last 6 to 8 months.   Despite that she continues to have significant functional intolerance with anything more than activities of daily daily living causing shortness of breath. She reports stable low-grade edema. Investigations personally reviewed by me  Cardiac catheterization February 2020: Severe native three-vessel disease. Patent LIMA to LAD and vein graft to RCA. Right PDA small diffusely diseased vessel. Left circumflex is chronically occluded and branches are filling by collaterals. Small diagonal is severely diffusely diseased. ECG January 2020: Sinus rhythm, prior inferior infarct, narrow QRS complex, single PVC noted. Echo June 2020: Severe LV systolic dysfunction. Overall EF about 30%. Global hypokinesis. Mid and basal inferolateral walls appear to be severely hypokinetic with possible prior infarct based on hyperechoic nature of this wall. Based on color Doppler it appears to be significant mitral regurgitation which is predominantly central with some degree of posteriorly directed nature of the jet. Mechanism of mitral regurgitation appears to be annular dilatation and lack of Mal coaptation secondary to predominantly posterior leaflet tethering. Moderate pulmonary hypertension is noted. Assessment/Plan:  1. Coronary disease manifested in the form of prior inferior MI, status post coronary bypass grafting with patent LIMA to LAD and vein graft to RCA and chronically occluded left circumflex with collaterals  2. Severe LV systolic dysfunction/heart failure reduced ejection fraction acute on chronic likely secondary to 1 with progressive adverse LV remodeling  3. Severe mitral regurgitation appears functional in nature secondary to progressive adverse LV remodeling and possible papillary muscle dysfunction from posterior myocardial infarction  4. Severe NYHA class III symptomatic limitations  5. History of lower extremity thromboembolism post coronary bypass grafting  6. CKD with baseline creatinine about 2  7.   Morbid obesity with suspected sleep apnea    Her most recent echocardiogram is consistent with severe functional mitral regurgitation with underlying severe LV systolic dysfunction from coronary artery disease. Despite optimization of her medical therapy she continues to have significant symptomatic limitation possibly contributed by her mitral regurgitation which appears prior inferolateral/posterior infarct and progressive LV remodeling with ventricular and annular dilatation. She does not appear to be a candidate for CRT. Hence I feel she would be an appropriate candidate for possible MitraClip/transcatheter [id number] repair of her mitral valve. She appears to be a poor candidate for any kind of surgical intervention. I suggested proceeding with transesophageal echocardiogram and subsequently considering TE ER if she appears to be a candidate. I discussed the risks/benefits/alternatives of the procedure with the patient. Risks include (but are not limited to) bleeding, infection,stroke,heart attack, need for emergency surgery/pericardiocentesis, need for dialysis or death. The patient understands and agrees to proceed. []    High complexity decision making was performed  []    Patient is at high-risk of decompensation with multiple organ involvement  She  has a past medical history of Adverse effect of anesthesia, Arthritis, Asthma, CAD (coronary artery disease), Chronic kidney disease, Chronic obstructive pulmonary disease ([address] Chronic pain, Coagulation disorder ([address] Congestive heart failure ([address] Diabetes ([address] Hypertension, Morbid obesity ([address] Sleep apnea (1996), Thromboembolus ([address] and Thyroid disease. Review of system:Patient reports no PND/Orthpnea/CP. sHe reports no cough/fever/focal neurological deficits/abdominal pain. All other systems negative except as above.    Family History   Problem Relation Age of Onset    Hypertension Mother     Dementia Mother     Coronary Artery Disease Father 60    Sudden Death Father 58    Diabetes Son     Diabetes Brother       Social History     Socioeconomic History    Marital status:      Spouse name: Not on file    Number of children: Not on file    Years of education: Not on file    Highest education level: Not on file   Tobacco Use    Smoking status: Former Smoker     Types: Cigarettes    Smokeless tobacco: Never Used    Tobacco comment: quit 2001/started again (smoked 3 yrs) off and on/none since 2012   Substance and Sexual Activity    Alcohol use: Not Currently    Drug use: Not Currently    Sexual activity: Not Currently      PE  There were no vitals filed for this visit. There is no height or weight on file to calculate BMI.     Recent Labs:  Lab Results   Component Value Date/Time    Cholesterol, total 172 07/23/2020 09:12 AM    HDL Cholesterol 75 07/23/2020 09:12 AM    LDL, calculated 78 07/23/2020 09:12 AM    Triglyceride 93 07/23/2020 09:12 AM    CHOL/HDL Ratio 2.6 01/31/2020 12:35 AM     Lab Results   Component Value Date/Time    Creatinine 2.18 (H) 09/22/2020 02:27 PM     Lab Results   Component Value Date/Time    BUN 41 (H) 09/22/2020 02:27 PM     Lab Results   Component Value Date/Time    Potassium 5.2 09/22/2020 02:27 PM     Lab Results   Component Value Date/Time    Hemoglobin A1c 7.8 (H) 08/17/2020 11:34 AM     Lab Results   Component Value Date/Time    HGB 10.8 (L) 07/23/2020 09:12 AM     Lab Results   Component Value Date/Time    PLATELET 741 36/93/3085 09:12 AM       Reviewed:  Past Medical History:   Diagnosis Date    Adverse effect of anesthesia     hard to wake up/uses BIPAP/\"try to avoid general if possible\"/intubated in past prior to going to sleep and it caused pt to be incontinent    Arthritis     Asthma     CAD (coronary artery disease)     Chronic kidney disease     elevataed creatinine    Chronic obstructive pulmonary disease (HCC)     Chronic pain     arthritis    Coagulation disorder ([address]     on plavix    [facility] failure [facility]     Diabetes ([facility]     Hypertension     Morbid obesity ([facility]     Sleep apnea 1996    BIPAP with 2 liters oxygen    Thromboembolus ([facility]     after heart surgery - left leg    Thyroid disease      Social History     Tobacco Use   Smoking Status Former Smoker    Types: Cigarettes   Smokeless Tobacco Never Used   Tobacco Comment    quit 2001/started again (smoked 3 yrs) off and on/none since 2012     Social History     Substance and Sexual Activity   Alcohol Use Not Currently     Allergies   Allergen Reactions    Crestor [Rosuvastatin] Other (comments)     Causes muscle cramps    Lisinopril Cough    Nsaids (Non-Steroidal Anti-Inflammatory Drug) Other (comments)     Liver and Kidney     Family History   Problem Relation Age of Onset    Hypertension Mother     Dementia Mother     Coronary Artery Disease Father 58    Sudden Death Father 58    Diabetes Son     Diabetes Brother         Current Outpatient Medications   Medication Sig    ezetimibe (ZETIA) 10 mg tablet Take 1 tablet by mouth once daily    gabapentin (NEURONTIN) 100 mg capsule Take 2 Caps by mouth three (3) times daily. Max Daily Amount: 600 mg.   [address] [place] [address] Manoj Entresto 24-26 mg tablet Take 1 tablet by mouth twice daily    clopidogreL (PLAVIX) 75 mg tab Take 1 Tab by mouth daily.  Blood-Glucose Meter monitoring kit Use as directed. Dx: E11.65 check sugar twice daily    levothyroxine (SYNTHROID) 100 mcg tablet Take 1 Tab by mouth Daily (before breakfast).  lancets misc Use as directed. Dx:check sugar once daily. E11.65    glucose blood VI test strips (ASCENSIA AUTODISC VI, ONE TOUCH ULTRA TEST VI) strip E11.65 check sugar once daily    allopurinoL (ZYLOPRIM) 100 mg tablet Take 1 tablet by mouth once daily    isosorbide mononitrate ER (IMDUR) 30 mg tablet Take 1 tablet by mouth once daily    hydrALAZINE (APRESOLINE) 50 mg tablet Take 1 Tab by mouth three (3) times daily.     atorvastatin (LIPITOR) 80 mg tablet TAKE 1 TABLET BY MOUTH AT BEDTIME    cholecalciferol (VITAMIN D3) (1000 Units /25 mcg) tablet Take 2 Tabs by mouth daily.  empagliflozin (JARDIANCE) 10 mg tablet Take 1 Tab by mouth daily.  albuterol (PROVENTIL HFA, VENTOLIN HFA, PROAIR HFA) 90 mcg/actuation inhaler Take 2 Puffs by inhalation every four (4) hours as needed.  buPROPion SR (WELLBUTRIN SR) 150 mg SR tablet Take 1 Tab by mouth daily.  metoprolol succinate (TOPROL-XL) 25 mg XL tablet Take 0.5 Tabs by mouth daily.  acetaminophen (TYLENOL ARTHRITIS PAIN) 650 mg TbER Take 1,300 mg by mouth two (2) times a day.  aspirin 81 mg chewable tablet Take 81 mg by mouth daily.  glipiZIDE (GLUCOTROL) 10 mg tablet Take 1 tablet by mouth twice daily with food    furosemide (LASIX) 20 mg tablet Take 2 Tabs by mouth daily.  clotrimazole-betamethasone (LOTRISONE) topical cream Apply  to affected area two (2) times a day. No current facility-administered medications for this visit. Oli Silver MD10/01/20 There are other unrelated non-urgent complaints, but due to the busy schedule and the amount of time I've already spent with her, time does not permit me to address these routine issues at today's visit. I've requested another appointment to review these additional issues. ATTENTION:   This medical record was transcribed using an electronic medical records/speech recognition system. Although proofread, it may and can contain electronic, spelling and other errors. Corrections may be executed at a later time. Please feel free to contact us for any clarifications as needed.     [address] [facility] [place]  [address] [facility], [address]

## 2020-10-05 ENCOUNTER — OFFICE VISIT (OUTPATIENT)
Dept: CARDIOLOGY CLINIC | Age: 70
End: 2020-10-05
Payer: SUBSIDIZED

## 2020-10-05 DIAGNOSIS — Z95.0 CARDIAC PACEMAKER IN SITU: Primary | ICD-10-CM

## 2020-10-05 PROCEDURE — 93296 REM INTERROG EVL PM/IDS: CPT | Performed by: INTERNAL MEDICINE

## 2020-10-05 PROCEDURE — 93294 REM INTERROG EVL PM/LDLS PM: CPT | Performed by: INTERNAL MEDICINE

## 2020-10-06 DIAGNOSIS — I25.5 ISCHEMIC CARDIOMYOPATHY: ICD-10-CM

## 2020-10-06 DIAGNOSIS — I50.20 NYHA CLASS 3 HEART FAILURE WITH REDUCED EJECTION FRACTION (HCC): ICD-10-CM

## 2020-10-06 DIAGNOSIS — I34.0 MITRAL VALVE INSUFFICIENCY, UNSPECIFIED ETIOLOGY: Primary | ICD-10-CM

## 2020-10-06 DIAGNOSIS — I34.0 SEVERE MITRAL REGURGITATION: ICD-10-CM

## 2020-10-06 DIAGNOSIS — I25.5 CARDIOMYOPATHY, ISCHEMIC: ICD-10-CM

## 2020-10-06 DIAGNOSIS — R06.02 SOB (SHORTNESS OF BREATH): ICD-10-CM

## 2020-10-06 DIAGNOSIS — I50.42 CHRONIC HEART FAILURE WITH REDUCED EJECTION FRACTION AND DIASTOLIC DYSFUNCTION (HCC): ICD-10-CM

## 2020-10-06 LAB
ECHO MV EROA PISA: 0.36 CM2
ECHO MV REGURGITANT RADIUS PISA: 0.68 CM
ECHO MV REGURGITANT VOLUME: 74.68 ML
ECHO MV REGURGITANT VTIA: 205.45 CM
ECHO TV REGURGITANT MAX VELOCITY: 365.29 CM/S
ECHO TV REGURGITANT MAX VELOCITY: 365.29 CM/S
ECHO TV REGURGITANT PEAK GRADIENT: 53.38 MMHG
ECHO TV REGURGITANT PEAK GRADIENT: 53.38 MMHG

## 2020-10-08 ENCOUNTER — TELEPHONE (OUTPATIENT)
Dept: INTERNAL MEDICINE CLINIC | Age: 70
End: 2020-10-08

## 2020-10-08 ENCOUNTER — OFFICE VISIT (OUTPATIENT)
Dept: CARDIOLOGY CLINIC | Age: 70
End: 2020-10-08
Payer: MEDICARE

## 2020-10-08 VITALS
OXYGEN SATURATION: 98 % | HEART RATE: 77 BPM | SYSTOLIC BLOOD PRESSURE: 130 MMHG | RESPIRATION RATE: 18 BRPM | DIASTOLIC BLOOD PRESSURE: 78 MMHG

## 2020-10-08 DIAGNOSIS — I34.0 SEVERE MITRAL REGURGITATION: Primary | ICD-10-CM

## 2020-10-08 PROCEDURE — 3017F COLORECTAL CA SCREEN DOC REV: CPT | Performed by: THORACIC SURGERY (CARDIOTHORACIC VASCULAR SURGERY)

## 2020-10-08 PROCEDURE — G8417 CALC BMI ABV UP PARAM F/U: HCPCS | Performed by: THORACIC SURGERY (CARDIOTHORACIC VASCULAR SURGERY)

## 2020-10-08 PROCEDURE — G8752 SYS BP LESS 140: HCPCS | Performed by: THORACIC SURGERY (CARDIOTHORACIC VASCULAR SURGERY)

## 2020-10-08 PROCEDURE — G9899 SCRN MAM PERF RSLTS DOC: HCPCS | Performed by: THORACIC SURGERY (CARDIOTHORACIC VASCULAR SURGERY)

## 2020-10-08 PROCEDURE — G8432 DEP SCR NOT DOC, RNG: HCPCS | Performed by: THORACIC SURGERY (CARDIOTHORACIC VASCULAR SURGERY)

## 2020-10-08 PROCEDURE — 1090F PRES/ABSN URINE INCON ASSESS: CPT | Performed by: THORACIC SURGERY (CARDIOTHORACIC VASCULAR SURGERY)

## 2020-10-08 PROCEDURE — G8536 NO DOC ELDER MAL SCRN: HCPCS | Performed by: THORACIC SURGERY (CARDIOTHORACIC VASCULAR SURGERY)

## 2020-10-08 PROCEDURE — 99215 OFFICE O/P EST HI 40 MIN: CPT | Performed by: THORACIC SURGERY (CARDIOTHORACIC VASCULAR SURGERY)

## 2020-10-08 PROCEDURE — G8754 DIAS BP LESS 90: HCPCS | Performed by: THORACIC SURGERY (CARDIOTHORACIC VASCULAR SURGERY)

## 2020-10-08 PROCEDURE — G8427 DOCREV CUR MEDS BY ELIG CLIN: HCPCS | Performed by: THORACIC SURGERY (CARDIOTHORACIC VASCULAR SURGERY)

## 2020-10-08 PROCEDURE — G8399 PT W/DXA RESULTS DOCUMENT: HCPCS | Performed by: THORACIC SURGERY (CARDIOTHORACIC VASCULAR SURGERY)

## 2020-10-08 PROCEDURE — 1100F PTFALLS ASSESS-DOCD GE2>/YR: CPT | Performed by: THORACIC SURGERY (CARDIOTHORACIC VASCULAR SURGERY)

## 2020-10-08 PROCEDURE — 3288F FALL RISK ASSESSMENT DOCD: CPT | Performed by: THORACIC SURGERY (CARDIOTHORACIC VASCULAR SURGERY)

## 2020-10-08 NOTE — PROGRESS NOTES
Pt seen and history reviewed and confirmed   Echo reviewed and discussed with Dr Rene Kaur   See full note by PRIYANKA Birch  She has class III symx and severe functional MR on optimal medical management   Would recommend mitral clip

## 2020-10-08 NOTE — TELEPHONE ENCOUNTER
Ad Wiggins with [facility] called [place] pt needs a [facility] referral. She does not have an appt yet because they need the referral first.  Dr. Orlando Guadarrama NPI #  [id number]  [address] [place] [address]  PH# [phone]  Fax# [phone]

## 2020-10-08 NOTE — PROGRESS NOTES
[facility]  Consultation    Subjective:     Patient Care Team:  Bhavin Narayanan, NP as PCP - General (Nurse Practitioner)  Bhavin Narayanan, NP as PCP - [facility] Provider  Brian Nuno MD (Cardiology)  Brittney Pretty MD (Gastroenterology)     PCP: Bhavin Narayanan, VENKATESH     Cardiologist: Dr. Lissy Recio     Diagnosis/Reason for Consultation: The encounter diagnosis was Severe mitral regurgitation.     Problem List:        Patient Active Problem List   Diagnosis Code    Hx of CABG Z95.1    ICD (implantable cardioverter-defibrillator) in place Z95.810    H/O laparoscopic adjustable gastric banding Z98.84    Obesity, morbid ([address] E66.01    Acute decompensated heart failure ([address] I50.9    Peripheral vascular disease ([facility] I73.9    Severe mitral regurgitation I34.0         HPI: 70y. o.female with PMHx of CAD with CAB x 3 in 1997 then subsequent stents X 4, HTN, HLD, palpitations, ICD 2007, DM, hypothyroid, COPD, WES-wears BiPAP, CKD, DVT 1997 left leg, right TKR, Lap band 2011. She is referred to the [address] [place] [address] by Dr. Yohana Fu for interventional evaluation of  Severe mitral regurgitation and consideration of a MitraClip. She notes that her health began to change in November 2019 when she developed an upper respiratory illness which was attributed to a virus. She felt poorly through much of the winter then was hospitalized in early February after her initial consultation with Dr. Yohana Fu for CHF. Since that time, she has improved with treatments and medication adjustments from the [facility] but has never recovered her previous level of activity. She does very little activity in a day and activity is mostly limited to activities of daily living. 10/8/2020: interval history involves JEFF with Dr. Marsa Holter on 10/2/2020 with results below. She also saw [facility] where her diuretic dose was increased.  She reports no change in her extremely limiting shortness of breath, fatigue, orthopnea. No chest pains. Still with 2+ LE pitting edema bilaterally, although patient reports this has improved some. Weight seems to be stable although slowly trending up as her activity levels remain limited.     SOB/MARINA: Yes   Fatigue: Yes   Palpitations: Yes   Chest pain: No:    Chest tightness with activity: No:    Lightheadedness: No:    Syncope: No:    Falls: No:    Orthopnea: Yes   PND: Yes   LE edema: Yes   Medication changes in past 3 months: Yes   Blood/Blackness/Tarriness of stools: No:    Heart Failure Admission w/in past year:  Yes   Heart Failure Admission w/in past 2 weeks: No:      NYHA Classification: IIIA              Class I (Mild): No limitation of physical activity. Ordinary physical activity does not cause undue fatigue, palpitation, or dyspnea. Class II (Mild): Slight limitation of physical activity. Comfortable at rest, but ordinary physical activity results in fatigue, palpitation, or dyspnea. Class III (Moderate): Marked limitation of physical activity. Comfortable at rest, but less than ordinary activity causes fatigue, palpitation, or dyspnea              Class IV (Severe): Unable to carry out any physical activity without discomfort.  Symptoms  of cardiac insufficiency at rest.  If any physical activity is undertaken, discomfort is increased.     Angina Classification: 0              Class 0: No symptoms              Class 1: Angina with strenuous exercise              Class 2: Angina with moderate exercise              Class 3: Angina with mild exertion              Walking 1-2 level blocks at normal pace; Climbing 1 flight of stairs at normal pace  Class 4: Angina at any level of physical exertion        Past Medical History:   Diagnosis Date    Adverse effect of anesthesia     hard to wake up/uses BIPAP/\"try to avoid general if possible\"/intubated in past prior to going to sleep and it caused pt to be incontinent    Arthritis     Asthma     CAD (coronary artery disease)     Chronic kidney disease     elevataed creatinine    Chronic obstructive pulmonary disease (HCC)     Chronic pain     arthritis    Coagulation disorder (HCC)     on plavix    Congestive heart failure (HCC)     Diabetes ([address]     Hypertension     Morbid obesity ([address]     Sleep apnea     BIPAP with 2 liters oxygen    Thromboembolus ([address]     after heart surgery - left leg    Thyroid disease      Past Surgical History:   Procedure Laterality Date    CARDIAC SURG PROCEDURE UNLIST  2018    cardiac cath - Left    COLONOSCOPY N/A 2020    COLONOSCOPY   :- performed by Inge Lamb MD at [facility] HX ARTHROPLASTY  2105    knee - right    HX  SECTION      x3    HX CORONARY ARTERY BYPASS GRAFT  1997    3 vessels    HX CORONARY STENT PLACEMENT  2016    HX HERNIA REPAIR  1988    HX IMPLANTABLE CARDIOVERTER DEFIBRILLATOR      HX KNEE REPLACEMENT Right 2015    once    LAP GASTRIC BYPASS/KERLINE-EN-Y      lap band per pt and not a gastric bypass      Social History     Tobacco Use    Smoking status: Former Smoker     Types: Cigarettes    Smokeless tobacco: Never Used    Tobacco comment: quit /started again (smoked 3 yrs) off and on/none since    Substance Use Topics    Alcohol use: Not Currently      Family History   Problem Relation Age of Onset    Hypertension Mother     Dementia Mother     Coronary Artery Disease Father 58    Sudden Death Father 58    Diabetes Son     Diabetes Brother      Prior to Admission medications    Medication Sig Start Date End Date Taking? Authorizing Provider   isosorbide mononitrate ER (IMDUR) 30 mg tablet Take 1 tablet by mouth once daily 10/7/20  Yes Camelia Friend., NP   glipiZIDE (GLUCOTROL) 10 mg tablet Take 1 tablet by mouth twice daily with food 10/1/20  Yes Camelia Friend., NP   furosemide (LASIX) 20 mg tablet Take 2 Tabs by mouth daily.  20  Yes Rossana Barrera Rosiland Jewel, NP   clotrimazole-betamethasone (LOTRISONE) topical cream Apply  to affected area two (2) times a day. 9/23/20  Yes Rosa Garcia NP   ezetimibe (ZETIA) 10 mg tablet Take 1 tablet by mouth once daily 9/2/20  Yes Fernando Mabry NP   gabapentin (NEURONTIN) 100 mg capsule Take 2 Caps by mouth three (3) times daily. Max Daily Amount: 600 mg. 9/2/20  Yes Rosa Garcia NP   [place] 24-26 mg tablet Take 1 tablet by mouth twice daily 8/31/20  Yes Lo Mckeon NP   clopidogreL (PLAVIX) 75 mg tab Take 1 Tab by mouth daily. 8/26/20  Yes Rosa Garcia NP   Blood-Glucose Meter monitoring kit Use as directed. Dx: E11.65 check sugar twice daily 8/25/20  Yes Rosa Garcia NP   levothyroxine (SYNTHROID) 100 mcg tablet Take 1 Tab by mouth Daily (before breakfast). 8/21/20  Yes Rosa Garcia NP   lancets misc Use as directed. Dx:check sugar once daily. E11.65 8/19/20  Yes Rosa Garcia NP   glucose blood VI test strips (ASCENSIA AUTODISC VI, ONE TOUCH ULTRA TEST VI) strip E11.65 check sugar once daily 8/19/20  Yes Rosa Garcia NP   allopurinoL (ZYLOPRIM) 100 mg tablet Take 1 tablet by mouth once daily 5/28/20  Yes Lo Mckeon NP   hydrALAZINE (APRESOLINE) 50 mg tablet Take 1 Tab by mouth three (3) times daily. 5/28/20  Yes Lo Mckeon NP   atorvastatin (LIPITOR) 80 mg tablet TAKE 1 TABLET BY MOUTH AT BEDTIME 4/10/20  Yes Fernando Mabry NP   cholecalciferol (VITAMIN D3) (1000 Units /25 mcg) tablet Take 2 Tabs by mouth daily. 4/10/20  Yes Zofia Blankenship NP   empagliflozin (JARDIANCE) 10 mg tablet Take 1 Tab by mouth daily. 3/9/20  Yes Fernando Mabry NP   albuterol (PROVENTIL HFA, VENTOLIN HFA, PROAIR HFA) 90 mcg/actuation inhaler Take 2 Puffs by inhalation every four (4) hours as needed. Yes Provider, Historical   buPROPion SR (WELLBUTRIN SR) 150 mg SR tablet Take 1 Tab by mouth daily.  2/6/20  Yes Rosa Rollins., NP   metoprolol succinate (TOPROL-XL) 25 mg XL tablet Take 0.5 Tabs by mouth daily. 2/5/20  Yes Kamila Tobar NP   acetaminophen (TYLENOL ARTHRITIS PAIN) 650 mg TbER Take 1,300 mg by mouth two (2) times a day. Yes Provider, Historical   aspirin 81 mg chewable tablet Take 81 mg by mouth daily. Yes Provider, Historical       Allergies   Allergen Reactions    Crestor [Rosuvastatin] Other (comments)     Causes muscle cramps    Lisinopril Cough    Nsaids (Non-Steroidal Anti-Inflammatory Drug) Other (comments)     Liver and Kidney     Review of Systems:    [] Unable to obtain  ROS due to  []mental status change  []sedated   []intubated   [x]Total of 13 systems reviewed as follows:  Constitutional: negative fever, negative chills  Eyes:   negative for amauroses fugax  ENT:   negative sore throat,oral absecess  Endocrine Negative for thyroid replacement Rx; goiter; DM  Respiratory:  negative chronic cough,sputum production  Cards:  negative for  palpitations, lower extremity edema, varicosities, Claudication  GI:   negative for dysphagia, bleeding, nausea, vomiting, diarrhea, and     abdominal pain  Genitourinary: negative for frequency, dysuria, BPH in men. Integument:  negative for rash and pruritus  Hematologic:  negative for easy bruising; bleeding dyscarsia  Musculoskel: negative for muscle weakness inhibiting ambulation  Neurological:  negative for stroke, TIA, syncope, dizziness  Behavl/Psych: negative for feelings of anxiety, depression     Objective:     VS:   Visit Vitals  /78 (BP 1 Location: Right arm, BP Patient Position: Sitting)   Pulse 77   Resp 18   SpO2 98%     Physical Exam:    General appearance: alert, cooperative, no distress  Head: normocephalic, without obvious abnormality; atraumatic  Eyes: conjunctivae/corneas clear; EOM's intact. Nose: nares normal; no drainage. Neck: no carotid bruit and no JVD  Lungs: diminished at the bases bilaterally. Good inspiratory effort.   Heart: regular rate and rhythm; +holosystolic murmur  Abdomen: soft, non-tender; bowel sounds normal  Extremities: moves all extremities; no weakness. Skin: Skin color normal; 2+pitting edema bilaterally  Neurologic: Grossly normal      Clinic Evaluation:   KCCQ-12: scanned into EMR     6 minute walk test: PreTest HR/02 sat:  See VS this visit                                   Post Test HR/02 sat: 110/97%                                   Distance Walked: 774 ft     5 meter gait: 6 seconds     Frality Survey:  Whitlock Index ADL - 6/6  scanned into [facility] 2.9 Risk Score / Predicted 30 day mortality: - calculations scanned into [facility] Adult Cardiac Surgery Database Version 2.9  RISK SCORES  Procedure: Isolated MVR  Risk of Mortality: 8.301%  Renal Failure: 25.349%  Permanent Stroke: 0.709%  Prolonged Ventilation: 41.484%  DSW Infection: 0.434%  Reoperation: 5.639%  Morbidity or Mortality: 52.848%  Short Length of Stay: 4.781%  Long Length of Stay: 25.170%     [facility] Adult Cardiac Surgery Database Version 2.9  RISK SCORES  Procedure: MV Repair  Risk of Mortality: 4.387%  Renal Failure: 25.940%  Permanent Stroke: 1.095%  Prolonged Ventilation: 30.314%  DSW Infection: 0.227%  Reoperation: 3.412%  Morbidity or Mortality: 41.499%  Short Length of Stay: 6.988%  Long Length of Stay: 26.983%    Imaging/Procedural Data  JEFF (10/2/2020, Dr. Dirk Merrill)  Left Ventricle  Moderately dilated left ventricle. Upper normal wall thickness. The estimated EF is 35 - 40%. Moderately reduced systolic function. There is moderate (grade 2) left ventricular diastolic dysfunction. Wall Scoring  The left ventricular wall motion is globally hypokinetic. Left Atrium  Moderately dilated left atrium. No atrial septal defect present. No patent foramen ovale visualized. There is no thrombus. Left atrial appendage size is large. There is no thrombus within the left atrial appendage. There is no mass within the left atrial appendage. Appendage velocity is normal (greater than 40 cm/sec). Right Ventricle  Normal cavity size. Borderline low systolic function. Right Atrium  Normal cavity size. Interatrial Septum  No atrial septal defect present. Aortic Valve  Normal valve structure and no stenosis. Trace aortic valve regurgitation. Mitral Valve  Normal valve structure and no stenosis. Moderate regurgitation. Mitral regurgitation appears to arise secondary to ventricular and annular dilatation. It appears to be dynamic in response to systemic blood pressures. While JEFF was performed systolic blood pressure was between 80-90 with relatively moderate mitral regurgitation. However as blood pressures were increased to about 100 mmHg, MR appears to be more prominent. After extubating the patient transthoracic echocardiogram suggested moderate to severe mitral regurgitation. Jet appears to be predominantly between A2 and P2 with adequate graspable leaflet lengths and no flail component. Coaptation length is adequate. MR appears to be less than what it was noted about 3 months ago. At its maximum, PISA based needle was 0.36 cm². The mitral regurgitant jet is posteriorly directed. There is no vegetation on the mitral valve. Tricuspid Valve  Normal valve structure and no stenosis. Moderate regurgitation. PASP is greater than 60 mmHg consistent with severe pulmonary hypertension    Pulmonic Valve  Pulmonic valve not well visualized. No stenosis. Trace regurgitation. Aorta  Normal aortic root and descending aorta. Pericardium  No evidence of pericardial effusion. EK20 Sinus bradycardia with occasional premature ventricular complexes   Inferior infarct , age undetermined   T wave abnormality, consider lateral ischemia at 59 bpm     TTE:  2020: Interpretation Summary   · Image quality for this study was technically difficult. · Mildly dilated left ventricle. Upper normal wall thickness.  Severe global systolic function. Estimated left ventricular ejection fraction is 25 - 30%. Suboptimal endocardial visualization limits wall motion analysis. Inconclusive left ventricular diastolic function. · Moderate tricuspid valve regurgitation is present. · Mild to moderate pulmonary hypertension. Pulmonary arterial systolic pressure is 45 mmHg. · Moderately dilated left atrium. · Mitral valve non-specific thickening. Severe mitral valve regurgitation is present.      Echo Findings   Left Ventricle  Mildly dilated left ventricle. Upper normal wall thickness. Severe and global systolic dysfunction. The estimated ejection fraction is 25 - 30%. LV wall motion is noted to be suboptimal endocardial visualization limits wall motion analysis. There is inconclusive left ventricular diastolic function. Left Atrium  Moderately dilated left atrium. Right Ventricle  Normal cavity size, wall thickness and global systolic function. Right Atrium  Normal cavity size. Aortic Valve  Normal valve structure, trileaflet valve structure, no stenosis and no regurgitation. Mitral Valve  No stenosis. Mitral valve non-specific thickening. Severe regurgitation. The mitral regurgitant jet is eccentric. Tricuspid Valve  Tricuspid valve not well visualized. No stenosis. Moderate regurgitation. Pulmonic Valve  Pulmonic valve not well visualized. Aorta  The aorta was not well visualized. Pulmonary Artery  Pulmonary arterial systolic pressure (PASP) is 45 mmHg. Pulmonary hypertension found to be mild to moderate. Pericardium  Normal pericardium and no evidence of pericardial effusion. TTE procedure Findings      TTE Procedure Information  Image quality: technically difficult. The view(s) performed were parasternal, apical, subcostal and suprasternal. Color flow Doppler was performed and pulse wave and/or continuous wave Doppler was performed. Cardiomyopathy.  DX: Other diabetic neurological complications associated with type 2 diabetes mellitus, chf with reduced ejection fraction and diastolic dysNo contrast was given. Procedure Staff      Technologist/Clinician: Nicky Serrano  Supporting Staff: None  Performing Physician/Midlevel: None     Exam Completion Date/Time: 6/19/20 11:35 AM   2D Volume Measurements        ESV  ESV Index    LA A4C  107. 67 mL (Range: 22 - 52)         53.58 ml/m2 (Range: 16 - 28)              Cardiac catheterization: 2/3/2020:  Conclusion   Findings:  1)RHC performed while patient on Milrinone gtt(0.3)--normal right and left sided filling pressures, no pulmonary HTN, normal CI by blanche and thermodilution  2)Severe native 3 vessel CAD. Patent VG to RCA and LIMA to LAD. LCx is collateral dependent and native rPDA and D1 have small disease in small vessels. This are unlikely to contribute to heart failure. 3)Frequent PVCs     Recommendations:  1)GDMT for CAD     Access\" left radial and right IJV--no issues     Contrast 25cc.         Coronary Findings   Diagnostic   Dominance: Right   Left Anterior Descending    Mid LAD lesion 90% stenosed. .    First Diagonal Branch    1st Diag lesion 90% stenosed. .    Left Circumflex    Prox Cx lesion 90% stenosed. with 99% stenosed side branch in 1st Mrg.    First Obtuse Marginal Branch    Collaterals    1st Mrg filled by collaterals from 2nd Diag.        Second Obtuse Marginal Branch    Collaterals    2nd Mrg filled by collaterals from 3rd Diag.        Right Coronary Artery    Prox RCA lesion 100% stenosed. .    Right Posterior Descending Artery    Previously placed RPDA-1 stent (unknown type) is widely patent. RPDA-2 lesion 90% stenosed. .    Graft to RPDA    Previously placed Insertion stent (unknown type) is widely patent. Intervention      No interventions have been documented. Right Heart      Right Heart Cath  PA pressure = 32/20 mmHg. PA mean = 25 mmHg. PA Sat = 65 %. Mid RA pressure = 7/6 mmHg. RV pressure = 28/4 mmHg. LVEDP pressure = 6 mmHg.  AO Sat = 98 %.   TDCO = 5.3 L/min. Malina CO = 4.4 L/min. TDCI 2.24  Malina CI 2.67    Normal right and left sided filling pressures. No pulmonary HTN. Normal cardiac output.             Carotid Dopplers: None     PFTs: FEV1/Predicted:  None  Normal FEV1 > 1 Liter, Predicted = 100%, DLCO > 80%                          Mild Lung Disease (60-70% Predicted)                          Moderate Lung Disease (50-55% Predicted)                          Severe Lung Disease (< 50% Predicted or PO2 < 60 or pCO2>50 on RA)     Gated C/A/P CTA: Not yet completed    Assessment:     Active Problems:    * No active hospital problems. *      Plan:   1. Severe Mitral Regurgitation: Patient seen by Dr. Barb Belcher. Prohibitive risk for surgical correction of her MR. JEFF completed 10/2/2020 as described above. Her anatomy appears suitable for mitralclip. Final plans per Anastasia Moreno/Ramy     2. CAD s/p CAB and subsequent stents: On ASA, Plavix, BB, Entresto, Statin, Zetia     3. HTN: BP above goal in clinic today but states it is generally much lower at home. On BB, Imdur, Entresto, lasix, hydralazine. Continue to monitor.      4. HLD: On Statin, Zetia     5. DM: On Jardiance, Glipizide. Last A1c in August 202 was 7.8 which was improved from previous.     6. COPD: Has ProAir inhaler at home.      7. Chronic systolic HF: LVEF 79-10%. Followed by [facility]. On BB, Entresto, Hydralazine, lasix. Couldn't tolerate AA due to hyperkalemia. Lasix increased at most recent visit.     8. Hypothyroid: On Synthroid     9. CKD4: Creatinine 2.6. Labs ordered next week per [facility].      10. Obesity: weight loss hindered by activity restriction. Discussed calorie restriction. Hopeful that she will be able to increase activity after Mitral Valve intervention     11. Hx total knee replacement: No current treatment.     12. VT s/p ICD in 2007: continue BB     13.  WES:  Wears BiPAP at home.     Further plan/care by Dr. Moriah Shafer By: Марина Rosa Shayla Charlton     October 8, 2020

## 2020-10-09 DIAGNOSIS — R06.02 SHORTNESS OF BREATH: ICD-10-CM

## 2020-10-09 DIAGNOSIS — I50.42 CHRONIC HEART FAILURE WITH REDUCED EJECTION FRACTION AND DIASTOLIC DYSFUNCTION (HCC): ICD-10-CM

## 2020-10-10 LAB
ALBUMIN SERPL-MCNC: 4.1 G/DL (ref 3.7–4.7)
ALBUMIN/GLOB SERPL: 1.6 {RATIO} (ref 1.2–2.2)
ALP SERPL-CCNC: 81 IU/L (ref 39–117)
ALT SERPL-CCNC: 14 IU/L (ref 0–32)
AST SERPL-CCNC: 19 IU/L (ref 0–40)
BILIRUB SERPL-MCNC: 0.4 MG/DL (ref 0–1.2)
BUN SERPL-MCNC: 50 MG/DL (ref 8–27)
BUN/CREAT SERPL: 20 (ref 12–28)
CALCIUM SERPL-MCNC: 10.5 MG/DL (ref 8.7–10.3)
CHLORIDE SERPL-SCNC: 106 MMOL/L (ref 96–106)
CO2 SERPL-SCNC: 21 MMOL/L (ref 20–29)
CREAT SERPL-MCNC: 2.48 MG/DL (ref 0.57–1)
GLOBULIN SER CALC-MCNC: 2.5 G/DL (ref 1.5–4.5)
GLUCOSE SERPL-MCNC: 153 MG/DL (ref 65–99)
NT-PROBNP SERPL-MCNC: 386 PG/ML (ref 0–301)
POTASSIUM SERPL-SCNC: 4.8 MMOL/L (ref 3.5–5.2)
PROT SERPL-MCNC: 6.6 G/DL (ref 6–8.5)
SODIUM SERPL-SCNC: 140 MMOL/L (ref 134–144)

## 2020-10-12 LAB
MAGNESIUM SERPL-MCNC: 2.7 MG/DL (ref 1.6–2.3)
SPECIMEN STATUS REPORT, ROLRST: NORMAL

## 2020-10-13 ENCOUNTER — HOSPITAL ENCOUNTER (OUTPATIENT)
Dept: CARDIAC REHAB | Age: 70
Discharge: HOME | End: 2020-10-13
Payer: MEDICARE

## 2020-10-13 VITALS — BODY MASS INDEX: 41.85 KG/M2 | WEIGHT: 228.8 LBS

## 2020-10-13 DIAGNOSIS — E11.49 OTHER DIABETIC NEUROLOGICAL COMPLICATION ASSOCIATED WITH TYPE 2 DIABETES MELLITUS (HCC): ICD-10-CM

## 2020-10-13 PROCEDURE — 93797 PHYS/QHP OP CAR RHAB WO ECG: CPT | Performed by: DIETITIAN, REGISTERED

## 2020-10-13 PROCEDURE — 93798 PHYS/QHP OP CAR RHAB W/ECG: CPT

## 2020-10-13 RX ORDER — GABAPENTIN 100 MG/1
200 CAPSULE ORAL 3 TIMES DAILY
Qty: 180 CAP | Refills: 3 | Status: SHIPPED | OUTPATIENT
Start: 2020-10-13 | End: 2021-03-31 | Stop reason: SDUPTHER

## 2020-10-13 NOTE — CARDIO/PULMONARY
Returned back today after being out since July. Says she will have a Mitral clip in a few weeks. Deconditioned. Exercising at a lower level.

## 2020-10-14 ENCOUNTER — TELEPHONE (OUTPATIENT)
Dept: CARDIOLOGY CLINIC | Age: 70
End: 2020-10-14

## 2020-10-14 DIAGNOSIS — I34.0 MITRAL VALVE INSUFFICIENCY, UNSPECIFIED ETIOLOGY: Primary | ICD-10-CM

## 2020-10-14 NOTE — TELEPHONE ENCOUNTER
Left message for patient to return call regarding application for patient assistance for Jardiance that we received fro [address] [place] [address]. I called patient, she states she does not know if she qualified for LIS or not, she states she was told by [address] [place] [address] that she needs to re-apply for patient assistance. She will come to office today to finish application. Patient assistance for Jardiance faxed on this date.

## 2020-10-15 ENCOUNTER — APPOINTMENT (OUTPATIENT)
Dept: CARDIAC REHAB | Age: 70
End: 2020-10-15
Payer: MEDICARE

## 2020-10-15 ENCOUNTER — HOSPITAL ENCOUNTER (OUTPATIENT)
Dept: CARDIAC REHAB | Age: 70
Discharge: HOME | End: 2020-10-15
Payer: MEDICARE

## 2020-10-15 VITALS — BODY MASS INDEX: 41.67 KG/M2 | WEIGHT: 227.8 LBS

## 2020-10-15 PROCEDURE — 93798 PHYS/QHP OP CAR RHAB W/ECG: CPT

## 2020-10-16 ENCOUNTER — HOSPITAL ENCOUNTER (OUTPATIENT)
Dept: CARDIAC REHAB | Age: 70
Discharge: HOME | End: 2020-10-16
Payer: MEDICARE

## 2020-10-16 VITALS — BODY MASS INDEX: 41.52 KG/M2 | WEIGHT: 227 LBS

## 2020-10-16 PROCEDURE — 93798 PHYS/QHP OP CAR RHAB W/ECG: CPT

## 2020-10-16 PROCEDURE — 93797 PHYS/QHP OP CAR RHAB WO ECG: CPT | Performed by: DIETITIAN, REGISTERED

## 2020-10-19 ENCOUNTER — HOSPITAL ENCOUNTER (OUTPATIENT)
Dept: CARDIAC REHAB | Age: 70
Discharge: HOME | End: 2020-10-19
Payer: MEDICARE

## 2020-10-19 VITALS — WEIGHT: 227.6 LBS | BODY MASS INDEX: 41.63 KG/M2

## 2020-10-19 PROCEDURE — 93798 PHYS/QHP OP CAR RHAB W/ECG: CPT

## 2020-10-21 ENCOUNTER — TELEPHONE (OUTPATIENT)
Dept: CARDIAC REHAB | Age: 70
End: 2020-10-21

## 2020-10-21 ENCOUNTER — APPOINTMENT (OUTPATIENT)
Dept: CARDIAC REHAB | Age: 70
End: 2020-10-21
Payer: MEDICARE

## 2020-10-21 DIAGNOSIS — I50.42 CHRONIC HEART FAILURE WITH REDUCED EJECTION FRACTION AND DIASTOLIC DYSFUNCTION (HCC): ICD-10-CM

## 2020-10-21 RX ORDER — SACUBITRIL AND VALSARTAN 24; 26 MG/1; MG/1
TABLET, FILM COATED ORAL
Qty: 60 TAB | Refills: 0 | Status: SHIPPED | OUTPATIENT
Start: 2020-10-21 | End: 2020-11-13

## 2020-10-21 NOTE — TELEPHONE ENCOUNTER
10/21/2020 Cardiac Wellness: Ms. Kelly Salcedo called to cancel today's appointment d/t not feeling well today. Will return for next appointment.  Poppy Nicholas

## 2020-10-23 ENCOUNTER — HOSPITAL ENCOUNTER (OUTPATIENT)
Dept: CARDIAC REHAB | Age: 70
Discharge: HOME | End: 2020-10-23
Payer: MEDICARE

## 2020-10-23 VITALS — BODY MASS INDEX: 41.85 KG/M2 | WEIGHT: 228.8 LBS

## 2020-10-23 PROCEDURE — 93798 PHYS/QHP OP CAR RHAB W/ECG: CPT

## 2020-10-26 ENCOUNTER — HOSPITAL ENCOUNTER (OUTPATIENT)
Dept: CARDIAC REHAB | Age: 70
Discharge: HOME | End: 2020-10-26
Payer: MEDICARE

## 2020-10-26 VITALS — WEIGHT: 229.4 LBS | BODY MASS INDEX: 41.96 KG/M2

## 2020-10-26 PROCEDURE — 93798 PHYS/QHP OP CAR RHAB W/ECG: CPT

## 2020-10-27 LAB
ALBUMIN SERPL-MCNC: 4.1 G/DL (ref 3.7–4.7)
ALBUMIN/GLOB SERPL: 1.8 {RATIO} (ref 1.2–2.2)
ALP SERPL-CCNC: 90 IU/L (ref 39–117)
ALT SERPL-CCNC: 16 IU/L (ref 0–32)
AST SERPL-CCNC: 20 IU/L (ref 0–40)
BILIRUB SERPL-MCNC: 0.3 MG/DL (ref 0–1.2)
BUN SERPL-MCNC: 43 MG/DL (ref 8–27)
BUN/CREAT SERPL: 19 (ref 12–28)
CALCIUM SERPL-MCNC: 10.2 MG/DL (ref 8.7–10.3)
CHLORIDE SERPL-SCNC: 102 MMOL/L (ref 96–106)
CO2 SERPL-SCNC: 23 MMOL/L (ref 20–29)
CREAT SERPL-MCNC: 2.31 MG/DL (ref 0.57–1)
EST. AVERAGE GLUCOSE BLD GHB EST-MCNC: 183 MG/DL
GLOBULIN SER CALC-MCNC: 2.3 G/DL (ref 1.5–4.5)
GLUCOSE SERPL-MCNC: 110 MG/DL (ref 65–99)
HBA1C MFR BLD: 8 % (ref 4.8–5.6)
MAGNESIUM SERPL-MCNC: 2.1 MG/DL (ref 1.6–2.3)
NT-PROBNP SERPL-MCNC: 495 PG/ML (ref 0–301)
POTASSIUM SERPL-SCNC: 5.1 MMOL/L (ref 3.5–5.2)
PROT SERPL-MCNC: 6.4 G/DL (ref 6–8.5)
SODIUM SERPL-SCNC: 136 MMOL/L (ref 134–144)
TSH SERPL DL<=0.005 MIU/L-ACNC: 1.46 UIU/ML (ref 0.45–4.5)

## 2020-10-29 ENCOUNTER — TELEPHONE (OUTPATIENT)
Dept: CARDIOLOGY CLINIC | Age: 70
End: 2020-10-29

## 2020-10-29 ENCOUNTER — HOSPITAL ENCOUNTER (OUTPATIENT)
Dept: GENERAL RADIOLOGY | Age: 70
Discharge: HOME | End: 2020-10-29
Attending: NURSE PRACTITIONER
Payer: MEDICARE

## 2020-10-29 ENCOUNTER — HOSPITAL ENCOUNTER (OUTPATIENT)
Dept: VASCULAR SURGERY | Age: 70
Discharge: HOME | End: 2020-10-29
Attending: NURSE PRACTITIONER
Payer: MEDICARE

## 2020-10-29 ENCOUNTER — TRANSCRIBE ORDER (OUTPATIENT)
Dept: SCHEDULING | Age: 70
End: 2020-10-29

## 2020-10-29 ENCOUNTER — HOSPITAL ENCOUNTER (OUTPATIENT)
Dept: PREADMISSION TESTING | Age: 70
Discharge: HOME | End: 2020-10-29
Attending: NURSE PRACTITIONER
Payer: MEDICARE

## 2020-10-29 VITALS
RESPIRATION RATE: 18 BRPM | WEIGHT: 232.37 LBS | SYSTOLIC BLOOD PRESSURE: 122 MMHG | HEIGHT: 62 IN | BODY MASS INDEX: 42.76 KG/M2 | HEART RATE: 70 BPM | DIASTOLIC BLOOD PRESSURE: 58 MMHG | TEMPERATURE: 97.9 F | OXYGEN SATURATION: 97 %

## 2020-10-29 DIAGNOSIS — I34.0 MITRAL VALVE INSUFFICIENCY, UNSPECIFIED ETIOLOGY: ICD-10-CM

## 2020-10-29 DIAGNOSIS — I50.42 CHRONIC HEART FAILURE WITH REDUCED EJECTION FRACTION AND DIASTOLIC DYSFUNCTION (HCC): Primary | ICD-10-CM

## 2020-10-29 DIAGNOSIS — N18.4 CHRONIC KIDNEY DISEASE, STAGE IV (SEVERE) (HCC): Primary | ICD-10-CM

## 2020-10-29 LAB
ALBUMIN SERPL-MCNC: 3.5 G/DL (ref 3.5–5)
ALBUMIN/GLOB SERPL: 1 {RATIO} (ref 1.1–2.2)
ALP SERPL-CCNC: 97 U/L (ref 45–117)
ALT SERPL-CCNC: 25 U/L (ref 12–78)
ANION GAP SERPL CALC-SCNC: 5 MMOL/L (ref 5–15)
APPEARANCE UR: CLEAR
APTT PPP: 28.3 SEC (ref 22.1–32)
ARTERIAL PATENCY WRIST A: YES
AST SERPL-CCNC: 24 U/L (ref 15–37)
BACTERIA URNS QL MICRO: NEGATIVE /HPF
BASE DEFICIT BLD-SCNC: 3 MMOL/L
BASOPHILS # BLD: 0.1 K/UL (ref 0–0.1)
BASOPHILS NFR BLD: 1 % (ref 0–1)
BDY SITE: ABNORMAL
BILIRUB SERPL-MCNC: 0.3 MG/DL (ref 0.2–1)
BILIRUB UR QL: NEGATIVE
BNP SERPL-MCNC: 406 PG/ML
BUN SERPL-MCNC: 49 MG/DL (ref 6–20)
BUN/CREAT SERPL: 20 (ref 12–20)
CA-I BLD-SCNC: 1.53 MMOL/L (ref 1.12–1.32)
CALCIUM SERPL-MCNC: 10.7 MG/DL (ref 8.5–10.1)
CHLORIDE SERPL-SCNC: 106 MMOL/L (ref 97–108)
CO2 SERPL-SCNC: 25 MMOL/L (ref 21–32)
COLOR UR: ABNORMAL
CREAT SERPL-MCNC: 2.41 MG/DL (ref 0.55–1.02)
DIFFERENTIAL METHOD BLD: ABNORMAL
EOSINOPHIL # BLD: 0.2 K/UL (ref 0–0.4)
EOSINOPHIL NFR BLD: 3 % (ref 0–7)
EPITH CASTS URNS QL MICRO: ABNORMAL /LPF
ERYTHROCYTE [DISTWIDTH] IN BLOOD BY AUTOMATED COUNT: 15.1 % (ref 11.5–14.5)
EST. AVERAGE GLUCOSE BLD GHB EST-MCNC: 171 MG/DL
GAS FLOW.O2 O2 DELIVERY SYS: ABNORMAL L/MIN
GLOBULIN SER CALC-MCNC: 3.5 G/DL (ref 2–4)
GLUCOSE SERPL-MCNC: 169 MG/DL (ref 65–100)
GLUCOSE UR STRIP.AUTO-MCNC: NEGATIVE MG/DL
HBA1C MFR BLD: 7.6 % (ref 4–5.6)
HCO3 BLD-SCNC: 21.9 MMOL/L (ref 22–26)
HCT VFR BLD AUTO: 34.2 % (ref 35–47)
HGB BLD-MCNC: 11.2 G/DL (ref 11.5–16)
HGB UR QL STRIP: NEGATIVE
HISTORY CHECKED?,CKHIST: NORMAL
HYALINE CASTS URNS QL MICRO: ABNORMAL /LPF (ref 0–5)
IMM GRANULOCYTES # BLD AUTO: 0 K/UL (ref 0–0.04)
IMM GRANULOCYTES NFR BLD AUTO: 0 % (ref 0–0.5)
INR PPP: 1 (ref 0.9–1.1)
KETONES UR QL STRIP.AUTO: NEGATIVE MG/DL
LEFT CCA DIST DIAS: 12.2 CM/S
LEFT CCA DIST SYS: 52.8 CM/S
LEFT CCA PROX DIAS: 11.2 CM/S
LEFT CCA PROX SYS: 41.5 CM/S
LEFT ECA DIAS: 10.3 CM/S
LEFT ECA SYS: 59.4 CM/S
LEFT ICA DIST DIAS: 17.9 CM/S
LEFT ICA DIST SYS: 39.6 CM/S
LEFT ICA MID DIAS: 18.8 CM/S
LEFT ICA MID SYS: 53.7 CM/S
LEFT ICA PROX DIAS: 13.1 CM/S
LEFT ICA PROX SYS: 41.5 CM/S
LEFT ICA/CCA SYS: 1.02
LEFT VERTEBRAL DIAS: 9.18 CM/S
LEFT VERTEBRAL SYS: 29.8 CM/S
LEUKOCYTE ESTERASE UR QL STRIP.AUTO: NEGATIVE
LYMPHOCYTES # BLD: 2 K/UL (ref 0.8–3.5)
LYMPHOCYTES NFR BLD: 34 % (ref 12–49)
MAGNESIUM SERPL-MCNC: 2.3 MG/DL (ref 1.6–2.4)
MCH RBC QN AUTO: 32.7 PG (ref 26–34)
MCHC RBC AUTO-ENTMCNC: 32.7 G/DL (ref 30–36.5)
MCV RBC AUTO: 100 FL (ref 80–99)
MONOCYTES # BLD: 0.4 K/UL (ref 0–1)
MONOCYTES NFR BLD: 8 % (ref 5–13)
NEUTS SEG # BLD: 3.2 K/UL (ref 1.8–8)
NEUTS SEG NFR BLD: 54 % (ref 32–75)
NITRITE UR QL STRIP.AUTO: NEGATIVE
NRBC # BLD: 0 K/UL (ref 0–0.01)
NRBC BLD-RTO: 0 PER 100 WBC
O2/TOTAL GAS SETTING VFR VENT: 21 %
PCO2 BLD: 37.3 MMHG (ref 35–45)
PH BLD: 7.38 [PH] (ref 7.35–7.45)
PH UR STRIP: 5 [PH] (ref 5–8)
PLATELET # BLD AUTO: 214 K/UL (ref 150–400)
PMV BLD AUTO: 9.6 FL (ref 8.9–12.9)
PO2 BLD: 83 MMHG (ref 80–100)
POTASSIUM SERPL-SCNC: 5 MMOL/L (ref 3.5–5.1)
PROT SERPL-MCNC: 7 G/DL (ref 6.4–8.2)
PROT UR STRIP-MCNC: 30 MG/DL
PROTHROMBIN TIME: 10.6 SEC (ref 9–11.1)
RBC # BLD AUTO: 3.42 M/UL (ref 3.8–5.2)
RBC #/AREA URNS HPF: ABNORMAL /HPF (ref 0–5)
RIGHT CCA DIST DIAS: 18.9 CM/S
RIGHT CCA DIST SYS: 54.1 CM/S
RIGHT CCA PROX DIAS: 13.4 CM/S
RIGHT CCA PROX SYS: 58.5 CM/S
RIGHT ECA DIAS: 7.92 CM/S
RIGHT ECA SYS: 55.2 CM/S
RIGHT ICA DIST DIAS: 15.3 CM/S
RIGHT ICA DIST SYS: 41.7 CM/S
RIGHT ICA MID DIAS: 25.2 CM/S
RIGHT ICA MID SYS: 65.9 CM/S
RIGHT ICA PROX DIAS: 23 CM/S
RIGHT ICA PROX SYS: 67 CM/S
RIGHT ICA/CCA SYS: 1.2
RIGHT VERTEBRAL DIAS: 9.18 CM/S
RIGHT VERTEBRAL SYS: 24.1 CM/S
SAO2 % BLD: 96 % (ref 92–97)
SODIUM SERPL-SCNC: 136 MMOL/L (ref 136–145)
SP GR UR REFRACTOMETRY: 1.01 (ref 1–1.03)
SPECIMEN TYPE: ABNORMAL
THERAPEUTIC RANGE,PTTT: NORMAL SECS (ref 58–77)
TOTAL RESP. RATE, ITRR: 12
TSH SERPL DL<=0.05 MIU/L-ACNC: 3.78 UIU/ML (ref 0.36–3.74)
UA: UC IF INDICATED,UAUC: ABNORMAL
UROBILINOGEN UR QL STRIP.AUTO: 0.2 EU/DL (ref 0.2–1)
WBC # BLD AUTO: 5.9 K/UL (ref 3.6–11)
WBC URNS QL MICRO: ABNORMAL /HPF (ref 0–4)

## 2020-10-29 PROCEDURE — 80053 COMPREHEN METABOLIC PANEL: CPT

## 2020-10-29 PROCEDURE — 36600 WITHDRAWAL OF ARTERIAL BLOOD: CPT

## 2020-10-29 PROCEDURE — 85610 PROTHROMBIN TIME: CPT

## 2020-10-29 PROCEDURE — 71046 X-RAY EXAM CHEST 2 VIEWS: CPT

## 2020-10-29 PROCEDURE — 85025 COMPLETE CBC W/AUTO DIFF WBC: CPT

## 2020-10-29 PROCEDURE — 81001 URINALYSIS AUTO W/SCOPE: CPT

## 2020-10-29 PROCEDURE — 85730 THROMBOPLASTIN TIME PARTIAL: CPT

## 2020-10-29 PROCEDURE — 93005 ELECTROCARDIOGRAM TRACING: CPT

## 2020-10-29 PROCEDURE — 83036 HEMOGLOBIN GLYCOSYLATED A1C: CPT

## 2020-10-29 PROCEDURE — 83735 ASSAY OF MAGNESIUM: CPT

## 2020-10-29 PROCEDURE — 84443 ASSAY THYROID STIM HORMONE: CPT

## 2020-10-29 PROCEDURE — 86900 BLOOD TYPING SEROLOGIC ABO: CPT

## 2020-10-29 PROCEDURE — 36415 COLL VENOUS BLD VENIPUNCTURE: CPT

## 2020-10-29 PROCEDURE — 93880 EXTRACRANIAL BILAT STUDY: CPT

## 2020-10-29 PROCEDURE — 86923 COMPATIBILITY TEST ELECTRIC: CPT

## 2020-10-29 PROCEDURE — 83880 ASSAY OF NATRIURETIC PEPTIDE: CPT

## 2020-10-29 PROCEDURE — 82803 BLOOD GASES ANY COMBINATION: CPT

## 2020-10-29 NOTE — TELEPHONE ENCOUNTER
Patient stopped in office to reschedule her missed appointment today. She was not able to make it due to having other doctor's appointments. She would like to know if her appointment should be scheduled for before or after her procedure with Dr. Javid Yadav on 11/12/20.

## 2020-10-29 NOTE — H&P
CSS   History and Physical    Subjective:      Cristino Wise is a 77y. o.female with PMHx of CAD with CAB x 3 in 1997 then subsequent stents X 4, HTN, HLD, palpitations, ICD 2007, DM, hypothyroid, COPD, WES-wears BiPAP, CKD, DVT 1997 left leg, right TKR, Lap band 2011.  She is referred to the [address] [place] [address] by Dr. Amrik Kimble interventional evaluation of  Severe mitral regurgitation and consideration of a MitraClip.  She notes that her health began to change in November 2019 when she developed an upper respiratory illness which was attributed to a virus. She felt poorly through much of the winter then was hospitalized in early February after her initial consultation with Dr. Emily Clark for CHF. Since that time, she has improved with treatments and medication adjustments from the [facility] but has never recovered her previous level of activity. She does very little activity in a day and activity is mostly limited to activities of daily living.      10/8/2020: interval history involves JEFF with Dr. Michael Roblero on 10/2/2020 with results below. She also saw [facility] where her diuretic dose was increased. She reports no change in her extremely limiting shortness of breath, fatigue, orthopnea. No chest pains. Still with 2+ LE pitting edema bilaterally, although patient reports this has improved some. Weight seems to be stable although slowly trending up as her activity levels remain limited.     Cardiac Testing    Cardiac catheterization: 2/3/20: Conclusion     Findings:  1)RHC performed while patient on Milrinone gtt(0.3)--normal right and left sided filling pressures, no pulmonary HTN, normal CI by blanche and thermodilution  2)Severe native 3 vessel CAD. Patent VG to RCA and LIMA to LAD. LCx is collateral dependent and native rPDA and D1 have small disease in small vessels. This are unlikely to contribute to heart failure.   3)Frequent PVCs     Recommendations:  1)GDMT for CAD     Access\" left radial and right IJV--no issues     Contrast 25cc.         Coronary Findings     Diagnostic   Dominance: Right   Left Anterior Descending    Mid LAD lesion 90% stenosed. .    First Diagonal Branch    1st Diag lesion 90% stenosed. .    Left Circumflex    Prox Cx lesion 90% stenosed. with 99% stenosed side branch in 1st Mrg.    First Obtuse Marginal Branch    Collaterals    1st Mrg filled by collaterals from 2nd Diag. Second Obtuse Marginal Branch    Collaterals    2nd Mrg filled by collaterals from 3rd Diag. Right Coronary Artery    Prox RCA lesion 100% stenosed. .    Right Posterior Descending Artery    Previously placed RPDA-1 stent (unknown type) is widely patent. RPDA-2 lesion 90% stenosed. .    Graft to RPDA    Previously placed Insertion stent (unknown type) is widely patent. Intervention     No interventions have been documented. Right Heart     Right Heart Cath  PA pressure = 32/20 mmHg. PA mean = 25 mmHg. PA Sat = 65 %. Mid RA pressure = 7/6 mmHg. RV pressure = 28/4 mmHg. LVEDP pressure = 6 mmHg. AO Sat = 98 %. TDCO = 5.3 L/min. Malina CO = 4.4 L/min. TDCI 2.24  Malina CI 2.67    Normal right and left sided filling pressures. No pulmonary HTN. Normal cardiac output. TTE 6/19/20: Interpretation Summary     · Image quality for this study was technically difficult. · Mildly dilated left ventricle. Upper normal wall thickness. Severe global systolic function. Estimated left ventricular ejection fraction is 25 - 30%. Suboptimal endocardial visualization limits wall motion analysis. Inconclusive left ventricular diastolic function. · Moderate tricuspid valve regurgitation is present. · Mild to moderate pulmonary hypertension. Pulmonary arterial systolic pressure is 45 mmHg. · Moderately dilated left atrium. · Mitral valve non-specific thickening. Severe mitral valve regurgitation is present. Echo Findings     Left Ventricle  Mildly dilated left ventricle. Upper normal wall thickness.  Severe and global systolic dysfunction. The estimated ejection fraction is 25 - 30%. LV wall motion is noted to be suboptimal endocardial visualization limits wall motion analysis. There is inconclusive left ventricular diastolic function. Left Atrium  Moderately dilated left atrium. Right Ventricle  Normal cavity size, wall thickness and global systolic function. Right Atrium  Normal cavity size. Aortic Valve  Normal valve structure, trileaflet valve structure, no stenosis and no regurgitation. Mitral Valve  No stenosis. Mitral valve non-specific thickening. Severe regurgitation. The mitral regurgitant jet is eccentric. Tricuspid Valve  Tricuspid valve not well visualized. No stenosis. Moderate regurgitation. Pulmonic Valve  Pulmonic valve not well visualized. Aorta  The aorta was not well visualized. Pulmonary Artery  Pulmonary arterial systolic pressure (PASP) is 45 mmHg. Pulmonary hypertension found to be mild to moderate. Pericardium  Normal pericardium and no evidence of pericardial effusion. TEE10/2/20:  Interpretation Summary     · LV: Estimated LVEF is 35 - 40%. Moderately dilated left ventricle. Upper normal wall thickness. Moderately reduced systolic function. Moderate (grade 2) left ventricular diastolic dysfunction. · LA: No atrial septal defect present. Moderately dilated left atrium. Left atrial appendage velocity is normal (greater than 40 cm/sec). · MV: Moderate mitral valve regurgitation is present. Mitral regurgitation appears to arise secondary to ventricular and annular dilatation. It appears to be dynamic in response to systemic blood pressures. While JEFF was performed systolic blood pressure was between 80-90 with relatively moderate mitral regurgitation. However as blood pressures were increased to about 100 mmHg, MR appears to be more prominent. After extubating the patient transthoracic echocardiogram suggested moderate to severe mitral regurgitation.  Jet appears to be predominantly between A2 and P2 with adequate graspable leaflet lengths and no flail component. Coaptation length is adequate. MR appears to be less than what it was noted about 3 months ago. At its maximum, PISA based needle was 0.36 cm². · TV: Moderate tricuspid valve regurgitation is present. Echo Findings     Left Ventricle  Moderately dilated left ventricle. Upper normal wall thickness. The estimated EF is 35 - 40%. Moderately reduced systolic function. There is moderate (grade 2) left ventricular diastolic dysfunction. Wall Scoring  The left ventricular wall motion is globally hypokinetic. Left Atrium  Moderately dilated left atrium. No atrial septal defect present. No patent foramen ovale visualized. There is no thrombus. Left atrial appendage size is large. There is no thrombus within the left atrial appendage. There is no mass within the left atrial appendage. Appendage velocity is normal (greater than 40 cm/sec). Right Ventricle  Normal cavity size. Borderline low systolic function. Right Atrium  Normal cavity size. Interatrial Septum  No atrial septal defect present. Aortic Valve  Normal valve structure and no stenosis. Trace aortic valve regurgitation. Mitral Valve  Normal valve structure and no stenosis. Moderate regurgitation. Mitral regurgitation appears to arise secondary to ventricular and annular dilatation. It appears to be dynamic in response to systemic blood pressures. While JEFF was performed systolic blood pressure was between 80-90 with relatively moderate mitral regurgitation. However as blood pressures were increased to about 100 mmHg, MR appears to be more prominent. After extubating the patient transthoracic echocardiogram suggested moderate to severe mitral regurgitation. Jet appears to be predominantly between A2 and P2 with adequate graspable leaflet lengths and no flail component. Coaptation length is adequate.   MR appears to be less than what it was noted about 3 months ago. At its maximum, PISA based needle was 0.36 cm². The mitral regurgitant jet is posteriorly directed. There is no vegetation on the mitral valve. Tricuspid Valve  Normal valve structure and no stenosis. Moderate regurgitation. PASP is greater than 60 mmHg consistent with severe pulmonary hypertension    Pulmonic Valve  Pulmonic valve not well visualized. No stenosis. Trace regurgitation. Aorta  Normal aortic root and descending aorta. Pericardium  No evidence of pericardial effusion.             Past Medical History:   Diagnosis Date    Adverse effect of anesthesia     hard to wake up/uses BIPAP/\"try to avoid general if possible\"/intubated in past prior to going to sleep and it caused pt to be incontinent    Arthritis     Asthma     CAD (coronary artery disease)     Chronic kidney disease     elevataed creatinine    Chronic obstructive pulmonary disease (HCC)     Chronic pain     arthritis    Coagulation disorder (HCC)     on plavix    Congestive heart failure (HCC)     Diabetes ([address]     Hypertension     Morbid obesity ([address]     Sleep apnea     BIPAP with 2 liters oxygen    Thromboembolus ([address]     after heart surgery - left leg    Thyroid disease      Past Surgical History:   Procedure Laterality Date    CARDIAC SURG PROCEDURE UNLIST  2018    cardiac cath - Left    COLONOSCOPY N/A 2020    COLONOSCOPY   :- performed by Yury Carter MD at [facility] [address]    knee - right    HX  SECTION      x3    HX CORONARY ARTERY BYPASS GRAFT  1997    3 vessels    HX CORONARY STENT PLACEMENT  2016    HX HERNIA REPAIR  1988    HX IMPLANTABLE CARDIOVERTER DEFIBRILLATOR      HX KNEE REPLACEMENT Right 2015    once    LAP GASTRIC BYPASS/KERLINE-EN-Y  2011    lap band per pt and not a gastric bypass      Social History     Tobacco Use    Smoking status: Former Smoker     Types: Cigarettes    Smokeless tobacco: Never Used    Tobacco comment: quit 2001/started again (smoked 3 yrs) off and on/none since 2012   Substance Use Topics    Alcohol use: Not Currently      Family History   Problem Relation Age of Onset    Hypertension Mother     Dementia Mother     Coronary Artery Disease Father 58    Sudden Death Father 58    Diabetes Son     Diabetes Brother      Prior to Admission medications    Medication Sig Start Date End Date Taking? Authorizing Provider   empagliflozin (JARDIANCE) 25 mg tablet Take 1 Tab by mouth daily. For diabetes 10/27/20   Madelaine Vences NP   Tonya Bread 24-26 mg tablet Take 1 tablet by mouth twice daily 10/21/20   Mahsa MCNEILL NP   gabapentin (NEURONTIN) 100 mg capsule Take 2 Caps by mouth three (3) times daily. Max Daily Amount: 600 mg. 10/13/20   Sukumar HOOD NP   isosorbide mononitrate ER (IMDUR) 30 mg tablet Take 1 tablet by mouth once daily 10/7/20   Madelaine Vences NP   glipiZIDE (GLUCOTROL) 10 mg tablet Take 1 tablet by mouth twice daily with food 10/1/20   Madelaine Vences, VENKATESH   furosemide (LASIX) 20 mg tablet Take 2 Tabs by mouth daily. 9/30/20   Basim Engel NP   clotrimazole-betamethasone (LOTRISONE) topical cream Apply  to affected area two (2) times a day. 9/23/20   Madelaine Vences NP   ezetimibe (ZETIA) 10 mg tablet Take 1 tablet by mouth once daily 9/2/20   Freddy Blankenship NP   clopidogreL (PLAVIX) 75 mg tab Take 1 Tab by mouth daily. 8/26/20   Madelaine Vences NP   Blood-Glucose Meter monitoring kit Use as directed. Dx: E11.65 check sugar twice daily 8/25/20   Madelaine Vences NP   levothyroxine (SYNTHROID) 100 mcg tablet Take 1 Tab by mouth Daily (before breakfast). 8/21/20   Madelaine Vences NP   lancets misc Use as directed. Dx:check sugar once daily.  E11.65 8/19/20   Sukumar HOOD NP   glucose blood VI test strips (ASCENSIA AUTODISC VI, ONE TOUCH ULTRA TEST VI) strip E11.65 check sugar once daily 8/19/20   Camelia Friend., NP   allopurinoL (ZYLOPRIM) 100 mg tablet Take 1 tablet by mouth once daily 5/28/20   Ann-Marie MCNEILL NP   hydrALAZINE (APRESOLINE) 50 mg tablet Take 1 Tab by mouth three (3) times daily. 5/28/20   Aracely Mckeon NP   atorvastatin (LIPITOR) 80 mg tablet TAKE 1 TABLET BY MOUTH AT BEDTIME 4/10/20   Michael Blankenship NP   cholecalciferol (VITAMIN D3) (1000 Units /25 mcg) tablet Take 2 Tabs by mouth daily. 4/10/20   Michael Blankenship NP   albuterol (PROVENTIL HFA, VENTOLIN HFA, PROAIR HFA) 90 mcg/actuation inhaler Take 2 Puffs by inhalation every four (4) hours as needed. Provider, Historical   buPROPion SR (WELLBUTRIN SR) 150 mg SR tablet Take 1 Tab by mouth daily. 2/6/20   Camelia Friend., NP   metoprolol succinate (TOPROL-XL) 25 mg XL tablet Take 0.5 Tabs by mouth daily. 2/5/20   Michael Blankenship NP   acetaminophen (TYLENOL ARTHRITIS PAIN) 650 mg TbER Take 1,300 mg by mouth two (2) times a day. Provider, Historical   aspirin 81 mg chewable tablet Take 81 mg by mouth daily. Provider, Historical       Allergies   Allergen Reactions    Crestor [Rosuvastatin] Other (comments)     Causes muscle cramps    Lisinopril Cough    Nsaids (Non-Steroidal Anti-Inflammatory Drug) Other (comments)     Liver and Kidney         Review of Systems:   Constitutional: +weight gain, +fatigue  Eyes:               +glasses, +cataracts  ENT:                Negative sore throat,oral absecess  Endocrine        Negative for thyroid replacement Rx; goiter; DM  Respiratory:     Negative chronic cough,sputum production. +shortness of breath on exertion  Cards:              Negative for palpitations, varicosities, claudication.  +lower extremity edema,  GI:                   Negative for dysphagia, bleeding, nausea, vomiting, diarrhea, and abdominal pain  Genitourinary: Negative for frequency, dysuria  Integument:     Negative for rash and pruritus  Hematologic: Negative for bleeding dyscarsia. +easy bruising;  Musculoskel:   +joint pain/swelling  Neurological:   Negative for stroke, TIA, syncope, dizziness. +memory loss, weakness in extremities  Behavl/Psych: Negative for feelings of anxiety, depression     Objective:     VS:   Ht: 5' 2\"  Wt: 232 lb  Temp: 97.9 F  /58  HR: 70  RR: 18  O2: 97% RA    Physical Exam:    Constitutional: She is oriented to person, place, and time. She appears well-developed and well-nourished. HENT:   Head: Normocephalic and atraumatic. Eyes: EOM are normal. Right eye exhibits no discharge. Left eye exhibits no discharge. No scleral icterus. Neck: Normal range of motion. Neck supple. Cardiovascular: Normal rate, regular rhythm and intact distal pulses. Murmur heard. Pulmonary/Chest: Effort normal and breath sounds normal.   Abdominal: Soft. Bowel sounds are normal.   Musculoskeletal:         General: Edema present. Comments: Bilateral LE pitting edema +2 to the mid shin   Neurological: She is alert and oriented to person, place, and time. Skin: Skin is warm and dry. No rash noted. No erythema. Psychiatric: She has a normal mood and affect. Her behavior is normal. Judgment and thought content normal.     Labs:   Recent Labs     10/29/20  1045   WBC 5.9   HGB 11.2*   HCT 34.2*         K 5.0   BUN 49*   CREA 2.41*   *   INR 1.0       Diagnostics:   PA and lateral:   CXR Results  (Last 48 hours)               10/29/20 1218  XR CHEST PA LAT Final result    Impression:  IMPRESSION:    1. Clear lungs. 2. Post gastric banding, with possible slipping of a gastric band (increased phi   angle). Narrative:  PA AND LATERAL CHEST RADIOGRAPHS: 10/29/2020 12:18 PM       INDICATION: Preop heart. COMPARISON: 1/30/2020. TECHNIQUE: Frontal and lateral radiographs of the chest.       FINDINGS:   The lungs are clear. The central airways are patent.  The heart is mildly   enlarged, particularly the left atrium. Post CABG and pacemaker/AICD placement. No pneumothorax or pleural effusion. Post gastric banding; the phi angle is   approximately 90 degrees, more horizontal than typical.                 Carotid doppler: 10/29/20  Interpretation Summary     · Evidence of mild, less than 50 percent, stenosis in the right and left internal carotid arteries. · The vertebrals were patent with antegrade flow     PFTS-FEV1: None    EKG: 10/29/20  Atrial-paced rhythm   Inferior infarct (cited on or before 30-JAN-2020)   Possible Anterolateral infarct , age undetermined   Abnormal ECG   When compared with ECG of 02-OCT-2020 07:44,   Electronic atrial pacemaker has replaced Sinus rhythm   Borderline criteria for Anterolateral infarct are now present    Assessment:     Active Problems:    Severe mitral regurgitation (8/31/2020)        Plan:   The risk and benefit of surgery were reviewed with patient and family and all questions answered and the patient wishes to proceed. Risk include infection, bleeding, stroke, heart attack, irregular heart rhythm, kidney failure and death. The patient was given instructions. The patient was instructed to stop Entresto and Lasix 24 hours prior to procedure. Surgery is scheduled for 11/12/20.     STS 2.9 Risk Score / Predicted 30 day mortality: - calculations scanned into EMR  STS Adult Cardiac Surgery Database Version 2.9  RISK SCORES  Procedure: Isolated MVR  Risk of Mortality: 8.301%  Renal Failure: 25.349%  Permanent Stroke: 0.709%  Prolonged Ventilation: 41.484%  DSW Infection: 0.434%  Reoperation: 5.639%  Morbidity or Mortality: 52.848%  Short Length of Stay: 4.781%  Long Length of Stay: 25.170%     STS Adult Cardiac Surgery Database Version 2.9  RISK SCORES  Procedure: MV Repair  Risk of Mortality: 4.387%  Renal Failure: 25.940%  Permanent Stroke: 1.095%  Prolonged Ventilation: 30.314%  DSW Infection: 0.227%  Reoperation: 3.412%  Morbidity or Mortality: 41.499%  Short Length of Stay: 6.988%  Long Length of Stay: 26.983%    Treatment Plan:      1. Severe Mitral Regurgitation:  Prohibitive risk for surgical correction of her MR. JEFF completed 10/2/2020 as described above. Her anatomy appears suitable for mitralclip. Patient is agreeable to proceed-scheduled for 11/12/20     2. CAD s/p CAB and subsequent stents: On ASA, Plavix, BB, Entresto, Statin, Zetia     3. HTN: BP controlled on current medications. On BB, Imdur, Entresto, lasix, hydralazine. Continue to monitor.      4. HLD: On Statin, Zetia     5. DM: On Jardiance, Glipizide. A1c of 7.6-improved from previous.     6. COPD: Has ProAir inhaler at home.      7. Chronic systolic HF: LVEF 10-13%. Followed by [facility]. On BB, Entresto, Hydralazine, lasix. Couldn't tolerate AA due to hyperkalemia.      8. Hypothyroid: On Synthroid     9. CKD4: Creatinine 2.4. Avoid nephrotoxic agents and hypotension.     10. Obesity: weight loss hindered by activity restriction. Discussed calorie restriction. Hopeful that she will be able to increase activity after Mitral Valve intervention     11. Hx total knee replacement: No current treatment.     12. VT s/p ICD in 2007: continue BB     13.  WES:  Wears BiPAP at home.         Signed By: Nasra Gupta NP     October 29, 2020

## 2020-10-30 ENCOUNTER — HOSPITAL ENCOUNTER (OUTPATIENT)
Dept: CARDIAC REHAB | Age: 70
Discharge: HOME | End: 2020-10-30
Payer: MEDICARE

## 2020-10-30 VITALS — WEIGHT: 229.6 LBS | BODY MASS INDEX: 41.99 KG/M2

## 2020-10-30 LAB
ATRIAL RATE: 70 BPM
CALCULATED P AXIS, ECG09: 41 DEGREES
CALCULATED R AXIS, ECG10: 5 DEGREES
CALCULATED T AXIS, ECG11: 134 DEGREES
DIAGNOSIS, 93000: NORMAL
P-R INTERVAL, ECG05: 154 MS
Q-T INTERVAL, ECG07: 392 MS
QRS DURATION, ECG06: 74 MS
QTC CALCULATION (BEZET), ECG08: 423 MS
VENTRICULAR RATE, ECG03: 70 BPM

## 2020-10-30 PROCEDURE — 93798 PHYS/QHP OP CAR RHAB W/ECG: CPT

## 2020-11-02 ENCOUNTER — APPOINTMENT (OUTPATIENT)
Dept: CARDIAC REHAB | Age: 70
End: 2020-11-02
Payer: MEDICARE

## 2020-11-02 ENCOUNTER — TRANSCRIBE ORDER (OUTPATIENT)
Dept: REGISTRATION | Age: 70
End: 2020-11-02

## 2020-11-02 DIAGNOSIS — Z01.812 PRE-PROCEDURE LAB EXAM: Primary | ICD-10-CM

## 2020-11-02 RX ORDER — ISOSORBIDE MONONITRATE 30 MG/1
TABLET, EXTENDED RELEASE ORAL
Qty: 90 TAB | Refills: 0 | Status: SHIPPED | OUTPATIENT
Start: 2020-11-02 | End: 2020-11-13

## 2020-11-03 ENCOUNTER — TELEPHONE (OUTPATIENT)
Dept: CARDIOLOGY CLINIC | Age: 70
End: 2020-11-03

## 2020-11-03 NOTE — TELEPHONE ENCOUNTER
I called patient to schedule office visit following procedure with Cardiac Surgery. Patient is scheduled 11/20/20 at 9:30am with Reji Alcantar. She also requested that I get a message to Ophelia Donald that she is out of [facility]. She has been speaking with the company to receive more but doesn't have any at this time,  going on the second week without taking it. She would like to come to the office today to receive samples if possible.     [phone]

## 2020-11-04 ENCOUNTER — HOSPITAL ENCOUNTER (OUTPATIENT)
Dept: ULTRASOUND IMAGING | Age: 70
Discharge: HOME | End: 2020-11-04
Attending: INTERNAL MEDICINE
Payer: MEDICARE

## 2020-11-04 ENCOUNTER — TELEPHONE (OUTPATIENT)
Dept: CARDIOLOGY CLINIC | Age: 70
End: 2020-11-04

## 2020-11-04 ENCOUNTER — HOSPITAL ENCOUNTER (OUTPATIENT)
Dept: CARDIAC REHAB | Age: 70
Discharge: HOME | End: 2020-11-04
Payer: MEDICARE

## 2020-11-04 VITALS — BODY MASS INDEX: 41.85 KG/M2 | WEIGHT: 228.8 LBS

## 2020-11-04 DIAGNOSIS — N18.4 CHRONIC KIDNEY DISEASE, STAGE IV (SEVERE) (HCC): ICD-10-CM

## 2020-11-04 PROCEDURE — 93798 PHYS/QHP OP CAR RHAB W/ECG: CPT

## 2020-11-04 PROCEDURE — 76770 US EXAM ABDO BACK WALL COMP: CPT

## 2020-11-04 NOTE — TELEPHONE ENCOUNTER
Left message for patient to return call regarding her application for Jardiance patient assistance-she will need to apply for [phone]    Patient returned call, she states she is unsure if she applied for LIS in past, she states she is \"always so busy, there is so much to do\". I advised that Garrett Muñoz will send her one month free medication until she applies, but she will need to advise this office if she is approved. She states she will call today when she gets home.

## 2020-11-04 NOTE — TELEPHONE ENCOUNTER
See other encounter from 11/4/20-patient is receiving one free month from [employer], she will need to call Social Security to apply for LIS

## 2020-11-05 ENCOUNTER — TELEPHONE (OUTPATIENT)
Dept: CARDIOLOGY CLINIC | Age: 70
End: 2020-11-05

## 2020-11-05 NOTE — TELEPHONE ENCOUNTER
Radha Cisneros was concerned that she wasn't told to hold her Plavix. I discussed that she does not need to hold her Plavix for a Mitraclip. Patient verbalized understanding and agreed.

## 2020-11-06 ENCOUNTER — HOSPITAL ENCOUNTER (OUTPATIENT)
Dept: CARDIAC REHAB | Age: 70
Discharge: HOME | End: 2020-11-06
Payer: MEDICARE

## 2020-11-06 VITALS — WEIGHT: 228 LBS | BODY MASS INDEX: 41.7 KG/M2

## 2020-11-06 PROCEDURE — 93798 PHYS/QHP OP CAR RHAB W/ECG: CPT

## 2020-11-08 ENCOUNTER — HOSPITAL ENCOUNTER (OUTPATIENT)
Dept: PREADMISSION TESTING | Age: 70
Discharge: HOME | End: 2020-11-08
Attending: THORACIC SURGERY (CARDIOTHORACIC VASCULAR SURGERY)
Payer: MEDICARE

## 2020-11-08 DIAGNOSIS — Z01.812 PRE-PROCEDURE LAB EXAM: ICD-10-CM

## 2020-11-08 PROCEDURE — 87635 SARS-COV-2 COVID-19 AMP PRB: CPT

## 2020-11-09 ENCOUNTER — NURSE TRIAGE (OUTPATIENT)
Dept: CARDIOLOGY CLINIC | Age: 70
End: 2020-11-09

## 2020-11-09 LAB — SARS-COV-2, COV2NT: NOT DETECTED

## 2020-11-11 ENCOUNTER — ANESTHESIA EVENT (OUTPATIENT)
Dept: CARDIOTHORACIC SURGERY | Age: 70
End: 2020-11-11
Payer: MEDICARE

## 2020-11-11 ENCOUNTER — TELEPHONE (OUTPATIENT)
Dept: CASE MANAGEMENT | Age: 70
End: 2020-11-11

## 2020-11-11 RX ORDER — SODIUM CHLORIDE 9 MG/ML
1.5-3 INJECTION, SOLUTION INTRAVENOUS
Status: DISCONTINUED | OUTPATIENT
Start: 2020-11-12 | End: 2020-11-12

## 2020-11-11 NOTE — TELEPHONE ENCOUNTER
Cardiac Surgery Care Coordinator-  Called Wendi Valdez to review plan of care and day of surgery expectations. Encouraged Wendi Valdez to verbalize and offered emotional support. She stated she will not have family at the hospital with her tomorrow. Her contact is Tim Last  [phone]  Wendi Valdez is without questions or concerns at this time.   Pat Watson RN

## 2020-11-12 ENCOUNTER — ANESTHESIA (OUTPATIENT)
Dept: CARDIOTHORACIC SURGERY | Age: 70
End: 2020-11-12
Payer: MEDICARE

## 2020-11-12 ENCOUNTER — APPOINTMENT (OUTPATIENT)
Dept: GENERAL RADIOLOGY | Age: 70
End: 2020-11-12
Attending: NURSE PRACTITIONER
Payer: MEDICARE

## 2020-11-12 ENCOUNTER — HOSPITAL ENCOUNTER (OUTPATIENT)
Age: 70
LOS: 1 days | Discharge: HOME | End: 2020-11-13
Attending: THORACIC SURGERY (CARDIOTHORACIC VASCULAR SURGERY) | Admitting: THORACIC SURGERY (CARDIOTHORACIC VASCULAR SURGERY)
Payer: MEDICARE

## 2020-11-12 ENCOUNTER — HOSPITAL ENCOUNTER (OUTPATIENT)
Dept: NON INVASIVE DIAGNOSTICS | Age: 70
Discharge: HOME | End: 2020-11-12
Attending: THORACIC SURGERY (CARDIOTHORACIC VASCULAR SURGERY)

## 2020-11-12 ENCOUNTER — APPOINTMENT (OUTPATIENT)
Dept: GENERAL RADIOLOGY | Age: 70
End: 2020-11-12
Attending: THORACIC SURGERY (CARDIOTHORACIC VASCULAR SURGERY)
Payer: MEDICARE

## 2020-11-12 DIAGNOSIS — I34.0 SEVERE MITRAL REGURGITATION: ICD-10-CM

## 2020-11-12 DIAGNOSIS — I50.42 CHRONIC HEART FAILURE WITH REDUCED EJECTION FRACTION AND DIASTOLIC DYSFUNCTION (HCC): ICD-10-CM

## 2020-11-12 DIAGNOSIS — Z95.1 HX OF CABG: ICD-10-CM

## 2020-11-12 DIAGNOSIS — I34.0 NONRHEUMATIC MITRAL VALVE REGURGITATION: ICD-10-CM

## 2020-11-12 LAB
ACT BLD: 224 SECS (ref 79–138)
ACT BLD: 230 SECS (ref 79–138)
ACT BLD: 241 SECS (ref 79–138)
ACT BLD: 285 SECS (ref 79–138)
ALBUMIN SERPL-MCNC: 3.4 G/DL (ref 3.5–5)
ALBUMIN/GLOB SERPL: 1.1 {RATIO} (ref 1.1–2.2)
ALP SERPL-CCNC: 78 U/L (ref 45–117)
ALT SERPL-CCNC: 22 U/L (ref 12–78)
ANION GAP SERPL CALC-SCNC: 6 MMOL/L (ref 5–15)
ANION GAP SERPL CALC-SCNC: 6 MMOL/L (ref 5–15)
APTT PPP: 29 SEC (ref 22.1–31)
AST SERPL-CCNC: 21 U/L (ref 15–37)
BASOPHILS # BLD: 0.1 K/UL (ref 0–0.1)
BASOPHILS NFR BLD: 1 % (ref 0–1)
BILIRUB SERPL-MCNC: 0.4 MG/DL (ref 0.2–1)
BUN SERPL-MCNC: 30 MG/DL (ref 6–20)
BUN SERPL-MCNC: 32 MG/DL (ref 6–20)
BUN/CREAT SERPL: 16 (ref 12–20)
BUN/CREAT SERPL: 16 (ref 12–20)
CALCIUM SERPL-MCNC: 10.1 MG/DL (ref 8.5–10.1)
CALCIUM SERPL-MCNC: 10.4 MG/DL (ref 8.5–10.1)
CHLORIDE SERPL-SCNC: 111 MMOL/L (ref 97–108)
CHLORIDE SERPL-SCNC: 112 MMOL/L (ref 97–108)
CO2 SERPL-SCNC: 22 MMOL/L (ref 21–32)
CO2 SERPL-SCNC: 25 MMOL/L (ref 21–32)
CREAT SERPL-MCNC: 1.88 MG/DL (ref 0.55–1.02)
CREAT SERPL-MCNC: 2.05 MG/DL (ref 0.55–1.02)
DIFFERENTIAL METHOD BLD: ABNORMAL
EOSINOPHIL # BLD: 0.2 K/UL (ref 0–0.4)
EOSINOPHIL NFR BLD: 2 % (ref 0–7)
ERYTHROCYTE [DISTWIDTH] IN BLOOD BY AUTOMATED COUNT: 14.9 % (ref 11.5–14.5)
GLOBULIN SER CALC-MCNC: 3.1 G/DL (ref 2–4)
GLUCOSE BLD STRIP.AUTO-MCNC: 126 MG/DL (ref 65–100)
GLUCOSE BLD STRIP.AUTO-MCNC: 132 MG/DL (ref 65–100)
GLUCOSE BLD STRIP.AUTO-MCNC: 145 MG/DL (ref 65–100)
GLUCOSE SERPL-MCNC: 141 MG/DL (ref 65–100)
GLUCOSE SERPL-MCNC: 158 MG/DL (ref 65–100)
HCT VFR BLD AUTO: 32.9 % (ref 35–47)
HGB BLD-MCNC: 10.8 G/DL (ref 11.5–16)
IMM GRANULOCYTES # BLD AUTO: 0.1 K/UL (ref 0–0.04)
IMM GRANULOCYTES NFR BLD AUTO: 1 % (ref 0–0.5)
INR PPP: 1.1 (ref 0.9–1.1)
LYMPHOCYTES # BLD: 1.6 K/UL (ref 0.8–3.5)
LYMPHOCYTES NFR BLD: 19 % (ref 12–49)
MAGNESIUM SERPL-MCNC: 2.3 MG/DL (ref 1.6–2.4)
MAGNESIUM SERPL-MCNC: 2.4 MG/DL (ref 1.6–2.4)
MCH RBC QN AUTO: 32.5 PG (ref 26–34)
MCHC RBC AUTO-ENTMCNC: 32.8 G/DL (ref 30–36.5)
MCV RBC AUTO: 99.1 FL (ref 80–99)
MONOCYTES # BLD: 0.2 K/UL (ref 0–1)
MONOCYTES NFR BLD: 2 % (ref 5–13)
NEUTS SEG # BLD: 6.2 K/UL (ref 1.8–8)
NEUTS SEG NFR BLD: 75 % (ref 32–75)
NRBC # BLD: 0 K/UL (ref 0–0.01)
NRBC BLD-RTO: 0 PER 100 WBC
PLATELET # BLD AUTO: 207 K/UL (ref 150–400)
PMV BLD AUTO: 9.6 FL (ref 8.9–12.9)
POTASSIUM SERPL-SCNC: 4.3 MMOL/L (ref 3.5–5.1)
POTASSIUM SERPL-SCNC: 5.1 MMOL/L (ref 3.5–5.1)
PROT SERPL-MCNC: 6.5 G/DL (ref 6.4–8.2)
PROTHROMBIN TIME: 11.8 SEC (ref 9–11.1)
RBC # BLD AUTO: 3.32 M/UL (ref 3.8–5.2)
SERVICE CMNT-IMP: ABNORMAL
SODIUM SERPL-SCNC: 140 MMOL/L (ref 136–145)
SODIUM SERPL-SCNC: 142 MMOL/L (ref 136–145)
THERAPEUTIC RANGE,PTTT: NORMAL SECS (ref 58–77)
WBC # BLD AUTO: 8.3 K/UL (ref 3.6–11)

## 2020-11-12 PROCEDURE — 74011250637 HC RX REV CODE- 250/637: Performed by: NURSE PRACTITIONER

## 2020-11-12 PROCEDURE — 77030008684 HC TU ET CUF COVD -B: Performed by: ANESTHESIOLOGY

## 2020-11-12 PROCEDURE — 85730 THROMBOPLASTIN TIME PARTIAL: CPT

## 2020-11-12 PROCEDURE — 74011250636 HC RX REV CODE- 250/636: Performed by: NURSE PRACTITIONER

## 2020-11-12 PROCEDURE — C1893 INTRO/SHEATH, FIXED,NON-PEEL: HCPCS | Performed by: THORACIC SURGERY (CARDIOTHORACIC VASCULAR SURGERY)

## 2020-11-12 PROCEDURE — 82962 GLUCOSE BLOOD TEST: CPT

## 2020-11-12 PROCEDURE — 77030019702 HC WRP THER MENM -C: Performed by: THORACIC SURGERY (CARDIOTHORACIC VASCULAR SURGERY)

## 2020-11-12 PROCEDURE — 74011000250 HC RX REV CODE- 250: Performed by: NURSE ANESTHETIST, CERTIFIED REGISTERED

## 2020-11-12 PROCEDURE — C1894 INTRO/SHEATH, NON-LASER: HCPCS | Performed by: THORACIC SURGERY (CARDIOTHORACIC VASCULAR SURGERY)

## 2020-11-12 PROCEDURE — 65660000000 HC RM CCU STEPDOWN

## 2020-11-12 PROCEDURE — C1892 INTRO/SHEATH,FIXED,PEEL-AWAY: HCPCS | Performed by: THORACIC SURGERY (CARDIOTHORACIC VASCULAR SURGERY)

## 2020-11-12 PROCEDURE — 80053 COMPREHEN METABOLIC PANEL: CPT

## 2020-11-12 PROCEDURE — 76010000109 HC CV SURG 2.5 TO 3 HR: Performed by: THORACIC SURGERY (CARDIOTHORACIC VASCULAR SURGERY)

## 2020-11-12 PROCEDURE — 99220 PR INITIAL OBSERVATION CARE/DAY 70 MINUTES: CPT | Performed by: INTERNAL MEDICINE

## 2020-11-12 PROCEDURE — 85347 COAGULATION TIME ACTIVATED: CPT

## 2020-11-12 PROCEDURE — 93355 ECHO TRANSESOPHAGEAL (TEE): CPT | Performed by: THORACIC SURGERY (CARDIOTHORACIC VASCULAR SURGERY)

## 2020-11-12 PROCEDURE — 74011250636 HC RX REV CODE- 250/636: Performed by: ANESTHESIOLOGY

## 2020-11-12 PROCEDURE — 74011250636 HC RX REV CODE- 250/636: Performed by: THORACIC SURGERY (CARDIOTHORACIC VASCULAR SURGERY)

## 2020-11-12 PROCEDURE — 85025 COMPLETE CBC W/AUTO DIFF WBC: CPT

## 2020-11-12 PROCEDURE — 33418 REPAIR TCAT MITRAL VALVE: CPT | Performed by: INTERNAL MEDICINE

## 2020-11-12 PROCEDURE — 36415 COLL VENOUS BLD VENIPUNCTURE: CPT

## 2020-11-12 PROCEDURE — 83735 ASSAY OF MAGNESIUM: CPT

## 2020-11-12 PROCEDURE — 77030013797 HC KT TRNSDUC PRSSR EDWD -A: Performed by: THORACIC SURGERY (CARDIOTHORACIC VASCULAR SURGERY)

## 2020-11-12 PROCEDURE — 85610 PROTHROMBIN TIME: CPT

## 2020-11-12 PROCEDURE — 77030002996 HC SUT SLK J&J -A: Performed by: THORACIC SURGERY (CARDIOTHORACIC VASCULAR SURGERY)

## 2020-11-12 PROCEDURE — 76210000000 HC OR PH I REC 2 TO 2.5 HR: Performed by: THORACIC SURGERY (CARDIOTHORACIC VASCULAR SURGERY)

## 2020-11-12 PROCEDURE — 71045 X-RAY EXAM CHEST 1 VIEW: CPT

## 2020-11-12 PROCEDURE — C1769 GUIDE WIRE: HCPCS | Performed by: THORACIC SURGERY (CARDIOTHORACIC VASCULAR SURGERY)

## 2020-11-12 PROCEDURE — 74011250636 HC RX REV CODE- 250/636: Performed by: NURSE ANESTHETIST, CERTIFIED REGISTERED

## 2020-11-12 PROCEDURE — 76060000036 HC ANESTHESIA 2.5 TO 3 HR: Performed by: THORACIC SURGERY (CARDIOTHORACIC VASCULAR SURGERY)

## 2020-11-12 PROCEDURE — 74011000250 HC RX REV CODE- 250: Performed by: NURSE PRACTITIONER

## 2020-11-12 PROCEDURE — 74011000258 HC RX REV CODE- 258: Performed by: NURSE PRACTITIONER

## 2020-11-12 PROCEDURE — 77030020263 HC SOL INJ SOD CL0.9% LFCR 1000ML: Performed by: THORACIC SURGERY (CARDIOTHORACIC VASCULAR SURGERY)

## 2020-11-12 PROCEDURE — 77030020506 HC NDL TRNSPTL NRG BAYL -F: Performed by: THORACIC SURGERY (CARDIOTHORACIC VASCULAR SURGERY)

## 2020-11-12 PROCEDURE — 77030041965 HC MITRACLP G4 DEL SYS ABBT -L: Performed by: THORACIC SURGERY (CARDIOTHORACIC VASCULAR SURGERY)

## 2020-11-12 PROCEDURE — 77030040361 HC SLV COMPR DVT MDII -B

## 2020-11-12 PROCEDURE — C1760 CLOSURE DEV, VASC: HCPCS | Performed by: THORACIC SURGERY (CARDIOTHORACIC VASCULAR SURGERY)

## 2020-11-12 PROCEDURE — 77030026438 HC STYL ET INTUB CARD -A: Performed by: ANESTHESIOLOGY

## 2020-11-12 PROCEDURE — 2709999900 HC NON-CHARGEABLE SUPPLY: Performed by: THORACIC SURGERY (CARDIOTHORACIC VASCULAR SURGERY)

## 2020-11-12 RX ORDER — ROCURONIUM BROMIDE 10 MG/ML
INJECTION, SOLUTION INTRAVENOUS AS NEEDED
Status: DISCONTINUED | OUTPATIENT
Start: 2020-11-12 | End: 2020-11-12 | Stop reason: HOSPADM

## 2020-11-12 RX ORDER — SODIUM CHLORIDE 0.9 % (FLUSH) 0.9 %
5-40 SYRINGE (ML) INJECTION EVERY 8 HOURS
Status: DISCONTINUED | OUTPATIENT
Start: 2020-11-12 | End: 2020-11-12 | Stop reason: HOSPADM

## 2020-11-12 RX ORDER — FENTANYL CITRATE 50 UG/ML
25 INJECTION, SOLUTION INTRAMUSCULAR; INTRAVENOUS
Status: DISCONTINUED | OUTPATIENT
Start: 2020-11-12 | End: 2020-11-12 | Stop reason: HOSPADM

## 2020-11-12 RX ORDER — SODIUM CHLORIDE, SODIUM LACTATE, POTASSIUM CHLORIDE, CALCIUM CHLORIDE 600; 310; 30; 20 MG/100ML; MG/100ML; MG/100ML; MG/100ML
50 INJECTION, SOLUTION INTRAVENOUS CONTINUOUS
Status: DISCONTINUED | OUTPATIENT
Start: 2020-11-12 | End: 2020-11-12 | Stop reason: HOSPADM

## 2020-11-12 RX ORDER — DEXAMETHASONE SODIUM PHOSPHATE 4 MG/ML
INJECTION, SOLUTION INTRA-ARTICULAR; INTRALESIONAL; INTRAMUSCULAR; INTRAVENOUS; SOFT TISSUE AS NEEDED
Status: DISCONTINUED | OUTPATIENT
Start: 2020-11-12 | End: 2020-11-12 | Stop reason: HOSPADM

## 2020-11-12 RX ORDER — SODIUM CHLORIDE 0.9 % (FLUSH) 0.9 %
5-40 SYRINGE (ML) INJECTION AS NEEDED
Status: DISCONTINUED | OUTPATIENT
Start: 2020-11-12 | End: 2020-11-12 | Stop reason: HOSPADM

## 2020-11-12 RX ORDER — ONDANSETRON 2 MG/ML
4 INJECTION INTRAMUSCULAR; INTRAVENOUS
Status: DISCONTINUED | OUTPATIENT
Start: 2020-11-12 | End: 2020-11-13 | Stop reason: HOSPADM

## 2020-11-12 RX ORDER — PROTAMINE SULFATE 10 MG/ML
INJECTION, SOLUTION INTRAVENOUS AS NEEDED
Status: DISCONTINUED | OUTPATIENT
Start: 2020-11-12 | End: 2020-11-12 | Stop reason: HOSPADM

## 2020-11-12 RX ORDER — ONDANSETRON 2 MG/ML
4 INJECTION INTRAMUSCULAR; INTRAVENOUS AS NEEDED
Status: DISCONTINUED | OUTPATIENT
Start: 2020-11-12 | End: 2020-11-12 | Stop reason: HOSPADM

## 2020-11-12 RX ORDER — TRAMADOL HYDROCHLORIDE 50 MG/1
50-100 TABLET ORAL
Status: DISCONTINUED | OUTPATIENT
Start: 2020-11-12 | End: 2020-11-13 | Stop reason: HOSPADM

## 2020-11-12 RX ORDER — HYDROMORPHONE HYDROCHLORIDE 1 MG/ML
0.2 INJECTION, SOLUTION INTRAMUSCULAR; INTRAVENOUS; SUBCUTANEOUS
Status: DISCONTINUED | OUTPATIENT
Start: 2020-11-12 | End: 2020-11-12 | Stop reason: HOSPADM

## 2020-11-12 RX ORDER — HEPARIN SODIUM 1000 [USP'U]/ML
INJECTION, SOLUTION INTRAVENOUS; SUBCUTANEOUS AS NEEDED
Status: DISCONTINUED | OUTPATIENT
Start: 2020-11-12 | End: 2020-11-12 | Stop reason: HOSPADM

## 2020-11-12 RX ORDER — GLYCOPYRROLATE 0.2 MG/ML
INJECTION INTRAMUSCULAR; INTRAVENOUS AS NEEDED
Status: DISCONTINUED | OUTPATIENT
Start: 2020-11-12 | End: 2020-11-12 | Stop reason: HOSPADM

## 2020-11-12 RX ORDER — LIDOCAINE HYDROCHLORIDE 10 MG/ML
0.1 INJECTION, SOLUTION EPIDURAL; INFILTRATION; INTRACAUDAL; PERINEURAL AS NEEDED
Status: DISCONTINUED | OUTPATIENT
Start: 2020-11-12 | End: 2020-11-12 | Stop reason: HOSPADM

## 2020-11-12 RX ORDER — GABAPENTIN 100 MG/1
200 CAPSULE ORAL 3 TIMES DAILY
Status: DISCONTINUED | OUTPATIENT
Start: 2020-11-12 | End: 2020-11-13 | Stop reason: HOSPADM

## 2020-11-12 RX ORDER — FENTANYL CITRATE 50 UG/ML
INJECTION, SOLUTION INTRAMUSCULAR; INTRAVENOUS AS NEEDED
Status: DISCONTINUED | OUTPATIENT
Start: 2020-11-12 | End: 2020-11-12 | Stop reason: HOSPADM

## 2020-11-12 RX ORDER — EZETIMIBE 10 MG/1
10 TABLET ORAL DAILY
Status: DISCONTINUED | OUTPATIENT
Start: 2020-11-13 | End: 2020-11-13 | Stop reason: HOSPADM

## 2020-11-12 RX ORDER — SUCCINYLCHOLINE CHLORIDE 20 MG/ML
INJECTION INTRAMUSCULAR; INTRAVENOUS AS NEEDED
Status: DISCONTINUED | OUTPATIENT
Start: 2020-11-12 | End: 2020-11-12 | Stop reason: HOSPADM

## 2020-11-12 RX ORDER — BUPROPION HYDROCHLORIDE 150 MG/1
150 TABLET, EXTENDED RELEASE ORAL DAILY
Status: DISCONTINUED | OUTPATIENT
Start: 2020-11-13 | End: 2020-11-13 | Stop reason: HOSPADM

## 2020-11-12 RX ORDER — MORPHINE SULFATE 2 MG/ML
2 INJECTION, SOLUTION INTRAMUSCULAR; INTRAVENOUS
Status: DISCONTINUED | OUTPATIENT
Start: 2020-11-12 | End: 2020-11-13 | Stop reason: HOSPADM

## 2020-11-12 RX ORDER — NALOXONE HYDROCHLORIDE 0.4 MG/ML
0.4 INJECTION, SOLUTION INTRAMUSCULAR; INTRAVENOUS; SUBCUTANEOUS AS NEEDED
Status: DISCONTINUED | OUTPATIENT
Start: 2020-11-12 | End: 2020-11-13 | Stop reason: HOSPADM

## 2020-11-12 RX ORDER — ACETAMINOPHEN 325 MG/1
650 TABLET ORAL
Status: DISCONTINUED | OUTPATIENT
Start: 2020-11-12 | End: 2020-11-13 | Stop reason: HOSPADM

## 2020-11-12 RX ORDER — ALLOPURINOL 100 MG/1
100 TABLET ORAL DAILY
Status: DISCONTINUED | OUTPATIENT
Start: 2020-11-13 | End: 2020-11-13 | Stop reason: HOSPADM

## 2020-11-12 RX ORDER — FENTANYL CITRATE 50 UG/ML
50 INJECTION, SOLUTION INTRAMUSCULAR; INTRAVENOUS AS NEEDED
Status: DISCONTINUED | OUTPATIENT
Start: 2020-11-12 | End: 2020-11-12 | Stop reason: HOSPADM

## 2020-11-12 RX ORDER — MIDAZOLAM HYDROCHLORIDE 1 MG/ML
INJECTION, SOLUTION INTRAMUSCULAR; INTRAVENOUS AS NEEDED
Status: DISCONTINUED | OUTPATIENT
Start: 2020-11-12 | End: 2020-11-12 | Stop reason: HOSPADM

## 2020-11-12 RX ORDER — ALBUTEROL SULFATE 0.83 MG/ML
2.5 SOLUTION RESPIRATORY (INHALATION)
Status: DISCONTINUED | OUTPATIENT
Start: 2020-11-12 | End: 2020-11-13 | Stop reason: HOSPADM

## 2020-11-12 RX ORDER — LEVOTHYROXINE SODIUM 100 UG/1
100 TABLET ORAL
Status: DISCONTINUED | OUTPATIENT
Start: 2020-11-13 | End: 2020-11-13 | Stop reason: HOSPADM

## 2020-11-12 RX ORDER — SODIUM CHLORIDE 0.9 % (FLUSH) 0.9 %
5-40 SYRINGE (ML) INJECTION AS NEEDED
Status: DISCONTINUED | OUTPATIENT
Start: 2020-11-12 | End: 2020-11-13 | Stop reason: HOSPADM

## 2020-11-12 RX ORDER — GUAIFENESIN 100 MG/5ML
81 LIQUID (ML) ORAL DAILY
Status: DISCONTINUED | OUTPATIENT
Start: 2020-11-13 | End: 2020-11-13 | Stop reason: HOSPADM

## 2020-11-12 RX ORDER — CEFAZOLIN SODIUM/WATER 2 G/20 ML
2 SYRINGE (ML) INTRAVENOUS
Status: COMPLETED | OUTPATIENT
Start: 2020-11-12 | End: 2020-11-12

## 2020-11-12 RX ORDER — CLOPIDOGREL BISULFATE 75 MG/1
75 TABLET ORAL DAILY
Status: DISCONTINUED | OUTPATIENT
Start: 2020-11-13 | End: 2020-11-13 | Stop reason: HOSPADM

## 2020-11-12 RX ORDER — ACETAMINOPHEN 325 MG/1
650 TABLET ORAL ONCE
Status: DISCONTINUED | OUTPATIENT
Start: 2020-11-12 | End: 2020-11-12 | Stop reason: HOSPADM

## 2020-11-12 RX ORDER — PROPOFOL 10 MG/ML
INJECTION, EMULSION INTRAVENOUS AS NEEDED
Status: DISCONTINUED | OUTPATIENT
Start: 2020-11-12 | End: 2020-11-12 | Stop reason: HOSPADM

## 2020-11-12 RX ORDER — SODIUM CHLORIDE 0.9 % (FLUSH) 0.9 %
5-40 SYRINGE (ML) INJECTION EVERY 8 HOURS
Status: DISCONTINUED | OUTPATIENT
Start: 2020-11-12 | End: 2020-11-13 | Stop reason: HOSPADM

## 2020-11-12 RX ORDER — SODIUM CHLORIDE 450 MG/100ML
10 INJECTION, SOLUTION INTRAVENOUS CONTINUOUS
Status: DISCONTINUED | OUTPATIENT
Start: 2020-11-12 | End: 2020-11-12

## 2020-11-12 RX ORDER — LIDOCAINE HYDROCHLORIDE 20 MG/ML
INJECTION, SOLUTION EPIDURAL; INFILTRATION; INTRACAUDAL; PERINEURAL AS NEEDED
Status: DISCONTINUED | OUTPATIENT
Start: 2020-11-12 | End: 2020-11-12 | Stop reason: HOSPADM

## 2020-11-12 RX ORDER — MIDAZOLAM HYDROCHLORIDE 1 MG/ML
1 INJECTION, SOLUTION INTRAMUSCULAR; INTRAVENOUS AS NEEDED
Status: DISCONTINUED | OUTPATIENT
Start: 2020-11-12 | End: 2020-11-12 | Stop reason: HOSPADM

## 2020-11-12 RX ORDER — NEOSTIGMINE METHYLSULFATE 1 MG/ML
INJECTION INTRAVENOUS AS NEEDED
Status: DISCONTINUED | OUTPATIENT
Start: 2020-11-12 | End: 2020-11-12 | Stop reason: HOSPADM

## 2020-11-12 RX ORDER — ATORVASTATIN CALCIUM 40 MG/1
80 TABLET, FILM COATED ORAL
Status: DISCONTINUED | OUTPATIENT
Start: 2020-11-12 | End: 2020-11-13 | Stop reason: HOSPADM

## 2020-11-12 RX ORDER — EPHEDRINE SULFATE/0.9% NACL/PF 50 MG/5 ML
SYRINGE (ML) INTRAVENOUS AS NEEDED
Status: DISCONTINUED | OUTPATIENT
Start: 2020-11-12 | End: 2020-11-12 | Stop reason: HOSPADM

## 2020-11-12 RX ADMIN — HEPARIN SODIUM 10000 UNITS: 1000 INJECTION, SOLUTION INTRAVENOUS; SUBCUTANEOUS at 11:13

## 2020-11-12 RX ADMIN — MIDAZOLAM 2 MG: 1 INJECTION INTRAMUSCULAR; INTRAVENOUS at 10:16

## 2020-11-12 RX ADMIN — LIDOCAINE HYDROCHLORIDE 100 MG: 20 INJECTION, SOLUTION EPIDURAL; INFILTRATION; INTRACAUDAL; PERINEURAL at 10:25

## 2020-11-12 RX ADMIN — GLYCOPYRROLATE 0.4 MG: 0.2 INJECTION INTRAMUSCULAR; INTRAVENOUS at 12:39

## 2020-11-12 RX ADMIN — Medication 10 MG: at 10:44

## 2020-11-12 RX ADMIN — GABAPENTIN 200 MG: 100 CAPSULE ORAL at 23:41

## 2020-11-12 RX ADMIN — PROPOFOL 150 MG: 10 INJECTION, EMULSION INTRAVENOUS at 10:25

## 2020-11-12 RX ADMIN — ROCURONIUM BROMIDE 30 MG: 10 SOLUTION INTRAVENOUS at 10:35

## 2020-11-12 RX ADMIN — PHENYLEPHRINE HYDROCHLORIDE 30 MCG/MIN: 10 INJECTION INTRAVENOUS at 10:31

## 2020-11-12 RX ADMIN — SUGAMMADEX 200 MG: 100 INJECTION, SOLUTION INTRAVENOUS at 12:52

## 2020-11-12 RX ADMIN — ROCURONIUM BROMIDE 5 MG: 10 SOLUTION INTRAVENOUS at 10:25

## 2020-11-12 RX ADMIN — ATORVASTATIN CALCIUM 80 MG: 40 TABLET, FILM COATED ORAL at 21:15

## 2020-11-12 RX ADMIN — SODIUM CHLORIDE, SODIUM LACTATE, POTASSIUM CHLORIDE, AND CALCIUM CHLORIDE 50 ML/HR: 600; 310; 30; 20 INJECTION, SOLUTION INTRAVENOUS at 09:53

## 2020-11-12 RX ADMIN — Medication 2 G: at 10:40

## 2020-11-12 RX ADMIN — PROTAMINE SULFATE 150 MG: 10 INJECTION, SOLUTION INTRAVENOUS at 12:32

## 2020-11-12 RX ADMIN — HEPARIN SODIUM 2000 UNITS: 1000 INJECTION, SOLUTION INTRAVENOUS; SUBCUTANEOUS at 11:29

## 2020-11-12 RX ADMIN — HEPARIN SODIUM 3000 UNITS: 1000 INJECTION, SOLUTION INTRAVENOUS; SUBCUTANEOUS at 11:43

## 2020-11-12 RX ADMIN — SUCCINYLCHOLINE CHLORIDE 140 MG: 20 INJECTION, SOLUTION INTRAMUSCULAR; INTRAVENOUS at 10:26

## 2020-11-12 RX ADMIN — SODIUM CHLORIDE 3 ML/KG/HR: 900 INJECTION, SOLUTION INTRAVENOUS at 09:53

## 2020-11-12 RX ADMIN — SODIUM CHLORIDE 10 ML/HR: 450 INJECTION, SOLUTION INTRAVENOUS at 13:27

## 2020-11-12 RX ADMIN — DEXAMETHASONE SODIUM PHOSPHATE 4 MG: 4 INJECTION, SOLUTION INTRAMUSCULAR; INTRAVENOUS at 10:36

## 2020-11-12 RX ADMIN — FAMOTIDINE 20 MG: 10 INJECTION INTRAVENOUS at 21:15

## 2020-11-12 RX ADMIN — NEOSTIGMINE METHYLSULFATE 2 MG: 1 INJECTION, SOLUTION INTRAVENOUS at 12:39

## 2020-11-12 RX ADMIN — FENTANYL CITRATE 100 MCG: 50 INJECTION, SOLUTION INTRAMUSCULAR; INTRAVENOUS at 10:25

## 2020-11-12 RX ADMIN — Medication 10 ML: at 21:15

## 2020-11-12 NOTE — PROCEDURES
Mitral Clip Procedure  Indication:  The patient was found to have moderate to severe/3+ functional mitral regurgitation in a patient at high surgical risk due to comorbid conditions. She had NYHA class III symptoms. Patient was evaluated by the heart fine and  transcatheter edge to edge repair was felt to be the preferred option. After informed consent was obtained from the patient, the patient was brought in the fasting state to the catheterization laboratory. The patient was prepped and draped in the usual sterile fashion. The patient was sedated and anesthetized, and intubated by the anesthesia service. Prophylactic antibiotic administration was done. A transesophageal echocardiogram was performed. Access  A 8 Fr sheath was placed in the right femoral vein for diagnostic right heart catheterization, obtaining hemodynamic evaluation of the pulmonary artery, wedge pressures. A transseptal puncture was performed using JEFF and fluoroscopic guidance, and then systemic heparinization was given. Procedure  Following the right heart catheterization and transseptal puncture, a 24 Haitian steerable guide catheter was introduced into the left atrium. Next, percutaneous transcatheter mitral valve repair was performed using MitraClip system and guided by JEFF. One NTW clip placed between A2/P2 resulted in substantial reduction in MR from 3+ to 1+. Final hemodynamics demonstrated mean transmitral graident of 4 mmHg. Hemodynamics  The inflow gradient by echo post-MitraClip was 3-4 mmHg mean by JEFF. Mean LA pressures reduced from 19 mm Hg to 15 mm Hg       Conclusion  1. Mitral regurgitation, severe  2. High surgical risk  3. Successful reduction in mitral regurgitation with mitraclip based transcatheter edge to edge mitral valve repair. At the completion of the case, catheters were removed. Hemostasis was obtained by perclose.   Mitral repair was successful with no complications. Estimated blood loss: 10 mL. ?  I was present for the entire procedure. I have edited the procedure note as appropriate.

## 2020-11-12 NOTE — Clinical Note
[employer] G4 CLIP DELIVERY SYSTEM LOT: [id number] REF: [id number] HYUN: [id number]

## 2020-11-12 NOTE — ANESTHESIA PROCEDURE NOTES
JEFF  Date/Time: 11/12/2020 12:45 PM      Procedure Details: probe placement, image aquisition & interpretation    Risks and benefits discussed with the patient and plans are to proceed    Procedure Note    Performed by: Jennifer oCrdero MD  Authorized by: Jennifer Cordero MD       Indications: assessment of ascending aorta and assessment of surgical repair  Modalities: 2D, CF, CWD, PWD  Probe Type: biplane and multiplane  Insertion: atraumatic  Patient Status: intubated and sedated    Echocardiographic and Doppler Measurements   Aorta  Size  Diam(cm)  Dissection PlaqueThick(mm)  Plaque Mobile    Ascending normal  No 0-3 No    Arch normal  No 0-3 No    Descending normal  No >3 Yes          Valves  Annulus  Stenosis  Area/Grad  Regurg  Leaflet   Morph  Leaflet   Motion    Aortic normal none  0 normal normal    Mitral dilated none  3+ normal normal    Tricuspid normal none  1+ normal normal          Atria  Size  SEC (smoke)  Thrombus  Tumor  Device    Rt Atrium normal No No  No    Lt Atrium dilated No No  No     Interatrial Septum Morphology: normal    Interventricular Septum Morphology: normal    Ventricle  Cavity Size  Cavity Dimension Hypertrophy  Thrombus  Gloal FXN  EF    RV normal  No no normal     LV dilated   No moderately impaired 25%       Regional Function  (1 = normal, 2 = mildly hypokinetic, 3 = severely hypokinetic, 4 = akinetic, 5 = dyskinetic) LAV - [place]   ME LAV = 0  ME LAV = 90  ME LAV = 130   Basal Sept:3 Basal Ant:3 Basal Post:4   Mid Sept:3 Mid Ant:3 Mid Post:4   Apical Sept:3 Apical Ant:3 Basal Ant Sept:3   Basal Lat:3 Basal Inf:3 Mid Ant Sept:3   Mid Lat:3 Mid Inf:3    Apical Lat:3 Apical Inf:3      Diastolic function: Grade 2 diastolic dysfunction.   Pericardium: normal    Post Intervention Follow-up Study  Ventricular Global Function: unchanged  Ventricular Regional Function: unchanged     Valve  Function  Regurgitation  Area    Aortic no change      Mitral improved 1+ Tricuspid no change 1+     Prosthetic        Complications: None  Comments: Exam done for placement of Mitraclip    Moderate LV dysfunction with LVEF 25%, global decrease, apex somewhat spared. Normal RV function. Normal appearing aortic valve. Mitral valve with dilated annulus, normal appearing leaflets, moderate MR from the A2/P2 region, blunted pulmonary veins. Trace TR. Dilated LA, CHRIS no thrombus, no PFO. RA with pacemaker wires. No effusion. Severe plaque in the descending aorta, moderate plaque in the arch and ascending aorta. Live guidance of intra atrial puncture and placement of mitraclip. 1 clip placed A2/P2 with significant reduction of MR to 1+. IAS with L to right shunt at end of the procedure. Rest unchanged.

## 2020-11-12 NOTE — ANESTHESIA PREPROCEDURE EVALUATION
Relevant Problems   No relevant active problems       Anesthetic History   No history of anesthetic complications            Review of Systems / Medical History  Patient summary reviewed, nursing notes reviewed and pertinent labs reviewed    Pulmonary  Within defined limits  COPD    Sleep apnea    Asthma        Neuro/Psych   Within defined limits           Cardiovascular  Within defined limits  Hypertension          CAD      Comments: · LV: Estimated LVEF is 35 - 40%. Moderately dilated left ventricle. Upper normal wall thickness. Moderately reduced systolic function. Moderate (grade 2) left ventricular diastolic dysfunction. · LA: No atrial septal defect present. Moderately dilated left atrium. Left atrial appendage velocity is normal (greater than 40 cm/sec). · MV: Moderate mitral valve regurgitation is present. Mitral regurgitation appears to arise secondary to ventricular and annular dilatation. It appears to be dynamic in response to systemic blood pressures. While JEFF was performed systolic blood pressure was between 80-90 with relatively moderate mitral regurgitation. However as blood pressures were increased to about 100 mmHg, MR appears to be more prominent. After extubating the patient transthoracic echocardiogram suggested moderate to severe mitral regurgitation. Jet appears to be predominantly between A2 and P2 with adequate graspable leaflet lengths and no flail component. Coaptation length is adequate. MR appears to be less than what it was noted about 3 months ago. At its maximum, PISA based needle was 0.36 cm². · TV: Moderate tricuspid valve regurgitation is present.    GI/Hepatic/Renal  Within defined limits              Endo/Other  Within defined limits  Diabetes  Hypothyroidism  Morbid obesity and arthritis     Other Findings              Physical Exam    Airway  Mallampati: III  TM Distance: > 6 cm  Neck ROM: normal range of motion   Mouth opening: Normal     Cardiovascular  Regular rate and rhythm,  S1 and S2 normal,  no murmur, click, rub, or gallop             Dental    Dentition: Poor dentition     Pulmonary  Breath sounds clear to auscultation               Abdominal  GI exam deferred       Other Findings            Anesthetic Plan    ASA: 4  Anesthesia type: general    Monitoring Plan: Arterial line      Induction: Intravenous  Anesthetic plan and risks discussed with: Patient      Discussed with the patient risks of anesthesia for the procedure and they wish to proceed. Plan for GETA with standard ASA monitors, 2 PIV, arterial line, possible central line, possible PA catheter, JFEF, blood transfusion likely, ICU post op and all other indicated procedures.

## 2020-11-12 NOTE — CONSULTS
[facility] [place]: Dung Alaniz  [phone]          Cardiology valvular heart disease consult/Progress Note      Requesting/referring provider: Dr. Jessica Francois  Reason for Consult: Mitral valve disease    HPI: Niko Thompson, a 65y.o. year-old who presents for evaluation of mitral valve disease .70y. o.female with PMHx of CAD with CAB x 3 in 1997 then subsequent stents X 4, HTN, HLD, palpitations, ICD 2007, DM, hypothyroid, COPD, WES-wears BiPAP, CKD, DVT 1997 left leg, right TKR, Lap band 2011. Ms. Becka Pascal was previously seen by me in [facility] about 10 months ago when she was reporting progressive symptoms of congestive heart failure. A cardiac catheterization was performed at the time demonstrated patent LIMA to LAD and vein graft to RCA with collateral dependent circumflex and native right PDA. Her vessels were too small to achieve significant further revascularization. She has been optimized for heart failure reduced ejection fraction guideline directed medical therapy by advanced heart failure team over the course of last 6 to 8 months. Despite that she continues to have significant functional intolerance with anything more than activities of daily daily living causing shortness of breath. She reports stable low-grade edema. Investigations personally reviewed by me  Cardiac catheterization February 2020: Severe native three-vessel disease. Patent LIMA to LAD and vein graft to RCA. Right PDA small diffusely diseased vessel. Left circumflex is chronically occluded and branches are filling by collaterals. Small diagonal is severely diffusely diseased. ECG January 2020: Sinus rhythm, prior inferior infarct, narrow QRS complex, single PVC noted. Echo June 2020: Severe LV systolic dysfunction. Overall EF about 30%. Global hypokinesis.   Mid and basal inferolateral walls appear to be severely hypokinetic with possible prior infarct based on hyperechoic nature of this wall. Based on color Doppler it appears to be significant mitral regurgitation which is predominantly central with some degree of posteriorly directed nature of the jet. Mechanism of mitral regurgitation appears to be annular dilatation and lack of Mal coaptation secondary to predominantly posterior leaflet tethering. Moderate pulmonary hypertension is noted. Assessment/Plan:  1. Coronary disease manifested in the form of prior inferior MI, status post coronary bypass grafting with patent LIMA to LAD and vein graft to RCA and chronically occluded left circumflex with collaterals  2. Severe LV systolic dysfunction/heart failure reduced ejection fraction acute on chronic likely secondary to 1 with progressive adverse LV remodeling  3. Severe mitral regurgitation appears functional in nature secondary to progressive adverse LV remodeling and possible papillary muscle dysfunction from posterior myocardial infarction  4. Severe NYHA class III symptomatic limitations  5. History of lower extremity thromboembolism post coronary bypass grafting  6. CKD with baseline creatinine about 2  7. Morbid obesity with suspected sleep apnea    Her most recent echocardiogram is consistent with severe functional mitral regurgitation with underlying severe LV systolic dysfunction from coronary artery disease. Despite optimization of her medical therapy she continues to have significant symptomatic limitation possibly contributed by her mitral regurgitation which appears prior inferolateral/posterior infarct and progressive LV remodeling with ventricular and annular dilatation. She does not appear to be a candidate for CRT. Hence I feel she would be an appropriate candidate for possible MitraClip/transcatheter [id number] repair of her mitral valve. She appears to be a poor candidate for any kind of surgical intervention.   She is deemed as a good candidate for mitraclip based transcatheter edge to edge repair. She has been deemed as a high risk patient for surgery. Anatomy of mitral valve of JEFF appears appropriate for VIOLA. I discussed the risks/benefits/alternatives of the procedure with the patient. Risks include (but are not limited to) bleeding, infection,stroke,heart attack, need for emergency surgery/pericardiocentesis, need for dialysis or death. The patient understands and agrees to proceed. [x]    High complexity decision making was performed  []    Patient is at high-risk of decompensation with multiple organ involvement  She  has a past medical history of Adverse effect of anesthesia, Arthritis, Asthma, CAD (coronary artery disease), Chronic kidney disease, Chronic obstructive pulmonary disease ([address] Chronic pain, Coagulation disorder ([address] Congestive heart failure ([address] Diabetes ([address] Hypertension, Morbid obesity ([address] Sleep apnea (1996), Thromboembolus ([address] and Thyroid disease. Review of system:Patient reports no PND/Orthpnea/CP. sHe reports no cough/fever/focal neurological deficits/abdominal pain. All other systems negative except as above.    Family History   Problem Relation Age of Onset    Hypertension Mother     Dementia Mother     Coronary Artery Disease Father 60    Sudden Death Father 58    Diabetes Son     Diabetes Brother       Social History     Socioeconomic History    Marital status:      Spouse name: Not on file    Number of children: Not on file    Years of education: Not on file    Highest education level: Not on file   Tobacco Use    Smoking status: Former Smoker     Types: Cigarettes    Smokeless tobacco: Never Used    Tobacco comment: quit 2001/started again (smoked 3 yrs) off and on/none since 2012   Substance and Sexual Activity    Alcohol use: Not Currently    Drug use: Not Currently    Sexual activity: Not Currently      PE  Vitals:    11/12/20 1309 11/12/20 1310 11/12/20 1315 11/12/20 1320   BP: 129/60      Pulse: 70 72 70 70 Resp: 14 (P) 16 (P) 17 12   Temp: 97.3 °F (36.3 °C)      SpO2: 98% 98% 98% 100%   Weight:        Body mass index is 41.69 kg/m².     Recent Labs:  Lab Results   Component Value Date/Time    Cholesterol, total 172 07/23/2020 09:12 AM    HDL Cholesterol 75 07/23/2020 09:12 AM    LDL, calculated 78 07/23/2020 09:12 AM    Triglyceride 93 07/23/2020 09:12 AM    CHOL/HDL Ratio 2.6 01/31/2020 12:35 AM     Lab Results   Component Value Date/Time    Creatinine 2.41 (H) 10/29/2020 10:45 AM     Lab Results   Component Value Date/Time    BUN 49 (H) 10/29/2020 10:45 AM     Lab Results   Component Value Date/Time    Potassium 5.0 10/29/2020 10:45 AM     Lab Results   Component Value Date/Time    Hemoglobin A1c 7.6 (H) 10/29/2020 10:45 AM     Lab Results   Component Value Date/Time    HGB 10.8 (L) 11/12/2020 01:12 PM     Lab Results   Component Value Date/Time    PLATELET 815 84/08/7702 01:12 PM       Reviewed:  Past Medical History:   Diagnosis Date    Adverse effect of anesthesia     hard to wake up/uses BIPAP/\"try to avoid general if possible\"/intubated in past prior to going to sleep and it caused pt to be incontinent    Arthritis     Asthma     CAD (coronary artery disease)     Chronic kidney disease     elevataed creatinine    Chronic obstructive pulmonary disease (HCC)     Chronic pain     arthritis    Coagulation disorder ([address]     on plavix    Congestive heart failure ([address]     Diabetes ([address]     Hypertension     Morbid obesity ([address]     Sleep apnea 1996    BIPAP with 2 liters oxygen    Thromboembolus ([address]     after heart surgery - left leg    Thyroid disease      Social History     Tobacco Use   Smoking Status Former Smoker    Types: Cigarettes   Smokeless Tobacco Never Used   Tobacco Comment    quit 2001/started again (smoked 3 yrs) off and on/none since 2012     Social History     Substance and Sexual Activity   Alcohol Use Not Currently     Allergies   Allergen Reactions    Crestor [Rosuvastatin] Other (comments)     Causes muscle cramps    Lisinopril Cough    Nsaids (Non-Steroidal Anti-Inflammatory Drug) Other (comments)     Liver and Kidney     Family History   Problem Relation Age of Onset    Hypertension Mother     Dementia Mother     Coronary Artery Disease Father 58    Sudden Death Father 58    Diabetes Son     Diabetes Brother         Current Facility-Administered Medications   Medication Dose Route Frequency    sodium chloride (NS) flush 5-40 mL  5-40 mL IntraVENous Q8H    sodium chloride (NS) flush 5-40 mL  5-40 mL IntraVENous PRN    fentaNYL citrate (PF) injection 25 mcg  25 mcg IntraVENous Multiple    HYDROmorphone (PF) (DILAUDID) injection 0.2 mg  0.2 mg IntraVENous Multiple    ondansetron (ZOFRAN) injection 4 mg  4 mg IntraVENous PRN    0.45% sodium chloride infusion  10 mL/hr IntraVENous CONTINUOUS    insulin regular (NOVOLIN R, HUMULIN R) 100 Units in 0.9% sodium chloride 100 mL infusion  1-50 Units/hr IntraVENous TITRATE    niCARdipine (CARDENE) 25 mg in 0.9% sodium chloride 250 mL infusion  0-15 mg/hr IntraVENous TITRATE    PHENYLephrine (AWILDA-SYNEPHRINE) 30 mg in 0.9% sodium chloride 250 mL infusion   mcg/min IntraVENous TITRATE       Mode Covarrubias MD11/12/20     ATTENTION:   This medical record was transcribed using an electronic medical records/speech recognition system. Although proofread, it may and can contain electronic, spelling and other errors. Corrections may be executed at a later time. Please feel free to contact us for any clarifications as needed.     [facility] [place]  [address], [address] [place] [address]

## 2020-11-12 NOTE — ANESTHESIA POSTPROCEDURE EVALUATION
Post-Anesthesia Evaluation and Assessment    Patient: Destiny Patterson MRN: [id number]  SSN: [id number]    YOB: 1950  Age: 65 y.o. Sex: female      I have evaluated the patient and they are stable and ready for discharge from the PACU. Cardiovascular Function/Vital Signs  Visit Vitals  /64   Pulse 70   Temp 36.3 °C (97.3 °F)   Resp 11   Wt 103.4 kg (227 lb 15.3 oz)   SpO2 100%   BMI 41.69 kg/m²       Patient is status post General anesthesia for Procedure(s):  TRANSCATHETER MITRAL VALVE REPAIR, JEFF BY DR Raij Tenorio. Nausea/Vomiting: None    Postoperative hydration reviewed and adequate. Pain:  Pain Scale 1: Numeric (0 - 10) (11/12/20 1310)  Pain Intensity 1: 0 (11/12/20 1310)   Managed    Neurological Status:   Neuro (WDL): Within Defined Limits (11/12/20 1310)  Neuro  Neurologic State: Drowsy(easily arousable, s/p anesthesia) (11/12/20 1310)  Orientation Level: Oriented X4 (11/12/20 1310)  Cognition: Follows commands (11/12/20 1310)  Speech: Clear (11/12/20 1310)  LUE Motor Response: Purposeful (11/12/20 1310)  LLE Motor Response: Purposeful (11/12/20 1310)  RUE Motor Response: Purposeful (11/12/20 1310)  RLE Motor Response: Purposeful (11/12/20 1310)   At baseline    Mental Status, Level of Consciousness: Alert and  oriented to person, place, and time    Pulmonary Status:   O2 Device: Nasal cannula (11/12/20 1330)   Adequate oxygenation and airway patent    Complications related to anesthesia: None    Post-anesthesia assessment completed.  No concerns    Signed By: Eleazar Combs MD     November 12, 2020

## 2020-11-12 NOTE — ANESTHESIA PROCEDURE NOTES
Arterial Line Placement    Start time: 11/12/2020 10:05 AM  Performed by: Feliberto Taylor MD  Authorized by: Feliberto Taylor MD     Pre-Procedure  Indications:  Arterial pressure monitoring and blood sampling  Preanesthetic Checklist: patient identified, risks and benefits discussed, anesthesia consent, site marked, patient being monitored, timeout performed and patient being monitored    Timeout Time: 10:05        Procedure:   Prep:  ChloraPrep  Seldinger Technique?: Yes    Orientation:  Right  Location:  Radial artery  Catheter size:  20 G  Number of attempts:  1    Assessment:   Post-procedure:  Line secured and sterile dressing applied  Patient Tolerance:  Patient tolerated the procedure well with no immediate complications  Comment:   Ultrasound guided

## 2020-11-12 NOTE — Clinical Note
MITRACLIP G4 STEERABLE GUIDE CATHETER [facility]:[id number] REF: [id number] [id number]

## 2020-11-13 ENCOUNTER — APPOINTMENT (OUTPATIENT)
Dept: NON INVASIVE DIAGNOSTICS | Age: 70
End: 2020-11-13
Attending: NURSE PRACTITIONER
Payer: MEDICARE

## 2020-11-13 VITALS
SYSTOLIC BLOOD PRESSURE: 96 MMHG | HEIGHT: 62 IN | WEIGHT: 228 LBS | DIASTOLIC BLOOD PRESSURE: 51 MMHG | HEART RATE: 70 BPM | RESPIRATION RATE: 18 BRPM | OXYGEN SATURATION: 96 % | BODY MASS INDEX: 41.96 KG/M2 | TEMPERATURE: 97.6 F

## 2020-11-13 LAB
ABO + RH BLD: NORMAL
ANION GAP SERPL CALC-SCNC: 4 MMOL/L (ref 5–15)
BLD PROD TYP BPU: NORMAL
BLD PROD TYP BPU: NORMAL
BLOOD GROUP ANTIBODIES SERPL: NORMAL
BPU ID: NORMAL
BPU ID: NORMAL
BUN SERPL-MCNC: 29 MG/DL (ref 6–20)
BUN/CREAT SERPL: 17 (ref 12–20)
CALCIUM SERPL-MCNC: 9.5 MG/DL (ref 8.5–10.1)
CHLORIDE SERPL-SCNC: 113 MMOL/L (ref 97–108)
CO2 SERPL-SCNC: 22 MMOL/L (ref 21–32)
COMMENT, HOLDF: NORMAL
CREAT SERPL-MCNC: 1.73 MG/DL (ref 0.55–1.02)
CROSSMATCH RESULT,%XM: NORMAL
CROSSMATCH RESULT,%XM: NORMAL
ECHO AO ROOT DIAM: 2.74 CM
ECHO AV AREA PEAK VELOCITY: 2.36 CM2
ECHO AV AREA/BSA PEAK VELOCITY: 1.2 CM2/M2
ECHO AV PEAK GRADIENT: 8.27 MMHG
ECHO AV PEAK VELOCITY: 143.76 CM/S
ECHO LA AREA 4C: 21.33 CM2
ECHO LA MAJOR AXIS: 5.51 CM
ECHO LA MINOR AXIS: 2.73 CM
ECHO LA VOL 2C: 111.85 ML (ref 22–52)
ECHO LA VOL 4C: 65.96 ML (ref 22–52)
ECHO LA VOL BP: 101.69 ML (ref 22–52)
ECHO LA VOL/BSA BIPLANE: 50.32 ML/M2 (ref 16–28)
ECHO LA VOLUME INDEX A2C: 55.34 ML/M2 (ref 16–28)
ECHO LA VOLUME INDEX A4C: 32.64 ML/M2 (ref 16–28)
ECHO LV E' LATERAL VELOCITY: 5.09 CM/S
ECHO LV E' SEPTAL VELOCITY: 3.26 CM/S
ECHO LV INTERNAL DIMENSION DIASTOLIC: 6.33 CM (ref 3.9–5.3)
ECHO LV INTERNAL DIMENSION SYSTOLIC: 5.53 CM
ECHO LV IVSD: 0.9 CM (ref 0.6–0.9)
ECHO LV MASS 2D: 228.5 G (ref 67–162)
ECHO LV MASS INDEX 2D: 113 G/M2 (ref 43–95)
ECHO LV POSTERIOR WALL DIASTOLIC: 0.85 CM (ref 0.6–0.9)
ECHO LVOT DIAM: 2.24 CM
ECHO LVOT PEAK GRADIENT: 2.95 MMHG
ECHO LVOT PEAK VELOCITY: 85.81 CM/S
ECHO MV A VELOCITY: 111.8 CM/S
ECHO MV AREA PHT: 1.77 CM2
ECHO MV E DECELERATION TIME (DT): 428.97 MS
ECHO MV E VELOCITY: 92.98 CM/S
ECHO MV E/A RATIO: 0.83
ECHO MV E/E' LATERAL: 18.27
ECHO MV E/E' RATIO (AVERAGED): 23.39
ECHO MV E/E' SEPTAL: 28.52
ECHO MV MAX VELOCITY: 118.77 CM/S
ECHO MV MEAN GRADIENT: 2.75 MMHG
ECHO MV PEAK GRADIENT: 5.65 MMHG
ECHO MV PRESSURE HALF TIME (PHT): 124.4 MS
ECHO MV VTI: 32.1 CM
ECHO PV MAX VELOCITY: 107.55 CM/S
ECHO PV PEAK INSTANTANEOUS GRADIENT SYSTOLIC: 4.63 MMHG
ECHO RV INTERNAL DIMENSION: 4.93 CM
ECHO RV TAPSE: 2.3 CM (ref 1.5–2)
ECHO TV REGURGITANT MAX VELOCITY: 271.14 CM/S
ECHO TV REGURGITANT PEAK GRADIENT: 29.41 MMHG
ERYTHROCYTE [DISTWIDTH] IN BLOOD BY AUTOMATED COUNT: 15.1 % (ref 11.5–14.5)
GLUCOSE BLD STRIP.AUTO-MCNC: 113 MG/DL (ref 65–100)
GLUCOSE BLD STRIP.AUTO-MCNC: 157 MG/DL (ref 65–100)
GLUCOSE SERPL-MCNC: 159 MG/DL (ref 65–100)
HCT VFR BLD AUTO: 30.5 % (ref 35–47)
HGB BLD-MCNC: 10 G/DL (ref 11.5–16)
MAGNESIUM SERPL-MCNC: 2.1 MG/DL (ref 1.6–2.4)
MCH RBC QN AUTO: 32.8 PG (ref 26–34)
MCHC RBC AUTO-ENTMCNC: 32.8 G/DL (ref 30–36.5)
MCV RBC AUTO: 100 FL (ref 80–99)
NRBC # BLD: 0 K/UL (ref 0–0.01)
NRBC BLD-RTO: 0 PER 100 WBC
PLATELET # BLD AUTO: 197 K/UL (ref 150–400)
PMV BLD AUTO: 10.5 FL (ref 8.9–12.9)
POTASSIUM SERPL-SCNC: 4.9 MMOL/L (ref 3.5–5.1)
RBC # BLD AUTO: 3.05 M/UL (ref 3.8–5.2)
SAMPLES BEING HELD,HOLD: NORMAL
SERVICE CMNT-IMP: ABNORMAL
SERVICE CMNT-IMP: ABNORMAL
SODIUM SERPL-SCNC: 139 MMOL/L (ref 136–145)
SPECIMEN EXP DATE BLD: NORMAL
STATUS OF UNIT,%ST: NORMAL
STATUS OF UNIT,%ST: NORMAL
UNIT DIVISION, %UDIV: 0
UNIT DIVISION, %UDIV: 0
WBC # BLD AUTO: 7 K/UL (ref 3.6–11)

## 2020-11-13 PROCEDURE — 80048 BASIC METABOLIC PNL TOTAL CA: CPT

## 2020-11-13 PROCEDURE — 85027 COMPLETE CBC AUTOMATED: CPT

## 2020-11-13 PROCEDURE — 74011250637 HC RX REV CODE- 250/637: Performed by: NURSE PRACTITIONER

## 2020-11-13 PROCEDURE — 82962 GLUCOSE BLOOD TEST: CPT

## 2020-11-13 PROCEDURE — 93005 ELECTROCARDIOGRAM TRACING: CPT

## 2020-11-13 PROCEDURE — 93306 TTE W/DOPPLER COMPLETE: CPT | Performed by: INTERNAL MEDICINE

## 2020-11-13 PROCEDURE — 74011250636 HC RX REV CODE- 250/636: Performed by: NURSE PRACTITIONER

## 2020-11-13 PROCEDURE — 83735 ASSAY OF MAGNESIUM: CPT

## 2020-11-13 PROCEDURE — 74011000258 HC RX REV CODE- 258: Performed by: NURSE PRACTITIONER

## 2020-11-13 PROCEDURE — 93306 TTE W/DOPPLER COMPLETE: CPT

## 2020-11-13 RX ORDER — FUROSEMIDE 40 MG/1
40 TABLET ORAL DAILY
Status: DISCONTINUED | OUTPATIENT
Start: 2020-11-13 | End: 2020-11-13 | Stop reason: HOSPADM

## 2020-11-13 RX ORDER — FUROSEMIDE 20 MG/1
20 TABLET ORAL DAILY
Qty: 60 TAB | Refills: 2 | Status: SHIPPED
Start: 2020-11-13 | End: 2020-12-22

## 2020-11-13 RX ORDER — MAGNESIUM SULFATE 100 %
4 CRYSTALS MISCELLANEOUS AS NEEDED
Status: DISCONTINUED | OUTPATIENT
Start: 2020-11-13 | End: 2020-11-13 | Stop reason: HOSPADM

## 2020-11-13 RX ORDER — INSULIN LISPRO 100 [IU]/ML
INJECTION, SOLUTION INTRAVENOUS; SUBCUTANEOUS
Status: DISCONTINUED | OUTPATIENT
Start: 2020-11-13 | End: 2020-11-13 | Stop reason: HOSPADM

## 2020-11-13 RX ORDER — METOPROLOL SUCCINATE 25 MG/1
12.5 TABLET, EXTENDED RELEASE ORAL DAILY
Qty: 60 TAB | Refills: 2 | Status: SHIPPED | OUTPATIENT
Start: 2020-11-13 | End: 2021-07-12 | Stop reason: SDUPTHER

## 2020-11-13 RX ORDER — DEXTROSE MONOHYDRATE 100 MG/ML
0-250 INJECTION, SOLUTION INTRAVENOUS AS NEEDED
Status: DISCONTINUED | OUTPATIENT
Start: 2020-11-13 | End: 2020-11-13 | Stop reason: HOSPADM

## 2020-11-13 RX ADMIN — FUROSEMIDE 40 MG: 40 TABLET ORAL at 10:28

## 2020-11-13 RX ADMIN — CEFAZOLIN SODIUM 1 G: 1 INJECTION, POWDER, FOR SOLUTION INTRAMUSCULAR; INTRAVENOUS at 01:07

## 2020-11-13 RX ADMIN — Medication 10 ML: at 01:07

## 2020-11-13 RX ADMIN — CLOPIDOGREL BISULFATE 75 MG: 75 TABLET ORAL at 08:42

## 2020-11-13 RX ADMIN — CEFAZOLIN SODIUM 1 G: 1 INJECTION, POWDER, FOR SOLUTION INTRAMUSCULAR; INTRAVENOUS at 12:38

## 2020-11-13 RX ADMIN — LEVOTHYROXINE SODIUM 100 MCG: 0.1 TABLET ORAL at 07:20

## 2020-11-13 RX ADMIN — EZETIMIBE 10 MG: 10 TABLET ORAL at 08:42

## 2020-11-13 RX ADMIN — ASPIRIN 81 MG: 81 TABLET, CHEWABLE ORAL at 08:42

## 2020-11-13 RX ADMIN — ALLOPURINOL 100 MG: 100 TABLET ORAL at 08:42

## 2020-11-13 RX ADMIN — GABAPENTIN 200 MG: 100 CAPSULE ORAL at 08:42

## 2020-11-13 RX ADMIN — Medication 10 ML: at 14:00

## 2020-11-13 RX ADMIN — Medication 10 ML: at 07:20

## 2020-11-13 RX ADMIN — BUPROPION HYDROCHLORIDE 150 MG: 150 TABLET, EXTENDED RELEASE ORAL at 08:42

## 2020-11-13 NOTE — PROGRESS NOTES
Transition of Care Plan  RUR 13%    Disposition   Home  Transportation  Partner   Medical Follow up   PCP and Cardiologist      Reason for Admission:  Mitral Valve Repair   CHF-- COPD  /Bipap at home, hypertension, asthma. Follow[facility] at [facility]                   RUR Score:   13%                  Plan for utilizing home health:      Not indicated    PCP: First and Last name:  Jude Bradley    Name of Practice: 1   Are you a current patient: Yes/No: yes   Approximate date of last visit: 10/1/2020   Can you participate in a virtual visit with your PCP: yes                    Current Advanced Directive/Advance Care Plan: Yes in Chart   Partner  Christian Bernal [phone]  Primary/ Son Yue Prieto [phone] Secondary                           Transition of Care Plan:       Home with medical follow up    Cm met with patient in her room to introduce self and explain role  Patient was alert and oriented. Confirmed Demographics, PCP and insurance ([employer])      Patient lives in one level apartment with her long time partner, Yasemin Min. Both are retired and receive SS benefits. Patient is self care and independent-uses no dme- Has Bipap at home. Good family support as well. Medicare pt has received, reviewed, and signed 1ST IM letter informing them of their right to appeal the discharge. Signed copy has been placed on pt bedside chart. Yasemin Min will transport home today. No identified transition of care needs identified. Care Management Interventions  PCP Verified by CM: Yes  Mode of Transport at Discharge: (car)  Transition of Care Consult (CM Consult): Discharge Planning  Discharge Durable Medical Equipment: No  Physical Therapy Consult: No  Occupational Therapy Consult: No  Speech Therapy Consult: No  Current Support Network:  Other(lives with partner in one level apartment   good  support-- independent and self care   AMD in chart)  Confirm Follow Up Transport: Self  Discharge Location  Discharge Placement: Home

## 2020-11-13 NOTE — PERIOP NOTES
TRANSFER - OUT REPORT:    Verbal report given to Ramez Beard RN(name) on Sukumar Brandt  being transferred to [facility] room 456 (unit) for routine post - op       Report consisted of patients Situation, Background, Assessment and   Recommendations(SBAR). Time Pre op antibiotic given:1040  Anesthesia Stop time: 3013  Duarte Present on Transfer to floor:n/a  Order for Duarte on Chart:n/a  Discharge Prescriptions with Chart:n/a    Information from the following report(s) SBAR, Kardex, OR Summary, Procedure Summary, Intake/Output, MAR, Recent Results and Cardiac Rhythm Paced was reviewed with the receiving nurse. Opportunity for questions and clarification was provided. Is the patient on 02? YES       L/Min 2    Is the patient on a monitor? YES    Is the nurse transporting with the patient? YES    Surgical Waiting Area notified of patient's transfer from PACU? YES      The following personal items collected during your admission accompanied patient upon transfer:   Dental Appliance: Dental Appliances: Other (comment)(caps)  Vision:    Hearing Aid:    Jewelry:    Clothing: Clothing: (will take upstairs to patient room once assigned)  Other Valuables:  Other Valuables: [employer], Eyeglasses(to patient assigned room)  Valuables sent to safe:

## 2020-11-13 NOTE — PROGRESS NOTES
1499 Bedside and Verbal shift change report given to Svetlana Watson (oncoming nurse) by Tani Paula RN (offgoing nurse). Report included the following information SBAR, Kardex, Intake/Output, MAR, Recent Results and Med Rec Status. 1300 EKG obtained. 1400 IVs and telemetry removed. 1415 Discharge medications reviewed with patient and appropriate educational materials and side effects teaching were provided. MEFs given to patient. I have reviewed discharge instructions with the patient. The patient verbalized understanding. Opportunity for questions provided. Signed AVS on chart. Problem: Diabetes Self-Management  Goal: *Disease process and treatment process  Description: Define diabetes and identify own type of diabetes; list 3 options for treating diabetes. Outcome: Progressing Towards Goal  Goal: *Incorporating nutritional management into lifestyle  Description: Describe effect of type, amount and timing of food on blood glucose; list 3 methods for planning meals. Outcome: Progressing Towards Goal  Goal: *Using medications safely  Description: State effect of diabetes medications on diabetes; name diabetes medication taking, action and side effects. Outcome: Progressing Towards Goal  Goal: *Monitoring blood glucose, interpreting and using results  Description: Identify recommended blood glucose targets  and personal targets. Outcome: Progressing Towards Goal  Goal: *Prevention, detection, treatment of acute complications  Description: List symptoms of hyper- and hypoglycemia; describe how to treat low blood sugar and actions for lowering  high blood glucose level. Outcome: Progressing Towards Goal  Goal: *Prevention, detection and treatment of chronic complications  Description: Define the natural course of diabetes and describe the relationship of blood glucose levels to long term complications of diabetes.   Outcome: Progressing Towards Goal     Problem: Falls - Risk of  Goal: *Absence of Falls  Description: Document [place] Fall Risk and appropriate interventions in the flowsheet.   Outcome: Progressing Towards Goal  Note: Fall Risk Interventions:  Mobility Interventions: Communicate number of staff needed for ambulation/transfer, Patient to call before getting OOB, OT consult for ADLs, PT Consult for mobility concerns, PT Consult for assist device competence, Utilize walker, cane, or other assistive device         Medication Interventions: Evaluate medications/consider consulting pharmacy, Patient to call before getting OOB, Teach patient to arise slowly    Elimination Interventions: Call light in reach, Patient to call for help with toileting needs, Stay With Me (per policy), Toileting schedule/hourly rounds    History of Falls Interventions: Consult care management for discharge planning, Door open when patient unattended, Evaluate medications/consider consulting pharmacy, Investigate reason for fall, Room close to nurse's station         Problem: Patient Education: Go to Patient Education Activity  Goal: Patient/Family Education  Outcome: Progressing Towards Goal     Problem: Post-procedure Day 1,TAVR  Goal: *Stable Cardiac Rhythm  Outcome: Progressing Towards Goal  Goal: *Hemodynamically stable  Outcome: Progressing Towards Goal  Goal: *Optimal pain control at patient's stated goal  Outcome: Progressing Towards Goal  Goal: *Lungs clear or at baseline  Outcome: Progressing Towards Goal  Goal: *No signs of infection or puncture site complication  Outcome: Progressing Towards Goal  Goal: Activity/Safety  Outcome: Progressing Towards Goal  Goal: Discharge Planning  Outcome: Progressing Towards Goal

## 2020-11-13 NOTE — DISCHARGE SUMMARY
[facility] Discharge Summary     Patient ID:  Cindy Perales  [id number]  49 y.o.  1950    Admit date: 11/12/2020    Discharge date: 11/13/2020     Admitting Physician: Remington Tran MD     Referring Cardiologist:  Dr. Ashley Barbosa    PCP:  Sulaiman Patricio NP     Admitting Diagnoses: Mitral regurgitation    Discharge Diagnoses:     Hospital Problems  Date Reviewed: 11/12/2020          Codes Class Noted POA    Mitral regurgitation ICD-10-CM: I34.0  ICD-9-CM: 424.0  11/12/2020 Unknown        Severe mitral regurgitation ICD-10-CM: I34.0  ICD-9-CM: 424.0  8/31/2020 Yes              Discharged Condition: improved    Disposition: home, see patient instructions for treatment and plan    Procedures for this admission:  Procedure(s):  TRANSCATHETER MITRAL VALVE REPAIR, JEFF BY DR Rolf Braga    Discharge Medications:      My Medications      CHANGE how you take these medications      Instructions Each Dose to Equal Morning Noon Evening Bedtime   furosemide 20 mg tablet  Commonly known as:  LASIX  What changed:  how much to take    Your last dose was: Your next dose is: Take 1 Tab by mouth daily. 20 mg                 metoprolol succinate 25 mg XL tablet  Commonly known as:  TOPROL-XL  What changed:  additional instructions    Your last dose was: Your next dose is: Take 0.5 Tabs by mouth daily. Hold for systolic BP less than 513   12.5 mg                    CONTINUE taking these medications      Instructions Each Dose to Equal Morning Noon Evening Bedtime   albuterol 90 mcg/actuation inhaler  Commonly known as:  PROVENTIL HFA, VENTOLIN HFA, PROAIR HFA    Your last dose was: Your next dose is: Take 2 Puffs by inhalation every four (4) hours as needed. 2 Puff                 allopurinoL 100 mg tablet  Commonly known as:  ZYLOPRIM    Your last dose was: Your next dose is:           Take 1 tablet by mouth once daily                  aspirin 81 mg chewable tablet    Your last dose was: Your next dose is: Take 81 mg by mouth daily. 81 mg                 atorvastatin 80 mg tablet  Commonly known as:  LIPITOR    Your last dose was: Your next dose is:          TAKE 1 TABLET BY MOUTH AT BEDTIME                  Blood-Glucose Meter monitoring kit    Your last dose was: Your next dose is:          Use as directed. Dx: E11.65 check sugar twice daily                  buPROPion  mg SR tablet  Commonly known as:  WELLBUTRIN SR    Your last dose was: Your next dose is: Take 1 Tab by mouth daily. 150 mg                 cholecalciferol (1000 Units /25 mcg) tablet  Commonly known as:  VITAMIN D3    Your last dose was: Your next dose is: Take 2 Tabs by mouth daily. 2,000 Units                 clopidogreL 75 mg Tab  Commonly known as:  PLAVIX    Your last dose was: Your next dose is: Take 1 Tab by mouth daily. 75 mg                 clotrimazole-betamethasone topical cream  Commonly known as:  LOTRISONE    Your last dose was: Your next dose is:          Apply  to affected area two (2) times a day. empagliflozin 25 mg tablet  Commonly known as:  Jardiance    Your last dose was: Your next dose is: Take 1 Tab by mouth daily. For diabetes   25 mg                 ezetimibe 10 mg tablet  Commonly known as:  ZETIA    Your last dose was: Your next dose is: Take 1 tablet by mouth once daily                  gabapentin 100 mg capsule  Commonly known as:  NEURONTIN    Your last dose was: Your next dose is: Take 2 Caps by mouth three (3) times daily. Max Daily Amount: 600 mg.   200 mg                 glipiZIDE 10 mg tablet  Commonly known as:  GLUCOTROL    Your last dose was: Your next dose is:           Take 1 tablet by mouth twice daily with food                  glucose blood VI test strips strip  Commonly known as:  ASCENSIA AUTODISC VI, ONE TOUCH ULTRA TEST VI    Your last dose was: Your next dose is:          E11.65 check sugar once daily                  lancets Misc    Your last dose was: Your next dose is:          Use as directed. Dx:check sugar once daily. E11.65                  levothyroxine 100 mcg tablet  Commonly known as:  SYNTHROID    Your last dose was: Your next dose is: Take 1 Tab by mouth Daily (before breakfast). 100 mcg                 Tylenol Arthritis Pain 650 mg Tber  Generic drug:  acetaminophen    Your last dose was: Your next dose is: Take 1,300 mg by mouth two (2) times a day. 1,300 mg                    STOP taking these medications    Entresto 24-26 mg tablet  Generic drug:  sacubitriL-valsartan        hydrALAZINE 50 mg tablet  Commonly known as:  APRESOLINE        isosorbide mononitrate ER 30 mg tablet  Commonly known as:  IMDUR              Where to Get Your Medications      These medications were sent to [address] [place] [address], [address] [place] [address]    Phone:  [phone]   · metoprolol succinate 25 mg XL tablet     Information on where to get these meds will be given to you by the nurse or doctor. Ask your nurse or doctor about these medications  · furosemide 20 mg tablet       HPI: Copied from H&P  Ashlyn Rankin is a 77y. o.female with PMHx of CAD with CAB x 3 in 1997 then subsequent stents X 4, HTN, HLD, palpitations, ICD 2007, DM, hypothyroid, COPD, WES-wears BiPAP, CKD, DVT 1997 left leg, right TKR, Lap band 2011.  She is referred to the [address] [place] [address] by Dr. Mesha Nava interventional evaluation of  Severe mitral regurgitation and consideration of a MitraClip.  She notes that her health began to change in November 2019 when she developed an upper respiratory illness which was attributed to a virus. She felt poorly through much of the winter then was hospitalized in early February after her initial consultation with Dr. Beryle Pih for CHF. Since that time, she has improved with treatments and medication adjustments from the [facility] but has never recovered her previous level of activity. She does very little activity in a day and activity is mostly limited to activities of daily living.      10/8/2020: interval history involves JEFF with Dr. Leeanna Perry on 10/2/2020 with results below. She also saw [facility] where her diuretic dose was increased. She reports no change in her extremely limiting shortness of breath, fatigue, orthopnea. No chest pains. Still with 2+ LE pitting edema bilaterally, although patient reports this has improved some. Weight seems to be stable although slowly trending up as her activity levels remain limited. Hospital Course: Radha Cisneros was taken to the OR on 11/12/20 for a transcatheter mitral valve repair using on NTW MitraClip. She was taken from the OR to the PACU in stable condition. When she was hemodynamically stable, she was transferred to the CV[facility]. She was monitored overnight. On POD #1, she was able to ambulate with assist of cane (her baseline), was tolerating a PO diet and had stable vital signs. Her blood pressure was on the low end of normal and she will be discharged on decreased doses of some of her antihypertensives with the plan to call her on Monday and add back her HF medications as able. She is also scheduled to see the [facility] on 11/20 to further titrate her medications. She was felt stable for discharge on POD #1 with the following assessment and plan:    1. Severe Mitral Regurgitation s/p mitralclip. Plan for Echo today. Discussed with Dr. Leeanna Perry. On ASA, Plavix. Echo complete-discussed with Dr. Leeanna Perry as well as BP from today. Ok to go home with plan to call on Monday and adjust medications as needed. Will see her back in the clinic in 1 month with an echo.     2.  CAD s/p CAB and subsequent stents: On ASA, Plavix, Statin, Zetia. BB, Entresto on hold due to soft BP. Add back as needed.     3.  HTN: BP a little soft this morning. Continue Lasix and add back other antihypertensives as needed.      4. HLD: On Statin, Zetia     5. DM: On Jardiance, Glipizide PTA. A1c of 7.6-improved from previous. SSI here. Will plan to restart home medications on discharge.      6. COPD: PRN Albuterol     7. Chronic systolic HF: LVEF 49-01%. Followed by [facility]. Couldn't tolerate AA due to hyperkalemia. On Lasix. BP is soft this morning. Restart BB, Entresto as able.     8. Hypothyroid: On Synthroid     9. CKD4: Creatinine 1.79 today-improved. Avoid nephrotoxic agents and hypotension.     10. Obesity: weight loss hindered by activity restriction. Discussed calorie restriction. Hopeful that she will be able to increase activity after Mitral Valve intervention     11. Hx total knee replacement: No current treatment.     12. VT s/p ICD in 2007: [address] home BB when BP will tolerate.     13. WES:  Wears BiPAP at home.     Dispo: PT/OT/Cardiac Rehab. Case Management for discharge planning. Likely discharge later today after echo has been reviewed. Referral to outpatient cardiac rehab made. Discharge Vital Signs:   Visit Vitals  BP (!) 96/51 (BP 1 Location: Left arm, BP Patient Position: At rest)   Pulse 70   Temp 97.6 °F (36.4 °C)   Resp 18   Ht 5' 2\" (1.575 m)   Wt 228 lb (103.4 kg)   SpO2 96%   BMI 41.70 kg/m²       Labs:   Recent Labs     11/13/20  1215  11/13/20  0115  11/12/20  1312   WBC  --   --  7.0  --  8.3   HGB  --   --  10.0*  --  10.8*   HCT  --   --  30.5*  --  32.9*   PLT  --   --  197  --  207   NA  --   --  139   < > 140   K  --   --  4.9   < > 4.3   BUN  --   --  29*   < > 30*   CREA  --   --  1.73*   < > 1.88*   GLU  --   --  159*   < > 141*   GLUCPOC 157*   < >  --    < >  --    INR  --   --   --   --  1.1    < > = values in this interval not displayed. Diagnostics:   CXR Results  (Last 48 hours)               11/12/20 1400  XR CHEST PORT Final result    Impression:  Impression: No acute process. Narrative: Indication: Mitral valve repair       Comparison: 10/29/2020       Portable exam of the chest obtained at 1353 demonstrates normal heart size. The   patient is status post CABG procedure. Pacer leads are unchanged in position. There is no acute process in the lung fields. The osseous structures are   unremarkable. Patient Instructions/Follow Up Care:  Discharge instructions were reviewed with the patient and family present. Questions were also answered at this time. Prescriptions and medications were reviewed. The patient has a follow up appointment with the Nurse Practitioner or Physician's Assistant on 11/16/20 11 by telephone and with Dr. Chet Valenzuela on 12/14/20 at 1:45pm. The patient was also instructed to follow up with her primary care physician as needed. The patient and family were encouraged to call with any questions or concerns.        Signed:  Cecile Bain NP  11/13/2020  12:49 PM

## 2020-11-13 NOTE — CARDIO/PULMONARY
Cardiac Rehab: Transcatheter education folder given to Jimena. She is eager to get back to cardiac rehab. Discussed various options for re-enrollment. Agreed to call OP and discuss plan then return and confirm with [facility] prior to scheduling an intake. Jimena verbalized understanding. Adarsh Taylor RN Spoke with Lewis Woo at OP cardiac rehab and she will contact [facility] after discharge to discuss options for returning. Jimena aware.  Adarsh Taylor RN

## 2020-11-13 NOTE — PROGRESS NOTES
1950: Bedside shift change report given to [employer] [place] (oncoming nurse) by Paulina Sue (offgoing nurse). Report included the following information SBAR, Intake/Output, MAR, Accordion, Recent Results, Med Rec Status and Cardiac Rhythm Paced . 2130: Pt with active bowel sounds, no N/V; diet advanced to clears    0345: Pt has tolerated clears well, active BS, diet advanced; R groin dressing changed with daily CHG bath to assess site, site remains stable with no bleeding/oozing    0750: Bedside shift change report given to Ne[place]u (oncoming nurse) by [address] [place] [address] (offgoing nurse). Report included the following information SBAR, Intake/Output, MAR, Accordion, Recent Results, Med Rec Status and Cardiac Rhythm Paced.

## 2020-11-13 NOTE — DISCHARGE INSTRUCTIONS
Cardiac Surgery Specialist    [address] [place] [address] [address]       [address] [address]                                       [address], [address]  Office- [phone]  Fax- [phone]       Office- [phone]  Fax- [phone]  _____________________________________________________________  Dr. Rodolfo Moore [facility]   Roselyn Harris, AGAPATRICK Encinas PA-C, PA-C, PA-C Caswell Ramsay, AGPCNP  _____________________________________________________________     Name:Mi Horne     Surgery & Date: Procedure(s):  [facility]    Discharge Date:  11/13/20     MEDICATIONS:  Please refer to your After Visit Summary for your medication list.     DO NOT TAKE ANY MEDICATIONS THAT ARE NOT ON THIS LIST    INSTRUCTIONS:  1. NO SMOKING OR TOBACCO PRODUCTS  2. Do not follow the activity/exercise instructions in your discharge book given to you as an inpatient. You have no activity restrictions. 3. You may shower. Wash all incisions twice daily with mild soap and water. No lotions, ointments or powder. 4. Call the office immediately for any redness, swelling, or drainage from your incision. 5. Take your temperature daily and call for a temperature of 101 degrees or higher or for any symptoms that make you think you have and infection. 6. Weigh yourself each morning. Call if you gain more than 5 pounds in 48 hours. 7. Use the incentive spirometer 6-8 times a day-10 breaths each time. 8. Walk several hundred feet several times daily. DIET  Eat an American Heart Association diet. If you are having trouble with your appetite, eat what you can. Try eating small, frequent meals throughout the day. ACTIVITY  1.  You have no activity restrictions. You may resume your daily activities at home, based on your comfort level. You may also drive. FOLLOW UP  1. Your first follow up appointment will be on 11/16/20 at 11am by Telephone. Our office is located in [address] [place] [address]. Your second follow up appointment will be in four weeks, on 12/14/20 at 1:45 pm in the Clinic. You will need to have an ECHO prior to your appointment time. Our office will set that up for you. Please call our office at [phone] if you are unable to make either one of these appointments. 2. You will be receiving a call before your 3 day follow up appointment to begin cardiac rehab. They are located in the [address] [place] [address] on [facility]. Their phone number is [phone]. Please call if you have not been contacted 2-3 weeks after discharge from the hospital.  3. We will make an appointment for you with your cardiologist in 4-5 weeks. 4. Consult you primary care physician regarding your influenza &   pneumovax vaccines. 5.   Please bring all medications with you to your appointment.     Signature:___________________________________________________

## 2020-11-13 NOTE — PROGRESS NOTES
[facility] FLOOR Progress Note    Admit Date: 2020  POD: 1 Day Post-Op      Procedure:  Procedure(s):  TRANSCATHETER MITRAL VALVE REPAIR, JEFF BY DR Martin Whitlock    Subjective:   Pt seen with Dr. Miguel A Alvarez. Afebrile, room air. States she feels great. Up in the chair. Objective:     Visit Vitals  BP (!) 96/51 (BP 1 Location: Left arm, BP Patient Position: At rest)   Pulse 70   Temp 97.6 °F (36.4 °C)   Resp 18   Ht 5' 2\" (1.575 m)   Wt 228 lb (103.4 kg)   SpO2 96%   BMI 41.70 kg/m²     Temp (24hrs), Av.8 °F (36.6 °C), Min:97.3 °F (36.3 °C), Max:98.3 °F (36.8 °C)      Last 24hr Input/Output:    Intake/Output Summary (Last 24 hours) at 2020 1125  Last data filed at 2020 0800  Gross per 24 hour   Intake 2006.66 ml   Output 1450 ml   Net 556.66 ml        EKG/Rhythm: Paced in the 70s    Oxygen: Room air    CXR:   CXR Results  (Last 48 hours)               20 1400  XR CHEST PORT Final result    Impression:  Impression: No acute process. Narrative: Indication: Mitral valve repair       Comparison: 10/29/2020       Portable exam of the chest obtained at 1353 demonstrates normal heart size. The   patient is status post CABG procedure. Pacer leads are unchanged in position. There is no acute process in the lung fields. The osseous structures are   unremarkable. Admission Weight: Last Weight   Weight: 227 lb 15.3 oz (103.4 kg) Weight: 228 lb (103.4 kg)       EXAM:  General: No acute distress. Up in the chair. Lungs:   Clear to auscultation bilaterally. Right groin site:  No erythema, drainage or swelling. Dressing removed. Heart:  Regular rate and rhythm, S1, S2 normal, no murmur, click, rub or gallop. Abdomen:   Soft, non-tender. Bowel sounds normal. No masses,  No organomegaly. Extremities:  Trace edema. PPP   Neurologic:  Gross motor and sensory apparatus intact.      Activity: Up with assist      Lab Data Reviewed:   Recent Labs     20  0720 20  0115 20  1312   WBC  --  7.0  --  8.3   HGB  --  10.0*  --  10.8*   HCT  --  30.5*  --  32.9*   PLT  --  197  --  207   NA  --  139   < > 140   K  --  4.9   < > 4.3   BUN  --  29*   < > 30*   CREA  --  1.73*   < > 1.88*   GLU  --  159*   < > 141*   GLUCPOC 113*  --    < >  --    INR  --   --   --  1.1    < > = values in this interval not displayed. Assessment:     Active Problems:    Severe mitral regurgitation (2020)      Mitral regurgitation (2020)             Plan/Recommendations/Medical Decision Makin. Severe Mitral Regurgitation s/p mitralclip. Plan for Echo today. Discussed with Dr. Michael Roblero. On ASA, Plavix     2.  CAD s/p CAB and subsequent stents: On ASA, Plavix, Statin, Zetia. BB, Entresto on hold due to soft BP. Add back as needed.     3. HTN: BP a little soft this morning. Continue Lasix and add back other antihypertensives as needed.      4. HLD: On Statin, Zetia     5. DM: On Jardiance, Glipizide PTA. A1c of 7.6-improved from previous. SSI here. Will plan to restart home medications on discharge.      6. COPD: PRN Albuterol     7. Chronic systolic HF: LVEF 09-87%. Followed by [facility]. Couldn't tolerate AA due to hyperkalemia. On Lasix. BP is soft this morning. Restart BB, Entresto as able.     8. Hypothyroid: On Synthroid     9. CKD4: Creatinine 1.79 today-improved. Avoid nephrotoxic agents and hypotension.     10. Obesity: weight loss hindered by activity restriction. Discussed calorie restriction. Hopeful that she will be able to increase activity after Mitral Valve intervention     11. Hx total knee replacement: No current treatment.     12. VT s/p ICD in : [address] home BB when BP will tolerate.     13. WES:  Wears BiPAP at home. Dispo: PT/OT/Cardiac Rehab. Case Management for discharge planning. Likely discharge later today after echo has been reviewed. Addendum: Echo complete-discussed with Dr. Michael Roblero as well as BP from today.  Ok to go home with plan to call on Monday and adjust medications as needed. Will see her back in the clinic in 1 month with an echo.     Signed By: Nitza Rose NP

## 2020-11-14 LAB
ATRIAL RATE: 70 BPM
CALCULATED P AXIS, ECG09: 2 DEGREES
CALCULATED R AXIS, ECG10: -4 DEGREES
CALCULATED T AXIS, ECG11: 138 DEGREES
DIAGNOSIS, 93000: NORMAL
P-R INTERVAL, ECG05: 96 MS
Q-T INTERVAL, ECG07: 392 MS
QRS DURATION, ECG06: 84 MS
QTC CALCULATION (BEZET), ECG08: 423 MS
VENTRICULAR RATE, ECG03: 70 BPM

## 2020-11-16 ENCOUNTER — OFFICE VISIT (OUTPATIENT)
Dept: CARDIOLOGY CLINIC | Age: 70
End: 2020-11-16
Payer: MEDICARE

## 2020-11-16 DIAGNOSIS — I34.0 SEVERE MITRAL REGURGITATION: Primary | ICD-10-CM

## 2020-11-16 PROCEDURE — 1111F DSCHRG MED/CURRENT MED MERGE: CPT | Performed by: THORACIC SURGERY (CARDIOTHORACIC VASCULAR SURGERY)

## 2020-11-16 PROCEDURE — 3017F COLORECTAL CA SCREEN DOC REV: CPT | Performed by: THORACIC SURGERY (CARDIOTHORACIC VASCULAR SURGERY)

## 2020-11-16 PROCEDURE — G8536 NO DOC ELDER MAL SCRN: HCPCS | Performed by: THORACIC SURGERY (CARDIOTHORACIC VASCULAR SURGERY)

## 2020-11-16 PROCEDURE — G8427 DOCREV CUR MEDS BY ELIG CLIN: HCPCS | Performed by: THORACIC SURGERY (CARDIOTHORACIC VASCULAR SURGERY)

## 2020-11-16 PROCEDURE — 99441 PR PHYS/QHP TELEPHONE EVALUATION 5-10 MIN: CPT | Performed by: THORACIC SURGERY (CARDIOTHORACIC VASCULAR SURGERY)

## 2020-11-16 PROCEDURE — 1100F PTFALLS ASSESS-DOCD GE2>/YR: CPT | Performed by: THORACIC SURGERY (CARDIOTHORACIC VASCULAR SURGERY)

## 2020-11-16 PROCEDURE — G8399 PT W/DXA RESULTS DOCUMENT: HCPCS | Performed by: THORACIC SURGERY (CARDIOTHORACIC VASCULAR SURGERY)

## 2020-11-16 PROCEDURE — G8417 CALC BMI ABV UP PARAM F/U: HCPCS | Performed by: THORACIC SURGERY (CARDIOTHORACIC VASCULAR SURGERY)

## 2020-11-16 PROCEDURE — G8756 NO BP MEASURE DOC: HCPCS | Performed by: THORACIC SURGERY (CARDIOTHORACIC VASCULAR SURGERY)

## 2020-11-16 PROCEDURE — G9899 SCRN MAM PERF RSLTS DOC: HCPCS | Performed by: THORACIC SURGERY (CARDIOTHORACIC VASCULAR SURGERY)

## 2020-11-16 PROCEDURE — 3288F FALL RISK ASSESSMENT DOCD: CPT | Performed by: THORACIC SURGERY (CARDIOTHORACIC VASCULAR SURGERY)

## 2020-11-16 PROCEDURE — G8432 DEP SCR NOT DOC, RNG: HCPCS | Performed by: THORACIC SURGERY (CARDIOTHORACIC VASCULAR SURGERY)

## 2020-11-16 PROCEDURE — 1090F PRES/ABSN URINE INCON ASSESS: CPT | Performed by: THORACIC SURGERY (CARDIOTHORACIC VASCULAR SURGERY)

## 2020-11-16 NOTE — PROGRESS NOTES
Patient: Sukumar Brandt   Age: 65 y.o. Patient Care Team:  Burdette Olszewski., NP as PCP - General (Nurse Practitioner)  Burdette Olszewski., NP as PCP - [facility]  Usman Barrett MD (Cardiology)  Ling Leo MD (Gastroenterology)  Cezar Bird MD (Cardiology)    Diagnosis: The encounter diagnosis was Severe mitral regurgitation. Problem List:   Patient Active Problem List   Diagnosis Code    Hx of CABG Z95.1    ICD (implantable cardioverter-defibrillator) in place Z95.810    H/O laparoscopic adjustable gastric banding Z98.84    Obesity, morbid ([facility] [address] E66.01    NYHA class 3 heart failure with reduced ejection fraction (HCC) I50.20    Peripheral vascular disease (HCC) I73.9    Severe mitral regurgitation I34.0    Ischemic cardiomyopathy I25.5    Chronic heart failure with reduced ejection fraction and diastolic dysfunction ([address] I50.42    Sleep apnea treated with nocturnal BiPAP G47.30    Mitral regurgitation I34.0        This service was provided thru telehealth, between Zunilda Oliveira NP  at Cardiac Surgery Specialist's office and Sukumar Brandt at their home. Date of Surgery: 11/12/2020     Surgery: transcatheter mitral valve repair using on NTW MitraClip with Anastasia Coronado and Ramy    HPI:  Sukumar Brandt was available by telephone for her initial postoperative visit. She denies fever, chills, chest pain, or worsening shortness of breath. She notes that her skin was irritated near her groin puncture site but that the actual puncture site is healing well. Several of her BP medications we held on discharge due to borderline hypotension. She has been checking her BP since discharge and has noted that her BP has been a little bit higher (130-140/70.)  She has been walking daily and is eager to return to her Cardiac Rehab program. She and her sister live together and her sister has been helping her as needed.  She did have a death in her family which has added some extra stress but she and her boys are working through this. Current Medications:   Current Outpatient Medications   Medication Sig Dispense Refill    furosemide (LASIX) 20 mg tablet Take 1 Tab by mouth daily. 60 Tab 2    metoprolol succinate (TOPROL-XL) 25 mg XL tablet Take 0.5 Tabs by mouth daily. Hold for systolic BP less than 373 60 Tab 2    empagliflozin (JARDIANCE) 25 mg tablet Take 1 Tab by mouth daily. For diabetes 90 Tab 0    gabapentin (NEURONTIN) 100 mg capsule Take 2 Caps by mouth three (3) times daily. Max Daily Amount: 600 mg. 180 Cap 3    glipiZIDE (GLUCOTROL) 10 mg tablet Take 1 tablet by mouth twice daily with food 180 Tab 0    clotrimazole-betamethasone (LOTRISONE) topical cream Apply  to affected area two (2) times a day. 30 g 0    ezetimibe (ZETIA) 10 mg tablet Take 1 tablet by mouth once daily 30 Tab 0    clopidogreL (PLAVIX) 75 mg tab Take 1 Tab by mouth daily. 90 Tab 0    Blood-Glucose Meter monitoring kit Use as directed. Dx: E11.65 check sugar twice daily 1 Kit 0    levothyroxine (SYNTHROID) 100 mcg tablet Take 1 Tab by mouth Daily (before breakfast). 90 Tab 1    lancets misc Use as directed. Dx:check sugar once daily. E11.65 1 Each 11    glucose blood VI test strips (ASCENSIA AUTODISC VI, ONE TOUCH ULTRA TEST VI) strip E11.65 check sugar once daily 100 Strip 1    allopurinoL (ZYLOPRIM) 100 mg tablet Take 1 tablet by mouth once daily 90 Tab 2    atorvastatin (LIPITOR) 80 mg tablet TAKE 1 TABLET BY MOUTH AT BEDTIME 90 Tab 3    cholecalciferol (VITAMIN D3) (1000 Units /25 mcg) tablet Take 2 Tabs by mouth daily. 60 Tab 11    albuterol (PROVENTIL HFA, VENTOLIN HFA, PROAIR HFA) 90 mcg/actuation inhaler Take 2 Puffs by inhalation every four (4) hours as needed.  buPROPion SR (WELLBUTRIN SR) 150 mg SR tablet Take 1 Tab by mouth daily. 90 Tab 0    acetaminophen (TYLENOL ARTHRITIS PAIN) 650 mg TbER Take 1,300 mg by mouth two (2) times a day.       aspirin 81 mg chewable tablet Take 81 mg by mouth daily. Vitals: There were no vitals taken for this visit. Allergies: is allergic to crestor [rosuvastatin]; lisinopril; and nsaids (non-steroidal anti-inflammatory drug). Physical Exam:  No physical exam completed due to telephone encounter. Assessment/Plan:     1. Severe Mitral Regurgitation s/p mitralclip.  On ASA, Plavix. Echo completed and discussed with Dr. Denice Sutherland. Will see her back in the clinic in 1 month with an echo-ordered.     2.  CAD s/p CAB and subsequent stents: On ASA, Plavix, Statin, Zetia, BB. Add back Entresto.     3. HTN: Continue Lasix, BB. Add back Entresto. Continue to monitor BP daily     4. HLD: On Statin, Zetia     5. DM: On Jardiance, Glipizide. A1c of 7.6-improved from previous.      6. COPD: PRN Albuterol     7. Chronic systolic HF: LVEF 07-93%. Followed by [facility]. Couldn't tolerate AA due to hyperkalemia. On Lasix, BB. Restart Entresto. She sees [facility] on Friday and they can further titrate her medications based on BP after adding Entresto     8. Hypothyroid: On Synthroid     9. CKD4: Creatinine 1.79 on discharge.  Avoid nephrotoxic agents and hypotension.     10. Obesity: weight loss hindered by activity restriction. Discussed calorie restriction. Hopeful that she will be able to increase activity after Mitral Valve intervention     11. Hx total knee replacement: No current treatment.     12. VT s/p ICD in 2007: BB.      13. WES:  Wears BiPAP at home. Pt is ready to start cardiac rehab. Rehab - May begin  Walking: Increase daily as able  Glucometer: Yes  Antibiotic card for valves: Yes    Time spent:  10 minutes spent on the telephone with the patient. This time is not inclusive on time spent on note preparation, diagnostic testing review, or coordination of care.

## 2020-11-18 ENCOUNTER — HOSPITAL ENCOUNTER (OUTPATIENT)
Dept: CARDIAC REHAB | Age: 70
Discharge: HOME | End: 2020-11-18
Payer: MEDICARE

## 2020-11-18 VITALS — WEIGHT: 227.6 LBS | BODY MASS INDEX: 41.63 KG/M2

## 2020-11-18 PROCEDURE — 93798 PHYS/QHP OP CAR RHAB W/ECG: CPT

## 2020-11-19 ENCOUNTER — APPOINTMENT (OUTPATIENT)
Dept: CARDIAC REHAB | Age: 70
End: 2020-11-19
Payer: MEDICARE

## 2020-11-19 ENCOUNTER — TELEPHONE (OUTPATIENT)
Dept: CARDIAC REHAB | Age: 70
End: 2020-11-19

## 2020-11-19 NOTE — TELEPHONE ENCOUNTER
11/19/2020 Cardiac Wellness: Ms. Novoa Adjutant called to cancel today's appointment d/t terrible knee pain this morning, maybe from such cold weather. Will return for next appointment.  José Hodges

## 2020-11-20 ENCOUNTER — OFFICE VISIT (OUTPATIENT)
Dept: CARDIOLOGY CLINIC | Age: 70
End: 2020-11-20
Payer: MEDICARE

## 2020-11-20 VITALS
RESPIRATION RATE: 20 BRPM | OXYGEN SATURATION: 100 % | HEIGHT: 62 IN | TEMPERATURE: 97.7 F | HEART RATE: 73 BPM | WEIGHT: 224.6 LBS | DIASTOLIC BLOOD PRESSURE: 68 MMHG | SYSTOLIC BLOOD PRESSURE: 122 MMHG | BODY MASS INDEX: 41.33 KG/M2

## 2020-11-20 DIAGNOSIS — I25.5 ISCHEMIC CARDIOMYOPATHY: ICD-10-CM

## 2020-11-20 DIAGNOSIS — I34.0 MITRAL VALVE INSUFFICIENCY, UNSPECIFIED ETIOLOGY: ICD-10-CM

## 2020-11-20 DIAGNOSIS — I50.42 CHRONIC HEART FAILURE WITH REDUCED EJECTION FRACTION AND DIASTOLIC DYSFUNCTION (HCC): Primary | ICD-10-CM

## 2020-11-20 PROCEDURE — 1111F DSCHRG MED/CURRENT MED MERGE: CPT | Performed by: NURSE PRACTITIONER

## 2020-11-20 PROCEDURE — 1100F PTFALLS ASSESS-DOCD GE2>/YR: CPT | Performed by: NURSE PRACTITIONER

## 2020-11-20 PROCEDURE — 93280 PM DEVICE PROGR EVAL DUAL: CPT | Performed by: NURSE PRACTITIONER

## 2020-11-20 PROCEDURE — G8536 NO DOC ELDER MAL SCRN: HCPCS | Performed by: NURSE PRACTITIONER

## 2020-11-20 PROCEDURE — 3288F FALL RISK ASSESSMENT DOCD: CPT | Performed by: NURSE PRACTITIONER

## 2020-11-20 PROCEDURE — 99214 OFFICE O/P EST MOD 30 MIN: CPT | Performed by: NURSE PRACTITIONER

## 2020-11-20 PROCEDURE — 1090F PRES/ABSN URINE INCON ASSESS: CPT | Performed by: NURSE PRACTITIONER

## 2020-11-20 PROCEDURE — G9899 SCRN MAM PERF RSLTS DOC: HCPCS | Performed by: NURSE PRACTITIONER

## 2020-11-20 PROCEDURE — G8432 DEP SCR NOT DOC, RNG: HCPCS | Performed by: NURSE PRACTITIONER

## 2020-11-20 PROCEDURE — G8427 DOCREV CUR MEDS BY ELIG CLIN: HCPCS | Performed by: NURSE PRACTITIONER

## 2020-11-20 PROCEDURE — G8399 PT W/DXA RESULTS DOCUMENT: HCPCS | Performed by: NURSE PRACTITIONER

## 2020-11-20 PROCEDURE — G8752 SYS BP LESS 140: HCPCS | Performed by: NURSE PRACTITIONER

## 2020-11-20 PROCEDURE — G8754 DIAS BP LESS 90: HCPCS | Performed by: NURSE PRACTITIONER

## 2020-11-20 PROCEDURE — G8417 CALC BMI ABV UP PARAM F/U: HCPCS | Performed by: NURSE PRACTITIONER

## 2020-11-20 PROCEDURE — 3017F COLORECTAL CA SCREEN DOC REV: CPT | Performed by: NURSE PRACTITIONER

## 2020-11-20 RX ORDER — OFLOXACIN 3 MG/ML
SOLUTION/ DROPS OPHTHALMIC
COMMUNITY
Start: 2020-10-22 | End: 2021-07-15 | Stop reason: ALTCHOICE

## 2020-11-20 RX ORDER — SACUBITRIL AND VALSARTAN 24; 26 MG/1; MG/1
1 TABLET, FILM COATED ORAL 2 TIMES DAILY
Qty: 180 TAB | Refills: 2 | Status: SHIPPED | OUTPATIENT
Start: 2020-11-20 | End: 2021-06-03

## 2020-11-20 RX ORDER — PREDNISOLONE ACETATE 10 MG/ML
SUSPENSION/ DROPS OPHTHALMIC
COMMUNITY
Start: 2020-10-22 | End: 2021-08-02

## 2020-11-20 RX ORDER — KETOROLAC TROMETHAMINE 5 MG/ML
SOLUTION OPHTHALMIC
COMMUNITY
Start: 2020-10-22 | End: 2021-07-15 | Stop reason: ALTCHOICE

## 2020-11-20 NOTE — PATIENT INSTRUCTIONS
Medication changes:    Resume [id number]/26mg 1 tab TWICE A DAY    Please take this to your pharmacy to notify them of the change in medications. Testing Ordered:    Blood work in 1 month- please do before your visit     Other Recommendations:     Please continue to keep your records- we will call you after the Thanksgiving Holiday to check on weights and BP. Ensure your drinking an adequate amount of water with a goal of 6-8 eight ounce glasses (1.5-2 liters) of fluid daily. Your urine should be clear and light yellow straw colored. If your blood pressure begins to consistently run below 90/60 and/or you begin to experience dizziness or lightheadedness, please contact the [facility] at [phone]. Follow up in 1 month with [facility]      Please monitor your blood pressures daily prior to medications and 2 hours after taking medications. Bring a written record of your blood pressures to your next appointment. Please monitor your weights daily upon waking and after using the bathroom. Keep a written records of your weights and bring to your next appointment. If you have a weight gain of 3 or more pounds overnight OR 5 or more pounds in one week please contact our office. Thank you for allowing us the privilege of being a part of your healthcare team! Please do not hesitate to contact our office at [phone] with any questions or concerns.

## 2020-11-23 ENCOUNTER — HOSPITAL ENCOUNTER (OUTPATIENT)
Dept: CARDIAC REHAB | Age: 70
Discharge: HOME | End: 2020-11-23
Payer: MEDICARE

## 2020-11-23 VITALS — WEIGHT: 226.6 LBS | BODY MASS INDEX: 41.45 KG/M2

## 2020-11-23 PROCEDURE — 93798 PHYS/QHP OP CAR RHAB W/ECG: CPT

## 2020-11-24 ENCOUNTER — HOSPITAL ENCOUNTER (OUTPATIENT)
Dept: CARDIAC REHAB | Age: 70
Discharge: HOME | End: 2020-11-24
Payer: MEDICARE

## 2020-11-24 VITALS — WEIGHT: 227.4 LBS | BODY MASS INDEX: 41.59 KG/M2

## 2020-11-24 PROCEDURE — 93798 PHYS/QHP OP CAR RHAB W/ECG: CPT

## 2020-11-25 RX ORDER — CLOPIDOGREL BISULFATE 75 MG/1
TABLET ORAL
Qty: 90 TAB | Refills: 0 | Status: SHIPPED | OUTPATIENT
Start: 2020-11-25 | End: 2021-03-03 | Stop reason: SDUPTHER

## 2020-11-30 ENCOUNTER — HOSPITAL ENCOUNTER (OUTPATIENT)
Dept: CARDIAC REHAB | Age: 70
Discharge: HOME | End: 2020-11-30
Payer: MEDICARE

## 2020-11-30 ENCOUNTER — TELEPHONE (OUTPATIENT)
Dept: CARDIOLOGY CLINIC | Age: 70
End: 2020-11-30

## 2020-11-30 VITALS — WEIGHT: 227.4 LBS | BODY MASS INDEX: 41.59 KG/M2

## 2020-11-30 DIAGNOSIS — I50.42 CHRONIC HEART FAILURE WITH REDUCED EJECTION FRACTION AND DIASTOLIC DYSFUNCTION (HCC): Primary | ICD-10-CM

## 2020-11-30 DIAGNOSIS — I10 HYPERTENSION, UNSPECIFIED TYPE: ICD-10-CM

## 2020-11-30 DIAGNOSIS — I25.5 ISCHEMIC CARDIOMYOPATHY: ICD-10-CM

## 2020-11-30 PROCEDURE — 93798 PHYS/QHP OP CAR RHAB W/ECG: CPT

## 2020-11-30 RX ORDER — ISOSORBIDE MONONITRATE 30 MG/1
30 TABLET, EXTENDED RELEASE ORAL
Qty: 30 TAB | Refills: 2 | Status: SHIPPED | OUTPATIENT
Start: 2020-11-30 | End: 2021-08-03

## 2020-11-30 RX ORDER — HYDRALAZINE HYDROCHLORIDE 25 MG/1
25 TABLET, FILM COATED ORAL 3 TIMES DAILY
Qty: 90 TAB | Refills: 2 | Status: SHIPPED | OUTPATIENT
Start: 2020-11-30 | End: 2021-05-07 | Stop reason: SDUPTHER

## 2020-11-30 NOTE — TELEPHONE ENCOUNTER
Patient return call. Inquired about weights and BP. Patient reported the following readings:     11/28/20: Wt- 224.8lbs; (Prior to meds) BP- 151/97 HR 72 (after meds) BP- 156/90 HR 73    11/29/20: Wt- 224.4lbs; (Prior to meds) BP- 129/81 HR 84 (after meds) BP- 139/70 HR 77    11/30/20: Wt- 222.4 lbs; (Prior to meds) BP- 149/79 HR 84 (after meds) BP- 148/85 HR 76    Patient noted she is \"a little more shortness of breath,\" but \"I think I'm starting to have less swelling. \" Patient denied chest pain, headache, dizziness or lightheadedness. Patient confirmed all medications as ordered. Inquired about patient diet. She stated she has been closely monitoring her sodium intake and preparing meals at home with low sodium and low sugar ingredients. She stated she has been avoiding eating out as well. Patient then stated she is having upcoming cataract surgery on 12/16/20 and was told by her surgeon it would be done under conscious sedation. She stated she will need cardiac clearance prior to surgery and is inquiring if appointment is needed. She also stated she is seeing Dr. Javid Yadav on 12/14/20 and will discuss with him as well. Informed patient will review with provider and contact her with further recommendations. She verbalized understanding and had no further questions. Michelle Stoner RN.

## 2020-11-30 NOTE — TELEPHONE ENCOUNTER
----- Message -----  From: Bhavani Khan NP  Sent: 11/20/2020  10:22 AM EST  To: Tom Peters RN, Haven Carvajal RN, #    Please call the Monday after Thanksgiving to check on weights and blood pressures, if they remain elevated we will start her Hydralazine/imdur back. Thanks!

## 2020-11-30 NOTE — TELEPHONE ENCOUNTER
Lo Mckeon NP  You 7 minutes ago (3:56 PM)       Please ask her to resume her hydralazine/Imdur and continue to check blood pressures. Start Hydralazine at half her previous dose and imdur is ok at 30mg daily. Message text      Previous dose of hydralazine 50mg TID. Updated prescription for hydralazine 25mg TID and imdur 30mg daily sent. Per Garret Gallegos NP patient to hold off on cataract surgery until after the beginning of the year and repeat echo is done. Contacted patient. Name and  verified. Informed her of recommendations. Patient stated /78 HR 71 and weight 222.4lbs today. Patient educated to continue monitoring daily weights, BP, and HR and contact the [facility] if BP less than 90/60 or if she develops dizziness/lightheadedness. She verbalized understanding and had no further questions. Daniel Nix RN.

## 2020-11-30 NOTE — TELEPHONE ENCOUNTER
Message received from patient returning call. Attempted to contact patient. Message left. Will await return call.

## 2020-12-02 ENCOUNTER — HOSPITAL ENCOUNTER (OUTPATIENT)
Dept: CARDIAC REHAB | Age: 70
Discharge: HOME | End: 2020-12-02
Payer: MEDICARE

## 2020-12-02 VITALS — BODY MASS INDEX: 41.41 KG/M2 | WEIGHT: 226.4 LBS

## 2020-12-02 PROCEDURE — 93798 PHYS/QHP OP CAR RHAB W/ECG: CPT

## 2020-12-04 ENCOUNTER — TRANSCRIBE ORDER (OUTPATIENT)
Dept: CARDIAC REHAB | Age: 70
End: 2020-12-04

## 2020-12-04 ENCOUNTER — HOSPITAL ENCOUNTER (OUTPATIENT)
Dept: CARDIAC REHAB | Age: 70
Discharge: HOME | End: 2020-12-04
Payer: MEDICARE

## 2020-12-04 VITALS — WEIGHT: 226.6 LBS | BODY MASS INDEX: 41.45 KG/M2

## 2020-12-04 DIAGNOSIS — Z95.4 S/P MITRAL VALVE ALLOGRAFT: Primary | ICD-10-CM

## 2020-12-04 PROCEDURE — 93798 PHYS/QHP OP CAR RHAB W/ECG: CPT

## 2020-12-07 ENCOUNTER — APPOINTMENT (OUTPATIENT)
Dept: CARDIAC REHAB | Age: 70
End: 2020-12-07
Payer: MEDICARE

## 2020-12-08 ENCOUNTER — HOSPITAL ENCOUNTER (OUTPATIENT)
Dept: CARDIAC REHAB | Age: 70
Discharge: HOME | End: 2020-12-08
Payer: MEDICARE

## 2020-12-08 VITALS — BODY MASS INDEX: 41.45 KG/M2 | WEIGHT: 226.6 LBS

## 2020-12-08 PROCEDURE — 93798 PHYS/QHP OP CAR RHAB W/ECG: CPT

## 2020-12-11 ENCOUNTER — HOSPITAL ENCOUNTER (OUTPATIENT)
Dept: CARDIAC REHAB | Age: 70
Discharge: HOME | End: 2020-12-11
Payer: MEDICARE

## 2020-12-11 ENCOUNTER — TELEPHONE (OUTPATIENT)
Dept: CARDIOLOGY CLINIC | Age: 70
End: 2020-12-11

## 2020-12-11 VITALS — BODY MASS INDEX: 41.34 KG/M2 | WEIGHT: 226 LBS

## 2020-12-11 DIAGNOSIS — E55.9 VITAMIN D DEFICIENCY: ICD-10-CM

## 2020-12-11 DIAGNOSIS — I10 HYPERTENSION, UNSPECIFIED TYPE: ICD-10-CM

## 2020-12-11 DIAGNOSIS — I50.42 CHRONIC HEART FAILURE WITH REDUCED EJECTION FRACTION AND DIASTOLIC DYSFUNCTION (HCC): ICD-10-CM

## 2020-12-11 DIAGNOSIS — R06.02 SHORTNESS OF BREATH: Primary | ICD-10-CM

## 2020-12-11 PROCEDURE — 93798 PHYS/QHP OP CAR RHAB W/ECG: CPT

## 2020-12-11 NOTE — TELEPHONE ENCOUNTER
Telephone Call RE:  Lab Reminder      Outcome:     [x] Patient verbalizes understanding    [] Unable to reach   [] Left message              []       [facility]

## 2020-12-13 NOTE — PROGRESS NOTES
Patient: Emery Carter   Age: 65 y.o. Patient Care Team:  Akshat Nichols NP as PCP - General (Nurse Practitioner)  Akshat Nichols NP as PCP - [facility]  Gretchen North MD (Cardiology)  Nissa Engel MD (Gastroenterology)  Rito Mckinley MD (Cardiology)    PCP: Akshat Nichols NP    Cardiologist: Dr. Zcah Krishna    Diagnosis/Reason for Consultation: The encounter diagnosis was Nonrheumatic mitral valve regurgitation. Problem List:   Patient Active Problem List   Diagnosis Code    Hx of CABG Z95.1    ICD (implantable cardioverter-defibrillator) in place Z95.810    H/O laparoscopic adjustable gastric banding Z98.84    Obesity, morbid ([address] E66.01    NYHA class 3 heart failure with reduced ejection fraction (HCC) I50.20    Peripheral vascular disease (HCC) I73.9    Severe mitral regurgitation I34.0    Ischemic cardiomyopathy I25.5    Chronic heart failure with reduced ejection fraction and diastolic dysfunction (HCC) I50.42    Sleep apnea treated with nocturnal BiPAP G47.30    Mitral regurgitation I34.0         HPI: 65 y.o. AA female  S/p TMVr with a Michaela Clip via R Femoral Artery approach on 11/12/20 for severe and symptomatic Mitral Regurgitation. There were no intra-op or post-op complications. She was discharged to Home on 11/13/20. She is here today for their One Month Post Op Appointment. She has continued with Cardiac rehab and is doing well there. However, in the new year she will need to pay a co-pay of $25 per rehab sessions and she isn't sure if she can afford this financially. She will talk to the people at Cardiac Rehab tomorrow when she goes to her appointment. She denies fever, chills, or issues with her incisions healing. She feels that she has improved since the surgery. She is still short of breath with activity but, now, she can walk farther or longer before she develops the shortness of breath.   She is able to walk on the treadmill for 15 minutes at 1.2 mph. She and her sister are looking for a new place to live which will be easier to get around-has fewer stairs, etc. This has been stressful. She also needs to have cataract surgery in the new year. PMHx:    Past Medical History:   Diagnosis Date    Adverse effect of anesthesia     hard to wake up/uses BIPAP/\"try to avoid general if possible\"/intubated in past prior to going to sleep and it caused pt to be incontinent    Arthritis     Asthma     CAD (coronary artery disease)     Chronic kidney disease     elevataed creatinine    Chronic obstructive pulmonary disease (HCC)     Chronic pain     arthritis    Coagulation disorder (HCC)     on plavix    Congestive heart failure (HCC)     Diabetes ([address]     Hypertension     Morbid obesity ([address]     Sleep apnea 1996    BIPAP with 2 liters oxygen    Thromboembolus ([address]     after heart surgery - left leg    Thyroid disease            NYHA Classification: IB   Class I (Mild): No limitation of physical activity. Ordinary physical activity does not cause undue fatigue, palpitation, or dyspnea. Class II (Mild): Slight limitation of physical activity. Comfortable at rest, but ordinary physical activity results in fatigue, palpitation, or dyspnea. Class III (Moderate): Marked limitation of physical activity. Comfortable at rest, but less than ordinary activity causes fatigue, palpitation, or dyspnea   Class IV (Severe): Unable to carry out any physical activity without discomfort. Symptoms  of cardiac insufficiency at rest.  If any physical activity is undertaken, discomfort is increased.     Angina Classification: 0   Class 0: No symptoms   Class 1: Angina with strenuous exercise   Class 2: Angina with moderate exercise   Class 3: Angina with mild exertion   Walking 1-2 level blocks at normal pace; Climbing 1 flight of stairs at normal pace  Class 4: Angina at any level of physical exertion       Past Medical History: Diagnosis Date    Adverse effect of anesthesia     hard to wake up/uses BIPAP/\"try to avoid general if possible\"/intubated in past prior to going to sleep and it caused pt to be incontinent    Arthritis     Asthma     CAD (coronary artery disease)     Chronic kidney disease     elevataed creatinine    Chronic obstructive pulmonary disease (HCC)     Chronic pain     arthritis    Coagulation disorder (HCC)     on plavix    Congestive heart failure (HCC)     Diabetes ([address]     Hypertension     Morbid obesity ([address]     Sleep apnea     BIPAP with 2 liters oxygen    Thromboembolus ([address]     after heart surgery - left leg    Thyroid disease        Last Dental Visit:  Many years ago   Any dental pain/concerns: none    Past Surgical History:   Procedure Laterality Date    CARDIAC SURG PROCEDURE UNLIST  2018    cardiac cath - Left    COLONOSCOPY N/A 2020    COLONOSCOPY   :- performed by Yury Carter MD at [address] HX ARTHROPLASTY  2105    knee - right    HX  SECTION      x3    HX CORONARY ARTERY BYPASS GRAFT  1997    3 vessels    HX CORONARY STENT PLACEMENT  2016    HX HERNIA REPAIR  1988    HX IMPLANTABLE CARDIOVERTER DEFIBRILLATOR      HX KNEE REPLACEMENT Right 2015    once    LAP GASTRIC BYPASS/KERLINE-EN-Y  2011    lap band per pt and not a gastric bypass      Social History     Tobacco Use    Smoking status: Former Smoker     Types: Cigarettes    Smokeless tobacco: Never Used    Tobacco comment: quit /started again (smoked 3 yrs) off and on/none since    Substance Use Topics    Alcohol use: Not Currently      Family History   Problem Relation Age of Onset    Hypertension Mother     Dementia Mother     Coronary Artery Disease Father 58    Sudden Death Father 58    Diabetes Son     Diabetes Brother      Prior to Admission medications    Medication Sig Start Date End Date Taking?  Authorizing Provider   Blood-Glucose Meter monitoring kit Check blood sugar daily. May substitute for insurance preferred meter 12/11/20  Yes Guido Amezquita NP   Blood-Glucose Meter monitoring kit Use as directed. Dx: E11.65 check sugar twice daily 8/25/20  Yes Luis Felipe Sorenson NP   allopurinoL (ZYLOPRIM) 100 mg tablet Take 1 tablet by mouth once daily 5/28/20  Yes Lo Mckeon NP   atorvastatin (LIPITOR) 80 mg tablet TAKE 1 TABLET BY MOUTH AT BEDTIME 4/10/20  Yes Svetlana Bright NP   glucose blood VI test strips (blood glucose test) strip Check blood sugar 2 times daily: E11.9 may substitute for insurance preferred strips. 12/11/20   Leandro Garcia NP   lancets misc Check blood sugar 2 times daily. May substitute for insurance preferred lancets. 12/11/20   Leandro Garcia NP   glucose blood VI test strips (ASCENSIA AUTODISC VI, ONE TOUCH ULTRA TEST VI) strip E11.65 check sugar once daily 12/7/20   Demarcus Pozo MD   hydrALAZINE (APRESOLINE) 25 mg tablet Take 1 Tab by mouth three (3) times daily. 11/30/20   [facility]   isosorbide mononitrate ER (IMDUR) 30 mg tablet Take 1 Tab by mouth every morning. 11/30/20   Tobin MCNEILL NP   clopidogreL (PLAVIX) 75 mg tab Take 1 tablet by mouth once daily 11/25/20   Lina HOOD NP   ketorolac (ACULAR) 0.5 % ophthalmic solution INSTILL 1 DROP 4 TIMES DAILY INTO EYE HAVING SURGERY START 2 DAYS PRIOR TO SURGERY 10/22/20   Provider, Historical   ofloxacin (FLOXIN) 0.3 % ophthalmic solution INSTILL 1 DROP 4 TIMES DAILY INTO SURGERY EYE START 2 DAYS PRIOR TO SURGERY 10/22/20   Provider, Historical   prednisoLONE acetate (PRED FORTE) 1 % ophthalmic suspension INSTILL 1 DROP 4 TIMES DAILY INTO SUREGRY EYE TAPER AS DIRECTED 10/22/20   Provider, Historical   sacubitriL-valsartan (Entresto) 24-26 mg tablet Take 1 Tab by mouth two (2) times a day. 11/20/20   [facility] V[facility]   furosemide (LASIX) 20 mg tablet Take 1 Tab by mouth daily.  11/13/20   Sola Redmond NP metoprolol succinate (TOPROL-XL) 25 mg XL tablet Take 0.5 Tabs by mouth daily. Hold for systolic BP less than 376 11/13/20   Sera Pa NP   empagliflozin (JARDIANCE) 25 mg tablet Take 1 Tab by mouth daily. For diabetes 10/27/20   Darylene Mini., NP   gabapentin (NEURONTIN) 100 mg capsule Take 2 Caps by mouth three (3) times daily. Max Daily Amount: 600 mg. 10/13/20   Dennis HOOD NP   glipiZIDE (GLUCOTROL) 10 mg tablet Take 1 tablet by mouth twice daily with food 10/1/20   Darylene Mini., NP   clotrimazole-betamethasone (LOTRISONE) topical cream Apply  to affected area two (2) times a day. 9/23/20   Darylene Mini., NP   ezetimibe (ZETIA) 10 mg tablet Take 1 tablet by mouth once daily 9/2/20   Jose Raul Blankenship NP   levothyroxine (SYNTHROID) 100 mcg tablet Take 1 Tab by mouth Daily (before breakfast). 8/21/20   Darylene Mini., NP   lancets misc Use as directed. Dx:check sugar once daily. E11.65 8/19/20   Darylene Mini., NP   cholecalciferol (VITAMIN D3) (1000 Units /25 mcg) tablet Take 2 Tabs by mouth daily. 4/10/20   Jose Raul Blankenship NP   albuterol (PROVENTIL HFA, VENTOLIN HFA, PROAIR HFA) 90 mcg/actuation inhaler Take 2 Puffs by inhalation every four (4) hours as needed. Provider, Historical   buPROPion SR (WELLBUTRIN SR) 150 mg SR tablet Take 1 Tab by mouth daily. 2/6/20   Darylene Mini., NP   acetaminophen (TYLENOL ARTHRITIS PAIN) 650 mg TbER Take 1,300 mg by mouth two (2) times a day. Provider, Historical   aspirin 81 mg chewable tablet Take 81 mg by mouth daily.     Provider, Historical       Allergies   Allergen Reactions    Crestor [Rosuvastatin] Other (comments)     Causes muscle cramps    Lisinopril Cough    Nsaids (Non-Steroidal Anti-Inflammatory Drug) Other (comments)     Liver and Kidney       Current Medications:   Current Outpatient Medications   Medication Sig Dispense Refill    Blood-Glucose Meter monitoring kit Check blood sugar daily. May substitute for insurance preferred meter 1 Kit 0    Blood-Glucose Meter monitoring kit Use as directed. Dx: E11.65 check sugar twice daily 1 Kit 0    allopurinoL (ZYLOPRIM) 100 mg tablet Take 1 tablet by mouth once daily 90 Tab 2    atorvastatin (LIPITOR) 80 mg tablet TAKE 1 TABLET BY MOUTH AT BEDTIME 90 Tab 3    glucose blood VI test strips (blood glucose test) strip Check blood sugar 2 times daily: E11.9 may substitute for insurance preferred strips. 200 Strip 3    lancets misc Check blood sugar 2 times daily. May substitute for insurance preferred lancets. 200 Each 3    glucose blood VI test strips (ASCENSIA AUTODISC VI, ONE TOUCH ULTRA TEST VI) strip E11.65 check sugar once daily 100 Strip 0    hydrALAZINE (APRESOLINE) 25 mg tablet Take 1 Tab by mouth three (3) times daily. 90 Tab 2    isosorbide mononitrate ER (IMDUR) 30 mg tablet Take 1 Tab by mouth every morning. 30 Tab 2    clopidogreL (PLAVIX) 75 mg tab Take 1 tablet by mouth once daily 90 Tab 0    ketorolac (ACULAR) 0.5 % ophthalmic solution INSTILL 1 DROP 4 TIMES DAILY INTO EYE HAVING SURGERY START 2 DAYS PRIOR TO SURGERY      ofloxacin (FLOXIN) 0.3 % ophthalmic solution INSTILL 1 DROP 4 TIMES DAILY INTO SURGERY EYE START 2 DAYS PRIOR TO SURGERY      prednisoLONE acetate (PRED FORTE) 1 % ophthalmic suspension INSTILL 1 DROP 4 TIMES DAILY INTO SUREGRY EYE TAPER AS DIRECTED      sacubitriL-valsartan (Entresto) 24-26 mg tablet Take 1 Tab by mouth two (2) times a day. 180 Tab 2    furosemide (LASIX) 20 mg tablet Take 1 Tab by mouth daily. 60 Tab 2    metoprolol succinate (TOPROL-XL) 25 mg XL tablet Take 0.5 Tabs by mouth daily. Hold for systolic BP less than 394 60 Tab 2    empagliflozin (JARDIANCE) 25 mg tablet Take 1 Tab by mouth daily. For diabetes 90 Tab 0    gabapentin (NEURONTIN) 100 mg capsule Take 2 Caps by mouth three (3) times daily.  Max Daily Amount: 600 mg. 180 Cap 3    glipiZIDE (GLUCOTROL) 10 mg tablet Take 1 tablet by mouth twice daily with food 180 Tab 0    clotrimazole-betamethasone (LOTRISONE) topical cream Apply  to affected area two (2) times a day. 30 g 0    ezetimibe (ZETIA) 10 mg tablet Take 1 tablet by mouth once daily 30 Tab 0    levothyroxine (SYNTHROID) 100 mcg tablet Take 1 Tab by mouth Daily (before breakfast). 90 Tab 1    lancets misc Use as directed. Dx:check sugar once daily. E11.65 1 Each 11    cholecalciferol (VITAMIN D3) (1000 Units /25 mcg) tablet Take 2 Tabs by mouth daily. 60 Tab 11    albuterol (PROVENTIL HFA, VENTOLIN HFA, PROAIR HFA) 90 mcg/actuation inhaler Take 2 Puffs by inhalation every four (4) hours as needed.  buPROPion SR (WELLBUTRIN SR) 150 mg SR tablet Take 1 Tab by mouth daily. 90 Tab 0    acetaminophen (TYLENOL ARTHRITIS PAIN) 650 mg TbER Take 1,300 mg by mouth two (2) times a day.  aspirin 81 mg chewable tablet Take 81 mg by mouth daily. Vitals: Blood pressure (!) 104/58, pulse 68, resp. rate 16, SpO2 98 %. Allergies: is allergic to crestor [rosuvastatin]; lisinopril; and nsaids (non-steroidal anti-inflammatory drug).     Review of Systems: Pertinent Positives per HPI   [x]Total of 13 systems reviewed as follows:  Constitutional: Negative fever, negative chills  Eyes:   Negative for amauroses fugax, +glasses  ENT:   Negative sore throat,oral absecess  Endocrine Negative for thyroid replacement Rx; goiter; DM  Respiratory:  Negative chronic cough,sputum production, +dyspnea on exertion  Cards:   Negative for palpitations, varicosities, claudication, +LE edema  GI:   Negative for dysphagia, bleeding, nausea, vomiting, diarrhea, and abdominal pain  Genitourinary: Negative for frequency, dysuria  Integument:  Negative for rash and pruritus  Hematologic:  Negative for easy bruising; bleeding dyscarsia  Musculoskel: Negative for muscle weakness inhibiting ambulation  Neurological:  Negative for stroke, TIA, syncope, dizziness  Behavl/Psych: Negative for feelings of anxiety, depression     Cardiovascular Testing:   EK20  Component  Value  Ref Range & Units  Status    Ventricular Rate  70  BPM  Final    Atrial Rate  70  BPM  Final    P-R Interval  96  ms  Final    QRS Duration  84  ms  Final    Q-T Interval  392  ms  Final    QTC Calculation (Bezet)  423  ms  Final    Calculated P Axis  2  degrees  Final    Calculated R Axis  -4  degrees  Final    Calculated T Axis  138  degrees  Final    Diagnosis    Final    ventricular pacing   Confirmed by Alirio Gibson M.D         TTE:  20 Completed-report pending    TTE completed 20  Interpretation Summary     · LV: Estimated LVEF is 20 - 25%. Severely dilated left ventricle. Wall thickness appears thin. Severely and globally reduced systolic function. · RA: Mildly dilated right atrium. LA: Severely dilated left atrium. · MV: Mitraclip is noted. Mean gradient is 3-4 mm hg. Mild mitral valve regurgitation is present. Mitral regurgitation appears to have substantially improved compared to pre mitraclip study from 2020. · TV: Mild to moderate tricuspid valve regurgitation is present. Echo Findings     Left Ventricle  Severely dilated left ventricle. Wall thickness appears thin. The estimated EF is 20 - 25%. Severely and globally reduced systolic function. Left Atrium  Severely dilated left atrium. Right Ventricle  Mildly dilated right ventricle. Borderline low systolic function. Right Atrium  Mildly dilated right atrium. Aortic Valve  Normal valve structure, no stenosis and no regurgitation. Mitral Valve  No stenosis. Mitraclip is noted. Mean gradient is 3-4 mm hg. Mild regurgitation. Mitral regurgitation appears to have substantially improved compared to pre mitraclip study from 2020. Tricuspid Valve  Normal valve structure and no stenosis. Mild to moderate regurgitation. Pulmonic Valve  Pulmonic valve not well visualized. Aorta  Normal aortic root.     Pericardium No evidence of pericardial effusion. Physical Exam:  General: Well nourished well groomed female appearing stated age unaccompanied  Neuro: A&OX3. NEGRETE. PERRL. Steady cane assisted gait  Head:Normocephalic. Atraumatic. Symmetrical  Neck: Trachea Midline  Resp: CTA B. No Adv BS/cough/sputum/tachypnea with seated conversation  CV: S1S2 RRR. No murmur. No JVD/carotid bruits. Pink/warm/dry extremities. trace LE peripheral edema  GI:Benign ab. Soft. NT/ND. Active BS  : Voids  Integ: No obvious s/s of infection or breakdown  Musculo/Skeletal: FROM in all major joints. good muscle tone    Clinic Evaluation:   K[facility]Q-12: scanned into EMR    Assessment/Plan:   1. Severe Mitral Regurgitation s/p mitralclip with Dr. Phillip Kelsey On ASA, Plavix.  Echo completed today for 1 month follow up-report pending     2.  CAD s/p CAB and subsequent stents: On ASA, Plavix, Statin, Zetia, BB,  Entresto.     3. HTN: Continue Lasix, BB, Entresto. Continue to monitor BP daily     4. HLD: On Statin, Zetia     5. DM: On Jardiance, Glipizide. A1c of 7.6-improved from previous. BS have been 110-130 range.     6. COPD: PRN Albuterol     7. Chronic systolic HF: LVEF 27-08%. Followed by [facility]. Couldn't tolerate AA due to hyperkalemia. On Lasix, BB,  Entresto.      8. Hypothyroid: On Synthroid     9. CKD4: Creatinine 1.73 at last draw.  Avoid nephrotoxic agents and hypotension.     10. Obesity: weight loss hindered by activity restriction. Discussed calorie restriction. Hopeful that she will be able to increase activity after Mitral Valve intervention     11. Hx total knee replacement: No current treatment.     12. VT s/p ICD in 2007: BB.      13. WES:  Wears BiPAP at home. SBE prophylax reviewed.    F/U with primary cardiologist Dr. Stuart Licona

## 2020-12-14 ENCOUNTER — OFFICE VISIT (OUTPATIENT)
Dept: CARDIOLOGY CLINIC | Age: 70
End: 2020-12-14
Payer: MEDICARE

## 2020-12-14 ENCOUNTER — HOSPITAL ENCOUNTER (OUTPATIENT)
Dept: NON INVASIVE DIAGNOSTICS | Age: 70
Discharge: HOME | End: 2020-12-14
Attending: NURSE PRACTITIONER
Payer: MEDICARE

## 2020-12-14 VITALS
OXYGEN SATURATION: 98 % | RESPIRATION RATE: 16 BRPM | HEART RATE: 68 BPM | DIASTOLIC BLOOD PRESSURE: 58 MMHG | SYSTOLIC BLOOD PRESSURE: 104 MMHG

## 2020-12-14 DIAGNOSIS — I34.0 SEVERE MITRAL REGURGITATION: ICD-10-CM

## 2020-12-14 DIAGNOSIS — I34.0 NONRHEUMATIC MITRAL VALVE REGURGITATION: Primary | ICD-10-CM

## 2020-12-14 LAB
ECHO AV AREA PEAK VELOCITY: 2.09 CM2
ECHO AV PEAK GRADIENT: 7.82 MMHG
ECHO AV PEAK VELOCITY: 139.79 CM/S
ECHO LA AREA 4C: 27.4 CM2
ECHO LA VOL 4C: 101.45 ML (ref 22–52)
ECHO LV EDV A2C: 147.39 ML
ECHO LV EDV A4C: 163.48 ML
ECHO LV EDV BP: 160.92 ML (ref 56–104)
ECHO LV EJECTION FRACTION A2C: 20 PERCENT
ECHO LV EJECTION FRACTION A4C: 30 PERCENT
ECHO LV EJECTION FRACTION BIPLANE: 25.2 PERCENT (ref 55–100)
ECHO LV ESV A2C: 118.08 ML
ECHO LV ESV A4C: 115.17 ML
ECHO LV ESV BP: 120.46 ML (ref 19–49)
ECHO LV INTERNAL DIMENSION DIASTOLIC: 6.09 CM (ref 3.9–5.3)
ECHO LV INTERNAL DIMENSION SYSTOLIC: 4.5 CM
ECHO LV IVSD: 0.54 CM (ref 0.6–0.9)
ECHO LV MASS 2D: 151.2 G (ref 67–162)
ECHO LV POSTERIOR WALL DIASTOLIC: 0.78 CM (ref 0.6–0.9)
ECHO LVOT DIAM: 1.99 CM
ECHO LVOT PEAK GRADIENT: 3.54 MMHG
ECHO LVOT PEAK VELOCITY: 94.06 CM/S
ECHO LVOT SV: 55.1 ML
ECHO LVOT VTI: 17.75 CM
ECHO MV A VELOCITY: 110.21 CM/S
ECHO MV AREA PHT: 1.68 CM2
ECHO MV AREA VTI: 1.41 CM2
ECHO MV E VELOCITY: 113.94 CM/S
ECHO MV E/A RATIO: 1.03
ECHO MV MAX VELOCITY: 120.38 CM/S
ECHO MV MEAN GRADIENT: 2.59 MMHG
ECHO MV PEAK GRADIENT: 5.8 MMHG
ECHO MV PRESSURE HALF TIME (PHT): 130.89 MS
ECHO MV REGURGITANT VTIA: 195.03 CM
ECHO MV VTI: 39.13 CM
ECHO RV INTERNAL DIMENSION: 3.98 CM
MR PISA PV: 445.08 CM/S

## 2020-12-14 PROCEDURE — 3017F COLORECTAL CA SCREEN DOC REV: CPT | Performed by: THORACIC SURGERY (CARDIOTHORACIC VASCULAR SURGERY)

## 2020-12-14 PROCEDURE — G8417 CALC BMI ABV UP PARAM F/U: HCPCS | Performed by: THORACIC SURGERY (CARDIOTHORACIC VASCULAR SURGERY)

## 2020-12-14 PROCEDURE — G8427 DOCREV CUR MEDS BY ELIG CLIN: HCPCS | Performed by: THORACIC SURGERY (CARDIOTHORACIC VASCULAR SURGERY)

## 2020-12-14 PROCEDURE — G8754 DIAS BP LESS 90: HCPCS | Performed by: THORACIC SURGERY (CARDIOTHORACIC VASCULAR SURGERY)

## 2020-12-14 PROCEDURE — G8399 PT W/DXA RESULTS DOCUMENT: HCPCS | Performed by: THORACIC SURGERY (CARDIOTHORACIC VASCULAR SURGERY)

## 2020-12-14 PROCEDURE — 93306 TTE W/DOPPLER COMPLETE: CPT

## 2020-12-14 PROCEDURE — G8752 SYS BP LESS 140: HCPCS | Performed by: THORACIC SURGERY (CARDIOTHORACIC VASCULAR SURGERY)

## 2020-12-14 PROCEDURE — G8536 NO DOC ELDER MAL SCRN: HCPCS | Performed by: THORACIC SURGERY (CARDIOTHORACIC VASCULAR SURGERY)

## 2020-12-14 PROCEDURE — 3288F FALL RISK ASSESSMENT DOCD: CPT | Performed by: THORACIC SURGERY (CARDIOTHORACIC VASCULAR SURGERY)

## 2020-12-14 PROCEDURE — G9899 SCRN MAM PERF RSLTS DOC: HCPCS | Performed by: THORACIC SURGERY (CARDIOTHORACIC VASCULAR SURGERY)

## 2020-12-14 PROCEDURE — 1090F PRES/ABSN URINE INCON ASSESS: CPT | Performed by: THORACIC SURGERY (CARDIOTHORACIC VASCULAR SURGERY)

## 2020-12-14 PROCEDURE — 1100F PTFALLS ASSESS-DOCD GE2>/YR: CPT | Performed by: THORACIC SURGERY (CARDIOTHORACIC VASCULAR SURGERY)

## 2020-12-14 PROCEDURE — G8432 DEP SCR NOT DOC, RNG: HCPCS | Performed by: THORACIC SURGERY (CARDIOTHORACIC VASCULAR SURGERY)

## 2020-12-14 PROCEDURE — 99214 OFFICE O/P EST MOD 30 MIN: CPT | Performed by: THORACIC SURGERY (CARDIOTHORACIC VASCULAR SURGERY)

## 2020-12-15 ENCOUNTER — TRANSCRIBE ORDER (OUTPATIENT)
Dept: INTERNAL MEDICINE CLINIC | Age: 70
End: 2020-12-15

## 2020-12-15 ENCOUNTER — HOSPITAL ENCOUNTER (OUTPATIENT)
Dept: CARDIAC REHAB | Age: 70
Discharge: HOME | End: 2020-12-15
Payer: MEDICARE

## 2020-12-15 VITALS — BODY MASS INDEX: 41.41 KG/M2 | WEIGHT: 226.4 LBS

## 2020-12-15 PROCEDURE — 93798 PHYS/QHP OP CAR RHAB W/ECG: CPT

## 2020-12-18 ENCOUNTER — HOSPITAL ENCOUNTER (OUTPATIENT)
Dept: CARDIAC REHAB | Age: 70
Discharge: HOME | End: 2020-12-18
Payer: MEDICARE

## 2020-12-18 VITALS — BODY MASS INDEX: 41.23 KG/M2 | WEIGHT: 225.4 LBS

## 2020-12-18 PROCEDURE — 93798 PHYS/QHP OP CAR RHAB W/ECG: CPT

## 2020-12-19 LAB
25(OH)D3+25(OH)D2 SERPL-MCNC: 49.6 NG/ML (ref 30–100)
ALBUMIN SERPL-MCNC: 4 G/DL (ref 3.7–4.7)
ALBUMIN/GLOB SERPL: 1.5 {RATIO} (ref 1.2–2.2)
ALP SERPL-CCNC: 94 IU/L (ref 39–117)
ALT SERPL-CCNC: 19 IU/L (ref 0–32)
AST SERPL-CCNC: 26 IU/L (ref 0–40)
BILIRUB SERPL-MCNC: 0.4 MG/DL (ref 0–1.2)
BUN SERPL-MCNC: 37 MG/DL (ref 8–27)
BUN/CREAT SERPL: 15 (ref 12–28)
CALCIUM SERPL-MCNC: 11 MG/DL (ref 8.7–10.3)
CHLORIDE SERPL-SCNC: 102 MMOL/L (ref 96–106)
CO2 SERPL-SCNC: 21 MMOL/L (ref 20–29)
CREAT SERPL-MCNC: 2.39 MG/DL (ref 0.57–1)
GLOBULIN SER CALC-MCNC: 2.7 G/DL (ref 1.5–4.5)
GLUCOSE SERPL-MCNC: 129 MG/DL (ref 65–99)
MAGNESIUM SERPL-MCNC: 2.3 MG/DL (ref 1.6–2.3)
NT-PROBNP SERPL-MCNC: 583 PG/ML (ref 0–301)
POTASSIUM SERPL-SCNC: 4.8 MMOL/L (ref 3.5–5.2)
PROT SERPL-MCNC: 6.7 G/DL (ref 6–8.5)
SODIUM SERPL-SCNC: 136 MMOL/L (ref 134–144)

## 2020-12-21 ENCOUNTER — TELEPHONE (OUTPATIENT)
Dept: CARDIOLOGY CLINIC | Age: 70
End: 2020-12-21

## 2020-12-21 ENCOUNTER — HOSPITAL ENCOUNTER (OUTPATIENT)
Dept: CARDIAC REHAB | Age: 70
Discharge: HOME | End: 2020-12-21
Payer: MEDICARE

## 2020-12-21 VITALS — WEIGHT: 224.2 LBS | BODY MASS INDEX: 41.01 KG/M2

## 2020-12-21 PROCEDURE — 93798 PHYS/QHP OP CAR RHAB W/ECG: CPT

## 2020-12-21 NOTE — TELEPHONE ENCOUNTER
Telephone Call RE:  Appointment reminder     Outcome:     [] Patient confirmed appointment   [] Patient rescheduled appointment for    [] Unable to reach   [x] Left message              [] Other:     Requesting a return call to discuss COVID screening    Hali Viramontes

## 2020-12-21 NOTE — TELEPHONE ENCOUNTER
Please call Gavin Alcantar with their normal lab results.  Please have her stop her Vit D supplement, recheck level in 12 weeks

## 2020-12-21 NOTE — TELEPHONE ENCOUNTER
----- Message from Jerica Farley NP sent at 12/21/2020  9:33 AM EST -----  Please call [facility] with their normal lab results. Please have her stop her Vit D supplement, recheck level in 12 weeks     I called patient and reviewed all lab results, she states understanding, she has follow up appt tomorrow in [facility].

## 2020-12-22 ENCOUNTER — OFFICE VISIT (OUTPATIENT)
Dept: CARDIOLOGY CLINIC | Age: 70
End: 2020-12-22
Payer: MEDICARE

## 2020-12-22 ENCOUNTER — HOSPITAL ENCOUNTER (OUTPATIENT)
Dept: CARDIAC REHAB | Age: 70
Discharge: HOME | End: 2020-12-22
Payer: MEDICARE

## 2020-12-22 VITALS — WEIGHT: 227 LBS | BODY MASS INDEX: 41.52 KG/M2

## 2020-12-22 VITALS
HEIGHT: 62 IN | OXYGEN SATURATION: 100 % | DIASTOLIC BLOOD PRESSURE: 82 MMHG | WEIGHT: 225.2 LBS | SYSTOLIC BLOOD PRESSURE: 116 MMHG | HEART RATE: 79 BPM | RESPIRATION RATE: 20 BRPM | TEMPERATURE: 99.3 F | BODY MASS INDEX: 41.44 KG/M2

## 2020-12-22 DIAGNOSIS — I25.10 CORONARY ARTERY DISEASE INVOLVING NATIVE HEART WITHOUT ANGINA PECTORIS, UNSPECIFIED VESSEL OR LESION TYPE: ICD-10-CM

## 2020-12-22 DIAGNOSIS — R06.02 SHORTNESS OF BREATH: Primary | ICD-10-CM

## 2020-12-22 DIAGNOSIS — I25.5 ISCHEMIC CARDIOMYOPATHY: ICD-10-CM

## 2020-12-22 DIAGNOSIS — I50.42 CHRONIC HEART FAILURE WITH REDUCED EJECTION FRACTION AND DIASTOLIC DYSFUNCTION (HCC): ICD-10-CM

## 2020-12-22 PROCEDURE — G8399 PT W/DXA RESULTS DOCUMENT: HCPCS | Performed by: NURSE PRACTITIONER

## 2020-12-22 PROCEDURE — G8432 DEP SCR NOT DOC, RNG: HCPCS | Performed by: NURSE PRACTITIONER

## 2020-12-22 PROCEDURE — G8427 DOCREV CUR MEDS BY ELIG CLIN: HCPCS | Performed by: NURSE PRACTITIONER

## 2020-12-22 PROCEDURE — 93798 PHYS/QHP OP CAR RHAB W/ECG: CPT

## 2020-12-22 PROCEDURE — 99214 OFFICE O/P EST MOD 30 MIN: CPT | Performed by: NURSE PRACTITIONER

## 2020-12-22 PROCEDURE — G8754 DIAS BP LESS 90: HCPCS | Performed by: NURSE PRACTITIONER

## 2020-12-22 PROCEDURE — G8417 CALC BMI ABV UP PARAM F/U: HCPCS | Performed by: NURSE PRACTITIONER

## 2020-12-22 PROCEDURE — G8752 SYS BP LESS 140: HCPCS | Performed by: NURSE PRACTITIONER

## 2020-12-22 PROCEDURE — 1090F PRES/ABSN URINE INCON ASSESS: CPT | Performed by: NURSE PRACTITIONER

## 2020-12-22 PROCEDURE — G8536 NO DOC ELDER MAL SCRN: HCPCS | Performed by: NURSE PRACTITIONER

## 2020-12-22 PROCEDURE — 1100F PTFALLS ASSESS-DOCD GE2>/YR: CPT | Performed by: NURSE PRACTITIONER

## 2020-12-22 PROCEDURE — 3288F FALL RISK ASSESSMENT DOCD: CPT | Performed by: NURSE PRACTITIONER

## 2020-12-22 PROCEDURE — 3017F COLORECTAL CA SCREEN DOC REV: CPT | Performed by: NURSE PRACTITIONER

## 2020-12-22 PROCEDURE — G9899 SCRN MAM PERF RSLTS DOC: HCPCS | Performed by: NURSE PRACTITIONER

## 2020-12-22 RX ORDER — FUROSEMIDE 20 MG/1
10 TABLET ORAL DAILY
Qty: 60 TAB | Refills: 2
Start: 2020-12-22 | End: 2021-03-26 | Stop reason: SDUPTHER

## 2020-12-22 RX ORDER — EZETIMIBE 10 MG/1
TABLET ORAL
Qty: 30 TAB | Refills: 2 | Status: SHIPPED | OUTPATIENT
Start: 2020-12-22 | End: 2021-03-26 | Stop reason: SDUPTHER

## 2020-12-22 NOTE — PROGRESS NOTES
[facility] Visit        DOS:   12/22/2020  NAME:  Emery Carter   MRN:   [id number]   REFERRING PROVIDER:  Akshat Nichols NP  PRIMARY CARE PHYSICIAN: Akshat Nichols NP  PRIMARY CARDIOLOGIST: none      Chief Complaint:   Chief Complaint   Patient presents with    CHF       HPI: 65y.o. year old female with a history of HTN, HLD, WES, obesity (Body mass index is 41.19 kg/m².) s/p gastric bypass in 2011, T2DM, hypothyroidism, COPD, CAD s/p CABG in 1997, s/p PCI to [address] in 5/15, ICM, VT, s/p AICD ([employer]),  anxiety, and depression who presents today for a HF clinic visit for her chronic systolic heart failure. She was scheduled to undergo BiV upgrade with Dr. Coreen Santana, however, her QRS was too narrow for the upgrade. He increased her low pacing threshold from 50 bpm to 70 bpm.      She presents today for HF follow up. She recently had a Mitral clip 11/12/20 with Dr. Zach Krishna and Dr. Gregg Rosenberg. She has done well since discharge and is following with the surgery team. Her most recent TTE shows improvement in EF 30-35% and reduction in MR to trace. Her activity is at baseline but she is doing cardiac rehab. She is compliant with her Bipap and her medications. Overall, she feels much better than prior to surgery. She has had some personal stressors recently and also is requesting surgical clearance for cataract surgery. She denies CP, palpitations, nausea vomiting, dyspnea at rest, orthopnea, abdominal distention, LE edema, or early satiety. She has mild MARINA which is improved from previous. No dizziness or lightheadedness.      Optivol Interrogation 12/22/20:  Stanislaw Chiu well below threshold   Thoracic impedance trending down   Patient activity 0.9hr/day  Time in AT/AF 0 hr/day  No ventricular arrhythmias  No shocks  Total  0.1%    Impression / Plan:   ICM - Stage C, NYHA Class III, LVEF 30-35%   TTE 6/18/20- EF 25-30%, mod TR, severe MR   S/p Mitral Clip, EF 11/16, 20-25%, mild MR   Repeat TTE 12/14 EF 30-35%, trace MR    Continue Toprol XL 12.5 mg daily   Continue Entresto 24/26 mg PO BID   Decrease furosemide to 10 mg by mouth daily    Intolerant to AA due to hyperkalemia   Discussed Low sodium/potassium diet-handout given    Daily weights   Invitate - DSP (VUS)   Equivocal PYP imaging   Cont cardiac rehab - next session beginning in January    Labs in 1 week   Follow up in the [facility] in 1 month      CAD s/p CABG in 1997, s/p PCI to DVG-RCA in 10/16   On ASA, statin, BB, Zetia   Severe 3V disease     Hx of severe MR s/p MitraClip and improvement to trace    S/p Mitral clip with Dr. 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    Encouraged to meet with dietician at cardiac rehab    Encourage physical activity   Nutritionist referral    Osteoarthritis - s/p right TKR   Avoid Celebrex due to CKD, HFrEF   S/p left knee injection in 8/2019   Lidocaine patches to knees     Hyperlipidemia    , LDL 58 on 5/28/20   Lipoprotein-A 212   Continue Lipitor 80 mg daily    Continue Zetia 10 mg PO daily   Low chol diet     CKD4, suspect diabetic nephropathy and cardiorenal syndrome   Recent Cr 2.39 from 1.73    Continue Entresto 24/26 mg PO BID   Reduce furosemide to 10 mg PO daily    Repeat CMP in 1 week   Has f/u with Nephrology beginning of January     Avoid nephrotoxic agents     Vitamin D deficiency in   Repeat Vitamin D level with next set of labs     Cataracts   Requesting surgical clearance for cataract surgery in    Will d/w Drs. Jannis Mcburney and Ramy        History:  Past Medical History:   Diagnosis Date    Adverse effect of anesthesia     hard to wake up/uses BIPAP/\"try to avoid general if possible\"/intubated in past prior to going to sleep and it caused pt to be incontinent    Arthritis     Asthma     CAD (coronary artery disease)     Chronic kidney disease     elevataed creatinine    Chronic obstructive pulmonary disease (HCC)     Chronic pain     arthritis    Coagulation disorder (HCC)     on plavix    Congestive heart failure (HCC)     Diabetes ([address]     Hypertension     Morbid obesity ([address]     Sleep apnea     BIPAP with 2 liters oxygen    Thromboembolus ([address]     after heart surgery - left leg    Thyroid disease      Past Surgical History:   Procedure Laterality Date    CARDIAC SURG PROCEDURE UNLIST  2018    cardiac cath - Left    COLONOSCOPY N/A 2020    COLONOSCOPY   :- performed by Nika Call MD at [address] HX ARTHROPLASTY  2105    knee - right    HX  SECTION      x3    HX CORONARY ARTERY BYPASS GRAFT  1997    3 vessels    HX CORONARY STENT PLACEMENT  2016    HX HERNIA REPAIR  1988    HX IMPLANTABLE CARDIOVERTER DEFIBRILLATOR      HX KNEE REPLACEMENT Right 2015    once    LAP GASTRIC BYPASS/KERLINE-EN-Y  2011    lap band per pt and not a gastric bypass     Social History     Socioeconomic History    Marital status:      Spouse name: Not on file    Number of children: Not on file    Years of education: Not on file    Highest education level: Not on file   Occupational History    Not on file   Social Needs    Financial resource strain: Not on file    Food insecurity     Worry: Not on file     Inability: Not on file    Transportation needs     Medical: Not on file Non-medical: Not on file   Tobacco Use    Smoking status: Former Smoker     Types: Cigarettes    Smokeless tobacco: Never Used    Tobacco comment: quit 2001/started again (smoked 3 yrs) off and on/none since 2012   Substance and Sexual Activity    Alcohol use: Not Currently    Drug use: Not Currently    Sexual activity: Not Currently   Lifestyle    Physical activity     Days per week: Not on file     Minutes per session: Not on file    Stress: Not on file   Relationships    Social connections     Talks on phone: Not on file     Gets together: Not on file     Attends Mandaeism service: Not on file     Active member of club or organization: Not on file     Attends meetings of clubs or organizations: Not on file     Relationship status: Not on file    Intimate partner violence     Fear of current or ex partner: Not on file     Emotionally abused: Not on file     Physically abused: Not on file     Forced sexual activity: Not on file   Other Topics Concern    Not on file   Social History Narrative    Not on file     Family History   Problem Relation Age of Onset    Hypertension Mother     Dementia Mother     Coronary Artery Disease Father 58    Sudden Death Father 58    Diabetes Son     Diabetes Brother      Allergies: Allergies   Allergen Reactions    Crestor [Rosuvastatin] Other (comments)     Causes muscle cramps    Lisinopril Cough    Nsaids (Non-Steroidal Anti-Inflammatory Drug) Other (comments)     Liver and Kidney       ROS:    Review of Systems   Constitutional: Negative for chills, fever, malaise/fatigue and weight loss. Eyes: Negative. Respiratory: Negative for cough, sputum production and shortness of breath. Cardiovascular: Negative for chest pain, palpitations, leg swelling and PND. Gastrointestinal: Negative for constipation, heartburn and nausea. Genitourinary: Negative. Musculoskeletal: Positive for joint pain. Chronic knee pain    Skin: Negative. Neurological: Negative for dizziness, weakness and headaches. Psychiatric/Behavioral: Negative for depression. The patient is not nervous/anxious. Physical Exam:   Vitals:    Visit Vitals  /82 (BP 1 Location: Right arm, BP Patient Position: Sitting)   Pulse 79   Temp 99.3 °F (37.4 °C) (Oral)   Resp 20   Ht 5' 2\" (1.575 m)   Wt 225 lb 3.2 oz (102.2 kg)   SpO2 100%   BMI 41.19 kg/m²         Physical Exam   Constitutional: She is oriented to person, place, and time. She appears well-developed and well-nourished. No distress. HENT:   Head: Normocephalic. Neck: Normal range of motion. Neck supple. No JVD present. Cardiovascular: Normal rate, regular rhythm, S1 normal, S2 normal and intact distal pulses. No murmur heard. Pulmonary/Chest: Effort normal and breath sounds normal. No respiratory distress. Abdominal: Soft. She exhibits no distension. obese   Musculoskeletal: Normal range of motion. General: No edema. Neurological: She is alert and oriented to person, place, and time. Skin: Skin is warm, dry and intact. Psychiatric: She has a normal mood and affect. Her speech is normal and behavior is normal. Thought content normal. Cognition and memory are normal.   Vitals reviewed. Recent Labs:   Labs Latest Ref Rng & Units 12/18/2020 11/13/2020 11/12/2020 11/12/2020 10/29/2020 10/26/2020   WBC 3.6 - 11.0 K/uL - 7.0 - 8.3 5.9 -   RBC 3.80 - 5.20 M/uL - 3.05(L) - 3.32(L) 3.42(L) -   Hemoglobin 11.5 - 16.0 g/dL - 10. 0(L) - 10. 8(L) 11. 2(L) -   Hematocrit 35.0 - 47.0 % - 30. 5(L) - 32. 9(L) 34. 2(L) -   MCV 80.0 - 99.0 FL - 100. 0(H) - 99. 1(H) 100. 0(H) -   Platelets 303 - 425 K/uL - 197 - 207 214 -   Lymphocytes 12 - 49 % - - - 19 34 -   Monocytes 5 - 13 % - - - 2(L) 8 -   Eosinophils 0 - 7 % - - - 2 3 -   Basophils 0 - 1 % - - - 1 1 -   Albumin 3.7 - 4.7 g/dL 4.0 - - 3. 4(L) 3.5 4.1   Calcium 8.7 - 10.3 mg/dL 11. 0(H) 9.5 10. 4(H) 10.1 10. 7(H) 10.2   Glucose 65 - 99 mg/dL 129(H) 159(H) 158(H) 141(H) 169(H) 110(H)   BUN 8 - 27 mg/dL 37(H) 29(H) 32(H) 30(H) 49(H) 43(H)   Creatinine 0.57 - 1.00 mg/dL 2.39(H) 1.73(H) 2.05(H) 1.88(H) 2.41(H) 2.31(H)   Sodium 134 - 144 mmol/L 136 139 142 140 136 136   Potassium 3.5 - 5.2 mmol/L 4.8 4.9 5.1 4.3 5.0 5.1   TSH 0.36 - 3.74 uIU/mL - - - - 3.78(H) 1.460   Some recent data might be hidden     EK2020  Sinus  Rhythm, rate 67 bpm   -  Nonspecific T-abnormality. Reason for Exam  Priority: Routine  s/p MitraCllip   Dx: Severe mitral regurgitation [I34.0 (ICD-10-CM)]   Comments: Echo to be done at [facility] and read only by Dr. Bouchra Draper. Vitals    Weight Height BSA (calculated - sq m) BP Pulse (Heart Rate)          Interpretation Summary    · LV: Estimated LVEF is 30 - 35%. Mildly dilated left ventricle. Mild concentric hypertrophy. Moderately reduced systolic function. Moderate (grade 2) left ventricular diastolic dysfunction. · Previously placed mitraclip is noted in secure position with residual trace to mild MR lateral to clip placement. Mean transmitral gradient is 2.6mmHg at heart rate of about 70 bpm.  · LA: Moderately dilated left atrium. Comparison Study Information    Prior Study    There is a prior study available for comparison. Compared to prior studies, the LVEF appears slightly improved and MR appears substantially better after mitraclip. Echo Findings    Left Ventricle Mildly dilated left ventricle. Mild concentric hypertrophy. The estimated EF is 30 - 35%. Moderately reduced systolic function. There is moderate (grade 2) left ventricular diastolic dysfunction. Wall Scoring The left ventricular wall motion is globally hypokinetic. Left Atrium Moderately dilated left atrium. Right Ventricle Normal cavity size and global systolic function. Right Atrium Mildly dilated right atrium. Aortic Valve Normal valve structure, no stenosis and no regurgitation. Mitral Valve No stenosis. Mild regurgitation. Previously placed mitraclip is noted in secure position with residual trace to mild MR lateral to clip placement. Mean transmitral gradient is 2.6mmHg at heart rate of about 70 bpm.   Tricuspid Valve Tricuspid valve not well visualized. No stenosis. Tricuspid regurgitation is inadequate for estimation of right ventricular systolic pressure. Pulmonic Valve Pulmonic valve not well visualized. Aorta Normal aortic root. Pericardium No evidence of pericardial effusion. Wall Scoring    Score Index: 2.000 Percent Normal: 0.0%             The left ventricular wall motion is globally hypokinetic. TTE procedure Findings    TTE Procedure Information Image quality: good. The view(s) performed were parasternal, apical, subcostal and suprasternal. Color flow Doppler was performed and pulse wave and/or continuous wave Doppler was performed. s/p Mitral clip DX: Severe Mitral RegurgitationNo contrast was given.    Procedure Staff    Technologist/Clinician: Kobe Tamayo  Supporting Staff: None  Performing Physician/Midlevel: None     Exam Completion Date/Time: 12/14/20  2:41 PM   2D Volume Measurements     ESV EDV EF   LV Biplane 120.46 mL (Range: 19 - 49)       160.92 mL (Range: 56 - 104)       25.2 percent (Range: 55 - 100)         LV A4C 115.17 mL       163.48 mL       30 percent         LV A2C 118.08 mL       147.39 mL       20 percent         LA A4C 101.45 mL (Range: 22 - 52)                 2D/M-Mode Measurements    Dimensions   Measurement Value (Range)   LVIDs 4.5 cm      LVIDd 6.09 cm (3.9 - 5.3)      IVSd 0.54 cm (0.6 - 0.9)      LVPWd 0.78 cm (0.6 - 0.9)      LV Mass .2 g (67 - 162)      LA Area 4C 27.4 cm2      RVIDd 3.98 cm                   Aortic Valve Measurements    Stenosis   Aortic Valve Systolic Peak Velocity 096.71 cm/s      AoV PG 7.82 mmHg      Aortic Valve Area by Continuity of Peak Velocity 2.09 cm2       LVOT   LVOT Peak Velocity 94.06 cm/s      LVOT Peak Gradient 3.54 mmHg LVOT VTI 17.75 cm      LVOT d 1.99 cm              Mitral Valve Measurements    Stenosis   MV Mean Gradient 2.59 mmHg      MV Peak Gradient 5.8 mmHg      Mitral Valve Pressure Half-time 130.89 ms      Mitral Valve Max Velocity 120.38 cm/s      Mitral Regurgitant Velocity Time Integral 195.03 cm      MVA (PHT) 1.68 cm2       Regurgitation   Mitral Regurgitant Velocity Time Integral 195.03 cm      MVA VTI 1.41 cm2                    Diastolic Filling/Shunts    Diastolic Filling   MV E Nelson 113.94 cm/s      MV A Nelson 110.21 cm/s      MV E/A 1.03        Shunt   LVOT SV 55.1 mL                  11/12/20   ECHO ADULT COMPLETE 11/13/2020 11/13/2020    Narrative · LV: Estimated LVEF is 20 - 25%. Severely dilated left ventricle. Wall   thickness appears thin. Severely and globally reduced systolic function. · RA: Mildly dilated right atrium. LA: Severely dilated left atrium. · MV: Mitraclip is noted. Mean gradient is 3-4 mm hg. Mild mitral valve   regurgitation is present. Mitral regurgitation appears to have   substantially improved compared to pre mitraclip study from June 2020. · TV: Mild to moderate tricuspid valve regurgitation is present. Signed by: Livia Thompson MD         06/19/20   ECHO ADULT COMPLETE 06/20/2020 6/20/2020    Narrative · Image quality for this study was technically difficult. · Mildly dilated left ventricle. Upper normal wall thickness. Severe   global systolic function. Estimated left ventricular ejection fraction is   25 - 30%. Suboptimal endocardial visualization limits wall motion   analysis. Inconclusive left ventricular diastolic function. · Moderate tricuspid valve regurgitation is present. · Mild to moderate pulmonary hypertension. Pulmonary arterial systolic   pressure is 45 mmHg. · Moderately dilated left atrium. · Mitral valve non-specific thickening. Severe mitral valve regurgitation   is present. Signed by:  Kalina Cota MD         Prior to Admission medications    Medication Sig Start Date End Date Taking? Authorizing Provider   glucose blood VI test strips (blood glucose test) strip Check blood sugar 2 times daily: E11.9 may substitute for insurance preferred strips. 12/11/20  Yes sEvin Amezquita NP   lancets misc Check blood sugar 2 times daily. May substitute for insurance preferred lancets. 12/11/20  Yes Esvin Amezquita NP   Blood-Glucose Meter monitoring kit Check blood sugar daily. May substitute for insurance preferred meter 12/11/20  Yes Esvin Amezquita NP   glucose blood VI test strips (ASCENSIA AUTODISC VI, ONE TOUCH ULTRA TEST VI) strip E11.65 check sugar once daily 12/7/20  Yes Kylah Dyer MD   hydrALAZINE (APRESOLINE) 25 mg tablet Take 1 Tab by mouth three (3) times daily. Patient taking differently: Take 25 mg by mouth three (3) times daily. 1/2 tab 11/30/20  Yes Mitzy Mckeon NP   isosorbide mononitrate ER (IMDUR) 30 mg tablet Take 1 Tab by mouth every morning. 11/30/20  Yes Ori Mckeon NP   clopidogreL (PLAVIX) 75 mg tab Take 1 tablet by mouth once daily 11/25/20  Yes Radha Torres NP   sacubitriL-valsartan Forrest Adie) 24-26 mg tablet Take 1 Tab by mouth two (2) times a day. 11/20/20  Yes Lo Mckeon NP   furosemide (LASIX) 20 mg tablet Take 1 Tab by mouth daily. 11/13/20  Yes Hugo Haas NP   metoprolol succinate (TOPROL-XL) 25 mg XL tablet Take 0.5 Tabs by mouth daily. Hold for systolic BP less than 844 11/13/20  Yes Hugo Haas NP   empagliflozin (JARDIANCE) 25 mg tablet Take 1 Tab by mouth daily. For diabetes 10/27/20  Yes Radha Torres NP   gabapentin (NEURONTIN) 100 mg capsule Take 2 Caps by mouth three (3) times daily.  Max Daily Amount: 600 mg. 10/13/20  Yes Radha Torres NP   glipiZIDE (GLUCOTROL) 10 mg tablet Take 1 tablet by mouth twice daily with food 10/1/20  Yes Radha Paris., NP   clotrimazole-betamethasone (LOTRISONE) topical cream Apply  to affected area two (2) times a day. 9/23/20  Yes Joyce Simth., NP   ezetimibe (ZETIA) 10 mg tablet Take 1 tablet by mouth once daily 9/2/20  Yes Devin Collazo NP   Blood-Glucose Meter monitoring kit Use as directed. Dx: E11.65 check sugar twice daily 8/25/20  Yes Joyce Matamoros, NP   levothyroxine (SYNTHROID) 100 mcg tablet Take 1 Tab by mouth Daily (before breakfast). 8/21/20  Yes Joyce Matamoros NP   lancets misc Use as directed. Dx:check sugar once daily. E11.65 8/19/20  Yes Joyce Matamoros, NP   allopurinoL (ZYLOPRIM) 100 mg tablet Take 1 tablet by mouth once daily 5/28/20  Yes Lo Mckeon NP   atorvastatin (LIPITOR) 80 mg tablet TAKE 1 TABLET BY MOUTH AT BEDTIME 4/10/20  Yes Devin Collazo NP   albuterol (PROVENTIL HFA, VENTOLIN HFA, PROAIR HFA) 90 mcg/actuation inhaler Take 2 Puffs by inhalation every four (4) hours as needed. Yes Provider, Historical   acetaminophen (TYLENOL ARTHRITIS PAIN) 650 mg TbER Take 1,300 mg by mouth two (2) times a day. Yes Provider, Historical   aspirin 81 mg chewable tablet Take 81 mg by mouth daily. Yes Provider, Historical   ketorolac (ACULAR) 0.5 % ophthalmic solution INSTILL 1 DROP 4 TIMES DAILY INTO EYE HAVING SURGERY START 2 DAYS PRIOR TO SURGERY 10/22/20   Provider, Historical   ofloxacin (FLOXIN) 0.3 % ophthalmic solution INSTILL 1 DROP 4 TIMES DAILY INTO SURGERY EYE START 2 DAYS PRIOR TO SURGERY 10/22/20   Provider, Historical   prednisoLONE acetate (PRED FORTE) 1 % ophthalmic suspension INSTILL 1 DROP 4 TIMES DAILY INTO SUREGRY EYE TAPER AS DIRECTED 10/22/20   Provider, Historical   buPROPion SR (WELLBUTRIN SR) 150 mg SR tablet Take 1 Tab by mouth daily.  2/6/20 12/22/20  Joyce Smith., NP     Allergies   Allergen Reactions    Crestor [Rosuvastatin] Other (comments)     Causes muscle cramps    Lisinopril Cough    Nsaids (Non-Steroidal Anti-Inflammatory Drug) Other (comments)     Liver and Kidney       Past Medical History: Diagnosis Date    Adverse effect of anesthesia     hard to wake up/uses BIPAP/\"try to avoid general if possible\"/intubated in past prior to going to sleep and it caused pt to be incontinent    Arthritis     Asthma     CAD (coronary artery disease)     Chronic kidney disease     elevataed creatinine    Chronic obstructive pulmonary disease (HCC)     Chronic pain     arthritis    Coagulation disorder ([address]     on plavix    Congestive heart failure ([address]     Diabetes ([address]     Hypertension     Morbid obesity ([address]     Sleep apnea 1996    BIPAP with 2 liters oxygen    Thromboembolus ([address]     after heart surgery - left leg    Thyroid disease          Socrates Paulino, NP  [address] [facility] [place]  [address] [facility], [place] [address], [address] [place] [address]  Office [phone]  Fax [phone]

## 2020-12-22 NOTE — PATIENT INSTRUCTIONS
Medication changes:    Decrease your furosemide to 10 mg daily (1/2 tablet daily). Please take this to your pharmacy to notify them of the change in medications. Testing Ordered:    Please have your labs drawn in 1 week. Other Recommendations:      Ensure your drinking an adequate amount of water with a goal of 6-8 eight ounce glasses (1.5-2 liters) of fluid daily. Your urine should be clear and light yellow straw colored. If your blood pressure begins to consistently run below 90/60 and/or you begin to experience dizziness or lightheadedness, please contact the [facility] at [phone]. Follow up in 1 month with [facility] [place]      Please monitor your blood pressures daily prior to medications and 2 hours after taking medications. Bring a written record of your blood pressures to your next appointment. Please monitor your weights daily upon waking and after using the bathroom. Keep a written records of your weights and bring to your next appointment. If you have a weight gain of 3 or more pounds overnight OR 5 or more pounds in one week please contact our office. Thank you for allowing us the privilege of being a part of your healthcare team! Please do not hesitate to contact our office at [phone] with any questions or concerns. Virtual Heart Failure [address] invites you to learn more about heart failure and to share your questions, ideas, and experiences with others. Each month, the Heart Failure Support Group features a new educational topic and a guest speaker, followed by an interactive discussion. Our Heart Failure Nurse Navigator will moderate each session. You will be able to participate by phone, tablet or computer through [address] [place] [address]. This support group takes place on the 3rd Thursday of each month from 6:00-7:30PM. All individuals living with heart failure and their caregivers are welcome to join.      If you are interested in participating, please contact us at [email or web address] and you will be sent the link to join the [employer].

## 2020-12-23 ENCOUNTER — TELEPHONE (OUTPATIENT)
Dept: CARDIAC REHAB | Age: 70
End: 2020-12-23

## 2020-12-23 NOTE — TELEPHONE ENCOUNTER
12/23/2020 Cardiac Rehab: Called Ms. Liam Quispe to remind of intake appointment on Monday, 12/28/2020. Confirmed appointment with patient. Provided patient with contact information for [facility]. Also, reminded patient to bring a list of current medications, a personal schedule, and to wear comfortable clothes and shoes.  Guillermina Meckel

## 2020-12-28 ENCOUNTER — HOSPITAL ENCOUNTER (OUTPATIENT)
Dept: CARDIAC REHAB | Age: 70
Discharge: HOME | End: 2020-12-28
Payer: MEDICARE

## 2020-12-28 VITALS — HEIGHT: 63 IN | BODY MASS INDEX: 40.5 KG/M2 | WEIGHT: 228.6 LBS

## 2020-12-28 PROCEDURE — 93798 PHYS/QHP OP CAR RHAB W/ECG: CPT

## 2020-12-28 PROCEDURE — 93797 PHYS/QHP OP CAR RHAB WO ECG: CPT

## 2020-12-28 RX ORDER — GLIPIZIDE 10 MG/1
TABLET ORAL
Qty: 180 TAB | Refills: 0 | Status: SHIPPED | OUTPATIENT
Start: 2020-12-28 | End: 2020-12-30

## 2020-12-28 NOTE — CARDIO/PULMONARY
Keyshawn Bergeron  65 y.o. presented to cardiac wellness for orientation and exercise tolerance test today with a primary diagnosis of MVR clip. EF is 30-35% . Keyshawn Bergeron has a history of (MI, PCI, CABG, HF, CVA etc). Cardiac risk factors include family history, dyslipidemia, diabetes mellitus, obesity, hypertension, stress, prior MI, valvular heart disease and these were reviewed with her. Keyshawn Bergeron lives with her sister. She is retired and has a grown son who lives out of state. PHQ9, depression score, is 2 and this is considered mild. The result was discussed with patient who affirms score to be accurate. Patient denied chest pain or SOB during 6 minute walk and was in A Paced rhythm with occasional PVCs. Keyshawn Bergeron will attend a 60 minute class once a week and will exercise 2 - 3 times weekly in cardiac wellness. An education manual was given to the patient and reviewed briefly.   Ifeoma Sofia RN  12/28/2020

## 2020-12-30 RX ORDER — GLIPIZIDE 10 MG/1
TABLET ORAL
Qty: 180 TAB | Refills: 0 | Status: SHIPPED | OUTPATIENT
Start: 2020-12-30 | End: 2021-03-31 | Stop reason: SDUPTHER

## 2021-01-04 ENCOUNTER — OFFICE VISIT (OUTPATIENT)
Dept: INTERNAL MEDICINE CLINIC | Age: 71
End: 2021-01-04
Payer: MEDICARE

## 2021-01-04 VITALS
WEIGHT: 225 LBS | BODY MASS INDEX: 39.87 KG/M2 | RESPIRATION RATE: 20 BRPM | HEIGHT: 63 IN | SYSTOLIC BLOOD PRESSURE: 110 MMHG | DIASTOLIC BLOOD PRESSURE: 60 MMHG | OXYGEN SATURATION: 99 % | TEMPERATURE: 97.5 F | HEART RATE: 71 BPM

## 2021-01-04 DIAGNOSIS — Z23 ENCOUNTER FOR IMMUNIZATION: ICD-10-CM

## 2021-01-04 DIAGNOSIS — N18.31 STAGE 3A CHRONIC KIDNEY DISEASE (HCC): ICD-10-CM

## 2021-01-04 DIAGNOSIS — I34.0 NONRHEUMATIC MITRAL VALVE REGURGITATION: ICD-10-CM

## 2021-01-04 DIAGNOSIS — I10 ESSENTIAL HYPERTENSION: ICD-10-CM

## 2021-01-04 DIAGNOSIS — E03.9 ACQUIRED HYPOTHYROIDISM: ICD-10-CM

## 2021-01-04 DIAGNOSIS — E11.9 CONTROLLED TYPE 2 DIABETES MELLITUS WITHOUT COMPLICATION, WITHOUT LONG-TERM CURRENT USE OF INSULIN (HCC): Primary | ICD-10-CM

## 2021-01-04 DIAGNOSIS — I50.22 SYSTOLIC CHF, CHRONIC (HCC): ICD-10-CM

## 2021-01-04 LAB — HBA1C MFR BLD HPLC: 7.2 %

## 2021-01-04 PROCEDURE — G0009 ADMIN PNEUMOCOCCAL VACCINE: HCPCS | Performed by: NURSE PRACTITIONER

## 2021-01-04 PROCEDURE — 3017F COLORECTAL CA SCREEN DOC REV: CPT | Performed by: NURSE PRACTITIONER

## 2021-01-04 PROCEDURE — 83036 HEMOGLOBIN GLYCOSYLATED A1C: CPT | Performed by: NURSE PRACTITIONER

## 2021-01-04 PROCEDURE — G8754 DIAS BP LESS 90: HCPCS | Performed by: NURSE PRACTITIONER

## 2021-01-04 PROCEDURE — G8399 PT W/DXA RESULTS DOCUMENT: HCPCS | Performed by: NURSE PRACTITIONER

## 2021-01-04 PROCEDURE — G8427 DOCREV CUR MEDS BY ELIG CLIN: HCPCS | Performed by: NURSE PRACTITIONER

## 2021-01-04 PROCEDURE — 90732 PPSV23 VACC 2 YRS+ SUBQ/IM: CPT | Performed by: NURSE PRACTITIONER

## 2021-01-04 PROCEDURE — 1090F PRES/ABSN URINE INCON ASSESS: CPT | Performed by: NURSE PRACTITIONER

## 2021-01-04 PROCEDURE — G8752 SYS BP LESS 140: HCPCS | Performed by: NURSE PRACTITIONER

## 2021-01-04 PROCEDURE — 1100F PTFALLS ASSESS-DOCD GE2>/YR: CPT | Performed by: NURSE PRACTITIONER

## 2021-01-04 PROCEDURE — G8536 NO DOC ELDER MAL SCRN: HCPCS | Performed by: NURSE PRACTITIONER

## 2021-01-04 PROCEDURE — G9899 SCRN MAM PERF RSLTS DOC: HCPCS | Performed by: NURSE PRACTITIONER

## 2021-01-04 PROCEDURE — 99214 OFFICE O/P EST MOD 30 MIN: CPT | Performed by: NURSE PRACTITIONER

## 2021-01-04 PROCEDURE — G8432 DEP SCR NOT DOC, RNG: HCPCS | Performed by: NURSE PRACTITIONER

## 2021-01-04 PROCEDURE — 3288F FALL RISK ASSESSMENT DOCD: CPT | Performed by: NURSE PRACTITIONER

## 2021-01-04 PROCEDURE — 3051F HG A1C>EQUAL 7.0%<8.0%: CPT | Performed by: NURSE PRACTITIONER

## 2021-01-04 PROCEDURE — G8417 CALC BMI ABV UP PARAM F/U: HCPCS | Performed by: NURSE PRACTITIONER

## 2021-01-04 PROCEDURE — 2022F DILAT RTA XM EVC RTNOPTHY: CPT | Performed by: NURSE PRACTITIONER

## 2021-01-04 RX ORDER — ZOSTER VACCINE RECOMBINANT, ADJUVANTED 50 MCG/0.5
0.5 KIT INTRAMUSCULAR ONCE
Qty: 0.5 ML | Refills: 1 | Status: SHIPPED | OUTPATIENT
Start: 2021-01-04 | End: 2021-01-04

## 2021-01-04 NOTE — PATIENT INSTRUCTIONS
Pneumococcal Polysaccharide Vaccine: What You Need to Know  Why get vaccinated? Pneumococcal polysaccharide vaccine (PPSV23) can prevent pneumococcal disease. Pneumococcal disease refers to any illness caused by pneumococcal bacteria. These bacteria can cause many types of illnesses, including pneumonia, which is an infection of the lungs. Pneumococcal bacteria are one of the most common causes of pneumonia. Besides pneumonia, pneumococcal bacteria can also cause:  · Ear infections,  · Sinus infections  · Meningitis (infection of the tissue covering the brain and spinal cord)  · Bacteremia (bloodstream infection)  Anyone can get pneumococcal disease, but children under 3years of age, people with certain medical conditions, adults 72 years or older, and cigarette smokers are at the highest risk. Most pneumococcal infections are mild. However, some can result in long-term problems, such as brain damage or hearing loss. Meningitis, bacteremia, and pneumonia caused by pneumococcal disease can be fatal.  PPSV23  PPSV23 protects against 23 types of bacteria that cause pneumococcal disease. PPSV23 is recommended for:  · All adults 72 years or older,  · Anyone 2 years or older with certain medical conditions that can lead to an increased risk for pneumococcal disease. Most people need only one dose of PPSV23. A second dose of PPSV23, and another type of pneumococcal vaccine called PCV13, are recommended for certain high-risk groups. Your health care provider can give you more information. People 65 years or older should get a dose of PPSV23 even if they have already gotten one or more doses of the vaccine before they turned 65. Talk with your health care provider  Tell your vaccine provider if the person getting the vaccine:  · Has had an allergic reaction after a previous dose of PPSV23, or has any severe, life-threatening allergies.   In some cases, your health care provider may decide to postpone PPSV23 vaccination to a future visit. People with minor illnesses, such as a cold, may be vaccinated. People who are moderately or severely ill should usually wait until they recover before getting PPSV23. Your health care provider can give you more information. Risks of a vaccine reaction  · Redness or pain where the shot is given, feeling tired, fever, or muscle aches can happen after PPSV23. People sometimes faint after medical procedures, including vaccination. Tell your provider if you feel dizzy or have vision changes or ringing in the ears. As with any medicine, there is a very remote chance of a vaccine causing a severe allergic reaction, other serious injury, or death. What if there is a serious problem? An allergic reaction could occur after the vaccinated person leaves the clinic. If you see signs of a severe allergic reaction (hives, swelling of the face and throat, difficulty breathing, a fast heartbeat, dizziness, or weakness), call 9-1-1 and get the person to the nearest hospital.  For other signs that concern you, call your health care provider. Adverse reactions should be reported to the Vaccine Adverse Event Reporting System (VAERS). Your health care provider will usually file this report, or you can do it yourself. Visit the VAERS website at www.vaers. hhs.gov at www.vaers. hhs.gov or call 7-357.330.6713. VAERS is only for reporting reactions, and VAERS staff do not give medical advice. How can I learn more? · Ask your health care provider. · Call your local or state health department. · Contact the Centers for Disease Control and Prevention (CDC):  ? Call 5-184.638.6782 (1-800-CDC-INFO) or  ? Visit CDC's website at www.cdc.gov/vaccines  Vaccine Information Statement  PPSV23 Vaccine  10/30/2019  [facility] and [facility] for Disease Control and Prevention  Many Vaccine Information Statements are available in Romanian and other languages. See www.immunize.org/vis.   Hojas de información Sobre Vacunas están disponibles en español y en muchos otros idiomas. Visite Trice.si. Care instructions adapted under license by [employer] (which disclaims liability or warranty for this information). If you have questions about a medical condition or this instruction, always ask your healthcare professional. [address] any warranty or liability for your use of this information. Recombinant Zoster (Shingles) Vaccine: What You Need to Know  Why get vaccinated? Recombinant zoster (shingles) vaccine can prevent shingles. Shingles (also called herpes zoster, or just zoster) is a painful skin rash, usually with blisters. In addition to the rash, shingles can cause fever, headache, chills, or upset stomach. More rarely, shingles can lead to pneumonia, hearing problems, blindness, brain inflammation (encephalitis), or death. The most common complication of shingles is long-term nerve pain called postherpetic neuralgia (PHN). PHN occurs in the areas where the shingles rash was, even after the rash clears up. It can last for months or years after the rash goes away. The pain from PHN can be severe and debilitating. About 10 to 18% of people who get shingles will experience PHN. The risk of PHN increases with age. An older adult with shingles is more likely to develop PHN and have longer lasting and more severe pain than a younger person with shingles. Shingles is caused by the varicella zoster virus, the same virus that causes chickenpox. After you have chickenpox, the virus stays in your body and can cause shingles later in life. Shingles cannot be passed from one person to another, but the virus that causes shingles can spread and cause chickenpox in someone who had never had chickenpox or received chickenpox vaccine. Recombinant shingles vaccine  Recombinant shingles vaccine provides strong protection against shingles.  By preventing shingles, recombinant shingles vaccine also protects against PHN. Recombinant shingles vaccine is the preferred vaccine for the prevention of shingles. However, a different vaccine, live shingles vaccine, may be used in some circumstances. The recombinant shingles vaccine is recommended for adults 50 years and older without serious immune problems. It is given as a two-dose series. This vaccine is also recommended for people who have already gotten another type of shingles vaccine, the live shingles vaccine. There is no live virus in this vaccine. Shingles vaccine may be given at the same time as other vaccines. Talk with your health care provider  Tell your vaccine provider if the person getting the vaccine:  · Has had an allergic reaction after a previous dose of recombinant shingles vaccine, or has any severe, life-threatening allergies. · Is pregnant or breastfeeding. · Is currently experiencing an episode of shingles. In some cases, your health care provider may decide to postpone shingles vaccination to a future visit. People with minor illnesses, such as a cold, may be vaccinated. People who are moderately or severely ill should usually wait until they recover before getting recombinant shingles vaccine. Your health care provider can give you more information. Risks of a vaccine reaction  · A sore arm with mild or moderate pain is very common after recombinant shingles vaccine, affecting about 80% of vaccinated people. Redness and swelling can also happen at the site of the injection. · Tiredness, muscle pain, headache, shivering, fever, stomach pain, and nausea happen after vaccination in more than half of people who receive recombinant shingles vaccine. In clinical trials, about 1 out of 6 people who got recombinant zoster vaccine experienced side effects that prevented them from doing regular activities. Symptoms usually went away on their own in 2 to 3 days.   You should still get the second dose of recombinant zoster vaccine even if you had one of these reactions after the first dose. People sometimes faint after medical procedures, including vaccination. Tell your provider if you feel dizzy or have vision changes or ringing in the ears. As with any medicine, there is a very remote chance of a vaccine causing a severe allergic reaction, other serious injury, or death. What if there is a serious problem? An allergic reaction could occur after the vaccinated person leaves the clinic. If you see signs of a severe allergic reaction (hives, swelling of the face and throat, difficulty breathing, a fast heartbeat, dizziness, or weakness), call 9-1-1 and get the person to the nearest hospital.  For other signs that concern you, call your health care provider. Adverse reactions should be reported to the Vaccine Adverse Event Reporting System (VAERS). Your health care provider will usually file this report, or you can do it yourself. Visit the VAERS website at www.vaers. [facility].gov or call 4-755.892.6405. VAERS is only for reporting reactions, and VAERS staff do not give medical advice. How can I learn more? · Ask your health care provider. · Call your local or state health department. · Contact the Centers for Disease Control and Prevention (CDC):  ? Call 8-876.469.1239 (1-800-CDC-INFO) or  ? Visit CDC's website at www.cdc.gov/vaccines  Vaccine Information Statement  Recombinant Zoster Vaccine  10/30/2019  [facility] and [facility] for Disease Control and Prevention  Many Vaccine Information Statements are available in Brazilian and other languages. See www.immunize.org/vis. Hojas de Información Sobre Vacunas están disponibles en Español y en muchos otros idiomas. Visite Trice.si   Care instructions adapted under license by [employer] (which disclaims liability or warranty for this information).  If you have questions about a medical condition or this instruction, always ask your healthcare professional. [address] any warranty or liability for your use of this information. Vaccine Information Statement    Pneumococcal Polysaccharide Vaccine (PPSV23): What You Need to Know    Many Vaccine Information Statements are available in Panamanian and other languages. See www.immunize.org/vis  Hojas de información sobre vacunas están disponibles en español y en muchos otros idiomas. Visite www.immunize.org/vis    1. Why get vaccinated? Pneumococcal polysaccharide vaccine (PPSV23) can prevent pneumococcal disease. Pneumococcal disease refers to any illness caused by pneumococcal bacteria. These bacteria can cause many types of illnesses, including pneumonia, which is an infection of the lungs. Pneumococcal bacteria are one of the most common causes of pneumonia. Besides pneumonia, pneumococcal bacteria can also cause:   Ear infections   Sinus infections   Meningitis (infection of the tissue covering the brain and spinal cord)   Bacteremia (bloodstream infection)    Anyone can get pneumococcal disease, but children under 3years of age, people with certain medical conditions, adults 72 years or older, and cigarette smokers are at the highest risk. Most pneumococcal infections are mild. However, some can result in long-term problems, such as brain damage or hearing loss. Meningitis, bacteremia, and pneumonia caused by pneumococcal disease can be fatal.     2. PPSV23     PPSV23 protects against 23 types of bacteria that cause pneumococcal disease. PPSV23 is recommended for:   All adults 72 years or older,   Anyone 2 years or older with certain medical conditions that can lead to an increased risk for pneumococcal disease. Most people need only one dose of PPSV23. A second dose of PPSV23, and another type of pneumococcal vaccine called PCV13, are recommended for certain high-risk groups.   Your health care provider can give you more information. People 65 years or older should get a dose of PPSV23 even if they have already gotten one or more doses of the vaccine before they turned 72.    3. Talk with your health care provider    Tell your vaccine provider if the person getting the vaccine:   Has had an allergic reaction after a previous dose of PPSV23, or has any severe, life-threatening allergies. In some cases, your health care provider may decide to postpone PPSV23 vaccination to a future visit. People with minor illnesses, such as a cold, may be vaccinated. People who are moderately or severely ill should usually wait until they recover before getting PPSV23. Your health care provider can give you more information. 4. Risks of a vaccine reaction     Redness or pain where the shot is given, feeling tired, fever, or muscle aches can happen after PPSV23. People sometimes faint after medical procedures, including vaccination. Tell your provider if you feel dizzy or have vision changes or ringing in the ears. As with any medicine, there is a very remote chance of a vaccine causing a severe allergic reaction, other serious injury, or death. 5. What if there is a serious problem? An allergic reaction could occur after the vaccinated person leaves the clinic. If you see signs of a severe allergic reaction (hives, swelling of the face and throat, difficulty breathing, a fast heartbeat, dizziness, or weakness), call 9-1-1 and get the person to the nearest hospital.    For other signs that concern you, call your health care provider. Adverse reactions should be reported to the Vaccine Adverse Event Reporting System (VAERS). Your health care provider will usually file this report, or you can do it yourself. Visit the VAERS website at www.vaers. hhs.gov or call 4-826.625.2670. VAERS is only for reporting reactions, and VAERS staff do not give medical advice. 6. How can I learn more?      Ask your health care provider.  Call your local or state health department.    Contact the Centers for Disease Control and Prevention (CDC):  - Call 8-508.469.8844 (1-800-CDC-INFO) or  - Visit CDCs website at www.cdc.gov/vaccines    Vaccine Information Statement   PPSV23   10/30/2019    [facility] for Disease Control and Prevention    Office Use Only

## 2021-01-04 NOTE — PROGRESS NOTES
Subjective: (As above and below)     Chief Complaint   Patient presents with    Follow-up     3 month      Koloa [place] is a 65y.o. year old female who presents for     Hypertension ROS:  taking medications as instructed, no medication side effects noted, no TIAs, no chest pain on exertion, no dyspnea on exertion, no swelling of ankles    Diabetic Review of Systems - medication compliance: compliant all of the time, diabetic diet compliance: compliant most of the time, home glucose monitoring: is performed regularly. Some diet \"issues\"- likes potatoes, is followed by a dietician    CHF: stable, denies cp, sob= followed by cardio    MVR: s/p mitral valve clip, she is in cardiac rehab, reports feeling wel    Hypothyroidism: med adherent    Sleep apnea: adherent w/ bipap    CKD: followed by nephrology, sees Dr. Kalin Kirk tomorrow    Wt Readings from Last 3 Encounters:   01/04/21 225 lb (102.1 kg)   12/28/20 228 lb 9.6 oz (103.7 kg)   12/22/20 227 lb (103 kg)         Reviewed PmHx, RxHx, FmHx, SocHx, AllgHx and updated in chart.   Family History   Problem Relation Age of Onset   [facility] Hypertension Mother     Dementia Mother     Coronary Artery Disease Father 60    Sudden Death Father 58    Diabetes Son     Diabetes Brother        Past Medical History:   Diagnosis Date    Adverse effect of anesthesia     hard to wake up/uses BIPAP/\"try to avoid general if possible\"/intubated in past prior to going to sleep and it caused pt to be incontinent    Arthritis     Asthma     CAD (coronary artery disease)     Chronic kidney disease     elevataed creatinine    Chronic obstructive pulmonary disease (HCC)     Chronic pain     arthritis    Coagulation disorder ([address]     on plavix    Congestive heart failure (HCC)     Diabetes ([address]     Hypertension     Morbid obesity ([address]     Sleep apnea 1996    BIPAP with 2 liters oxygen    Thromboembolus ([address]     after heart surgery - left leg    Thyroid disease       Social History     Socioeconomic History    Marital status:      Spouse name: Not on file    Number of children: Not on file    Years of education: Not on file    Highest education level: Not on file   Tobacco Use    Smoking status: Former Smoker     Packs/day: 0.50     Years: 45.00     Pack years: 22.50     Types: Cigarettes     Quit date: 2010     Years since quittin.0    Smokeless tobacco: Never Used    Tobacco comment: quit /started again (smoked 3 yrs) off and on/none since    Substance and Sexual Activity    Alcohol use: Not Currently    Drug use: Not Currently    Sexual activity: Not Currently   Other Topics Concern          Current Outpatient Medications   Medication Sig    varicella-zoster recombinant, PF, (Shingrix, PF,) 50 mcg/0.5 mL susr injection 0.5 mL by IntraMUSCular route once for 1 dose.  pneumococcal 23-valent (PNEUMOVAX 23) 25 mcg/0.5 mL injection 0.5 mL by IntraMUSCular route once for 1 dose.  glipiZIDE (GLUCOTROL) 10 mg tablet Take 1 tablet by mouth twice daily with food    ezetimibe (ZETIA) 10 mg tablet Take 1 tablet by mouth once daily    furosemide (LASIX) 20 mg tablet Take 0.5 Tabs by mouth daily.  hydrALAZINE (APRESOLINE) 25 mg tablet Take 1 Tab by mouth three (3) times daily. (Patient taking differently: Take 25 mg by mouth three (3) times daily. 1/2 tab)    isosorbide mononitrate ER (IMDUR) 30 mg tablet Take 1 Tab by mouth every morning.  clopidogreL (PLAVIX) 75 mg tab Take 1 tablet by mouth once daily    sacubitriL-valsartan (Entresto) 24-26 mg tablet Take 1 Tab by mouth two (2) times a day.  metoprolol succinate (TOPROL-XL) 25 mg XL tablet Take 0.5 Tabs by mouth daily. Hold for systolic BP less than 560    empagliflozin (JARDIANCE) 25 mg tablet Take 1 Tab by mouth daily. For diabetes    gabapentin (NEURONTIN) 100 mg capsule Take 2 Caps by mouth three (3) times daily. Max Daily Amount: 600 mg.     levothyroxine (SYNTHROID) 100 mcg tablet Take 1 Tab by mouth Daily (before breakfast).  allopurinoL (ZYLOPRIM) 100 mg tablet Take 1 tablet by mouth once daily    atorvastatin (LIPITOR) 80 mg tablet TAKE 1 TABLET BY MOUTH AT BEDTIME    albuterol (PROVENTIL HFA, VENTOLIN HFA, PROAIR HFA) 90 mcg/actuation inhaler Take 2 Puffs by inhalation every four (4) hours as needed.  acetaminophen (TYLENOL ARTHRITIS PAIN) 650 mg TbER Take 1,300 mg by mouth two (2) times a day.  aspirin 81 mg chewable tablet Take 81 mg by mouth daily.  glucose blood VI test strips (blood glucose test) strip Check blood sugar 2 times daily: E11.9 may substitute for insurance preferred strips.  lancets misc Check blood sugar 2 times daily. May substitute for insurance preferred lancets.  Blood-Glucose Meter monitoring kit Check blood sugar daily. May substitute for insurance preferred meter    glucose blood VI test strips (ASCENSIA AUTODISC VI, ONE TOUCH ULTRA TEST VI) strip E11.65 check sugar once daily    ketorolac (ACULAR) 0.5 % ophthalmic solution INSTILL 1 DROP 4 TIMES DAILY INTO EYE HAVING SURGERY START 2 DAYS PRIOR TO SURGERY    ofloxacin (FLOXIN) 0.3 % ophthalmic solution INSTILL 1 DROP 4 TIMES DAILY INTO SURGERY EYE START 2 DAYS PRIOR TO SURGERY    prednisoLONE acetate (PRED FORTE) 1 % ophthalmic suspension INSTILL 1 DROP 4 TIMES DAILY INTO SUREGRY EYE TAPER AS DIRECTED    clotrimazole-betamethasone (LOTRISONE) topical cream Apply  to affected area two (2) times a day.  Blood-Glucose Meter monitoring kit Use as directed. Dx: E11.65 check sugar twice daily    lancets misc Use as directed. Dx:check sugar once daily. E11.65     No current facility-administered medications for this visit. Review of Systems:   Constitutional:    Negative for fever and chills, negative diaphoresis. HEENT:              Negative for neck pain and stiffness. Eyes:                  Negative for visual disturbance, itching, redness or discharge.    Respiratory: Negative for cough and shortness of breath. Cardiovascular:  Negative for chest pain and palpitations. Gastrointestinal: Negative for nausea, vomiting, abdominal pain, diarrhea or constipation. Genitourinary:     Negative for dysuria and frequency. Musculoskeletal: Negative for falls, tenderness and swelling. Skin:                    Negative for rash, masses or lesions. Neurological:       Negative for dizzyness, seizure, loss of consciousness, weakness and numbness. Objective:     Vitals:    01/04/21 1046   BP: 110/60   Pulse: 71   Resp: 20   Temp: 97.5 °F (36.4 °C)   TempSrc: Temporal   SpO2: 99%   Weight: 225 lb (102.1 kg)   Height: 5' 3\" (1.6 m)       Results for orders placed or performed in visit on 01/04/21   AMB POC HEMOGLOBIN A1C   Result Value Ref Range    Hemoglobin A1c (POC) 7.2 %       Gen: Oriented to person, place and time and well-developed, well-nourished and in no distress. HEENT:    Head: normocephalic and atraumatic. Eyes:  EOM are normal. Pupils equal and round. Neck:  Normal range of motion. Neck supple. Cardiovascular: normal rate, regular rhythm and normal heart sounds. Pulmonary/Chest:  Effort normal and breath sounds normal.  No respiratory distress. No wheezes, no rales. Abdominal: soft, normal  bowel sounds. Musculoskeletal:  No edema, no tenderness. No calf tenderness or edema. Neurological:  Alert, oriented to person, place and time. Skin: skin is warm and dry. Assessment/ Plan:     Follow-up and Dispositions    · Return in about 3 months (around 4/4/2021) for dm. 1. Controlled type 2 diabetes mellitus without complication, without long-term current use of insulin ([address]  She is working w/ dietician at cardiac rehab, discussed lowering carbs I.e potatoes, physical activity    2. Essential hypertension      3. Systolic CHF, chronic ([address]      4. Nonrheumatic mitral valve regurgitation  Cont care w/ cardic rehab    5.  Acquired hypothyroidism    - TSH REFLEX TO T4    6. Encounter for immunization    - varicella-zoster recombinant, PF, (Shingrix, PF,) 50 mcg/0.5 mL susr injection; 0.5 mL by IntraMUSCular route once for 1 dose. Dispense: 0.5 mL; Refill: 1  - pneumococcal 23-valent (PNEUMOVAX 23) 25 mcg/0.5 mL injection; 0.5 mL by IntraMUSCular route once for 1 dose. Dispense: 0.5 mL; Refill: 0    7. Stage 3a chronic kidney disease  Follows w/ nephro          I have discussed the diagnosis with the patient and the intended plan as seen in the above orders. The patient has received an after-visit summary and questions were answered concerning future plans. Pt conveyed understanding of plan. Medication Side Effects and Warnings were discussed with patient: yes  Patient Labs were reviewed: yes  Patient Past Records were reviewed:  yes    Dari Abad.  Wander Morrison NP

## 2021-01-04 NOTE — PROGRESS NOTES
Chief Complaint   Patient presents with    Follow-up     3 month        1. Have you been to the ER, urgent care clinic since your last visit? Hospitalized since your last visit? No    2. Have you seen or consulted any other health care providers outside of the [address] [place] [address] since your last visit? Include any pap smears or colon screening. Vi Sierra is a 65 y.o. female who presents for routine immunizations. She denies any symptoms , reactions or allergies that would exclude them from being immunized today. Risks and adverse reactions were discussed and the VIS was given to them. All questions were addressed. She was observed for 10 min post injection. There were no reactions observed.     Kelly Estes

## 2021-01-05 ENCOUNTER — TELEPHONE (OUTPATIENT)
Dept: INTERNAL MEDICINE CLINIC | Age: 71
End: 2021-01-05

## 2021-01-05 DIAGNOSIS — E03.9 ACQUIRED HYPOTHYROIDISM: Primary | ICD-10-CM

## 2021-01-05 LAB
ALBUMIN SERPL-MCNC: 3.8 G/DL (ref 3.7–4.7)
ALBUMIN/GLOB SERPL: 1.4 {RATIO} (ref 1.2–2.2)
ALP SERPL-CCNC: 93 IU/L (ref 39–117)
ALT SERPL-CCNC: 15 IU/L (ref 0–32)
AST SERPL-CCNC: 21 IU/L (ref 0–40)
BILIRUB SERPL-MCNC: 0.2 MG/DL (ref 0–1.2)
BUN SERPL-MCNC: 37 MG/DL (ref 8–27)
BUN/CREAT SERPL: 15 (ref 12–28)
CALCIUM SERPL-MCNC: 10.2 MG/DL (ref 8.7–10.3)
CHLORIDE SERPL-SCNC: 107 MMOL/L (ref 96–106)
CO2 SERPL-SCNC: 21 MMOL/L (ref 20–29)
CREAT SERPL-MCNC: 2.41 MG/DL (ref 0.57–1)
GLOBULIN SER CALC-MCNC: 2.7 G/DL (ref 1.5–4.5)
GLUCOSE SERPL-MCNC: 151 MG/DL (ref 65–99)
MAGNESIUM SERPL-MCNC: 2.3 MG/DL (ref 1.6–2.3)
NT-PROBNP SERPL-MCNC: 697 PG/ML (ref 0–301)
POTASSIUM SERPL-SCNC: 4.9 MMOL/L (ref 3.5–5.2)
PROT SERPL-MCNC: 6.5 G/DL (ref 6–8.5)
SODIUM SERPL-SCNC: 137 MMOL/L (ref 134–144)
T4 SERPL-MCNC: 5.1 UG/DL (ref 4.5–12)
TSH SERPL DL<=0.005 MIU/L-ACNC: 7.13 UIU/ML (ref 0.45–4.5)

## 2021-01-05 RX ORDER — LEVOTHYROXINE SODIUM 150 UG/1
150 TABLET ORAL
Qty: 90 TAB | Refills: 0 | Status: SHIPPED | OUTPATIENT
Start: 2021-01-05 | End: 2021-02-18 | Stop reason: DRUGHIGH

## 2021-01-05 NOTE — TELEPHONE ENCOUNTER
jeremiah dennison  Reviewed labs  tsh high  She does not miss doses  Dose adjust, labs in 6 weeks  Pt verb understanding

## 2021-01-06 ENCOUNTER — OFFICE VISIT (OUTPATIENT)
Dept: CARDIOLOGY CLINIC | Age: 71
End: 2021-01-06
Payer: MEDICARE

## 2021-01-06 ENCOUNTER — TELEPHONE (OUTPATIENT)
Dept: CARDIOLOGY CLINIC | Age: 71
End: 2021-01-06

## 2021-01-06 DIAGNOSIS — Z95.810 AUTOMATIC IMPLANTABLE CARDIAC DEFIBRILLATOR IN SITU: Primary | ICD-10-CM

## 2021-01-06 PROCEDURE — 93296 REM INTERROG EVL PM/IDS: CPT | Performed by: INTERNAL MEDICINE

## 2021-01-06 PROCEDURE — 93295 DEV INTERROG REMOTE 1/2/MLT: CPT | Performed by: INTERNAL MEDICINE

## 2021-01-08 NOTE — TELEPHONE ENCOUNTER
Future Appointments   Date Time Provider Skye [place]   1/11/2021  2:00 PM [address]   1/12/2021  2:00  [address] [facility]   1/12/2021  3:00  [address] [facility]   1/15/2021  1:30 PM Scott Burroughs RD [facility]   1/15/2021  2:00  [address] [facility]   1/18/2021  2:00  [facility] [address] [facility] [facility]   1/19/2021 10:00 AM MD HARLEY Porter BS AMB   1/22/2021  2:00  [address] [facility]   1/25/2021  2:00  [address] [facility] [facility]   1/26/2021  2:00  [address] [facility]   1/26/2021  3:00 PM [address] [facility]   1/29/2021  2:00 PM [address] [facility]   2/1/2021  2:00 PM [address]   2/2/2021 10:30 AM Sandra Daly NP [facility] BS AMB   2/2/2021  2:00 PM [place]   2/2/2021  3:00  [address] [facility]   2/5/2021  2:00 PM [address]   2/8/2021  2:00  [address] [facility]   2/9/2021  2:00  [address] [facility]   2/9/2021  3:00  [address] [facility]   2/12/2021  2:00  [address] [facility] [facility]   2/15/2021  2:00  [address] [facility] [facility]   2/16/2021  2:00  [address] [facility]   2/16/2021  3:00  [address] [facility] [facility]   2/19/2021  2:00  [facility] [address] [facility]   2/22/2021  2:00  [address] [facility]   2/23/2021  2:00  [address] [facility]   2/23/2021  3[facility]   2/26/2021  2:00 PM [address]   3/1/2021  2:00 PM [address] [facility]   3/2/2021  2:00  [address] [facility]   3/2/2021  3:00 PM [address] [facility]   3/5/2021  2:00 PM [address] [facility]   3/8/2021  2:00 PM [address] [facility]   3/9/2021  2:00  [address] [facility]   3/9/2021  3:00 PM [address]   4/5/2021 10:45 AM Tadeo HOOD, VENKATESH STANTON BS AMB   5/10/2021  8:00 AM REMOTE1, RUDY SOUZA BS AMB   7/6/2021  1:00 PM PACEMAKER3, RUDY SOUZA BS AMB   7/6/2021  1:20 PM MD HARLEY Kelly BS AMB

## 2021-01-11 ENCOUNTER — HOSPITAL ENCOUNTER (OUTPATIENT)
Dept: CARDIAC REHAB | Age: 71
Discharge: HOME | End: 2021-01-11
Payer: MEDICARE

## 2021-01-11 VITALS — BODY MASS INDEX: 40.81 KG/M2 | WEIGHT: 230.4 LBS

## 2021-01-11 PROCEDURE — 93798 PHYS/QHP OP CAR RHAB W/ECG: CPT

## 2021-01-12 ENCOUNTER — TELEPHONE (OUTPATIENT)
Dept: CARDIAC REHAB | Age: 71
End: 2021-01-12

## 2021-01-12 NOTE — TELEPHONE ENCOUNTER
Keyshawndandre Bergeron was absent from cardiac rehab today. I called to check on her but the vm was full. She called back to say she had forgotten to come. I verified her schedule and she will return on Friday.   Ifeoma Sofia RN

## 2021-01-15 ENCOUNTER — HOSPITAL ENCOUNTER (OUTPATIENT)
Dept: CARDIAC REHAB | Age: 71
Discharge: HOME | End: 2021-01-15
Payer: MEDICARE

## 2021-01-15 VITALS — BODY MASS INDEX: 40.46 KG/M2 | WEIGHT: 228.4 LBS

## 2021-01-15 PROCEDURE — 93798 PHYS/QHP OP CAR RHAB W/ECG: CPT

## 2021-01-15 NOTE — CARDIO/PULMONARY
Cardiac Rehab Nutrition Assessment - 1:1 Evaluation NAME: Chastity Funk : 1950 AGE: 65 y.o. GENDER: female CARDIAC REHAB ADMITTING DIAGNOSIS: valve surgery Relevant Comorbidites: diabetes, hypertension, dyslipidemia, renal disease, cardiovascular disease and heart failure LABS:  
Lab Results Component Value Date/Time Hemoglobin A1c 7.6 (H) 10/29/2020 10:45 AM  
 Hemoglobin A1c (POC) 7.2 2021 12:03 PM  
 
Lab Results Component Value Date/Time Cholesterol, total 172 2020 09:12 AM  
 HDL Cholesterol 75 2020 09:12 AM  
 LDL, calculated 78 2020 09:12 AM  
 VLDL, calculated 19 2020 09:12 AM  
 Triglyceride 93 2020 09:12 AM  
 CHOL/HDL Ratio 2.6 2020 12:35 AM  
 
SMBG daily MEDICATIONS/SUPPLEMENTS:  
@K@ Prior to Admission medications Medication Sig Start Date End Date Taking? Authorizing Provider  
levothyroxine (SYNTHROID) 150 mcg tablet Take 1 Tab by mouth Daily (before breakfast). 21   Leisa Francis., VENKATESH  
glipiZIDE (GLUCOTROL) 10 mg tablet Take 1 tablet by mouth twice daily with food 20   Leisa Francis., VENKATESH  
ezetimibe (ZETIA) 10 mg tablet Take 1 tablet by mouth once daily 20   Polliard, Corinne Raja, NP  
furosemide (LASIX) 20 mg tablet Take 0.5 Tabs by mouth daily. 20   Peter Blankenship NP  
glucose blood VI test strips (blood glucose test) strip Check blood sugar 2 times daily: E11.9 may substitute for insurance preferred strips. 20   Whitney Hilliard NP  
lancets misc Check blood sugar 2 times daily. May substitute for insurance preferred lancets. 20   Whitney Hilliard NP Blood-Glucose Meter monitoring kit Check blood sugar daily.  May substitute for insurance preferred meter 20   Whitney Hilliard NP  
glucose blood VI test strips (ASCENSIA AUTODISC VI, ONE TOUCH ULTRA TEST VI) strip E11.65 check sugar once daily 20   Venkatesh Jessica MD  
 hydrALAZINE (APRESOLINE) 25 mg tablet Take 1 Tab by mouth three (3) times daily. Patient taking differently: Take 25 mg by mouth three (3) times daily. 1/2 tab 11/30/20   Farley Phalen B, NP  
isosorbide mononitrate ER (IMDUR) 30 mg tablet Take 1 Tab by mouth every morning. 11/30/20   Farley Phalen B, NP  
clopidogreL (PLAVIX) 75 mg tab Take 1 tablet by mouth once daily 11/25/20   Rae HOOD NP  
ketorolac (ACULAR) 0.5 % ophthalmic solution INSTILL 1 DROP 4 TIMES DAILY INTO EYE HAVING SURGERY START 2 DAYS PRIOR TO SURGERY 10/22/20   Provider, Historical  
ofloxacin (FLOXIN) 0.3 % ophthalmic solution INSTILL 1 DROP 4 TIMES DAILY INTO SURGERY EYE START 2 DAYS PRIOR TO SURGERY 10/22/20   Provider, Historical  
prednisoLONE acetate (PRED FORTE) 1 % ophthalmic suspension INSTILL 1 DROP 4 TIMES DAILY INTO SUREGRY EYE TAPER AS DIRECTED 10/22/20   Provider, Historical  
sacubitriL-valsartan (Entresto) 24-26 mg tablet Take 1 Tab by mouth two (2) times a day. 11/20/20   Sonya Mckeon NP  
metoprolol succinate (TOPROL-XL) 25 mg XL tablet Take 0.5 Tabs by mouth daily. Hold for systolic BP less than 987 11/13/20   Lesli Lacey NP  
empagliflozin (JARDIANCE) 25 mg tablet Take 1 Tab by mouth daily. For diabetes 10/27/20   Lord Bryant NP  
gabapentin (NEURONTIN) 100 mg capsule Take 2 Caps by mouth three (3) times daily. Max Daily Amount: 600 mg. 10/13/20   Lord Dasilva., NP  
clotrimazole-betamethasone (LOTRISONE) topical cream Apply  to affected area two (2) times a day. 9/23/20   Lord Dasilva., NP Blood-Glucose Meter monitoring kit Use as directed. Dx: E11.65 check sugar twice daily 8/25/20   Lord Dasilva., NP  
lancets misc Use as directed. Dx:check sugar once daily.  E11.65 8/19/20   Lord Hurtado, NP  
allopurinoL (ZYLOPRIM) 100 mg tablet Take 1 tablet by mouth once daily 5/28/20   Donald Congress, NP  
 atorvastatin (LIPITOR) 80 mg tablet TAKE 1 TABLET BY MOUTH AT BEDTIME 4/10/20   Rebecca Blankenship, NP  
albuterol (PROVENTIL HFA, VENTOLIN HFA, PROAIR HFA) 90 mcg/actuation inhaler Take 2 Puffs by inhalation every four (4) hours as needed. Provider, Historical  
acetaminophen (TYLENOL ARTHRITIS PAIN) 650 mg TbER Take 1,300 mg by mouth two (2) times a day. Provider, Historical  
aspirin 81 mg chewable tablet Take 81 mg by mouth daily. Provider, Historical  
 
 
 
 
ANTHROPOMETRICS:   
Ht Readings from Last 1 Encounters:  
01/04/21 5' 3\" (1.6 m) Wt Readings from Last 1 Encounters:  
01/11/21 104.5 kg (230 lb 6.4 oz) IBW:115 # +/- 10%  %IBW: 200 % +/- 10% BMI: 40.8 kg/M2 Category: morbid obesity Waist: 44.5 inches Reported Wt Hx: maintained weight throughout cardiac rehab last fall; gained a few pounds over the holidays Reported Diet Hx: 
 
Rate Your Plate Score: 59 (Score 58-72: Making many healthy choices; 41-57: Some choices need improving 24-40: many choices need improving) 24 Hour Diet Recall Breakfast Oatmeal with a little brown sugar & butter Lunch Dinner [employer], rice, mixed veggies (used the portion plate) Snacks Beverages Kassy Spencer states using the portion plate provided during cardiac rehab has been very helpful. She knows that planning her meals and cutting back on fast food would help her. She also is working on eating breakfast consistently as this helps her with portions and choices later in the day. She never eats 3 meals a day, but she might have fruit between meals or at night. Environmental/Social: Annika Merida lives with her sister, they are in the midst of moving. Her sister often requests fast food and Annika Merida has a hard time turning it down. NUTRITION INTERVENTION: 
Nutrition 30 minute one-on-one education & goal setting with Kassy Spencer Reviewed with Kassy Spencer relevant labs compared to ideals. Reviewed weight history and patient's verbalized weight goal as well as any real or perceived barriers to obtaining the goal. Collaborated with patient to set a specific short and long term weight goal.  
 
Reviewed Rate Your Plate and conducted a verbal diet recall. Assessed for environmental, financial, psychosocial, physical and comorbidities that may impact the food and eating patterns / behaviors of Caity Mina Collaborated with patient to set specific nutrient goals as well as specific food / behavior changes that will help patient meet the overall goal of following a heart healthy eating pattern (using guidelines as set forth by the American Heart Association and modeled after healthful eating patterns as recognized by the USDA Dietary Guidelines such as DASH, Mediterranean or plant-based). Briefly reviewed with Caity Mina the nutrition information in the Cardiac Rehab patient education book and encouraged Caity Mina to read thoroughly, ask questions as needed, and use for future reference for heart healthy nutrition information. Caity Mina is scheduled to participate in Cardiac Rehab group nutrition classes. PATIENT GOALS: 
 
Weight Goals: 
Short Term Weight Goal:<220 lbs Long Term Weight Goal:<200 lbs Nutrition Goals: 
Daily Recommendations: 
Calories: 3700-2202 /day 
(using [address] with AF 1.35 and deducting 545-745 for weight loss) Saturated Fat: no more than 9-10 g/day Trans Fat: 0 g/day Sodium: no more than 1500 mg/day Fruit: 1-1.5 cups / day Vegetables: 1.5 cups/day Other: 
- read and compare food labels 
- eat breakfast at least 5 days per week  
- use the plate for portion control - plan / prep ahead to decrease reliance on fast food; provided meal planner tool 
- use provided fast food guide to help make better choices Keeping a food diary was recommended. Questions addressed. Follow-up plans discussed. Keyshawn Bergeron verbalized understanding.  
 
        Balbir Santos RD

## 2021-01-18 ENCOUNTER — HOSPITAL ENCOUNTER (OUTPATIENT)
Dept: CARDIAC REHAB | Age: 71
Discharge: HOME | End: 2021-01-18
Payer: MEDICARE

## 2021-01-18 VITALS — BODY MASS INDEX: 40.49 KG/M2 | WEIGHT: 228.6 LBS

## 2021-01-18 PROCEDURE — 93798 PHYS/QHP OP CAR RHAB W/ECG: CPT

## 2021-01-19 ENCOUNTER — APPOINTMENT (OUTPATIENT)
Dept: CARDIAC REHAB | Age: 71
End: 2021-01-19
Payer: MEDICARE

## 2021-01-22 ENCOUNTER — HOSPITAL ENCOUNTER (OUTPATIENT)
Dept: CARDIAC REHAB | Age: 71
Discharge: HOME | End: 2021-01-22
Payer: MEDICARE

## 2021-01-22 VITALS — WEIGHT: 230.4 LBS | BODY MASS INDEX: 40.81 KG/M2

## 2021-01-22 PROCEDURE — 93798 PHYS/QHP OP CAR RHAB W/ECG: CPT

## 2021-01-25 ENCOUNTER — HOSPITAL ENCOUNTER (OUTPATIENT)
Dept: CARDIAC REHAB | Age: 71
Discharge: HOME | End: 2021-01-25
Payer: MEDICARE

## 2021-01-25 VITALS — WEIGHT: 228.4 LBS | BODY MASS INDEX: 40.46 KG/M2

## 2021-01-25 PROCEDURE — 93798 PHYS/QHP OP CAR RHAB W/ECG: CPT

## 2021-01-26 ENCOUNTER — HOSPITAL ENCOUNTER (OUTPATIENT)
Dept: CARDIAC REHAB | Age: 71
Discharge: HOME | End: 2021-01-26
Payer: MEDICARE

## 2021-01-26 ENCOUNTER — OFFICE VISIT (OUTPATIENT)
Dept: CARDIOLOGY CLINIC | Age: 71
End: 2021-01-26
Payer: MEDICARE

## 2021-01-26 VITALS
HEIGHT: 63 IN | HEART RATE: 81 BPM | DIASTOLIC BLOOD PRESSURE: 60 MMHG | RESPIRATION RATE: 18 BRPM | SYSTOLIC BLOOD PRESSURE: 120 MMHG | WEIGHT: 227 LBS | BODY MASS INDEX: 40.22 KG/M2 | OXYGEN SATURATION: 99 %

## 2021-01-26 VITALS — BODY MASS INDEX: 40.46 KG/M2 | WEIGHT: 228.4 LBS

## 2021-01-26 DIAGNOSIS — I34.0 SEVERE MITRAL REGURGITATION: ICD-10-CM

## 2021-01-26 DIAGNOSIS — I34.0 NONRHEUMATIC MITRAL VALVE REGURGITATION: Primary | ICD-10-CM

## 2021-01-26 PROCEDURE — 1100F PTFALLS ASSESS-DOCD GE2>/YR: CPT | Performed by: INTERNAL MEDICINE

## 2021-01-26 PROCEDURE — 99204 OFFICE O/P NEW MOD 45 MIN: CPT | Performed by: INTERNAL MEDICINE

## 2021-01-26 PROCEDURE — G8754 DIAS BP LESS 90: HCPCS | Performed by: INTERNAL MEDICINE

## 2021-01-26 PROCEDURE — 93798 PHYS/QHP OP CAR RHAB W/ECG: CPT

## 2021-01-26 PROCEDURE — G8510 SCR DEP NEG, NO PLAN REQD: HCPCS | Performed by: INTERNAL MEDICINE

## 2021-01-26 PROCEDURE — 93797 PHYS/QHP OP CAR RHAB WO ECG: CPT | Performed by: DIETITIAN, REGISTERED

## 2021-01-26 PROCEDURE — G9899 SCRN MAM PERF RSLTS DOC: HCPCS | Performed by: INTERNAL MEDICINE

## 2021-01-26 PROCEDURE — 3288F FALL RISK ASSESSMENT DOCD: CPT | Performed by: INTERNAL MEDICINE

## 2021-01-26 PROCEDURE — G8427 DOCREV CUR MEDS BY ELIG CLIN: HCPCS | Performed by: INTERNAL MEDICINE

## 2021-01-26 PROCEDURE — G8536 NO DOC ELDER MAL SCRN: HCPCS | Performed by: INTERNAL MEDICINE

## 2021-01-26 PROCEDURE — G8399 PT W/DXA RESULTS DOCUMENT: HCPCS | Performed by: INTERNAL MEDICINE

## 2021-01-26 PROCEDURE — 1090F PRES/ABSN URINE INCON ASSESS: CPT | Performed by: INTERNAL MEDICINE

## 2021-01-26 PROCEDURE — 3017F COLORECTAL CA SCREEN DOC REV: CPT | Performed by: INTERNAL MEDICINE

## 2021-01-26 PROCEDURE — G8417 CALC BMI ABV UP PARAM F/U: HCPCS | Performed by: INTERNAL MEDICINE

## 2021-01-26 PROCEDURE — G8752 SYS BP LESS 140: HCPCS | Performed by: INTERNAL MEDICINE

## 2021-01-26 NOTE — LETTER
1/26/2021 Patient: Sameera Vides YOB: 1950 Date of Visit: 1/26/2021 Keisha Worley, [address] [facility] [address] [facility] [address] [id number] [address] [employer] Dear Delonte Sanchez. Zahraa Worley NP, Thank you for referring Ms. Bal Temple to [address] for evaluation. My notes for this consultation are attached. If you have questions, please do not hesitate to call me. I look forward to following your patient along with you.  
 
 
Sincerely, 
 
Celina Clinton MD

## 2021-01-26 NOTE — PROGRESS NOTES
[facility] [place]: Ena Mcclendon  [phone]          Cardiology valvular heart disease consult/Progress Note      Requesting/referring provider: Chantel Reyes., Kareem. Fan Monae  Reason for Consult: Mitral valve disease    HPI: Arun Hellre, a 65y.o. year-old who presents for evaluation of mitral valve disease .70y. o.female with PMHx of CAD with CAB x 3 in 1997 then subsequent stents X 4, HTN, HLD, palpitations, ICD 2007, DM, hypothyroid, COPD, WES-wears BiPAP, CKD, DVT 1997 left leg, right TKR, Lap band 2011. Ms. Amy Alvarez was previously seen by me for underlying ischemic cardiomyopathy and prior inferior myocardial infarction. She is status post coronary bypass grafting. A cardiac catheterization was performed in February 2020 demonstrated patent LIMA to LAD and vein graft to RCA with collateral dependent circumflex and native right PDA. Her vessels were too small to achieve significant further revascularization. She has been optimized for heart failure reduced ejection fraction guideline directed medical therapy by advanced heart failure team and continue to have symptoms of shortness of breath and pedal edema despite aggressive optimization of her medical therapy. Hence but eventually decided to proceed with transcatheter [id number] repair with MitraClip device given significant mitral regurgitation despite optimization of medical therapy in the setting of an underlying cardiomyopathy leading to functional mitral regurgitation. She reports improvement in energy levels, shortness of breath and pedal edema since the procedure. Investigations personally reviewed by me  Cardiac catheterization February 2020: Severe native three-vessel disease. Patent LIMA to LAD and vein graft to RCA. Right PDA small diffusely diseased vessel. Left circumflex is chronically occluded and branches are filling by collaterals.   Small diagonal is severely diffusely diseased. ECG January 2020: Sinus rhythm, prior inferior infarct, narrow QRS complex, single PVC noted. Echo June 2020: Severe LV systolic dysfunction. Overall EF about 30%. Global hypokinesis. Mid and basal inferolateral walls appear to be severely hypokinetic with possible prior infarct based on hyperechoic nature of this wall. Based on color Doppler it appears to be significant mitral regurgitation which is predominantly central with some degree of posteriorly directed nature of the jet. Mechanism of mitral regurgitation appears to be annular dilatation and lack of Mal coaptation secondary to predominantly posterior leaflet tethering. Moderate pulmonary hypertension is noted. Echocardiogram December 2020: Status post MitraClip, mitral regurgitation reduced from severe to mild to moderate. Clip seen expected position and secured. Overall LV systolic function about 25 to 30%. Moderate biatrial enlargement. Inferolateral wall appears to be severely hypokinetic and the rest of the areas are moderate ly hypokinetic.mean transmitral gradient was 3 mmHg    Assessment/Plan:  1. Coronary disease manifested in the form of prior inferior MI, status post coronary bypass grafting with patent LIMA to LAD and vein graft to RCA and chronically occluded left circumflex with collaterals  2. Severe LV systolic dysfunction/heart failure reduced ejection fraction acute on chronic likely secondary to 1 with progressive adverse LV remodeling  3. Severe mitral regurgitation appears functional in nature secondary to progressive adverse LV remodeling and possible papillary muscle dysfunction from posterior myocardial infarction--status post MitraClip NT W placement in November 2020  4. Severe NYHA class III symptomatic limitations  5. History of lower extremity thromboembolism post coronary bypass grafting  6. CKD with baseline creatinine about 2  7. Morbid obesity with suspected sleep apnea    Ms. Tavo Mejia was seen in follow-up after MitraClip procedure. She has reported significant improvement in energy and episodes of dyspnea since undergoing the procedure. She does not have any episodes of profound hypoxemia and shortness of breath as she used to have before the procedure which were likely consistent with PND episodes. She is now engaging in cardiac rehab and has been gradually increasing her peak activity tolerance. Heart failure does not seem to be decompensated and her blood pressure medications are well tolerated. Since her MitraClip procedure per creatinine has improved and she is more responsive to diuretic therapy. Pedal edema has significantly improved as well. I think she is doing well from a congestive heart failure standpoint. Clinically MR seems to have improved. We will see her back in November 2021 during her first year anniversary status post MitraClip in an echocardiogram will be performed for reassessment of degree of mitral regurgitation. [x]    High complexity decision making was performed  []    Patient is at high-risk of decompensation with multiple organ involvement  She  has a past medical history of Adverse effect of anesthesia, Arthritis, Asthma, CAD (coronary artery disease), Chronic kidney disease, Chronic obstructive pulmonary disease ([address] Chronic pain, Coagulation disorder ([address] Congestive heart failure ([address] Diabetes ([address] Hypertension, Morbid obesity ([address] Sleep apnea (1996), Thromboembolus ([address] and Thyroid disease. Review of system:Patient reports no PND/Orthpnea/CP. sHe reports no cough/fever/focal neurological deficits/abdominal pain. All other systems negative except as above.    Family History   Problem Relation Age of Onset    Hypertension Mother     Dementia Mother     Coronary Artery Disease Father 58    Sudden Death Father 58    Diabetes Son     Diabetes Brother       Social History     Socioeconomic History    Marital status:      Spouse name: Not on file    Number of children: Not on file    Years of education: Not on file    Highest education level: Not on file   Tobacco Use    Smoking status: Former Smoker     Packs/day: 0.50     Years: 45.00     Pack years: 22.50     Types: Cigarettes     Quit date: 2010     Years since quittin.0    Smokeless tobacco: Never Used    Tobacco comment: quit /started again (smoked 3 yrs) off and on/none since    Substance and Sexual Activity    Alcohol use: Not Currently    Drug use: Not Currently    Sexual activity: Not Currently   Other Topics Concern      PE  Vitals:    21 0849   BP: 120/60   Pulse: 81   Resp: 18   SpO2: 99%   Weight: 227 lb (103 kg)   Height: 5' 3\" (1.6 m)    Body mass index is 40.21 kg/m².     Recent Labs:  Lab Results   Component Value Date/Time    Cholesterol, total 172 2020 09:12 AM    HDL Cholesterol 75 2020 09:12 AM    LDL, calculated 78 2020 09:12 AM    Triglyceride 93 2020 09:12 AM    CHOL/HDL Ratio 2.6 2020 12:35 AM     Lab Results   Component Value Date/Time    Creatinine 2.41 (H) 2021 12:16 PM     Lab Results   Component Value Date/Time    BUN 37 (H) 2021 12:16 PM     Lab Results   Component Value Date/Time    Potassium 4.9 2021 12:16 PM     Lab Results   Component Value Date/Time    Hemoglobin A1c 7.6 (H) 10/29/2020 10:45 AM     Lab Results   Component Value Date/Time    HGB 10.0 (L) 2020 01:15 AM     Lab Results   Component Value Date/Time    PLATELET 436  01:15 AM       Reviewed:  Past Medical History:   Diagnosis Date    Adverse effect of anesthesia     hard to wake up/uses BIPAP/\"try to avoid general if possible\"/intubated in past prior to going to sleep and it caused pt to be incontinent    Arthritis     Asthma     CAD (coronary artery disease)     Chronic kidney disease     elevataed creatinine    Chronic obstructive pulmonary disease (HCC)     Chronic pain     arthritis    Coagulation disorder ([facility]     on plavix    Congestive heart failure ([facility]     Diabetes ([facility]     Hypertension     Morbid obesity ([facility]     Sleep apnea     BIPAP with 2 liters oxygen    Thromboembolus ([facility]     after heart surgery - left leg    Thyroid disease      Social History     Tobacco Use   Smoking Status Former Smoker    Packs/day: 0.50    Years: 45.00    Pack years: 22.50    Types: Cigarettes    Quit date: 2010    Years since quittin.0   Smokeless Tobacco Never Used   Tobacco Comment    quit /started again (smoked 3 yrs) off and on/none since      Social History     Substance and Sexual Activity   Alcohol Use Not Currently     Allergies   Allergen Reactions    Crestor [Rosuvastatin] Other (comments)     Causes muscle cramps    Lisinopril Cough    Nsaids (Non-Steroidal Anti-Inflammatory Drug) Other (comments)     Liver and Kidney     Family History   Problem Relation Age of Onset    Hypertension Mother     Dementia Mother     Coronary Artery Disease Father 58    Sudden Death Father 58    Diabetes Son     Diabetes Brother         Current Outpatient Medications   Medication Sig    levothyroxine (SYNTHROID) 150 mcg tablet Take 1 Tab by mouth Daily (before breakfast).  glipiZIDE (GLUCOTROL) 10 mg tablet Take 1 tablet by mouth twice daily with food    ezetimibe (ZETIA) 10 mg tablet Take 1 tablet by mouth once daily    furosemide (LASIX) 20 mg tablet Take 0.5 Tabs by mouth daily.  glucose blood VI test strips (blood glucose test) strip Check blood sugar 2 times daily: E11.9 may substitute for insurance preferred strips.  lancets misc Check blood sugar 2 times daily. May substitute for insurance preferred lancets.  Blood-Glucose Meter monitoring kit Check blood sugar daily.  May substitute for insurance preferred meter    glucose blood VI test strips (ASCENSIA AUTODISC VI, ONE TOUCH ULTRA TEST VI) strip E11.65 check sugar once daily    hydrALAZINE (APRESOLINE) 25 mg tablet Take 1 Tab by mouth three (3) times daily. (Patient taking differently: Take 25 mg by mouth three (3) times daily. 1/2 tab)    isosorbide mononitrate ER (IMDUR) 30 mg tablet Take 1 Tab by mouth every morning.  clopidogreL (PLAVIX) 75 mg tab Take 1 tablet by mouth once daily    ketorolac (ACULAR) 0.5 % ophthalmic solution INSTILL 1 DROP 4 TIMES DAILY INTO EYE HAVING SURGERY START 2 DAYS PRIOR TO SURGERY    ofloxacin (FLOXIN) 0.3 % ophthalmic solution INSTILL 1 DROP 4 TIMES DAILY INTO SURGERY EYE START 2 DAYS PRIOR TO SURGERY    prednisoLONE acetate (PRED FORTE) 1 % ophthalmic suspension INSTILL 1 DROP 4 TIMES DAILY INTO SUREGRY EYE TAPER AS DIRECTED    sacubitriL-valsartan (Entresto) 24-26 mg tablet Take 1 Tab by mouth two (2) times a day.  metoprolol succinate (TOPROL-XL) 25 mg XL tablet Take 0.5 Tabs by mouth daily. Hold for systolic BP less than 662    empagliflozin (JARDIANCE) 25 mg tablet Take 1 Tab by mouth daily. For diabetes    gabapentin (NEURONTIN) 100 mg capsule Take 2 Caps by mouth three (3) times daily. Max Daily Amount: 600 mg.  clotrimazole-betamethasone (LOTRISONE) topical cream Apply  to affected area two (2) times a day.  Blood-Glucose Meter monitoring kit Use as directed. Dx: E11.65 check sugar twice daily    lancets misc Use as directed. Dx:check sugar once daily. E11.65    allopurinoL (ZYLOPRIM) 100 mg tablet Take 1 tablet by mouth once daily    atorvastatin (LIPITOR) 80 mg tablet TAKE 1 TABLET BY MOUTH AT BEDTIME    albuterol (PROVENTIL HFA, VENTOLIN HFA, PROAIR HFA) 90 mcg/actuation inhaler Take 2 Puffs by inhalation every four (4) hours as needed.  acetaminophen (TYLENOL ARTHRITIS PAIN) 650 mg TbER Take 1,300 mg by mouth two (2) times a day.  aspirin 81 mg chewable tablet Take 81 mg by mouth daily. No current facility-administered medications for this visit.       /60 (BP 1 Location: Left arm, BP Patient Position: Sitting)   Pulse 81   Resp 18 Ht 5' 3\" (1.6 m)   Wt 227 lb (103 kg)   SpO2 99%   BMI 40.21 kg/m²   General:    Alert, cooperative, no distress. Psychiatric:    Normal Mood and affect    Eye/ENT:      Pupils equal, No asymmetry, Conjunctival pink. Able to hear voice at normal amplitude   Lungs:      Visibly symmetric chest expansion, No palpable tenderness. Clear to auscultation bilaterally. Heart[de-identified]    Regular rate and rhythm, S1, S2 normal, no murmur, click, rub or gallop. No JVD, Normal palpable peripheral pulses. No cyanosis   Abdomen:     Soft, non-tender. Bowel sounds normal. No masses,  No      organomegaly. Extremities:   Extremities normal, atraumatic, no edema. Neurologic:   CN II-XII grossly intact. No gross focal deficits         Codi Encinas MD01/26/21     ATTENTION:   This medical record was transcribed using an electronic medical records/speech recognition system. Although proofread, it may and can contain electronic, spelling and other errors. Corrections may be executed at a later time. Please feel free to contact us for any clarifications as needed.     [facility]  [address], [address] [place] [address]

## 2021-01-28 ENCOUNTER — HOSPITAL ENCOUNTER (OUTPATIENT)
Dept: CARDIAC REHAB | Age: 71
Discharge: HOME | End: 2021-01-28
Payer: MEDICARE

## 2021-01-28 VITALS — BODY MASS INDEX: 40.39 KG/M2 | WEIGHT: 228 LBS

## 2021-01-28 PROCEDURE — 93798 PHYS/QHP OP CAR RHAB W/ECG: CPT

## 2021-01-29 ENCOUNTER — TELEPHONE (OUTPATIENT)
Dept: CARDIOLOGY CLINIC | Age: 71
End: 2021-01-29

## 2021-01-29 LAB
ALBUMIN SERPL-MCNC: 3.9 G/DL (ref 3.7–4.7)
ALBUMIN/GLOB SERPL: 1.6 {RATIO} (ref 1.2–2.2)
ALP SERPL-CCNC: 85 IU/L (ref 39–117)
ALT SERPL-CCNC: 14 IU/L (ref 0–32)
AST SERPL-CCNC: 19 IU/L (ref 0–40)
BILIRUB SERPL-MCNC: 0.2 MG/DL (ref 0–1.2)
BUN SERPL-MCNC: 40 MG/DL (ref 8–27)
BUN/CREAT SERPL: 19 (ref 12–28)
CALCIUM SERPL-MCNC: 10.3 MG/DL (ref 8.7–10.3)
CHLORIDE SERPL-SCNC: 106 MMOL/L (ref 96–106)
CO2 SERPL-SCNC: 22 MMOL/L (ref 20–29)
CREAT SERPL-MCNC: 2.06 MG/DL (ref 0.57–1)
GLOBULIN SER CALC-MCNC: 2.4 G/DL (ref 1.5–4.5)
GLUCOSE SERPL-MCNC: 125 MG/DL (ref 65–99)
MAGNESIUM SERPL-MCNC: 2.4 MG/DL (ref 1.6–2.3)
NT-PROBNP SERPL-MCNC: 901 PG/ML (ref 0–301)
POTASSIUM SERPL-SCNC: 5.1 MMOL/L (ref 3.5–5.2)
PROT SERPL-MCNC: 6.3 G/DL (ref 6–8.5)
SODIUM SERPL-SCNC: 138 MMOL/L (ref 134–144)

## 2021-01-29 NOTE — TELEPHONE ENCOUNTER
I called patient and advised her per Roxie Cotto:  PBNP is trending up, please check on weights and ask her to double her diuretics for the weekend. Patient states weight today is 221.3 lb, she denies shortness of breath, she is exercising and trying to eat correctly. She will double diuretics, will call on Monday with weight and symptoms.

## 2021-02-01 ENCOUNTER — TELEPHONE (OUTPATIENT)
Dept: CARDIOLOGY CLINIC | Age: 71
End: 2021-02-01

## 2021-02-01 ENCOUNTER — TELEPHONE (OUTPATIENT)
Dept: CARDIAC REHAB | Age: 71
End: 2021-02-01

## 2021-02-01 ENCOUNTER — APPOINTMENT (OUTPATIENT)
Dept: CARDIAC REHAB | Age: 71
End: 2021-02-01

## 2021-02-01 NOTE — TELEPHONE ENCOUNTER
I called patient, weight today is 223.3 lb, she states she doubled her lasix over weekend, taking 40 mg lasix daily. She denies shortness of breath or swelling but does complain of stuffiness in her nose, but she is also moving and may be exposed to dust.  She denies dietary indiscretion but ate chili and scalloped potatoes and cheez-its over weekend. BP today was 104/55, HR 87. Please advise, thank you    I called patient and advised per GUERA Blankenship:        Have patient resume normal dose of lasix - she is scheduled to see Dominic Lazcano virtually tomorrow - will reassess then. She states understanding.

## 2021-02-01 NOTE — TELEPHONE ENCOUNTER
2/1/2021 Cardiac [facility]ness: Ms. Zaragoza Dub called to cancel today's appointment d/t weather. Will return for next appointment.  Anali Guadarrama

## 2021-02-02 ENCOUNTER — TELEPHONE (OUTPATIENT)
Dept: CARDIAC REHAB | Age: 71
End: 2021-02-02

## 2021-02-02 ENCOUNTER — APPOINTMENT (OUTPATIENT)
Dept: CARDIAC REHAB | Age: 71
End: 2021-02-02

## 2021-02-02 ENCOUNTER — VIRTUAL VISIT (OUTPATIENT)
Dept: CARDIOLOGY CLINIC | Age: 71
End: 2021-02-02
Payer: MEDICARE

## 2021-02-02 DIAGNOSIS — E11.22 CONTROLLED TYPE 2 DIABETES MELLITUS WITH STAGE 3 CHRONIC KIDNEY DISEASE, WITHOUT LONG-TERM CURRENT USE OF INSULIN (HCC): ICD-10-CM

## 2021-02-02 DIAGNOSIS — R06.02 SHORTNESS OF BREATH: ICD-10-CM

## 2021-02-02 DIAGNOSIS — I50.42 CHRONIC HEART FAILURE WITH REDUCED EJECTION FRACTION AND DIASTOLIC DYSFUNCTION (HCC): ICD-10-CM

## 2021-02-02 DIAGNOSIS — E66.01 OBESITY, MORBID (HCC): ICD-10-CM

## 2021-02-02 DIAGNOSIS — G47.30 SLEEP APNEA TREATED WITH NOCTURNAL BIPAP: ICD-10-CM

## 2021-02-02 DIAGNOSIS — Z95.810 AUTOMATIC IMPLANTABLE CARDIAC DEFIBRILLATOR IN SITU: ICD-10-CM

## 2021-02-02 DIAGNOSIS — I25.5 ISCHEMIC CARDIOMYOPATHY: ICD-10-CM

## 2021-02-02 DIAGNOSIS — I10 ESSENTIAL HYPERTENSION: ICD-10-CM

## 2021-02-02 DIAGNOSIS — I50.20 NYHA CLASS 3 HEART FAILURE WITH REDUCED EJECTION FRACTION (HCC): Primary | ICD-10-CM

## 2021-02-02 DIAGNOSIS — N18.30 CONTROLLED TYPE 2 DIABETES MELLITUS WITH STAGE 3 CHRONIC KIDNEY DISEASE, WITHOUT LONG-TERM CURRENT USE OF INSULIN (HCC): ICD-10-CM

## 2021-02-02 PROCEDURE — 99443 PR PHYS/QHP TELEPHONE EVALUATION 21-30 MIN: CPT | Performed by: NURSE PRACTITIONER

## 2021-02-02 NOTE — PROGRESS NOTES
[facility] Telephone Visit        DOS:   2/2/2021  NAME:  Joaquin Huff   MRN:   [id number]   REFERRING PROVIDER:  Agustin Salmeron NP  PRIMARY CARE PHYSICIAN: Agustin Salmeron NP  PRIMARY CARDIOLOGIST: none      Chief Complaint:   Chief Complaint   Patient presents with    CHF     follow up    Leg Swelling     minimal       HPI: 65y.o. year old female with a history of HTN, HLD, WES, obesity (There is no height or weight on file to calculate BMI.) s/p gastric bypass in 2011, T2DM, hypothyroidism, COPD, CAD s/p CABG in 1997, s/p PCI to [address] in 5/15, ICM, VT, s/p AICD ([employer]),  anxiety, and depression who presents today for a HF clinic visit for her chronic systolic heart failure. She was scheduled to undergo BiV upgrade with Dr. Anjali Collins, however, her QRS was too narrow for the upgrade. He increased her low pacing threshold from 50 bpm to 70 bpm.      She presents today for telephone visit for HF follow up. Overall she feels well since her mitral clip surgery. Her activity is at baseline but she is doing cardiac rehab. She is compliant with her Bipap and her medications. She is in the middle of moving and feels overwhelmed at times. She has had some personal stressors recently   She denies CP, palpitations, nausea, vomiting, dyspnea at rest, orthopnea, abdominal distention, LE edema, or early satiety. She has mild MARINA which is continues to improve. No dizziness or lightheadedness. She is working with dietitian at cardiac rehab on her dietary habits and indiscretions.       Optivol Interrogation 1/22/20:  Daryl Cape well below threshold, trending up   Thoracic impedance trending down   Patient activity 0.7hr/day  Time in AT/AF 0 hr/day  No ventricular arrhythmias  No shocks  Total  0.1%    Impression / Plan:   ICM - Stage C, NYHA Class III, LVEF 30-35%   TTE 6/18/20- EF 25-30%, mod TR, severe MR   S/p Mitral Clip, EF 11/16, 20-25%, mild MR   Repeat TTE 12/14 EF 30-35%, trace MR    Continue Toprol XL 12.5 mg daily   Continue Entresto 24/26 mg PO BID   furosemide 10 mg by mouth daily    Intolerant to AA due to hyperkalemia   Discussed Low sodium/potassium diet handout previously given    Daily weights   Invitate - DSP (VUS)   Equivocal PYP imaging   Cont cardiac rehab    HF Labs - will fax to [address]    Follow up in the [facility] in 6-8 weeks      CAD s/p CABG in 1997, s/p PCI to [address] in 10/16   On ASA, statin, BB, Zetia   Severe 3V disease     Hx of severe MR s/p MitraClip and improvement to trace    S/p Mitral clip with Dr. Delonte Torres   Follow up with CSS    Repeat TTE per CSS; in 6 months if not sooner     HTN -   On BB, Entresto, Lasix     Low sodium diet   Compliant with BiPAP    VT - s/p dual chamber AICD with RA lead    OptiVol below threshold but trending up(1/6/2021)    No shocks   No arrhythmias on interrogation     WES - on BiPAP   Compliant with BiPAP    C1VC complicated by neuropathy   HgA1C improved from 7.6 from 8.0    Continue Jardiance 10 mg PO daily   Instructed to watch for  infections     Hypothyroidism   TSH 7.13, T4 5.1 (1/4/2021)    On levothyroxine 150 mcgs daily   Management per PCP.  Ralph Mercedes., VENKATESH    Gout    Continue allopurinol    Discussed low purine foods     Obesity   BMI: 40.39 kg/m²   Continue low Na+/K/CHO diet   Encourage physical activity   Followed by Nutritionist at cardiac rehab    Osteoarthritis - s/p right TKR   Avoid Celebrex due to CKD, HFrEF   S/p left knee injection in 8/2019   Lidocaine patches to knees     Hyperlipidemia    , LDL 58 on 5/28/20   Lipoprotein-A 212   Continue Lipitor 80 mg daily    Continue Zetia 10 mg PO daily   Low chol diet     CKD4, suspect diabetic nephropathy and cardiorenal syndrome   Recent Cr 2.39 from 1.73 - monitor   Continue Entresto 24/26 mg PO BID   No AA/MRA   Reduce furosemide to 10 mg PO daily    Avoid nephrotoxic agents   Followed by Dr. Paige Loyd      Vitamin D deficiency in   Vitamin D level - 49.6 (20)    Cataracts   Requesting surgical clearance for cataract surgery    Will d/w Anastasia Granda        History:  Past Medical History:   Diagnosis Date    Adverse effect of anesthesia     hard to wake up/uses BIPAP/\"try to avoid general if possible\"/intubated in past prior to going to sleep and it caused pt to be incontinent    Arthritis     Asthma     CAD (coronary artery disease)     Chronic kidney disease     elevataed creatinine    Chronic obstructive pulmonary disease (HCC)     Chronic pain     arthritis    Coagulation disorder (HCC)     on plavix    Congestive heart failure (HCC)     Diabetes ([address]     Hypertension     Morbid obesity ([address]     Sleep apnea     BIPAP with 2 liters oxygen    Thromboembolus ([address]     after heart surgery - left leg    Thyroid disease      Past Surgical History:   Procedure Laterality Date    COLONOSCOPY N/A 2020    COLONOSCOPY   :- performed by Venessa Hidalgo MD at [address] HX ARTHROPLASTY  2105    knee - right    HX  SECTION      x3    HX CORONARY ARTERY BYPASS GRAFT  1997    3 vessels    HX CORONARY STENT PLACEMENT  2016    HX HERNIA REPAIR  1988    HX IMPLANTABLE CARDIOVERTER DEFIBRILLATOR      HX KNEE REPLACEMENT Right 2015    once    NH CARDIAC SURG PROCEDURE UNLIST  2018    cardiac cath - Left    NH LAP GASTRIC BYPASS/KERLINE-EN-Y  2011    lap band per pt and not a gastric bypass     Social History     Socioeconomic History    Marital status:      Spouse name: Not on file    Number of children: Not on file    Years of education: Not on file    Highest education level: Not on file   Occupational History    Not on file   Social Needs    Financial resource strain: Not on file    Food insecurity     Worry: Not on file     Inability: Not on file    Transportation needs     Medical: Not on file     Non-medical: Not on file   Tobacco Use    Smoking status: Former Smoker     Packs/day: 0.50     Years: 45.00     Pack years: 22.50     Types: Cigarettes     Quit date: 2010     Years since quittin.0    Smokeless tobacco: Never Used    Tobacco comment: quit /started again (smoked 3 yrs) off and on/none since    Substance and Sexual Activity    Alcohol use: Not Currently    Drug use: Not Currently    Sexual activity: Not Currently   Lifestyle    Physical activity     Days per week: Not on file     Minutes per session: Not on file    Stress: Not on file   Relationships    Social connections     Talks on phone: Not on file     Gets together: Not on file     Attends [employer]ism service: Not on file     Active member of club or organization: Not on file     Attends meetings of clubs or organizations: Not on file     Relationship status: Not on file    Intimate partner violence     Fear of current or ex partner: Not on file     Emotionally abused: Not on file     Physically abused: Not on file     Forced sexual activity: Not on file   Other Topics Concern     Service Not Asked    Blood Transfusions Not Asked    Caffeine Concern Not Asked    Occupational Exposure Not Asked   Clarice Rosita Hazards Not Asked    Sleep Concern Not Asked    Stress Concern Not Asked    Weight Concern Not Asked    Special Diet Not Asked    Back Care Not Asked    Exercise Not Asked    Bike Helmet Not Asked    [facility] Not Asked    Self-Exams Not Asked   Social History Narrative    Not on file     Family History   Problem Relation Age of Onset    Hypertension Mother     Dementia Mother     Coronary Artery Disease Father 58    Sudden Death Father 58    Diabetes Son     Diabetes Brother      Allergies:    Allergies   Allergen Reactions    Crestor [Rosuvastatin] Other (comments)     Causes muscle cramps    Lisinopril Cough    Nsaids (Non-Steroidal Anti-Inflammatory Drug) Other (comments)     Liver and Kidney     Recent Labs:   Labs Latest Ref Rng & Units 2020   Albumin 3.7 - 4.7 g/dL 3.9 3.8 4.0   Calcium 8.7 - 10.3 mg/dL 10.3 10.2 11. 0(H)   Glucose 65 - 99 mg/dL 125(H) 151(H) 129(H)   BUN 8 - 27 mg/dL 40(H) 37(H) 37(H)   Creatinine 0.57 - 1.00 mg/dL 2.06(H) 2.41(H) 2.39(H)   Sodium 134 - 144 mmol/L 138 137 136   Potassium 3.5 - 5.2 mmol/L 5.1 4.9 4.8   TSH 0.450 - 4.500 uIU/mL - 7.130(H) -   Some recent data might be hidden     EK2020  Sinus  Rhythm, rate 67 bpm   -  Nonspecific T-abnormality. Reason for Exam  Priority: Routine  s/p MitraCllip   Dx: Severe mitral regurgitation [I34.0 (ICD-10-CM)]   Comments: Echo to be done at [facility] and read only by Dr. Matty Ruiz. Vitals    Weight Height BSA (calculated - sq m) BP Pulse (Heart Rate)          Interpretation Summary    · LV: Estimated LVEF is 30 - 35%. Mildly dilated left ventricle. Mild concentric hypertrophy. Moderately reduced systolic function. Moderate (grade 2) left ventricular diastolic dysfunction. · Previously placed mitraclip is noted in secure position with residual trace to mild MR lateral to clip placement. Mean transmitral gradient is 2.6mmHg at heart rate of about 70 bpm.  · LA: Moderately dilated left atrium. Comparison Study Information    Prior Study    There is a prior study available for comparison. Compared to prior studies, the LVEF appears slightly improved and MR appears substantially better after mitraclip. Echo Findings    Left Ventricle Mildly dilated left ventricle. Mild concentric hypertrophy. The estimated EF is 30 - 35%. Moderately reduced systolic function. There is moderate (grade 2) left ventricular diastolic dysfunction. Wall Scoring The left ventricular wall motion is globally hypokinetic. Left Atrium Moderately dilated left atrium. Right Ventricle Normal cavity size and global systolic function. Right Atrium Mildly dilated right atrium. Aortic Valve Normal valve structure, no stenosis and no regurgitation. Mitral Valve No stenosis. Mild regurgitation. Previously placed mitraclip is noted in secure position with residual trace to mild MR lateral to clip placement. Mean transmitral gradient is 2.6mmHg at heart rate of about 70 bpm.   Tricuspid Valve Tricuspid valve not well visualized. No stenosis. Tricuspid regurgitation is inadequate for estimation of right ventricular systolic pressure. Pulmonic Valve Pulmonic valve not well visualized. Aorta Normal aortic root. Pericardium No evidence of pericardial effusion. Wall Scoring    Score Index: 2.000 Percent Normal: 0.0%             The left ventricular wall motion is globally hypokinetic. TTE procedure Findings    TTE Procedure Information Image quality: good. The view(s) performed were parasternal, apical, subcostal and suprasternal. Color flow Doppler was performed and pulse wave and/or continuous wave Doppler was performed. s/p Mitral clip DX: Severe Mitral RegurgitationNo contrast was given.    Procedure Staff    Technologist/Clinician: Cristofer Ratliff  Supporting Staff: None  Performing Physician/Midlevel: None     Exam Completion Date/Time: 12/14/20  2:41 PM   2D Volume Measurements     ESV EDV EF   LV Biplane 120.46 mL (Range: 19 - 49)       160.92 mL (Range: 56 - 104)       25.2 percent (Range: 55 - 100)         LV A4C 115.17 mL       163.48 mL       30 percent         LV A2C 118.08 mL       147.39 mL       20 percent         LA A4C 101.45 mL (Range: 22 - 52)                 2D/M-Mode Measurements    Dimensions   Measurement Value (Range)   LVIDs 4.5 cm      LVIDd 6.09 cm (3.9 - 5.3)      IVSd 0.54 cm (0.6 - 0.9)      LVPWd 0.78 cm (0.6 - 0.9)      LV Mass .2 g (67 - 162)      LA Area 4C 27.4 cm2      RVIDd 3.98 cm                   Aortic Valve Measurements    Stenosis   Aortic Valve Systolic Peak Velocity 174.07 cm/s      AoV PG 7.82 mmHg      Aortic Valve Area by Continuity of Peak Velocity 2.09 cm2       LVOT   LVOT Peak Velocity 94.06 cm/s      LVOT Peak Gradient 3.54 mmHg      LVOT VTI 17.75 cm      LVOT d 1.99 cm              Mitral Valve Measurements    Stenosis   MV Mean Gradient 2.59 mmHg      MV Peak Gradient 5.8 mmHg      Mitral Valve Pressure Half-time 130.89 ms      Mitral Valve Max Velocity 120.38 cm/s      Mitral Regurgitant Velocity Time Integral 195.03 cm      MVA (PHT) 1.68 cm2       Regurgitation   Mitral Regurgitant Velocity Time Integral 195.03 cm      MVA VTI 1.41 cm2                    Diastolic Filling/Shunts    Diastolic Filling   MV E Nelson 113.94 cm/s      MV A Nelson 110.21 cm/s      MV E/A 1.03        Shunt   LVOT SV 55.1 mL                  11/12/20   ECHO ADULT COMPLETE 11/13/2020 11/13/2020    Narrative · LV: Estimated LVEF is 20 - 25%. Severely dilated left ventricle. Wall   thickness appears thin. Severely and globally reduced systolic function. · RA: Mildly dilated right atrium. LA: Severely dilated left atrium. · MV: Mitraclip is noted. Mean gradient is 3-4 mm hg. Mild mitral valve   regurgitation is present. Mitral regurgitation appears to have   substantially improved compared to pre mitraclip study from June 2020. · TV: Mild to moderate tricuspid valve regurgitation is present. Signed by: Esperanza Porras MD         06/19/20   ECHO ADULT COMPLETE 06/20/2020 6/20/2020    Narrative · Image quality for this study was technically difficult. · Mildly dilated left ventricle. Upper normal wall thickness. Severe   global systolic function. Estimated left ventricular ejection fraction is   25 - 30%. Suboptimal endocardial visualization limits wall motion   analysis. Inconclusive left ventricular diastolic function. · Moderate tricuspid valve regurgitation is present. · Mild to moderate pulmonary hypertension. Pulmonary arterial systolic   pressure is 45 mmHg. · Moderately dilated left atrium. · Mitral valve non-specific thickening. Severe mitral valve regurgitation   is present.         Signed by: Blake Peterson MD       Allergies   Allergen Reactions    Crestor [Rosuvastatin] Other (comments)     Causes muscle cramps    Lisinopril Cough    Nsaids (Non-Steroidal Anti-Inflammatory Drug) Other (comments)     Liver and Kidney       Past Medical History:   Diagnosis Date    Adverse effect of anesthesia     hard to wake up/uses BIPAP/\"try to avoid general if possible\"/intubated in past prior to going to sleep and it caused pt to be incontinent    Arthritis     Asthma     CAD (coronary artery disease)     Chronic kidney disease     elevataed creatinine    Chronic obstructive pulmonary disease (HCC)     Chronic pain     arthritis    Coagulation disorder (HCC)     on plavix    Congestive heart failure (HCC)     Diabetes ([address]     Hypertension     Morbid obesity ([address]     Sleep apnea 1996    BIPAP with 2 liters oxygen    Thromboembolus ([address]     after heart surgery - left leg    Thyroid disease    . Ying Concepcion is a 65 y.o. female, evaluated via audio-only technology on 2/2/2021 for CHF (follow up) and Leg Swelling (minimal)  . Prior to Admission medications    Medication Sig Start Date End Date Taking? Authorizing Provider   levothyroxine (SYNTHROID) 150 mcg tablet Take 1 Tab by mouth Daily (before breakfast). 1/5/21  Yes Ina Villegas.VENKATESH   glipiZIDE (GLUCOTROL) 10 mg tablet Take 1 tablet by mouth twice daily with food 12/30/20  Yes Ina River NP   ezetimibe (ZETIA) 10 mg tablet Take 1 tablet by mouth once daily 12/22/20  Yes Wells Dubin T, NP   furosemide (LASIX) 20 mg tablet Take 0.5 Tabs by mouth daily. Patient taking differently: Take 20 mg by mouth daily. 12/22/20  Yes Shawna Tirado NP   glucose blood VI test strips (blood glucose test) strip Check blood sugar 2 times daily: E11.9 may substitute for insurance preferred strips. 12/11/20  Yes Puneet Amezquita NP   lancets misc Check blood sugar 2 times daily.  May substitute for insurance preferred lancets. 12/11/20  Yes Carlos Amezquita NP   Blood-Glucose Meter monitoring kit Check blood sugar daily. May substitute for insurance preferred meter 12/11/20  Yes Carlos Amezquita NP   glucose blood VI test strips (ASCENSIA AUTODISC VI, ONE TOUCH ULTRA TEST VI) strip E11.65 check sugar once daily 12/7/20  Yes Elton Vicente MD   hydrALAZINE (APRESOLINE) 25 mg tablet Take 1 Tab by mouth three (3) times daily. 11/30/20  Yes Lo Mckeon NP   isosorbide mononitrate ER (IMDUR) 30 mg tablet Take 1 Tab by mouth every morning. 11/30/20  Yes Ana Mckeon NP   clopidogreL (PLAVIX) 75 mg tab Take 1 tablet by mouth once daily 11/25/20  Yes Nova Obrien NP   sacubitriL-valsartan Marley Cruz) 24-26 mg tablet Take 1 Tab by mouth two (2) times a day. 11/20/20  Yes Lo Mckeon NP   metoprolol succinate (TOPROL-XL) 25 mg XL tablet Take 0.5 Tabs by mouth daily. Hold for systolic BP less than 603 11/13/20  Yes Alesha Dyson NP   empagliflozin (JARDIANCE) 25 mg tablet Take 1 Tab by mouth daily. For diabetes 10/27/20  Yes Nova Obrien NP   gabapentin (NEURONTIN) 100 mg capsule Take 2 Caps by mouth three (3) times daily. Max Daily Amount: 600 mg. 10/13/20  Yes Nova Obrien NP   clotrimazole-betamethasone (LOTRISONE) topical cream Apply  to affected area two (2) times a day. Patient taking differently: Apply  to affected area two (2) times daily as needed. 9/23/20  Yes Nova Obrien NP   Blood-Glucose Meter monitoring kit Use as directed. Dx: E11.65 check sugar twice daily 8/25/20  Yes Nova Obrien NP   lancets misc Use as directed. Dx:check sugar once daily.  E11.65 8/19/20  Yes Nova Obrien NP   allopurinoL (ZYLOPRIM) 100 mg tablet Take 1 tablet by mouth once daily 5/28/20  Yes Lo Mckeon, NP   atorvastatin (LIPITOR) 80 mg tablet TAKE 1 TABLET BY MOUTH AT BEDTIME 4/10/20  Yes Maryam Renae NP   acetaminophen (TYLENOL ARTHRITIS PAIN) 650 mg TbER Take 1,300 mg by mouth two (2) times a day. Yes Provider, Historical   aspirin 81 mg chewable tablet Take 81 mg by mouth daily. Yes Provider, Historical   ketorolac (ACULAR) 0.5 % ophthalmic solution INSTILL 1 DROP 4 TIMES DAILY INTO EYE HAVING SURGERY START 2 DAYS PRIOR TO SURGERY 10/22/20   Provider, Historical   ofloxacin (FLOXIN) 0.3 % ophthalmic solution INSTILL 1 DROP 4 TIMES DAILY INTO SURGERY EYE START 2 DAYS PRIOR TO SURGERY 10/22/20   Provider, Historical   prednisoLONE acetate (PRED FORTE) 1 % ophthalmic suspension INSTILL 1 DROP 4 TIMES DAILY INTO SUREGRY EYE TAPER AS DIRECTED 10/22/20   Provider, Historical   albuterol (PROVENTIL HFA, VENTOLIN HFA, PROAIR HFA) 90 mcg/actuation inhaler Take 2 Puffs by inhalation every four (4) hours as needed. Provider, Historical       Review of Systems   Constitutional: Negative for chills, fever and weight loss. HENT: Negative. Eyes: Negative. Respiratory: Positive for shortness of breath. MARINA - baseline   Cardiovascular: Positive for leg swelling. Negative for chest pain, palpitations, orthopnea, claudication and PND. Gastrointestinal: Negative. Musculoskeletal: Negative. Skin: Negative. Neurological: Negative. Endo/Heme/Allergies: Negative. Psychiatric/Behavioral: Negative. Patient-Reported Vitals 2/2/2021   Patient-Reported Weight 222.4 lb   Patient-Reported Pulse 87   Patient-Reported Temperature 97.4   Patient-Reported Systolic  431   Patient-Reported Diastolic 55        Millie Salazar, who was evaluated through a patient-initiated, synchronous (real-time) audio only encounter, and/or her healthcare decision maker, is aware that it is a billable service, with coverage as determined by her insurance carrier. She provided verbal consent to proceed: Yes. She has not had a related appointment within my department in the past 7 days or scheduled within the next 24 hours.     Millie Salazar is a 65 y.o. female being evaluated by a Virtual Visit (video visit) encounter to address concerns as mentioned above. A caregiver was present when appropriate. Due to this being a TeleHealth encounter (During WWZZX-57 public health emergency), evaluation of the following organ systems was limited: Vitals/Constitutional/EENT/Resp/CV/GI//MS/Neuro/Skin/Heme-Lymph-Imm. Pursuant to the emergency declaration under the [address] [place] [address] and the [employer] Act, this Virtual Visit was conducted with patient's (and/or legal guardian's) consent, to reduce the risk of exposure to COVID-19 and provide necessary medical care. Services were provided through a video synchronous discussion virtually to substitute for in-person encounter. --Jan Marcum NP on 2/2/2021 at 2:10 PM    An electronic signature was used to authenticate this note.     Total Time: minutes: 21-30 minutes        Jan Marcum NP  [address] [place] [address]  [address]  Office [phone]  Fax [phone]

## 2021-02-02 NOTE — PATIENT INSTRUCTIONS
Medication changes: 
 
none Testing Ordered: 
 
Labs, [facility] will fax lab orders to [employer] for you to completed when you get the rest of your labs completed in 2 weeks- [facility] will notify you of any abnormal results Other Recommendations:  
  
Ensure your drinking an adequate amount of water with a goal of 6-8 eight ounce glasses (1.5-2 liters) of fluid daily. Your urine should be clear and light yellow straw colored. If your blood pressure begins to consistently run below 90/60 and/or you begin to experience dizziness or lightheadedness, please contact the [facility] at [phone]. Follow up 6-8 weeks with [facility]Immediately Please monitor your weights daily upon waking and after using the bathroom. Keep a written records of your weights and bring to your next appointment. If you have a weight gain of 3 or more pounds overnight OR 5 or more pounds in one week please contact our office. Thank you for allowing us the privilege of being a part of your healthcare team! Please do not hesitate to contact our office at [phone] with any questions or concerns. Virtual Heart Failure Support Group [facility] invites you to learn more about heart failure and to share your questions, ideas, and experiences with others. Each month, the Heart Failure Support Group features a new educational topic and a guest speaker, followed by an interactive discussion. Our Heart Failure Nurse Navigator will moderate each session. You will be able to participate by phone, tablet or computer through [address] [place] [address]. This support group takes place on the 3rd Thursday of each month from 6:00-7:30PM. All individuals living with heart failure and their caregivers are welcome to join. If you are interested in participating, please contact us at [email or web address] and you will be sent the link to join the [employer].

## 2021-02-02 NOTE — TELEPHONE ENCOUNTER
2/2/2021 Cardiac Wellness: Ms. Keyshawn Bergeron called to cancel today's appointment d/t not feeling well, has sniffles and wants to be sure they are just cold symptoms, not COVID symptoms. Will return for next appointment.  Kamari Spencer

## 2021-02-05 ENCOUNTER — VIRTUAL VISIT (OUTPATIENT)
Dept: INTERNAL MEDICINE CLINIC | Age: 71
End: 2021-02-05
Payer: MEDICARE

## 2021-02-05 ENCOUNTER — APPOINTMENT (OUTPATIENT)
Dept: CARDIAC REHAB | Age: 71
End: 2021-02-05

## 2021-02-05 DIAGNOSIS — J30.89 ENVIRONMENTAL AND SEASONAL ALLERGIES: Primary | ICD-10-CM

## 2021-02-05 PROCEDURE — 99442 PR PHYS/QHP TELEPHONE EVALUATION 11-20 MIN: CPT | Performed by: NURSE PRACTITIONER

## 2021-02-05 NOTE — PROGRESS NOTES
Chief Complaint   Patient presents with    Allergies     pt states she has had runny nose, sneezing and nasal congestion since Monday, pt reports no fever, pt thinks she is allergic to something in her home, pt has taken OTC allergy med and is feeling better    Other     PHONE [id number] [facility] [phone]     Pt reports no pain at this time    1. Have you been to the ER, urgent care clinic since your last visit? Hospitalized since your last visit? No    2. Have you seen or consulted any other health care providers outside of the [address] [place] [address] since your last visit? Include any pap smears or colon screening.  No

## 2021-02-05 NOTE — PROGRESS NOTES
Severiano Hoe is a 65 y.o. female, evaluated via audio-only technology on 2021 for Allergies (pt states she has had runny nose, sneezing and nasal congestion since Monday, pt reports no fever, pt thinks she is allergic to something in her home, pt has taken OTC allergy med and is feeling better) and Other (PHONE Verenice Garnica [phone])  . Assessment & Plan:   Diagnoses and all orders for this visit:    1. Environmental and seasonal allergies      The complexity of medical decision making for this visit is moderate     S/s c/w allergies,  recc cont loratadine, nasal spray if needed  Urgent care if fevers, sob, or other covid-19 s/s      12  Subjective:       Prior to Admission medications    Medication Sig Start Date End Date Taking? Authorizing Provider   levothyroxine (SYNTHROID) 150 mcg tablet Take 1 Tab by mouth Daily (before breakfast). 21  Yes Lillie Waddell NP   glipiZIDE (GLUCOTROL) 10 mg tablet Take 1 tablet by mouth twice daily with food 20  Yes Lillie Waddell NP   ezetimibe (ZETIA) 10 mg tablet Take 1 tablet by mouth once daily 20  Yes Jhony CASANOVA NP   furosemide (LASIX) 20 mg tablet Take 0.5 Tabs by mouth daily. Patient taking differently: Take 20 mg by mouth daily. 20  Yes Natalya Govea NP   hydrALAZINE (APRESOLINE) 25 mg tablet Take 1 Tab by mouth three (3) times daily. 20  Yes Lo Mckeon NP   isosorbide mononitrate ER (IMDUR) 30 mg tablet Take 1 Tab by mouth every morning. 20  Yes Wilfredo Mckeon NP   clopidogreL (PLAVIX) 75 mg tab Take 1 tablet by mouth once daily 20  Yes Lillie Waddell NP   sacubitriL-valsartan Marzetta Hum) 24-26 mg tablet Take 1 Tab by mouth two (2) times a day. 20  Yes Lo Mckeon NP   metoprolol succinate (TOPROL-XL) 25 mg XL tablet Take 0.5 Tabs by mouth daily.  Hold for systolic BP less than 998 20  Yes Sneha Amos NP   empagliflozin (JARDIANCE) 25 mg tablet Take 1 Tab by mouth daily. For diabetes 10/27/20  Yes Dotty Youssef NP   gabapentin (NEURONTIN) 100 mg capsule Take 2 Caps by mouth three (3) times daily. Max Daily Amount: 600 mg. 10/13/20  Yes Dotty Youssef NP   clotrimazole-betamethasone (LOTRISONE) topical cream Apply  to affected area two (2) times a day. Patient taking differently: Apply  to affected area two (2) times daily as needed. 9/23/20  Yes Dotty Youssef NP   allopurinoL (ZYLOPRIM) 100 mg tablet Take 1 tablet by mouth once daily 5/28/20  Yes Lo Mckeon NP   atorvastatin (LIPITOR) 80 mg tablet TAKE 1 TABLET BY MOUTH AT BEDTIME 4/10/20  Yes Leighton Blankenship NP   acetaminophen (TYLENOL ARTHRITIS PAIN) 650 mg TbER Take 1,300 mg by mouth two (2) times a day. Yes Provider, Historical   aspirin 81 mg chewable tablet Take 81 mg by mouth daily. Yes Provider, Historical   glucose blood VI test strips (blood glucose test) strip Check blood sugar 2 times daily: E11.9 may substitute for insurance preferred strips. 12/11/20   Susan Boyle NP   lancets misc Check blood sugar 2 times daily. May substitute for insurance preferred lancets. 12/11/20   Susan Boyle NP   Blood-Glucose Meter monitoring kit Check blood sugar daily.  May substitute for insurance preferred meter 12/11/20   Susan Boyle NP   glucose blood VI test strips (ASCENSIA AUTODISC VI, ONE TOUCH ULTRA TEST VI) strip E11.65 check sugar once daily 12/7/20   Graham Soriano MD   ketorolac (ACULAR) 0.5 % ophthalmic solution INSTILL 1 DROP 4 TIMES DAILY INTO EYE HAVING SURGERY START 2 DAYS PRIOR TO SURGERY 10/22/20   Provider, Historical   ofloxacin (FLOXIN) 0.3 % ophthalmic solution INSTILL 1 DROP 4 TIMES DAILY INTO SURGERY EYE START 2 DAYS PRIOR TO SURGERY 10/22/20   Provider, Historical   prednisoLONE acetate (PRED FORTE) 1 % ophthalmic suspension INSTILL 1 DROP 4 TIMES DAILY INTO SUREGRY EYE TAPER AS DIRECTED 10/22/20   Provider, Historical Blood-Glucose Meter monitoring kit Use as directed. Dx: E11.65 check sugar twice daily 8/25/20   David Pleva., NP   lancets misc Use as directed. Dx:check sugar once daily. E11.65 8/19/20   [place] Pleva., NP   albuterol (PROVENTIL HFA, VENTOLIN HFA, PROAIR HFA) 90 mcg/actuation inhaler Take 2 Puffs by inhalation every four (4) hours as needed. Provider, Historical     Patient Active Problem List   Diagnosis Code    Hx of CABG Z95.1    ICD (implantable cardioverter-defibrillator) in place Z80.65    H/O laparoscopic adjustable gastric banding Z98.84    Obesity, morbid ([facility] E66.01    NYHA class 3 heart failure with reduced ejection fraction (HCC) I50.20    Peripheral vascular disease (HCC) I73.9    Severe mitral regurgitation I34.0    Ischemic cardiomyopathy I25.5    Chronic heart failure with reduced ejection fraction and diastolic dysfunction (HCC) I50.42    Sleep apnea treated with nocturnal BiPAP G47.30    Mitral regurgitation I34.0       ROS     On Monday she started getting what felt like her usual allergy s/s that happen in the summer  She has a runny nose and sneezing  No fevers, cough, sob    DM2: sugars in the 100's  CHF: /74, HR 88  She has not gained weight    No significant sob    She stayed home from cardiac rehab to rest and monitor symptoms  She took otc loratadine which helped some    She is moving and so possible more dust exposure    She is interested in COVID-19 vax    Patient-Reported Vitals 2/2/2021   Patient-Reported Weight 222.4 lb   Patient-Reported Pulse 87   Patient-Reported Temperature 97.4   Patient-Reported Systolic  182   Patient-Reported Diastolic 55        Yuliya Melirodolfo, who was evaluated through a patient-initiated, synchronous (real-time) audio only encounter, and/or her healthcare decision maker, is aware that it is a billable service, with coverage as determined by her insurance carrier. She provided verbal consent to proceed: Yes.  She has not had a related appointment within my department in the past 7 days or scheduled within the next 24 hours. Total Time: minutes: 11-20 minutes    Keisha Leavitt NP

## 2021-02-05 NOTE — LETTER
NOTIFICATION RETURN TO WORK / SCHOOL 
 
2/5/2021 9:48 AM 
 
Ms. Fady Churchill [address] [facility] [address] [id number] To Whom It May Concern: 
 
Fady Churchill is currently under the care of Rose Singh. She was assessed by virtual phone visit on 2/5/21. Her symptoms are consistent with allergies. If her symptoms worsen she is advised to follow up. If there are questions or concerns please have the patient contact our office. Sincerely, Keisha Peres NP

## 2021-02-08 ENCOUNTER — TELEPHONE (OUTPATIENT)
Dept: CARDIAC REHAB | Age: 71
End: 2021-02-08

## 2021-02-08 ENCOUNTER — APPOINTMENT (OUTPATIENT)
Dept: CARDIAC REHAB | Age: 71
End: 2021-02-08

## 2021-02-08 NOTE — TELEPHONE ENCOUNTER
2/8/2021 [facility]: Ms. Carrie Rivera called to cancel today's appointment d/t still coughing and sniffling, doesn't want to exercise feeling bad/sick. Will return for next appointment.  Maureen Corley

## 2021-02-09 ENCOUNTER — APPOINTMENT (OUTPATIENT)
Dept: CARDIAC REHAB | Age: 71
End: 2021-02-09

## 2021-02-10 ENCOUNTER — IMMUNIZATION (OUTPATIENT)
Dept: INTERNAL MEDICINE CLINIC | Age: 71
End: 2021-02-10
Payer: MEDICARE

## 2021-02-10 DIAGNOSIS — Z23 ENCOUNTER FOR IMMUNIZATION: Primary | ICD-10-CM

## 2021-02-10 PROCEDURE — 0001A COVID-19, MRNA, LNP-S, PF, 30MCG/0.3ML DOSE(PFIZER): CPT | Performed by: FAMILY MEDICINE

## 2021-02-10 PROCEDURE — 91300 COVID-19, MRNA, LNP-S, PF, 30MCG/0.3ML DOSE(PFIZER): CPT | Performed by: FAMILY MEDICINE

## 2021-02-12 ENCOUNTER — APPOINTMENT (OUTPATIENT)
Dept: CARDIAC REHAB | Age: 71
End: 2021-02-12

## 2021-02-15 ENCOUNTER — APPOINTMENT (OUTPATIENT)
Dept: CARDIAC REHAB | Age: 71
End: 2021-02-15

## 2021-02-15 ENCOUNTER — TELEPHONE (OUTPATIENT)
Dept: CARDIAC REHAB | Age: 71
End: 2021-02-15

## 2021-02-15 NOTE — TELEPHONE ENCOUNTER
Heavenjusot Low called today to say she was in the process of moving and needs to cancel all of her cardiac rehab appointments for the month of February. She intends to return to us on 3/1/21.   Cole Alpers, RN

## 2021-02-16 ENCOUNTER — APPOINTMENT (OUTPATIENT)
Dept: CARDIAC REHAB | Age: 71
End: 2021-02-16

## 2021-02-18 ENCOUNTER — TELEPHONE (OUTPATIENT)
Dept: INTERNAL MEDICINE CLINIC | Age: 71
End: 2021-02-18

## 2021-02-18 DIAGNOSIS — E03.9 ACQUIRED HYPOTHYROIDISM: Primary | ICD-10-CM

## 2021-02-18 LAB
TSH SERPL DL<=0.005 MIU/L-ACNC: 0.23 UIU/ML (ref 0.45–4.5)
TSH SERPL DL<=0.005 MIU/L-ACNC: 0.25 UIU/ML (ref 0.45–4.5)

## 2021-02-18 RX ORDER — LEVOTHYROXINE SODIUM 125 UG/1
125 TABLET ORAL
Qty: 90 TAB | Refills: 0 | Status: SHIPPED | OUTPATIENT
Start: 2021-02-18 | End: 2021-04-06

## 2021-02-19 ENCOUNTER — APPOINTMENT (OUTPATIENT)
Dept: CARDIAC REHAB | Age: 71
End: 2021-02-19

## 2021-02-22 ENCOUNTER — APPOINTMENT (OUTPATIENT)
Dept: CARDIAC REHAB | Age: 71
End: 2021-02-22

## 2021-02-23 ENCOUNTER — APPOINTMENT (OUTPATIENT)
Dept: CARDIAC REHAB | Age: 71
End: 2021-02-23

## 2021-02-26 ENCOUNTER — APPOINTMENT (OUTPATIENT)
Dept: CARDIAC REHAB | Age: 71
End: 2021-02-26

## 2021-03-01 ENCOUNTER — HOSPITAL ENCOUNTER (OUTPATIENT)
Dept: CARDIAC REHAB | Age: 71
Discharge: HOME | End: 2021-03-01
Payer: MEDICARE

## 2021-03-01 VITALS — BODY MASS INDEX: 40.57 KG/M2 | WEIGHT: 229 LBS

## 2021-03-01 PROCEDURE — 93798 PHYS/QHP OP CAR RHAB W/ECG: CPT

## 2021-03-02 ENCOUNTER — HOSPITAL ENCOUNTER (OUTPATIENT)
Dept: CARDIAC REHAB | Age: 71
Discharge: HOME | End: 2021-03-02
Payer: MEDICARE

## 2021-03-02 VITALS — BODY MASS INDEX: 40.74 KG/M2 | WEIGHT: 230 LBS

## 2021-03-02 PROCEDURE — 93797 PHYS/QHP OP CAR RHAB WO ECG: CPT

## 2021-03-02 PROCEDURE — 93798 PHYS/QHP OP CAR RHAB W/ECG: CPT

## 2021-03-03 ENCOUNTER — IMMUNIZATION (OUTPATIENT)
Dept: INTERNAL MEDICINE CLINIC | Age: 71
End: 2021-03-03
Payer: MEDICARE

## 2021-03-03 DIAGNOSIS — Z23 ENCOUNTER FOR IMMUNIZATION: Primary | ICD-10-CM

## 2021-03-03 PROCEDURE — 0002A COVID-19, MRNA, LNP-S, PF, 30MCG/0.3ML DOSE(PFIZER): CPT | Performed by: FAMILY MEDICINE

## 2021-03-03 PROCEDURE — 91300 COVID-19, MRNA, LNP-S, PF, 30MCG/0.3ML DOSE(PFIZER): CPT | Performed by: FAMILY MEDICINE

## 2021-03-04 RX ORDER — CLOPIDOGREL BISULFATE 75 MG/1
TABLET ORAL
Qty: 90 TAB | Refills: 0 | Status: SHIPPED | OUTPATIENT
Start: 2021-03-04 | End: 2021-06-07 | Stop reason: SDUPTHER

## 2021-03-05 ENCOUNTER — HOSPITAL ENCOUNTER (OUTPATIENT)
Dept: CARDIAC REHAB | Age: 71
Discharge: HOME | End: 2021-03-05
Payer: MEDICARE

## 2021-03-05 VITALS — BODY MASS INDEX: 40.28 KG/M2 | WEIGHT: 227.4 LBS

## 2021-03-05 PROCEDURE — 93798 PHYS/QHP OP CAR RHAB W/ECG: CPT

## 2021-03-08 ENCOUNTER — APPOINTMENT (OUTPATIENT)
Dept: CARDIAC REHAB | Age: 71
End: 2021-03-08
Payer: MEDICARE

## 2021-03-09 ENCOUNTER — HOSPITAL ENCOUNTER (OUTPATIENT)
Dept: CARDIAC REHAB | Age: 71
Discharge: HOME | End: 2021-03-09
Payer: MEDICARE

## 2021-03-09 VITALS — WEIGHT: 228.4 LBS | BODY MASS INDEX: 40.46 KG/M2

## 2021-03-09 PROCEDURE — 93798 PHYS/QHP OP CAR RHAB W/ECG: CPT

## 2021-03-09 PROCEDURE — 93797 PHYS/QHP OP CAR RHAB WO ECG: CPT

## 2021-03-10 ENCOUNTER — HOSPITAL ENCOUNTER (OUTPATIENT)
Dept: CARDIAC REHAB | Age: 71
Discharge: HOME | End: 2021-03-10
Payer: MEDICARE

## 2021-03-10 VITALS — WEIGHT: 228 LBS | BODY MASS INDEX: 40.39 KG/M2

## 2021-03-10 PROCEDURE — 93798 PHYS/QHP OP CAR RHAB W/ECG: CPT

## 2021-03-12 ENCOUNTER — HOSPITAL ENCOUNTER (OUTPATIENT)
Dept: CARDIAC REHAB | Age: 71
Discharge: HOME | End: 2021-03-12
Payer: MEDICARE

## 2021-03-12 ENCOUNTER — APPOINTMENT (OUTPATIENT)
Dept: CARDIAC REHAB | Age: 71
End: 2021-03-12
Payer: MEDICARE

## 2021-03-12 VITALS — BODY MASS INDEX: 40.64 KG/M2 | WEIGHT: 229.4 LBS

## 2021-03-12 PROCEDURE — 93798 PHYS/QHP OP CAR RHAB W/ECG: CPT

## 2021-03-15 ENCOUNTER — APPOINTMENT (OUTPATIENT)
Dept: CARDIAC REHAB | Age: 71
End: 2021-03-15
Payer: MEDICARE

## 2021-03-15 ENCOUNTER — HOSPITAL ENCOUNTER (OUTPATIENT)
Dept: CARDIAC REHAB | Age: 71
Discharge: HOME | End: 2021-03-15
Payer: MEDICARE

## 2021-03-15 VITALS — WEIGHT: 229.8 LBS | BODY MASS INDEX: 40.71 KG/M2

## 2021-03-15 PROCEDURE — 93798 PHYS/QHP OP CAR RHAB W/ECG: CPT

## 2021-03-16 ENCOUNTER — HOSPITAL ENCOUNTER (OUTPATIENT)
Dept: CARDIAC REHAB | Age: 71
Discharge: HOME | End: 2021-03-16
Payer: MEDICARE

## 2021-03-16 VITALS — WEIGHT: 228.6 LBS | BODY MASS INDEX: 40.49 KG/M2

## 2021-03-16 PROCEDURE — 93798 PHYS/QHP OP CAR RHAB W/ECG: CPT

## 2021-03-16 PROCEDURE — 93797 PHYS/QHP OP CAR RHAB WO ECG: CPT

## 2021-03-19 ENCOUNTER — APPOINTMENT (OUTPATIENT)
Dept: CARDIAC REHAB | Age: 71
End: 2021-03-19
Payer: MEDICARE

## 2021-03-19 ENCOUNTER — TELEPHONE (OUTPATIENT)
Dept: CARDIAC REHAB | Age: 71
End: 2021-03-19

## 2021-03-19 NOTE — TELEPHONE ENCOUNTER
3/19/2021 [facility]: Ms. Maricel Carvajal called to cancel today's appointment d/t feeling sick/a cold. Will return for next appointment.  Stanley Herbert

## 2021-03-22 ENCOUNTER — HOSPITAL ENCOUNTER (OUTPATIENT)
Dept: CARDIAC REHAB | Age: 71
Discharge: HOME | End: 2021-03-22
Payer: MEDICARE

## 2021-03-22 VITALS — WEIGHT: 228.6 LBS | BODY MASS INDEX: 40.49 KG/M2

## 2021-03-22 PROCEDURE — 93798 PHYS/QHP OP CAR RHAB W/ECG: CPT

## 2021-03-23 ENCOUNTER — HOSPITAL ENCOUNTER (OUTPATIENT)
Dept: CARDIAC REHAB | Age: 71
Discharge: HOME | End: 2021-03-23
Payer: MEDICARE

## 2021-03-23 VITALS — BODY MASS INDEX: 40.46 KG/M2 | WEIGHT: 228.4 LBS

## 2021-03-23 PROCEDURE — 93797 PHYS/QHP OP CAR RHAB WO ECG: CPT

## 2021-03-23 PROCEDURE — 93798 PHYS/QHP OP CAR RHAB W/ECG: CPT

## 2021-03-26 ENCOUNTER — HOSPITAL ENCOUNTER (OUTPATIENT)
Dept: CARDIAC REHAB | Age: 71
Discharge: HOME | End: 2021-03-26
Payer: MEDICARE

## 2021-03-26 ENCOUNTER — TELEPHONE (OUTPATIENT)
Dept: CARDIOLOGY CLINIC | Age: 71
End: 2021-03-26

## 2021-03-26 VITALS — WEIGHT: 229.4 LBS | BODY MASS INDEX: 40.64 KG/M2

## 2021-03-26 DIAGNOSIS — I25.5 ISCHEMIC CARDIOMYOPATHY: ICD-10-CM

## 2021-03-26 DIAGNOSIS — I25.10 CORONARY ARTERY DISEASE INVOLVING NATIVE HEART WITHOUT ANGINA PECTORIS, UNSPECIFIED VESSEL OR LESION TYPE: ICD-10-CM

## 2021-03-26 DIAGNOSIS — I50.42 CHRONIC HEART FAILURE WITH REDUCED EJECTION FRACTION AND DIASTOLIC DYSFUNCTION (HCC): ICD-10-CM

## 2021-03-26 PROCEDURE — 93798 PHYS/QHP OP CAR RHAB W/ECG: CPT

## 2021-03-26 RX ORDER — EZETIMIBE 10 MG/1
TABLET ORAL
Qty: 30 TAB | Refills: 2 | Status: SHIPPED | OUTPATIENT
Start: 2021-03-26 | End: 2021-07-09

## 2021-03-26 RX ORDER — FUROSEMIDE 20 MG/1
10 TABLET ORAL DAILY
Qty: 60 TAB | Refills: 2 | Status: SHIPPED | OUTPATIENT
Start: 2021-03-26 | End: 2021-05-28

## 2021-03-26 NOTE — TELEPHONE ENCOUNTER
Called patient, verified ID with two identifiers. Confirmed pharmacy - [address]. Refills for ezetimibe and furosemide sent electronically. (last OV 2/2/21, pt needs f/u appt)  SALOMON Cheng

## 2021-03-29 ENCOUNTER — HOSPITAL ENCOUNTER (OUTPATIENT)
Dept: CARDIAC REHAB | Age: 71
Discharge: HOME | End: 2021-03-29
Payer: MEDICARE

## 2021-03-29 VITALS — BODY MASS INDEX: 40.71 KG/M2 | WEIGHT: 229.8 LBS

## 2021-03-29 PROCEDURE — 93798 PHYS/QHP OP CAR RHAB W/ECG: CPT

## 2021-03-31 ENCOUNTER — HOSPITAL ENCOUNTER (OUTPATIENT)
Dept: CARDIAC REHAB | Age: 71
Discharge: HOME | End: 2021-03-31
Payer: MEDICARE

## 2021-03-31 VITALS — WEIGHT: 228.4 LBS | BODY MASS INDEX: 40.46 KG/M2

## 2021-03-31 DIAGNOSIS — E11.49 OTHER DIABETIC NEUROLOGICAL COMPLICATION ASSOCIATED WITH TYPE 2 DIABETES MELLITUS (HCC): ICD-10-CM

## 2021-03-31 LAB — TSH SERPL DL<=0.005 MIU/L-ACNC: 0.25 UIU/ML (ref 0.45–4.5)

## 2021-03-31 PROCEDURE — 93798 PHYS/QHP OP CAR RHAB W/ECG: CPT

## 2021-03-31 RX ORDER — GABAPENTIN 100 MG/1
200 CAPSULE ORAL 3 TIMES DAILY
Qty: 180 CAP | Refills: 0 | Status: SHIPPED | OUTPATIENT
Start: 2021-03-31 | End: 2021-05-13 | Stop reason: SDUPTHER

## 2021-03-31 NOTE — TELEPHONE ENCOUNTER
Pt left a voice message requesting a refill. No access to      Last visit 02/05/2021 NP Giorgi Peres   Next appointment 04/05/2021 NP Giorgi Peres   Previous refill encounter(s)   10/13/2020 Neurontin #180 with 3 refills     Requested Prescriptions     Pending Prescriptions Disp Refills    gabapentin (NEURONTIN) 100 mg capsule 180 Cap 0     Sig: Take 2 Caps by mouth three (3) times daily. Max Daily Amount: 600 mg.

## 2021-04-01 RX ORDER — GLIPIZIDE 10 MG/1
TABLET ORAL
Qty: 180 TAB | Refills: 0 | Status: SHIPPED | OUTPATIENT
Start: 2021-04-01 | End: 2021-07-02 | Stop reason: SDUPTHER

## 2021-04-02 ENCOUNTER — HOSPITAL ENCOUNTER (OUTPATIENT)
Dept: CARDIAC REHAB | Age: 71
Discharge: HOME | End: 2021-04-02
Payer: MEDICARE

## 2021-04-02 VITALS — WEIGHT: 226.6 LBS | BODY MASS INDEX: 40.14 KG/M2

## 2021-04-02 PROCEDURE — 93798 PHYS/QHP OP CAR RHAB W/ECG: CPT

## 2021-04-05 ENCOUNTER — HOSPITAL ENCOUNTER (OUTPATIENT)
Dept: CARDIAC REHAB | Age: 71
Discharge: HOME | End: 2021-04-05
Payer: MEDICARE

## 2021-04-05 ENCOUNTER — OFFICE VISIT (OUTPATIENT)
Dept: INTERNAL MEDICINE CLINIC | Age: 71
End: 2021-04-05
Payer: MEDICARE

## 2021-04-05 VITALS
SYSTOLIC BLOOD PRESSURE: 142 MMHG | TEMPERATURE: 97.4 F | HEIGHT: 63 IN | WEIGHT: 229 LBS | HEART RATE: 73 BPM | BODY MASS INDEX: 40.57 KG/M2 | RESPIRATION RATE: 17 BRPM | OXYGEN SATURATION: 99 % | DIASTOLIC BLOOD PRESSURE: 63 MMHG

## 2021-04-05 VITALS — WEIGHT: 229.4 LBS | BODY MASS INDEX: 40.64 KG/M2

## 2021-04-05 DIAGNOSIS — I10 ESSENTIAL HYPERTENSION: ICD-10-CM

## 2021-04-05 DIAGNOSIS — N18.32 STAGE 3B CHRONIC KIDNEY DISEASE (HCC): ICD-10-CM

## 2021-04-05 DIAGNOSIS — Z95.818 S/P MITRAL VALVE CLIP IMPLANTATION: ICD-10-CM

## 2021-04-05 DIAGNOSIS — E03.9 ACQUIRED HYPOTHYROIDISM: ICD-10-CM

## 2021-04-05 DIAGNOSIS — Z98.890 S/P MITRAL VALVE CLIP IMPLANTATION: ICD-10-CM

## 2021-04-05 DIAGNOSIS — E11.9 CONTROLLED TYPE 2 DIABETES MELLITUS WITHOUT COMPLICATION, WITHOUT LONG-TERM CURRENT USE OF INSULIN (HCC): Primary | ICD-10-CM

## 2021-04-05 LAB
ALBUMIN UR QL STRIP: 30 MG/L
CHOLEST SERPL-MCNC: 145 MG/DL
CREATININE, URINE POC: 300 MG/DL
GLUCOSE POC: 174 MG/DL
HBA1C MFR BLD HPLC: 7.6 %
HDLC SERPL-MCNC: 55 MG/DL
LDL CHOLESTEROL POC: 80 MG/DL
MICROALBUMIN/CREAT RATIO POC: <30 MG/G
TCHOL/HDL RATIO (POC): 1.5
TRIGL SERPL-MCNC: 51 MG/DL
VLDLC SERPL CALC-MCNC: 90 MG/DL

## 2021-04-05 PROCEDURE — 93798 PHYS/QHP OP CAR RHAB W/ECG: CPT

## 2021-04-05 PROCEDURE — 1101F PT FALLS ASSESS-DOCD LE1/YR: CPT | Performed by: NURSE PRACTITIONER

## 2021-04-05 PROCEDURE — 82044 UR ALBUMIN SEMIQUANTITATIVE: CPT | Performed by: NURSE PRACTITIONER

## 2021-04-05 PROCEDURE — G8399 PT W/DXA RESULTS DOCUMENT: HCPCS | Performed by: NURSE PRACTITIONER

## 2021-04-05 PROCEDURE — G9899 SCRN MAM PERF RSLTS DOC: HCPCS | Performed by: NURSE PRACTITIONER

## 2021-04-05 PROCEDURE — 80061 LIPID PANEL: CPT | Performed by: NURSE PRACTITIONER

## 2021-04-05 PROCEDURE — G8754 DIAS BP LESS 90: HCPCS | Performed by: NURSE PRACTITIONER

## 2021-04-05 PROCEDURE — G8427 DOCREV CUR MEDS BY ELIG CLIN: HCPCS | Performed by: NURSE PRACTITIONER

## 2021-04-05 PROCEDURE — 1090F PRES/ABSN URINE INCON ASSESS: CPT | Performed by: NURSE PRACTITIONER

## 2021-04-05 PROCEDURE — G8753 SYS BP > OR = 140: HCPCS | Performed by: NURSE PRACTITIONER

## 2021-04-05 PROCEDURE — 99214 OFFICE O/P EST MOD 30 MIN: CPT | Performed by: NURSE PRACTITIONER

## 2021-04-05 PROCEDURE — 83036 HEMOGLOBIN GLYCOSYLATED A1C: CPT | Performed by: NURSE PRACTITIONER

## 2021-04-05 PROCEDURE — 82962 GLUCOSE BLOOD TEST: CPT | Performed by: NURSE PRACTITIONER

## 2021-04-05 PROCEDURE — G8432 DEP SCR NOT DOC, RNG: HCPCS | Performed by: NURSE PRACTITIONER

## 2021-04-05 PROCEDURE — G8536 NO DOC ELDER MAL SCRN: HCPCS | Performed by: NURSE PRACTITIONER

## 2021-04-05 PROCEDURE — 2022F DILAT RTA XM EVC RTNOPTHY: CPT | Performed by: NURSE PRACTITIONER

## 2021-04-05 PROCEDURE — G8417 CALC BMI ABV UP PARAM F/U: HCPCS | Performed by: NURSE PRACTITIONER

## 2021-04-05 PROCEDURE — 3017F COLORECTAL CA SCREEN DOC REV: CPT | Performed by: NURSE PRACTITIONER

## 2021-04-05 NOTE — PROGRESS NOTES
Pt is here for   Chief Complaint   Patient presents with    Diabetes     follow up     States pain level is a 5/10 knees    1. Have you been to the ER, urgent care clinic since your last visit? Hospitalized since your last visit? No    2. Have you seen or consulted any other health care providers outside of the [address] [place] [address] since your last visit? Include any pap smears or colon screening.  No      Pt states that BGR this morning was 170    Pt states that her BP has been elevated, states that her BP this morning was 170/110

## 2021-04-05 NOTE — Clinical Note
I saw her yesterday but needed to review her chart more before changing thyroid meds  Please tell her to go back down to 100mcg, schedule w/ endo  Labs in 6 weeks

## 2021-04-05 NOTE — PROGRESS NOTES
Subjective: (As above and below)     Chief Complaint   Patient presents with    Diabetes     follow up     Kareem Hooper is a 65y.o. year old female who presents for     Hypertension ROS:  taking medications as instructed, no medication side effects noted, no TIAs, no chest pain on exertion, no dyspnea on exertion, no swelling of ankles    Hyperlipidemia tolerating statin    Diabetic Review of Systems - medication compliance: compliant all of the time, diabetic diet compliance: she admits to non-compliance, home glucose monitoring: is performed regularly. Diet: she lives w/ her sister who is not in the best of health, they eat out OFTEN, fast food, she has been drinking sweet tea  She has access to a nutritionist thru cardiac rehab  She is aware of what needs to be done but struggles w/ the energy/will power  Wt Readings from Last 3 Encounters:   04/05/21 229 lb (103.9 kg)   04/02/21 226 lb 9.6 oz (102.8 kg)   03/31/21 228 lb 6.4 oz (103.6 kg)         Hypothyroidism: reports med adherence but still abnml, she states it has often been \"all over the place\" she is not est w/ endo    Lab Results   Component Value Date/Time    TSH 0.247 (L) 03/30/2021 11:21 AM    TSH 3.78 (H) 10/29/2020 10:45 AM         S/p MV clip: followed by cardiology and cardiac rehab, reports doing well      Reviewed PmHx, RxHx, FmHx, SocHx, AllgHx and updated in chart.   Family History   Problem Relation Age of Onset   Collettekatya Clarkejusto Hypertension Mother     Dementia Mother     Coronary Artery Disease Father 60    Sudden Death Father 58    Diabetes Son     Diabetes Brother        Past Medical History:   Diagnosis Date    Adverse effect of anesthesia     hard to wake up/uses BIPAP/\"try to avoid general if possible\"/intubated in past prior to going to sleep and it caused pt to be incontinent    Arthritis     Asthma     CAD (coronary artery disease)     Chronic kidney disease     elevataed creatinine    [facility]     Chronic pain     arthritis    Coagulation disorder ([facility]     on plavix    Congestive heart failure (HCC)     Diabetes ([facility]     Hypertension     Morbid obesity ([facility]     Sleep apnea     BIPAP with 2 liters oxygen    Thromboembolus ([facility]     after heart surgery - left leg    Thyroid disease       Social History     Socioeconomic History    Marital status:      Spouse name: Not on file    Number of children: Not on file    Years of education: Not on file    Highest education level: Not on file   Tobacco Use    Smoking status: Former Smoker     Packs/day: 0.50     Years: 45.00     Pack years: 22.50     Types: Cigarettes     Quit date: 2010     Years since quittin.2    Smokeless tobacco: Never Used    Tobacco comment: quit /started again (smoked 3 yrs) off and on/none since    Substance and Sexual Activity    Alcohol use: Not Currently    Drug use: Not Currently    Sexual activity: Not Currently   Other Topics Concern          Current Outpatient Medications   Medication Sig    glipiZIDE (GLUCOTROL) 10 mg tablet Take 1 tablet by mouth twice daily with food    gabapentin (NEURONTIN) 100 mg capsule Take 2 Caps by mouth three (3) times daily. Max Daily Amount: 600 mg.    ezetimibe (ZETIA) 10 mg tablet Take 1 tablet by mouth once daily    furosemide (LASIX) 20 mg tablet Take 0.5 Tabs by mouth daily.  clopidogreL (PLAVIX) 75 mg tab Take 1 tablet by mouth once daily    levothyroxine (SYNTHROID) 125 mcg tablet Take 1 Tab by mouth Daily (before breakfast).  hydrALAZINE (APRESOLINE) 25 mg tablet Take 1 Tab by mouth three (3) times daily.  isosorbide mononitrate ER (IMDUR) 30 mg tablet Take 1 Tab by mouth every morning.  sacubitriL-valsartan (Entresto) 24-26 mg tablet Take 1 Tab by mouth two (2) times a day.  metoprolol succinate (TOPROL-XL) 25 mg XL tablet Take 0.5 Tabs by mouth daily.  Hold for systolic BP less than 928    clotrimazole-betamethasone (LOTRISONE) topical cream Apply  to affected area two (2) times a day. (Patient taking differently: Apply  to affected area two (2) times daily as needed.)    allopurinoL (ZYLOPRIM) 100 mg tablet Take 1 tablet by mouth once daily    atorvastatin (LIPITOR) 80 mg tablet TAKE 1 TABLET BY MOUTH AT BEDTIME    albuterol (PROVENTIL HFA, VENTOLIN HFA, PROAIR HFA) 90 mcg/actuation inhaler Take 2 Puffs by inhalation every four (4) hours as needed.  acetaminophen (TYLENOL ARTHRITIS PAIN) 650 mg TbER Take 1,300 mg by mouth two (2) times a day.  aspirin 81 mg chewable tablet Take 81 mg by mouth daily.  glucose blood VI test strips (blood glucose test) strip Check blood sugar 2 times daily: E11.9 may substitute for insurance preferred strips.  lancets misc Check blood sugar 2 times daily. May substitute for insurance preferred lancets.  Blood-Glucose Meter monitoring kit Check blood sugar daily. May substitute for insurance preferred meter    glucose blood VI test strips (ASCENSIA AUTODISC VI, ONE TOUCH ULTRA TEST VI) strip E11.65 check sugar once daily    ketorolac (ACULAR) 0.5 % ophthalmic solution INSTILL 1 DROP 4 TIMES DAILY INTO EYE HAVING SURGERY START 2 DAYS PRIOR TO SURGERY    ofloxacin (FLOXIN) 0.3 % ophthalmic solution INSTILL 1 DROP 4 TIMES DAILY INTO SURGERY EYE START 2 DAYS PRIOR TO SURGERY    prednisoLONE acetate (PRED FORTE) 1 % ophthalmic suspension INSTILL 1 DROP 4 TIMES DAILY INTO SUREGRY EYE TAPER AS DIRECTED    Blood-Glucose Meter monitoring kit Use as directed. Dx: E11.65 check sugar twice daily    lancets misc Use as directed. Dx:check sugar once daily. E11.65     No current facility-administered medications for this visit. Review of Systems:   Constitutional:    Negative for fever and chills, negative diaphoresis. HEENT:              Negative for neck pain and stiffness. Eyes:                  Negative for visual disturbance, itching, redness or discharge.    Respiratory: Negative for cough and shortness of breath. Cardiovascular:  Negative for chest pain and palpitations. Gastrointestinal: Negative for nausea, vomiting, abdominal pain, diarrhea or constipation. Genitourinary:     Negative for dysuria and frequency. Musculoskeletal: Negative for falls, tenderness and swelling. Skin:                    Negative for rash, masses or lesions. Neurological:       Negative for dizzyness, seizure, loss of consciousness, weakness and numbness. Objective:     Vitals:    04/05/21 1103 04/05/21 1142   BP: (!) 145/75 (!) 142/63   Pulse: 73    Resp: 17    Temp: 97.4 °F (36.3 °C)    TempSrc: Oral    SpO2: 99%    Weight: 229 lb (103.9 kg)    Height: 5' 3\" (1.6 m)          Physical Examination: General appearance - alert, well appearing, and in no distress  Mental status - alert, oriented to person, place, and time, normal mood, behavior, speech, dress, motor activity, and thought processes  Chest - clear to auscultation, no wheezes, rales or rhonchi, symmetric air entry  Heart - normal rate, regular rhythm, normal S1, S2, no murmurs, rubs, clicks or gallops  Extremities - no pedal edema noted      Assessment/ Plan:     1. Controlled type 2 diabetes mellitus without complication, without long-term current use of insulin ([facility])  Worsening  Discussed diet, consider 2nd med- jardiance? She has fu w/ nephrology next week for labs, will eval then  She states she is going to attempt 1 week of healthy eating to see if she can even do it. .    - AMB POC HEMOGLOBIN A1C  - AMB POC GLUCOSE BLOOD, BY GLUCOSE MONITORING DEVICE  - AMB POC URINE, MICROALBUMIN, SEMIQUANT (3 RESULTS)    2. Acquired hypothyroidism  Labile  Resume 100mcg tab instead of 125mcg  Discussed adherence  - REFERRAL TO ENDOCRINOLOGY    3. Essential hypertension    - AMB POC LIPID PROFILE    4. Stage 3b chronic kidney disease ([address]  Fu nephro    5.  S/P mitral valve clip implantation  Cont cardiac rehab          I have discussed the diagnosis with the patient and the intended plan as seen in the above orders. The patient has received an after-visit summary and questions were answered concerning future plans. Pt conveyed understanding of plan. Medication Side Effects and Warnings were discussed with patient: yes  Patient Labs were reviewed: yes  Patient Past Records were reviewed:  yes    Jovanny Jeffery.  Adrienne Andre, VENKATESH

## 2021-04-06 ENCOUNTER — APPOINTMENT (OUTPATIENT)
Dept: CARDIAC REHAB | Age: 71
End: 2021-04-06
Payer: MEDICARE

## 2021-04-06 DIAGNOSIS — E03.9 ACQUIRED HYPOTHYROIDISM: Primary | ICD-10-CM

## 2021-04-06 RX ORDER — LEVOTHYROXINE SODIUM 100 UG/1
100 TABLET ORAL
Qty: 30 TAB | Refills: 1 | Status: SHIPPED | OUTPATIENT
Start: 2021-04-06 | End: 2021-05-05 | Stop reason: SDUPTHER

## 2021-04-07 ENCOUNTER — HOSPITAL ENCOUNTER (OUTPATIENT)
Dept: CARDIAC REHAB | Age: 71
Discharge: HOME | End: 2021-04-07
Payer: MEDICARE

## 2021-04-07 VITALS — BODY MASS INDEX: 40.49 KG/M2 | WEIGHT: 228.6 LBS

## 2021-04-07 PROCEDURE — 93798 PHYS/QHP OP CAR RHAB W/ECG: CPT

## 2021-04-08 ENCOUNTER — TELEPHONE (OUTPATIENT)
Dept: INTERNAL MEDICINE CLINIC | Age: 71
End: 2021-04-08

## 2021-04-08 NOTE — TELEPHONE ENCOUNTER
LVM informing pt of dosage change, labs ordered for recheck in 6 weeks, and to schedule w/ Endo     Call back # left if more information is needed.

## 2021-04-09 ENCOUNTER — HOSPITAL ENCOUNTER (OUTPATIENT)
Dept: CARDIAC REHAB | Age: 71
Discharge: HOME | End: 2021-04-09
Payer: MEDICARE

## 2021-04-09 VITALS — BODY MASS INDEX: 40.28 KG/M2 | WEIGHT: 227.4 LBS

## 2021-04-09 PROCEDURE — 93798 PHYS/QHP OP CAR RHAB W/ECG: CPT

## 2021-04-12 ENCOUNTER — HOSPITAL ENCOUNTER (OUTPATIENT)
Dept: CARDIAC REHAB | Age: 71
Discharge: HOME | End: 2021-04-12
Payer: MEDICARE

## 2021-04-12 VITALS — WEIGHT: 228.8 LBS | BODY MASS INDEX: 40.53 KG/M2

## 2021-04-12 PROCEDURE — 93798 PHYS/QHP OP CAR RHAB W/ECG: CPT

## 2021-04-13 ENCOUNTER — APPOINTMENT (OUTPATIENT)
Dept: CARDIAC REHAB | Age: 71
End: 2021-04-13
Payer: MEDICARE

## 2021-04-14 ENCOUNTER — HOSPITAL ENCOUNTER (OUTPATIENT)
Dept: CARDIAC REHAB | Age: 71
Discharge: HOME | End: 2021-04-14
Payer: MEDICARE

## 2021-04-14 VITALS — BODY MASS INDEX: 40.28 KG/M2 | WEIGHT: 227.4 LBS

## 2021-04-14 PROCEDURE — 93798 PHYS/QHP OP CAR RHAB W/ECG: CPT

## 2021-04-16 ENCOUNTER — HOSPITAL ENCOUNTER (OUTPATIENT)
Dept: CARDIAC REHAB | Age: 71
Discharge: HOME | End: 2021-04-16
Payer: MEDICARE

## 2021-04-16 VITALS — WEIGHT: 226.8 LBS | BODY MASS INDEX: 40.18 KG/M2

## 2021-04-16 PROCEDURE — 93798 PHYS/QHP OP CAR RHAB W/ECG: CPT

## 2021-04-19 ENCOUNTER — HOSPITAL ENCOUNTER (OUTPATIENT)
Dept: CARDIAC REHAB | Age: 71
Discharge: HOME | End: 2021-04-19
Payer: MEDICARE

## 2021-04-19 VITALS — WEIGHT: 228.4 LBS | BODY MASS INDEX: 40.46 KG/M2

## 2021-04-19 PROCEDURE — 93798 PHYS/QHP OP CAR RHAB W/ECG: CPT

## 2021-04-20 ENCOUNTER — APPOINTMENT (OUTPATIENT)
Dept: CARDIAC REHAB | Age: 71
End: 2021-04-20
Payer: MEDICARE

## 2021-04-21 ENCOUNTER — APPOINTMENT (OUTPATIENT)
Dept: CARDIAC REHAB | Age: 71
End: 2021-04-21
Payer: MEDICARE

## 2021-04-22 ENCOUNTER — PATIENT MESSAGE (OUTPATIENT)
Dept: CARDIOLOGY CLINIC | Age: 71
End: 2021-04-22

## 2021-04-22 DIAGNOSIS — I25.10 CORONARY ARTERY DISEASE INVOLVING NATIVE HEART WITHOUT ANGINA PECTORIS, UNSPECIFIED VESSEL OR LESION TYPE: ICD-10-CM

## 2021-04-22 DIAGNOSIS — I50.20 NYHA CLASS 3 HEART FAILURE WITH REDUCED EJECTION FRACTION (HCC): Primary | ICD-10-CM

## 2021-04-22 RX ORDER — ATORVASTATIN CALCIUM 80 MG/1
TABLET, FILM COATED ORAL
Qty: 90 TAB | Refills: 3 | Status: SHIPPED | OUTPATIENT
Start: 2021-04-22

## 2021-04-22 NOTE — TELEPHONE ENCOUNTER
Requested Prescriptions     Signed Prescriptions Disp Refills    atorvastatin (LIPITOR) 80 mg tablet 90 Tab 3     Sig: TAKE 1 TABLET BY MOUTH AT BEDTIME     Authorizing Provider: Neetu Diaz     Ordering User: Dharmesh Rowe     Last OV 2/2/21; next [address] 5/28/21Gayla Bosch RN

## 2021-04-23 ENCOUNTER — APPOINTMENT (OUTPATIENT)
Dept: CARDIAC REHAB | Age: 71
End: 2021-04-23
Payer: MEDICARE

## 2021-04-23 ENCOUNTER — HOSPITAL ENCOUNTER (OUTPATIENT)
Dept: CARDIAC REHAB | Age: 71
Discharge: HOME | End: 2021-04-23
Payer: MEDICARE

## 2021-04-23 VITALS — BODY MASS INDEX: 39.79 KG/M2 | WEIGHT: 224.6 LBS

## 2021-04-23 PROCEDURE — 93798 PHYS/QHP OP CAR RHAB W/ECG: CPT

## 2021-04-23 NOTE — CARDIO/PULMONARY
Ely Boast Completed phase II cardiac rehab and attended 36 sessions from 12/28/21-4/23/21. Ely Boast is interested in maintaining optimal health and will work with Dr. Hero Vigil. Ely Boast has improved her endurance and stamina through regular exercise, lost 4 lbs. Blood pressure is 122/59 and is WNL. She has also improved her nutrition, [employer] and depression scores and these were reviewed with patient. Ely Boast plans to continue exercising at home, at a gym at her workplace, and has set revised goals that include using cardio equipment, light weights and walking, 5 times a week for at least 30 minutes. Yakelin Petersen RN 
4/23/2021

## 2021-04-27 ENCOUNTER — APPOINTMENT (OUTPATIENT)
Dept: CARDIAC REHAB | Age: 71
End: 2021-04-27
Payer: MEDICARE

## 2021-04-28 ENCOUNTER — TELEPHONE (OUTPATIENT)
Dept: CARDIOLOGY CLINIC | Age: 71
End: 2021-04-28

## 2021-04-28 RX ORDER — ALLOPURINOL 100 MG/1
TABLET ORAL
Qty: 90 TAB | Refills: 1 | Status: SHIPPED | OUTPATIENT
Start: 2021-04-28 | End: 2021-11-01 | Stop reason: SDUPTHER

## 2021-04-28 NOTE — TELEPHONE ENCOUNTER
Ok to refill allopurinol? She states it has been refused.   Thank you    Per NASIM Mckeon:  Requested Prescriptions     Signed Prescriptions Disp Refills    allopurinoL (ZYLOPRIM) 100 mg tablet 90 Tab 1     Sig: Take 1 tablet by mouth once daily     Authorizing Provider: Nazanin Villarreal User: Bereket Bacca     Patient notified by [facility]

## 2021-04-29 ENCOUNTER — TELEPHONE (OUTPATIENT)
Dept: INTERNAL MEDICINE CLINIC | Age: 71
End: 2021-04-29

## 2021-04-29 RX ORDER — INSULIN PUMP SYRINGE, 3 ML
EACH MISCELLANEOUS
Qty: 1 KIT | Refills: 0 | Status: SHIPPED | OUTPATIENT
Start: 2021-04-29

## 2021-04-29 RX ORDER — IBUPROFEN 200 MG
CAPSULE ORAL
Qty: 200 STRIP | Refills: 3 | Status: SHIPPED | OUTPATIENT
Start: 2021-04-29

## 2021-04-29 RX ORDER — LANCETS
EACH MISCELLANEOUS
Qty: 1 EACH | Refills: 11 | Status: SHIPPED | OUTPATIENT
Start: 2021-04-29

## 2021-04-29 NOTE — TELEPHONE ENCOUNTER
Pt states she wants rx sent to [address] forher freestyle lite test strips. She moved and the pharmacist said she needs new rx sent please.   Pt # [phone]

## 2021-05-04 ENCOUNTER — OFFICE VISIT (OUTPATIENT)
Dept: INTERNAL MEDICINE CLINIC | Age: 71
End: 2021-05-04
Payer: MEDICARE

## 2021-05-04 ENCOUNTER — APPOINTMENT (OUTPATIENT)
Dept: CARDIAC REHAB | Age: 71
End: 2021-05-04

## 2021-05-04 VITALS
DIASTOLIC BLOOD PRESSURE: 66 MMHG | HEART RATE: 76 BPM | SYSTOLIC BLOOD PRESSURE: 103 MMHG | WEIGHT: 227 LBS | TEMPERATURE: 97.9 F | HEIGHT: 63 IN | OXYGEN SATURATION: 97 % | RESPIRATION RATE: 17 BRPM | BODY MASS INDEX: 40.22 KG/M2

## 2021-05-04 DIAGNOSIS — Z95.818 S/P MITRAL VALVE CLIP IMPLANTATION: ICD-10-CM

## 2021-05-04 DIAGNOSIS — I10 ESSENTIAL HYPERTENSION: ICD-10-CM

## 2021-05-04 DIAGNOSIS — J44.9 CHRONIC OBSTRUCTIVE PULMONARY DISEASE, UNSPECIFIED COPD TYPE (HCC): ICD-10-CM

## 2021-05-04 DIAGNOSIS — Z01.818 PREOP EXAMINATION: Primary | ICD-10-CM

## 2021-05-04 DIAGNOSIS — E11.9 CONTROLLED TYPE 2 DIABETES MELLITUS WITHOUT COMPLICATION, WITHOUT LONG-TERM CURRENT USE OF INSULIN (HCC): ICD-10-CM

## 2021-05-04 DIAGNOSIS — Z98.890 S/P MITRAL VALVE CLIP IMPLANTATION: ICD-10-CM

## 2021-05-04 LAB — GLUCOSE POC: 284 MG/DL

## 2021-05-04 PROCEDURE — 2022F DILAT RTA XM EVC RTNOPTHY: CPT | Performed by: NURSE PRACTITIONER

## 2021-05-04 PROCEDURE — G8417 CALC BMI ABV UP PARAM F/U: HCPCS | Performed by: NURSE PRACTITIONER

## 2021-05-04 PROCEDURE — G8432 DEP SCR NOT DOC, RNG: HCPCS | Performed by: NURSE PRACTITIONER

## 2021-05-04 PROCEDURE — 82962 GLUCOSE BLOOD TEST: CPT | Performed by: NURSE PRACTITIONER

## 2021-05-04 PROCEDURE — G9899 SCRN MAM PERF RSLTS DOC: HCPCS | Performed by: NURSE PRACTITIONER

## 2021-05-04 PROCEDURE — G8752 SYS BP LESS 140: HCPCS | Performed by: NURSE PRACTITIONER

## 2021-05-04 PROCEDURE — G8399 PT W/DXA RESULTS DOCUMENT: HCPCS | Performed by: NURSE PRACTITIONER

## 2021-05-04 PROCEDURE — 1090F PRES/ABSN URINE INCON ASSESS: CPT | Performed by: NURSE PRACTITIONER

## 2021-05-04 PROCEDURE — G8536 NO DOC ELDER MAL SCRN: HCPCS | Performed by: NURSE PRACTITIONER

## 2021-05-04 PROCEDURE — G8427 DOCREV CUR MEDS BY ELIG CLIN: HCPCS | Performed by: NURSE PRACTITIONER

## 2021-05-04 PROCEDURE — 1101F PT FALLS ASSESS-DOCD LE1/YR: CPT | Performed by: NURSE PRACTITIONER

## 2021-05-04 PROCEDURE — G8754 DIAS BP LESS 90: HCPCS | Performed by: NURSE PRACTITIONER

## 2021-05-04 PROCEDURE — 99214 OFFICE O/P EST MOD 30 MIN: CPT | Performed by: NURSE PRACTITIONER

## 2021-05-04 PROCEDURE — 3017F COLORECTAL CA SCREEN DOC REV: CPT | Performed by: NURSE PRACTITIONER

## 2021-05-04 NOTE — PROGRESS NOTES
Subjective: (As above and below)     Chief Complaint   Patient presents with    Pre-op Exam     for eye surgery      Melania Thomas is a 65y.o. year old female who presents for     Pre-op for cataract surgery:  R eye scheduled for 5/26  L eye 6/16    No prior hx of anesthesia complications  No latex allergy    Diabetic Review of Systems - medication compliance: compliant all of the time, diabetic diet compliance: noncompliant some of the time, home glucose monitoring: is performed regularly. Hypertension ROS:  taking medications as instructed, no medication side effects noted, no TIAs, no chest pain on exertion, no dyspnea on exertion, no swelling of ankles    COPD: stable    Hx mitral valve clip, AICD CHF: follows closely w/ cardiology, in cardiac rehab, denies cp, sob      Wt Readings from Last 3 Encounters:   05/04/21 227 lb (103 kg)   04/23/21 224 lb 9.6 oz (101.9 kg)   04/19/21 228 lb 6.4 oz (103.6 kg)     CKD: followed by nephrology, recent labs     Reviewed PmHx, RxHx, FmHx, SocHx, AllgHx and updated in chart.   Family History   Problem Relation Age of Onset   [address] Hypertension Mother     Dementia Mother     Coronary Artery Disease Father 60    Sudden Death Father 58    Diabetes Son     Diabetes Brother        Past Medical History:   Diagnosis Date    Adverse effect of anesthesia     hard to wake up/uses BIPAP/\"try to avoid general if possible\"/intubated in past prior to going to sleep and it caused pt to be incontinent    Arthritis     Asthma     CAD (coronary artery disease)     Chronic kidney disease     elevataed creatinine    Chronic obstructive pulmonary disease (HCC)     Chronic pain     arthritis    Coagulation disorder ([address]     on plavix    Congestive heart failure (HCC)     Diabetes ([address]     Hypertension     Morbid obesity ([address]     Sleep apnea 1996    BIPAP with 2 liters oxygen    Thromboembolus ([address]     after heart surgery - left leg    Thyroid disease       Social History     Socioeconomic History    Marital status:      Spouse name: Not on file    Number of children: Not on file    Years of education: Not on file    Highest education level: Not on file   Tobacco Use    Smoking status: Former Smoker     Packs/day: 0.50     Years: 45.00     Pack years: 22.50     Types: Cigarettes     Quit date: 2010     Years since quittin.3    Smokeless tobacco: Never Used    Tobacco comment: quit /started again (smoked 3 yrs) off and on/none since    Substance and Sexual Activity    Alcohol use: Not Currently    Drug use: Not Currently    Sexual activity: Not Currently   Other Topics Concern          Current Outpatient Medications   Medication Sig    allopurinoL (ZYLOPRIM) 100 mg tablet Take 1 tablet by mouth once daily    atorvastatin (LIPITOR) 80 mg tablet TAKE 1 TABLET BY MOUTH AT BEDTIME    glipiZIDE (GLUCOTROL) 10 mg tablet Take 1 tablet by mouth twice daily with food    gabapentin (NEURONTIN) 100 mg capsule Take 2 Caps by mouth three (3) times daily. Max Daily Amount: 600 mg.    ezetimibe (ZETIA) 10 mg tablet Take 1 tablet by mouth once daily    furosemide (LASIX) 20 mg tablet Take 0.5 Tabs by mouth daily.  clopidogreL (PLAVIX) 75 mg tab Take 1 tablet by mouth once daily    hydrALAZINE (APRESOLINE) 25 mg tablet Take 1 Tab by mouth three (3) times daily.  isosorbide mononitrate ER (IMDUR) 30 mg tablet Take 1 Tab by mouth every morning.  ketorolac (ACULAR) 0.5 % ophthalmic solution INSTILL 1 DROP 4 TIMES DAILY INTO EYE HAVING SURGERY START 2 DAYS PRIOR TO SURGERY    ofloxacin (FLOXIN) 0.3 % ophthalmic solution INSTILL 1 DROP 4 TIMES DAILY INTO SURGERY EYE START 2 DAYS PRIOR TO SURGERY    prednisoLONE acetate (PRED FORTE) 1 % ophthalmic suspension INSTILL 1 DROP 4 TIMES DAILY INTO SUREGRY EYE TAPER AS DIRECTED    sacubitriL-valsartan (Entresto) 24-26 mg tablet Take 1 Tab by mouth two (2) times a day.     metoprolol succinate (TOPROL-XL) 25 mg XL tablet Take 0.5 Tabs by mouth daily. Hold for systolic BP less than 725    clotrimazole-betamethasone (LOTRISONE) topical cream Apply  to affected area two (2) times a day. (Patient taking differently: Apply  to affected area two (2) times daily as needed.)    albuterol (PROVENTIL HFA, VENTOLIN HFA, PROAIR HFA) 90 mcg/actuation inhaler Take 2 Puffs by inhalation every four (4) hours as needed.  acetaminophen (TYLENOL ARTHRITIS PAIN) 650 mg TbER Take 1,300 mg by mouth two (2) times a day.  aspirin 81 mg chewable tablet Take 81 mg by mouth daily.  levothyroxine (SYNTHROID) 100 mcg tablet Take 1 Tab by mouth Daily (before breakfast).  Blood-Glucose Meter monitoring kit Check blood sugar daily. May substitute for insurance preferred meter    glucose blood VI test strips (blood glucose test) strip Check blood sugar 2 times daily: E11.9 may substitute for insurance preferred strips.  lancets misc Use as directed. Dx:check sugar once daily. E11.65    lancets misc Check blood sugar 2 times daily. May substitute for insurance preferred lancets.  glucose blood VI test strips (ASCENSIA AUTODISC VI, ONE TOUCH ULTRA TEST VI) strip E11.65 check sugar once daily    Blood-Glucose Meter monitoring kit Use as directed. Dx: E11.65 check sugar twice daily     No current facility-administered medications for this visit. Review of Systems:   Constitutional:    Negative for fever and chills, negative diaphoresis. HEENT:              Negative for neck pain and stiffness. Eyes:                  Negative for visual disturbance, itching, redness or discharge. Respiratory:        Negative for cough and shortness of breath. Cardiovascular:  Negative for chest pain and palpitations. Gastrointestinal: Negative for nausea, vomiting, abdominal pain, diarrhea or constipation. Genitourinary:     Negative for dysuria and frequency.    Musculoskeletal: Negative for falls, tenderness and swelling. Skin:                    Negative for rash, masses or lesions. Neurological:       Negative for dizzyness, seizure, loss of consciousness, weakness and numbness. Objective:     Vitals:    05/04/21 1359   BP: 103/66   Pulse: 76   Resp: 17   Temp: 97.9 °F (36.6 °C)   TempSrc: Oral   SpO2: 97%   Weight: 227 lb (103 kg)   Height: 5' 3\" (1.6 m)     Results for orders placed or performed in visit on 05/04/21   AMB POC GLUCOSE BLOOD, BY GLUCOSE MONITORING DEVICE   Result Value Ref Range    Glucose  MG/DL           Gen: Oriented to person, place and time and well-developed, well-nourished and in no distress. HEENT:    Head: normocephalic and atraumatic. Eyes:  EOM are normal. Pupils equal and round. Neck:  Normal range of motion. Neck supple. Cardiovascular: normal rate, regular rhythm and normal heart sounds. Pulmonary/Chest:  Effort normal and breath sounds normal.  No respiratory distress. No wheezes, no rales. Abdominal: soft, normal  bowel sounds. Musculoskeletal:  No edema, no tenderness. No calf tenderness or edema. Neurological:  Alert, oriented to person, place and time. Skin: skin is warm and dry. Assessment/ Plan:     1. Preop examination  Low risk for procedure      2. Controlled type 2 diabetes mellitus without complication, without long-term current use of insulin (HCC)    - AMB POC GLUCOSE BLOOD, BY GLUCOSE MONITORING DEVICE    3. Essential hypertension      4. Chronic obstructive pulmonary disease, unspecified COPD type ([address]    5. S/P mitral valve clip implantation          I have discussed the diagnosis with the patient and the intended plan as seen in the above orders. The patient has received an after-visit summary and questions were answered concerning future plans. Pt conveyed understanding of plan.       Medication Side Effects and Warnings were discussed with patient: yes  Patient Labs were reviewed: yes  Patient Past Records were reviewed: yes    Keisha Palmer, NP

## 2021-05-07 DIAGNOSIS — I25.5 ISCHEMIC CARDIOMYOPATHY: ICD-10-CM

## 2021-05-07 DIAGNOSIS — I50.42 CHRONIC HEART FAILURE WITH REDUCED EJECTION FRACTION AND DIASTOLIC DYSFUNCTION (HCC): ICD-10-CM

## 2021-05-07 DIAGNOSIS — I10 HYPERTENSION, UNSPECIFIED TYPE: ICD-10-CM

## 2021-05-07 RX ORDER — HYDRALAZINE HYDROCHLORIDE 25 MG/1
25 TABLET, FILM COATED ORAL 3 TIMES DAILY
Qty: 90 TAB | Refills: 2 | Status: SHIPPED | OUTPATIENT
Start: 2021-05-07 | End: 2021-09-08

## 2021-05-10 ENCOUNTER — OFFICE VISIT (OUTPATIENT)
Dept: CARDIOLOGY CLINIC | Age: 71
End: 2021-05-10
Payer: MEDICARE

## 2021-05-10 ENCOUNTER — TELEPHONE (OUTPATIENT)
Dept: CARDIOLOGY CLINIC | Age: 71
End: 2021-05-10

## 2021-05-10 DIAGNOSIS — Z95.810 AUTOMATIC IMPLANTABLE CARDIAC DEFIBRILLATOR IN SITU: Primary | ICD-10-CM

## 2021-05-10 PROCEDURE — 93295 DEV INTERROG REMOTE 1/2/MLT: CPT | Performed by: INTERNAL MEDICINE

## 2021-05-10 PROCEDURE — 93296 REM INTERROG EVL PM/IDS: CPT | Performed by: INTERNAL MEDICINE

## 2021-05-10 NOTE — LETTER
5/10/2021 8:31 AM    Ms. Ely Boast  [address] [facility] [address] [id number]                After careful review of your remote check of your implated device on 0-34-07. I have concluded that your device is working properly. Your next: In clinic device check is scheduled for   7-6-21 at  1:00pm.          If you have any questions, please call [employer] [address] [address] at [phone].      Additional Comments: ________________________________________________    __________________________________________________________________    __________________________________________________________________              Sincerely,

## 2021-05-10 NOTE — LETTER
5/10/2021 8:32 AM 
 
Ms. Homer Prakash [address] [place] [address] [address] [id number] Dear Patient, This letter is to inform you that as of  [facility] will no longer be sending out confirmation letters for remote transmissions through the [employer]. All letters in the future will be posted in an electronic medical records format therefore we highly encourage enrollment in [facility] patient's portal. Once enrolled you will have access to any letters we send as well as appointment information that can be found in your medical record. Our EP team would contact you via phone if there are significant abnormal findings so you can discuss with Dr. Maryjo Martinez further in office. Missed transmission letters will also be digitized in [facility] but we will continue to send those out through the mail as well. How to register for [facility]: - Visit [employer]/[facility] - Click Create an Account - Provide your name, address, and  
- You will then be asked a short series of questions to verify your identity. This helps to ensure that no one else can access your information.  
- If you have any further questions, please contact our office at [phone]. If you choose not to enroll in [employer] or do not have internet access, please kindly let device clinic know and we will accommodate your preference. We are grateful for your understanding and looking forward to this new, improved and more efficient way of communication. Warm Regards, [facility] Staff

## 2021-05-10 NOTE — TELEPHONE ENCOUNTER
----- Message from Melania Thomas sent at 5/10/2021  9:25 AM EDT -----  Regarding: Non-Urgent Medical Question  Contact: [phone]  Good morning. .. I am scheduled for cataract surgery 5/26 and 6/17. I am also scheduled for an echo on Friday 5/14. Do I need to stop my clopidogrel or any other med adjustments? Pls call my cell [phone] and leave message if I cannot answer. I work at a reception desk. I will return call ASAP. THANK YOU.

## 2021-05-12 ENCOUNTER — DOCUMENTATION ONLY (OUTPATIENT)
Dept: CARDIOLOGY CLINIC | Age: 71
End: 2021-05-12

## 2021-05-12 NOTE — TELEPHONE ENCOUNTER
MD Olga Cano, RN 11 hours ago (10:09 PM)     OK to stop Plavix 5-7 days prior to procedure and restart 1 day after procedure

## 2021-05-12 NOTE — TELEPHONE ENCOUNTER
Returned call to patient. Two patient indentifiers verified. Pt was informed of message. Pt verbalized understanding and denies any further questions at this time.      Future Appointments   Date Time Provider Skye Wongi   5/14/2021  2:30 PM ECHO LAB 1 [facility] [address]   5/28/2021  2:30 PM Denice Baron NP [facility] BS AMB   7/5/2021 10:30 AM VENKATESH Delgado BS AMB   7/6/2021  1:00 PM PACEMAKER3RUDY BS AMB   7/6/2021  1:20 PM MD HARLEY Mcdonald BS AMB   11/16/2021 10:00 AM ECHOTWRUDY MURRELL BS AMB   11/16/2021 10:40 AM MD HARLEY Sultana BS AMB

## 2021-05-13 ENCOUNTER — PATIENT MESSAGE (OUTPATIENT)
Dept: INTERNAL MEDICINE CLINIC | Age: 71
End: 2021-05-13

## 2021-05-13 DIAGNOSIS — E11.49 OTHER DIABETIC NEUROLOGICAL COMPLICATION ASSOCIATED WITH TYPE 2 DIABETES MELLITUS (HCC): ICD-10-CM

## 2021-05-13 RX ORDER — GABAPENTIN 100 MG/1
200 CAPSULE ORAL 3 TIMES DAILY
Qty: 180 CAP | Refills: 0 | Status: ON HOLD | OUTPATIENT
Start: 2021-05-13 | End: 2021-06-02

## 2021-05-14 ENCOUNTER — HOSPITAL ENCOUNTER (OUTPATIENT)
Dept: NON INVASIVE DIAGNOSTICS | Age: 71
Discharge: HOME | End: 2021-05-14
Attending: NURSE PRACTITIONER
Payer: MEDICARE

## 2021-05-14 VITALS
HEIGHT: 63 IN | SYSTOLIC BLOOD PRESSURE: 103 MMHG | WEIGHT: 227.07 LBS | DIASTOLIC BLOOD PRESSURE: 66 MMHG | BODY MASS INDEX: 40.23 KG/M2

## 2021-05-14 DIAGNOSIS — I50.42 CHRONIC HEART FAILURE WITH REDUCED EJECTION FRACTION AND DIASTOLIC DYSFUNCTION (HCC): ICD-10-CM

## 2021-05-14 PROCEDURE — 93306 TTE W/DOPPLER COMPLETE: CPT

## 2021-05-15 LAB
ECHO AV CUSP MM: 0 CM
ECHO AV PEAK GRADIENT: 8.86 MMHG
ECHO AV PEAK VELOCITY: 148.8 CM/S
ECHO EST RA PRESSURE: 3 MMHG
ECHO LA AREA 4C: 31.18 CM2
ECHO LA TO AORTIC ROOT RATIO: 1.73
ECHO LA TO AORTIC ROOT RATIO: 1.73
ECHO LA VOL 4C: 118.47 ML (ref 22–52)
ECHO LA VOLUME INDEX A4C: 58.04 ML/M2 (ref 16–28)
ECHO LV INTERNAL DIMENSION DIASTOLIC: 5.7 CM (ref 3.9–5.3)
ECHO LV INTERNAL DIMENSION SYSTOLIC: 4.75 CM
ECHO LV IVSD: 0.88 CM (ref 0.6–0.9)
ECHO LV MASS 2D: 161 G (ref 67–162)
ECHO LV MASS INDEX 2D: 78.9 G/M2 (ref 43–95)
ECHO LV POSTERIOR WALL DIASTOLIC: 0.65 CM (ref 0.6–0.9)
ECHO LVOT PEAK GRADIENT: 2.71 MMHG
ECHO LVOT PEAK VELOCITY: 82.3 CM/S
ECHO MV A VELOCITY: 96.83 CM/S
ECHO MV AREA PHT: 2.6 CM2
ECHO MV E VELOCITY: 97.61 CM/S
ECHO MV E/A RATIO: 1.01
ECHO MV MAX VELOCITY: 123.43 CM/S
ECHO MV MEAN GRADIENT: 2.06 MMHG
ECHO MV PEAK GRADIENT: 6.09 MMHG
ECHO MV PRESSURE HALF TIME (PHT): 84.64 MS
ECHO MV VTI: 31.62 CM
ECHO RIGHT VENTRICULAR SYSTOLIC PRESSURE (RVSP): 40.92 MMHG
ECHO RV INTERNAL DIMENSION: 4.05 CM
ECHO RV TAPSE: 1.68 CM (ref 1.5–2)
ECHO TV REGURGITANT MAX VELOCITY: 307.89 CM/S
ECHO TV REGURGITANT PEAK GRADIENT: 37.92 MMHG

## 2021-05-20 ENCOUNTER — TELEPHONE (OUTPATIENT)
Dept: CARDIOLOGY CLINIC | Age: 71
End: 2021-05-20

## 2021-05-20 NOTE — TELEPHONE ENCOUNTER
Called patient regarding message received regarding having her labs drawn at [address].     Per nursing patient can have her labs drawn in our office during her next follow up appointment on Friday May 28th Daily Note     Today's date: 2021  Patient name: Lori Hernandez  : 1960  MRN: [id number]  Referring provider: Tatiana Burns MD  Dx:   Encounter Diagnosis     ICD-10-CM    1  Cervical radiculopathy  M54 12    2  Lumbar radiculopathy  M54 16    3  Chronic neck pain  M54 2     G89 29    4  Left thigh pain  M79 652    5  Generalized weakness  R53 1                   Subjective: Patient states he is doing alright  Reports that he is getting a TPR injection on left SO  Objective: See treatment diary below      Assessment: Tolerated treatment well  No progressions made due to patient missing appt last week  No increase in pain into R suboccipital noted throughout session with any exercises  Concluded with e-stim  Patient demonstrated fatigue post treatment, exhibited good technique with therapeutic exercises and would benefit from continued PT      Plan: Continue per plan of care        Precautions: hx of cervical fusion     HEP: supine chin tucks, CODY, seated HS stretch    Specialty Daily Treatment Diary       Manual 4/27 4/8 4/12 4/15 4/20   Cupping         Exercise Diary         UBE 5' retro  5' retro  5' retro 5' retro 5' retro    UT; LS stretch        Pec stretch (unilaterally)        Chin tucks  5 sec x 30 DC to HEP     Hooklying Hugs to T's                TB Scap Retraction; TB S' Ext BTB 3x10 GTB 3x10 BTB 2x10 BTB 2x10 BTB 3x10    B ER; B Horiz ABD BTB 3x10  GTB 3x10  BTB 2x10  BTB 2x10 BTB 3x10    Scaption                Wall circles /c red MB 3x10 CW, CCW b/l  3x10 CW, CCW b/l  3x10 CW,CCW  b/l 3x10 CW, CCW b/l  3x10 CW, CCW b/l    Seated HS stretch        Seated Thoracic Extension c MB 10" x 15  10"x15 10"x15 10" x 15 10" x 15   Sh abd dumbbell 2# 30 2# 30 3# 20 2# 30 2# 30   Sh flexion dumbbell 2# 30 2# 30 3# 20 2# 30 2# 30   SNAGs c towel Extension; Rotation 10 ea  10 ea  10 ea 10 ea  10 ea   Upper Trap stretch; LS stretch   DC to HEP             SKTC; DKTC        PB LTR; PB DKTC Prone T x15 0#                 x15 0#   Prone I  x15 0#     x15 0#    Sidelying Thoracic Rotation 10"x15 ea  5 sec x 15 ea 5 sec x 15 ea 10" x 15 ea   Supine Hugs 10' x 15 ea    5 sec x 15  10 " x 15 ea                    Modalities                CP        Estim 10 min post  10 min post 10 mins post        Skin checks performed pre and post application: intact

## 2021-05-28 ENCOUNTER — OFFICE VISIT (OUTPATIENT)
Dept: CARDIOLOGY CLINIC | Age: 71
End: 2021-05-28
Payer: MEDICARE

## 2021-05-28 VITALS
RESPIRATION RATE: 16 BRPM | WEIGHT: 230 LBS | HEIGHT: 63 IN | HEART RATE: 91 BPM | OXYGEN SATURATION: 92 % | BODY MASS INDEX: 40.75 KG/M2 | SYSTOLIC BLOOD PRESSURE: 124 MMHG | DIASTOLIC BLOOD PRESSURE: 64 MMHG | TEMPERATURE: 98.6 F

## 2021-05-28 DIAGNOSIS — I50.42 CHRONIC HEART FAILURE WITH REDUCED EJECTION FRACTION AND DIASTOLIC DYSFUNCTION (HCC): ICD-10-CM

## 2021-05-28 PROCEDURE — G8432 DEP SCR NOT DOC, RNG: HCPCS | Performed by: NURSE PRACTITIONER

## 2021-05-28 PROCEDURE — G8427 DOCREV CUR MEDS BY ELIG CLIN: HCPCS | Performed by: NURSE PRACTITIONER

## 2021-05-28 PROCEDURE — G8754 DIAS BP LESS 90: HCPCS | Performed by: NURSE PRACTITIONER

## 2021-05-28 PROCEDURE — G8536 NO DOC ELDER MAL SCRN: HCPCS | Performed by: NURSE PRACTITIONER

## 2021-05-28 PROCEDURE — G8399 PT W/DXA RESULTS DOCUMENT: HCPCS | Performed by: NURSE PRACTITIONER

## 2021-05-28 PROCEDURE — G8417 CALC BMI ABV UP PARAM F/U: HCPCS | Performed by: NURSE PRACTITIONER

## 2021-05-28 PROCEDURE — 1090F PRES/ABSN URINE INCON ASSESS: CPT | Performed by: NURSE PRACTITIONER

## 2021-05-28 PROCEDURE — 99214 OFFICE O/P EST MOD 30 MIN: CPT | Performed by: NURSE PRACTITIONER

## 2021-05-28 PROCEDURE — G9899 SCRN MAM PERF RSLTS DOC: HCPCS | Performed by: NURSE PRACTITIONER

## 2021-05-28 PROCEDURE — 3017F COLORECTAL CA SCREEN DOC REV: CPT | Performed by: NURSE PRACTITIONER

## 2021-05-28 PROCEDURE — 1101F PT FALLS ASSESS-DOCD LE1/YR: CPT | Performed by: NURSE PRACTITIONER

## 2021-05-28 PROCEDURE — G8752 SYS BP LESS 140: HCPCS | Performed by: NURSE PRACTITIONER

## 2021-05-28 RX ORDER — FUROSEMIDE 20 MG/1
20 TABLET ORAL DAILY
Qty: 30 TABLET | Refills: 2 | Status: ON HOLD | OUTPATIENT
Start: 2021-05-28 | End: 2021-06-03 | Stop reason: SDUPTHER

## 2021-05-28 NOTE — PROGRESS NOTES
[facility] Office Visit        DOS:   5/28/2021  NAME:  Thania Díaz   MRN:   [id number]   REFERRING PROVIDER:  David Valenzuela NP  PRIMARY CARE PHYSICIAN: David Valenzuela NP  PRIMARY CARDIOLOGIST: none      Chief Complaint:   Chief Complaint   Patient presents with    CHF    Leg Swelling       HPI: 65y.o. year old female with a history of HTN, HLD, WES, obesity (Body mass index is 40.74 kg/m².) s/p gastric bypass in 2011, T2DM, hypothyroidism, COPD, CAD s/p CABG in 1997, s/p PCI to [address] in 5/15, ICM, VT, s/p AICD ([employer]),  anxiety, and depression who presents today for a HF clinic visit for her chronic systolic heart failure. She was scheduled to undergo BiV upgrade with Dr. Farhad Aguilar, however, her QRS was too narrow for the upgrade. He increased her low pacing threshold from 50 bpm to 70 bpm.      She presents today for HF follow up. Overall she feels well since her mitral clip surgery. Her activity is at baseline but she is doing cardiac rehab. She is compliant with her Bipap and her medications. She is in the middle of moving and feels overwhelmed at times. She has had some personal stressors recently     She denies CP, palpitations, nausea, vomiting, dyspnea at rest, orthopnea, abdominal distention, LE edema, or early satiety. She has mild MARINA which is continues to improve. No dizziness or lightheadedness. She is working with dietitian at cardiac rehab on her dietary habits and indiscretions.       Optivol Interrogation 5/28/21:   Optivol  trending up   Thoracic impedance trending down   Patient activity 0.7hr/day  Time in AT/AF 0 hr/day  No ventricular arrhythmias  No shocks  Total  0.1%    Impression / Plan:   ICM - Stage C, NYHA Class III, LVEF 35-40%   Repeat TTE 5/2021 shows EF improvement 35-40%   Continue Toprol XL 12.5 mg daily   Continue Entresto 24/26 mg PO BID   Increase furosemide to 20 mg PO BID    Previously on [facility] to AA due to hyperkalemia   Discussed Low sodium/potassium diet handout previously given    Daily weights   Invitate - DSP (VUS)   Equivocal PYP imaging   Cont cardiac rehab    HF Labs - will fax to [address]    Follow up in the [facility] in 6-8 weeks      CAD s/p CABG in 1997, s/p PCI to [address] in 10/16   On ASA, statin, BB, Zetia   Severe 3V disease     Hx of severe MR s/p MitraClip and improvement to trace    S/p Mitral clip with Dr. [place]   Follow up with CSS    Repeat TTE per CSS; in 6 months if not sooner     HTN -   On BB, Entresto, Lasix     Low sodium diet   Compliant with BiPAP    VT - s/p dual chamber AICD with RA lead    OptiVol below threshold but trending up(1/6/2021)    No shocks   No arrhythmias on interrogation     WES - on BiPAP   Compliant with BiPAP    R5VQ complicated by neuropathy   HgA1C improved from 7.6 from 8.0    Previously on Jardiance, will      Hypothyroidism   TSH 7.13, T4 5.1 (1/4/2021)    On levothyroxine 150 mcgs daily   Management per PCP. [place]., NP    Gout    Continue allopurinol    Discussed low purine foods     Obesity   BMI: 40.39 kg/m²   Continue low Na+/K/CHO diet   Encourage physical activity   Followed by Nutritionist at cardiac rehab    Osteoarthritis - s/p right TKR   Avoid Celebrex due to CKD, HFrEF   S/p left knee injection in 8/2019   Lidocaine patches to knees     Hyperlipidemia    , LDL 58 on 5/28/20   Lipoprotein-A 212   Continue Lipitor 80 mg daily    Continue Zetia 10 mg PO daily   Low chol diet     CKD4, suspect diabetic nephropathy and cardiorenal syndrome   Recent Cr 2.39 from 1.73 - monitor   Continue Entresto 24/26 mg PO BID   No AA/MRA   Reduce furosemide to 10 mg PO daily    Avoid nephrotoxic agents   Followed by Dr. Kamilla Wilcox D deficiency in   Vitamin D level - 49.6 (12/18/20)    Cataracts   Requesting surgical clearance for cataract surgery    Will d/w Anastasia Lopez and Ramy        History:  Past Medical History: Diagnosis Date    Adverse effect of anesthesia     hard to wake up/uses BIPAP/\"try to avoid general if possible\"/intubated in past prior to going to sleep and it caused pt to be incontinent    Arthritis     Asthma     CAD (coronary artery disease)     Chronic kidney disease     elevataed creatinine    Chronic obstructive pulmonary disease (HCC)     Chronic pain     arthritis    Coagulation disorder (HCC)     on plavix    Congestive heart failure (HCC)     Diabetes ([address]     Hypertension     Morbid obesity ([address]     Sleep apnea     BIPAP with 2 liters oxygen    Thromboembolus ([address]     after heart surgery - left leg    Thyroid disease      Past Surgical History:   Procedure Laterality Date    COLONOSCOPY N/A 2020    COLONOSCOPY   :- performed by Keyla Cleaning MD at [address] HX ARTHROPLASTY  2105    knee - right    HX  SECTION      x3    HX CORONARY ARTERY BYPASS GRAFT  1997    3 vessels    HX CORONARY STENT PLACEMENT  2016    HX HERNIA REPAIR  1988    HX IMPLANTABLE CARDIOVERTER DEFIBRILLATOR      HX KNEE REPLACEMENT Right 2015    once    ND CARDIAC SURG PROCEDURE UNLIST  2018    cardiac cath - Left    ND LAP GASTRIC BYPASS/KERLINE-EN-Y  2011    lap band per pt and not a gastric bypass     Social History     Socioeconomic History    Marital status:      Spouse name: Not on file    Number of children: Not on file    Years of education: Not on file    Highest education level: Not on file   Occupational History    Not on file   Tobacco Use    Smoking status: Former Smoker     Packs/day: 0.50     Years: 45.00     Pack years: 22.50     Types: Cigarettes     Quit date: 2010     Years since quittin.4    Smokeless tobacco: Never Used    Tobacco comment: quit /started again (smoked 3 yrs) off and on/none since    Vaping Use    Vaping Use: Never used   Substance and Sexual Activity    Alcohol use: Not Currently    Drug use: Not Currently    Sexual activity: Not Currently   Other Topics Concern     Service Not Asked    Blood Transfusions Not Asked    Caffeine Concern Not Asked    Occupational Exposure Not Asked    Hobby Hazards Not Asked    Sleep Concern Not Asked    Stress Concern Not Asked    Weight Concern Not Asked    Special Diet Not Asked    Back Care Not Asked    Exercise Not Asked    Bike Helmet Not Asked   [address] Not Asked    Self-Exams Not Asked   Social History Narrative    Not on file     Social Determinants of Health     Financial Resource Strain:     Difficulty of Paying Living Expenses:    Food Insecurity:     Worried About Running Out of Food in the Last Year:     [address] in the Last Year:    Transportation Needs:     Lack of Transportation (Medical):  Lack of Transportation (Non-Medical):    Physical Activity:     Days of Exercise per Week:     Minutes of Exercise per Session:    Stress:     Feeling of Stress :    Social Connections:     Frequency of Communication with Friends and Family:     Frequency of Social Gatherings with Friends and Family:     Attends Rastafari Services:     Active Member of Clubs or Organizations:     Attends Club or Organization Meetings:     Marital Status:    Intimate Partner Violence:     Fear of Current or Ex-Partner:     Emotionally Abused:     Physically Abused:     Sexually Abused:      Family History   Problem Relation Age of Onset    Hypertension Mother     Dementia Mother     Coronary Artery Disease Father 58    Sudden Death Father 58    Diabetes Son     Diabetes Brother      Allergies:    Allergies   Allergen Reactions    Crestor [Rosuvastatin] Other (comments)     Causes muscle cramps    Lisinopril Cough    Nsaids (Non-Steroidal Anti-Inflammatory Drug) Other (comments)     Liver and Kidney     Recent Labs:   Labs Latest Ref Rng & Units 5/4/2021 3/30/2021   TSH 0.36 - 3.74 uIU/mL 0.64 0.247(L)   Some recent data might be hidden     EK2020  Sinus  Rhythm, rate 67 bpm   -  Nonspecific T-abnormality. Reason for Exam  Priority: Routine  s/p MitraCllip   Dx: Severe mitral regurgitation [I34.0 (ICD-10-CM)]   Comments: Echo to be done at [facility] and read only by Dr. Zunilda Jackson. Vitals    Weight Height BSA (calculated - sq m) BP Pulse (Heart Rate)          Interpretation Summary    · LV: Estimated LVEF is 30 - 35%. Mildly dilated left ventricle. Mild concentric hypertrophy. Moderately reduced systolic function. Moderate (grade 2) left ventricular diastolic dysfunction. · Previously placed mitraclip is noted in secure position with residual trace to mild MR lateral to clip placement. Mean transmitral gradient is 2.6mmHg at heart rate of about 70 bpm.  · LA: Moderately dilated left atrium. Comparison Study Information    Prior Study    There is a prior study available for comparison. Compared to prior studies, the LVEF appears slightly improved and MR appears substantially better after mitraclip. Echo Findings    Left Ventricle Mildly dilated left ventricle. Mild concentric hypertrophy. The estimated EF is 30 - 35%. Moderately reduced systolic function. There is moderate (grade 2) left ventricular diastolic dysfunction. Wall Scoring The left ventricular wall motion is globally hypokinetic. Left Atrium Moderately dilated left atrium. Right Ventricle Normal cavity size and global systolic function. Right Atrium Mildly dilated right atrium. Aortic Valve Normal valve structure, no stenosis and no regurgitation. Mitral Valve No stenosis. Mild regurgitation. Previously placed mitraclip is noted in secure position with residual trace to mild MR lateral to clip placement. Mean transmitral gradient is 2.6mmHg at heart rate of about 70 bpm.   Tricuspid Valve Tricuspid valve not well visualized. No stenosis.  Tricuspid regurgitation is inadequate for estimation of right ventricular systolic pressure. Pulmonic Valve Pulmonic valve not well visualized. Aorta Normal aortic root. Pericardium No evidence of pericardial effusion. Wall Scoring    Score Index: 2.000 Percent Normal: 0.0%             The left ventricular wall motion is globally hypokinetic. TTE procedure Findings    TTE Procedure Information Image quality: good. The view(s) performed were parasternal, apical, subcostal and suprasternal. Color flow Doppler was performed and pulse wave and/or continuous wave Doppler was performed. s/p Mitral clip DX: Severe Mitral RegurgitationNo contrast was given.    Procedure Staff    Technologist/Clinician: Payton Kelsey  Supporting Staff: None  Performing Physician/Midlevel: None     Exam Completion Date/Time: 12/14/20  2:41 PM   2D Volume Measurements     ESV EDV EF   LV Biplane 120.46 mL (Range: 19 - 49)       160.92 mL (Range: 56 - 104)       25.2 percent (Range: 55 - 100)         LV A4C 115.17 mL       163.48 mL       30 percent         LV A2C 118.08 mL       147.39 mL       20 percent         LA A4C 101.45 mL (Range: 22 - 52)                 2D/M-Mode Measurements    Dimensions   Measurement Value (Range)   LVIDs 4.5 cm      LVIDd 6.09 cm (3.9 - 5.3)      IVSd 0.54 cm (0.6 - 0.9)      LVPWd 0.78 cm (0.6 - 0.9)      LV Mass .2 g (67 - 162)      LA Area 4C 27.4 cm2      RVIDd 3.98 cm                   Aortic Valve Measurements    Stenosis   Aortic Valve Systolic Peak Velocity 545.88 cm/s      AoV PG 7.82 mmHg      Aortic Valve Area by Continuity of Peak Velocity 2.09 cm2       LVOT   LVOT Peak Velocity 94.06 cm/s      LVOT Peak Gradient 3.54 mmHg      LVOT VTI 17.75 cm      LVOT d 1.99 cm              Mitral Valve Measurements    Stenosis   MV Mean Gradient 2.59 mmHg      MV Peak Gradient 5.8 mmHg      Mitral Valve Pressure Half-time 130.89 ms      Mitral Valve Max Velocity 120.38 cm/s      Mitral Regurgitant Velocity Time Integral 195.03 cm      MVA (PHT) 1.68 cm2 Regurgitation   Mitral Regurgitant Velocity Time Integral 195.03 cm      MVA VTI 1.41 cm2                    Diastolic Filling/Shunts    Diastolic Filling   MV E Nelson 113.94 cm/s      MV A Nelson 110.21 cm/s      MV E/A 1.03        Shunt   LVOT SV 55.1 mL                  11/12/20   ECHO ADULT COMPLETE 11/13/2020 11/13/2020    Narrative · LV: Estimated LVEF is 20 - 25%. Severely dilated left ventricle. Wall   thickness appears thin. Severely and globally reduced systolic function. · RA: Mildly dilated right atrium. LA: Severely dilated left atrium. · MV: Mitraclip is noted. Mean gradient is 3-4 mm hg. Mild mitral valve   regurgitation is present. Mitral regurgitation appears to have   substantially improved compared to pre mitraclip study from June 2020. · TV: Mild to moderate tricuspid valve regurgitation is present. Signed by: Shade Bernheim, MD         06/19/20   ECHO ADULT COMPLETE 06/20/2020 6/20/2020    Narrative · Image quality for this study was technically difficult. · Mildly dilated left ventricle. Upper normal wall thickness. Severe   global systolic function. Estimated left ventricular ejection fraction is   25 - 30%. Suboptimal endocardial visualization limits wall motion   analysis. Inconclusive left ventricular diastolic function. · Moderate tricuspid valve regurgitation is present. · Mild to moderate pulmonary hypertension. Pulmonary arterial systolic   pressure is 45 mmHg. · Moderately dilated left atrium. · Mitral valve non-specific thickening. Severe mitral valve regurgitation   is present. Signed by:  Seda Andrade MD       Allergies   Allergen Reactions    Crestor [Rosuvastatin] Other (comments)     Causes muscle cramps    Lisinopril Cough    Nsaids (Non-Steroidal Anti-Inflammatory Drug) Other (comments)     Liver and Kidney       Past Medical History:   Diagnosis Date    Adverse effect of anesthesia     hard to wake up/uses BIPAP/\"try to avoid general if possible\"/intubated in past prior to going to sleep and it caused pt to be incontinent    Arthritis     Asthma     CAD (coronary artery disease)     Chronic kidney disease     elevataed creatinine    Chronic obstructive pulmonary disease (HCC)     Chronic pain     arthritis    Coagulation disorder ([facility]     on plavix    Congestive heart failure (HCC)     Diabetes ([facility]     Hypertension     Morbid obesity ([facility]     Sleep apnea 1996    BIPAP with 2 liters oxygen    Thromboembolus ([facility]     after heart surgery - left leg    Thyroid disease    . [place] is a 65 y.o. female, evaluated via audio-only technology on 5/28/2021 for CHF and Leg Swelling  . Prior to Admission medications    Medication Sig Start Date End Date Taking? Authorizing Provider   gabapentin (NEURONTIN) 100 mg capsule Take 2 Caps by mouth three (3) times daily. Max Daily Amount: 600 mg. 5/13/21  Yes Carol Ann Iyer NP   hydrALAZINE (APRESOLINE) 25 mg tablet Take 1 Tab by mouth three (3) times daily. 5/7/21  Yes John Zimmerman NP   levothyroxine (SYNTHROID) 100 mcg tablet Take 1 Tab by mouth Daily (before breakfast). 5/5/21  Yes Carol Ann Iyer NP   Blood-Glucose Meter monitoring kit Check blood sugar daily. May substitute for insurance preferred meter 4/29/21  Yes Carol Ann Iyer NP   glucose blood VI test strips (blood glucose test) strip Check blood sugar 2 times daily: E11.9 may substitute for insurance preferred strips. 4/29/21  Yes Carol Ann Iyer NP   lancets misc Use as directed. Dx:check sugar once daily.  E11.65 4/29/21  Yes Carol Ann Iyer NP   allopurinoL (ZYLOPRIM) 100 mg tablet Take 1 tablet by mouth once daily 4/28/21  Yes Lo Mckeon NP   atorvastatin (LIPITOR) 80 mg tablet TAKE 1 TABLET BY MOUTH AT BEDTIME 4/22/21  Yes Gisela Blankenship NP   glipiZIDE (GLUCOTROL) 10 mg tablet Take 1 tablet by mouth twice daily with food 4/1/21  Yes Carol Ann Iyer NP   ezetimibe (Faith Pae) 10 mg tablet Take 1 tablet by mouth once daily 3/26/21  Yes Polliard, Ilsa Severance, NP   furosemide (LASIX) 20 mg tablet Take 0.5 Tabs by mouth daily. Patient taking differently: Take 20 mg by mouth daily. 3/26/21  Yes Justin Mena NP   clopidogreL (PLAVIX) 75 mg tab Take 1 tablet by mouth once daily 3/4/21  Yes Cherylene Huxley., NP   lancets misc Check blood sugar 2 times daily. May substitute for insurance preferred lancets. 12/11/20  Yes Yenifer Amezquita NP   glucose blood VI test strips (ASCENSIA AUTODISC VI, ONE TOUCH ULTRA TEST VI) strip E11.65 check sugar once daily 12/7/20  Yes Hieu Hunt MD   isosorbide mononitrate ER (IMDUR) 30 mg tablet Take 1 Tab by mouth every morning. 11/30/20  Yes Fady Booth NP   ketorolac (ACULAR) 0.5 % ophthalmic solution INSTILL 1 DROP 4 TIMES DAILY INTO EYE HAVING SURGERY START 2 DAYS PRIOR TO SURGERY 10/22/20  Yes Provider, Historical   ofloxacin (FLOXIN) 0.3 % ophthalmic solution INSTILL 1 DROP 4 TIMES DAILY INTO SURGERY EYE START 2 DAYS PRIOR TO SURGERY 10/22/20  Yes Provider, Historical   prednisoLONE acetate (PRED FORTE) 1 % ophthalmic suspension INSTILL 1 DROP 4 TIMES DAILY INTO SUREGRY EYE TAPER AS DIRECTED 10/22/20  Yes Provider, Historical   sacubitriL-valsartan (Entresto) 24-26 mg tablet Take 1 Tab by mouth two (2) times a day. 11/20/20  Yes Lo Mckeon NP   metoprolol succinate (TOPROL-XL) 25 mg XL tablet Take 0.5 Tabs by mouth daily. Hold for systolic BP less than 715 11/13/20  Yes Dolly Holstein, NP   clotrimazole-betamethasone (LOTRISONE) topical cream Apply  to affected area two (2) times a day. Patient taking differently: Apply  to affected area two (2) times daily as needed. 9/23/20  Yes Cherylene Huxley., NP   Blood-Glucose Meter monitoring kit Use as directed.  Dx: E11.65 check sugar twice daily 8/25/20  Yes Cherylene Fields., NP   albuterol (PROVENTIL HFA, VENTOLIN HFA, PROAIR HFA) 90 mcg/actuation inhaler Take 2 Puffs by inhalation every four (4) hours as needed. Yes Provider, Historical   acetaminophen (TYLENOL ARTHRITIS PAIN) 650 mg TbER Take 1,300 mg by mouth two (2) times a day. Yes Provider, Historical   aspirin 81 mg chewable tablet Take 81 mg by mouth daily. Yes Provider, Historical       Review of Systems   Constitutional: Negative for chills, fever and weight loss. HENT: Negative. Eyes: Negative. Respiratory: Positive for shortness of breath. MARINA - baseline   Cardiovascular: Positive for leg swelling. Negative for chest pain, palpitations, orthopnea, claudication and PND. Gastrointestinal: Negative. Musculoskeletal: Negative. Skin: Negative. Neurological: Negative. Endo/Heme/Allergies: Negative. Psychiatric/Behavioral: Negative. Patient-Reported Vitals 2/2/2021   Patient-Reported Weight 222.4 lb   Patient-Reported Pulse 87   Patient-Reported Temperature 97.4   Patient-Reported Systolic  542   Patient-Reported Diastolic 55        Jonnie Chiang, who was evaluated through a patient-initiated, synchronous (real-time) audio only encounter, and/or her healthcare decision maker, is aware that it is a billable service, with coverage as determined by her insurance carrier. She provided verbal consent to proceed: Yes. She has not had a related appointment within my department in the past 7 days or scheduled within the next 24 hours. Jonnie Chiang is a 65 y.o. female being evaluated by a Virtual Visit (video visit) encounter to address concerns as mentioned above. A caregiver was present when appropriate. Due to this being a TeleHealth encounter (During EPEXF-13 public health emergency), evaluation of the following organ systems was limited: Vitals/Constitutional/EENT/Resp/CV/GI//MS/Neuro/Skin/Heme-Lymph-Imm.   Pursuant to the emergency declaration under the [address] [facility] [place], [address] waiver authority and the [place] [employer] and Response Supplemental Appropriations Act, this Virtual Visit was conducted with patient's (and/or legal guardian's) consent, to reduce the risk of exposure to COVID-19 and provide necessary medical care. Services were provided through a video synchronous discussion virtually to substitute for in-person encounter. --Aria Gutiérrez NP on 5/28/2021 at 2:10 PM    An electronic signature was used to authenticate this note.     Total Time: minutes: 21-30 minutes        Aria Gutiérrez NP  [address] [place] [address], [address]  Office [phone]  Fax [phone]

## 2021-05-28 NOTE — PATIENT INSTRUCTIONS
Medication changes:    BEGIN taking Jardiance, 1 pill daily. CONTINUE taking Lasix, 1 pill daily. Please take this to your pharmacy to notify them of the change in medications. Testing Ordered: We will request lab work from Dr. Malcolm Mina. Other Recommendations:     Email your forms to [email or web address]      Ensure your drinking an adequate amount of water with a goal of 6-8 eight ounce glasses (1.5-2 liters) of fluid daily. Your urine should be clear and light yellow straw colored. If your blood pressure begins to consistently run below 90/60 and/or you begin to experience dizziness or lightheadedness, please contact the [facility] at [phone]. Follow up in 3 months with [facility]. Please monitor your weights daily upon waking and after using the bathroom. Keep a written records of your weights and bring to your next appointment. If you have a weight gain of 3 or more pounds overnight OR 5 or more pounds in one week please contact our office. Thank you for allowing us the privilege of being a part of your healthcare team! Please do not hesitate to contact our office at [phone] with any questions or concerns. Virtual Heart Failure [address] invites you to learn more about heart failure and to share your questions, ideas, and experiences with others. Each month, the Heart Failure Support Group features a new educational topic and a guest speaker, followed by an interactive discussion. Our Heart Failure Nurse Navigator will moderate each session. You will be able to participate by phone, tablet or computer through [employer]. This support group takes place on the 3rd Thursday of each month from 6:00-7:30PM. All individuals living with heart failure and their caregivers are welcome to join.      If you are interested in participating, please contact us at [email or web address] and you will be sent the link to join the [facility].

## 2021-05-30 ENCOUNTER — APPOINTMENT (OUTPATIENT)
Dept: GENERAL RADIOLOGY | Age: 71
DRG: 291 | End: 2021-05-30
Attending: STUDENT IN AN ORGANIZED HEALTH CARE EDUCATION/TRAINING PROGRAM
Payer: MEDICARE

## 2021-05-30 ENCOUNTER — HOSPITAL ENCOUNTER (INPATIENT)
Age: 71
LOS: 4 days | Discharge: HOME | DRG: 291 | End: 2021-06-03
Attending: STUDENT IN AN ORGANIZED HEALTH CARE EDUCATION/TRAINING PROGRAM | Admitting: HOSPITALIST
Payer: MEDICARE

## 2021-05-30 DIAGNOSIS — I25.5 ISCHEMIC CARDIOMYOPATHY: ICD-10-CM

## 2021-05-30 DIAGNOSIS — Z95.1 HX OF CABG: ICD-10-CM

## 2021-05-30 DIAGNOSIS — Z95.810 ICD (IMPLANTABLE CARDIOVERTER-DEFIBRILLATOR) IN PLACE: ICD-10-CM

## 2021-05-30 DIAGNOSIS — I50.42 CHRONIC HEART FAILURE WITH REDUCED EJECTION FRACTION AND DIASTOLIC DYSFUNCTION (HCC): ICD-10-CM

## 2021-05-30 DIAGNOSIS — I50.23 ACUTE ON CHRONIC SYSTOLIC CHF (CONGESTIVE HEART FAILURE) (HCC): ICD-10-CM

## 2021-05-30 DIAGNOSIS — E87.79 CARDIAC VOLUME OVERLOAD: ICD-10-CM

## 2021-05-30 DIAGNOSIS — J96.02 ACUTE RESPIRATORY FAILURE WITH HYPERCAPNIA (HCC): Primary | ICD-10-CM

## 2021-05-30 DIAGNOSIS — N18.9 CHRONIC KIDNEY DISEASE, UNSPECIFIED CKD STAGE: ICD-10-CM

## 2021-05-30 PROBLEM — I50.9 CHF EXACERBATION (HCC): Status: ACTIVE | Noted: 2021-05-30

## 2021-05-30 PROBLEM — J96.20 ACUTE ON CHRONIC RESPIRATORY FAILURE (HCC): Status: ACTIVE | Noted: 2021-05-30

## 2021-05-30 LAB
ALBUMIN SERPL-MCNC: 3.8 G/DL (ref 3.5–5)
ALBUMIN/GLOB SERPL: 1 {RATIO} (ref 1.1–2.2)
ALP SERPL-CCNC: 114 U/L (ref 45–117)
ALT SERPL-CCNC: 36 U/L (ref 12–78)
ANION GAP SERPL CALC-SCNC: 12 MMOL/L (ref 5–15)
ARTERIAL PATENCY WRIST A: POSITIVE
AST SERPL-CCNC: 34 U/L (ref 15–37)
ATRIAL RATE: 104 BPM
BASE DEFICIT BLD-SCNC: 6.5 MMOL/L
BASOPHILS # BLD: 0.1 K/UL (ref 0–0.1)
BASOPHILS NFR BLD: 1 % (ref 0–1)
BDY SITE: ABNORMAL
BILIRUB SERPL-MCNC: 0.6 MG/DL (ref 0.2–1)
BNP SERPL-MCNC: 2947 PG/ML
BUN SERPL-MCNC: 34 MG/DL (ref 6–20)
BUN/CREAT SERPL: 18 (ref 12–20)
CA-I BLD-SCNC: 1.39 MMOL/L (ref 1.12–1.32)
CALCIUM SERPL-MCNC: 10.6 MG/DL (ref 8.5–10.1)
CALCULATED P AXIS, ECG09: 60 DEGREES
CALCULATED R AXIS, ECG10: 11 DEGREES
CALCULATED T AXIS, ECG11: 130 DEGREES
CHLORIDE SERPL-SCNC: 105 MMOL/L (ref 97–108)
CO2 SERPL-SCNC: 20 MMOL/L (ref 21–32)
COMMENT, HOLDF: NORMAL
COVID-19 RAPID TEST, COVR: NOT DETECTED
CREAT SERPL-MCNC: 1.94 MG/DL (ref 0.55–1.02)
DIAGNOSIS, 93000: NORMAL
DIFFERENTIAL METHOD BLD: ABNORMAL
EOSINOPHIL # BLD: 0.3 K/UL (ref 0–0.4)
EOSINOPHIL NFR BLD: 2 % (ref 0–7)
ERYTHROCYTE [DISTWIDTH] IN BLOOD BY AUTOMATED COUNT: 15.2 % (ref 11.5–14.5)
GAS FLOW.O2 O2 DELIVERY SYS: ABNORMAL L/MIN
GLOBULIN SER CALC-MCNC: 3.8 G/DL (ref 2–4)
GLUCOSE BLD STRIP.AUTO-MCNC: 134 MG/DL (ref 65–117)
GLUCOSE SERPL-MCNC: 184 MG/DL (ref 65–100)
HCO3 BLD-SCNC: 20.7 MMOL/L (ref 22–26)
HCT VFR BLD AUTO: 39.7 % (ref 35–47)
HGB BLD-MCNC: 12.6 G/DL (ref 11.5–16)
IMM GRANULOCYTES # BLD AUTO: 0.1 K/UL (ref 0–0.04)
IMM GRANULOCYTES NFR BLD AUTO: 1 % (ref 0–0.5)
LYMPHOCYTES # BLD: 4.6 K/UL (ref 0.8–3.5)
LYMPHOCYTES NFR BLD: 24 % (ref 12–49)
MAGNESIUM SERPL-MCNC: 2.3 MG/DL (ref 1.6–2.4)
MCH RBC QN AUTO: 31.7 PG (ref 26–34)
MCHC RBC AUTO-ENTMCNC: 31.7 G/DL (ref 30–36.5)
MCV RBC AUTO: 99.7 FL (ref 80–99)
MONOCYTES # BLD: 1 K/UL (ref 0–1)
MONOCYTES NFR BLD: 5 % (ref 5–13)
NEUTS SEG # BLD: 13 K/UL (ref 1.8–8)
NEUTS SEG NFR BLD: 67 % (ref 32–75)
NRBC # BLD: 0 K/UL (ref 0–0.01)
NRBC BLD-RTO: 0 PER 100 WBC
O2/TOTAL GAS SETTING VFR VENT: 50 %
P-R INTERVAL, ECG05: 116 MS
PCO2 BLD: 46.7 MMHG (ref 35–45)
PEEP RESPIRATORY: 7 CMH2O
PH BLD: 7.26 [PH] (ref 7.35–7.45)
PLATELET # BLD AUTO: 256 K/UL (ref 150–400)
PMV BLD AUTO: 9.9 FL (ref 8.9–12.9)
PO2 BLD: 140 MMHG (ref 80–100)
POTASSIUM SERPL-SCNC: 3.7 MMOL/L (ref 3.5–5.1)
PRESSURE SUPPORT SETTING VENT: 7 CMH2O
PROT SERPL-MCNC: 7.6 G/DL (ref 6.4–8.2)
Q-T INTERVAL, ECG07: 328 MS
QRS DURATION, ECG06: 88 MS
QTC CALCULATION (BEZET), ECG08: 431 MS
RBC # BLD AUTO: 3.98 M/UL (ref 3.8–5.2)
SAMPLES BEING HELD,HOLD: NORMAL
SAO2 % BLD: 98.7 % (ref 92–97)
SERVICE CMNT-IMP: ABNORMAL
SODIUM SERPL-SCNC: 137 MMOL/L (ref 136–145)
SOURCE, COVRS: NORMAL
SPECIMEN TYPE: ABNORMAL
TROPONIN I SERPL-MCNC: <0.05 NG/ML
VENTILATION MODE VENT: ABNORMAL
VENTRICULAR RATE, ECG03: 104 BPM
WBC # BLD AUTO: 19.1 K/UL (ref 3.6–11)

## 2021-05-30 PROCEDURE — 99285 EMERGENCY DEPT VISIT HI MDM: CPT

## 2021-05-30 PROCEDURE — 5A09457 ASSISTANCE WITH RESPIRATORY VENTILATION, 24-96 CONSECUTIVE HOURS, CONTINUOUS POSITIVE AIRWAY PRESSURE: ICD-10-PCS | Performed by: HOSPITALIST

## 2021-05-30 PROCEDURE — 83735 ASSAY OF MAGNESIUM: CPT

## 2021-05-30 PROCEDURE — 71045 X-RAY EXAM CHEST 1 VIEW: CPT

## 2021-05-30 PROCEDURE — 82962 GLUCOSE BLOOD TEST: CPT

## 2021-05-30 PROCEDURE — 65660000001 HC RM ICU INTERMED STEPDOWN

## 2021-05-30 PROCEDURE — 74011000250 HC RX REV CODE- 250: Performed by: HOSPITALIST

## 2021-05-30 PROCEDURE — 85025 COMPLETE CBC W/AUTO DIFF WBC: CPT

## 2021-05-30 PROCEDURE — 83880 ASSAY OF NATRIURETIC PEPTIDE: CPT

## 2021-05-30 PROCEDURE — 93005 ELECTROCARDIOGRAM TRACING: CPT

## 2021-05-30 PROCEDURE — 94660 CPAP INITIATION&MGMT: CPT

## 2021-05-30 PROCEDURE — 74011250637 HC RX REV CODE- 250/637: Performed by: HOSPITALIST

## 2021-05-30 PROCEDURE — 94762 N-INVAS EAR/PLS OXIMTRY CONT: CPT

## 2021-05-30 PROCEDURE — 36600 WITHDRAWAL OF ARTERIAL BLOOD: CPT

## 2021-05-30 PROCEDURE — 84484 ASSAY OF TROPONIN QUANT: CPT

## 2021-05-30 PROCEDURE — 2709999900 HC NON-CHARGEABLE SUPPLY

## 2021-05-30 PROCEDURE — 74011000250 HC RX REV CODE- 250: Performed by: STUDENT IN AN ORGANIZED HEALTH CARE EDUCATION/TRAINING PROGRAM

## 2021-05-30 PROCEDURE — 80053 COMPREHEN METABOLIC PANEL: CPT

## 2021-05-30 PROCEDURE — 82803 BLOOD GASES ANY COMBINATION: CPT

## 2021-05-30 PROCEDURE — 77030029684 HC NEB SM VOL KT MONA -A

## 2021-05-30 PROCEDURE — 77030013038 HC MSK CPAP FISP -A

## 2021-05-30 PROCEDURE — 87635 SARS-COV-2 COVID-19 AMP PRB: CPT

## 2021-05-30 PROCEDURE — 36415 COLL VENOUS BLD VENIPUNCTURE: CPT

## 2021-05-30 RX ORDER — BUMETANIDE 0.25 MG/ML
1 INJECTION INTRAMUSCULAR; INTRAVENOUS 2 TIMES DAILY
Status: DISCONTINUED | OUTPATIENT
Start: 2021-05-30 | End: 2021-06-04 | Stop reason: HOSPADM

## 2021-05-30 RX ORDER — BUMETANIDE 0.25 MG/ML
1 INJECTION INTRAMUSCULAR; INTRAVENOUS ONCE
Status: COMPLETED | OUTPATIENT
Start: 2021-05-30 | End: 2021-05-30

## 2021-05-30 RX ORDER — IPRATROPIUM BROMIDE AND ALBUTEROL SULFATE 2.5; .5 MG/3ML; MG/3ML
3 SOLUTION RESPIRATORY (INHALATION)
Status: DISCONTINUED | OUTPATIENT
Start: 2021-05-30 | End: 2021-06-01 | Stop reason: SDUPTHER

## 2021-05-30 RX ORDER — INSULIN GLARGINE 100 [IU]/ML
10 INJECTION, SOLUTION SUBCUTANEOUS DAILY
Status: DISCONTINUED | OUTPATIENT
Start: 2021-05-31 | End: 2021-06-02

## 2021-05-30 RX ORDER — INSULIN LISPRO 100 [IU]/ML
INJECTION, SOLUTION INTRAVENOUS; SUBCUTANEOUS
Status: DISCONTINUED | OUTPATIENT
Start: 2021-05-30 | End: 2021-06-04 | Stop reason: HOSPADM

## 2021-05-30 RX ORDER — CLOPIDOGREL BISULFATE 75 MG/1
75 TABLET ORAL DAILY
Status: DISCONTINUED | OUTPATIENT
Start: 2021-05-31 | End: 2021-06-04 | Stop reason: HOSPADM

## 2021-05-30 RX ORDER — HYDRALAZINE HYDROCHLORIDE 25 MG/1
25 TABLET, FILM COATED ORAL 3 TIMES DAILY
Status: DISCONTINUED | OUTPATIENT
Start: 2021-05-30 | End: 2021-05-31

## 2021-05-30 RX ORDER — ALLOPURINOL 100 MG/1
100 TABLET ORAL DAILY
Status: DISCONTINUED | OUTPATIENT
Start: 2021-05-31 | End: 2021-06-04 | Stop reason: HOSPADM

## 2021-05-30 RX ORDER — MAGNESIUM SULFATE 100 %
4 CRYSTALS MISCELLANEOUS AS NEEDED
Status: DISCONTINUED | OUTPATIENT
Start: 2021-05-30 | End: 2021-06-04 | Stop reason: HOSPADM

## 2021-05-30 RX ORDER — ISOSORBIDE MONONITRATE 30 MG/1
30 TABLET, EXTENDED RELEASE ORAL
Status: DISCONTINUED | OUTPATIENT
Start: 2021-05-31 | End: 2021-06-04 | Stop reason: HOSPADM

## 2021-05-30 RX ORDER — DEXTROSE 50 % IN WATER (D50W) INTRAVENOUS SYRINGE
25-50 AS NEEDED
Status: DISCONTINUED | OUTPATIENT
Start: 2021-05-30 | End: 2021-06-04 | Stop reason: HOSPADM

## 2021-05-30 RX ORDER — LEVOTHYROXINE SODIUM 100 UG/1
100 TABLET ORAL
Status: DISCONTINUED | OUTPATIENT
Start: 2021-05-31 | End: 2021-06-01

## 2021-05-30 RX ORDER — EZETIMIBE 10 MG/1
10 TABLET ORAL DAILY
Status: DISCONTINUED | OUTPATIENT
Start: 2021-05-31 | End: 2021-06-04 | Stop reason: HOSPADM

## 2021-05-30 RX ORDER — GUAIFENESIN 100 MG/5ML
81 LIQUID (ML) ORAL DAILY
Status: DISCONTINUED | OUTPATIENT
Start: 2021-05-31 | End: 2021-06-04 | Stop reason: HOSPADM

## 2021-05-30 RX ORDER — ATORVASTATIN CALCIUM 40 MG/1
80 TABLET, FILM COATED ORAL
Status: DISCONTINUED | OUTPATIENT
Start: 2021-05-30 | End: 2021-06-04 | Stop reason: HOSPADM

## 2021-05-30 RX ORDER — GABAPENTIN 100 MG/1
200 CAPSULE ORAL 3 TIMES DAILY
Status: DISCONTINUED | OUTPATIENT
Start: 2021-05-30 | End: 2021-06-02

## 2021-05-30 RX ORDER — METOPROLOL SUCCINATE 25 MG/1
12.5 TABLET, EXTENDED RELEASE ORAL DAILY
Status: DISCONTINUED | OUTPATIENT
Start: 2021-05-31 | End: 2021-06-04 | Stop reason: HOSPADM

## 2021-05-30 RX ADMIN — BUMETANIDE 1 MG: 0.25 INJECTION INTRAMUSCULAR; INTRAVENOUS at 15:39

## 2021-05-30 RX ADMIN — BUMETANIDE 1 MG: 0.25 INJECTION INTRAMUSCULAR; INTRAVENOUS at 20:09

## 2021-05-30 RX ADMIN — SACUBITRIL AND VALSARTAN 1 TABLET: 24; 26 TABLET, FILM COATED ORAL at 22:04

## 2021-05-30 RX ADMIN — ATORVASTATIN CALCIUM 80 MG: 40 TABLET, FILM COATED ORAL at 22:00

## 2021-05-30 RX ADMIN — HYDRALAZINE HYDROCHLORIDE 25 MG: 25 TABLET, FILM COATED ORAL at 22:01

## 2021-05-30 RX ADMIN — GABAPENTIN 200 MG: 100 CAPSULE ORAL at 21:59

## 2021-05-30 NOTE — ED PROVIDER NOTES
Chief Complaint   Patient presents with    Shortness of Breath     History and review of systems limited by the critical nature of her presentation. This is a 20-year-old female with a history of COPD, coronary disease with multiple stents in place and now status post CABG x3, ischemic cardiomyopathy and congestive heart failure for which she is known to the advanced heart failure service here, status post ICD placement, chronic respiratory failure for which she uses BiPAP at home, presenting by EMS for respiratory distress. She started feeling short of breath yesterday, does note that she has had some weight gain more recently despite good compliance with her medications. No fevers or chest pain, she does note a slight cough. No abdominal pain, vomiting, or urinary complaints. Symptoms are severe in nature without alleviating factors.       Past Medical History:   Diagnosis Date    Adverse effect of anesthesia     hard to wake up/uses BIPAP/\"try to avoid general if possible\"/intubated in past prior to going to sleep and it caused pt to be incontinent    Arthritis     Asthma     CAD (coronary artery disease)     Chronic kidney disease     elevataed creatinine    Chronic obstructive pulmonary disease (HCC)     Chronic pain     arthritis    Coagulation disorder (HCC)     on plavix    Congestive heart failure (HCC)     Diabetes ([address]     Hypertension     Morbid obesity ([address]     Sleep apnea     BIPAP with 2 liters oxygen    Thromboembolus ([address]     after heart surgery - left leg    Thyroid disease        Past Surgical History:   Procedure Laterality Date    COLONOSCOPY N/A 2020    COLONOSCOPY   :- performed by Shaquille Mack MD at [address] HX ARTHROPLASTY  2105    knee - right    HX  SECTION      x3    HX CORONARY ARTERY BYPASS GRAFT  1997    3 vessels    HX CORONARY STENT PLACEMENT  2016    HX HERNIA REPAIR  1988    HX IMPLANTABLE CARDIOVERTER DEFIBRILLATOR      HX KNEE REPLACEMENT Right 2015    once    [facility] CARDIAC SURG PROCEDURE UNLIST  2018    cardiac cath - Left    [facility] LAP GASTRIC BYPASS/KERLINE-EN-Y  2011    lap band per pt and not a gastric bypass         Family History:   Problem Relation Age of Onset    Hypertension Mother     Dementia Mother     Coronary Artery Disease Father 58    Sudden Death Father 58    Diabetes Son     Diabetes Brother        Social History     Socioeconomic History    Marital status:      Spouse name: Not on file    Number of children: Not on file    Years of education: Not on file    Highest education level: Not on file   Occupational History    Not on file   Tobacco Use    Smoking status: Former Smoker     Packs/day: 0.50     Years: 45.00     Pack years: 22.50     Types: Cigarettes     Quit date: 2010     Years since quittin.4    Smokeless tobacco: Never Used    Tobacco comment: quit /started again (smoked 3 yrs) off and on/none since    Vaping Use    Vaping Use: Never used   Substance and Sexual Activity    Alcohol use: Not Currently    Drug use: Not Currently    Sexual activity: Not Currently   Other Topics Concern     Service Not Asked    Blood Transfusions Not Asked    Caffeine Concern Not Asked    Occupational Exposure Not Asked    Hobby Hazards Not Asked    Sleep Concern Not Asked    Stress Concern Not Asked    Weight Concern Not Asked    Special Diet Not Asked    Back Care Not Asked    Exercise Not Asked    Bike Helmet Not Asked    Seat Belt Not Asked    Self-Exams Not Asked   Social History Narrative    Not on file     Social Determinants of Health     Financial Resource Strain:     Difficulty of Paying Living Expenses:    Food Insecurity:     Worried About Running Out of Food in the Last Year:     Ran Out of Food in the Last Year:    Transportation Needs:     Lack of Transportation (Medical):      Lack of Transportation (Non-Medical):    Physical Activity:     Days of Exercise per Week:     Minutes of Exercise per Session:    Stress:     Feeling of Stress :    Social Connections:     Frequency of Communication with Friends and Family:     Frequency of Social Gatherings with Friends and Family:     Attends [employer] Services:     Active Member of Clubs or Organizations:     Attends Club or Organization Meetings:     Marital Status:    Intimate Partner Violence:     Fear of Current or Ex-Partner:     Emotionally Abused:     Physically Abused:     Sexually Abused: ALLERGIES: Crestor [rosuvastatin], Lisinopril, and Nsaids (non-steroidal anti-inflammatory drug)    Review of Systems   Unable to perform ROS: Severe respiratory distress   Respiratory: Positive for shortness of breath. Cardiovascular: Negative for chest pain. Gastrointestinal: Negative for abdominal pain and vomiting.        Vitals:    05/30/21 1430 05/30/21 1431 05/30/21 1445 05/30/21 1446   BP: 138/76  (!) 145/87    Pulse: 88  85    Resp: 17  25    Temp:       SpO2:  97%  100%   Weight:       Height:                Physical Exam  General:  Awake and alert, +respiratory distress  HEENT:  NC/AT, equal pupils, moist mucous membranes  Neck:   Normal inspection, full range of motion  Cardiac:  RRR, no murmurs  Respiratory:  +Rales throughout, no wheezing, diminished breath sounds with increased work of breathing  Abdomen:  Soft and nontender, nondistended  Extremities: Warm and well perfused, pitting edema in the lower extremities, symmetric  Neuro:  Moving all extremities symmetrically without gross motor deficit  Skin:   No rashes or pallor    RESULTS  Recent Results (from the past 12 hour(s))   EKG, 12 LEAD, INITIAL    Collection Time: 05/30/21  1:40 PM   Result Value Ref Range    Ventricular Rate 104 BPM    Atrial Rate 104 BPM    P-R Interval 116 ms    QRS Duration 88 ms    Q-T Interval 328 ms    QTC Calculation (Bezet) 431 ms    Calculated P Axis 60 degrees    Calculated R Axis 11 degrees    Calculated T Axis 130 degrees    Diagnosis       Sinus tachycardia with premature supraventricular complexes and with frequent   premature ventricular complexes  Inferior infarct (cited on or before 30-MAY-2021)  ST & T wave abnormality, consider lateral ischemia  When compared with ECG of 13-NOV-2020 12:56,  Previous ECG has undetermined rhythm, needs review  Possible ventricular-paced complexes  Confirmed by Karen Gonzalez MD, [facility] ([id number]) on 5/30/2021 3:00:47 PM     CBC WITH AUTOMATED DIFF    Collection Time: 05/30/21  1:46 PM   Result Value Ref Range    WBC 19.1 (H) 3.6 - 11.0 K/uL    RBC 3.98 3.80 - 5.20 M/uL    HGB 12.6 11.5 - 16.0 g/dL    HCT 39.7 35.0 - 47.0 %    MCV 99.7 (H) 80.0 - 99.0 FL    MCH 31.7 26.0 - 34.0 PG    MCHC 31.7 30.0 - 36.5 g/dL    RDW 15.2 (H) 11.5 - 14.5 %    PLATELET 844 237 - 167 K/uL    MPV 9.9 8.9 - 12.9 FL    NRBC 0.0 0  WBC    ABSOLUTE NRBC 0.00 0.00 - 0.01 K/uL    NEUTROPHILS 67 32 - 75 %    LYMPHOCYTES 24 12 - 49 %    MONOCYTES 5 5 - 13 %    EOSINOPHILS 2 0 - 7 %    BASOPHILS 1 0 - 1 %    IMMATURE GRANULOCYTES 1 (H) 0.0 - 0.5 %    ABS. NEUTROPHILS 13.0 (H) 1.8 - 8.0 K/UL    ABS. LYMPHOCYTES 4.6 (H) 0.8 - 3.5 K/UL    ABS. MONOCYTES 1.0 0.0 - 1.0 K/UL    ABS. EOSINOPHILS 0.3 0.0 - 0.4 K/UL    ABS. BASOPHILS 0.1 0.0 - 0.1 K/UL    ABS. IMM.  GRANS. 0.1 (H) 0.00 - 0.04 K/UL    DF AUTOMATED     METABOLIC PANEL, COMPREHENSIVE    Collection Time: 05/30/21  1:46 PM   Result Value Ref Range    Sodium 137 136 - 145 mmol/L    Potassium 3.7 3.5 - 5.1 mmol/L    Chloride 105 97 - 108 mmol/L    CO2 20 (L) 21 - 32 mmol/L    Anion gap 12 5 - 15 mmol/L    Glucose 184 (H) 65 - 100 mg/dL    BUN 34 (H) 6 - 20 MG/DL    Creatinine 1.94 (H) 0.55 - 1.02 MG/DL    BUN/Creatinine ratio 18 12 - 20      GFR est AA 31 (L) >60 ml/min/1.73m2    GFR est non-AA 25 (L) >60 ml/min/1.73m2    Calcium 10.6 (H) 8.5 - 10.1 MG/DL    Bilirubin, total 0.6 0.2 - 1.0 MG/DL    ALT (SGPT) 36 12 - 78 U/L    AST (SGOT) 34 15 - 37 U/L    Alk. phosphatase 114 45 - 117 U/L    Protein, total 7.6 6.4 - 8.2 g/dL    Albumin 3.8 3.5 - 5.0 g/dL    Globulin 3.8 2.0 - 4.0 g/dL    A-G Ratio 1.0 (L) 1.1 - 2.2     SAMPLES BEING HELD    Collection Time: 05/30/21  1:46 PM   Result Value Ref Range    SAMPLES BEING HELD 1blu 1red 2bc(1purp 1blu)     COMMENT        Add-on orders for these samples will be processed based on acceptable specimen integrity and analyte stability, which may vary by analyte. NT-PRO BNP    Collection Time: 05/30/21  1:46 PM   Result Value Ref Range    NT pro-BNP 2,947 (H) <125 PG/ML   TROPONIN I    Collection Time: 05/30/21  1:46 PM   Result Value Ref Range    Troponin-I, Qt. <0.05 <0.05 ng/mL   POC EG7    Collection Time: 05/30/21  2:02 PM   Result Value Ref Range    Calcium, ionized (POC) 1.39 (H) 1.12 - 1.32 mmol/L    FIO2 (POC) 50 %    pH (POC) 7.26 (L) 7.35 - 7.45      pCO2 (POC) 46.7 (H) 35.0 - 45.0 MMHG    pO2 (POC) 140 (H) 80 - 100 MMHG    HCO3 (POC) 20.7 (L) 22 - 26 MMOL/L    Base deficit (POC) 6.5 mmol/L    sO2 (POC) 98.7 (H) 92 - 97 %    Site LEFT RADIAL      Device: BIPAP MASK      Mode Pressure Support      PEEP/CPAP (POC) 7 cmH2O    Pressure support 7 cmH2O    Allens test (POC) Positive      Specimen type (POC) ARTERIAL          IMAGING  XR CHEST PORT    Result Date: 5/30/2021  Diffuse increased interstitial and airspace opacities with more confluent airspace opacities in the bilateral lung bases likely representing pulmonary edema.       Critical Care  Performed by: Luis Taylor MD  Authorized by: Luis Taylor MD     Critical care provider statement:     Critical care time (minutes):  35    Critical care time was exclusive of:  Separately billable procedures and treating other patients    Critical care was necessary to treat or prevent imminent or life-threatening deterioration of the following conditions:  Cardiac failure and respiratory failure    Critical care was time spent personally by me on the following activities:  Discussions with consultants, evaluation of patient's response to treatment, examination of patient, ordering and performing treatments and interventions, ordering and review of laboratory studies, ordering and review of radiographic studies, pulse oximetry and re-evaluation of patient's condition       EKG as interpreted by me:  Sinus tachycardia at a rate of 104, left axis deviation, no ST elevations, ventricular ectopy noted. ED course: Started on BIPAP immediately on arrival with improvement in work of breathing. Labs, EKG and imaging reviewed. Hx CHF with EF 35-40% by last echocardiogram, presents in respiratory distress with respiratory acidosis, notes recent weight gain, chest film indicates pulmonary edema. IV Bumex ordered, not sure if the furosemide she is taking at home is working given her renal impairment. Will admit for continued management, discussed with the hospitalist.    [address] for Admission  3:24 PM    ED Room Number:   ER18/18  Patient Name and age:  Jamie Minus 65 y.o.  female  Working Diagnosis:     1. Acute respiratory failure with hypercapnia (HCC)    2. Acute on chronic systolic CHF (congestive heart failure) ([address]    3. Chronic kidney disease, unspecified CKD stage      COVID suspicion:   no  Code Status:    Full Code  Readmission:    no  Isolation Requirements:  no  Recommended Level of Care: step down  Department:    [facility] Adult ED - 21   Other:     Hx CHF with EF 35-40% by last echocardiogram, presents in respiratory distress with respiratory acidosis, has had some weight gain. Pulmonary edema on chest film. Tolerating BIPAP. IV Bumex ordered, not sure if the furosemide she is taking at home is working given her renal impairment.

## 2021-05-30 NOTE — ED TRIAGE NOTES
Patient arrives to ED via EMS from home complaining of shortness of breath starting this morning. EMS reports room air saturations 76% on Room air. Patient arrives on 15L NRB.   Room air saturations during triage 91%, Placed on 6L nasal cannula

## 2021-05-30 NOTE — H&P
History & Physical    Primary Care Provider: Erika Clifford NP  Source of Information: Patient     History of Presenting Illness:   Luther Jaquez is a 65 y.o. female who presents with sob     65 WF with h/o CHF, CKD, DM, asthma/copd, CAD, s/p TAVR HTN and morbid obese  Came to ER with acute SOB. complaining of shortness of breath starting this morning. EMS reports room air saturations 76% on Room air. Patient arrives on 15L NRB. Pt on po lasxi 20mg daily. Gain 4 lbs last 2 days and noticed increasing leg swelling. She follows AHF team and was just seen on Friday. Pt was given bumex 1mg in ER and placed on bipap. Denied fever, no sputum. Pt was fully vaccinated with covid 19      Review of Systems:  General: HPI, no changes   HEENT: no headache, no vision changes, no nose discharge, no hearing changes   RES: hpi   CVS: no cp, no palpitation.   Muscular: no joint swelling, no muscle pain, + swelling  Skin: no rash, no itching   GI: no vomiting, no diarrhea  : no dysuria, no hematuria  Hemo: no gum bleeding, no petechial   Neuro: no sensation changes, no focal weakness   Endo: no polydipsia   Psych: denied depression     Past Medical History:   Diagnosis Date    Adverse effect of anesthesia     hard to wake up/uses BIPAP/\"try to avoid general if possible\"/intubated in past prior to going to sleep and it caused pt to be incontinent    Arthritis     Asthma     CAD (coronary artery disease)     Chronic kidney disease     elevataed creatinine    Chronic obstructive pulmonary disease (HCC)     Chronic pain     arthritis    Coagulation disorder (HCC)     on plavix    Congestive heart failure (HCC)     Diabetes (HCC)     Hypertension     Morbid obesity (HCC)     Sleep apnea 1996    BIPAP with 2 liters oxygen    Thromboembolus ([address]     after heart surgery - left leg    Thyroid disease       Past Surgical History:   Procedure Laterality Date    COLONOSCOPY N/A 2020    COLONOSCOPY   :- performed by Lila Quevedo MD at [facility] [address]    knee - right    HX  SECTION      x3    HX CORONARY ARTERY BYPASS GRAFT  1997    3 vessels    HX CORONARY STENT PLACEMENT  2016    HX HERNIA REPAIR  1988    HX IMPLANTABLE CARDIOVERTER DEFIBRILLATOR      HX KNEE REPLACEMENT Right 2015    once    TX CARDIAC SURG PROCEDURE UNLIST  2018    cardiac cath - Left    TX LAP GASTRIC BYPASS/KERLINE-EN-Y  2011    lap band per pt and not a gastric bypass     Prior to Admission medications    Medication Sig Start Date End Date Taking? Authorizing Provider   empagliflozin (Jardiance) 10 mg tablet Take 1 Tablet by mouth daily. 21   Jennie Blankenship, NP   furosemide (LASIX) 20 mg tablet Take 1 Tablet by mouth daily. 21   Jennie Blankenship NP   gabapentin (NEURONTIN) 100 mg capsule Take 2 Caps by mouth three (3) times daily. Max Daily Amount: 600 mg. 21   Trevor Lipoma Z., NP   hydrALAZINE (APRESOLINE) 25 mg tablet Take 1 Tab by mouth three (3) times daily. 21   Jennie Blankenship NP   levothyroxine (SYNTHROID) 100 mcg tablet Take 1 Tab by mouth Daily (before breakfast). 21   Paige Molina., VENKATESH   Blood-Glucose Meter monitoring kit Check blood sugar daily. May substitute for insurance preferred meter 21   Paige Fontaine, VENKATESH   glucose blood VI test strips (blood glucose test) strip Check blood sugar 2 times daily: E11.9 may substitute for insurance preferred strips. 21   Paige Molina., VENKATESH   lancets misc Use as directed. Dx:check sugar once daily.  E11.65 21   Paige Fontaine, VENKATESH   allopurinoL (ZYLOPRIM) 100 mg tablet Take 1 tablet by mouth once daily 21   Caryle Qufederico B, NP   atorvastatin (LIPITOR) 80 mg tablet TAKE 1 TABLET BY MOUTH AT BEDTIME 21   Jennie Blankenship NP   glipiZIDE (GLUCOTROL) 10 mg tablet Take 1 tablet by mouth twice daily with food 4/1/21   David Rodriguez NP   ezetimibe (ZETIA) 10 mg tablet Take 1 tablet by mouth once daily 3/26/21   Geoff Blankenship NP   clopidogreL (PLAVIX) 75 mg tab Take 1 tablet by mouth once daily 3/4/21   David Rodriguez NP   lancets misc Check blood sugar 2 times daily. May substitute for insurance preferred lancets. 12/11/20   Gerhardt Luz, NP   glucose blood VI test strips (ASCENSIA AUTODISC VI, ONE TOUCH ULTRA TEST VI) strip E11.65 check sugar once daily 12/7/20   Billy Miller MD   isosorbide mononitrate ER (IMDUR) 30 mg tablet Take 1 Tab by mouth every morning. 11/30/20   [facility] OSIT[facility], VENK[facility]H   ketorolac (ACULAR) 0.5 % ophthalmic solution INSTILL 1 DROP 4 TIMES DAILY INTO EYE HAVING SURGERY START 2 DAYS PRIOR TO SURGERY 10/22/20   Provider, Historical   ofloxacin (FLOXIN) 0.3 % ophthalmic solution INSTILL 1 DROP 4 TIMES DAILY INTO SURGERY EYE START 2 DAYS PRIOR TO SURGERY 10/22/20   Provider, Historical   prednisoLONE acetate (PRED FORTE) 1 % ophthalmic suspension INSTILL 1 DROP 4 TIMES DAILY INTO SUREGRY EYE TAPER AS DIRECTED 10/22/20   Provider, Historical   sacubitriL-valsartan (Entresto) 24-26 mg tablet Take 1 Tab by mouth two (2) times a day. 11/20/20   [facility] VENKATESH MCNEILL   metoprolol succinate (TOPROL-XL) 25 mg XL tablet Take 0.5 Tabs by mouth daily. Hold for systolic BP less than 685 11/13/20   Bettina Cervantes NP   clotrimazole-betamethasone (LOTRISONE) topical cream Apply  to affected area two (2) times a day. Patient taking differently: Apply  to affected area two (2) times daily as needed. 9/23/20   David Rodriguez NP   Blood-Glucose Meter monitoring kit Use as directed. Dx: E11.65 check sugar twice daily 8/25/20   David Rodriguez NP   albuterol (PROVENTIL HFA, VENTOLIN HFA, PROAIR HFA) 90 mcg/actuation inhaler Take 2 Puffs by inhalation every four (4) hours as needed.     Provider, Historical   acetaminophen (TYLENOL ARTHRITIS PAIN) 650 mg TbER Take 1,300 mg by mouth two (2) times a day. Provider, Historical   aspirin 81 mg chewable tablet Take 81 mg by mouth daily. Provider, Historical     Allergies   Allergen Reactions    Crestor [Rosuvastatin] Other (comments)     Causes muscle cramps    Lisinopril Cough    Nsaids (Non-Steroidal Anti-Inflammatory Drug) Other (comments)     Liver and Kidney      Family History   Problem Relation Age of Onset    Hypertension Mother     Dementia Mother     Coronary Artery Disease Father 58    Sudden Death Father 58    Diabetes Son     Diabetes Brother         SOCIAL HISTORY:  Patient resides:  Independently x   Assisted Living    SNF    With family care       Smoking history:   None x   Former    Chronic      Alcohol history:   None x   Social    Chronic      Ambulates:   Independently    w/cane    w/walker x   w/wc    CODE STATUS:  DNR    Full x   Other      Objective:     Physical Exam:     Visit Vitals  BP (!) 162/83   Pulse 85   Temp 97.5 °F (36.4 °C)   Resp 25   Ht 5' 3\" (1.6 m)   Wt 103.6 kg (228 lb 6.4 oz)   SpO2 92%   BMI 40.46 kg/m²    O2 Flow Rate (L/min): 6 l/min O2 Device: BIPAP    General:  Alert, cooperative, no distress, appears stated age. Head:  Normocephalic, without obvious abnormality, atraumatic. Eyes:  Conjunctivae/corneas clear. PERRL, EOMs intact. Nose: Nares normal. Septum midline. Mucosa normal. No drainage or sinus tenderness. Throat: Lips, mucosa, and tongue normal. Teeth and gums normal.   Neck: Supple, symmetrical, trachea midline, no adenopathy, thyroid: no enlargement/tenderness/nodules, no carotid bruit and no JVD. Back:   Symmetric, no curvature. ROM normal. No CVA tenderness. Lungs:   Clear to auscultation bilaterally. Chest wall:  No tenderness or deformity. Heart:  Regular rate and rhythm, S1, S2 normal, no murmur, click, rub or gallop. Abdomen:   Soft, non-tender. Bowel sounds normal. No masses,  No organomegaly.    Extremities: Extremities normal, atraumatic, no cyanosis or edema. Pulses: 2+ and symmetric all extremities. Skin: Skin color, texture, turgor normal. No rashes or lesions   Neurologic: CNII-XII intact. Data Review:     Recent Days:  Recent Labs     05/30/21  1346   WBC 19.1*   HGB 12.6   HCT 39.7        Recent Labs     05/30/21  1346      K 3.7      CO2 20*   *   BUN 34*   CREA 1.94*   CA 10.6*   ALB 3.8   ALT 36     No results for input(s): PH, PCO2, PO2, HCO3, FIO2 in the last 72 hours.     24 Hour Results:  Recent Results (from the past 24 hour(s))   EKG, 12 LEAD, INITIAL    Collection Time: 05/30/21  1:40 PM   Result Value Ref Range    Ventricular Rate 104 BPM    Atrial Rate 104 BPM    P-R Interval 116 ms    QRS Duration 88 ms    Q-T Interval 328 ms    QTC Calculation (Bezet) 431 ms    Calculated P Axis 60 degrees    Calculated R Axis 11 degrees    Calculated T Axis 130 degrees    Diagnosis       Sinus tachycardia with premature supraventricular complexes and with frequent   premature ventricular complexes  Inferior infarct (cited on or before 30-MAY-2021)  ST & T wave abnormality, consider lateral ischemia  When compared with ECG of 13-NOV-2020 12:56,  Previous ECG has undetermined rhythm, needs review  Possible ventricular-paced complexes  Confirmed by Adia Stovall MD, Sarabia Breath ([id number]) on 5/30/2021 3:00:47 PM     CBC WITH AUTOMATED DIFF    Collection Time: 05/30/21  1:46 PM   Result Value Ref Range    WBC 19.1 (H) 3.6 - 11.0 K/uL    RBC 3.98 3.80 - 5.20 M/uL    HGB 12.6 11.5 - 16.0 g/dL    HCT 39.7 35.0 - 47.0 %    MCV 99.7 (H) 80.0 - 99.0 FL    MCH 31.7 26.0 - 34.0 PG    MCHC 31.7 30.0 - 36.5 g/dL    RDW 15.2 (H) 11.5 - 14.5 %    PLATELET 027 502 - 858 K/uL    MPV 9.9 8.9 - 12.9 FL    NRBC 0.0 0  WBC    ABSOLUTE NRBC 0.00 0.00 - 0.01 K/uL    NEUTROPHILS 67 32 - 75 %    LYMPHOCYTES 24 12 - 49 %    MONOCYTES 5 5 - 13 %    EOSINOPHILS 2 0 - 7 %    BASOPHILS 1 0 - 1 %    IMMATURE GRANULOCYTES 1 (H) 0.0 - 0.5 %    ABS. NEUTROPHILS 13.0 (H) 1.8 - 8.0 K/UL    ABS. LYMPHOCYTES 4.6 (H) 0.8 - 3.5 K/UL    ABS. MONOCYTES 1.0 0.0 - 1.0 K/UL    ABS. EOSINOPHILS 0.3 0.0 - 0.4 K/UL    ABS. BASOPHILS 0.1 0.0 - 0.1 K/UL    ABS. IMM. GRANS. 0.1 (H) 0.00 - 0.04 K/UL    DF AUTOMATED     METABOLIC PANEL, COMPREHENSIVE    Collection Time: 05/30/21  1:46 PM   Result Value Ref Range    Sodium 137 136 - 145 mmol/L    Potassium 3.7 3.5 - 5.1 mmol/L    Chloride 105 97 - 108 mmol/L    CO2 20 (L) 21 - 32 mmol/L    Anion gap 12 5 - 15 mmol/L    Glucose 184 (H) 65 - 100 mg/dL    BUN 34 (H) 6 - 20 MG/DL    Creatinine 1.94 (H) 0.55 - 1.02 MG/DL    BUN/Creatinine ratio 18 12 - 20      GFR est AA 31 (L) >60 ml/min/1.73m2    GFR est non-AA 25 (L) >60 ml/min/1.73m2    Calcium 10.6 (H) 8.5 - 10.1 MG/DL    Bilirubin, total 0.6 0.2 - 1.0 MG/DL    ALT (SGPT) 36 12 - 78 U/L    AST (SGOT) 34 15 - 37 U/L    Alk. phosphatase 114 45 - 117 U/L    Protein, total 7.6 6.4 - 8.2 g/dL    Albumin 3.8 3.5 - 5.0 g/dL    Globulin 3.8 2.0 - 4.0 g/dL    A-G Ratio 1.0 (L) 1.1 - 2.2     SAMPLES BEING HELD    Collection Time: 05/30/21  1:46 PM   Result Value Ref Range    SAMPLES BEING HELD 1blu 1red 2bc(1purp 1blu)     COMMENT        Add-on orders for these samples will be processed based on acceptable specimen integrity and analyte stability, which may vary by analyte.    NT-PRO BNP    Collection Time: 05/30/21  1:46 PM   Result Value Ref Range    NT pro-BNP 2,947 (H) <125 PG/ML   TROPONIN I    Collection Time: 05/30/21  1:46 PM   Result Value Ref Range    Troponin-I, Qt. <0.05 <0.05 ng/mL   POC EG7    Collection Time: 05/30/21  2:02 PM   Result Value Ref Range    Calcium, ionized (POC) 1.39 (H) 1.12 - 1.32 mmol/L    FIO2 (POC) 50 %    pH (POC) 7.26 (L) 7.35 - 7.45      pCO2 (POC) 46.7 (H) 35.0 - 45.0 MMHG    pO2 (POC) 140 (H) 80 - 100 MMHG    HCO3 (POC) 20.7 (L) 22 - 26 MMOL/L    Base deficit (POC) 6.5 mmol/L    sO2 (POC) 98.7 (H) 92 - 97 %    Site LEFT RADIAL Device: BIPAP MASK      Mode Pressure Support      PEEP/CPAP (POC) 7 cmH2O    Pressure support 7 cmH2O    Allens test (POC) Positive      Specimen type (POC) ARTERIAL           Imaging:   XR CHEST PORT    Result Date: 5/30/2021  Diffuse increased interstitial and airspace opacities with more confluent airspace opacities in the bilateral lung bases likely representing pulmonary edema. Assessment:     Active Problems:    CHF exacerbation ([facility] [address] (5/30/2021)      Acute on chronic respiratory failure ([facility] [address] (5/30/2021)           Plan:     1. Acute on chonic respiratory respiratory failure: due to CHF exacerbation. Bipap now. Iv diuresis. Low risk for covid, but will test rapid. 2. Acute on chronic bv systolic congestive heart failure: received bumex 1mg now. Will give one more dose tonight and continue at 1mg bid. Repeat echo. cardiiologist consult. Following output, weight and cr.   3. CKD 4: monitor cr on diuresis. 4. DM: hold oral meds, Lantus and SSI will adjust  5. CAD: asa, plavix, BB continue   6. COPD/asthma: continue prn nebs   7.  Everardo: bipap hs  8. moribid obese: BMI 40.46        Signed By: Gus Garcia MD     May 30, 2021

## 2021-05-31 ENCOUNTER — APPOINTMENT (OUTPATIENT)
Dept: NON INVASIVE DIAGNOSTICS | Age: 71
DRG: 291 | End: 2021-05-31
Attending: HOSPITALIST
Payer: MEDICARE

## 2021-05-31 LAB
ALBUMIN SERPL-MCNC: 3.2 G/DL (ref 3.5–5)
ALBUMIN/GLOB SERPL: 0.8 {RATIO} (ref 1.1–2.2)
ALP SERPL-CCNC: 99 U/L (ref 45–117)
ALT SERPL-CCNC: 31 U/L (ref 12–78)
ANION GAP SERPL CALC-SCNC: 9 MMOL/L (ref 5–15)
AST SERPL-CCNC: 27 U/L (ref 15–37)
BASOPHILS # BLD: 0 K/UL (ref 0–0.1)
BASOPHILS NFR BLD: 0 % (ref 0–1)
BILIRUB SERPL-MCNC: 0.6 MG/DL (ref 0.2–1)
BUN SERPL-MCNC: 33 MG/DL (ref 6–20)
BUN/CREAT SERPL: 19 (ref 12–20)
CALCIUM SERPL-MCNC: 9.9 MG/DL (ref 8.5–10.1)
CHLORIDE SERPL-SCNC: 108 MMOL/L (ref 97–108)
CO2 SERPL-SCNC: 23 MMOL/L (ref 21–32)
CREAT SERPL-MCNC: 1.75 MG/DL (ref 0.55–1.02)
DIFFERENTIAL METHOD BLD: ABNORMAL
ECHO AO ROOT DIAM: 2.74 CM
ECHO AV AREA PEAK VELOCITY: 1.37 CM2
ECHO AV AREA/BSA PEAK VELOCITY: 0.7 CM2/M2
ECHO AV PEAK GRADIENT: 9.27 MMHG
ECHO AV PEAK VELOCITY: 152.25 CM/S
ECHO EST RA PRESSURE: 3 MMHG
ECHO LA AREA 4C: 28.74 CM2
ECHO LA MAJOR AXIS: 5.41 CM
ECHO LA MINOR AXIS: 2.67 CM
ECHO LA VOL 2C: 149.68 ML (ref 22–52)
ECHO LA VOL 4C: 101.17 ML (ref 22–52)
ECHO LA VOL BP: 129.58 ML (ref 22–52)
ECHO LA VOL/BSA BIPLANE: 63.83 ML/M2 (ref 16–28)
ECHO LA VOLUME INDEX A2C: 73.73 ML/M2 (ref 16–28)
ECHO LA VOLUME INDEX A4C: 49.84 ML/M2 (ref 16–28)
ECHO LV E' LATERAL VELOCITY: 6.35 CM/S
ECHO LV E' SEPTAL VELOCITY: 3.67 CM/S
ECHO LV EDV A2C: 139.46 ML
ECHO LV EDV A4C: 127.65 ML
ECHO LV EDV BP: 139.66 ML (ref 56–104)
ECHO LV EDV INDEX A4C: 62.9 ML/M2
ECHO LV EDV INDEX BP: 68.8 ML/M2
ECHO LV EDV NDEX A2C: 68.7 ML/M2
ECHO LV EJECTION FRACTION A2C: 44 PERCENT
ECHO LV EJECTION FRACTION A4C: 32 PERCENT
ECHO LV EJECTION FRACTION BIPLANE: 38.7 PERCENT (ref 55–100)
ECHO LV ESV A2C: 78.18 ML
ECHO LV ESV A4C: 86.39 ML
ECHO LV ESV BP: 85.59 ML (ref 19–49)
ECHO LV ESV INDEX A2C: 38.5 ML/M2
ECHO LV ESV INDEX A4C: 42.6 ML/M2
ECHO LV ESV INDEX BP: 42.2 ML/M2
ECHO LV INTERNAL DIMENSION DIASTOLIC: 5.61 CM (ref 3.9–5.3)
ECHO LV INTERNAL DIMENSION SYSTOLIC: 4.8 CM
ECHO LV IVSD: 0.98 CM (ref 0.6–0.9)
ECHO LV MASS 2D: 184.1 G (ref 67–162)
ECHO LV MASS INDEX 2D: 90.7 G/M2 (ref 43–95)
ECHO LV POSTERIOR WALL DIASTOLIC: 0.76 CM (ref 0.6–0.9)
ECHO LVOT DIAM: 1.74 CM
ECHO LVOT PEAK GRADIENT: 3.06 MMHG
ECHO LVOT PEAK VELOCITY: 87.51 CM/S
ECHO MV A VELOCITY: 125.83 CM/S
ECHO MV AREA PHT: 3.1 CM2
ECHO MV AREA PHT: 4.05 CM2
ECHO MV E DECELERATION TIME (DT): 244.46 MS
ECHO MV E VELOCITY: 151.39 CM/S
ECHO MV E/A RATIO: 1.2
ECHO MV E/E' LATERAL: 23.84
ECHO MV E/E' RATIO (AVERAGED): 32.55
ECHO MV E/E' SEPTAL: 41.25
ECHO MV MAX VELOCITY: 163.82 CM/S
ECHO MV MEAN GRADIENT: 4.29 MMHG
ECHO MV PEAK GRADIENT: 10.74 MMHG
ECHO MV PRESSURE HALF TIME (PHT): 54.39 MS
ECHO MV PRESSURE HALF TIME (PHT): 70.89 MS
ECHO MV VTI: 29.72 CM
ECHO PV PEAK INSTANTANEOUS GRADIENT SYSTOLIC: 4.31 MMHG
ECHO PV REGURGITANT MAX VELOCITY: 103.78 CM/S
ECHO RIGHT VENTRICULAR SYSTOLIC PRESSURE (RVSP): 23.86 MMHG
ECHO RV INTERNAL DIMENSION: 2.67 CM
ECHO TV REGURGITANT MAX VELOCITY: 228.36 CM/S
ECHO TV REGURGITANT PEAK GRADIENT: 20.86 MMHG
EOSINOPHIL # BLD: 0 K/UL (ref 0–0.4)
EOSINOPHIL NFR BLD: 0 % (ref 0–7)
ERYTHROCYTE [DISTWIDTH] IN BLOOD BY AUTOMATED COUNT: 15.1 % (ref 11.5–14.5)
EST. AVERAGE GLUCOSE BLD GHB EST-MCNC: 163 MG/DL
GLOBULIN SER CALC-MCNC: 3.9 G/DL (ref 2–4)
GLUCOSE BLD STRIP.AUTO-MCNC: 160 MG/DL (ref 65–117)
GLUCOSE BLD STRIP.AUTO-MCNC: 172 MG/DL (ref 65–117)
GLUCOSE BLD STRIP.AUTO-MCNC: 207 MG/DL (ref 65–117)
GLUCOSE BLD STRIP.AUTO-MCNC: 231 MG/DL (ref 65–117)
GLUCOSE SERPL-MCNC: 157 MG/DL (ref 65–100)
HBA1C MFR BLD: 7.3 % (ref 4–5.6)
HCT VFR BLD AUTO: 37.8 % (ref 35–47)
HGB BLD-MCNC: 12.3 G/DL (ref 11.5–16)
IMM GRANULOCYTES # BLD AUTO: 0 K/UL (ref 0–0.04)
IMM GRANULOCYTES NFR BLD AUTO: 0 % (ref 0–0.5)
LYMPHOCYTES # BLD: 1.5 K/UL (ref 0.8–3.5)
LYMPHOCYTES NFR BLD: 13 % (ref 12–49)
MAGNESIUM SERPL-MCNC: 1.9 MG/DL (ref 1.6–2.4)
MCH RBC QN AUTO: 31.9 PG (ref 26–34)
MCHC RBC AUTO-ENTMCNC: 32.5 G/DL (ref 30–36.5)
MCV RBC AUTO: 98.2 FL (ref 80–99)
MONOCYTES # BLD: 0.5 K/UL (ref 0–1)
MONOCYTES NFR BLD: 4 % (ref 5–13)
NEUTS SEG # BLD: 9.4 K/UL (ref 1.8–8)
NEUTS SEG NFR BLD: 83 % (ref 32–75)
NRBC # BLD: 0 K/UL (ref 0–0.01)
NRBC BLD-RTO: 0 PER 100 WBC
PHOSPHATE SERPL-MCNC: 3.6 MG/DL (ref 2.6–4.7)
PLATELET # BLD AUTO: 231 K/UL (ref 150–400)
PMV BLD AUTO: 10.3 FL (ref 8.9–12.9)
POTASSIUM SERPL-SCNC: 4.6 MMOL/L (ref 3.5–5.1)
PROT SERPL-MCNC: 7.1 G/DL (ref 6.4–8.2)
RBC # BLD AUTO: 3.85 M/UL (ref 3.8–5.2)
SERVICE CMNT-IMP: ABNORMAL
SODIUM SERPL-SCNC: 140 MMOL/L (ref 136–145)
WBC # BLD AUTO: 11.4 K/UL (ref 3.6–11)

## 2021-05-31 PROCEDURE — 74011636637 HC RX REV CODE- 636/637: Performed by: HOSPITALIST

## 2021-05-31 PROCEDURE — 84100 ASSAY OF PHOSPHORUS: CPT

## 2021-05-31 PROCEDURE — 74011250637 HC RX REV CODE- 250/637: Performed by: HOSPITALIST

## 2021-05-31 PROCEDURE — 74011250637 HC RX REV CODE- 250/637: Performed by: NURSE PRACTITIONER

## 2021-05-31 PROCEDURE — 85025 COMPLETE CBC W/AUTO DIFF WBC: CPT

## 2021-05-31 PROCEDURE — 65660000001 HC RM ICU INTERMED STEPDOWN

## 2021-05-31 PROCEDURE — 80053 COMPREHEN METABOLIC PANEL: CPT

## 2021-05-31 PROCEDURE — 94660 CPAP INITIATION&MGMT: CPT

## 2021-05-31 PROCEDURE — 83036 HEMOGLOBIN GLYCOSYLATED A1C: CPT

## 2021-05-31 PROCEDURE — 99222 1ST HOSP IP/OBS MODERATE 55: CPT | Performed by: NURSE PRACTITIONER

## 2021-05-31 PROCEDURE — 93306 TTE W/DOPPLER COMPLETE: CPT | Performed by: SPECIALIST

## 2021-05-31 PROCEDURE — 83735 ASSAY OF MAGNESIUM: CPT

## 2021-05-31 PROCEDURE — 77010033678 HC OXYGEN DAILY

## 2021-05-31 PROCEDURE — 93306 TTE W/DOPPLER COMPLETE: CPT

## 2021-05-31 PROCEDURE — 82962 GLUCOSE BLOOD TEST: CPT

## 2021-05-31 PROCEDURE — 74011000250 HC RX REV CODE- 250: Performed by: HOSPITALIST

## 2021-05-31 PROCEDURE — 36415 COLL VENOUS BLD VENIPUNCTURE: CPT

## 2021-05-31 RX ORDER — HYDRALAZINE HYDROCHLORIDE 50 MG/1
50 TABLET, FILM COATED ORAL 3 TIMES DAILY
Status: DISCONTINUED | OUTPATIENT
Start: 2021-05-31 | End: 2021-06-03

## 2021-05-31 RX ADMIN — SACUBITRIL AND VALSARTAN 1 TABLET: 24; 26 TABLET, FILM COATED ORAL at 09:31

## 2021-05-31 RX ADMIN — SACUBITRIL AND VALSARTAN 1 TABLET: 24; 26 TABLET, FILM COATED ORAL at 17:06

## 2021-05-31 RX ADMIN — METOPROLOL SUCCINATE 12.5 MG: 25 TABLET, EXTENDED RELEASE ORAL at 09:31

## 2021-05-31 RX ADMIN — HYDRALAZINE HYDROCHLORIDE 50 MG: 50 TABLET, FILM COATED ORAL at 17:06

## 2021-05-31 RX ADMIN — HYDRALAZINE HYDROCHLORIDE 25 MG: 25 TABLET, FILM COATED ORAL at 09:31

## 2021-05-31 RX ADMIN — ASPIRIN 81 MG: 81 TABLET, CHEWABLE ORAL at 09:31

## 2021-05-31 RX ADMIN — ATORVASTATIN CALCIUM 80 MG: 40 TABLET, FILM COATED ORAL at 22:13

## 2021-05-31 RX ADMIN — GABAPENTIN 200 MG: 100 CAPSULE ORAL at 09:31

## 2021-05-31 RX ADMIN — BUMETANIDE 1 MG: 0.25 INJECTION INTRAMUSCULAR; INTRAVENOUS at 09:31

## 2021-05-31 RX ADMIN — EZETIMIBE 10 MG: 10 TABLET ORAL at 09:31

## 2021-05-31 RX ADMIN — LEVOTHYROXINE SODIUM 100 MCG: 0.1 TABLET ORAL at 09:31

## 2021-05-31 RX ADMIN — ALLOPURINOL 100 MG: 100 TABLET ORAL at 09:31

## 2021-05-31 RX ADMIN — INSULIN GLARGINE 10 UNITS: 100 INJECTION, SOLUTION SUBCUTANEOUS at 09:32

## 2021-05-31 RX ADMIN — INSULIN LISPRO 2 UNITS: 100 INJECTION, SOLUTION INTRAVENOUS; SUBCUTANEOUS at 22:23

## 2021-05-31 RX ADMIN — HYDRALAZINE HYDROCHLORIDE 50 MG: 50 TABLET, FILM COATED ORAL at 22:13

## 2021-05-31 RX ADMIN — INSULIN LISPRO 2 UNITS: 100 INJECTION, SOLUTION INTRAVENOUS; SUBCUTANEOUS at 12:08

## 2021-05-31 RX ADMIN — BUMETANIDE 1 MG: 0.25 INJECTION INTRAMUSCULAR; INTRAVENOUS at 17:07

## 2021-05-31 RX ADMIN — GABAPENTIN 200 MG: 100 CAPSULE ORAL at 17:06

## 2021-05-31 RX ADMIN — CLOPIDOGREL BISULFATE 75 MG: 75 TABLET ORAL at 09:31

## 2021-05-31 RX ADMIN — GABAPENTIN 200 MG: 100 CAPSULE ORAL at 22:13

## 2021-05-31 RX ADMIN — ISOSORBIDE MONONITRATE 30 MG: 30 TABLET, EXTENDED RELEASE ORAL at 07:26

## 2021-05-31 RX ADMIN — INSULIN LISPRO 2 UNITS: 100 INJECTION, SOLUTION INTRAVENOUS; SUBCUTANEOUS at 09:32

## 2021-05-31 RX ADMIN — INSULIN LISPRO 3 UNITS: 100 INJECTION, SOLUTION INTRAVENOUS; SUBCUTANEOUS at 17:06

## 2021-05-31 NOTE — PROGRESS NOTES
Problem: Pressure Injury - Risk of  Goal: *Prevention of pressure injury  Description: Document Ruben Scale and appropriate interventions in the flowsheet.   Outcome: Progressing Towards Goal  Note: Pressure Injury Interventions:       Moisture Interventions: Internal/External urinary devices, Absorbent underpads    Activity Interventions: Pressure redistribution bed/mattress(bed type), Increase time out of bed    Mobility Interventions: Pressure redistribution bed/mattress (bed type), HOB 30 degrees or less    Nutrition Interventions: Document food/fluid/supplement intake, Offer support with meals,snacks and hydration    Problem: Patient Education: Go to Patient Education Activity  Goal: Patient/Family Education  Outcome: Progressing Towards Goal     Problem: Heart Failure: Day 1  Goal: Activity/Safety  Outcome: Progressing Towards Goal  Goal: Consults, if ordered  Outcome: Progressing Towards Goal  Goal: Diagnostic Test/Procedures  Outcome: Progressing Towards Goal  Goal: Medications  Outcome: Progressing Towards Goal  Goal: Respiratory  Outcome: Progressing Towards Goal  Goal: Treatments/Interventions/Procedures  Outcome: Progressing Towards Goal  Goal: Psychosocial  Outcome: Progressing Towards Goal  Goal: *Oxygen saturation within defined limits  Outcome: Progressing Towards Goal  Goal: *Optimal pain control at patient's stated goal  Outcome: Progressing Towards Goal  Goal: *Anxiety reduced or absent  Outcome: Progressing Towards Goal

## 2021-05-31 NOTE — NURSE NAVIGATOR
Chart reviewed by Heart Failure Nurse Navigator. Heart Failure database completed. EF:  25/30    ACEi/ARB/ARNi: entresto 24-26 mg twice daily    BB: toprol xl 12.5 mg daily    Aldosterone Antagonist: contraindicated d/t hyperkalemia    Obstructive Sleep Apnea Screening: dx WES, on cpap   STOP-BANG score:   Referred to Sleep Medicine:     CRT:  ICD. NYHA Functional Class III      Heart Failure Teach Back in Patient Education. Heart Failure Avoiding Triggers on Discharge Instructions. Cardiologist: [facility]      Post discharge follow up phone call to be made within 48-72 hours of discharge.

## 2021-05-31 NOTE — CONSULTS
[address]  Inpatient Consult Progress Note      Patient name: Jeanette Ryan  Patient : 1950  Patient MRN: [id number]  Consulting MD: Danish Thomas MD  Primary general cardiologist:      Date of service: 21    REASON FOR CONSULT:  Acute on chronic systolic heart failure    PLAN:  Continue current medical therapy for heart failure  Continue current dose of Toprol 12.5mg daily  Continue current dose of Entresto 24/26mg BID  No MRA due to hyperkalemia   Increase hydralazine to 50mg TID  Cont Imdur 30mg daily  Cont Synthroid 100mcg  Resume Jardiance as OP  Continue current dose of diuretic; goal net negative 1-2 liters per day (goal weight)  Cont allopurinol 100mg daily   Continue ASA and plavix  Continue current dose of statin  ICD interrogation 21  Continue CPAP therapy    Equivocal PYP  [employer] DSP (VUS)  Nutritionist consult  Heart failure education  Advanced care plan present on file  Plan at discharge: recommend flu and pneumonia vaccinations    IMPRESSION:  Fatigue  Shortness of breath  Volume overload  Chronic systolic heart failure  Stage D, NYHA class IIIA symptoms  Ischemic cardiomyopathy, LVEF 35-40%  CAD s/p CABG  s/p PCI to DVG-RCA   H/o severe MR s/p mitral clip in   HTN  H/o VT s/p AICD  WES on Bipap  T2DM with neuropathy  Hypothyroidism  Obesity  HLD  Osteoarthritis   CKD4       CARDIAC IMAGING:  Echo 21- Pending  Echo 21- LVEF 35-40%, Mild MR post clip, mild TR, LVIDd 5.7cm, TAPSE 1.68, RVIDd 4.05cm   Echo 20 LVEF 30-35%, mild MR, LVIDd 6.09cm, RVIDd 3.98cm  Echo 20 LVEF 25-30%, Mod TR, severe MR, LVIDd 5.76cm, TAPSE 1.94cm, RVIDd 4.06cm    EKG 21 ST with PVCs, QRS 88ms  EKG 20 V paced, QRS 84    LHC 2/3/20- Severe native 3 vessel CAD. Patent VG to RCA and LIMA to LAD. LCx is collateral dependent and native rPDA and D1 have small disease in small vessels. NST    ICD interrogation 5/10/21    HEMODYNAMICS:  RHC not done  CPEST not done  6MW not done    OTHER IMAGING:  CXR 5/30/2 Diffuse increased interstitial and airspace opacities with more  confluent airspace opacities in the bilateral lung bases likely representing  pulmonary edema. CT chest not done     HPI:   65y.o. year old female with a history of HTN, HLD, WES, obesity (Body mass index is 40.74 kg/m².) s/p gastric bypass in 2011, T2DM, hypothyroidism, COPD, CAD s/p CABG in 1997, s/p PCI to [address] in 5/15, ICM, VT, s/p AICD ([employer]),  anxiety, and depression. She was scheduled to undergo BiV upgrade with Dr. Kristie Chaudhary, however, her QRS was too narrow for the upgrade. He increased her low pacing threshold from 50 bpm to 70 bpm.     INTERVAL HISTORY:  Ms. Lidia Veliz was admitted via the ED for increased SOB over the weekend, she denies dietary indiscretions but said she left a pan on the stove and she thinks the smoke may have triggered her cough and volume overload. [facility] has been consulted for ongoing management of her HFrEF. PHYSICAL EXAM:  Visit Vitals  /70   Pulse 71   Temp 98.3 °F (36.8 °C)   Resp 18   Ht 5' 3\" (1.6 m)   Wt 223 lb 12.8 oz (101.5 kg)   SpO2 97%   BMI 39.64 kg/m²     General: Patient is well developed, well-nourished in no acute distress  HEENT: Normocephalic and atraumatic. No scleral icterus. Pupils are equal, round and reactive to light and accomodation. No conjunctival injection. Oropharynx is clear. Neck: Supple. No evidence of thyroid enlargements or lymphadenopathy. JVD: Cannot be appreciated   Lungs: Breath sounds are equal and clear bilaterally. No wheezes, rhonchi, or rales. Wearing O2 this morning   Heart: Regular rate and rhythm with normal S1 and S2. No murmurs, gallops or rubs. Abdomen: Soft, no mass or tenderness. No organomegaly or hernia. Bowel sounds present. Genitourinary and rectal: deferred  Extremities: No cyanosis, clubbing, or edema.   Neurologic: No focal sensory or motor deficits are noted. Grossly intact. Psychiatric: Awake, alert an doriented x 3. Appropriate mood and affect. Skin: Warm, dry and well perfused. No lesions, nodules or rashes are noted. REVIEW OF SYSTEMS:  General: Denies fever, night sweats. Ear, nose and throat: Denies difficulty hearing, sinus problems, runny nose, post-nasal drip, ringing in ears, mouth sores, loose teeth, ear pain, nosebleeds, sore throate, facial pain or numbess  Cardiovascular: see above in the interval history  Respiratory: Denies cough, wheezing, sputum production, hemoptysis.   Gastrointestinal: Denies heartburn, constipation, intolerance to certain foods, diarrhea, abdominal pain, nausea, vomiting, difficulty swallowing, blood in stool  Kidney and bladder: Denies painful urination, frequent urination, urgency, prostate problems and impotence  Musculoskeletal: Denies joint pain, muscle weakness  Skin and hair: Denies change in existing skin lesions, hair loss or increase, breast changes    PAST MEDICAL HISTORY:  Past Medical History:   Diagnosis Date    Adverse effect of anesthesia     hard to wake up/uses BIPAP/\"try to avoid general if possible\"/intubated in past prior to going to sleep and it caused pt to be incontinent    Arthritis     Asthma     CAD (coronary artery disease)     Chronic kidney disease     elevataed creatinine    Chronic obstructive pulmonary disease (HCC)     Chronic pain     arthritis    Coagulation disorder ([address]     on plavix    Congestive heart failure (HCC)     Diabetes ([address]     Hypertension     Morbid obesity ([address]     Sleep apnea 1996    BIPAP with 2 liters oxygen    Thromboembolus ([address]     after heart surgery - left leg    Thyroid disease        PAST SURGICAL HISTORY:  Past Surgical History:   Procedure Laterality Date    COLONOSCOPY N/A 7/22/2020    COLONOSCOPY   :- performed by Chiquita Tejeda MD at [address] HX ARTHROPLASTY  12/03/2105    knee - right    HX  SECTION      x3    HX CORONARY ARTERY BYPASS GRAFT  1997    3 vessels    HX CORONARY STENT PLACEMENT  2016    HX HERNIA REPAIR  1988    HX IMPLANTABLE CARDIOVERTER DEFIBRILLATOR      HX KNEE REPLACEMENT Right 2015    once    CT CARDIAC SURG PROCEDURE UNLIST  2018    cardiac cath - Left    CT LAP GASTRIC BYPASS/KERLINE-EN-Y  2011    lap band per pt and not a gastric bypass       FAMILY HISTORY:  Family History   Problem Relation Age of Onset    Hypertension Mother     Dementia Mother     Coronary Artery Disease Father 58    Sudden Death Father 58    Diabetes Son     Diabetes Brother        SOCIAL HISTORY:  Social History     Socioeconomic History    Marital status:      Spouse name: Not on file    Number of children: Not on file    Years of education: Not on file    Highest education level: Not on file   Tobacco Use    Smoking status: Former Smoker     Packs/day: 0.50     Years: 45.00     Pack years: 22.50     Types: Cigarettes     Quit date: 2010     Years since quittin.4    Smokeless tobacco: Never Used    Tobacco comment: quit /started again (smoked 3 yrs) off and on/none since    Vaping Use    Vaping Use: Never used   Substance and Sexual Activity    Alcohol use: Not Currently    Drug use: Not Currently    Sexual activity: Not Currently   Other Topics Concern     Social Determinants of Health     Financial Resource Strain:     Difficulty of Paying Living Expenses:    Food Insecurity:     Worried About Running Out of Food in the Last Year:     [address] in the Last Year:    Transportation Needs:     Lack of Transportation (Medical):      Lack of Transportation (Non-Medical):    Physical Activity:     Days of Exercise per Week:     Minutes of Exercise per Session:    Stress:     Feeling of Stress :    Social Connections:     Frequency of Communication with Friends and Family:     Frequency of Social Gatherings with Friends and Family:     Attends Holiness Services:     Active Member of Clubs or Organizations:     Attends Club or Organization Meetings:     Marital Status:        LABORATORY RESULTS:     Labs Latest Ref Rng & Units 5/31/2021 5/30/2021 5/4/2021   WBC 3.6 - 11.0 K/uL 11. 4(H) 19. 1(H) -   RBC 3.80 - 5.20 M/uL 3.85 3.98 -   Hemoglobin 11.5 - 16.0 g/dL 12.3 12.6 -   Hematocrit 35.0 - 47.0 % 37.8 39.7 -   MCV 80.0 - 99.0 FL 98.2 99. 7(H) -   Platelets 900 - 381 K/uL 231 256 -   Lymphocytes 12 - 49 % 13 24 -   Monocytes 5 - 13 % 4(L) 5 -   Eosinophils 0 - 7 % 0 2 -   Basophils 0 - 1 % 0 1 -   Albumin 3.5 - 5.0 g/dL 3.2(L) 3.8 -   Calcium 8.5 - 10.1 MG/DL 9.9 10. 6(H) -   Glucose 65 - 100 mg/dL 157(H) 184(H) -   BUN 6 - 20 MG/DL 33(H) 34(H) -   Creatinine 0.55 - 1.02 MG/DL 1.75(H) 1.94(H) -   Sodium 136 - 145 mmol/L 140 137 -   Potassium 3.5 - 5.1 mmol/L 4.6 3.7 -   TSH 0.36 - 3.74 uIU/mL - - 0.64   Some recent data might be hidden     Lab Results   Component Value Date/Time    TSH 0.64 05/04/2021 02:32 PM    TSH 0.247 (L) 03/30/2021 11:21 AM    TSH 0.250 (L) 02/17/2021 09:41 AM    TSH 0.230 (L) 02/17/2021 09:36 AM    TSH 7.130 (H) 01/04/2021 12:21 PM    TSH 3.78 (H) 10/29/2020 10:45 AM    TSH 1.460 10/26/2020 02:57 PM    TSH 1.620 04/30/2020 03:05 PM    TSH 1.07 01/31/2020 12:35 AM       ALLERGY:  Allergies   Allergen Reactions    Crestor [Rosuvastatin] Other (comments)     Causes muscle cramps    Lisinopril Cough    Nsaids (Non-Steroidal Anti-Inflammatory Drug) Other (comments)     Liver and Kidney        CURRENT MEDICATIONS:    Current Facility-Administered Medications:     hydrALAZINE (APRESOLINE) tablet 50 mg, 50 mg, Oral, TID, Lo Mckeon NP    albuterol-ipratropium (DUO-NEB) 2.5 MG-0.5 MG/3 ML, 3 mL, Nebulization, Q6H PRN, Mary Velez MD    allopurinoL (ZYLOPRIM) tablet 100 mg, 100 mg, Oral, DAILY, Mary Velez MD, 100 mg at 05/31/21 4290    aspirin chewable tablet 81 mg, 81 mg, Oral, DAILY, Maury Du MD, 81 mg at 05/31/21 0931    atorvastatin (LIPITOR) tablet 80 mg, 80 mg, Oral, QHS, Mary Velez MD, 80 mg at 05/30/21 2200    clopidogreL (PLAVIX) tablet 75 mg, 75 mg, Oral, DAILY, Rachel Velez MD, 75 mg at 05/31/21 1210    ezetimibe (ZETIA) tablet 10 mg, 10 mg, Oral, DAILY, Rachel Velez MD, 10 mg at 05/31/21 0726    gabapentin (NEURONTIN) capsule 200 mg, 200 mg, Oral, TID, Cathryn Madison MD, 200 mg at 05/31/21 1937    isosorbide mononitrate ER (IMDUR) tablet 30 mg, 30 mg, Oral, 7am, Rachel Velez MD, 30 mg at 05/31/21 6822    levothyroxine (SYNTHROID) tablet 100 mcg, 100 mcg, Oral, ACB, Rachel Velez MD, 100 mcg at 05/31/21 7822    metoprolol succinate (TOPROL-XL) XL tablet 12.5 mg, 12.5 mg, Oral, DAILY, Rachel Velez MD, 12.5 mg at 05/31/21 4253    sacubitriL-valsartan (ENTRESTO) 24-26 mg tablet 1 Tablet, 1 Tablet, Oral, BID, Cathryn Madison MD, 1 Tablet at 05/31/21 0931    bumetanide (BUMEX) injection 1 mg, 1 mg, IntraVENous, BID, Mary Velez MD, 1 mg at 05/31/21 7611    glucose chewable tablet 16 g, 4 Tablet, Oral, PRN, Cathryn Madison MD    dextrose (D50W) injection syrg 12.5-25 g, 25-50 mL, IntraVENous, PRN, Mary Velez MD    glucagon [facility]) injection 1 mg, 1 mg, IntraMUSCular, PRN, Mary Jackson MD    insulin lispro (HUMALOG) injection, , SubCUTAneous, AC&HS, Cathryn Madison MD, 2 Units at 05/31/21 0932    insulin glargine (LANTUS) injection 10 Units, 10 Units, SubCUTAneous, DAILY, Cathryn Madison MD, 10 Units at 05/31/21 0989    PATIENT CARE TEAM:  Patient Care Team:  Gibson Ganser., NP as PCP - General (Nurse Practitioner)  Gibson Ganser., NP as PCP - [facility]  Ran Aguilera MD (Cardiology)  Raven Felix MD (Gastroenterology)  Mayank Adams MD (Cardiology)     Thank you for allowing me to participate in this patient's care.     Tavia Vieyra NP  [address] [place] [address], [address]  Phone: [phone]

## 2021-05-31 NOTE — PROGRESS NOTES
Hospitalist Progress Note  Isidoro Meehan MD  Answering service: [phone] from in house phone      Date of Service:  2021  NAME:  Adilene Pendleton  :  1950  MRN:  [id number]    Admission Summary:   70F p/w SOB- fairly suddenly, following some exercises the night before. Interval history / Subjective:   Patient seen and examined at bedside, feels ok now, on Bipap- but appears very comfortable- will wean her off it. Encourage mobility. Cardio eval pending. Assessment & Plan:     #. Acute on chronic bv systolic CHF: CXR: pulm edema. S/p ICD. NYHA class 3/4  #. Acute on chonic respiratory respiratory failure: 2nd to above. Requiring Bipap- Rapid COVID test -ve. #. Mitral valve regurgitation disease: s/p MitraClip. Now mild stenosis with mild regurgitation.  - Iv diuresis. Strict I&O, daily weight. Monitor and correct lytes. - recent TTE ef 35%. cardiiologist consult pending     3. CKD 4: monitor cr on diuresis. 4. DM: hold oral meds, Lantus and SSI will adjust  5. CAD: asa, plavix, BB continue   6. COPD/asthma: continue prn nebs   7. Everardo: bipap hs  8. moribid obese: BMI 40.46. counseled. Code status: Full  DVT prophylaxis: SCDs  Care Plan discussed with: Patient/Family and Nurse  Disposition: D [facility] Problems  Date Reviewed: 2021        Codes Class Noted POA    CHF exacerbation ([facility] ICD-10-CM: I50.9  ICD-9-CM: 428.0  2021 Unknown        Acute on chronic respiratory failure [facility]) ICD-10-CM: J96.20  ICD-9-CM: 518.84  2021 Unknown            Review of Systems:   Pertinent items are mentioned in interval history. Vital Signs:    Last 24hrs VS reviewed since prior progress note.  Most recent are:  Visit Vitals  /70   Pulse 71   Temp 98.3 °F (36.8 °C)   Resp 18   Ht 5' 3\" (1.6 m)   Wt 101.5 kg (223 lb 12.8 oz)   SpO2 99%   BMI 39.64 kg/m²         Intake/Output Summary (Last 24 hours) at 5/31/2021 0831  Last data filed at 5/31/2021 0418  Gross per 24 hour   Intake 120 ml   Output 1900 ml   Net -1780 ml        Physical Examination:   Evaluated face to face and examined 05/31/21    General:  Alert, oriented, distress with some SOB. Obese [employer].  Card:  S1, S2 without murmur, good peripheral perfusion  Resp:  No accessory muscle use, Good AE, no wheezes. no crepitations  Abd:  Soft, non-tender, non-distended, BS+  Extremities:  No cyanosis or clubbing, no significant edema  Neuro:  Grossly normal, no focal neuro deficits, follows commands, speech wnl. Psych:  Good insight, not agitated. Data Review:    Review and/or order of clinical lab test  Review and/or order of tests in the radiology section of CPT  Review and/or order of tests in the medicine section of CPT  Labs:     Recent Labs     05/31/21 0423 05/30/21  1346   WBC 11.4* 19.1*   HGB 12.3 12.6   HCT 37.8 39.7    256     Recent Labs     05/31/21  0423 05/30/21  1346    137   K 4.6 3.7    105   CO2 23 20*   BUN 33* 34*   CREA 1.75* 1.94*   * 184*   CA 9.9 10.6*   MG 1.9 2.3   PHOS 3.6  --      Recent Labs     05/31/21  0423 05/30/21  1346   ALT 31 36   AP 99 114   TBILI 0.6 0.6   TP 7.1 7.6   ALB 3.2* 3.8   GLOB 3.9 3.8     No results for input(s): INR, PTP, APTT, INREXT in the last 72 hours. No results for input(s): FE, TIBC, PSAT, FERR in the last 72 hours. No results found for: FOL, RBCF   No results for input(s): PH, PCO2, PO2 in the last 72 hours.   Recent Labs     05/30/21  1346   TROIQ <0.05     Lab Results   Component Value Date/Time    Cholesterol, total 172 07/23/2020 09:12 AM    HDL Cholesterol 75 07/23/2020 09:12 AM    LDL, calculated 78 07/23/2020 09:12 AM    Triglyceride 93 07/23/2020 09:12 AM    CHOL/HDL Ratio 2.6 01/31/2020 12:35 AM     Lab Results   Component Value Date/Time    Glucose (POC) 160 (H) 05/31/2021 08:14 AM    Glucose (POC) 134 (H) 05/30/2021 09:58 PM    Glucose (POC) 157 (H) 11/13/2020 12:15 PM    Glucose (POC) 113 (H) 11/13/2020 07:20 AM    Glucose (POC) 145 (H) 11/12/2020 09:31 PM    Glucose  05/04/2021 02:04 PM    Glucose  04/05/2021 11:08 AM     Lab Results   Component Value Date/Time    Color YELLOW/STRAW 10/29/2020 10:45 AM    Appearance CLEAR 10/29/2020 10:45 AM    Specific gravity 1.014 10/29/2020 10:45 AM    pH (UA) 5.0 10/29/2020 10:45 AM    Protein 30 (A) 10/29/2020 10:45 AM    Glucose Negative 10/29/2020 10:45 AM    Ketone Negative 10/29/2020 10:45 AM    Bilirubin Negative 10/29/2020 10:45 AM    Urobilinogen 0.2 10/29/2020 10:45 AM    Nitrites Negative 10/29/2020 10:45 AM    Leukocyte Esterase Negative 10/29/2020 10:45 AM    Epithelial cells FEW 10/29/2020 10:45 AM    Bacteria Negative 10/29/2020 10:45 AM    WBC 0-4 10/29/2020 10:45 AM    RBC 0-5 10/29/2020 10:45 AM     Medications Reviewed:     Current Facility-Administered Medications   Medication Dose Route Frequency    albuterol-ipratropium (DUO-NEB) 2.5 MG-0.5 MG/3 ML  3 mL Nebulization Q6H PRN    allopurinoL (ZYLOPRIM) tablet 100 mg  100 mg Oral DAILY    aspirin chewable tablet 81 mg  81 mg Oral DAILY    atorvastatin (LIPITOR) tablet 80 mg  80 mg Oral QHS    clopidogreL (PLAVIX) tablet 75 mg  75 mg Oral DAILY    ezetimibe (ZETIA) tablet 10 mg  10 mg Oral DAILY    gabapentin (NEURONTIN) capsule 200 mg  200 mg Oral TID    hydrALAZINE (APRESOLINE) tablet 25 mg  25 mg Oral TID    isosorbide mononitrate ER (IMDUR) tablet 30 mg  30 mg Oral 7am    levothyroxine (SYNTHROID) tablet 100 mcg  100 mcg Oral ACB    metoprolol succinate (TOPROL-XL) XL tablet 12.5 mg  12.5 mg Oral DAILY    sacubitriL-valsartan (ENTRESTO) 24-26 mg tablet 1 Tablet  1 Tablet Oral BID    bumetanide (BUMEX) injection 1 mg  1 mg IntraVENous BID    glucose chewable tablet 16 g  4 Tablet Oral PRN    dextrose (D50W) injection syrg 12.5-25 g  25-50 mL IntraVENous PRN    glucagon (GLUCAGEN) injection 1 mg  1 mg IntraMUSCular PRN    insulin lispro (HUMALOG) injection   SubCUTAneous AC&HS    insulin glargine (LANTUS) injection 10 Units  10 Units SubCUTAneous DAILY   ______________________________________________________________________  EXPECTED LENGTH OF STAY: - - -  ACTUAL LENGTH OF STAY:          1               Donald Urbina MD

## 2021-05-31 NOTE — PROGRESS NOTES
Problem: Pressure Injury - Risk of  Goal: *Prevention of pressure injury  Description: Document Ruben Scale and appropriate interventions in the flowsheet. Outcome: Progressing Towards Goal  Note: Pressure Injury Interventions:       Moisture Interventions: Internal/External urinary devices    Activity Interventions: Pressure redistribution bed/mattress(bed type)    Mobility Interventions: Pressure redistribution bed/mattress (bed type)    Nutrition Interventions: Document food/fluid/supplement intake, Discuss nutritional consult with provider                     Problem: Patient Education: Go to Patient Education Activity  Goal: Patient/Family Education  Outcome: Progressing Towards Goal     Problem: Falls - Risk of  Goal: *Absence of Falls  Description: Document Scott Neptalirodolfo Fall Risk and appropriate interventions in the flowsheet.   Outcome: Progressing Towards Goal  Note: Fall Risk Interventions:  Mobility Interventions: Communicate number of staff needed for ambulation/transfer, Patient to call before getting OOB         Medication Interventions: Teach patient to arise slowly, Patient to call before getting OOB    Elimination Interventions: Call light in reach, Patient to call for help with toileting needs, Toilet paper/wipes in reach, Toileting schedule/hourly rounds              Problem: Patient Education: Go to Patient Education Activity  Goal: Patient/Family Education  Outcome: Progressing Towards Goal     Problem: Patient Education: Go to Patient Education Activity  Goal: Patient/Family Education  Outcome: Progressing Towards Goal     Problem: Heart Failure: Day 1  Goal: Off Pathway (Use only if patient is Off Pathway)  Outcome: Progressing Towards Goal  Goal: Activity/Safety  Outcome: Progressing Towards Goal  Goal: Consults, if ordered  Outcome: Progressing Towards Goal  Goal: Diagnostic Test/Procedures  Outcome: Progressing Towards Goal  Goal: Nutrition/Diet  Outcome: Progressing Towards Goal  Goal: Discharge Planning  Outcome: Progressing Towards Goal  Goal: Medications  Outcome: Progressing Towards Goal  Goal: Respiratory  Outcome: Progressing Towards Goal  Goal: Treatments/Interventions/Procedures  Outcome: Progressing Towards Goal  Goal: Psychosocial  Outcome: Progressing Towards Goal  Goal: *Oxygen saturation within defined limits  Outcome: Progressing Towards Goal  Goal: *Hemodynamically stable  Outcome: Progressing Towards Goal  Goal: *Optimal pain control at patient's stated goal  Outcome: Progressing Towards Goal  Goal: *Anxiety reduced or absent  Outcome: Progressing Towards Goal     Problem: Heart Failure: Day 2  Goal: Off Pathway (Use only if patient is Off Pathway)  Outcome: Progressing Towards Goal  Goal: Activity/Safety  Outcome: Progressing Towards Goal  Goal: Consults, if ordered  Outcome: Progressing Towards Goal  Goal: Diagnostic Test/Procedures  Outcome: Progressing Towards Goal  Goal: Nutrition/Diet  Outcome: Progressing Towards Goal  Goal: Discharge Planning  Outcome: Progressing Towards Goal  Goal: Medications  Outcome: Progressing Towards Goal  Goal: Respiratory  Outcome: Progressing Towards Goal  Goal: Treatments/Interventions/Procedures  Outcome: Progressing Towards Goal  Goal: Psychosocial  Outcome: Progressing Towards Goal  Goal: *Oxygen saturation within defined limits  Outcome: Progressing Towards Goal  Goal: *Hemodynamically stable  Outcome: Progressing Towards Goal  Goal: *Optimal pain control at patient's stated goal  Outcome: Progressing Towards Goal  Goal: *Anxiety reduced or absent  Outcome: Progressing Towards Goal  Goal: *Demonstrates progressive activity  Outcome: Progressing Towards Goal     Problem: Heart Failure: Day 3  Goal: Off Pathway (Use only if patient is Off Pathway)  Outcome: Progressing Towards Goal  Goal: Activity/Safety  Outcome: Progressing Towards Goal  Goal: Diagnostic Test/Procedures  Outcome: Progressing Towards Goal  Goal: Nutrition/Diet  Outcome: Progressing Towards Goal  Goal: Discharge Planning  Outcome: Progressing Towards Goal  Goal: Medications  Outcome: Progressing Towards Goal  Goal: Respiratory  Outcome: Progressing Towards Goal  Goal: Treatments/Interventions/Procedures  Outcome: Progressing Towards Goal  Goal: Psychosocial  Outcome: Progressing Towards Goal  Goal: *Oxygen saturation within defined limits  Outcome: Progressing Towards Goal  Goal: *Hemodynamically stable  Outcome: Progressing Towards Goal  Goal: *Optimal pain control at patient's stated goal  Outcome: Progressing Towards Goal  Goal: *Anxiety reduced or absent  Outcome: Progressing Towards Goal  Goal: *Demonstrates progressive activity  Outcome: Progressing Towards Goal     Problem: Heart Failure: Day 4  Goal: Off Pathway (Use only if patient is Off Pathway)  Outcome: Progressing Towards Goal  Goal: Activity/Safety  Outcome: Progressing Towards Goal  Goal: Diagnostic Test/Procedures  Outcome: Progressing Towards Goal  Goal: Nutrition/Diet  Outcome: Progressing Towards Goal  Goal: Discharge Planning  Outcome: Progressing Towards Goal  Goal: Medications  Outcome: Progressing Towards Goal  Goal: Respiratory  Outcome: Progressing Towards Goal  Goal: Treatments/Interventions/Procedures  Outcome: Progressing Towards Goal  Goal: Psychosocial  Outcome: Progressing Towards Goal  Goal: *Oxygen saturation within defined limits  Outcome: Progressing Towards Goal  Goal: *Hemodynamically stable  Outcome: Progressing Towards Goal  Goal: *Optimal pain control at patient's stated goal  Outcome: Progressing Towards Goal  Goal: *Anxiety reduced or absent  Outcome: Progressing Towards Goal  Goal: *Demonstrates progressive activity  Outcome: Progressing Towards Goal     Problem: Heart Failure: Day 5  Goal: Off Pathway (Use only if patient is Off Pathway)  Outcome: Progressing Towards Goal  Goal: Activity/Safety  Outcome: Progressing Towards Goal  Goal: Diagnostic Test/Procedures  Outcome: Progressing Towards Goal  Goal: Nutrition/Diet  Outcome: Progressing Towards Goal  Goal: Discharge Planning  Outcome: Progressing Towards Goal  Goal: Medications  Outcome: Progressing Towards Goal  Goal: Respiratory  Outcome: Progressing Towards Goal  Goal: Treatments/Interventions/Procedures  Outcome: Progressing Towards Goal  Goal: Psychosocial  Outcome: Progressing Towards Goal     Problem: Heart Failure: Discharge Outcomes  Goal: *Demonstrates ability to perform prescribed activity without shortness of breath or discomfort  Outcome: Progressing Towards Goal  Goal: *Left ventricular function assessment completed prior to or during stay, or planned for post-discharge  Outcome: Progressing Towards Goal  Goal: *ACEI prescribed if LVEF less than 40% and no contraindications or ARB prescribed  Outcome: Progressing Towards Goal  Goal: *Verbalizes understanding and describes prescribed diet  Outcome: Progressing Towards Goal  Goal: *Verbalizes understanding/describes prescribed medications  Outcome: Progressing Towards Goal  Goal: *Describes available resources and support systems  Description: (eg: Home Health, Palliative Care, Advanced Medical Directive)  Outcome: Progressing Towards Goal  Goal: *Describes smoking cessation resources  Outcome: Progressing Towards Goal  Goal: *Understands and describes signs and symptoms to report to providers(Stroke Metric)  Outcome: Progressing Towards Goal  Goal: *Describes/verbalizes understanding of follow-up/return appt  Description: (eg: to physicians, diabetes treatment coordinator, and other resources  Outcome: Progressing Towards Goal  Goal: *Describes importance of continuing daily weights and changes to report to physician  Outcome: Progressing Towards Goal

## 2021-05-31 NOTE — PROGRESS NOTES
2050: TRANSFER - IN REPORT:    Verbal report received from [facility] RN(name) on Soni Collins  being received from ED(unit) for routine progression of care      Report consisted of patients Situation, Background, Assessment and   Recommendations(SBAR). Information from the following report(s) SBAR, Kardex, ED Summary, Intake/Output, MAR, Recent Results and Cardiac Rhythm ST was reviewed with the receiving nurse. Opportunity for questions and clarification was provided. Assessment completed upon patients arrival to unit and care assumed.

## 2021-05-31 NOTE — ROUTINE PROCESS
TRANSFER - OUT REPORT: 
 
Verbal report given to [facility] RN(name) on Kareem Hooper  being transferred to [facility](unit) for routine progression of care Report consisted of patients Situation, Background, Assessment and  
Recommendations(SBAR). Information from the following report(s) SBAR, Kardex, Intake/Output, MAR and Recent Results was reviewed with the receiving nurse. Lines:  
Peripheral IV 05/30/21 Right Antecubital (Active) Site Assessment Clean, dry, & intact 05/30/21 1345 Phlebitis Assessment 0 05/30/21 1345 Infiltration Assessment 0 05/30/21 1345 Dressing Status Clean, dry, & intact 05/30/21 1345 Hub Color/Line Status Pink;Capped; Patent; Flushed 05/30/21 1345 Action Taken Blood drawn 05/30/21 1345 Opportunity for questions and clarification was provided. Patient transported with: 
 Monitor Registered Nurse

## 2021-06-01 LAB
25(OH)D3 SERPL-MCNC: 30.5 NG/ML (ref 30–100)
ALBUMIN SERPL-MCNC: 2.5 G/DL (ref 3.5–5)
ALBUMIN/GLOB SERPL: 0.8 {RATIO} (ref 1.1–2.2)
ALP SERPL-CCNC: 75 U/L (ref 45–117)
ALT SERPL-CCNC: 23 U/L (ref 12–78)
ANION GAP SERPL CALC-SCNC: 6 MMOL/L (ref 5–15)
APPEARANCE UR: CLEAR
AST SERPL-CCNC: 36 U/L (ref 15–37)
BACTERIA URNS QL MICRO: NEGATIVE /HPF
BILIRUB SERPL-MCNC: 0.7 MG/DL (ref 0.2–1)
BILIRUB UR QL: NEGATIVE
BNP SERPL-MCNC: 7733 PG/ML
BUN SERPL-MCNC: 42 MG/DL (ref 6–20)
BUN/CREAT SERPL: 23 (ref 12–20)
CALCIUM SERPL-MCNC: 8.7 MG/DL (ref 8.5–10.1)
CHLORIDE SERPL-SCNC: 111 MMOL/L (ref 97–108)
CK SERPL-CCNC: 160 U/L (ref 26–192)
CO2 SERPL-SCNC: 24 MMOL/L (ref 21–32)
COLOR UR: ABNORMAL
CREAT SERPL-MCNC: 1.79 MG/DL (ref 0.55–1.02)
EPITH CASTS URNS QL MICRO: ABNORMAL /LPF
ERYTHROCYTE [SEDIMENTATION RATE] IN BLOOD: 42 MM/HR (ref 0–30)
FERRITIN SERPL-MCNC: 62 NG/ML (ref 26–388)
GLOBULIN SER CALC-MCNC: 3 G/DL (ref 2–4)
GLUCOSE BLD STRIP.AUTO-MCNC: 164 MG/DL (ref 65–117)
GLUCOSE BLD STRIP.AUTO-MCNC: 191 MG/DL (ref 65–117)
GLUCOSE BLD STRIP.AUTO-MCNC: 244 MG/DL (ref 65–117)
GLUCOSE BLD STRIP.AUTO-MCNC: 266 MG/DL (ref 65–117)
GLUCOSE SERPL-MCNC: 150 MG/DL (ref 65–100)
GLUCOSE UR STRIP.AUTO-MCNC: 500 MG/DL
HGB UR QL STRIP: NEGATIVE
HYALINE CASTS URNS QL MICRO: ABNORMAL /LPF (ref 0–5)
IRON SATN MFR SERPL: 19 % (ref 20–50)
IRON SERPL-MCNC: 46 UG/DL (ref 35–150)
KETONES UR QL STRIP.AUTO: NEGATIVE MG/DL
LEUKOCYTE ESTERASE UR QL STRIP.AUTO: NEGATIVE
MAGNESIUM SERPL-MCNC: 2 MG/DL (ref 1.6–2.4)
NITRITE UR QL STRIP.AUTO: NEGATIVE
PH UR STRIP: 6.5 [PH] (ref 5–8)
POTASSIUM SERPL-SCNC: 4.6 MMOL/L (ref 3.5–5.1)
PROCALCITONIN SERPL-MCNC: 1.45 NG/ML
PROT SERPL-MCNC: 5.5 G/DL (ref 6.4–8.2)
PROT UR STRIP-MCNC: NEGATIVE MG/DL
RBC #/AREA URNS HPF: ABNORMAL /HPF (ref 0–5)
SERVICE CMNT-IMP: ABNORMAL
SODIUM SERPL-SCNC: 141 MMOL/L (ref 136–145)
SP GR UR REFRACTOMETRY: 1.01 (ref 1–1.03)
TIBC SERPL-MCNC: 236 UG/DL (ref 250–450)
UR CULT HOLD, URHOLD: NORMAL
URATE SERPL-MCNC: 5 MG/DL (ref 2.6–6)
UROBILINOGEN UR QL STRIP.AUTO: 0.2 EU/DL (ref 0.2–1)
WBC URNS QL MICRO: ABNORMAL /HPF (ref 0–4)

## 2021-06-01 PROCEDURE — 74011000250 HC RX REV CODE- 250: Performed by: INTERNAL MEDICINE

## 2021-06-01 PROCEDURE — 74011250637 HC RX REV CODE- 250/637: Performed by: HOSPITALIST

## 2021-06-01 PROCEDURE — 74011250636 HC RX REV CODE- 250/636: Performed by: INTERNAL MEDICINE

## 2021-06-01 PROCEDURE — 36415 COLL VENOUS BLD VENIPUNCTURE: CPT

## 2021-06-01 PROCEDURE — 99223 1ST HOSP IP/OBS HIGH 75: CPT | Performed by: INTERNAL MEDICINE

## 2021-06-01 PROCEDURE — 80053 COMPREHEN METABOLIC PANEL: CPT

## 2021-06-01 PROCEDURE — 74011000250 HC RX REV CODE- 250: Performed by: HOSPITALIST

## 2021-06-01 PROCEDURE — 74011000258 HC RX REV CODE- 258: Performed by: NURSE PRACTITIONER

## 2021-06-01 PROCEDURE — 83735 ASSAY OF MAGNESIUM: CPT

## 2021-06-01 PROCEDURE — 74011250636 HC RX REV CODE- 250/636: Performed by: NURSE PRACTITIONER

## 2021-06-01 PROCEDURE — 74011250637 HC RX REV CODE- 250/637: Performed by: NURSE PRACTITIONER

## 2021-06-01 PROCEDURE — 74011636637 HC RX REV CODE- 636/637: Performed by: HOSPITALIST

## 2021-06-01 PROCEDURE — 84145 PROCALCITONIN (PCT): CPT

## 2021-06-01 PROCEDURE — 82962 GLUCOSE BLOOD TEST: CPT

## 2021-06-01 PROCEDURE — 82550 ASSAY OF CK (CPK): CPT

## 2021-06-01 PROCEDURE — 81001 URINALYSIS AUTO W/SCOPE: CPT

## 2021-06-01 PROCEDURE — 65660000001 HC RM ICU INTERMED STEPDOWN

## 2021-06-01 PROCEDURE — 84550 ASSAY OF BLOOD/URIC ACID: CPT

## 2021-06-01 PROCEDURE — P9047 ALBUMIN (HUMAN), 25%, 50ML: HCPCS | Performed by: NURSE PRACTITIONER

## 2021-06-01 PROCEDURE — 83880 ASSAY OF NATRIURETIC PEPTIDE: CPT

## 2021-06-01 PROCEDURE — 85652 RBC SED RATE AUTOMATED: CPT

## 2021-06-01 PROCEDURE — 82728 ASSAY OF FERRITIN: CPT

## 2021-06-01 PROCEDURE — 99233 SBSQ HOSP IP/OBS HIGH 50: CPT | Performed by: INTERNAL MEDICINE

## 2021-06-01 PROCEDURE — 82306 VITAMIN D 25 HYDROXY: CPT

## 2021-06-01 PROCEDURE — 83540 ASSAY OF IRON: CPT

## 2021-06-01 RX ORDER — HEPARIN SODIUM 5000 [USP'U]/ML
5000 INJECTION, SOLUTION INTRAVENOUS; SUBCUTANEOUS EVERY 8 HOURS
Status: DISCONTINUED | OUTPATIENT
Start: 2021-06-01 | End: 2021-06-04 | Stop reason: HOSPADM

## 2021-06-01 RX ORDER — PREDNISOLONE ACETATE 10 MG/ML
1 SUSPENSION/ DROPS OPHTHALMIC 4 TIMES DAILY
Status: DISCONTINUED | OUTPATIENT
Start: 2021-06-01 | End: 2021-06-04 | Stop reason: HOSPADM

## 2021-06-01 RX ORDER — IPRATROPIUM BROMIDE AND ALBUTEROL SULFATE 2.5; .5 MG/3ML; MG/3ML
3 SOLUTION RESPIRATORY (INHALATION)
Status: DISCONTINUED | OUTPATIENT
Start: 2021-06-01 | End: 2021-06-04 | Stop reason: HOSPADM

## 2021-06-01 RX ORDER — KETOROLAC TROMETHAMINE 5 MG/ML
1 SOLUTION OPHTHALMIC 4 TIMES DAILY
Status: DISCONTINUED | OUTPATIENT
Start: 2021-06-01 | End: 2021-06-04 | Stop reason: HOSPADM

## 2021-06-01 RX ORDER — ALBUMIN HUMAN 250 G/1000ML
12.5 SOLUTION INTRAVENOUS 2 TIMES DAILY
Status: DISCONTINUED | OUTPATIENT
Start: 2021-06-01 | End: 2021-06-04 | Stop reason: HOSPADM

## 2021-06-01 RX ORDER — OFLOXACIN 3 MG/ML
1 SOLUTION/ DROPS OPHTHALMIC 4 TIMES DAILY
Status: DISCONTINUED | OUTPATIENT
Start: 2021-06-01 | End: 2021-06-04 | Stop reason: HOSPADM

## 2021-06-01 RX ORDER — LEVOTHYROXINE SODIUM 100 UG/1
100 TABLET ORAL
Status: DISCONTINUED | OUTPATIENT
Start: 2021-06-02 | End: 2021-06-04 | Stop reason: HOSPADM

## 2021-06-01 RX ADMIN — KETOROLAC TROMETHAMINE 1 DROP: 5 SOLUTION OPHTHALMIC at 17:28

## 2021-06-01 RX ADMIN — PREDNISOLONE ACETATE 1 DROP: 10 SUSPENSION/ DROPS OPHTHALMIC at 17:27

## 2021-06-01 RX ADMIN — OFLOXACIN 1 DROP: 3 SOLUTION OPHTHALMIC at 17:28

## 2021-06-01 RX ADMIN — GABAPENTIN 200 MG: 100 CAPSULE ORAL at 17:25

## 2021-06-01 RX ADMIN — INSULIN LISPRO 5 UNITS: 100 INJECTION, SOLUTION INTRAVENOUS; SUBCUTANEOUS at 18:27

## 2021-06-01 RX ADMIN — LEVOTHYROXINE SODIUM 100 MCG: 0.1 TABLET ORAL at 07:41

## 2021-06-01 RX ADMIN — INSULIN LISPRO 2 UNITS: 100 INJECTION, SOLUTION INTRAVENOUS; SUBCUTANEOUS at 08:48

## 2021-06-01 RX ADMIN — PREDNISOLONE ACETATE 1 DROP: 10 SUSPENSION/ DROPS OPHTHALMIC at 14:11

## 2021-06-01 RX ADMIN — CLOPIDOGREL BISULFATE 75 MG: 75 TABLET ORAL at 08:47

## 2021-06-01 RX ADMIN — ALBUMIN (HUMAN) 12.5 G: 0.25 INJECTION, SOLUTION INTRAVENOUS at 17:25

## 2021-06-01 RX ADMIN — HEPARIN SODIUM 5000 UNITS: 5000 INJECTION INTRAVENOUS; SUBCUTANEOUS at 17:25

## 2021-06-01 RX ADMIN — GABAPENTIN 200 MG: 100 CAPSULE ORAL at 08:47

## 2021-06-01 RX ADMIN — ALLOPURINOL 100 MG: 100 TABLET ORAL at 08:47

## 2021-06-01 RX ADMIN — HYDRALAZINE HYDROCHLORIDE 50 MG: 50 TABLET, FILM COATED ORAL at 17:25

## 2021-06-01 RX ADMIN — KETOROLAC TROMETHAMINE 1 DROP: 5 SOLUTION OPHTHALMIC at 22:33

## 2021-06-01 RX ADMIN — PREDNISOLONE ACETATE 1 DROP: 10 SUSPENSION/ DROPS OPHTHALMIC at 22:33

## 2021-06-01 RX ADMIN — ALBUMIN (HUMAN) 12.5 G: 0.25 INJECTION, SOLUTION INTRAVENOUS at 12:12

## 2021-06-01 RX ADMIN — ATORVASTATIN CALCIUM 80 MG: 40 TABLET, FILM COATED ORAL at 21:57

## 2021-06-01 RX ADMIN — SACUBITRIL AND VALSARTAN 0.5 TABLET: 24; 26 TABLET, FILM COATED ORAL at 17:28

## 2021-06-01 RX ADMIN — INSULIN LISPRO 2 UNITS: 100 INJECTION, SOLUTION INTRAVENOUS; SUBCUTANEOUS at 12:12

## 2021-06-01 RX ADMIN — KETOROLAC TROMETHAMINE 1 DROP: 5 SOLUTION OPHTHALMIC at 14:11

## 2021-06-01 RX ADMIN — ASPIRIN 81 MG: 81 TABLET, CHEWABLE ORAL at 08:47

## 2021-06-01 RX ADMIN — OFLOXACIN 1 DROP: 3 SOLUTION OPHTHALMIC at 14:11

## 2021-06-01 RX ADMIN — GABAPENTIN 200 MG: 100 CAPSULE ORAL at 21:56

## 2021-06-01 RX ADMIN — OFLOXACIN 1 DROP: 3 SOLUTION OPHTHALMIC at 22:33

## 2021-06-01 RX ADMIN — ISOSORBIDE MONONITRATE 30 MG: 30 TABLET, EXTENDED RELEASE ORAL at 07:41

## 2021-06-01 RX ADMIN — EZETIMIBE 10 MG: 10 TABLET ORAL at 08:47

## 2021-06-01 RX ADMIN — BUMETANIDE 1 MG: 0.25 INJECTION INTRAMUSCULAR; INTRAVENOUS at 17:25

## 2021-06-01 RX ADMIN — INSULIN GLARGINE 10 UNITS: 100 INJECTION, SOLUTION SUBCUTANEOUS at 08:48

## 2021-06-01 RX ADMIN — METOPROLOL SUCCINATE 12.5 MG: 25 TABLET, EXTENDED RELEASE ORAL at 08:47

## 2021-06-01 RX ADMIN — SACUBITRIL AND VALSARTAN 1 TABLET: 24; 26 TABLET, FILM COATED ORAL at 08:47

## 2021-06-01 RX ADMIN — HEPARIN SODIUM 5000 UNITS: 5000 INJECTION INTRAVENOUS; SUBCUTANEOUS at 10:26

## 2021-06-01 RX ADMIN — HYDRALAZINE HYDROCHLORIDE 50 MG: 50 TABLET, FILM COATED ORAL at 21:57

## 2021-06-01 RX ADMIN — IRON SUCROSE 200 MG: 20 INJECTION, SOLUTION INTRAVENOUS at 10:26

## 2021-06-01 RX ADMIN — INSULIN LISPRO 2 UNITS: 100 INJECTION, SOLUTION INTRAVENOUS; SUBCUTANEOUS at 22:32

## 2021-06-01 RX ADMIN — HYDRALAZINE HYDROCHLORIDE 50 MG: 50 TABLET, FILM COATED ORAL at 08:47

## 2021-06-01 RX ADMIN — BUMETANIDE 1 MG: 0.25 INJECTION INTRAMUSCULAR; INTRAVENOUS at 08:48

## 2021-06-01 NOTE — PROGRESS NOTES
History of Present Illness  58 year old patient presents today for a 1 month follow up for genetic testing results. 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 Patient states family hx of mother (age 52,  at 57) and 2 maternal aunts (one in her early 40s, other in her 60s) had breast cancer. TC done and is 12-15%, Marlyn 13%.      Review of Systems  Const: Normal.   Breast: Normal.   Neuro: Normal.   Psych: Normal.       Physical Exam  Constitutional: alert, in no acute distress and current vital signs reviewed.   Neck: normal appearing neck, no mass was seen and no cervical lymphadenopathy.   Lymphatic: no lymphadenopathy.   Breasts: Both breasts normal in appearance, no palpable masses, no suspicious skin changes, no supraclavicular adenopathy, no axillary adenopathy, no nipple changes and no nipple discharge of both breasts.   Neurologic: cranial nerves grossly intact. no sensory deficits noted. no coordination deficits.   Psychiatric: oriented to person, oriented to place and oriented to time. alert and awake.      Assessment   1. Family history of malignant neoplasm of breast (Z80.3) : Mother, Maternal Aunt   2. 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 : Yes  Strong family history of breast cancer, genetic testing negative for any high risk breast cancer genes but was positive for a high risk prostate cancer gene. I have referred her to genetic counselor to discuss these results and how they affect her children. Her risk of development of breast cancer is a little increased (up to 15%). She does not meet criteria for risk reduction with medication or high risk screening but I would be happy to see  Hospitalist Progress Note  Dede Flor MD  Answering service: [phone] from in house phone      Date of Service:  2021  NAME:  Ellen Soto  :  1950  MRN:  [id number]    Admission Summary:   70F p/w SOB- fairly suddenly, following some exercises the night before. Interval history / Subjective:   Patient seen and examined at bedside, feels better, her breathing has improved a lot. on NC O2. Wasn't placed on Bipap overnight, will switch bipap order to night time for WES. Assessment & Plan:     #. Acute on chronic BiV sCHF: CXR: pulm edema. S/p ICD. NYHA class 3/4  #. Acute on chonic respiratory respiratory failure: 2nd to above. Requiring Bipap- Rapid COVID test -ve. #. Mitral valve regurgitation: s/p MitraClip. Now mild stenosis with mild regurgitation.  - Iv diuresis. Strict I&O, daily weight. Monitor and correct lytes. Albumin iv.  - recent TTE ef 35%- repeat shows ef 25%. [facility] failure team following. 3. CKD 4: monitor cr on diuresis. 4. DM: hold oral meds, Lantus and SSI will adjust  5. CAD: asa, plavix, BB continue   6. COPD/asthma: continue prn nebs   7. WES: bipap hs  8. moribid obese: BMI 40.46. counseled. #. Cataract: continue eye drops    Code status: Full  DVT prophylaxis: SCDs  Care Plan discussed with: Patient/Family and Nurse  Disposition: TBD 1-2days     Hospital Problems  Date Reviewed: 2021        Codes Class Noted POA    CHF exacerbation ([facility] [address] ICD-10-CM: I50.9  ICD-9-CM: 428.0  2021 Unknown        Acute on chronic respiratory failure [facility]) ICD-10-CM: J96.20  ICD-9-CM: 518.84  2021 Unknown            Review of Systems:   Pertinent items are mentioned in interval history. Vital Signs:    Last 24hrs VS reviewed since prior progress note.  Most recent are:  Visit Vitals  /60   Pulse 71   Temp 98.3 °F (36.8 °C)   Resp 18   Ht 5' 3\" (1.6 m)   Wt 101.6 kg (224 lb)   SpO2 95% BMI 39.68 kg/m²         Intake/Output Summary (Last 24 hours) at 6/1/2021 0924  Last data filed at 6/1/2021 0400  Gross per 24 hour   Intake 200 ml   Output 1600 ml   Net -1400 ml        Physical Examination:   Evaluated face to face and examined 06/01/21    General:  Alert, oriented, distress with some SOB. Obese [employer].  Card:  S1, S2 without murmur, good peripheral perfusion  Resp:  No accessory muscle use, fair AE, no wheezes. Few crepitations  Abd:  Soft, non-tender, non-distended, BS+  Extremities:  No cyanosis or clubbing, no significant edema  Neuro:  Grossly normal, no focal neuro deficits, follows commands, speech wnl. Psych:  Good insight, not agitated. Data Review:    Review and/or order of clinical lab test  Review and/or order of tests in the radiology section of CPT  Review and/or order of tests in the medicine section of [facility]  Labs:     Recent Labs     05/31/21 0423 05/30/21  1346   WBC 11.4* 19.1*   HGB 12.3 12.6   HCT 37.8 39.7    256     Recent Labs     06/01/21 0440 05/31/21 0423 05/30/21  1346    140 137   K 4.6 4.6 3.7   * 108 105   CO2 24 23 20*   BUN 42* 33* 34*   CREA 1.79* 1.75* 1.94*   * 157* 184*   CA 8.7 9.9 10.6*   MG 2.0 1.9 2.3   PHOS  --  3.6  --    URICA 5.0  --   --      Recent Labs     06/01/21 0440 05/31/21 0423 05/30/21  1346   ALT 23 31 36   AP 75 99 114   TBILI 0.7 0.6 0.6   TP 5.5* 7.1 7.6   ALB 2.5* 3.2* 3.8   GLOB 3.0 3.9 3.8     No results for input(s): INR, PTP, APTT, INREXT, INREXT in the last 72 hours. Recent Labs     06/01/21 0440   TIBC 236*   PSAT 19*   FERR 62      No results found for: FOL, RBCF   No results for input(s): PH, PCO2, PO2 in the last 72 hours.   Recent Labs     06/01/21  0440 05/30/21  1346     --    TROIQ  --  <0.05     Lab Results   Component Value Date/Time    Cholesterol, total 172 07/23/2020 09:12 AM    HDL Cholesterol 75 07/23/2020 09:12 AM    LDL, calculated 78 07/23/2020 09:12 AM    Triglyceride 93 her annually for a breast exam and will order her annual mammogram. She would like this. Since she sees Dr. Whitehead in Nov, she will plan to see me in May.      Signatures   Electronically signed by : Lili Best CMA; Nov 28 2018  9:46AM CST    Electronically signed by : DESTINI ANDERSEN, ; Nov 28 2018 10:09AM CST     07/23/2020 09:12 AM    CHOL/HDL Ratio 2.6 01/31/2020 12:35 AM     Lab Results   Component Value Date/Time    Glucose (POC) 164 (H) 06/01/2021 07:56 AM    Glucose (POC) 207 (H) 05/31/2021 10:11 PM    Glucose (POC) 231 (H) 05/31/2021 04:53 PM    Glucose (POC) 172 (H) 05/31/2021 11:32 AM    Glucose (POC) 160 (H) 05/31/2021 08:14 AM     Lab Results   Component Value Date/Time    Color YELLOW/STRAW 10/29/2020 10:45 AM    Appearance CLEAR 10/29/2020 10:45 AM    Specific gravity 1.014 10/29/2020 10:45 AM    pH (UA) 5.0 10/29/2020 10:45 AM    Protein 30 (A) 10/29/2020 10:45 AM    Glucose Negative 10/29/2020 10:45 AM    Ketone Negative 10/29/2020 10:45 AM    Bilirubin Negative 10/29/2020 10:45 AM    Urobilinogen 0.2 10/29/2020 10:45 AM    Nitrites Negative 10/29/2020 10:45 AM    Leukocyte Esterase Negative 10/29/2020 10:45 AM    Epithelial cells FEW 10/29/2020 10:45 AM    Bacteria Negative 10/29/2020 10:45 AM    WBC 0-4 10/29/2020 10:45 AM    RBC 0-5 10/29/2020 10:45 AM     Medications Reviewed:     Current Facility-Administered Medications   Medication Dose Route Frequency    iron sucrose (VENOFER) 200 mg in 0.9% sodium chloride 100 mL IVPB  200 mg IntraVENous Q24H    albumin human 25% (BUMINATE) solution 12.5 g  12.5 g IntraVENous BID    sacubitriL-valsartan (ENTRESTO) 24-26 mg tablet 0.5 Tablet  0.5 Tablet Oral BID    hydrALAZINE (APRESOLINE) tablet 50 mg  50 mg Oral TID    albuterol-ipratropium (DUO-NEB) 2.5 MG-0.5 MG/3 ML  3 mL Nebulization Q6H PRN    allopurinoL (ZYLOPRIM) tablet 100 mg  100 mg Oral DAILY    aspirin chewable tablet 81 mg  81 mg Oral DAILY    atorvastatin (LIPITOR) tablet 80 mg  80 mg Oral QHS    clopidogreL (PLAVIX) tablet 75 mg  75 mg Oral DAILY    ezetimibe (ZETIA) tablet 10 mg  10 mg Oral DAILY    gabapentin (NEURONTIN) capsule 200 mg  200 mg Oral TID    isosorbide mononitrate ER (IMDUR) tablet 30 mg  30 mg Oral 7am    levothyroxine (SYNTHROID) tablet 100 mcg  100 mcg Oral ACB    metoprolol succinate (TOPROL-XL) XL tablet 12.5 mg  12.5 mg Oral DAILY    bumetanide (BUMEX) injection 1 mg  1 mg IntraVENous BID    glucose chewable tablet 16 g  4 Tablet Oral PRN    dextrose (D50W) injection syrg 12.5-25 g  25-50 mL IntraVENous PRN    glucagon (GLUCAGEN) injection 1 mg  1 mg IntraMUSCular PRN    insulin lispro (HUMALOG) injection   SubCUTAneous AC&HS    insulin glargine (LANTUS) injection 10 Units  10 Units SubCUTAneous DAILY   ______________________________________________________________________  EXPECTED LENGTH OF STAY: - - -  ACTUAL LENGTH OF STAY:          2               Lexus Cheek MD

## 2021-06-01 NOTE — PROGRESS NOTES
0730: Bedside and Verbal shift change report given to [address] (oncoming nurse) by Kike Bradley RN (offgoing nurse). Report included the following information SBAR, Kardex, ED Summary, Intake/Output, MAR, Recent Results and Cardiac Rhythm NSR.

## 2021-06-01 NOTE — PROGRESS NOTES
Admission Medication Reconciliation:    Information obtained from:  patient  RxQuery data available¹:  YES    Comments/Recommendations: Updated PTA meds/reviewed patient's allergies. 1)  The patient had AVS paperwork in her belongings from her cardiology office visit on 5/28. The patient was also able to confirm which medications and doses she is taking. The patient typically takes gabapentin 100 mg 2 capsules BID rather than TID. The patient has not had to utilize her albuterol inhaler in approximately a year. 2)  Medication changes (since last review): Added  - None    Adjusted  - None    Removed  - None     ¹RxQuery pharmacy benefit data reflects medications filled and processed through the patient's insurance, however   this data does NOT capture whether the medication was picked up or is currently being taken by the patient. Allergies:  Crestor [rosuvastatin], Lisinopril, and Nsaids (non-steroidal anti-inflammatory drug)    Significant PMH/Disease States:   Past Medical History:   Diagnosis Date    Adverse effect of anesthesia     hard to wake up/uses BIPAP/\"try to avoid general if possible\"/intubated in past prior to going to sleep and it caused pt to be incontinent    Arthritis     Asthma     CAD (coronary artery disease)     Chronic kidney disease     elevataed creatinine    Chronic obstructive pulmonary disease (HCC)     Chronic pain     arthritis    Coagulation disorder (HCC)     on plavix    Congestive heart failure (HCC)     Diabetes ([address]     Hypertension     Morbid obesity ([address]     Sleep apnea 1996    BIPAP with 2 liters oxygen    Thromboembolus ([address]     after heart surgery - left leg    Thyroid disease      Chief Complaint for this Admission:    Chief Complaint   Patient presents with    Shortness of Breath     Prior to Admission Medications:   Prior to Admission Medications   Prescriptions Last Dose Informant Taking? Blood-Glucose Meter monitoring kit   No   Sig: Use as directed.  Dx: E11.65 check sugar twice daily   Blood-Glucose Meter monitoring kit   No   Sig: Check blood sugar daily. May substitute for insurance preferred meter   acetaminophen (TYLENOL ARTHRITIS PAIN) 650 mg TbER   Yes   Sig: Take 1,300 mg by mouth two (2) times a day. albuterol (PROVENTIL HFA, VENTOLIN HFA, PROAIR HFA) 90 mcg/actuation inhaler   Yes   Sig: Take 2 Puffs by inhalation every four (4) hours as needed. allopurinoL (ZYLOPRIM) 100 mg tablet   Yes   Sig: Take 1 tablet by mouth once daily   aspirin 81 mg chewable tablet   Yes   Sig: Take 81 mg by mouth daily. atorvastatin (LIPITOR) 80 mg tablet   Yes   Sig: TAKE 1 TABLET BY MOUTH AT BEDTIME   clopidogreL (PLAVIX) 75 mg tab   Yes   Sig: Take 1 tablet by mouth once daily   clotrimazole-betamethasone (LOTRISONE) topical cream   Yes   Sig: Apply  to affected area two (2) times a day. Patient taking differently: Apply  to affected area two (2) times daily as needed. empagliflozin (Jardiance) 10 mg tablet   Yes   Sig: Take 1 Tablet by mouth daily. ezetimibe (ZETIA) 10 mg tablet   Yes   Sig: Take 1 tablet by mouth once daily   furosemide (LASIX) 20 mg tablet   Yes   Sig: Take 1 Tablet by mouth daily. gabapentin (NEURONTIN) 100 mg capsule   Yes   Sig: Take 2 Caps by mouth three (3) times daily. Max Daily Amount: 600 mg.   glipiZIDE (GLUCOTROL) 10 mg tablet   Yes   Sig: Take 1 tablet by mouth twice daily with food   glucose blood VI test strips (ASCENSIA AUTODISC VI, ONE TOUCH ULTRA TEST VI) strip   No   Sig: E11.65 check sugar once daily   glucose blood VI test strips (blood glucose test) strip   No   Sig: Check blood sugar 2 times daily: E11.9 may substitute for insurance preferred strips. hydrALAZINE (APRESOLINE) 25 mg tablet   Yes   Sig: Take 1 Tab by mouth three (3) times daily.    isosorbide mononitrate ER (IMDUR) 30 mg tablet   Yes   Sig: Take 1 Tab by mouth every morning.   ketorolac (ACULAR) 0.5 % ophthalmic solution   Yes   Sig: INSTILL 1 DROP 4 TIMES DAILY INTO EYE HAVING SURGERY START 2 DAYS PRIOR TO SURGERY   lancets misc   No   Sig: Check blood sugar 2 times daily. May substitute for insurance preferred lancets. lancets misc   No   Sig: Use as directed. Dx:check sugar once daily. E11.65   levothyroxine (SYNTHROID) 100 mcg tablet   Yes   Sig: Take 1 Tab by mouth Daily (before breakfast). metoprolol succinate (TOPROL-XL) 25 mg XL tablet   Yes   Sig: Take 0.5 Tabs by mouth daily. Hold for systolic BP less than 709   ofloxacin (FLOXIN) 0.3 % ophthalmic solution   Yes   Sig: INSTILL 1 DROP 4 TIMES DAILY INTO SURGERY EYE START 2 DAYS PRIOR TO SURGERY   prednisoLONE acetate (PRED FORTE) 1 % ophthalmic suspension   Yes   Sig: INSTILL 1 DROP 4 TIMES DAILY INTO SUREGRY EYE TAPER AS DIRECTED   sacubitriL-valsartan (Entresto) 24-26 mg tablet   Yes   Sig: Take 1 Tab by mouth two (2) times a day. Facility-Administered Medications: None     Please contact the main inpatient pharmacy with any questions or concerns at [phone] and we will direct you to the clinical pharmacist covering this patient's care while in-house.    Beauty Brooms

## 2021-06-01 NOTE — CONSULTS
[facility] [place]: Jackie Lieberman  [phone]          Cardiology valvular heart disease consult/Progress Note      Requesting/referring provider: Paty Gutierrez., Kareem. Emory Rivera  Reason for Consult: Mitral valve disease    Assessment/Plan:  1. Coronary disease manifested in the form of prior inferior MI, status post coronary bypass grafting with patent LIMA to LAD and vein graft to RCA and chronically occluded left circumflex with collaterals  2. Severe LV systolic dysfunction/heart failure reduced ejection fraction acute on chronic likely secondary to 1 with progressive adverse LV remodeling  3. History of severe mitral regurgitation appears functional in nature secondary to progressive adverse LV remodeling and possible papillary muscle dysfunction from posterior myocardial infarction--status post MitraClip NT W placement in November 2020 with now residual mild to moderate mitral regurgitation. 4.  Acute on chronic systolic congestive heart failure  5. History of lower extremity thromboembolism post coronary bypass grafting  6. CKD with baseline creatinine about 2  7. Morbid obesity with suspected sleep apnea    Ms. Violetta Ramirez is seen for acute hypoxemic respiratory failure caused by acute on chronic systolic congestive heart failure. It is uncertain what caused worsening of her congestive heart failure symptoms but clearly she seems to be responding well now with IV diuretics. I do not feel that she requires any positive pressure ventilation. Her echocardiogram performed during current hospitalization as well as 2 weeks ago was reviewed and shows relatively stable EF of about 30 to 35% with mild to moderate residual MR with stable clip position. Her mitral dilatation is not contributing to her current presentation. Recommend transition of IV Bumex to oral Bumex per heart failure team hopefully tomorrow.   Rest of the GDMT including Entresto, Toprol, hydralazine nitrate combination should be continued. From a CAD standpoint she should be on high intensity statin therapy with atorvastatin 40 mg.  Plavix may be continued for now. She wants to get cataract surgery done in short-term future. From a cardiac standpoint that would be reasonable. Plavix can be stopped 7 days prior to procedure and can be reinitiated after the procedure. [x]    High complexity decision making was performed  []    Patient is at high-risk of decompensation with multiple organ involvement      Investigations personally reviewed by me  Cardiac catheterization February 2020: Severe native three-vessel disease. Patent LIMA to LAD and vein graft to RCA. Right PDA small diffusely diseased vessel. Left circumflex is chronically occluded and branches are filling by collaterals. Small diagonal is severely diffusely diseased. ECG January 2020: Sinus rhythm, prior inferior infarct, narrow QRS complex, single PVC noted. Echo June 2020: Severe LV systolic dysfunction. Overall EF about 30%. Global hypokinesis. Mid and basal inferolateral walls appear to be severely hypokinetic with possible prior infarct based on hyperechoic nature of this wall. Based on color Doppler it appears to be significant mitral regurgitation which is predominantly central with some degree of posteriorly directed nature of the jet. Mechanism of mitral regurgitation appears to be annular dilatation and lack of Mal coaptation secondary to predominantly posterior leaflet tethering. Moderate pulmonary hypertension is noted. Echocardiogram December 2020: Status post MitraClip, mitral regurgitation reduced from severe to mild to moderate. Clip seen expected position and secured. Overall LV systolic function about 25 to 30%. Moderate biatrial enlargement.   Inferolateral wall appears to be severely hypokinetic and the rest of the areas are moderate ly hypokinetic.mean transmitral gradient was 3 mmHg  Echocardiogram May 2021: EF 30 to 35%. Moderate to severe biatrial enlargement. Inferolateral wall is hypokinetic rest of the areas are moderately hypokinetic. Mild to moderate residual posterior directed mitral regurgitation. Mean transmitral gradient is about 4 to 5 mmHg at heart rate of about 100 bpm.      HPI: Ashley Anton, a 65y.o. year-old with h/o mitral valve disease, CAD with CAB x 3 in 1997 then subsequent stents X 4, HTN, HLD, palpitations, ICD 2007, DM, hypothyroid, COPD, WES-wears BiPAP, CKD, DVT 1997 left leg, right TKR, Lap band 2011. Ms. Woo Barrett is seen for acute hypoxemic respiratory failure caused by acute on chronic systolic congestive heart failure. She is on relatively good medical therapy and is being followed by advanced heart failure services. On her prior visit with advanced heart failure team previous Friday she was doing very well. Over the weekend she gained about 4 pounds weight. She was exposed to some kind of smoke which provoked excessive coughing on Saturday eventually led to further increasing shortness of breath and orthopnea on Sunday requiring hospitalization. At the time of initial arrival she was in acute hypoxemic respiratory failure with sats of 76%. She required both BiPAP/noninvasive positive pressure ventilation treatment as well as IV Bumex 2 get her out of congestive heart failure. She now reports significant improvement in symptoms. She  has a past medical history of Adverse effect of anesthesia, Arthritis, Asthma, CAD (coronary artery disease), Chronic kidney disease, Chronic obstructive pulmonary disease ([address] Chronic pain, Coagulation disorder ([address] Congestive heart failure ([address] Diabetes ([address] Hypertension, Morbid obesity ([address] Sleep apnea (1996), Thromboembolus ([address] and Thyroid disease. Review of system:Patient reports no PND/Orthpnea/CP. sHe reports no cough/fever/focal neurological deficits/abdominal pain. All other systems negative except as above. Family History   Problem Relation Age of Onset    Hypertension Mother     Dementia Mother     Coronary Artery Disease Father 60    Sudden Death Father 58    Diabetes Son     Diabetes Brother       Social History     Socioeconomic History    Marital status:      Spouse name: Not on file    Number of children: Not on file    Years of education: Not on file    Highest education level: Not on file   Tobacco Use    Smoking status: Former Smoker     Packs/day: 0.50     Years: 45.00     Pack years: 22.50     Types: Cigarettes     Quit date: 2010     Years since quittin.4    Smokeless tobacco: Never Used    Tobacco comment: quit /started again (smoked 3 yrs) off and on/none since    Vaping Use    Vaping Use: Never used   Substance and Sexual Activity    Alcohol use: Not Currently    Drug use: Not Currently    Sexual activity: Not Currently   Other Topics Concern     Social Determinants of Health     Financial Resource Strain:     Difficulty of Paying Living Expenses:    Food Insecurity:     Worried About Running Out of Food in the Last Year:     [address] in the Last Year:    Transportation Needs:     Lack of Transportation (Medical):      Lack of Transportation (Non-Medical):    Physical Activity:     Days of Exercise per Week:     Minutes of Exercise per Session:    Stress:     Feeling of Stress :    Social Connections:     Frequency of Communication with Friends and Family:     Frequency of Social Gatherings with Friends and Family:     Attends Voodoo Services:     Active Member of Clubs or Organizations:     Attends Club or Organization Meetings:     Marital Status:       PE  Vitals:    21 1035 21 1036 21 1210 21 1625   BP: 121/72 116/65 119/80 110/64   Pulse: 74 75 70 70   Resp: 14 20 16 20   Temp:   97.4 °F (36.3 °C) 98 °F (36.7 °C)   SpO2: 94% 100%  93%   Weight:       Height:        Body mass index is 39.68 kg/m².     Recent Labs:  Lab Results   Component Value Date/Time    Cholesterol, total 172 2020 09:12 AM    HDL Cholesterol 75 2020 09:12 AM    LDL, calculated 78 2020 09:12 AM    Triglyceride 93 2020 09:12 AM    CHOL/HDL Ratio 2.6 2020 12:35 AM     Lab Results   Component Value Date/Time    Creatinine 1.79 (H) 2021 04:40 AM     Lab Results   Component Value Date/Time    BUN 42 (H) 2021 04:40 AM     Lab Results   Component Value Date/Time    Potassium 4.6 2021 04:40 AM     Lab Results   Component Value Date/Time    Hemoglobin A1c 7.3 (H) 2021 04:23 AM     Lab Results   Component Value Date/Time    HGB 12.3 2021 04:23 AM     Lab Results   Component Value Date/Time    PLATELET 021  04:23 AM       Reviewed:  Past Medical History:   Diagnosis Date    Adverse effect of anesthesia     hard to wake up/uses BIPAP/\"try to avoid general if possible\"/intubated in past prior to going to sleep and it caused pt to be incontinent    Arthritis     Asthma     CAD (coronary artery disease)     Chronic kidney disease     elevataed creatinine    Chronic obstructive pulmonary disease (HCC)     Chronic pain     arthritis    Coagulation disorder ([address]     on plavix    Congestive heart failure (HCC)     Diabetes [facility]     Hypertension     Morbid obesity ([facility]     Sleep apnea     BIPAP with 2 liters oxygen    Thromboembolus ([facility]     after heart surgery - left leg    Thyroid disease      Social History     Tobacco Use   Smoking Status Former Smoker    Packs/day: 0.50    Years: 45.00    Pack years: 22.50    Types: Cigarettes    Quit date: 2010    Years since quittin.4   Smokeless Tobacco Never Used   Tobacco Comment    quit /started again (smoked 3 yrs) off and on/none since      Social History     Substance and Sexual Activity   Alcohol Use Not Currently     Allergies   Allergen Reactions    Crestor [Rosuvastatin] Other (comments)     Causes muscle cramps    Lisinopril Cough    Nsaids (Non-Steroidal Anti-Inflammatory Drug) Other (comments)     Liver and Kidney     Family History   Problem Relation Age of Onset    Hypertension Mother     Dementia Mother     Coronary Artery Disease Father 58    Sudden Death Father 58    Diabetes Son     Diabetes Brother         Current Facility-Administered Medications   Medication Dose Route Frequency    iron sucrose (VENOFER) 200 mg in 0.9% sodium chloride 100 mL IVPB  200 mg IntraVENous Q24H    albumin human 25% (BUMINATE) solution 12.5 g  12.5 g IntraVENous BID    sacubitriL-valsartan (ENTRESTO) 24-26 mg tablet 0.5 Tablet  0.5 Tablet Oral BID    albuterol-ipratropium (DUO-NEB) 2.5 MG-0.5 MG/3 ML  3 mL Nebulization Q6H PRN    ketorolac (ACULAR) 0.5 % ophthalmic solution 1 Drop  1 Drop Right Eye QID    ofloxacin (FLOXIN) 0.3 % ophthalmic solution 1 Drop  1 Drop Right Eye QID    prednisoLONE acetate (PRED FORTE) 1 % ophthalmic suspension 1 Drop  1 Drop Right Eye QID    heparin (porcine) injection 5,000 Units  5,000 Units SubCUTAneous Q8H    [START ON 6/2/2021] levothyroxine (SYNTHROID) tablet 100 mcg  100 mcg Oral ACB    hydrALAZINE (APRESOLINE) tablet 50 mg  50 mg Oral TID    allopurinoL (ZYLOPRIM) tablet 100 mg  100 mg Oral DAILY    aspirin chewable tablet 81 mg  81 mg Oral DAILY    atorvastatin (LIPITOR) tablet 80 mg  80 mg Oral QHS    clopidogreL (PLAVIX) tablet 75 mg  75 mg Oral DAILY    ezetimibe (ZETIA) tablet 10 mg  10 mg Oral DAILY    gabapentin (NEURONTIN) capsule 200 mg  200 mg Oral TID    isosorbide mononitrate ER (IMDUR) tablet 30 mg  30 mg Oral 7am    metoprolol succinate (TOPROL-XL) XL tablet 12.5 mg  12.5 mg Oral DAILY    bumetanide (BUMEX) injection 1 mg  1 mg IntraVENous BID    glucose chewable tablet 16 g  4 Tablet Oral PRN    dextrose (D50W) injection syrg 12.5-25 g  25-50 mL IntraVENous PRN    glucagon (GLUCAGEN) injection 1 mg  1 mg IntraMUSCular PRN    insulin lispro (HUMALOG) injection   SubCUTAneous AC&HS    insulin glargine (LANTUS) injection 10 Units  10 Units SubCUTAneous DAILY     /64 (BP 1 Location: Left upper arm, BP Patient Position: At rest)   Pulse 70   Temp 98 °F (36.7 °C)   Resp 20   Ht 5' 3\" (1.6 m)   Wt 224 lb (101.6 kg)   SpO2 93%   BMI 39.68 kg/m²   General:    Alert, cooperative, no distress. Psychiatric:    Normal Mood and affect    Eye/ENT:      Pupils equal, No asymmetry, Conjunctival pink. Able to hear voice at normal amplitude   Lungs:      Visibly symmetric chest expansion, No palpable tenderness. Clear to auscultation bilaterally. Heart[de-identified]    Regular rate and rhythm, S1, S2 normal, no murmur, click, rub or gallop. No JVD, Normal palpable peripheral pulses. No cyanosis   Abdomen:     Soft, non-tender. Bowel sounds normal. No masses,  No      organomegaly. Extremities:   Extremities normal, atraumatic, no edema. Neurologic:   CN II-XII grossly intact. No gross focal deficits         Marie Purdy MD06/01/21     ATTENTION:   This medical record was transcribed using an electronic medical records/speech recognition system. Although proofread, it may and can contain electronic, spelling and other errors. Corrections may be executed at a later time. Please feel free to contact us for any clarifications as needed.     [address] [facility] [place]  [address], [address] [facility], [address] [place] [address]

## 2021-06-01 NOTE — PROGRESS NOTES
Physician Progress Note      Adalberto Alonso  [facility] #:                  [id number]  :                       1950  ADMIT DATE:       2021 1:30 PM  [address] DATE:  RESPONDING  PROVIDER #:        MATTHEW AGUIRRE MD          QUERY TEXT:    Dear Attending,    Pt admitted with acute on chronic biventricular systolic heart failure and has acute on chronic respiratory failure documented. If possible, please document in progress notes and discharge summary further specificity regarding the type and acuity of respiratory failure: The medical record reflects the following:  Risk Factors: COPD, biventricular CHF, WES  Clinical Indicators:  - H&P = 65 WF with h/o CHF, CKD, DM, asthma/copd, CAD, s/p TAVR HTN and morbid obese  Came to ER with acute SOB. complaining of shortness of breath starting this morning. EMS reports room air saturations 76% on Room air. Patient arrives on 15L NRB. Pt on po lasxi 20mg daily. Gain 4 lbs last 2 days and noticed increasing leg swelling - Acute on chonic respiratory respiratory failure: due to CHF exacerbation. - ED Note = 24-year-old female with a history of COPD, coronary disease with multiple stents in place and now status post CABG x3, ischemic cardiomyopathy and congestive heart failure for which she is known to the advanced heart failure service here, status post ICD placement, chronic respiratory failure for which she uses BiPAP at home, presenting by EMS for respiratory distress. She started feeling short of breath yesterday, does note that she has had some weight gain more recently despite good compliance with her medications. - Unable to perform ROS: Severe respiratory distress - Respiratory: Positive for shortness of breath.   Physical Exam: +respiratory distress; +Rales throughout, no wheezing, diminished breath sounds with increased work of breathing  - ABG: pH = 7.26, pCO2 = 46.7, pO2 = 140, FIO2 = 50 (BiPAP), calculated P/F = 280  Treatment: BiPAP, ABG testing, supplemental O2, Cardiology consult, IV diuresis. Strict I&O, daily weight, monitor and correct lytes, albumin IV, recent TTE, Heart Failure team following    Thank-you,  Dinorah Perez RN, [facility], [place]  Clinical   [phone]  Options provided:  -- Acute on chronic respiratory failure with hypoxia  -- Acute on chronic respiratory failure with hypercapnia  -- Acute on chronic respiratory failure with hypoxia and hypercapnia  -- Other - I will add my own diagnosis  -- Disagree - Not applicable / Not valid  -- Disagree - Clinically unable to determine / Unknown  -- Refer to Clinical Documentation Reviewer    PROVIDER RESPONSE TEXT:    This patient is in acute on chronic respiratory failure with hypoxia.     Query created by: Carine Clifton on 6/1/2021 3:02 PM      Electronically signed by:  MATTHEW COOPER [facility] MD 6/1/2021 3:22 PM

## 2021-06-01 NOTE — PROGRESS NOTES
[facility] in [place], [place]  Inpatient Consult Progress Note      Patient name: Servando Ortiz  Patient : 1950  Patient MRN: [id number]  Consulting MD: Candance Fox, MD  Primary general cardiologist:      Date of service: 21    REASON FOR CONSULT:  Acute on chronic systolic heart failure    PLAN:  Continue current medical therapy for heart failure  Continue current dose of Toprol 12.5mg daily  Decrease Entresto to 1.2 tab 24/26 mg PO BID d/t renal dysfunction   Orthostatics today   No MRA due to hyperkalemia   Continue hydralazine 50mg TID  Cont Imdur 30mg daily  Cont Synthroid 100mcg  Resume Jardiance as OP if Cr stable   Continue current dose of diuretic; goal net negative 1-2 liters per day (goal weight)  Cont allopurinol 100mg daily   Continue ASA and plavix  Continue current dose of statin  Begin albumin BID   ICD interrogation 21  Continue CPAP therapy    Check ESR, PCT, UA due to leukocytosis  Venofer 200 mg IV x 2   Equivocal PYP  [employer] DSP (VUS)  Cardiology consult for ischemic workup - ?cause of recent decompensation   BiPAP [facility]   Nutritionist consult  Heart failure education  Advanced care plan present on file  Plan at discharge: recommend flu and pneumonia vaccinations    IMPRESSION:  Fatigue  Shortness of breath  Volume overload  Chronic systolic heart failure  Stage D, NYHA class IIIA symptoms  Ischemic cardiomyopathy, LVEF 35-40%  CAD s/p CABG  s/p PCI to DVG-RCA   H/o severe MR s/p mitral clip in   HTN  H/o VT s/p AICD  WES on Bipap  T2DM with neuropathy  Hypothyroidism  Obesity  HLD  Osteoarthritis   CKD4     RECENT EVENTS:  Net negative 1.4L   WBC 11.4 from 19.1   Hgb stable   Neutrophils 83% from 67%  Afebrile  proBNP 7733 from 2947     CARDIAC IMAGING:  Echo 21- Pending  Echo 21- LVEF 35-40%, Mild MR post clip, mild TR, LVIDd 5.7cm, TAPSE 1.68, RVIDd 4.05cm   Echo 20 LVEF 30-35%, mild MR, LVIDd 6.09cm, RVIDd 3.98cm  Echo 6/19/20 LVEF 25-30%, Mod TR, severe MR, LVIDd 5.76cm, TAPSE 1.94cm, RVIDd 4.06cm    EKG 5/30/21 ST with PVCs, QRS 88ms  EKG 11/14/20 V paced, QRS 84    LHC 2/3/20- Severe native 3 vessel CAD. Patent VG to RCA and LIMA to LAD. LCx is collateral dependent and native rPDA and D1 have small disease in small vessels. NST    ICD interrogation 5/10/21    HEMODYNAMICS:  RHC not done  CPEST not done  6MW not done    OTHER IMAGING:  CXR 5/30/2 Diffuse increased interstitial and airspace opacities with more  confluent airspace opacities in the bilateral lung bases likely representing  pulmonary edema. CT chest not done     HPI:   65y.o. year old female with a history of HTN, HLD, WES, obesity (Body mass index is 40.74 kg/m².) s/p gastric bypass in 2011, T2DM, hypothyroidism, COPD, CAD s/p CABG in 1997, s/p PCI to [address] in 5/15, ICM, VT, s/p AICD ([employer]),  anxiety, and depression. She was scheduled to undergo BiV upgrade with Dr. Ines Campbell, however, her QRS was too narrow for the upgrade. He increased her low pacing threshold from 50 bpm to 70 bpm.     INTERVAL HISTORY:  Ms. Tyrell Bowman was admitted via the ED for increased SOB over the weekend, she denies dietary indiscretions but said she left a pan on the stove and she thinks the smoke may have triggered her cough and volume overload. [facility] has been consulted for ongoing management of her HFrEF. PHYSICAL EXAM:  Visit Vitals  /80 (BP 1 Location: Left upper arm, BP Patient Position: At rest)   Pulse 70   Temp 97.4 °F (36.3 °C)   Resp 16   Ht 5' 3\" (1.6 m)   Wt 224 lb (101.6 kg)   SpO2 100%   BMI 39.68 kg/m²     General: Patient is well developed, well-nourished in no acute distress  HEENT: Normocephalic and atraumatic. No scleral icterus. Pupils are equal, round and reactive to light and accomodation. No conjunctival injection. Oropharynx is clear. Neck: Supple. No evidence of thyroid enlargements or lymphadenopathy.   JVD: Cannot be appreciated   Lungs: Breath sounds are equal and clear bilaterally. No wheezes, rhonchi, or rales. Wearing O2 this morning   Heart: Regular rate and rhythm with normal S1 and S2. No murmurs, gallops or rubs. Abdomen: Soft, no mass or tenderness. No organomegaly or hernia. Bowel sounds present. Genitourinary and rectal: deferred  Extremities: No cyanosis, clubbing, or edema. Neurologic: No focal sensory or motor deficits are noted. Grossly intact. Psychiatric: Awake, alert an doriented x 3. Appropriate mood and affect. Skin: Warm, dry and well perfused. No lesions, nodules or rashes are noted. REVIEW OF SYSTEMS:  General: Denies fever, night sweats. Ear, nose and throat: Denies difficulty hearing, sinus problems, runny nose, post-nasal drip, ringing in ears, mouth sores, loose teeth, ear pain, nosebleeds, sore throate, facial pain or numbess  Cardiovascular: see above in the interval history  Respiratory: Denies cough, wheezing, sputum production, hemoptysis.   Gastrointestinal: Denies heartburn, constipation, intolerance to certain foods, diarrhea, abdominal pain, nausea, vomiting, difficulty swallowing, blood in stool  Kidney and bladder: Denies painful urination, frequent urination, urgency, prostate problems and impotence  Musculoskeletal: Denies joint pain, muscle weakness  Skin and hair: Denies change in existing skin lesions, hair loss or increase, breast changes    PAST MEDICAL HISTORY:  Past Medical History:   Diagnosis Date    Adverse effect of anesthesia     hard to wake up/uses BIPAP/\"try to avoid general if possible\"/intubated in past prior to going to sleep and it caused pt to be incontinent    Arthritis     Asthma     CAD (coronary artery disease)     Chronic kidney disease     elevataed creatinine    Chronic obstructive pulmonary disease (HCC)     Chronic pain     arthritis    Coagulation disorder ([address]     on plavix    Congestive heart failure ([address]     Diabetes ([address]     Hypertension     Morbid obesity ([facility]     Sleep apnea     BIPAP with 2 liters oxygen    Thromboembolus (HCC)     after heart surgery - left leg    Thyroid disease        PAST SURGICAL HISTORY:  Past Surgical History:   Procedure Laterality Date    COLONOSCOPY N/A 2020    COLONOSCOPY   :- performed by Maryan Torres MD at [facility] [address]    knee - right    HX  SECTION      x3    HX CORONARY ARTERY BYPASS GRAFT  1997    3 vessels    HX CORONARY STENT PLACEMENT  2016    HX HERNIA REPAIR  1988    HX IMPLANTABLE CARDIOVERTER DEFIBRILLATOR      HX KNEE REPLACEMENT Right 2015    once    [place] CARDIAC SUR[place] PROCEDURE UNLIST  2018    cardiac cath - Left    [place] LAP GASTRIC BYPASS/KERLINE-EN-Y  2011    lap band per pt and not a gastric bypass       FAMILY HISTORY:  Family History   Problem Relation Age of Onset    Hypertension Mother     Dementia Mother     Coronary Artery Disease Father 58    Sudden Death Father 58    Diabetes Son     Diabetes Brother        SOCIAL HISTORY:  Social History     Socioeconomic History    Marital status:      Spouse name: Not on file    Number of children: Not on file    Years of education: Not on file    Highest education level: Not on file   Tobacco Use    Smoking status: Former Smoker     Packs/day: 0.50     Years: 45.00     Pack years: 22.50     Types: Cigarettes     Quit date: 2010     Years since quittin.4    Smokeless tobacco: Never Used    Tobacco comment: quit /started again (smoked 3 yrs) off and on/none since    Vaping Use    Vaping Use: Never used   Substance and Sexual Activity    Alcohol use: Not Currently    Drug use: Not Currently    Sexual activity: Not Currently   Other Topics Concern     Social Determinants of Health     Financial Resource Strain:     Difficulty of Paying Living Expenses:    Food Insecurity:     Worried About Running Out of Food in the Last Year:     Ran Out of Food in the Last Year:    Transportation Needs:     Lack of Transportation (Medical):  Lack of Transportation (Non-Medical):    Physical Activity:     Days of Exercise per Week:     Minutes of Exercise per Session:    Stress:     Feeling of Stress :    Social Connections:     Frequency of Communication with Friends and Family:     Frequency of Social Gatherings with Friends and Family:     Attends Muslim Services:     Active Member of Clubs or Organizations:     Attends Club or Organization Meetings:     Marital Status:        LABORATORY RESULTS:     Labs Latest Ref Rng & Units 6/1/2021 5/31/2021 5/30/2021 5/4/2021   WBC 3.6 - 11.0 K/uL - 11. 4(H) 19. 1(H) -   RBC 3.80 - 5.20 M/uL - 3.85 3.98 -   Hemoglobin 11.5 - 16.0 g/dL - 12.3 12.6 -   Hematocrit 35.0 - 47.0 % - 37.8 39.7 -   MCV 80.0 - 99.0 FL - 98.2 99. 7(H) -   Platelets 051 - 737 K/uL - 231 256 -   Lymphocytes 12 - 49 % - 13 24 -   Monocytes 5 - 13 % - 4(L) 5 -   Eosinophils 0 - 7 % - 0 2 -   Basophils 0 - 1 % - 0 1 -   Albumin 3.5 - 5.0 g/dL 2. 5(L) 3. 2(L) 3.8 -   Calcium 8.5 - 10.1 MG/DL 8.7 9.9 10. 6(H) -   Glucose 65 - 100 mg/dL 150(H) 157(H) 184(H) -   BUN 6 - 20 MG/DL 42(H) 33(H) 34(H) -   Creatinine 0.55 - 1.02 MG/DL 1.79(H) 1.75(H) 1.94(H) -   Sodium 136 - 145 mmol/L 141 140 137 -   Potassium 3.5 - 5.1 mmol/L 4.6 4.6 3.7 -   TSH 0.36 - 3.74 uIU/mL - - - 0.64   Some recent data might be hidden     Lab Results   Component Value Date/Time    TSH 0.64 05/04/2021 02:32 PM    TSH 0.247 (L) 03/30/2021 11:21 AM    TSH 0.250 (L) 02/17/2021 09:41 AM    TSH 0.230 (L) 02/17/2021 09:36 AM    TSH 7.130 (H) 01/04/2021 12:21 PM    TSH 3.78 (H) 10/29/2020 10:45 AM    TSH 1.460 10/26/2020 02:57 PM    TSH 1.620 04/30/2020 03:05 PM    TSH 1.07 01/31/2020 12:35 AM       ALLERGY:  Allergies   Allergen Reactions    Crestor [Rosuvastatin] Other (comments)     Causes muscle cramps    Lisinopril Cough    Nsaids (Non-Steroidal Anti-Inflammatory Drug) Other (comments)     Liver and Kidney        CURRENT MEDICATIONS:    Current Facility-Administered Medications:     iron sucrose (VENOFER) 200 mg in 0.9% sodium chloride 100 mL IVPB, 200 mg, IntraVENous, Q24H, Rachel Blankenship NP, Last Rate: 440 mL/hr at 06/01/21 1026, 200 mg at 06/01/21 1026    albumin human 25% (BUMINATE) solution 12.5 g, 12.5 g, IntraVENous, BID, Leonela Blankenship NP, 12.5 g at 06/01/21 1212    sacubitriL-valsartan (ENTRESTO) 24-26 mg tablet 0.5 Tablet, 0.5 Tablet, Oral, BID, Dede Blankenship NP    albuterol-ipratropium (DUO-NEB) 2.5 MG-0.5 MG/3 ML, 3 mL, Nebulization, Q6H PRN, Ismael Meyer MD    ketorolac (ACULAR) 0.5 % ophthalmic solution 1 Drop, 1 Drop, Right Eye, QID, Ismael Meyer MD, 1 Drop at 06/01/21 1411    ofloxacin (FLOXIN) 0.3 % ophthalmic solution 1 Drop, 1 Drop, Right Eye, QID, Ismael Meyer MD, 1 Drop at 06/01/21 1411    prednisoLONE acetate (PRED FORTE) 1 % ophthalmic suspension 1 Drop, 1 Drop, Right Eye, QID, Ismael Meyer MD, 1 Drop at 06/01/21 1411    heparin (porcine) injection 5,000 Units, 5,000 Units, SubCUTAneous, Q8H, Ismael Meyer MD, 5,000 Units at 06/01/21 1026    hydrALAZINE (APRESOLINE) tablet 50 mg, 50 mg, Oral, TID, Lo Mckeon NP, 50 mg at 06/01/21 0847    allopurinoL (ZYLOPRIM) tablet 100 mg, 100 mg, Oral, DAILY, Mary Velez MD, 100 mg at 06/01/21 0847    aspirin chewable tablet 81 mg, 81 mg, Oral, DAILY, Mary Velez MD, 81 mg at 06/01/21 0847    atorvastatin (LIPITOR) tablet 80 mg, 80 mg, Oral, QHS, Mary Velez MD, 80 mg at 05/31/21 2213    clopidogreL (PLAVIX) tablet 75 mg, 75 mg, Oral, DAILY, Mary Velez MD, 75 mg at 06/01/21 0847    ezetimibe (ZETIA) tablet 10 mg, 10 mg, Oral, DAILY, Mary Velez MD, 10 mg at 06/01/21 0847    gabapentin (NEURONTIN) capsule 200 mg, 200 mg, Oral, TID, Mary Velez MD, 200 mg at 06/01/21 0847    isosorbide mononitrate ER (IMDUR) tablet 30 mg, 30 mg, Oral, 7am, Mary Velez MD, 30 mg at 06/01/21 0741    levothyroxine (SYNTHROID) tablet 100 mcg, 100 mcg, Oral, ACB, Mary Velez MD, 100 mcg at 06/01/21 0741    metoprolol succinate (TOPROL-XL) XL tablet 12.5 mg, 12.5 mg, Oral, DAILY, Rosie, Tiffanie Call MD, 12.5 mg at 06/01/21 0847    bumetanide (BUMEX) injection 1 mg, 1 mg, IntraVENous, BID, Mary Velez MD, 1 mg at 06/01/21 0848    glucose chewable tablet 16 g, 4 Tablet, Oral, PRN, Mary Figueroa MD    dextrose (D50W) injection syrg 12.5-25 g, 25-50 mL, IntraVENous, PRN, Mary Velez MD    glucagon [facility]) injection 1 mg, 1 mg, IntraMUSCular, PRN, Mary Figueroa MD    insulin lispro (HUMALOG) injection, , SubCUTAneous, AC&HS, Analia Sanchez MD, 2 Units at 06/01/21 1212    insulin glargine (LANTUS) injection 10 Units, 10 Units, SubCUTAneous, DAILY, Analia Sanchez MD, 10 Units at 06/01/21 0848    PATIENT CARE TEAM:  Patient Care Team:  Lewis Maria NP as PCP - General (Nurse Practitioner)  Lewis Maria NP as PCP [facility]  Madelyn Archibald MD (Cardiology)  Pankaj Croft MD (Gastroenterology)  Kendrick June MD as Consulting Provider (Cardiology)     Thank you for allowing me to participate in this patient's care. Suresh Vann NP  [address] [place] [address], [address]  Phone: [phone]    [facility] ATTENDING ADDENDUM    Patient was seen and examined in person. Data and notes were reviewed. I have discussed and agree with the plan as noted in the NP note above without further additions.     Chato Conway MD PhD  [address] [address]

## 2021-06-01 NOTE — PROGRESS NOTES
Transition of Care Plan   RUR 15%    Disposition  Home    Transportation   Will likely need ride home- [address]   Will arrange if ordered    Medical follow up   PCP and specialist    Contacts  Partner-- Nina Camp  [phone]/ cell [phone] MPOA  Bradley Michelle  [phone] [address] [place] [address]    Reason for Admission:  SOB-- CHF exacerbation, acute chronic respiratory failure  Hx COPD  Bipap at home, diabetes, neuropathy, CABG, CHF, mitral valve repair                   RUR Score:    15%                 Plan for utilizing home health:  Not at this time bur will arrange if ordered        PCP: First and Last name:  Paige Fontaine, NP     Name of Practice:    Are you a current patient: Yes/No: yes   Approximate date of last visit: 5/4/21   Can you participate in a virtual visit with your PCP: yes                    Current Advanced Directive/Advance Care Plan: Full Code      Healthcare Decision Maker:   Click here to complete [address] including selection of the Healthcare Decision Maker Relationship (ie \"Primary\")  Primary  MPOA  Nina Camp  [phone]  Secondary [facility] [place] [place]  Bradley Michelle  [phone]                      Transition of Care Plan:   Patient anticipates retuning home with medical follow up    Cm met with patient in her room to introduce self and explain role. Patient was alert and oriented- Confirmed demographics, PCP and insruance -- Medicare [place] mediblue-- Care More     [facility]        Patient lives in one level apartment with her partner, Nina Camp. Patient was self care and independent with adl's prior to admission. Has cane and RW for mobility as needed. She drives to daily activties and appointments. Her partner does not drive. She is active in her Worship and has support form members. Patient has two sons-- Rashard Gurrola ([facility])  and Denise Champion)     Patient and Carlsonlogan Meads are retired and receive social security benefits. Patient is serviced by [facility] ([facility])  [phone] for Bipap and supplies. Patient is followed by [facility]     CM will follow patient's medical progress and assist with any needs that arise. CM will assist with transportation home if needed. Patient will be provided with 2nd Medicare letter prior to discharge      Care Management Interventions  PCP Verified by CM: Yes  Last Visit to PCP: 05/04/21  Mode of Transport at Discharge:  Other (see comment) (will need ride home-- round trip)  Transition of Care Consult (CM Consult): Discharge Planning  MyChart Signup: No  Discharge Durable Medical Equipment: No  Physical Therapy Consult: No  Occupational Therapy Consult: No  Speech Therapy Consult: No  Current Support Network:  (lives with partner in one level apartment with no steps   self care and independent   Has AMD in chart)  Confirm Follow Up Transport: Self  Discharge Location  Discharge Placement: Home

## 2021-06-01 NOTE — CONSULTS
Cardiology Consult/Progress Note           5/30/2021  1:30 PM   Acute on chronic respiratory failure (HCC) [J96.20]  CHF exacerbation ([facility] [I50.9]     HPI:   Ashley Anton is a 65 y.o. female admitted for Acute on chronic respiratory failure ([facility] [J96.20]  CHF exacerbation ([facility] [I50.9]. Presented for SOB. O2 sats 76% on RA. Having increased Wt Gain and leg edema. 65 y. o. year old female with a history of HTN, HLD, WES, obesity (Body mass index is 40.74 kg/m².) s/p gastric bypass in 2011, T2DM, hypothyroidism, COPD, CAD s/p CABG in 1997, s/p PCI to [address] in 5/15, ICM, VT, s/p AICD ([employer]),  anxiety, and depression.  She was scheduled to undergo BiV upgrade with Dr. Levonne Kussmaul, however, her QRS was too narrow for the upgrade. Rachel Avilez increased her low pacing threshold from 50 bpm to 70 bpm.    Investigation:  Telemetry: normal sinus rhythm  ECG:   EKG Results     Procedure 720 Value Units Date/Time    EKG, 12 LEAD, INITIAL [[id number]] Collected: 05/30/21 1340    Order Status: Completed Updated: 05/30/21 1501     Ventricular Rate 104 BPM      Atrial Rate 104 BPM      P-R Interval 116 ms      QRS Duration 88 ms      Q-T Interval 328 ms      QTC Calculation (Bezet) 431 ms      Calculated P Axis 60 degrees      Calculated R Axis 11 degrees      Calculated T Axis 130 degrees      Diagnosis --     Sinus tachycardia with premature supraventricular complexes and with frequent   premature ventricular complexes  Inferior infarct (cited on or before 30-MAY-2021)  ST & T wave abnormality, consider lateral ischemia  When compared with ECG of 13-NOV-2020 12:56,  Previous ECG has undetermined rhythm, needs review  Possible ventricular-paced complexes  Confirmed by Matthieu Serna MD, Shannan Delong ([id number]) on 5/30/2021 3:00:47 PM      EKG, 12 LEAD, INITIAL [[id number]]     Order Status: Canceled         05/30/21    ECHO ADULT COMPLETE 05/31/2021 5/31/2021    Interpretation Summary  · LV: Estimated LVEF is 25 - 30%. Severely dilated left ventricle. Mild concentric hypertrophy. Severely and globally reduced systolic function. Inconclusive left ventricular diastolic function. · LA: Moderately dilated left atrium. · RA: Mildly dilated right atrium. · MV: Moderate mitral valve regurgitation is present. · Image quality for this study was suboptimal.    Signed by: Sam Grigsby MD on 5/31/2021 12:34 PM        L/RHC 2/3/2020    Findings:  1)RHC performed while patient on Milrinone gtt(0.3)--normal right and left sided filling pressures, no pulmonary HTN, normal CI by blanche and thermodilution  2)Severe native 3 vessel CAD. Patent VG to RCA and LIMA to LAD. LCx is collateral dependent and native rPDA and D1 have small disease in small vessels. This are unlikely to contribute to heart failure. 3)Frequent PVCs     Recommendations:  1)GDMT for CAD          Assessment and Plan     1. Systolic heart failure -   - Chronic   - NYHA Class - IIIa   - EF - 25-30%   - Sacubitril/Valsartan (Entresto)       - Metoprolol succinate (Toprol XL)   - Aldactone - NO 2/2 elevated K  2. ICM   - LVEF 25-30%    - Severe LV systolic dysfunction/heart failure reduced ejection fraction acute on chronic     likely secondary to 1 with progressive adverse LV remodeling   3. CAD   - manifested in the form of prior inferior MI, status post coronary bypass grafting      with patent LIMA to LAD and vein graft to RCA and chronically occluded left circumflex      with collaterals   - asa/Plavix, Lipitor, Zetia, Toprol XL  4. DM II   - SSI   - A1c 7.3  5. CKD   - IV - stable  6. WES   - BIPAP compliant  7. H/o VT   - s/p AICD  8. MR   - severe   - s/p Michaela clip 2020    Improved since admission. No considerable decompensation from CHF standpoint. EF is down some. Dr. Aisha Colon to see today as well. Discuss possible ischemia testing.       []    High complexity decision making was performed  []    Patient is at high-risk of decompensation with multiple organ involvement    Review of Symptoms:  Respiratory: No exertional dyspnea, orthopnea, PND, cough, hemoptysis, URI. Cardiovascular: No CP, palpitations, sweating, lightheadedness, dizziness, syncope, presyncope, lower extremity swelling. *Otherwise no other pertinent positive or negative symptoms on ROS.      Patient Active Problem List    Diagnosis Date Noted    CHF exacerbation ([facility]) 05/30/2021    Acute on chronic respiratory failure ([facility]) 05/30/2021    Mitral regurgitation 11/12/2020    Ischemic cardiomyopathy 09/30/2020    Chronic heart failure with reduced ejection fraction and diastolic dysfunction ([facility]) 09/30/2020    Sleep apnea treated with nocturnal BiPAP 09/30/2020    Severe mitral regurgitation 08/31/2020    Peripheral vascular disease ([facility]) 02/25/2020    NYHA class 3 heart failure with reduced ejection fraction ([facility]) 01/30/2020    Hx of CABG 01/13/2020    ICD (implantable cardioverter-defibrillator) in place 01/13/2020    H/O laparoscopic adjustable gastric banding 01/13/2020    Obesity, morbid ([facility]) 01/13/2020      Carl Wheeler NP  Past Medical History:   Diagnosis Date    Adverse effect of anesthesia     hard to wake up/uses BIPAP/\"try to avoid general if possible\"/intubated in past prior to going to sleep and it caused pt to be incontinent    Arthritis     Asthma     CAD (coronary artery disease)     Chronic kidney disease     elevataed creatinine    Chronic obstructive pulmonary disease (HCC)     Chronic pain     arthritis    Coagulation disorder ([facility])     on plavix    Congestive heart failure (HCC)     Diabetes ([facility])     Hypertension     Morbid obesity ([facility])     Sleep apnea 1996    BIPAP with 2 liters oxygen    Thromboembolus ([facility])     after heart surgery - left leg    Thyroid disease       Past Surgical History:   Procedure Laterality Date    COLONOSCOPY N/A 7/22/2020    COLONOSCOPY   :- performed [facility] ENDOSCOPY    HX ARTHROPLASTY  2105    knee - right    HX  SECTION      x3    HX CORONARY ARTERY BYPASS GRAFT  1997    3 vessels    HX CORONARY STENT PLACEMENT  2016    HX HERNIA REPAIR  1988    HX IMPLANTABLE CARDIOVERTER DEFIBRILLATOR      HX KNEE REPLACEMENT Right 2015    once    [place] CARDIAC SURG PROCEDURE UNLIST  2018    cardiac cath - Left    [place] LAP GASTRIC BYPASS/KERLINE-EN-Y  2011    lap band per pt and not a gastric bypass     Social History     Socioeconomic History    Marital status:      Spouse name: Not on file    Number of children: Not on file    Years of education: Not on file    Highest education level: Not on file   Tobacco Use    Smoking status: Former Smoker     Packs/day: 0.50     Years: 45.00     Pack years: 22.50     Types: Cigarettes     Quit date: 2010     Years since quittin.4    Smokeless tobacco: Never Used    Tobacco comment: quit /started again (smoked 3 yrs) off and on/none since    Vaping Use    Vaping Use: Never used   Substance and Sexual Activity    Alcohol use: Not Currently    Drug use: Not Currently    Sexual activity: Not Currently   Other Topics Concern     Social Determinants of Health     Financial Resource Strain:     Difficulty of Paying Living Expenses:    Food Insecurity:     Worried About Running Out of Food in the Last Year:     Salbador of Food in the Last Year:    Transportation Needs:     Lack of Transportation (Medical):      Lack of Transportation (Non-Medical):    Physical Activity:     Days of Exercise per Week:     Minutes of Exercise per Session:    Stress:     Feeling of Stress :    Social Connections:     Frequency of Communication with Friends and Family:     Frequency of Social Gatherings with Friends and Family:     Attends [employer] Services:     Active Member of Clubs or Organizations:     Attends Club or Organization Meetings:     Marital Status:      Family History   Problem Relation Age of Onset    Hypertension Mother     Dementia Mother     Coronary Artery Disease Father 58    Sudden Death Father 58    Diabetes Son     Diabetes Brother       Current Facility-Administered Medications   Medication Dose Route Frequency    iron sucrose (VENOFER) 200 mg in 0.9% sodium chloride 100 mL IVPB  200 mg IntraVENous Q24H    albumin human 25% (BUMINATE) solution 12.5 g  12.5 g IntraVENous BID    sacubitriL-valsartan (ENTRESTO) 24-26 mg tablet 0.5 Tablet  0.5 Tablet Oral BID    heparin (porcine) injection 5,000 Units  5,000 Units SubCUTAneous Q8H    hydrALAZINE (APRESOLINE) tablet 50 mg  50 mg Oral TID    albuterol-ipratropium (DUO-NEB) 2.5 MG-0.5 MG/3 ML  3 mL Nebulization Q6H PRN    allopurinoL (ZYLOPRIM) tablet 100 mg  100 mg Oral DAILY    aspirin chewable tablet 81 mg  81 mg Oral DAILY    atorvastatin (LIPITOR) tablet 80 mg  80 mg Oral QHS    clopidogreL (PLAVIX) tablet 75 mg  75 mg Oral DAILY    ezetimibe (ZETIA) tablet 10 mg  10 mg Oral DAILY    gabapentin (NEURONTIN) capsule 200 mg  200 mg Oral TID    isosorbide mononitrate ER (IMDUR) tablet 30 mg  30 mg Oral 7am    levothyroxine (SYNTHROID) tablet 100 mcg  100 mcg Oral ACB    metoprolol succinate (TOPROL-XL) XL tablet 12.5 mg  12.5 mg Oral DAILY    bumetanide (BUMEX) injection 1 mg  1 mg IntraVENous BID    glucose chewable tablet 16 g  4 Tablet Oral PRN    dextrose (D50W) injection syrg 12.5-25 g  25-50 mL IntraVENous PRN    glucagon (GLUCAGEN) injection 1 mg  1 mg IntraMUSCular PRN    insulin lispro (HUMALOG) injection   SubCUTAneous AC&HS    insulin glargine (LANTUS) injection 10 Units  10 Units SubCUTAneous DAILY      Prior to Admission Medications   Prescriptions Last Dose Informant Patient Reported? Taking? Blood-Glucose Meter monitoring kit   No No   Sig: Use as directed. Dx: E11.65 check sugar twice daily   Blood-Glucose Meter monitoring kit   No No   Sig: Check blood sugar daily.  May substitute for insurance preferred meter   acetaminophen (TYLENOL ARTHRITIS PAIN) 650 mg TbER   Yes No   Sig: Take 1,300 mg by mouth two (2) times a day. albuterol (PROVENTIL HFA, VENTOLIN HFA, PROAIR HFA) 90 mcg/actuation inhaler   Yes No   Sig: Take 2 Puffs by inhalation every four (4) hours as needed. allopurinoL (ZYLOPRIM) 100 mg tablet   No No   Sig: Take 1 tablet by mouth once daily   aspirin 81 mg chewable tablet   Yes No   Sig: Take 81 mg by mouth daily. atorvastatin (LIPITOR) 80 mg tablet   No No   Sig: TAKE 1 TABLET BY MOUTH AT BEDTIME   clopidogreL (PLAVIX) 75 mg tab   No No   Sig: Take 1 tablet by mouth once daily   clotrimazole-betamethasone (LOTRISONE) topical cream   No No   Sig: Apply  to affected area two (2) times a day. Patient taking differently: Apply  to affected area two (2) times daily as needed. empagliflozin (Jardiance) 10 mg tablet   No No   Sig: Take 1 Tablet by mouth daily. ezetimibe (ZETIA) 10 mg tablet   No No   Sig: Take 1 tablet by mouth once daily   furosemide (LASIX) 20 mg tablet   No No   Sig: Take 1 Tablet by mouth daily. gabapentin (NEURONTIN) 100 mg capsule   No No   Sig: Take 2 Caps by mouth three (3) times daily. Max Daily Amount: 600 mg.   glipiZIDE (GLUCOTROL) 10 mg tablet   No No   Sig: Take 1 tablet by mouth twice daily with food   glucose blood VI test strips (ASCENSIA AUTODISC VI, ONE TOUCH ULTRA TEST VI) strip   No No   Sig: E11.65 check sugar once daily   glucose blood VI test strips (blood glucose test) strip   No No   Sig: Check blood sugar 2 times daily: E11.9 may substitute for insurance preferred strips. hydrALAZINE (APRESOLINE) 25 mg tablet   No No   Sig: Take 1 Tab by mouth three (3) times daily.    isosorbide mononitrate ER (IMDUR) 30 mg tablet   No No   Sig: Take 1 Tab by mouth every morning.   ketorolac (ACULAR) 0.5 % ophthalmic solution   Yes No   Sig: INSTILL 1 DROP 4 TIMES DAILY INTO EYE HAVING SURGERY START 2 DAYS PRIOR TO SURGERY   lancets misc   No No   Sig: Check blood sugar 2 times daily. May substitute for insurance preferred lancets. lancets misc   No No   Sig: Use as directed. Dx:check sugar once daily. E11.65   levothyroxine (SYNTHROID) 100 mcg tablet   No No   Sig: Take 1 Tab by mouth Daily (before breakfast). metoprolol succinate (TOPROL-XL) 25 mg XL tablet   No No   Sig: Take 0.5 Tabs by mouth daily. Hold for systolic BP less than 059   ofloxacin (FLOXIN) 0.3 % ophthalmic solution   Yes No   Sig: INSTILL 1 DROP 4 TIMES DAILY INTO SURGERY EYE START 2 DAYS PRIOR TO SURGERY   prednisoLONE acetate (PRED FORTE) 1 % ophthalmic suspension   Yes No   Sig: INSTILL 1 DROP 4 TIMES DAILY INTO SUREGRY EYE TAPER AS DIRECTED   sacubitriL-valsartan (Entresto) 24-26 mg tablet   No No   Sig: Take 1 Tab by mouth two (2) times a day.       Facility-Administered Medications: None      Allergies   Allergen Reactions    Crestor [Rosuvastatin] Other (comments)     Causes muscle cramps    Lisinopril Cough    Nsaids (Non-Steroidal Anti-Inflammatory Drug) Other (comments)     Liver and Kidney       Labs:   Recent Results (from the past 24 hour(s))   ECHO ADULT COMPLETE    Collection Time: 05/31/21 10:27 AM   Result Value Ref Range    IVSd 0.98 (A) 0.60 - 0.90 cm    LVIDd 5.61 (A) 3.90 - 5.30 cm    LVIDs 4.80 cm    LVOT d 1.74 cm    LVPWd 0.76 0.60 - 0.90 cm    BP EF 38.7 (A) 55.0 - 100.0 percent    LV Ejection Fraction MOD 2C 44 percent    LV Ejection Fraction MOD 4C 32 percent    LV ED Vol A2C 139.46 mL    LV ED Vol A4C 127.65 mL    LV ED Vol .66 (A) 56.0 - 104.0 mL    LV ES Vol A2C 78.18 mL    LV ES Vol A4C 86.39 mL    LV ES Vol BP 85.59 (A) 19.0 - 49.0 mL    LVOT Peak Gradient 3.06 mmHg    LVOT Peak Velocity 87.51 cm/s    RVIDd 2.67 cm    RVSP 23.86 mmHg    Left Atrium Major Axis 5.41 cm    LA Volume 129.58 22.0 - 52.0 mL    LA Area 4C 28.74 cm2    LA Vol 2C 149.68 (A) 22.00 - 52.00 mL    LA Vol 4C 101.17 (A) 22.00 - 52.00 mL    Est. RA Pressure 3.00 mmHg    Aortic Valve Area by Continuity of Peak Velocity 1.37 cm2    AoV PG 9.27 mmHg    Aortic Valve Systolic Peak Velocity 011.17 cm/s    MV A Nelson 125.83 cm/s    Mitral Valve E Wave Deceleration Time 244.46 ms    MV E Nelson 151.39 cm/s    E/E' ratio (averaged) 32.55     E/E' lateral 23.84     E/E' septal 41.25     LV E' Lateral Velocity 6.35 cm/s    LV E' Septal Velocity 3.67 cm/s    Mitral Valve Pressure Half-time 70.89 ms    MVA (PHT) 3.10 cm2    MV Peak Gradient 10.74 mmHg    MV Mean Gradient 4.29 mmHg    Mitral Valve Pressure Half-time 54.39 ms    Mitral Valve Max Velocity 163.82 cm/s    Mitral Valve Annulus Velocity Time Integral 29.72 cm    MVA (PHT) 4.05 cm2    Pulmonic Valve Systolic Peak Instantaneous Gradient 4.31 mmHg    Pulmonic Regurgitant End Max Velocity 103.78 cm/s    Triscuspid Valve Regurgitation Peak Gradient 20.86 mmHg    TR Max Velocity 228.36 cm/s    Ao Root D 2.74 cm    MV E/A 1.20     LV Mass .1 67.0 - 162.0 g    LV Mass AL Index 90.7 43.0 - 95.0 g/m2    LVES Vol Index BP 42.2 mL/m2    LVED Vol Index BP 68.8 mL/m2    Left Atrium Minor Axis 2.67 cm    LA Vol Index 63.83 16.00 - 28.00 ml/m2    LA Vol Index 73.73 16.00 - 28.00 ml/m2    LA Vol Index 49.84 16.00 - 28.00 ml/m2    LVED Vol Index A4C 62.9 mL/m2    LVED Vol Index A2C 68.7 mL/m2    LVES Vol Index A4C 42.6 mL/m2    LVES Vol Index A2C 38.5 mL/m2    PANFILO/BSA Pk Nelson 0.7 cm2/m2   GLUCOSE, POC    Collection Time: 05/31/21 11:32 AM   Result Value Ref Range    Glucose (POC) 172 (H) 65 - 117 mg/dL    Performed by Wolf MENA(JEFFREY)    GLUCOSE, POC    Collection Time: 05/31/21  4:53 PM   Result Value Ref Range    Glucose (POC) 231 (H) 65 - 117 mg/dL    Performed by Vincent hughes RN    GLUCOSE, POC    Collection Time: 05/31/21 10:11 PM   Result Value Ref Range    Glucose (POC) 207 (H) 65 - 117 mg/dL    Performed by Melinda Jackson    NT-PRO BNP    Collection Time: 06/01/21  4:40 AM   Result Value Ref Range    NT pro-BNP 7,733 (H) <125 PG/ML METABOLIC PANEL, COMPREHENSIVE    Collection Time: 06/01/21  4:40 AM   Result Value Ref Range    Sodium 141 136 - 145 mmol/L    Potassium 4.6 3.5 - 5.1 mmol/L    Chloride 111 (H) 97 - 108 mmol/L    CO2 24 21 - 32 mmol/L    Anion gap 6 5 - 15 mmol/L    Glucose 150 (H) 65 - 100 mg/dL    BUN 42 (H) 6 - 20 MG/DL    Creatinine 1.79 (H) 0.55 - 1.02 MG/DL    BUN/Creatinine ratio 23 (H) 12 - 20      GFR est AA 34 (L) >60 ml/min/1.73m2    GFR est non-AA 28 (L) >60 ml/min/1.73m2    Calcium 8.7 8.5 - 10.1 MG/DL    Bilirubin, total 0.7 0.2 - 1.0 MG/DL    ALT (SGPT) 23 12 - 78 U/L    AST (SGOT) 36 15 - 37 U/L    Alk.  phosphatase 75 45 - 117 U/L    Protein, total 5.5 (L) 6.4 - 8.2 g/dL    Albumin 2.5 (L) 3.5 - 5.0 g/dL    Globulin 3.0 2.0 - 4.0 g/dL    A-G Ratio 0.8 (L) 1.1 - 2.2     MAGNESIUM    Collection Time: 06/01/21  4:40 AM   Result Value Ref Range    Magnesium 2.0 1.6 - 2.4 mg/dL   URIC ACID    Collection Time: 06/01/21  4:40 AM   Result Value Ref Range    Uric acid 5.0 2.6 - 6.0 MG/DL   CK    Collection Time: 06/01/21  4:40 AM   Result Value Ref Range     26 - 192 U/L   VITAMIN D, 25 HYDROXY    Collection Time: 06/01/21  4:40 AM   Result Value Ref Range    Vitamin D 25-Hydroxy 30.5 30 - 100 ng/mL   IRON PROFILE    Collection Time: 06/01/21  4:40 AM   Result Value Ref Range    Iron 46 35 - 150 ug/dL    TIBC 236 (L) 250 - 450 ug/dL    Iron % saturation 19 (L) 20 - 50 %   FERRITIN    Collection Time: 06/01/21  4:40 AM   Result Value Ref Range    Ferritin 62 26 - 388 NG/ML   GLUCOSE, POC    Collection Time: 06/01/21  7:56 AM   Result Value Ref Range    Glucose (POC) 164 (H) 65 - 117 mg/dL    Performed by Bo AARON (CON)        Intake/Output Summary (Last 24 hours) at 6/1/2021 1009  Last data filed at 6/1/2021 0400  Gross per 24 hour   Intake 200 ml   Output 1600 ml   Net -1400 ml      Patient Vitals for the past 24 hrs:   Temp Pulse Resp BP SpO2   06/01/21 0851  71  112/60    06/01/21 0757 98.3 °F (36.8 °C) 70 18 107/63 95 %   06/01/21 0741 98.7 °F (37.1 °C) 70 16 121/71 100 %   06/01/21 0400 98.4 °F (36.9 °C) 71 17 108/65 94 %   05/31/21 2345 99 °F (37.2 °C) 75 14 116/62 94 %   05/31/21 2213  77  (!) 111/58    05/31/21 2100     97 %   05/31/21 1702 98.1 °F (36.7 °C) 79 22 125/65 (!) 87 %   05/31/21 1652 98.1 °F (36.7 °C) 79 20 95/74 (!) 89 %   05/31/21 1600  77      05/31/21 1202 98.1 °F (36.7 °C) 86 22 126/73 96 %   05/31/21 1026    133/70    05/31/21 1018     97 %        General:    Alert, cooperative, no distress. Psychiatric:   Normal Mood and affect    Eye/ENT:   Pupils equal, No asymmetry, Conjunctival pink. Able to hear voice at normal amplitude   Lungs:   Visibly symmetric chest expansion, No palpable tenderness. Clear to auscultation bilaterally. Heart:    Regular rate and rhythm, S1, S2 normal, no murmur, click, rub or gallop. No JVD, Normal palpable     peripheral pulses. No cyanosis   Abdomen:    Soft, non-tender. Bowel sounds normal. No masses,  No organomegaly. Extremities:   Extremities normal, atraumatic, no edema. Neurologic:   CN II-XII grossly intact. No gross focal deficits       [place] Monday.  Germaine Ness MD  6/1/2021   10:09 AM      [facility] Office:   [facility] Office:  [address]    [place] [address] [facility]  [address]     [address] [place] [address]  P: [phone]    P: [phone]  F: [phone]    F: [phone]

## 2021-06-02 ENCOUNTER — APPOINTMENT (OUTPATIENT)
Dept: GENERAL RADIOLOGY | Age: 71
DRG: 291 | End: 2021-06-02
Attending: NURSE PRACTITIONER
Payer: MEDICARE

## 2021-06-02 LAB
ALBUMIN SERPL-MCNC: 3.2 G/DL (ref 3.5–5)
ALBUMIN/GLOB SERPL: 1 {RATIO} (ref 1.1–2.2)
ALP SERPL-CCNC: 82 U/L (ref 45–117)
ALT SERPL-CCNC: 36 U/L (ref 12–78)
ANION GAP SERPL CALC-SCNC: 6 MMOL/L (ref 5–15)
AST SERPL-CCNC: 30 U/L (ref 15–37)
BILIRUB SERPL-MCNC: 0.6 MG/DL (ref 0.2–1)
BNP SERPL-MCNC: 2535 PG/ML
BUN SERPL-MCNC: 41 MG/DL (ref 6–20)
BUN/CREAT SERPL: 18 (ref 12–20)
CALCIUM SERPL-MCNC: 10.2 MG/DL (ref 8.5–10.1)
CHLORIDE SERPL-SCNC: 105 MMOL/L (ref 97–108)
CHOLEST SERPL-MCNC: 144 MG/DL
CO2 SERPL-SCNC: 28 MMOL/L (ref 21–32)
CREAT SERPL-MCNC: 2.24 MG/DL (ref 0.55–1.02)
GLOBULIN SER CALC-MCNC: 3.3 G/DL (ref 2–4)
GLUCOSE BLD STRIP.AUTO-MCNC: 159 MG/DL (ref 65–117)
GLUCOSE BLD STRIP.AUTO-MCNC: 165 MG/DL (ref 65–117)
GLUCOSE BLD STRIP.AUTO-MCNC: 198 MG/DL (ref 65–117)
GLUCOSE BLD STRIP.AUTO-MCNC: 272 MG/DL (ref 65–117)
GLUCOSE SERPL-MCNC: 188 MG/DL (ref 65–100)
HDLC SERPL-MCNC: 72 MG/DL
HDLC SERPL: 2 {RATIO} (ref 0–5)
LDLC SERPL CALC-MCNC: 55.8 MG/DL (ref 0–100)
MAGNESIUM SERPL-MCNC: 2.1 MG/DL (ref 1.6–2.4)
POTASSIUM SERPL-SCNC: 4.2 MMOL/L (ref 3.5–5.1)
PROCALCITONIN SERPL-MCNC: 1.24 NG/ML
PROT SERPL-MCNC: 6.5 G/DL (ref 6.4–8.2)
SERVICE CMNT-IMP: ABNORMAL
SODIUM SERPL-SCNC: 139 MMOL/L (ref 136–145)
TRIGL SERPL-MCNC: 81 MG/DL (ref ?–150)
VLDLC SERPL CALC-MCNC: 16.2 MG/DL

## 2021-06-02 PROCEDURE — 74011636637 HC RX REV CODE- 636/637: Performed by: HOSPITALIST

## 2021-06-02 PROCEDURE — 97535 SELF CARE MNGMENT TRAINING: CPT

## 2021-06-02 PROCEDURE — 99233 SBSQ HOSP IP/OBS HIGH 50: CPT | Performed by: INTERNAL MEDICINE

## 2021-06-02 PROCEDURE — 74011250637 HC RX REV CODE- 250/637: Performed by: HOSPITALIST

## 2021-06-02 PROCEDURE — 71045 X-RAY EXAM CHEST 1 VIEW: CPT

## 2021-06-02 PROCEDURE — 74011250637 HC RX REV CODE- 250/637: Performed by: NURSE PRACTITIONER

## 2021-06-02 PROCEDURE — 74011250637 HC RX REV CODE- 250/637: Performed by: INTERNAL MEDICINE

## 2021-06-02 PROCEDURE — 77010033678 HC OXYGEN DAILY

## 2021-06-02 PROCEDURE — 97165 OT EVAL LOW COMPLEX 30 MIN: CPT

## 2021-06-02 PROCEDURE — 74011250636 HC RX REV CODE- 250/636: Performed by: NURSE PRACTITIONER

## 2021-06-02 PROCEDURE — P9047 ALBUMIN (HUMAN), 25%, 50ML: HCPCS | Performed by: NURSE PRACTITIONER

## 2021-06-02 PROCEDURE — 74011000258 HC RX REV CODE- 258: Performed by: NURSE PRACTITIONER

## 2021-06-02 PROCEDURE — 65660000001 HC RM ICU INTERMED STEPDOWN

## 2021-06-02 PROCEDURE — 83880 ASSAY OF NATRIURETIC PEPTIDE: CPT

## 2021-06-02 PROCEDURE — 36415 COLL VENOUS BLD VENIPUNCTURE: CPT

## 2021-06-02 PROCEDURE — 83735 ASSAY OF MAGNESIUM: CPT

## 2021-06-02 PROCEDURE — 74011250636 HC RX REV CODE- 250/636: Performed by: INTERNAL MEDICINE

## 2021-06-02 PROCEDURE — 80061 LIPID PANEL: CPT

## 2021-06-02 PROCEDURE — 80053 COMPREHEN METABOLIC PANEL: CPT

## 2021-06-02 PROCEDURE — 84145 PROCALCITONIN (PCT): CPT

## 2021-06-02 PROCEDURE — 94660 CPAP INITIATION&MGMT: CPT

## 2021-06-02 PROCEDURE — 82962 GLUCOSE BLOOD TEST: CPT

## 2021-06-02 RX ORDER — GABAPENTIN 100 MG/1
200 CAPSULE ORAL 2 TIMES DAILY
Status: DISCONTINUED | OUTPATIENT
Start: 2021-06-02 | End: 2021-06-04 | Stop reason: HOSPADM

## 2021-06-02 RX ORDER — GABAPENTIN 100 MG/1
200 CAPSULE ORAL 2 TIMES DAILY
COMMUNITY
End: 2021-07-02 | Stop reason: SDUPTHER

## 2021-06-02 RX ORDER — INSULIN GLARGINE 100 [IU]/ML
15 INJECTION, SOLUTION SUBCUTANEOUS DAILY
Status: DISCONTINUED | OUTPATIENT
Start: 2021-06-03 | End: 2021-06-04 | Stop reason: HOSPADM

## 2021-06-02 RX ADMIN — ISOSORBIDE MONONITRATE 30 MG: 30 TABLET, EXTENDED RELEASE ORAL at 07:13

## 2021-06-02 RX ADMIN — ASPIRIN 81 MG: 81 TABLET, CHEWABLE ORAL at 08:32

## 2021-06-02 RX ADMIN — ALBUMIN (HUMAN) 12.5 G: 0.25 INJECTION, SOLUTION INTRAVENOUS at 17:26

## 2021-06-02 RX ADMIN — PREDNISOLONE ACETATE 1 DROP: 10 SUSPENSION/ DROPS OPHTHALMIC at 08:34

## 2021-06-02 RX ADMIN — KETOROLAC TROMETHAMINE 1 DROP: 5 SOLUTION OPHTHALMIC at 22:39

## 2021-06-02 RX ADMIN — OFLOXACIN 1 DROP: 3 SOLUTION OPHTHALMIC at 08:34

## 2021-06-02 RX ADMIN — INSULIN LISPRO 5 UNITS: 100 INJECTION, SOLUTION INTRAVENOUS; SUBCUTANEOUS at 12:04

## 2021-06-02 RX ADMIN — PREDNISOLONE ACETATE 1 DROP: 10 SUSPENSION/ DROPS OPHTHALMIC at 22:39

## 2021-06-02 RX ADMIN — HEPARIN SODIUM 5000 UNITS: 5000 INJECTION INTRAVENOUS; SUBCUTANEOUS at 10:43

## 2021-06-02 RX ADMIN — PREDNISOLONE ACETATE 1 DROP: 10 SUSPENSION/ DROPS OPHTHALMIC at 12:04

## 2021-06-02 RX ADMIN — KETOROLAC TROMETHAMINE 1 DROP: 5 SOLUTION OPHTHALMIC at 12:04

## 2021-06-02 RX ADMIN — CLOPIDOGREL BISULFATE 75 MG: 75 TABLET ORAL at 08:32

## 2021-06-02 RX ADMIN — ATORVASTATIN CALCIUM 80 MG: 40 TABLET, FILM COATED ORAL at 22:38

## 2021-06-02 RX ADMIN — OFLOXACIN 1 DROP: 3 SOLUTION OPHTHALMIC at 17:27

## 2021-06-02 RX ADMIN — SACUBITRIL AND VALSARTAN 0.5 TABLET: 24; 26 TABLET, FILM COATED ORAL at 08:31

## 2021-06-02 RX ADMIN — INSULIN LISPRO 2 UNITS: 100 INJECTION, SOLUTION INTRAVENOUS; SUBCUTANEOUS at 17:26

## 2021-06-02 RX ADMIN — OFLOXACIN 1 DROP: 3 SOLUTION OPHTHALMIC at 22:39

## 2021-06-02 RX ADMIN — INSULIN GLARGINE 10 UNITS: 100 INJECTION, SOLUTION SUBCUTANEOUS at 08:32

## 2021-06-02 RX ADMIN — HYDRALAZINE HYDROCHLORIDE 50 MG: 50 TABLET, FILM COATED ORAL at 22:44

## 2021-06-02 RX ADMIN — PREDNISOLONE ACETATE 1 DROP: 10 SUSPENSION/ DROPS OPHTHALMIC at 17:26

## 2021-06-02 RX ADMIN — OFLOXACIN 1 DROP: 3 SOLUTION OPHTHALMIC at 12:04

## 2021-06-02 RX ADMIN — KETOROLAC TROMETHAMINE 1 DROP: 5 SOLUTION OPHTHALMIC at 08:34

## 2021-06-02 RX ADMIN — HEPARIN SODIUM 5000 UNITS: 5000 INJECTION INTRAVENOUS; SUBCUTANEOUS at 17:25

## 2021-06-02 RX ADMIN — GABAPENTIN 200 MG: 100 CAPSULE ORAL at 17:25

## 2021-06-02 RX ADMIN — HEPARIN SODIUM 5000 UNITS: 5000 INJECTION INTRAVENOUS; SUBCUTANEOUS at 02:26

## 2021-06-02 RX ADMIN — INSULIN LISPRO 2 UNITS: 100 INJECTION, SOLUTION INTRAVENOUS; SUBCUTANEOUS at 08:33

## 2021-06-02 RX ADMIN — KETOROLAC TROMETHAMINE 1 DROP: 5 SOLUTION OPHTHALMIC at 17:27

## 2021-06-02 RX ADMIN — LEVOTHYROXINE SODIUM 100 MCG: 0.1 TABLET ORAL at 07:14

## 2021-06-02 RX ADMIN — GABAPENTIN 200 MG: 100 CAPSULE ORAL at 08:32

## 2021-06-02 RX ADMIN — ALLOPURINOL 100 MG: 100 TABLET ORAL at 08:32

## 2021-06-02 RX ADMIN — EZETIMIBE 10 MG: 10 TABLET ORAL at 08:31

## 2021-06-02 RX ADMIN — IRON SUCROSE 200 MG: 20 INJECTION, SOLUTION INTRAVENOUS at 12:04

## 2021-06-02 RX ADMIN — ALBUMIN (HUMAN) 12.5 G: 0.25 INJECTION, SOLUTION INTRAVENOUS at 08:30

## 2021-06-02 NOTE — CARDIO/PULMONARY
Cardiac Rehab: Met with Ellen Soto and offered HF education material which she declined because she has the material.  
 
Teach back method used for education. Allowed patient an opportunity to reflect on recent events and symptoms. She had a recent weight gain prior to any symptoms and her weights are \"sent to [facility] through the scale\" and eventually symptoms progressed quickly. Reviewed signs and symptoms of fluid retention and when to call the physician. Reinforced the importance of calling the physician with a weight gain in the absence of any other symptoms. She admitted to recent dietary discretions as well and that she was embarrassed to call and admit that she wasn't eating appropriately. Encouraged her to call regardless of that because that could have prevented this hospitalization. Ellen Soto verbalized understanding with questions answered.  Nael Du RN

## 2021-06-02 NOTE — PROGRESS NOTES
Problem: Self Care Deficits Care Plan (Adult)  Goal: *Acute Goals and Plan of Care (Insert Text)  Description:   FUNCTIONAL STATUS PRIOR TO ADMISSION: Patient was modified independent using a single point cane for functional mobility. Patient was modified independent for basic and instrumental ADLs. Pt drives and is active at her [employer]. She works in a sedentary position. HOME SUPPORT: The patient lived with her partner but did not require assist.    Occupational Therapy Goals  Initiated 6/2/2021  1. Patient will perform grooming, standing at sink for at least 5 minutes, with modified independence within 7 day(s). 2.  Patient will perform lower body dressing with modified independence using AE PRN within 7 day(s). 3.  Patient will perform bathing, sitting and standing PRN, with supervision/set-up within 7 day(s). 4.  Patient will perform toilet transfers and all aspects of toileting with modified independence within 7 day(s). 5.  Patient will participate in upper extremity therapeutic exercise/activities with modified independence for 10 minutes within 7 day(s). 6.  Patient will utilize energy conservation techniques during functional activities with verbal cues within 7 day(s). Outcome: Progressing Towards Goal     OCCUPATIONAL THERAPY EVALUATION  Patient: Soni Collins (63 y.o. female)  Date: 6/2/2021  Primary Diagnosis: Acute on chronic respiratory failure (HCC) [J96.20]  CHF exacerbation ([facility] [address] [I50.9]        Precautions: Fall       ASSESSMENT  Based on the objective data described below, the patient presents with decreased activity tolerance mildly impaired standing balance following admission for SOB and CHF exacerbation. Pt is received in bed, is very pleasant and motivated to participate with therapy. She tolerates activity on RA with saturations ranging from 98% at rest to 91% with activity.  Pt tolerates mobility to bathroom for toileting and standing tasks at sink with overall CGA and is motivated to perform additional mobility in hallway. She reports having needed DME/AE at home, including sock aid and tub transfer bench. Pt with good carry-over of PLB during activity with minimal cues needed. Noted small drop in BP (137/87 to 127/69) from pre and post activity; pt denied feeling lightheaded or dizzy and is left up in chair with cardiology NP present. Anticipate continued progress as she improves medically and anticipate no follow up OT services at discharge. Current Level of Function Impacting Discharge (ADLs/self-care): overall set up to CGA for ADLs; up to min A for LB dressing (without AE)    Functional Outcome Measure: The patient scored Total: 60/100 on the Barthel Index outcome measure which is indicative of 40% impaired ability to care for basic self needs/dependency on others; inferred 25% dependency on others for instrumental ADLs. Other factors to consider for discharge: pleasant and motivated; mod I at baseline; lives with supportive partner     Patient will benefit from skilled therapy intervention to address the above noted impairments. PLAN :  Recommendations and Planned Interventions: self care training, functional mobility training, therapeutic exercise, balance training, therapeutic activities, endurance activities, patient education, home safety training and family training/education    Frequency/Duration: Patient will be followed by occupational therapy 3 times a week to address goals.     Recommendation for discharge: (in order for the patient to meet his/her long term goals)  Occupational therapy at least 2 days/week in the home vs. none    This discharge recommendation:  A follow-up discussion with the attending provider and/or case management is planned    IF patient discharges home will need the following DME: patient owns DME required for discharge       SUBJECTIVE:   Patient stated The best thing I learned from cardiac rehab is how to breathe correctly.     OBJECTIVE DATA SUMMARY:   HISTORY:   Past Medical History:   Diagnosis Date    Adverse effect of anesthesia     hard to wake up/uses BIPAP/\"try to avoid general if possible\"/intubated in past prior to going to sleep and it caused pt to be incontinent    Arthritis     Asthma     CAD (coronary artery disease)     Chronic kidney disease     elevataed creatinine    Chronic obstructive pulmonary disease (HCC)     Chronic pain     arthritis    Coagulation disorder (HCC)     on plavix    Congestive heart failure (HCC)     Diabetes ([address]     Hypertension     Morbid obesity ([address]     Sleep apnea     BIPAP with 2 liters oxygen    Thromboembolus ([facility] [address]     after heart surgery - left leg    Thyroid disease      Past Surgical History:   Procedure Laterality Date    COLONOSCOPY N/A 2020    COLONOSCOPY   :- performed by Wanda Moulton MD at [address] HX ARTHROPLASTY  2105    knee - right    HX  SECTION      x3    HX CORONARY ARTERY BYPASS GRAFT  1997    3 vessels    HX CORONARY STENT PLACEMENT  2016    HX HERNIA REPAIR  1988    HX IMPLANTABLE CARDIOVERTER DEFIBRILLATOR      HX KNEE REPLACEMENT Right 2015    once    [place] CARDIAC SURG PROCEDURE [place]LIST  2018    cardiac cath - Left    [place] LAP GASTRIC BYPASS/KERLINE-EN-Y  2011    lap band per pt and not a gastric bypass       Expanded or extensive additional review of patient history:     Home Situation  Home Environment: Apartment  One/Two Story Residence: One story  Living Alone: No  Support Systems: Spouse/Significant Other/Partner  Patient Expects to be Discharged to[de-identified] Apartment  Current DME Used/Available at Home: Cane, straight, Transfer bench, Raised toilet seat, Grab bars  Tub or Shower Type: Tub/Shower combination    Hand dominance: Right    EXAMINATION OF PERFORMANCE DEFICITS:  Cognitive/Behavioral Status:  Neurologic State: Alert; Appropriate for age  Orientation Level: Oriented X4  Cognition: Appropriate decision making; Appropriate for age attention/concentration; Appropriate safety awareness; Follows commands  Perception: Appears intact  Perseveration: No perseveration noted  Safety/Judgement: Awareness of environment; Fall prevention;Good awareness of safety precautions; Home safety; Insight into deficits    Hearing: Auditory  Auditory Impairment: None    Range of Motion:  AROM: Generally decreased, functional    Strength:  Strength: Generally decreased, functional    Coordination:  Coordination: Within functional limits  Fine Motor Skills-Upper: Left Intact; Right Intact    Gross Motor Skills-Upper: Left Intact; Right Intact    Tone & Sensation:  Tone: Normal  Sensation: Intact    Balance:  Sitting: Intact  Standing: Impaired  Standing - Static: Good  Standing - Dynamic : Fair    Functional Mobility and Transfers for ADLs:  Bed Mobility:  Supine to Sit: Stand-by assistance  Scooting: Supervision    Transfers:  Sit to Stand: Contact guard assistance  Stand to Sit: Contact guard assistance  Bed to Chair: Contact guard assistance;Minimum assistance (HHA x 1)  Bathroom Mobility: Contact guard assistance  Toilet Transfer : Contact guard assistance    ADL Assessment:  Feeding: Independent    Oral Facial Hygiene/Grooming: Contact guard assistance (for standing tasks at sink)    Bathing: Contact guard assistance;Minimum assistance    Upper Body Dressing: Setup    Lower Body Dressing: Minimum assistance (for distal threading over L LE)    Toileting: Stand by assistance;Contact guard assistance    ADL Intervention and task modifications:  Grooming  Grooming Assistance: Contact guard assistance  Position Performed: Standing (at sink)  Washing Hands: Contact guard assistance    Lower Body Dressing Assistance  Socks:  (pt able to simulate reach to R LE; A for L; sock aid @ home)  Leg Crossed Method Used: No  Position Performed: Seated edge of bed (side sitting with LE supported on mattress)    Toileting  Bladder Hygiene: Stand-by assistance  Clothing Management: Contact guard assistance  Cues: Verbal cues provided  Adaptive Equipment: Grab bars    Cognitive Retraining  Safety/Judgement: Awareness of environment; Fall prevention;Good awareness of safety precautions; Home safety; Insight into deficits    Instructed patient to increase time sitting OOB in chair for pulmonary hygiene, skin integrity, and to increase endurance in preparation for ADLs. Instructed patient to sit OOB for all meals. Patient verbalized understanding of same. Instructed pt on pursed lip breathing (PLB) to assist with maintaining oxygen saturations >90% during activity and at rest. Pt instructed to inhale through nose and exhale through mouth while creating \"O\" shape with verbal, tactile, and visual cues provided to increase clarity and carry-over of technique. Instructed pt to complete focused PLB during rest breaks in order to increase ease while performing functional tasks. Patient instructed and indicated understanding energy conservation techniques to increase independence and safety during ADLs. Functional Measure:  Barthel Index:    Bathin  Bladder: 5  Bowels: 10  Groomin  Dressin  Feeding: 10  Mobility: 10  Stairs: 0  Toilet Use: 5  Transfer (Bed to Chair and Back): 10  Total: 60/100        The Barthel ADL Index: Guidelines  1. The index should be used as a record of what a patient does, not as a record of what a patient could do. 2. The main aim is to establish degree of independence from any help, physical or verbal, however minor and for whatever reason. 3. The need for supervision renders the patient not independent. 4. A patient's performance should be established using the best available evidence. Asking the patient, friends/relatives and nurses are the usual sources, but direct observation and common sense are also important. However direct testing is not needed.   5. Usually the patient's performance over the preceding 24-48 hours is important, but occasionally longer periods will be relevant. 6. Middle categories imply that the patient supplies over 50 per cent of the effort. 7. Use of aids to be independent is allowed. Gabe Galdamez., Barthel, D.W. (1410). Functional evaluation: the Barthel Index. [address] [facility] (14)2. JORDAN Alex, Anatoliy Pendleton., [facility]., Madina Kaye, [address] [place] [address] (1999). Measuring the change indisability after inpatient rehabilitation; comparison of the responsiveness of the Barthel Index and Functional [place] Measure. Journal of Neurology, Neurosurgery, and Psychiatry, 66(4), 459-351. Taylor Espinoza, N.J.A, ROME Harvey, & Harpreet Powell M.A. (2004.) Assessment of post-stroke quality of life in cost-effectiveness studies: The usefulness of the Barthel Index and the EuroQoL-5D. Quality of Life Research, 15, 171-39     Occupational Therapy Evaluation Charge Determination   History Examination Decision-Making   LOW Complexity : Brief history review  LOW Complexity : 1-3 performance deficits relating to physical, cognitive , or psychosocial skils that result in activity limitations and / or participation restrictions  LOW Complexity : No comorbidities that affect functional and no verbal or physical assistance needed to complete eval tasks       Based on the above components, the patient evaluation is determined to be of the following complexity level: LOW   Pain Rating:  Pt reporting minimal pain    Activity Tolerance:   Good, Fair, SpO2 stable on RA, requires rest breaks and observed SOB with activity    After treatment patient left in no apparent distress:    Sitting in chair and Call bell within reach    COMMUNICATION/EDUCATION:   The patients plan of care was discussed with: Registered nurse and cardiology NP.      Home safety education was provided and the patient/caregiver indicated understanding., Patient/family have participated as able in goal setting and plan of care. and Patient/family agree to work toward stated goals and plan of care. This patients plan of care is appropriate for delegation to [facility].     Thank you for this referral.  Vinicio Su OT  Time Calculation: 29 mins

## 2021-06-02 NOTE — PROGRESS NOTES
[address]  Inpatient Consult Progress Note      Patient name: Jm Borges  Patient : 1950  Patient MRN: [id number]  Consulting MD: Fadi Martel MD  Primary general cardiologist:      Date of service: 21    REASON FOR CONSULT:  Acute on chronic systolic heart failure    PLAN:  NICOM recordings today show adequate CI, 2.7; please see additional note for details   Repeat CXR shows resolution of pulmonary edema   Continue current medical therapy for heart failure  Continue current dose of Toprol 12.5mg daily  Discontinue Entresto due to renal failure    No MRA due to hyperkalemia   Orthostatics today; hold diuretics   Continue hydralazine 50mg TID  Cont Imdur 30mg daily  Cont Synthroid 100mcg  Resume Jardiance as OP if Cr stable   Cont allopurinol 100mg daily   Albumin BID   Continue ASA and plavix  Continue current dose of statin  Begin albumin BID   ICD interrogation 21  Continue CPAP therapy    UA neg, PCT trending down   S/p venofer 200 mg IV x 2; Hgb stable - can complete NEREIDA eval as an outpatient   Equivocal PYP  Invitae DSP (VUS)  Cardiology consult for ischemic workup - ?cause of recent decompensation   BiPAP qHS   Nutritionist consult  Heart failure education  Advanced care plan present on file  Plan at discharge: recommend flu and pneumonia vaccinations    IMPRESSION:  Fatigue  Shortness of breath  Volume overload  Chronic systolic heart failure  Stage D, NYHA class IIIA symptoms  Ischemic cardiomyopathy, LVEF 35-40%  CAD s/p CABG  s/p PCI to DVG-RCA   H/o severe MR s/p mitral clip in   HTN  H/o VT s/p AICD  WES on Bipap  T2DM with neuropathy  Hypothyroidism  Obesity  HLD  Osteoarthritis   CKD4     RECENT EVENTS:  Net negative 1.8L  Cr 2.24 from 1.79  proBNP 2535 from [address]:  Echo 21- EF 25-30%, moderate MR post-clip  Echo 21- LVEF 35-40%, Mild MR post clip, mild TR, LVIDd 5.7cm, TAPSE 1.68, RVIDd 4.05cm   Echo 12/14/20 LVEF 30-35%, mild MR, LVIDd 6.09cm, RVIDd 3.98cm  Echo 6/19/20 LVEF 25-30%, Mod TR, severe MR, LVIDd 5.76cm, TAPSE 1.94cm, RVIDd 4.06cm    EKG 5/30/21 ST with PVCs, QRS 88ms  EKG 11/14/20 V paced, QRS 84    LHC 2/3/20- Severe native 3 vessel CAD. Patent VG to RCA and LIMA to LAD. LCx is collateral dependent and native rPDA and D1 have small disease in small vessels. NST    ICD interrogation 5/10/21    HEMODYNAMICS:  RHC not done  CPEST not done  6MW not done    OTHER IMAGING:  CXR 5/30/2 Diffuse increased interstitial and airspace opacities with more  confluent airspace opacities in the bilateral lung bases likely representing  pulmonary edema. CT chest not done     HPI:   65y.o. year old female with a history of HTN, HLD, WES, obesity (Body mass index is 40.74 kg/m².) s/p gastric bypass in 2011, T2DM, hypothyroidism, COPD, CAD s/p CABG in 1997, s/p PCI to [address] in 5/15, ICM, VT, s/p AICD (Medtronic),  anxiety, and depression. She was scheduled to undergo BiV upgrade with Dr. Yovani Golden, however, her QRS was too narrow for the upgrade. He increased her low pacing threshold from 50 bpm to 70 bpm.     INTERVAL HISTORY:  Ms. Lakesha Helton was admitted via the ED for increased SOB over the weekend, she denies dietary indiscretions but said she left a pan on the stove and she thinks the smoke may have triggered her cough and volume overload. The [facility] has been consulted for ongoing management of her HFrEF. PHYSICAL EXAM:  Visit Vitals  /68 (BP 1 Location: Left arm, BP Patient Position: At rest)   Pulse 70   Temp 98.1 °F (36.7 °C)   Resp 19   Ht 5' 3\" (1.6 m)   Wt 217 lb 14.4 oz (98.8 kg)   SpO2 95%   BMI 38.60 kg/m²     General: Patient is well developed, well-nourished in no acute distress  HEENT: Normocephalic and atraumatic. No scleral icterus. Pupils are equal, round and reactive to light and accomodation. No conjunctival injection. Oropharynx is clear.    Neck: Supple. No evidence of thyroid enlargements or lymphadenopathy. JVD: Cannot be appreciated   Lungs: Breath sounds are equal and clear bilaterally. No wheezes, rhonchi, or rales. Wearing O2 this morning   Heart: Regular rate and rhythm with normal S1 and S2. No murmurs, gallops or rubs. Abdomen: Soft, no mass or tenderness. No organomegaly or hernia. Bowel sounds present. Genitourinary and rectal: deferred  Extremities: No cyanosis, clubbing, or edema. Neurologic: No focal sensory or motor deficits are noted. Grossly intact. Psychiatric: Awake, alert an doriented x 3. Appropriate mood and affect. Skin: Warm, dry and well perfused. No lesions, nodules or rashes are noted. REVIEW OF SYSTEMS:  General: Denies fever, night sweats. Ear, nose and throat: Denies difficulty hearing, sinus problems, runny nose, post-nasal drip, ringing in ears, mouth sores, loose teeth, ear pain, nosebleeds, sore throate, facial pain or numbess  Cardiovascular: see above in the interval history  Respiratory: Denies cough, wheezing, sputum production, hemoptysis.   Gastrointestinal: Denies heartburn, constipation, intolerance to certain foods, diarrhea, abdominal pain, nausea, vomiting, difficulty swallowing, blood in stool  Kidney and bladder: Denies painful urination, frequent urination, urgency, prostate problems and impotence  Musculoskeletal: Denies joint pain, muscle weakness  Skin and hair: Denies change in existing skin lesions, hair loss or increase, breast changes    PAST MEDICAL HISTORY:  Past Medical History:   Diagnosis Date    Adverse effect of anesthesia     hard to wake up/uses BIPAP/\"try to avoid general if possible\"/intubated in past prior to going to sleep and it caused pt to be incontinent    Arthritis     Asthma     CAD (coronary artery disease)     Chronic kidney disease     elevataed creatinine    Chronic obstructive pulmonary disease (HCC)     Chronic pain     arthritis    Coagulation disorder ([facility]     on plavix    Congestive heart failure (HCC)     Diabetes ([facility]     Hypertension     Morbid obesity (HCC)     Sleep apnea     BIPAP with 2 liters oxygen    Thromboembolus (HCC)     after heart surgery - left leg    Thyroid disease        PAST SURGICAL HISTORY:  Past Surgical History:   Procedure Laterality Date    COLONOSCOPY N/A 2020    COLONOSCOPY   :- performed by Radha Oh MD at [address] [place] [address]  2105    knee - right    HX  SECTION      x3    HX CORONARY ARTERY BYPASS GRAFT  1997    3 vessels    HX CORONARY STENT PLACEMENT  2016    HX HERNIA REPAIR  1988    HX IMPLANTABLE CARDIOVERTER DEFIBRILLATOR      HX KNEE REPLACEMENT Right 2015    once    SC CARDIAC SURG PROCEDURE UNLIST  2018    cardiac cath - Left    SC LAP GASTRIC BYPASS/KERLINE-EN-Y  2011    lap band per pt and not a gastric bypass       FAMILY HISTORY:  Family History   Problem Relation Age of Onset    Hypertension Mother     Dementia Mother     Coronary Artery Disease Father 58    Sudden Death Father 58    Diabetes Son     Diabetes Brother        SOCIAL HISTORY:  Social History     Socioeconomic History    Marital status:      Spouse name: Not on file    Number of children: Not on file    Years of education: Not on file    Highest education level: Not on file   Tobacco Use    Smoking status: Former Smoker     Packs/day: 0.50     Years: 45.00     Pack years: 22.50     Types: Cigarettes     Quit date: 2010     Years since quittin.4    Smokeless tobacco: Never Used    Tobacco comment: quit /started again (smoked 3 yrs) off and on/none since    Vaping Use    Vaping Use: Never used   Substance and Sexual Activity    Alcohol use: Not Currently    Drug use: Not Currently    Sexual activity: Not Currently   Other Topics Concern     Social Determinants of Health     Financial Resource Strain:     Difficulty of Paying Living Expenses:    Food Insecurity:     Worried About Running Out of Food in the Last Year:     Ran Out of Food in the Last Year:    Transportation Needs:     Lack of Transportation (Medical):     Lack of Transportation (Non-Medical):    Physical Activity:     Days of Exercise per Week:     Minutes of Exercise per Session:    Stress:     Feeling of Stress :    Social Connections:     Frequency of Communication with Friends and Family:     Frequency of Social Gatherings with Friends and Family:     Attends [employer] Services: Active Member of Clubs or Organizations:     Attends Club or Organization Meetings:     Marital Status:        LABORATORY RESULTS:     Labs Latest Ref Rng & Units 6/2/2021 6/1/2021 5/31/2021 5/30/2021 5/4/2021   WBC 3.6 - 11.0 K/uL - - 11. 4(H) 19. 1(H) -   RBC 3.80 - 5.20 M/uL - - 3.85 3.98 -   Hemoglobin 11.5 - 16.0 g/dL - - 12.3 12.6 -   Hematocrit 35.0 - 47.0 % - - 37.8 39.7 -   MCV 80.0 - 99.0 FL - - 98.2 99. 7(H) -   Platelets 113 - 375 K/uL - - 231 256 -   Lymphocytes 12 - 49 % - - 13 24 -   Monocytes 5 - 13 % - - 4(L) 5 -   Eosinophils 0 - 7 % - - 0 2 -   Basophils 0 - 1 % - - 0 1 -   Albumin 3.5 - 5.0 g/dL 3.2(L) 2. 5(L) 3. 2(L) 3.8 -   Calcium 8.5 - 10.1 MG/DL 10. 2(H) 8.7 9.9 10. 6(H) -   Glucose 65 - 100 mg/dL 188(H) 150(H) 157(H) 184(H) -   BUN 6 - 20 MG/DL 41(H) 42(H) 33(H) 34(H) -   Creatinine 0.55 - 1.02 MG/DL 2.24(H) 1.79(H) 1.75(H) 1.94(H) -   Sodium 136 - 145 mmol/L 139 141 140 137 -   Potassium 3.5 - 5.1 mmol/L 4.2 4.6 4.6 3.7 -   TSH 0.36 - 3.74 uIU/mL - - - - 0.64   Some recent data might be hidden     Lab Results   Component Value Date/Time    TSH 0.64 05/04/2021 02:32 PM    TSH 0.247 (L) 03/30/2021 11:21 AM    TSH 0.250 (L) 02/17/2021 09:41 AM    TSH 0.230 (L) 02/17/2021 09:36 AM    TSH 7.130 (H) 01/04/2021 12:21 PM    TSH 3.78 (H) 10/29/2020 10:45 AM    TSH 1.460 10/26/2020 02:57 PM    TSH 1.620 04/30/2020 03:05 PM    TSH 1.07 01/31/2020 12:35 AM       ALLERGY:  Allergies   Allergen Reactions    Crestor [Rosuvastatin] Other (comments)     Causes muscle cramps    Lisinopril Cough    Nsaids (Non-Steroidal Anti-Inflammatory Drug) Other (comments)     Liver and Kidney        CURRENT MEDICATIONS:    Current Facility-Administered Medications:     gabapentin (NEURONTIN) capsule 200 mg, 200 mg, Oral, BID, Ismael Meyer MD, 200 mg at 06/02/21 0832    [START ON 6/3/2021] insulin glargine (LANTUS) injection 15 Units, 15 Units, SubCUTAneous, DAILY, Ismael Meyer MD    albumin human 25% (BUMINATE) solution 12.5 g, 12.5 g, IntraVENous, BID, PolliarMarco jones T NP, 12.5 g at 06/02/21 0830    albuterol-ipratropium (DUO-NEB) 2.5 MG-0.5 MG/3 ML, 3 mL, Nebulization, Q6H PRN, Ismael Meyer MD    ketorolac (ACULAR) 0.5 % ophthalmic solution 1 Drop, 1 Drop, Right Eye, QID, Ismael Meyer MD, 1 Drop at 06/02/21 1204    ofloxacin (FLOXIN) 0.3 % ophthalmic solution 1 Drop, 1 Drop, Right Eye, QID, Ismael Meyer MD, 1 Drop at 06/02/21 1204    prednisoLONE acetate (PRED FORTE) 1 % ophthalmic suspension 1 Drop, 1 Drop, Right Eye, QID, Ismael Meyer MD, 1 Drop at 06/02/21 1204    heparin (porcine) injection 5,000 Units, 5,000 Units, SubCUTAneous, Q8H, Ismael Meyer MD, 5,000 Units at 06/02/21 1043    levothyroxine (SYNTHROID) tablet 100 mcg, 100 mcg, Oral, ACB, Ismael Meyer MD, 100 mcg at 06/02/21 0714    hydrALAZINE (APRESOLINE) tablet 50 mg, 50 mg, Oral, TID, MikeLo sullivan NP, 50 mg at 06/01/21 2157    allopurinoL (ZYLOPRIM) tablet 100 mg, 100 mg, Oral, DAILY, Mary Velez MD, 100 mg at 06/02/21 4472    aspirin chewable tablet 81 mg, 81 mg, Oral, DAILY, Mary Velez MD, 81 mg at 06/02/21 6082    atorvastatin (LIPITOR) tablet 80 mg, 80 mg, Oral, QHS, Mary Velez MD, 80 mg at 06/01/21 2157    clopidogreL (PLAVIX) tablet 75 mg, 75 mg, Oral, DAILY, Mary Velez MD, 75 mg at 06/02/21 3164    ezetimibe (ZETIA) tablet 10 mg, 10 mg, Oral, DAILY, Mary Velez MD, 10 mg at 06/02/21 0831    isosorbide mononitrate ER (IMDUR) tablet 30 mg, 30 mg, Oral, 7am, Sun, Albanian, MD, 30 mg at 21 0713    metoprolol succinate (TOPROL-XL) XL tablet 12.5 mg, 12.5 mg, Oral, DAILY, Dian Velez MD, 12.5 mg at 21 0847    [Held by provider] bumetanide (BUMEX) injection 1 mg, 1 mg, IntraVENous, BID, Mary Velez MD, 1 mg at 21 1725    glucose chewable tablet 16 g, 4 Tablet, Oral, PRN, Mary Hou MD    dextrose (D50W) injection syrg 12.5-25 g, 25-50 mL, IntraVENous, PRN, Mary Velez MD    glucagon [place] [facility]) injection 1 mg, 1 mg, IntraMUSCular, PRN, Nate Oliveira MD    insulin lispro (HUMALOG) injection, , SubCUTAneous, AC&HS, Nate Oliveira MD, 5 Units at 21 1204    PATIENT CARE TEAM:  Patient Care Team:  Efraín Gallagher, NP as PCP - General (Nurse Practitioner)  Efraín Gallagher NP as PCP - [facility]  Tamy Abdi MD (Cardiology)  Leonard Sood MD (Gastroenterology)  Maame Alejo MD as Consulting Provider (Cardiology)     Thank you for allowing me to participate in this patient's care. Maria Eugenia Driscoll NP  [address] [place] [address], [address]  Phone: [phone]    [facility] ATTENDING ADDENDUM    Patient was seen and examined in person. Data and notes were reviewed. I have discussed and agree with the plan as noted in the NP note above without further additions.     Otis Bustamante MD PhD  [facility] Stevenson in [place], [place]  Inpatient Consult Progress Note      Patient name: Anatoliy Freeman  Patient : 1950  Patient MRN: [id number]  Consulting MD: Hattie Villa MD  Primary general cardiologist:      Date of service: 21    REASON FOR CONSULT:  Acute on chronic systolic heart failure    PLAN:  Continue current medical therapy for heart failure  Continue current dose of Toprol 12.5mg daily  Decrease Entresto to 1.2 tab 24/26 mg PO BID d/t renal dysfunction   Orthostatics today   No MRA due to hyperkalemia   Continue hydralazine 50mg TID  Cont Imdur 30mg daily  Cont Synthroid 100mcg  Resume Jardiance as OP if Cr stable   Continue current dose of diuretic; goal net negative 1-2 liters per day (goal weight)  Cont allopurinol 100mg daily   Continue ASA and plavix  Continue current dose of statin  Begin albumin BID   ICD interrogation 5/13/21  Continue CPAP therapy    Check ESR, PCT, UA due to leukocytosis  Venofer 200 mg IV x 2   Equivocal PYP  Inv[employer]ae DSP (VUS)  Cardiology consult for ischemic workup - ?cause of recent decompensation   BiPAP qHS   Nutritionist consult  Heart failure education  Advanced care plan present on file  Plan at discharge: recommend flu and pneumonia vaccinations    IMPRESSION:  Fatigue  Shortness of breath  Volume overload  Chronic systolic heart failure  Stage D, NYHA class IIIA symptoms  Ischemic cardiomyopathy, LVEF 35-40%  CAD s/p CABG 1997 s/p PCI to DVG-RCA 2016  H/o severe MR s/p mitral clip in 2020  HTN  H/o VT s/p AICD  WES on Bipap  T2DM with neuropathy  Hypothyroidism  Obesity  HLD  Osteoarthritis   CKD4     RECENT EVENTS:  Net negative 1.4L   WBC 11.4 from 19.1   Hgb stable   Neutrophils 83% from 67%  Afebrile  proBNP 7733 from 2947     CARDIAC IMAGING:  Echo 5/31/21- Pending  Echo 5/14/21- LVEF 35-40%, Mild MR post clip, mild TR, LVIDd 5.7cm, TAPSE 1.68, RVIDd 4.05cm   Echo 12/14/20 LVEF 30-35%, mild MR, LVIDd 6.09cm, RVIDd 3.98cm  Echo 6/19/20 LVEF 25-30%, Mod TR, severe MR, LVIDd 5.76cm, TAPSE 1.94cm, RVIDd 4.06cm    EKG 5/30/21 ST with PVCs, QRS 88ms  EKG 11/14/20 V paced, QRS 84    LHC 2/3/20- Severe native 3 vessel CAD. Patent VG to RCA and LIMA to LAD. LCx is collateral dependent and native rPDA and D1 have small disease in small vessels.      NST    ICD interrogation 5/10/21    HEMODYNAMICS:  RHC not done  CPEST not done  6MW not done    OTHER IMAGING:  CXR 5/30/2 Diffuse increased interstitial and airspace opacities with more  confluent airspace opacities in the bilateral lung bases likely representing  pulmonary edema. CT chest not done     HPI:   65y.o. year old female with a history of HTN, HLD, WES, obesity (Body mass index is 40.74 kg/m².) s/p gastric bypass in 2011, T2DM, hypothyroidism, COPD, CAD s/p CABG in 1997, s/p PCI to [address] in 5/15, ICM, VT, s/p AICD ([employer]tronic),  anxiety, and depression. She was scheduled to undergo BiV upgrade with Dr. Sarai Andrews, however, her QRS was too narrow for the upgrade. He increased her low pacing threshold from 50 bpm to 70 bpm.     INTERVAL HISTORY:  Ms. Carol Ann Dunn was admitted via the ED for increased SOB over the weekend, she denies dietary indiscretions but said she left a pan on the stove and she thinks the smoke may have triggered her cough and volume overload. The [facility] has been consulted for ongoing management of her HFrEF. PHYSICAL EXAM:  Visit Vitals  /68 (BP 1 Location: Left arm, BP Patient Position: At rest)   Pulse 70   Temp 98.1 °F (36.7 °C)   Resp 19   Ht 5' 3\" (1.6 m)   Wt 217 lb 14.4 oz (98.8 kg)   SpO2 95%   BMI 38.60 kg/m²     General: Patient is well developed, well-nourished in no acute distress  HEENT: Normocephalic and atraumatic. No scleral icterus. Pupils are equal, round and reactive to light and accomodation. No conjunctival injection. Oropharynx is clear. Neck: Supple. No evidence of thyroid enlargements or lymphadenopathy. JVD: Cannot be appreciated   Lungs: Breath sounds are equal and clear bilaterally. No wheezes, rhonchi, or rales. Wearing O2 this morning   Heart: Regular rate and rhythm with normal S1 and S2. No murmurs, gallops or rubs. Abdomen: Soft, no mass or tenderness. No organomegaly or hernia. Bowel sounds present. Genitourinary and rectal: deferred  Extremities: No cyanosis, clubbing, or edema. Neurologic: No focal sensory or motor deficits are noted. Grossly intact. Psychiatric: Awake, alert an doriented x 3. Appropriate mood and affect. Skin: Warm, dry and well perfused.  No lesions, nodules or rashes are noted. REVIEW OF SYSTEMS:  General: Denies fever, night sweats. Ear, nose and throat: Denies difficulty hearing, sinus problems, runny nose, post-nasal drip, ringing in ears, mouth sores, loose teeth, ear pain, nosebleeds, sore throate, facial pain or numbess  Cardiovascular: see above in the interval history  Respiratory: Denies cough, wheezing, sputum production, hemoptysis.   Gastrointestinal: Denies heartburn, constipation, intolerance to certain foods, diarrhea, abdominal pain, nausea, vomiting, difficulty swallowing, blood in stool  Kidney and bladder: Denies painful urination, frequent urination, urgency, prostate problems and impotence  Musculoskeletal: Denies joint pain, muscle weakness  Skin and hair: Denies change in existing skin lesions, hair loss or increase, breast changes    PAST MEDICAL HISTORY:  Past Medical History:   Diagnosis Date    Adverse effect of anesthesia     hard to wake up/uses BIPAP/\"try to avoid general if possible\"/intubated in past prior to going to sleep and it caused pt to be incontinent    Arthritis     Asthma     CAD (coronary artery disease)     Chronic kidney disease     elevataed creatinine    Chronic obstructive pulmonary disease (HCC)     Chronic pain     arthritis    Coagulation disorder ([address]     on plavix    Congestive heart failure (HCC)     Diabetes ([address]     Hypertension     Morbid obesity ([address]     Sleep apnea     BIPAP with 2 liters oxygen    Thromboembolus ([address]     after heart surgery - left leg    Thyroid disease        PAST SURGICAL HISTORY:  Past Surgical History:   Procedure Laterality Date    COLONOSCOPY N/A 2020    COLONOSCOPY   :- performed by Lynn Clarke MD at [address] [place] [address]  2105    knee - right    HX  SECTION      x3    HX CORONARY ARTERY BYPASS GRAFT  1997    3 vessels    HX CORONARY STENT PLACEMENT  2016    [address]    HX IMPLANTABLE CARDIOVERTER DEFIBRILLATOR HX KNEE REPLACEMENT Right 2015    once    ND CARDIAC SURG PROCEDURE UNLIST  2018    cardiac cath - Left    ND LAP GASTRIC BYPASS/KERLINE-EN-Y  2011    lap band per pt and not a gastric bypass       FAMILY HISTORY:  Family History   Problem Relation Age of Onset    Hypertension Mother     Dementia Mother     Coronary Artery Disease Father 58    Sudden Death Father 58    Diabetes Son     Diabetes Brother        SOCIAL HISTORY:  Social History     Socioeconomic History    Marital status:      Spouse name: Not on file    Number of children: Not on file    Years of education: Not on file    Highest education level: Not on file   Tobacco Use    Smoking status: Former Smoker     Packs/day: 0.50     Years: 45.00     Pack years: 22.50     Types: Cigarettes     Quit date: 2010     Years since quittin.4    Smokeless tobacco: Never Used    Tobacco comment: quit /started again (smoked 3 yrs) off and on/none since    Vaping Use    Vaping Use: Never used   Substance and Sexual Activity    Alcohol use: Not Currently    Drug use: Not Currently    Sexual activity: Not Currently   Other Topics Concern     Social Determinants of Health     Financial Resource Strain:     Difficulty of Paying Living Expenses:    Food Insecurity:     Worried About Running Out of Food in the Last Year:     Ran Out of Food in the Last Year:    Transportation Needs:     Lack of Transportation (Medical):     Lack of Transportation (Non-Medical):    Physical Activity:     Days of Exercise per Week:     Minutes of Exercise per Session:    Stress:     Feeling of Stress :    Social Connections:     Frequency of Communication with Friends and Family:     Frequency of Social Gatherings with Friends and Family:     Attends Congregational Services:      Active Member of Clubs or Organizations:     Attends Club or Organization Meetings:     Marital Status:        LABORATORY RESULTS:     Labs Latest Ref Rng & Units 2021 5/30/2021 5/4/2021   WBC 3.6 - 11.0 K/uL - - 11. 4(H) 19. 1(H) -   RBC 3.80 - 5.20 M/uL - - 3.85 3.98 -   Hemoglobin 11.5 - 16.0 g/dL - - 12.3 12.6 -   Hematocrit 35.0 - 47.0 % - - 37.8 39.7 -   MCV 80.0 - 99.0 FL - - 98.2 99. 7(H) -   Platelets 527 - 342 K/uL - - 231 256 -   Lymphocytes 12 - 49 % - - 13 24 -   Monocytes 5 - 13 % - - 4(L) 5 -   Eosinophils 0 - 7 % - - 0 2 -   Basophils 0 - 1 % - - 0 1 -   Albumin 3.5 - 5.0 g/dL 3.2(L) 2. 5(L) 3. 2(L) 3.8 -   Calcium 8.5 - 10.1 MG/DL 10. 2(H) 8.7 9.9 10. 6(H) -   Glucose 65 - 100 mg/dL 188(H) 150(H) 157(H) 184(H) -   BUN 6 - 20 MG/DL 41(H) 42(H) 33(H) 34(H) -   Creatinine 0.55 - 1.02 MG/DL 2.24(H) 1.79(H) 1.75(H) 1.94(H) -   Sodium 136 - 145 mmol/L 139 141 140 137 -   Potassium 3.5 - 5.1 mmol/L 4.2 4.6 4.6 3.7 -   TSH 0.36 - 3.74 uIU/mL - - - - 0.64   Some recent data might be hidden     Lab Results   Component Value Date/Time    TSH 0.64 05/04/2021 02:32 PM    TSH 0.247 (L) 03/30/2021 11:21 AM    TSH 0.250 (L) 02/17/2021 09:41 AM    TSH 0.230 (L) 02/17/2021 09:36 AM    TSH 7.130 (H) 01/04/2021 12:21 PM    TSH 3.78 (H) 10/29/2020 10:45 AM    TSH 1.460 10/26/2020 02:57 PM    TSH 1.620 04/30/2020 03:05 PM    TSH 1.07 01/31/2020 12:35 AM       ALLERGY:  Allergies   Allergen Reactions    Crestor [Rosuvastatin] Other (comments)     Causes muscle cramps    Lisinopril Cough    Nsaids (Non-Steroidal Anti-Inflammatory Drug) Other (comments)     Liver and Kidney        CURRENT MEDICATIONS:    Current Facility-Administered Medications:     gabapentin (NEURONTIN) capsule 200 mg, 200 mg, Oral, BID, InocentesalIsmael ruiz MD, 200 mg at 06/02/21 0832    [START ON 6/3/2021] insulin glargine (LANTUS) injection 15 Units, 15 Units, SubCUTAneous, DAILY, Ismael Meyer MD    albumin human 25% (BUMINATE) solution 12.5 g, 12.5 g, IntraVENous, BID, Leonela Blankenship NP, 12.5 g at 06/02/21 0830    albuterol-ipratropium (DUO-NEB) 2.5 MG-0.5 MG/3 ML, 3 mL, Nebulization, Q6H PRN, Quique Reagan MD ketorolac (ACULAR) 0.5 % ophthalmic solution 1 Drop, 1 Drop, Right Eye, QID, Ismael Meyer MD, 1 Drop at 06/02/21 1204    ofloxacin (FLOXIN) 0.3 % ophthalmic solution 1 Drop, 1 Drop, Right Eye, QID, Ismael Meyer MD, 1 Drop at 06/02/21 1204    prednisoLONE acetate (PRED FORTE) 1 % ophthalmic suspension 1 Drop, 1 Drop, Right Eye, QID, Ismael Meyer MD, 1 Drop at 06/02/21 1204    heparin (porcine) injection 5,000 Units, 5,000 Units, SubCUTAneous, Q8H, Ismael Meyer MD, 5,000 Units at 06/02/21 1043    levothyroxine (SYNTHROID) tablet 100 mcg, 100 mcg, Oral, ACB, Ismael Meyer MD, 100 mcg at 06/02/21 0714    hydrALAZINE (APRESOLINE) tablet 50 mg, 50 mg, Oral, TID, Lo Mckeon, NP, 50 mg at 06/01/21 2157    allopurinoL (ZYLOPRIM) tablet 100 mg, 100 mg, Oral, DAILY, Ifeoma Velez MD, 100 mg at 06/02/21 1730    aspirin chewable tablet 81 mg, 81 mg, Oral, DAILY, Mary Velez MD, 81 mg at 06/02/21 1729    atorvastatin (LIPITOR) tablet 80 mg, 80 mg, Oral, QHS, Ifeoma Velez MD, 80 mg at 06/01/21 2157    clopidogreL (PLAVIX) tablet 75 mg, 75 mg, Oral, DAILY, Ifeoma Velez MD, 75 mg at 06/02/21 9656    ezetimibe (ZETIA) tablet 10 mg, 10 mg, Oral, DAILY, Ifeoma Velez MD, 10 mg at 06/02/21 0831    isosorbide mononitrate ER (IMDUR) tablet 30 mg, 30 mg, Oral, 7am, Mary Velez MD, 30 mg at 06/02/21 4551    metoprolol succinate (TOPROL-XL) XL tablet 12.5 mg, 12.5 mg, Oral, DAILY, Ifeoma Velez MD, 12.5 mg at 06/01/21 0847    [Held by provider] bumetanide (BUMEX) injection 1 mg, 1 mg, IntraVENous, BID, Ifeoma Velez MD, 1 mg at 06/01/21 1725    glucose chewable tablet 16 g, 4 Tablet, Oral, PRN, Mary Velez MD    dextrose (D50W) injection syrg 12.5-25 g, 25-50 mL, IntraVENous, PRN, Mary Velez MD    glucagon [facility]) injection 1 mg, 1 mg, IntraMUSCular, PRN, Tracy Branham MD    insulin lispro (HUMALOG) injection, , SubCUTAneous, AC&HS, Tracy Branham MD, 5 Units at 06/02/21 1204    PATIENT CARE TEAM:  Patient Care Team:  Carl Wheeler NP as PCP - General (Nurse Practitioner)  Julien Thompson, NP as PCP - [facility] Empaneled Provider  Xavier Gallegos MD (Cardiology)  Brandan Wilson MD (Gastroenterology)  Constanza Paula MD as Consulting Provider (Cardiology)     Thank you for allowing me to participate in this patient's care. Lizzie Shukla NP  [address] [place] [address], [address]  Phone: [phone]      [facility] ATTENDING ADDENDUM    Patient was seen and examined in person. Data and notes were reviewed. I have discussed and agree with the plan as noted in the NP note above without further additions.     Ben Shore MD PhD  Sandeepjesi Grandeing

## 2021-06-02 NOTE — PROGRESS NOTES
06/02/21 0800   Cristiana Fall Risk   Mobility 1.0   Mobility Interventions Communicate number of staff needed for ambulation/transfer;Patient to call before getting OOB   Mentation 0   Medication 1   Medication Interventions Patient to call before getting OOB; Teach patient to arise slowly   Elimination 1.0   Elimination Interventions Call light in reach; Patient to call for help with toileting needs; Toileting schedule/hourly rounds   Prior Fall History 0   Total Score 3   Standard Fall Precautions Yes   High Fall Risk Yes   '  PT is high fall risk, please coordinate with PT for six minute walk test.

## 2021-06-02 NOTE — PROGRESS NOTES
NICOM Hemodynamic Monitoring       Visit Vitals  /68 (BP 1 Location: Left arm, BP Patient Position: At rest)   Pulse 70   Temp 98.1 °F (36.7 °C)   Resp 19   Ht 5' 3\" (1.6 m)   Wt 217 lb 14.4 oz (98.8 kg)   SpO2 95%   BMI 38.60 kg/m²         Baseline assessment:        CI 2.7        TPR 1294    Signed By: Lizzie Shukla NP     June 2, 2021

## 2021-06-02 NOTE — PROGRESS NOTES
Hospitalist Progress Note  Trent Zhou MD  Answering service: [phone] from in house phone      Date of Service:  2021  NAME:  Fred Dorsey  :  1950  MRN:  [id number]    Admission Summary:   70F p/w SOB- fairly suddenly, following some exercises the night before. Interval history / Subjective:   Patient seen and examined at bedside, feels well, slept well, no complaints. Her BP high 80s this am, also Cr risen today, hold diuretics. Assessment & Plan:     #. Acute on chronic BiV sCHF: CXR: pulm edema. S/p ICD. NYHA class 3/4  #. Acute on chonic respiratory respiratory failure: 2nd to above. Requiring Bipap- Rapid COVID test -ve. #. Mitral valve regurgitation: s/p MitraClip. Now mild stenosis with mild regurgitation.  - Iv diuresis. Strict I&O, daily weight. Monitor and correct lytes. Albumin iv.  - recent TTE ef 35%- repeat shows ef 25%. Heart failure team following. 3. CKD 4: monitor cr on diuresis. F/u with Nephrology  4. DM: hold oral meds, Lantus and SSI will adjust  5. CAD: asa, plavix, BB continue   6. COPD/asthma: continue prn nebs   7. WES: bipap hs  8. moribid obese: BMI 40.46. counseled. #. Cataract: continue eye drops    Code status: Full  DVT prophylaxis: SCDs  Care Plan discussed with: Patient/Family and Nurse  Disposition: TBD 1-2days     Hospital Problems  Date Reviewed: 2021        Codes Class Noted POA    CHF exacerbation ([facility] [address] [facility] ICD-10-CM: I50.9  ICD-9-CM: 428.0  2021 Unknown        Acute on chronic respiratory failure [facility]) ICD-10-CM: J96.20  ICD-9-CM: 518.84  2021 Unknown            Review of Systems:   Pertinent items are mentioned in interval history. Vital Signs:    Last 24hrs VS reviewed since prior progress note.  Most recent are:  Visit Vitals  /72 (BP 1 Location: Left upper arm, BP Patient Position: At rest)   Pulse 70   Temp 97.3 °F (36.3 °C)   Resp 15   Ht 5' 3\" (1.6 m)   Wt 98.8 kg (217 lb 14.4 oz)   SpO2 92%   BMI 38.60 kg/m²         Intake/Output Summary (Last 24 hours) at 6/2/2021 0835  Last data filed at 6/1/2021 2153  Gross per 24 hour   Intake 360 ml   Output 2100 ml   Net -1740 ml        Physical Examination:   Evaluated face to face and examined 06/02/21    General:  Alert, oriented, distress with some SOB. Obese pleasant [employer].  Card:  S1, S2 without murmur, good peripheral perfusion/warm peripheries  Resp:  No accessory muscle use, fair AE, no wheezes. Few crepitations  Abd:  Soft, non-tender, non-distended, BS+  Extremities:  No cyanosis or clubbing, no significant edema  Neuro:  Grossly normal, no focal neuro deficits, follows commands, speech wnl. Psych:  Good insight, not agitated. Data Review:    Review and/or order of clinical lab test  Review and/or order of tests in the radiology section of CPT  Review and/or order of tests in the medicine section of CPT  Labs:     Recent Labs     05/31/21 0423 05/30/21  1346   WBC 11.4* 19.1*   HGB 12.3 12.6   HCT 37.8 39.7    256     Recent Labs     06/02/21 0351 06/01/21 0440 05/31/21 0423    141 140   K 4.2 4.6 4.6    111* 108   CO2 28 24 23   BUN 41* 42* 33*   CREA 2.24* 1.79* 1.75*   * 150* 157*   CA 10.2* 8.7 9.9   MG 2.1 2.0 1.9   PHOS  --   --  3.6   URICA  --  5.0  --      Recent Labs     06/02/21  0351 06/01/21 0440 05/31/21  0423   ALT 36 23 31   AP 82 75 99   TBILI 0.6 0.7 0.6   TP 6.5 5.5* 7.1   ALB 3.2* 2.5* 3.2*   GLOB 3.3 3.0 3.9     No results for input(s): INR, PTP, APTT, INREXT, INREXT in the last 72 hours. Recent Labs     06/01/21 0440   TIBC 236*   PSAT 19*   FERR 62      No results found for: FOL, RBCF   No results for input(s): PH, PCO2, PO2 in the last 72 hours.   Recent Labs     06/01/21 0440 05/30/21  1346     --    TROIQ  --  <0.05     Lab Results   Component Value Date/Time    Cholesterol, total 144 06/02/2021 03:51 AM    HDL Cholesterol 72 06/02/2021 03:51 AM    LDL, calculated 55.8 06/02/2021 03:51 AM    Triglyceride 81 06/02/2021 03:51 AM    CHOL/HDL Ratio 2.0 06/02/2021 03:51 AM     Lab Results   Component Value Date/Time    Glucose (POC) 244 (H) 06/01/2021 09:55 PM    Glucose (POC) 266 (H) 06/01/2021 06:16 PM    Glucose (POC) 191 (H) 06/01/2021 12:04 PM    Glucose (POC) 164 (H) 06/01/2021 07:56 AM    Glucose (POC) 207 (H) 05/31/2021 10:11 PM     Lab Results   Component Value Date/Time    Color YELLOW/STRAW 06/01/2021 10:27 AM    Appearance CLEAR 06/01/2021 10:27 AM    Specific gravity 1.008 06/01/2021 10:27 AM    pH (UA) 6.5 06/01/2021 10:27 AM    Protein Negative 06/01/2021 10:27 AM    Glucose 500 (A) 06/01/2021 10:27 AM    Ketone Negative 06/01/2021 10:27 AM    Bilirubin Negative 06/01/2021 10:27 AM    Urobilinogen 0.2 06/01/2021 10:27 AM    Nitrites Negative 06/01/2021 10:27 AM    Leukocyte Esterase Negative 06/01/2021 10:27 AM    Epithelial cells FEW 06/01/2021 10:27 AM    Bacteria Negative 06/01/2021 10:27 AM    WBC 0-4 06/01/2021 10:27 AM    RBC 0-5 06/01/2021 10:27 AM     Medications Reviewed:     Current Facility-Administered Medications   Medication Dose Route Frequency    gabapentin (NEURONTIN) capsule 200 mg  200 mg Oral BID    iron sucrose (VENOFER) 200 mg in 0.9% sodium chloride 100 mL IVPB  200 mg IntraVENous Q24H    albumin human 25% (BUMINATE) solution 12.5 g  12.5 g IntraVENous BID    sacubitriL-valsartan (ENTRESTO) 24-26 mg tablet 0.5 Tablet  0.5 Tablet Oral BID    albuterol-ipratropium (DUO-NEB) 2.5 MG-0.5 MG/3 ML  3 mL Nebulization Q6H PRN    ketorolac (ACULAR) 0.5 % ophthalmic solution 1 Drop  1 Drop Right Eye QID    ofloxacin (FLOXIN) 0.3 % ophthalmic solution 1 Drop  1 Drop Right Eye QID    prednisoLONE acetate (PRED FORTE) 1 % ophthalmic suspension 1 Drop  1 Drop Right Eye QID    heparin (porcine) injection 5,000 Units  5,000 Units SubCUTAneous Q8H    levothyroxine (SYNTHROID) tablet 100 mcg  100 mcg Oral ACB    hydrALAZINE (APRESOLINE) tablet 50 mg  50 mg Oral TID    allopurinoL (ZYLOPRIM) tablet 100 mg  100 mg Oral DAILY    aspirin chewable tablet 81 mg  81 mg Oral DAILY    atorvastatin (LIPITOR) tablet 80 mg  80 mg Oral QHS    clopidogreL (PLAVIX) tablet 75 mg  75 mg Oral DAILY    ezetimibe (ZETIA) tablet 10 mg  10 mg Oral DAILY    isosorbide mononitrate ER (IMDUR) tablet 30 mg  30 mg Oral 7am    metoprolol succinate (TOPROL-XL) XL tablet 12.5 mg  12.5 mg Oral DAILY    [Held by provider] bumetanide (BUMEX) injection 1 mg  1 mg IntraVENous BID    glucose chewable tablet 16 g  4 Tablet Oral PRN    dextrose (D50W) injection syrg 12.5-25 g  25-50 mL IntraVENous PRN    glucagon (GLUCAGEN) injection 1 mg  1 mg IntraMUSCular PRN    insulin lispro (HUMALOG) injection   SubCUTAneous AC&HS    insulin glargine (LANTUS) injection 10 Units  10 Units SubCUTAneous DAILY   ______________________________________________________________________  EXPECTED LENGTH OF STAY: 4d 0h  ACTUAL LENGTH OF STAY:          3               Martell Hooker MD

## 2021-06-02 NOTE — PROGRESS NOTES
Bedside shift change report given to Jory Uribe RN (oncoming nurse) by Uli Collado RN (offgoing nurse). Report included the following information SBAR, Kardex, ED Summary, MAR, Med Rec Status, and Cardiac Rhythm NSR /AV paced      Hourly rounding done, pt in AV paced, on 3L Nc/bipap at night, O2 saturation greater than 90%, purewick, no complaints of pain throughout shift, call bell within reach, use explained to pt     Problem: Pressure Injury - Risk of  Goal: *Prevention of pressure injury  Description: Document Ruben Scale and appropriate interventions in the flowsheet. Outcome: Progressing Towards Goal  Note: Pressure Injury Interventions:       Moisture Interventions: Absorbent underpads    Activity Interventions: Increase time out of bed    Mobility Interventions: Pressure redistribution bed/mattress (bed type), PT/OT evaluation    Nutrition Interventions: Document food/fluid/supplement intake                     Problem: Falls - Risk of  Goal: *Absence of Falls  Description: Document Cristiana Fall Risk and appropriate interventions in the flowsheet.   Outcome: Progressing Towards Goal  Note: Fall Risk Interventions:  Mobility Interventions: Patient to call before getting OOB         Medication Interventions: Patient to call before getting OOB, Teach patient to arise slowly    Elimination Interventions: Call light in reach              Problem: Heart Failure: Day 3  Goal: Medications  Outcome: Progressing Towards Goal  Goal: Respiratory  Outcome: Progressing Towards Goal  Goal: *Oxygen saturation within defined limits  Outcome: Progressing Towards Goal

## 2021-06-02 NOTE — CARDIO/PULMONARY
Cardiac Rehab: Consult received and chart reviewed. Patient has previously attended and graduated cardiac rehab under the diagnosis of SHF. Insurance only allows enrollment in cardiac rehab once for [facility] so Marquez Arnett is not eligible to re-enroll under this diagnosis.  Cami Hilliard RN

## 2021-06-03 VITALS
TEMPERATURE: 98.6 F | OXYGEN SATURATION: 97 % | SYSTOLIC BLOOD PRESSURE: 90 MMHG | DIASTOLIC BLOOD PRESSURE: 64 MMHG | RESPIRATION RATE: 18 BRPM | HEIGHT: 63 IN | WEIGHT: 214.6 LBS | BODY MASS INDEX: 38.02 KG/M2 | HEART RATE: 70 BPM

## 2021-06-03 LAB
ALBUMIN SERPL-MCNC: 3.2 G/DL (ref 3.5–5)
ALBUMIN/GLOB SERPL: 0.9 {RATIO} (ref 1.1–2.2)
ALP SERPL-CCNC: 78 U/L (ref 45–117)
ALT SERPL-CCNC: 30 U/L (ref 12–78)
ANION GAP SERPL CALC-SCNC: 4 MMOL/L (ref 5–15)
AST SERPL-CCNC: 22 U/L (ref 15–37)
BILIRUB SERPL-MCNC: 0.5 MG/DL (ref 0.2–1)
BNP SERPL-MCNC: 887 PG/ML
BUN SERPL-MCNC: 38 MG/DL (ref 6–20)
BUN/CREAT SERPL: 21 (ref 12–20)
CALCIUM SERPL-MCNC: 10.1 MG/DL (ref 8.5–10.1)
CHLORIDE SERPL-SCNC: 104 MMOL/L (ref 97–108)
CO2 SERPL-SCNC: 28 MMOL/L (ref 21–32)
CREAT SERPL-MCNC: 1.79 MG/DL (ref 0.55–1.02)
ERYTHROCYTE [DISTWIDTH] IN BLOOD BY AUTOMATED COUNT: 14.6 % (ref 11.5–14.5)
GLOBULIN SER CALC-MCNC: 3.4 G/DL (ref 2–4)
GLUCOSE BLD STRIP.AUTO-MCNC: 230 MG/DL (ref 65–117)
GLUCOSE BLD STRIP.AUTO-MCNC: 290 MG/DL (ref 65–117)
GLUCOSE SERPL-MCNC: 196 MG/DL (ref 65–100)
HCT VFR BLD AUTO: 34 % (ref 35–47)
HGB BLD-MCNC: 11 G/DL (ref 11.5–16)
MAGNESIUM SERPL-MCNC: 2.3 MG/DL (ref 1.6–2.4)
MCH RBC QN AUTO: 32.3 PG (ref 26–34)
MCHC RBC AUTO-ENTMCNC: 32.4 G/DL (ref 30–36.5)
MCV RBC AUTO: 99.7 FL (ref 80–99)
NRBC # BLD: 0 K/UL (ref 0–0.01)
NRBC BLD-RTO: 0 PER 100 WBC
PLATELET # BLD AUTO: 218 K/UL (ref 150–400)
PMV BLD AUTO: 10.5 FL (ref 8.9–12.9)
POTASSIUM SERPL-SCNC: 4.1 MMOL/L (ref 3.5–5.1)
PROCALCITONIN SERPL-MCNC: 0.61 NG/ML
PROT SERPL-MCNC: 6.6 G/DL (ref 6.4–8.2)
RBC # BLD AUTO: 3.41 M/UL (ref 3.8–5.2)
SERVICE CMNT-IMP: ABNORMAL
SERVICE CMNT-IMP: ABNORMAL
SODIUM SERPL-SCNC: 136 MMOL/L (ref 136–145)
WBC # BLD AUTO: 7.7 K/UL (ref 3.6–11)

## 2021-06-03 PROCEDURE — 94660 CPAP INITIATION&MGMT: CPT

## 2021-06-03 PROCEDURE — 80053 COMPREHEN METABOLIC PANEL: CPT

## 2021-06-03 PROCEDURE — 97116 GAIT TRAINING THERAPY: CPT

## 2021-06-03 PROCEDURE — 36415 COLL VENOUS BLD VENIPUNCTURE: CPT

## 2021-06-03 PROCEDURE — 74011250637 HC RX REV CODE- 250/637: Performed by: INTERNAL MEDICINE

## 2021-06-03 PROCEDURE — 83735 ASSAY OF MAGNESIUM: CPT

## 2021-06-03 PROCEDURE — 85027 COMPLETE CBC AUTOMATED: CPT

## 2021-06-03 PROCEDURE — 83880 ASSAY OF NATRIURETIC PEPTIDE: CPT

## 2021-06-03 PROCEDURE — 74011636637 HC RX REV CODE- 636/637: Performed by: INTERNAL MEDICINE

## 2021-06-03 PROCEDURE — 97161 PT EVAL LOW COMPLEX 20 MIN: CPT

## 2021-06-03 PROCEDURE — 74011250636 HC RX REV CODE- 250/636: Performed by: INTERNAL MEDICINE

## 2021-06-03 PROCEDURE — 84145 PROCALCITONIN (PCT): CPT

## 2021-06-03 PROCEDURE — 74011636637 HC RX REV CODE- 636/637: Performed by: HOSPITALIST

## 2021-06-03 PROCEDURE — 74011250637 HC RX REV CODE- 250/637: Performed by: HOSPITALIST

## 2021-06-03 PROCEDURE — 82962 GLUCOSE BLOOD TEST: CPT

## 2021-06-03 RX ORDER — FUROSEMIDE 20 MG/1
40 TABLET ORAL DAILY
Qty: 60 TABLET | Refills: 2 | Status: SHIPPED | OUTPATIENT
Start: 2021-06-03 | End: 2021-08-18

## 2021-06-03 RX ORDER — HYDRALAZINE HYDROCHLORIDE 25 MG/1
25 TABLET, FILM COATED ORAL 3 TIMES DAILY
Status: DISCONTINUED | OUTPATIENT
Start: 2021-06-03 | End: 2021-06-04 | Stop reason: HOSPADM

## 2021-06-03 RX ADMIN — INSULIN LISPRO 2 UNITS: 100 INJECTION, SOLUTION INTRAVENOUS; SUBCUTANEOUS at 18:52

## 2021-06-03 RX ADMIN — LEVOTHYROXINE SODIUM 100 MCG: 0.1 TABLET ORAL at 05:56

## 2021-06-03 RX ADMIN — KETOROLAC TROMETHAMINE 1 DROP: 5 SOLUTION OPHTHALMIC at 10:59

## 2021-06-03 RX ADMIN — HEPARIN SODIUM 5000 UNITS: 5000 INJECTION INTRAVENOUS; SUBCUTANEOUS at 10:54

## 2021-06-03 RX ADMIN — ALLOPURINOL 100 MG: 100 TABLET ORAL at 10:53

## 2021-06-03 RX ADMIN — HEPARIN SODIUM 5000 UNITS: 5000 INJECTION INTRAVENOUS; SUBCUTANEOUS at 01:34

## 2021-06-03 RX ADMIN — EZETIMIBE 10 MG: 10 TABLET ORAL at 10:53

## 2021-06-03 RX ADMIN — CLOPIDOGREL BISULFATE 75 MG: 75 TABLET ORAL at 10:53

## 2021-06-03 RX ADMIN — METOPROLOL SUCCINATE 12.5 MG: 25 TABLET, EXTENDED RELEASE ORAL at 10:53

## 2021-06-03 RX ADMIN — GABAPENTIN 200 MG: 100 CAPSULE ORAL at 10:53

## 2021-06-03 RX ADMIN — ISOSORBIDE MONONITRATE 30 MG: 30 TABLET, EXTENDED RELEASE ORAL at 07:22

## 2021-06-03 RX ADMIN — OFLOXACIN 1 DROP: 3 SOLUTION OPHTHALMIC at 11:00

## 2021-06-03 RX ADMIN — PREDNISOLONE ACETATE 1 DROP: 10 SUSPENSION/ DROPS OPHTHALMIC at 11:00

## 2021-06-03 RX ADMIN — INSULIN GLARGINE 15 UNITS: 100 INJECTION, SOLUTION SUBCUTANEOUS at 10:54

## 2021-06-03 RX ADMIN — ASPIRIN 81 MG: 81 TABLET, CHEWABLE ORAL at 10:53

## 2021-06-03 NOTE — PROGRESS NOTES
Bedside and Verbal shift change report given to May Jimenez RN (oncoming nurse) by Sheri Saeed RN (offgoing nurse). Report included the following information SBAR, Kardex, Procedure Summary, Intake/Output, MAR and Recent Results.

## 2021-06-03 NOTE — PROGRESS NOTES
Problem: Pressure Injury - Risk of  Goal: *Prevention of pressure injury  Description: Document Ruben Scale and appropriate interventions in the flowsheet. Outcome: Progressing Towards Goal  Note: Pressure Injury Interventions:       Moisture Interventions: Absorbent underpads, Minimize layers    Activity Interventions: Increase time out of bed, PT/OT evaluation    Mobility Interventions: Assess need for specialty bed, PT/OT evaluation    Nutrition Interventions: Document food/fluid/supplement intake                     Problem: Falls - Risk of  Goal: *Absence of Falls  Description: Document Cristiana Fall Risk and appropriate interventions in the flowsheet.   Outcome: Progressing Towards Goal  Note: Fall Risk Interventions:  Mobility Interventions: Communicate number of staff needed for ambulation/transfer, Patient to call before getting OOB         Medication Interventions: Patient to call before getting OOB, Evaluate medications/consider consulting pharmacy, Teach patient to arise slowly    Elimination Interventions: Call light in reach, Toileting schedule/hourly rounds, Toilet paper/wipes in reach

## 2021-06-03 NOTE — DISCHARGE SUMMARY
Discharge Summary       PATIENT ID: Jm Borges  MRN: [id number]   YOB: 1950    DATE OF ADMISSION: 5/30/2021  1:30 PM    DATE OF DISCHARGE: 6/3/2021   PRIMARY CARE PROVIDER: Debora Benitez, VENKATESH     ATTENDING PHYSICIAN: Dr Clifford Webb  DISCHARGING PROVIDER: Luis Kat MD    To contact this individual call [phone] and ask the  to page. If unavailable ask to be transferred the Adult Hospitalist Department. CONSULTATIONS: IP CONSULT TO CARDIOLOGY  IP CONSULT TO CARDIOLOGY  IP CONSULT TO CARDIOLOGY  IP CONSULT TO HOSPITALIST    PROCEDURES/SURGERIES: * No surgery found *    ADMITTING [address] [place] [address] COURSE:   #. Acute on chronic BiV sCHF: CXR: pulm edema. S/p ICD. NYHA class 3/4  #. Acute on chonic respiratory respiratory failure: 2nd to above. Requiring Bipap- Rapid COVID test -ve. #. Mitral valve regurgitation: s/p MitraClip. Now mild stenosis with mild regurgitation. - recent TTE ef 35%- repeat shows ef 25%. -on ASA/Statin/Plavix/Toprol/Hydralazine/Imdur. Taken off Entresto sec to renal failure  -Appreciate Cardiology/adv heart failure  -Appreciate Adv heart failure, discharge on lasix 40 mg po daily     CKD 4: monitor cr on diuresis. F/u with Nephrology  Anemia, iron deficiency: outpatient follow up with PMD  DM type 2: hold oral meds, Lantus and SSI will adjust. Jardiance to be restarted as outpatient when creatinine stable  CAD: asa, plavix, BB continue   COPD/asthma: continue prn nebs   WES: bipap hs  moribid obese: BMI 40.46. counseled. Cataract: continue eye drops     Cardiac diet        DISCHARGE DIAGNOSES / PLAN:      1.   Acute on chronic CHF     ADDITIONAL CARE RECOMMENDATIONS:   Follow up with PMD  Daily weight, if weight gain >2lbs in 3-4 days please call cardiologist  Follow up with Cardiologist     PENDING TEST RESULTS:   At the time of discharge the following test results are still pending: none    FOLLOW UP APPOINTMENTS:    Follow-up Information Follow up With Specialties Details [facility] On 6/10/2021 1:00 pm  [address] [facility], [place] [address] [facility]    Pankaj Cohen, NP Nurse Practitioner In 1 week  [address] [place] Ryan  [phone]               DIET: Diabetic Diet    ACTIVITY: Activity as tolerated      DISCHARGE MEDICATIONS:  Current Discharge Medication List      CONTINUE these medications which have CHANGED    Details   furosemide (LASIX) 20 mg tablet Take 2 Tablets by mouth daily. Qty: 60 Tablet, Refills: 2  Start date: 6/3/2021    Associated Diagnoses: Chronic heart failure with reduced ejection fraction and diastolic dysfunction ([address]         CONTINUE these medications which have NOT CHANGED    Details   gabapentin (NEURONTIN) 100 mg capsule Take 200 mg by mouth two (2) times a day. hydrALAZINE (APRESOLINE) 25 mg tablet Take 1 Tab by mouth three (3) times daily. Qty: 90 Tab, Refills: 2    Associated Diagnoses: Chronic heart failure with reduced ejection fraction and diastolic dysfunction ([address] Ischemic cardiomyopathy; Hypertension, unspecified type      levothyroxine (SYNTHROID) 100 mcg tablet Take 1 Tab by mouth Daily (before breakfast). Qty: 90 Tab, Refills: 0    Comments: New dose  Associated Diagnoses: Acquired hypothyroidism      allopurinoL (ZYLOPRIM) 100 mg tablet Take 1 tablet by mouth once daily  Qty: 90 Tab, Refills: 1      atorvastatin (LIPITOR) 80 mg tablet TAKE 1 TABLET BY MOUTH AT BEDTIME  Qty: 90 Tab, Refills: 3    Associated Diagnoses: NYHA class 3 heart failure with reduced ejection fraction ([address]  Coronary artery disease involving native heart without angina pectoris, unspecified vessel or lesion type      glipiZIDE (GLUCOTROL) 10 mg tablet Take 1 tablet by mouth twice daily with food  Qty: 180 Tab, Refills: 0      ezetimibe (ZETIA) 10 mg tablet Take 1 tablet by mouth once daily  Qty: 30 Tab, Refills: 2    Associated Diagnoses: Ischemic cardiomyopathy; Coronary artery disease involving native heart without angina pectoris, unspecified vessel or lesion type      clopidogreL (PLAVIX) 75 mg tab Take 1 tablet by mouth once daily  Qty: 90 Tab, Refills: 0      isosorbide mononitrate ER (IMDUR) 30 mg tablet Take 1 Tab by mouth every morning. Qty: 30 Tab, Refills: 2    Associated Diagnoses: Chronic heart failure with reduced ejection fraction and diastolic dysfunction ([address] Ischemic cardiomyopathy; Hypertension, unspecified type      ketorolac (ACULAR) 0.5 % ophthalmic solution INSTILL 1 DROP 4 TIMES DAILY INTO EYE HAVING SURGERY START 2 DAYS PRIOR TO SURGERY      ofloxacin (FLOXIN) 0.3 % ophthalmic solution INSTILL 1 DROP 4 TIMES DAILY INTO SURGERY EYE START 2 DAYS PRIOR TO SURGERY      prednisoLONE acetate (PRED FORTE) 1 % ophthalmic suspension INSTILL 1 DROP 4 TIMES DAILY INTO SUREGRY EYE TAPER AS DIRECTED      metoprolol succinate (TOPROL-XL) 25 mg XL tablet Take 0.5 Tabs by mouth daily. Hold for systolic BP less than 639  Qty: 60 Tab, Refills: 2      clotrimazole-betamethasone (LOTRISONE) topical cream Apply  to affected area two (2) times a day. Qty: 30 g, Refills: 0      albuterol (PROVENTIL HFA, VENTOLIN HFA, PROAIR HFA) 90 mcg/actuation inhaler Take 2 Puffs by inhalation every four (4) hours as needed. acetaminophen (TYLENOL ARTHRITIS PAIN) 650 mg TbER Take 1,300 mg by mouth two (2) times a day. aspirin 81 mg chewable tablet Take 81 mg by mouth daily. !! Blood-Glucose Meter monitoring kit Check blood sugar daily. May substitute for insurance preferred meter  Qty: 1 Kit, Refills: 0      !! glucose blood VI test strips (blood glucose test) strip Check blood sugar 2 times daily: E11.9 may substitute for insurance preferred strips. Qty: 200 Strip, Refills: 3      !! lancets misc Use as directed. Dx:check sugar once daily.  E11.65  Qty: 1 Each, Refills: 11      !! lancets misc Check blood sugar 2 times daily. May substitute for insurance preferred lancets. Qty: 200 Each, Refills: 3      !! glucose blood VI test strips (ASCENSIA AUTODISC VI, ONE TOUCH ULTRA TEST VI) strip E11.65 check sugar once daily  Qty: 100 Strip, Refills: 0    Comments: Okay to sub what is covered      !! Blood-Glucose Meter monitoring kit Use as directed. Dx: E11.65 check sugar twice daily  Qty: 1 Kit, Refills: 0    Comments: One touch or whatever her insurance covers       !! - Potential duplicate medications found. Please discuss with provider. STOP taking these medications       empagliflozin (Jardiance) 10 mg tablet Comments:   Reason for Stopping:         sacubitriL-valsartan (Entresto) 24-26 mg tablet Comments:   Reason for Stopping:                 NOTIFY YOUR PHYSICIAN FOR ANY OF THE FOLLOWING:   Fever over 101 degrees for 24 hours. Chest pain, shortness of breath, fever, chills, nausea, vomiting, diarrhea, change in mentation, falling, weakness, bleeding. Severe pain or pain not relieved by medications. Or, any other signs or symptoms that you may have questions about.     DISPOSITION:  x  Home With:   OT  PT  HH  RN       Long term SNF/Inpatient Rehab    Independent/assisted living    Hospice    Other:       PATIENT CONDITION AT DISCHARGE:     Functional status    Poor     Deconditioned    x Independent      Cognition    x Lucid     Forgetful     Dementia      Catheters/lines (plus indication)    Duarte     PICC     PEG    x None      Code status    x Full code     DNR      PHYSICAL EXAMINATION AT DISCHARGE:  Please see progress note      CHRONIC MEDICAL DIAGNOSES:  Problem List as of 6/3/2021 Date Reviewed: 6/3/2021        Codes Class Noted - Resolved    * (Principal) [facility] ([facility] ICD-10-CM: I50.9  ICD-9-CM: 428.0  5/30/2021 - Present        Acute on chronic respiratory failure ([facility] ICD-10-CM: J96.20  ICD-9-CM: 518.84  5/30/2021 - Present        Mitral regurgitation ICD-10-CM: I34.0  ICD-9-CM: 424.0 11/12/2020 - Present        Ischemic cardiomyopathy ICD-10-CM: I25.5  ICD-9-CM: 414.8  9/30/2020 - Present        Chronic heart failure with reduced ejection fraction and diastolic dysfunction (HCC) ICD-10-CM: I50.42  ICD-9-CM: 428.42  9/30/2020 - Present        Sleep apnea treated with nocturnal BiPAP ICD-10-CM: G47.30  ICD-9-CM: 780.57  9/30/2020 - Present        Severe mitral regurgitation ICD-10-CM: I34.0  ICD-9-CM: 424.0  8/31/2020 - Present        Peripheral vascular disease ([facility] ICD-10-CM: I73.9  ICD-9-CM: 443.9  2/25/2020 - Present        NYHA class 3 heart failure with reduced ejection fraction (HCC) ICD-10-CM: I50.20  ICD-9-CM: 428.9  1/30/2020 - Present        Hx of CABG ICD-10-CM: Z95.1  ICD-9-CM: V45.81  1/13/2020 - Present        ICD (implantable cardioverter-defibrillator) in place ICD-10-CM: Z95.810  ICD-9-CM: V45.02  1/13/2020 - Present        H/O laparoscopic adjustable gastric banding ICD-10-CM: Z98.84  ICD-9-CM: V45.86  1/13/2020 - Present        Obesity, morbid ([facility] ICD-10-CM: E66.01  ICD-9-CM: 278.01  1/13/2020 - Present              Greater than 36 minutes were spent with the patient on counseling and coordination of care    Signed:   Yakelin Faulkner MD  6/3/2021  4:06 PM   .

## 2021-06-03 NOTE — PROGRESS NOTES
Hospitalist Progress Note  Racheal Marinelli MD  Answering service: [phone] from in house phone      Date of Service:  6/3/2021  NAME:  Ashley Anton  :  1950  MRN:  [id number]    Admission Summary:   Ashley Anton is a 65 y.o. female who presents with sob      65 WF with h/o CHF, CKD, DM, asthma/copd, CAD, s/p TAVR HTN and morbid obese  Came to ER with acute SOB. complaining of shortness of breath starting this morning.  EMS reports room air saturations 76% on Room air.  Patient arrives on 15L NRB. Pt on po lasxi 20mg daily. Gain 4 lbs last 2 days and noticed increasing leg swelling. She follows AHF team and was just seen on Friday. Pt was given bumex 1mg in ER and placed on bipap. Denied fever, no sputum. Pt was fully vaccinated with covid 19 . Interval history / Subjective:   F/u Acute CHF   BP better  Creatinine better today  Feels much better  Assessment & Plan:     #. Acute on chronic BiV sCHF: CXR: pulm edema. S/p ICD. NYHA class 3/4  #. Acute on chonic respiratory respiratory failure: 2nd to above. Requiring Bipap- Rapid COVID test -ve. #. Mitral valve regurgitation: s/p MitraClip. Now mild stenosis with mild regurgitation.  - Iv diuresis. Strict I&O, daily weight. Monitor and correct lytes. Albumin iv.  - recent TTE ef 35%- repeat shows ef 25%. -on ASA/Statin/Plavix/Toprol/Hydralazine/Imdur. Taken off Entresto sec to renal failure  -Appreciate Cardiology/adv heart failure  -Appreciate Adv heart failure, holding diuretic     CKD 4: monitor cr on diuresis. F/u with Nephrology  Anemia, iron deficiency: outpatient follow up with PMD  DM type 2: hold oral meds, Lantus and SSI will adjust. Jardiance to be restarted as outpatient when creatinine stable  CAD: asa, plavix, BB continue   COPD/asthma: continue prn nebs   WES: bipap hs  moribid obese: BMI 40.46. counseled.   Cataract: continue eye drops    Cardiac diet    PT/OT    Code status: Full  DVT prophylaxis: heparin    Plan: Awaiting Cardiology rec,   Care Plan discussed with: Patient/Family and Nurse  Disposition: [facility] Problems  Date Reviewed: 6/3/2021        Codes Class Noted POA    * (Principal) CHF exacerbation ([facility] [address] ICD-10-CM: I50.9  ICD-9-CM: 428.0  5/30/2021 Yes        Acute on chronic respiratory failure [facility]) ICD-10-CM: J96.20  ICD-9-CM: 518.84  5/30/2021 Unknown            Review of Systems:   Pertinent items are mentioned in interval history. Vital Signs:    Last 24hrs VS reviewed since prior progress note. Most recent are:  Visit Vitals  /66 (BP 1 Location: Left upper arm, BP Patient Position: At rest)   Pulse 70   Temp 98.3 °F (36.8 °C)   Resp 15   Ht 5' 3\" (1.6 m)   Wt 97.3 kg (214 lb 9.6 oz)   SpO2 97%   BMI 38.01 kg/m²         Intake/Output Summary (Last 24 hours) at 6/3/2021 0945  Last data filed at 6/2/2021 2000  Gross per 24 hour   Intake 600 ml   Output 1600 ml   Net -1000 ml        Physical Examination:   Evaluated face to face and examined 06/03/21    General:  Alert, oriented, distress with some SOB. Obese pleasant [employer].  Card:  S1, S2 without murmur, good peripheral perfusion/warm peripheries  Resp:  No accessory muscle use, fair AE, no wheezes. Few crepitations  Abd:  Soft, non-tender, non-distended, BS+  Extremities:  No cyanosis or clubbing, no significant edema  Neuro:  Grossly normal, no focal neuro deficits, follows commands, speech wnl. Psych:  Good insight, not agitated.     Data Review:    Review and/or order of clinical lab test  Review and/or order of tests in the radiology section of CPT  Review and/or order of tests in the medicine section of CPT  Labs:     Recent Labs     06/03/21  0144   WBC 7.7   HGB 11.0*   HCT 34.0*        Recent Labs     06/03/21  0144 06/02/21  0351 06/01/21  0440    139 141   K 4.1 4.2 4.6    105 111*   CO2 28 28 24   BUN 38* 41* 42*   CREA 1.79* 2.24* 1.79* * 188* 150*   CA 10.1 10.2* 8.7   MG 2.3 2.1 2.0   URICA  --   --  5.0     Recent Labs     06/03/21  0144 06/02/21  0351 06/01/21  0440   ALT 30 36 23   AP 78 82 75   TBILI 0.5 0.6 0.7   TP 6.6 6.5 5.5*   ALB 3.2* 3.2* 2.5*   GLOB 3.4 3.3 3.0     No results for input(s): INR, PTP, APTT, INREXT, INREXT in the last 72 hours. Recent Labs     06/01/21  0440   TIBC 236*   PSAT 19*   FERR 62      No results found for: FOL, RBCF   No results for input(s): PH, PCO2, PO2 in the last 72 hours.   Recent Labs     06/01/21  0440        Lab Results   Component Value Date/Time    Cholesterol, total 144 06/02/2021 03:51 AM    HDL Cholesterol 72 06/02/2021 03:51 AM    LDL, calculated 55.8 06/02/2021 03:51 AM    Triglyceride 81 06/02/2021 03:51 AM    CHOL/HDL Ratio 2.0 06/02/2021 03:51 AM     Lab Results   Component Value Date/Time    Glucose (POC) 198 (H) 06/02/2021 10:28 PM    Glucose (POC) 165 (H) 06/02/2021 04:15 PM    Glucose (POC) 272 (H) 06/02/2021 11:23 AM    Glucose (POC) 159 (H) 06/02/2021 08:12 AM    Glucose (POC) 244 (H) 06/01/2021 09:55 PM     Lab Results   Component Value Date/Time    Color YELLOW/STRAW 06/01/2021 10:27 AM    Appearance CLEAR 06/01/2021 10:27 AM    Specific gravity 1.008 06/01/2021 10:27 AM    pH (UA) 6.5 06/01/2021 10:27 AM    Protein Negative 06/01/2021 10:27 AM    Glucose 500 (A) 06/01/2021 10:27 AM    Ketone Negative 06/01/2021 10:27 AM    Bilirubin Negative 06/01/2021 10:27 AM    Urobilinogen 0.2 06/01/2021 10:27 AM    Nitrites Negative 06/01/2021 10:27 AM    Leukocyte Esterase Negative 06/01/2021 10:27 AM    Epithelial cells FEW 06/01/2021 10:27 AM    Bacteria Negative 06/01/2021 10:27 AM    WBC 0-4 06/01/2021 10:27 AM    RBC 0-5 06/01/2021 10:27 AM     Medications Reviewed:     Current Facility-Administered Medications   Medication Dose Route Frequency    gabapentin (NEURONTIN) capsule 200 mg  200 mg Oral BID    insulin glargine (LANTUS) injection 15 Units  15 Units SubCUTAneous DAILY    albumin human 25% (BUMINATE) solution 12.5 g  12.5 g IntraVENous BID    albuterol-ipratropium (DUO-NEB) 2.5 MG-0.5 MG/3 ML  3 mL Nebulization Q6H PRN    ketorolac (ACULAR) 0.5 % ophthalmic solution 1 Drop  1 Drop Right Eye QID    ofloxacin (FLOXIN) 0.3 % ophthalmic solution 1 Drop  1 Drop Right Eye QID    prednisoLONE acetate (PRED FORTE) 1 % ophthalmic suspension 1 Drop  1 Drop Right Eye QID    heparin (porcine) injection 5,000 Units  5,000 Units SubCUTAneous Q8H    levothyroxine (SYNTHROID) tablet 100 mcg  100 mcg Oral ACB    hydrALAZINE (APRESOLINE) tablet 50 mg  50 mg Oral TID    allopurinoL (ZYLOPRIM) tablet 100 mg  100 mg Oral DAILY    aspirin chewable tablet 81 mg  81 mg Oral DAILY    atorvastatin (LIPITOR) tablet 80 mg  80 mg Oral QHS    clopidogreL (PLAVIX) tablet 75 mg  75 mg Oral DAILY    ezetimibe (ZETIA) tablet 10 mg  10 mg Oral DAILY    isosorbide mononitrate ER (IMDUR) tablet 30 mg  30 mg Oral 7am    metoprolol succinate (TOPROL-XL) XL tablet 12.5 mg  12.5 mg Oral DAILY    [Held by provider] bumetanide (BUMEX) injection 1 mg  1 mg IntraVENous BID    glucose chewable tablet 16 g  4 Tablet Oral PRN    dextrose (D50W) injection syrg 12.5-25 g  25-50 mL IntraVENous PRN    glucagon (GLUCAGEN) injection 1 mg  1 mg IntraMUSCular PRN    insulin lispro (HUMALOG) injection   SubCUTAneous AC&HS   ______________________________________________________________________  EXPECTED LENGTH OF STAY: 4d 0h  ACTUAL LENGTH OF STAY:          4               Tera Coe MD

## 2021-06-03 NOTE — PROGRESS NOTES
Problem: Mobility Impaired (Adult and Pediatric)  Goal: *Acute Goals and Plan of Care (Insert Text)  Description: FUNCTIONAL STATUS PRIOR TO ADMISSION: Patient was modified independent using a single point cane for functional mobility. Pt has a rollator that she has loaned to her sister but will likely get it back    [address]: The patient lived alone with family nearby in an [facility]-55 senior independent living facility. Physical Therapy Goals  Initiated 6/3/2021  1. Patient will move from supine to sit and sit to supine , scoot up and down, and roll side to side in bed with independence within 7 day(s). 2.  Patient will transfer from bed to chair and chair to bed with independence using the least restrictive device within 7 day(s). 3.  Patient will perform sit to stand with modified independence within 7 day(s). 4.  Patient will ambulate with modified independence for 300 feet with the least restrictive device within 7 day(s). Outcome: Progressing Towards Goal     PHYSICAL THERAPY EVALUATION  Patient: Matty Quan (29 y.o. female)  Date: 6/3/2021  Primary Diagnosis: Acute on chronic respiratory failure ([facility]) [J96.20]  CHF exacerbation ([facility]) [I50.9]        Precautions: falls, CHF         ASSESSMENT  Based on the objective data described below, the patient presents with decreased mobility, decreased endurance minimally, but nearing baseline at this time with admission for CHF exacerbation and Chronic respiratory failure. Pt tolerates gait and mobility well with slow movements and frequent cues for correcting posture, mobility and technique. Pt tolerates mobility well with no desaturation noted. 6mwt performed for cardiac DC. Pt moving slow but expect pt can DC home with [facility] services.     Current Level of Function Impacting Discharge (mobility/balance): near baseline    Functional Outcome Measure:  6MWT performed as below    Other factors to consider for discharge:      Patient will benefit from skilled therapy intervention to address the above noted impairments. PLAN :  Recommendations and Planned Interventions: bed mobility training, transfer training, gait training, therapeutic exercises, neuromuscular re-education, patient and family training/education, and therapeutic activities      Frequency/Duration: Patient will be followed by physical therapy:  5 times a week to address goals. Recommendation for discharge: (in order for the patient to meet his/her long term goals)  Physical therapy at least 2 days/week in the home     This discharge recommendation:  Has been made in collaboration with the attending provider and/or case management    IF patient discharges home will need the following DME: patient owns DME required for discharge         SUBJECTIVE:   Patient stated I just want to do better.     OBJECTIVE DATA SUMMARY:   HISTORY:    Past Medical History:   Diagnosis Date    Adverse effect of anesthesia     hard to wake up/uses BIPAP/\"try to avoid general if possible\"/intubated in past prior to going to sleep and it caused pt to be incontinent    Arthritis     Asthma     CAD (coronary artery disease)     Chronic kidney disease     elevataed creatinine    Chronic obstructive pulmonary disease (HCC)     Chronic pain     arthritis    Coagulation disorder (HCC)     on plavix    Congestive heart failure (HCC)     Diabetes (HCC)     Hypertension     Morbid obesity (HCC)     Sleep apnea     BIPAP with 2 liters oxygen    Thromboembolus ([address]     after heart surgery - left leg    Thyroid disease      Past Surgical History:   Procedure Laterality Date    COLONOSCOPY N/A 2020    COLONOSCOPY   :- performed by Laura Yee MD at [address] [place] [address]  2105    knee - right    HX  SECTION      x3    HX CORONARY ARTERY BYPASS GRAFT  1997    3 vessels    HX CORONARY STENT PLACEMENT  2016    [address]    HX IMPLANTABLE CARDIOVERTER DEFIBRILLATOR      HX KNEE REPLACEMENT Right 2015    once    NC CARDIAC SURG PROCEDURE UNLIST  09/13/2018    cardiac cath - Left    NC LAP GASTRIC BYPASS/KERLINE-EN-Y  2011    lap band per pt and not a gastric bypass       Personal factors and/or comorbidities impacting plan of care:   Home Situation  Home Environment: Apartment  One/Two Story Residence: One story  Living Alone: No  Support Systems: Spouse/Significant Other/Partner  Patient Expects to be Discharged to[de-identified] Apartment  Current DME Used/Available at Home: Cane, straight, Transfer bench, Raised toilet seat, Grab bars  Tub or Shower Type: Tub/Shower combination    EXAMINATION/PRESENTATION/DECISION MAKING:   Critical Behavior:  Neurologic State: Alert  Orientation Level: Oriented X4  Cognition: Appropriate decision making, Follows commands  Safety/Judgement: Awareness of environment, Fall prevention, Good awareness of safety precautions, Home safety, Insight into deficits  Hearing: Auditory  Auditory Impairment: None  Skin:  intact  Edema: none  Range Of Motion:  AROM: Within functional limits                       Strength:    Strength: Within functional limits                    Tone & Sensation:                                  Coordination:  Coordination: Within functional limits  Vision:      Functional Mobility:  Bed Mobility:  Rolling: Supervision  Supine to Sit: Supervision        Transfers:  Sit to Stand: Contact guard assistance;Supervision  Stand to Sit: Contact guard assistance;Supervision                       Balance:   Sitting: Intact  Standing: Impaired; Without support  Standing - Static: Good;Constant support  Standing - Dynamic : Good;Fair;Constant support  Ambulation/Gait Training:  Distance (ft): 300 Feet (ft)  Assistive Device: Walker, rollator  Ambulation - Level of Assistance: Contact guard assistance;Supervision     Gait Description (WDL): Exceptions to WDL  Gait Abnormalities: Shuffling gait        Base of Support: Widened Speed/Melvi: Shuffled; Slow  Step Length: Right shortened;Left shortened      Functional Measure:  6 MWT results: (Specify if any supplemental oxygen is used, the type, pre, during and post sats.)  Distance Walked in Feet (ft): 300 ft. Daniel Rating of Perceived exertion (0-10 point scale):   Pre Heart Rate: 70 Beginning: 3   Pre O2 Saturation: 98     Mid walk: 6   Post Heart Rate: 92 End: 8   Post O2 Saturation: 99    Assistive device used: Assistive Device: Walker, rollator        Normative data:   Men 39-80 years old = 1889 feet; Women 3680 years gqe=9277 feet  Modified 10 point Daniel RPE scale utilized:  0 = no breathlessness at all ---> 10 = maximum exertion  Please refer to the flowsheet for any additional vital signs taken during this treatment. Physical Therapy Evaluation Charge Determination   History Examination Presentation Decision-Making   HIGH Complexity :3+ comorbidities / personal factors will impact the outcome/ POC  HIGH Complexity : 4+ Standardized tests and measures addressing body structure, function, activity limitation and / or participation in recreation  LOW Complexity : Stable, uncomplicated  Other outcome measures barthel  HIGH       Based on the above components, the patient evaluation is determined to be of the following complexity level: LOW     Pain Rating:      Activity Tolerance:   Fair and requires rest breaks    After treatment patient left in no apparent distress:   Sitting in chair and Call bell within reach    COMMUNICATION/EDUCATION:   The patients plan of care was discussed with: Registered nurse and Case management. Fall prevention education was provided and the patient/caregiver indicated understanding. and Patient/family have participated as able in goal setting and plan of care.     Thank you for this referral.  Marcel Simental, PT   Time Calculation: 29 mins

## 2021-06-03 NOTE — PROGRESS NOTES
Hospital follow-up Virtual PCP transitional care appointment has been scheduled with Delilah Buck NP for Monday, 6/7/21 at 11:15 p.m. Pending patient discharge.   David Rouse, Care Management Specialist.

## 2021-06-03 NOTE — DISCHARGE INSTRUCTIONS
Discharge Instructions       PATIENT ID: Ashley Anton  MRN: [id number]   YOB: 1950    DATE OF ADMISSION: 5/30/2021  1:30 PM    DATE OF DISCHARGE: 6/3/2021    PRIMARY CARE PROVIDER: Henna Jewell NP     ATTENDING PHYSICIAN: Luis Alberto Peralta MD  DISCHARGING PROVIDER: Racheal Marinelli MD    To contact this individual call [phone] and ask the  to page. If unavailable ask to be transferred the Adult Hospitalist Department. DISCHARGE DIAGNOSES   CHF    CONSULTATIONS: IP CONSULT TO CARDIOLOGY  IP CONSULT TO CARDIOLOGY  IP CONSULT TO CARDIOLOGY  IP CONSULT TO HOSPITALIST    PROCEDURES/SURGERIES: * No surgery found *    PENDING TEST RESULTS:   At the time of discharge the following test results are still pending: none    FOLLOW UP APPOINTMENTS:   Follow-up Information     Follow up With Specialties Details Why Contact [address] On 6/10/2021 1:00 pm  [address] [place] [address]    Henna Jewell, NP Nurse Practitioner In 1 week  [address]  [phone]             ADDITIONAL CARE RECOMMENDATIONS:   Follow up with PMD  Daily weight, if weight gain >2lbs in 3-4 days please call cardiologist  Follow up with Cardiologist     DIET: Diabetic Diet    ACTIVITY: Activity as tolerated      DISCHARGE MEDICATIONS:   See Medication Reconciliation Form    · It is important that you take the medication exactly as they are prescribed. · Keep your medication in the bottles provided by the pharmacist and keep a list of the medication names, dosages, and times to be taken in your wallet. · Do not take other medications without consulting your doctor. NOTIFY YOUR PHYSICIAN FOR ANY OF THE FOLLOWING:   Fever over 101 degrees for 24 hours.    Chest pain, shortness of breath, fever, chills, nausea, vomiting, diarrhea, change in mentation, falling, weakness, bleeding. Severe pain or pain not relieved by medications. Or, any other signs or symptoms that you may have questions about.       DISPOSITION:  x  Home With:   OT  PT  HH  RN       SNF/Inpatient Rehab/LTAC    Independent/assisted living    Hospice    Other:     CDMP Checked:   Yes x     PROBLEM LIST Updated:  Yes x       Signed:   Margaux Brooks MD  6/3/2021  4:05 PM

## 2021-06-03 NOTE — PROGRESS NOTES
[address] [facility] in [place], [place]  Inpatient Consult Progress Note      Patient name: Juan Draper  Patient : 1950  Patient MRN: [id number]  Consulting MD: Angely Capps MD  Primary general cardiologist:      Date of service: 21    REASON FOR CONSULT:  Acute on chronic systolic heart failure    PLAN:  Okay to discharge home from HF perspective  Adequate hemodynamics on current GDMT; please see  documentation for further details   Repeat CXR shows resolution of pulmonary edema   Continue current dose of Toprol 12.5mg daily  Intolerant of Entresto due to renal failure    No MRA due to hyperkalemia   Reduce hydralazine to 25 mg PO TID + Imdur 30 mg PO daily   Lasix 40 mg PO daily on discharge  Intolerant of SGLT2i due to hx of UTI, renal failure  Cont Synthroid 100mcg  Cont allopurinol 100mg daily   Albumin BID   Continue ASA and plavix  Continue current dose of statin  ICD interrogation 21  Continue CPAP therapy     UA neg, PCT trending down   S/p venofer 200 mg IV x 2; Hgb stable - can complete NEREIDA eval as an outpatient   Equivocal PYP  [employer] DSP (VUS)  Cardiology consulted for ischemic workup; continue current GDMT   BiPAP qHS   PFT with DLCO as OP   Nutritionist consult  Heart failure education  Advanced care plan present on file  Plan at discharge: recommend flu and pneumonia vaccinations    IMPRESSION:  Fatigue  Shortness of breath  Volume overload  Chronic systolic heart failure  Stage D, NYHA class IIIA symptoms  Ischemic cardiomyopathy, LVEF 35-40%  CAD s/p CABG  s/p PCI to DVG-RCA   H/o severe MR s/p mitral clip in   HTN  H/o VT s/p AICD  WES on Bipap  T2DM with neuropathy  Hypothyroidism  Obesity  HLD  Osteoarthritis   CKD4     RECENT EVENTS:  Net negative 640 mL   Cr 1.79 from 2.24  proBNP 887 from 2535    CARDIAC IMAGING:  Echo 21- EF 25-30%, moderate MR post-clip  Echo 21- LVEF 35-40%, Mild MR post clip, mild TR, LVIDd 5.7cm, TAPSE 1.68, RVIDd 4.05cm   Echo 12/14/20 LVEF 30-35%, mild MR, LVIDd 6.09cm, RVIDd 3.98cm  Echo 6/19/20 LVEF 25-30%, Mod TR, severe MR, LVIDd 5.76cm, TAPSE 1.94cm, RVIDd 4.06cm    EKG 5/30/21 ST with PVCs, QRS 88ms  EKG 11/14/20 V paced, QRS 84    LHC 2/3/20- Severe native 3 vessel CAD. Patent VG to RCA and LIMA to LAD. LCx is collateral dependent and native rPDA and D1 have small disease in small vessels. NST    ICD interrogation 5/10/21    HEMODYNAMICS:  RHC not done  CPEST not done  6MW not done    OTHER IMAGING:  CXR 5/30/2 Diffuse increased interstitial and airspace opacities with more  confluent airspace opacities in the bilateral lung bases likely representing  pulmonary edema. CT chest not done     HPI:   65y.o. year old female with a history of HTN, HLD, WES, obesity (Body mass index is 40.74 kg/m².) s/p gastric bypass in 2011, T2DM, hypothyroidism, COPD, CAD s/p CABG in 1997, s/p PCI to [address] in 5/15, ICM, VT, s/p AICD ([employer]),  anxiety, and depression. She was scheduled to undergo BiV upgrade with Dr. Maryjo Martinez, however, her QRS was too narrow for the upgrade. He increased her low pacing threshold from 50 bpm to 70 bpm.     INTERVAL HISTORY:  Ms. Maggie Oliveira was admitted via the ED for increased SOB over the weekend, she denies dietary indiscretions but said she left a pan on the stove and she thinks the smoke may have triggered her cough and volume overload. The [facility] has been consulted for ongoing management of her HFrEF. PHYSICAL EXAM:  Visit Vitals  BP (!) 111/57 (BP 1 Location: Right upper arm, BP Patient Position: At rest;Sitting)   Pulse 75   Temp 97.7 °F (36.5 °C)   Resp 16   Ht 5' 3\" (1.6 m)   Wt 214 lb 9.6 oz (97.3 kg)   SpO2 99%   BMI 38.01 kg/m²     General: Patient is well developed, well-nourished in no acute distress  HEENT: Normocephalic and atraumatic. No scleral icterus. Pupils are equal, round and reactive to light and accomodation. No conjunctival injection. Oropharynx is clear. Neck: Supple. No evidence of thyroid enlargements or lymphadenopathy. JVD: Cannot be appreciated   Lungs: Breath sounds are equal and clear bilaterally. No wheezes, rhonchi, or rales. Wearing O2 this morning   Heart: Regular rate and rhythm with normal S1 and S2. No murmurs, gallops or rubs. Abdomen: Soft, no mass or tenderness. No organomegaly or hernia. Bowel sounds present. Genitourinary and rectal: deferred  Extremities: No cyanosis, clubbing, or edema. Neurologic: No focal sensory or motor deficits are noted. Grossly intact. Psychiatric: Awake, alert an doriented x 3. Appropriate mood and affect. Skin: Warm, dry and well perfused. No lesions, nodules or rashes are noted. REVIEW OF SYSTEMS:  General: Denies fever, night sweats. Ear, nose and throat: Denies difficulty hearing, sinus problems, runny nose, post-nasal drip, ringing in ears, mouth sores, loose teeth, ear pain, nosebleeds, sore throate, facial pain or numbess  Cardiovascular: see above in the interval history  Respiratory: Denies cough, wheezing, sputum production, hemoptysis.   Gastrointestinal: Denies heartburn, constipation, intolerance to certain foods, diarrhea, abdominal pain, nausea, vomiting, difficulty swallowing, blood in stool  Kidney and bladder: Denies painful urination, frequent urination, urgency, prostate problems and impotence  Musculoskeletal: Denies joint pain, muscle weakness  Skin and hair: Denies change in existing skin lesions, hair loss or increase, breast changes    PAST MEDICAL HISTORY:  Past Medical History:   Diagnosis Date    Adverse effect of anesthesia     hard to wake up/uses BIPAP/\"try to avoid general if possible\"/intubated in past prior to going to sleep and it caused pt to be incontinent    Arthritis     Asthma     CAD (coronary artery disease)     Chronic kidney disease     elevataed creatinine    Chronic obstructive pulmonary disease (HCC)     Chronic pain     arthritis    Coagulation disorder (HCC)     on plavix    Congestive [facility] failure ([facility] [address] [facility]     Diabetes ([facility] [facility]     Hypertension     Morbid obesity ([facility] [address] [facility]     Sleep apnea     BIPAP with 2 liters oxygen    Thromboembolus ([facility] [address] [facility]     after heart surgery - left leg    Thyroid disease        PAST SURGICAL HISTORY:  Past Surgical History:   Procedure Laterality Date    COLONOSCOPY N/A 2020    COLONOSCOPY   :- performed by Ranjana Dawson MD at [facility] [address]    knee - right    HX  SECTION      x3    HX CORONARY ARTERY BYPASS GRAFT  1997    3 vessels    HX CORONARY STENT PLACEMENT  2016    HX HERNIA REPAIR  1988    HX IMPLANTABLE CARDIOVERTER DEFIBRILLATOR      HX KNEE REPLACEMENT Right 2015    once    CT CARDIAC SURG PROCEDURE UNLIST  2018    cardiac cath - Left    CT LAP GASTRIC BYPASS/KERLINE-EN-Y  2011    lap band per pt and not a gastric bypass       FAMILY HISTORY:  Family History   Problem Relation Age of Onset    Hypertension Mother     Dementia Mother     Coronary Artery Disease Father 58    Sudden Death Father 58    Diabetes Son     Diabetes Brother        SOCIAL HISTORY:  Social History     Socioeconomic History    Marital status:      Spouse name: Not on file    Number of children: Not on file    Years of education: Not on file    Highest education level: Not on file   Tobacco Use    Smoking status: Former Smoker     Packs/day: 0.50     Years: 45.00     Pack years: 22.50     Types: Cigarettes     Quit date: 2010     Years since quittin.4    Smokeless tobacco: Never Used    Tobacco comment: quit /started again (smoked 3 yrs) off and on/none since    Vaping Use    Vaping Use: Never used   Substance and Sexual Activity    Alcohol use: Not Currently    Drug use: Not Currently    Sexual activity: Not Currently   Other Topics Concern     Social Determinants of Health     Financial Resource Strain:     Difficulty of Paying Living Expenses:    Food Insecurity:     Worried About [address] in the Last Year:     [address] [facility] [address] in the Last Year:    Transportation Needs:     Lack of Transportation (Medical):  Lack of Transportation (Non-Medical):    Physical Activity:     Days of Exercise per Week:     Minutes of Exercise per Session:    Stress:     Feeling of Stress :    Social Connections:     Frequency of Communication with Friends and Family:     Frequency of Social Gatherings with Friends and Family:     Attends [employer] Services:     Active Member of Clubs or Organizations:     Attends Club or Organization Meetings:     Marital Status:        LABORATORY RESULTS:     Labs Latest Ref Rng & Units 6/3/2021 6/2/2021 6/1/2021 5/31/2021 5/30/2021 5/4/2021   WBC 3.6 - 11.0 K/uL 7.7 - - 11. 4(H) 19. 1(H) -   RBC 3.80 - 5.20 M/uL 3.41(L) - - 3.85 3.98 -   Hemoglobin 11.5 - 16.0 g/dL 11. 0(L) - - 12.3 12.6 -   Hematocrit 35.0 - 47.0 % 34. 0(L) - - 37.8 39.7 -   MCV 80.0 - 99.0 FL 99. 7(H) - - 98.2 99. 7(H) -   Platelets 777 - 725 K/uL 218 - - 231 256 -   Lymphocytes 12 - 49 % - - - 13 24 -   Monocytes 5 - 13 % - - - 4(L) 5 -   Eosinophils 0 - 7 % - - - 0 2 -   Basophils 0 - 1 % - - - 0 1 -   Albumin 3.5 - 5.0 g/dL 3.2(L) 3. 2(L) 2. 5(L) 3. 2(L) 3.8 -   Calcium 8.5 - 10.1 MG/DL 10.1 10. 2(H) 8.7 9.9 10. 6(H) -   Glucose 65 - 100 mg/dL 196(H) 188(H) 150(H) 157(H) 184(H) -   BUN 6 - 20 MG/DL 38(H) 41(H) 42(H) 33(H) 34(H) -   Creatinine 0.55 - 1.02 MG/DL 1.79(H) 2.24(H) 1.79(H) 1.75(H) 1.94(H) -   Sodium 136 - 145 mmol/L 136 139 141 140 137 -   Potassium 3.5 - 5.1 mmol/L 4.1 4.2 4.6 4.6 3.7 -   TSH 0.36 - 3.74 uIU/mL - - - - - 0.64   Some recent data might be hidden     Lab Results   Component Value Date/Time    TSH 0.64 05/04/2021 02:32 PM    TSH 0.247 (L) 03/30/2021 11:21 AM    TSH 0.250 (L) 02/17/2021 09:41 AM    TSH 0.230 (L) 02/17/2021 09:36 AM    TSH 7.130 (H) 01/04/2021 12:21 PM    TSH 3.78 (H) 10/29/2020 10:45 AM TSH 1.460 10/26/2020 02:57 PM    TSH 1.620 04/30/2020 03:05 PM    TSH 1.07 01/31/2020 12:35 AM       ALLERGY:  Allergies   Allergen Reactions    Crestor [Rosuvastatin] Other (comments)     Causes muscle cramps    Lisinopril Cough    Nsaids (Non-Steroidal Anti-Inflammatory Drug) Other (comments)     Liver and Kidney        CURRENT MEDICATIONS:    Current Facility-Administered Medications:     gabapentin (NEURONTIN) capsule 200 mg, 200 mg, Oral, BID, Ismael Meyer MD, 200 mg at 06/03/21 1053    insulin glargine (LANTUS) injection 15 Units, 15 Units, SubCUTAneous, DAILY, Ismael Meyer MD, 15 Units at 06/03/21 1054    albumin human 25% (BUMINATE) solution 12.5 g, 12.5 g, IntraVENous, BID, Polliard, Gwen Bumpers, NP, Held at 06/03/21 0830    albuterol-ipratropium (DUO-NEB) 2.5 MG-0.5 MG/3 ML, 3 mL, Nebulization, Q6H PRN, Ismael Meyer MD    ketorolac (ACULAR) 0.5 % ophthalmic solution 1 Drop, 1 Drop, Right Eye, QID, Ismael Meyer MD, 1 Drop at 06/03/21 1059    ofloxacin (FLOXIN) 0.3 % ophthalmic solution 1 Drop, 1 Drop, Right Eye, QID, Ismael Meyer MD, 1 Drop at 06/03/21 1100    prednisoLONE acetate (PRED FORTE) 1 % ophthalmic suspension 1 Drop, 1 Drop, Right Eye, QID, Ismael Meyer MD, 1 Drop at 06/03/21 1100    heparin (porcine) injection 5,000 Units, 5,000 Units, SubCUTAneous, Q8H, Ismael Meyer MD, 5,000 Units at 06/03/21 1054    levothyroxine (SYNTHROID) tablet 100 mcg, 100 mcg, Oral, ACB, Ismael Meyer MD, 100 mcg at 06/03/21 0556    hydrALAZINE (APRESOLINE) tablet 50 mg, 50 mg, Oral, TID, Lo Mckeon NP, 50 mg at 06/02/21 2244    allopurinoL (ZYLOPRIM) tablet 100 mg, 100 mg, Oral, DAILY, Mary Velez MD, 100 mg at 06/03/21 1053    aspirin chewable tablet 81 mg, 81 mg, Oral, DAILY, Mary Velez MD, 81 mg at 06/03/21 1053    atorvastatin (LIPITOR) tablet 80 mg, 80 mg, Oral, QHS, Mary Velez MD, 80 mg at 06/02/21 2238    clopidogreL (PLAVIX) tablet 75 mg, 75 mg, Oral, DAILY, Sun, Lenka Mallory MD, 75 mg at 06/03/21 1053    ezetimibe (ZETIA) tablet 10 mg, 10 mg, Oral, DAILY, Mary Velez MD, 10 mg at 06/03/21 1053    isosorbide mononitrate ER (IMDUR) tablet 30 mg, 30 mg, Oral, 7am, Mary Velez MD, 30 mg at 06/03/21 0652    metoprolol succinate (TOPROL-XL) XL tablet 12.5 mg, 12.5 mg, Oral, DAILY, Lenka Velez MD, 12.5 mg at 06/03/21 1053    [Held by provider] bumetanide (BUMEX) injection 1 mg, 1 mg, IntraVENous, BID, Mary Velez MD, 1 mg at 06/01/21 1725    glucose chewable tablet 16 g, 4 Tablet, Oral, PRN, Mary Lr MD    dextrose (D50W) injection syrg 12.5-25 g, 25-50 mL, IntraVENous, PRN, Mary Velez MD    glucagon [facility]) injection 1 mg, 1 mg, IntraMUSCular, PRN, Mary Lr MD    insulin lispro (HUMALOG) injection, , SubCUTAneous, AC&HS, Ciro Martell MD, 2 Units at 06/02/21 1726    [address]:  Patient Care Team:  Paige Fontaine NP as PCP - General (Nurse Practitioner)  Paige Fontaine NP as PCP - [facility]  Chloe Tai MD (Cardiology)  Valeria King MD (Gastroenterology)  Pascual Manzanares MD as Consulting Provider (Cardiology)     Thank you for allowing me to participate in this patient's care. Chandra Sena NP  [address] [place] [address], [address]  Phone: [phone]    [facility] ATTENDING ADDENDUM    Patient was seen and examined in person. Data and notes were reviewed. I have discussed and agree with the plan as noted in the NP note above without further additions.     Bettye Mendieta MD PhD  [phone]

## 2021-06-03 NOTE — PROGRESS NOTES
Problem: Pressure Injury - Risk of  Goal: *Prevention of pressure injury  Description: Document Ruben Scale and appropriate interventions in the flowsheet. Outcome: Resolved/Met  Note: Pressure Injury Interventions:       Moisture Interventions: Absorbent underpads, Minimize layers    Activity Interventions: Increase time out of bed, PT/OT evaluation    Mobility Interventions: Assess need for specialty bed, PT/OT evaluation    Nutrition Interventions: Document food/fluid/supplement intake                     Problem: Patient Education: Go to Patient Education Activity  Goal: Patient/Family Education  Outcome: Resolved/Met     Problem: Falls - Risk of  Goal: *Absence of Falls  Description: Document Cristiana Fall Risk and appropriate interventions in the flowsheet.   Outcome: Resolved/Met  Note: Fall Risk Interventions:  Mobility Interventions: Communicate number of staff needed for ambulation/transfer, Patient to call before getting OOB         Medication Interventions: Patient to call before getting OOB, Evaluate medications/consider consulting pharmacy, Teach patient to arise slowly    Elimination Interventions: Call light in reach, Toileting schedule/hourly rounds, Toilet paper/wipes in reach              Problem: Patient Education: Go to Patient Education Activity  Goal: Patient/Family Education  Outcome: Resolved/Met     Problem: Patient Education: Go to Patient Education Activity  Goal: Patient/Family Education  Outcome: Resolved/Met     Problem: Heart Failure: Day 1  Goal: Off Pathway (Use only if patient is Off Pathway)  Outcome: Resolved/Met  Goal: Activity/Safety  Outcome: Resolved/Met  Goal: Consults, if ordered  Outcome: Resolved/Met  Goal: Diagnostic Test/Procedures  Outcome: Resolved/Met  Goal: Nutrition/Diet  Outcome: Resolved/Met  Goal: Discharge Planning  Outcome: Resolved/Met  Goal: Medications  Outcome: Resolved/Met  Goal: Respiratory  Outcome: Resolved/Met  Goal: Treatments/Interventions/Procedures  Outcome: Resolved/Met  Goal: Psychosocial  Outcome: Resolved/Met  Goal: *Oxygen saturation within defined limits  Outcome: Resolved/Met  Goal: *Hemodynamically stable  Outcome: Resolved/Met  Goal: *Optimal pain control at patient's stated goal  Outcome: Resolved/Met  Goal: *Anxiety reduced or absent  Outcome: Resolved/Met     Problem: Heart Failure: Day 2  Goal: Off Pathway (Use only if patient is Off Pathway)  Outcome: Resolved/Met  Goal: Activity/Safety  Outcome: Resolved/Met  Goal: Consults, if ordered  Outcome: Resolved/Met  Goal: Diagnostic Test/Procedures  Outcome: Resolved/Met  Goal: Nutrition/Diet  Outcome: Resolved/Met  Goal: Discharge Planning  Outcome: Resolved/Met  Goal: Medications  Outcome: Resolved/Met  Goal: Respiratory  Outcome: Resolved/Met  Goal: Treatments/Interventions/Procedures  Outcome: Resolved/Met  Goal: Psychosocial  Outcome: Resolved/Met  Goal: *Oxygen saturation within defined limits  Outcome: Resolved/Met  Goal: *Hemodynamically stable  Outcome: Resolved/Met  Goal: *Optimal pain control at patient's stated goal  Outcome: Resolved/Met  Goal: *Anxiety reduced or absent  Outcome: Resolved/Met  Goal: *Demonstrates progressive activity  Outcome: Resolved/Met     Problem: Heart Failure: Day 3  Goal: Off Pathway (Use only if patient is Off Pathway)  Outcome: Resolved/Met  Goal: Activity/Safety  Outcome: Resolved/Met  Goal: Diagnostic Test/Procedures  Outcome: Resolved/Met  Goal: Nutrition/Diet  Outcome: Resolved/Met  Goal: Discharge Planning  Outcome: Resolved/Met  Goal: Medications  Outcome: Resolved/Met  Goal: Respiratory  Outcome: Resolved/Met  Goal: Treatments/Interventions/Procedures  Outcome: Resolved/Met  Goal: Psychosocial  Outcome: Resolved/Met  Goal: *Oxygen saturation within defined limits  Outcome: Resolved/Met  Goal: *Hemodynamically stable  Outcome: Resolved/Met  Goal: *Optimal pain control at patient's stated goal  Outcome: Resolved/Met  Goal: *Anxiety reduced or absent  Outcome: Resolved/Met  Goal: *Demonstrates progressive activity  Outcome: Resolved/Met     Problem: Heart Failure: Day 4  Goal: Off Pathway (Use only if patient is Off Pathway)  Outcome: Resolved/Met  Goal: Activity/Safety  Outcome: Resolved/Met  Goal: Diagnostic Test/Procedures  Outcome: Resolved/Met  Goal: Nutrition/Diet  Outcome: Resolved/Met  Goal: Discharge Planning  Outcome: Resolved/Met  Goal: Medications  Outcome: Resolved/Met  Goal: Respiratory  Outcome: Resolved/Met  Goal: Treatments/Interventions/Procedures  Outcome: Resolved/Met  Goal: Psychosocial  Outcome: Resolved/Met  Goal: *Oxygen saturation within defined limits  Outcome: Resolved/Met  Goal: *Hemodynamically stable  Outcome: Resolved/Met  Goal: *Optimal pain control at patient's stated goal  Outcome: Resolved/Met  Goal: *Anxiety reduced or absent  Outcome: Resolved/Met  Goal: *Demonstrates progressive activity  Outcome: Resolved/Met     Problem: Heart Failure: Day 5  Goal: Off Pathway (Use only if patient is Off Pathway)  Outcome: Resolved/Met  Goal: Activity/Safety  Outcome: Resolved/Met  Goal: Diagnostic Test/Procedures  Outcome: Resolved/Met  Goal: Nutrition/Diet  Outcome: Resolved/Met  Goal: Discharge Planning  Outcome: Resolved/Met  Goal: Medications  Outcome: Resolved/Met  Goal: Respiratory  Outcome: Resolved/Met  Goal: Treatments/Interventions/Procedures  Outcome: Resolved/Met  Goal: Psychosocial  Outcome: Resolved/Met     Problem: Heart Failure: Discharge Outcomes  Goal: *Demonstrates ability to perform prescribed activity without shortness of breath or discomfort  Outcome: Resolved/Met  Goal: *Left ventricular function assessment completed prior to or during stay, or planned for post-discharge  Outcome: Resolved/Met  Goal: *ACEI prescribed if LVEF less than 40% and no contraindications or ARB prescribed  Outcome: Resolved/Met  Goal: *Verbalizes understanding and describes prescribed diet  Outcome: Resolved/Met  Goal: *Verbalizes understanding/describes prescribed medications  Outcome: Resolved/Met  Goal: *Describes available resources and support systems  Description: (eg: Home Health, Palliative Care, Advanced Medical Directive)  Outcome: Resolved/Met  Goal: *Describes smoking cessation resources  Outcome: Resolved/Met  Goal: *Understands and describes signs and symptoms to report to providers(Stroke Metric)  Outcome: Resolved/Met  Goal: *Describes/verbalizes understanding of follow-up/return appt  Description: (eg: to physicians, diabetes treatment coordinator, and other resources  Outcome: Resolved/Met  Goal: *Describes importance of continuing daily weights and changes to report to physician  Outcome: Resolved/Met  Patient received awake, alert and oriented x4, no acute distress. Reviewed by MD and can be discharged home today. Pt ambulated in halls with physical therapist this am. Pt reports using a can at home, front wheel walker used for ambulation. Discussed discharged with patient. Pt notified her family of discharge home today.

## 2021-06-03 NOTE — CONSULTS
Nutrition Education    · Consult received for low Na+, CHF diet education  · Verbally reviewed information with pt  · Declined written materials - she is actually very well versed on information and knows what to do, but struggles with consistency  · Pt has been through cardiac rehab and met with dietitian several times  · Pt appreciative of our discussion and stated she needed the \"jolt\" from dietitian to help get her back into routine. She is motivated. · Happy to speak with her further if need arises. Please reconsult PRN. Thank you.     Sybil Carlisle RD  Available via [employer]

## 2021-06-03 NOTE — PROGRESS NOTES
[facility] [place]: Kasey Cheung  [phone]          Cardiology valvular heart disease consult/Progress Note      Requesting/referring provider: Dorothy Leavitt., Kareem. Kristen Santos  Reason for Consult: Mitral valve disease    Assessment/Plan:  1. Coronary disease manifested in the form of prior inferior MI, status post coronary bypass grafting with patent LIMA to LAD and vein graft to RCA and chronically occluded left circumflex with collaterals  2. Severe LV systolic dysfunction/heart failure reduced ejection fraction acute on chronic likely secondary to 1 with progressive adverse LV remodeling  3. History of severe mitral regurgitation appears functional in nature secondary to progressive adverse LV remodeling and possible papillary muscle dysfunction from posterior myocardial infarction--status post MitraClip NTW placement in November 2020 with now residual mild to moderate mitral regurgitation. 4.  Acute on chronic systolic congestive heart failure  5. History of lower extremity thromboembolism post coronary bypass grafting  6. CKD with baseline creatinine about 2  7. Morbid obesity with suspected sleep apnea    Ms. Valente Oneill reports improvement in dyspnea. Noninvasive monitoring by heart failure suggests adequate cardiac index. She did bump in her creatinine likely secondary to IV diuretics and is getting overnight hiatus. She does have underlying CKD though. Can be initiated on oral Bumex once renal function stabilized. I tried to keep her off oxygen for a few minutes and was able to tolerated well without desaturation. Rest of the GDMT including Entresto, Toprol, hydralazine nitrate combination should be continued. From a CAD standpoint she should be on high intensity statin therapy with atorvastatin 40 mg.  Plavix may be continued for now. She wants to get cataract surgery done in short-term future.   From a cardiac standpoint that would be reasonable. Plavix can be stopped 7 days prior to procedure and can be reinitiated after the procedure. There is no clinical concern of superimposed ischemia at this point of time. She has had a heart catheterization about a year ago with nonrevascularizable circumflex territory and well revascularized LAD and RCA territories. Clinically I do not think she has ischemia driven heart failure since she has responded promptly to IV diuretic therapy. [x]    High complexity decision making was performed  []    Patient is at high-risk of decompensation with multiple organ involvement      Investigations personally reviewed by me  Cardiac catheterization February 2020: Severe native three-vessel disease. Patent LIMA to LAD and vein graft to RCA. Right PDA small diffusely diseased vessel. Left circumflex is chronically occluded and branches are filling by collaterals. Small diagonal is severely diffusely diseased. ECG January 2020: Sinus rhythm, prior inferior infarct, narrow QRS complex, single PVC noted. Echo June 2020: Severe LV systolic dysfunction. Overall EF about 30%. Global hypokinesis. Mid and basal inferolateral walls appear to be severely hypokinetic with possible prior infarct based on hyperechoic nature of this wall. Based on color Doppler it appears to be significant mitral regurgitation which is predominantly central with some degree of posteriorly directed nature of the jet. Mechanism of mitral regurgitation appears to be annular dilatation and lack of Mal coaptation secondary to predominantly posterior leaflet tethering. Moderate pulmonary hypertension is noted. Echocardiogram December 2020: Status post MitraClip, mitral regurgitation reduced from severe to mild to moderate. Clip seen expected position and secured. Overall LV systolic function about 25 to 30%. Moderate biatrial enlargement.   Inferolateral wall appears to be severely hypokinetic and the rest of the areas are moderate ly hypokinetic.mean transmitral gradient was 3 mmHg  Echocardiogram May 2021: EF 30 to 35%. Moderate to severe biatrial enlargement. Inferolateral wall is hypokinetic rest of the areas are moderately hypokinetic. Mild to moderate residual posterior directed mitral regurgitation. Mean transmitral gradient is about 4 to 5 mmHg at heart rate of about 100 bpm.      HPI: Vicky Cummings, a 65y.o. year-old with h/o mitral valve disease, CAD with CAB x 3 in 1997 then subsequent stents X 4, HTN, HLD, palpitations, ICD 2007, DM, hypothyroid, COPD, EWS-wears BiPAP, CKD, DVT 1997 left leg, right TKR, Lap band 2011. Ms. Eyad Brown is seen for acute hypoxemic respiratory failure caused by acute on chronic systolic congestive heart failure. She is on relatively good medical therapy and is being followed by advanced heart failure services. On her prior visit with advanced heart failure team previous Friday she was doing very well. Over the weekend she gained about 4 pounds weight. She was exposed to some kind of smoke which provoked excessive coughing on Saturday eventually led to further increasing shortness of breath and orthopnea on Sunday requiring hospitalization. At the time of initial arrival she was in acute hypoxemic respiratory failure with sats of 76%. She required both BiPAP/noninvasive positive pressure ventilation treatment as well as IV Bumex 2 get her out of congestive heart failure. She now reports significant improvement in symptoms. She  has a past medical history of Adverse effect of anesthesia, Arthritis, Asthma, CAD (coronary artery disease), Chronic kidney disease, Chronic obstructive pulmonary disease ([address] Chronic pain, Coagulation disorder ([address] Congestive heart failure ([address] Diabetes ([address] Hypertension, Morbid obesity ([address] Sleep apnea (1996), Thromboembolus ([address] and Thyroid disease. Review of system:Patient reports no PND/Orthpnea/CP.  sHe reports no cough/fever/focal neurological deficits/abdominal pain. All other systems negative except as above. Family History   Problem Relation Age of Onset    Hypertension Mother     Dementia Mother     Coronary Artery Disease Father 60    Sudden Death Father 58    Diabetes Son     Diabetes Brother       Social History     Socioeconomic History    Marital status:      Spouse name: Not on file    Number of children: Not on file    Years of education: Not on file    Highest education level: Not on file   Tobacco Use    Smoking status: Former Smoker     Packs/day: 0.50     Years: 45.00     Pack years: 22.50     Types: Cigarettes     Quit date: 2010     Years since quittin.4    Smokeless tobacco: Never Used    Tobacco comment: quit /started again (smoked 3 yrs) off and on/none since    Vaping Use    Vaping Use: Never used   Substance and Sexual Activity    Alcohol use: Not Currently    Drug use: Not Currently    Sexual activity: Not Currently   Other Topics Concern     Social Determinants of Health     Financial Resource Strain:     Difficulty of Paying Living Expenses:    Food Insecurity:     Worried About Running Out of Food in the Last Year:     [address] in the Last Year:    Transportation Needs:     Lack of Transportation (Medical):      Lack of Transportation (Non-Medical):    Physical Activity:     Days of Exercise per Week:     Minutes of Exercise per Session:    Stress:     Feeling of Stress :    Social Connections:     Frequency of Communication with Friends and Family:     Frequency of Social Gatherings with Friends and Family:     Attends Jew Services:     Active Member of Clubs or Organizations:     Attends Club or Organization Meetings:     Marital Status:       PE  Vitals:    21 0700 21 0800 21 1126 21 1459   BP: 105/72 (!) 94/48 (!) 98/56 108/68   Pulse: 70 70 70 70   Resp: 15 14 17 19   Temp: 97.3 °F (36.3 °C)  97.5 °F (36.4 °C) 98.1 °F (36.7 °C)   SpO2: 92% 98%  95%   Weight: 217 lb 14.4 oz (98.8 kg)      Height:        Body mass index is 38.6 kg/m².     Recent Labs:  Lab Results   Component Value Date/Time    Cholesterol, total 144 2021 03:51 AM    HDL Cholesterol 72 2021 03:51 AM    LDL, calculated 55.8 2021 03:51 AM    Triglyceride 81 2021 03:51 AM    CHOL/HDL Ratio 2.0 2021 03:51 AM     Lab Results   Component Value Date/Time    Creatinine 2.24 (H) 2021 03:51 AM     Lab Results   Component Value Date/Time    BUN 41 (H) 2021 03:51 AM     Lab Results   Component Value Date/Time    Potassium 4.2 2021 03:51 AM     Lab Results   Component Value Date/Time    Hemoglobin A1c 7.3 (H) 2021 04:23 AM     Lab Results   Component Value Date/Time    HGB 12.3 2021 04:23 AM     Lab Results   Component Value Date/Time    PLATELET 377  04:23 AM       Reviewed:  Past Medical History:   Diagnosis Date    Adverse effect of anesthesia     hard to wake up/uses BIPAP/\"try to avoid general if possible\"/intubated in past prior to going to sleep and it caused pt to be incontinent    Arthritis     Asthma     CAD (coronary artery disease)     Chronic kidney disease     elevataed creatinine    Chronic obstructive pulmonary disease (HCC)     Chronic pain     arthritis    Coagulation disorder ([address]     on plavix    Congestive heart failure (HCC)     Diabetes ([address]     Hypertension     Morbid obesity ([address]     Sleep apnea     BIPAP with 2 liters oxygen    Thromboembolus ([address]     after heart surgery - left leg    Thyroid disease      Social History     Tobacco Use   Smoking Status Former Smoker    Packs/day: 0.50    Years: 45.00    Pack years: 22.50    Types: Cigarettes    Quit date: 2010    Years since quittin.4   Smokeless Tobacco Never Used   Tobacco Comment    quit /started again (smoked 3 yrs) off and on/none since      Social History     Substance and Sexual Activity   Alcohol Use Not Currently     Allergies   Allergen Reactions    Crestor [Rosuvastatin] Other (comments)     Causes muscle cramps    Lisinopril Cough    Nsaids (Non-Steroidal Anti-Inflammatory Drug) Other (comments)     Liver and Kidney     Family History   Problem Relation Age of Onset    Hypertension Mother     Dementia Mother     Coronary Artery Disease Father 58    Sudden Death Father 58    Diabetes Son     Diabetes Brother         Current Facility-Administered Medications   Medication Dose Route Frequency    gabapentin (NEURONTIN) capsule 200 mg  200 mg Oral BID    [START ON 6/3/2021] insulin glargine (LANTUS) injection 15 Units  15 Units SubCUTAneous DAILY    albumin human 25% (BUMINATE) solution 12.5 g  12.5 g IntraVENous BID    albuterol-ipratropium (DUO-NEB) 2.5 MG-0.5 MG/3 ML  3 mL Nebulization Q6H PRN    ketorolac (ACULAR) 0.5 % ophthalmic solution 1 Drop  1 Drop Right Eye QID    ofloxacin (FLOXIN) 0.3 % ophthalmic solution 1 Drop  1 Drop Right Eye QID    prednisoLONE acetate (PRED FORTE) 1 % ophthalmic suspension 1 Drop  1 Drop Right Eye QID    heparin (porcine) injection 5,000 Units  5,000 Units SubCUTAneous Q8H    levothyroxine (SYNTHROID) tablet 100 mcg  100 mcg Oral ACB    hydrALAZINE (APRESOLINE) tablet 50 mg  50 mg Oral TID    allopurinoL (ZYLOPRIM) tablet 100 mg  100 mg Oral DAILY    aspirin chewable tablet 81 mg  81 mg Oral DAILY    atorvastatin (LIPITOR) tablet 80 mg  80 mg Oral QHS    clopidogreL (PLAVIX) tablet 75 mg  75 mg Oral DAILY    ezetimibe (ZETIA) tablet 10 mg  10 mg Oral DAILY    isosorbide mononitrate ER (IMDUR) tablet 30 mg  30 mg Oral 7am    metoprolol succinate (TOPROL-XL) XL tablet 12.5 mg  12.5 mg Oral DAILY    [Held by provider] bumetanide (BUMEX) injection 1 mg  1 mg IntraVENous BID    glucose chewable tablet 16 g  4 Tablet Oral PRN    dextrose (D50W) injection syrg 12.5-25 g  25-50 mL IntraVENous PRN    glucagon (GLUCAGEN) injection 1 mg  1 mg IntraMUSCular PRN    insulin lispro (HUMALOG) injection   SubCUTAneous AC&HS     /68 (BP 1 Location: Left arm, BP Patient Position: At rest)   Pulse 70   Temp 98.1 °F (36.7 °C)   Resp 19   Ht 5' 3\" (1.6 m)   Wt 217 lb 14.4 oz (98.8 kg)   SpO2 95%   BMI 38.60 kg/m²   General:    Alert, cooperative, no distress. Psychiatric:    Normal Mood and affect    Eye/ENT:      Pupils equal, No asymmetry, Conjunctival pink. Able to hear voice at normal amplitude   Lungs:      Visibly symmetric chest expansion, No palpable tenderness. Clear to auscultation bilaterally. Heart[de-identified]    Regular rate and rhythm, S1, S2 normal, no murmur, click, rub or gallop. No JVD, Normal palpable peripheral pulses. No cyanosis   Abdomen:     Soft, non-tender. Bowel sounds normal. No masses,  No      organomegaly. Extremities:   Extremities normal, atraumatic, no edema. Neurologic:   CN II-XII grossly intact. No gross focal deficits         Marie Purdy MD06/02/21     ATTENTION:   This medical record was transcribed using an electronic medical records/speech recognition system. Although proofread, it may and can contain electronic, spelling and other errors. Corrections may be executed at a later time. Please feel free to contact us for any clarifications as needed.     [facility] [place] [facility] [place]  [address], [address] [facility], [address] [place] [address]

## 2021-06-03 NOTE — PROGRESS NOTES
Bedside shift change report given to Hardy (oncoming nurse) by Audelia Mendes (offgoing nurse). Report included the following information SBAR, Kardex, Recent Results and Cardiac Rhythm AV paced.

## 2021-06-04 ENCOUNTER — PATIENT OUTREACH (OUTPATIENT)
Dept: CASE MANAGEMENT | Age: 71
End: 2021-06-04

## 2021-06-04 NOTE — PROGRESS NOTES
Care Transitions Initial Call    Call within 2 business days of discharge: yes    Patient: Servando Ortiz Patient : 1950 MRN: [id number]    Last Discharge [address]       Complaint Diagnosis Description Type Department Provider    21 Shortness of Breath Acute respiratory failure with hypercapnia ([facility] . .. ED to Hosp-Admission (Discharged) (ADMIT) Jazzmine Hilliard MD; Darshana Chase MD... Was this an external facility discharge? No     Challenges to be reviewed by the provider   Additional needs identified to be addressed with provider: yes  [facility] -6/3 CHF; Admits not always compliant with low sodium/DM diet. Method of communication with provider : chart routing    Discussed [phone] related testing which was not done at this time. Advance Care Planning:   Does patient have an Advance Directive: yes, reviewed and current. Inpatient Readmission Risk score: Unplanned Readmit Risk Score: 21    Was this a readmission? no   Patient stated reason for the admission: sob and weight gain    Patients top risk factors for readmission: level of motivation and medical condition-admits not always compliant with DM/low sodium diet   Interventions to address risk factors: Scheduled appointment with PCP- at 11:15am VENKATESH Amezquita, Scheduled appointment with Specialist-6/10 1 pm with VENKATESH Manning at [facility] and Obtained and reviewed discharge summary and/or continuity of care documents    Care Transition Nurse (CTN) contacted the patient by telephone to perform post hospital discharge assessment. Verified name and  with patient as identifiers. Provided introduction to self, and explanation of the CTN role. Reports she feels much better. No sob. Pulse ox about 93%. Slept well.  this am; /90; Weight 220.2lb. Ankles not swollen for first time in \"ages. \" Admits to not always following low salt/DM diet. Plans to try and plan meals in advance.  Encouragement given.    CTN reviewed discharge instructions, medical action plan and red flags with patient who verbalized understanding. Were discharge instructions available to patient? yes. Reviewed appropriate site of care based on symptoms and resources available to patient including: PCP, Specialist, Urgent Care Clinics, When to call 911 and [address]. Patient given an opportunity to ask questions and does not have any further questions or concerns at this time. The patient agrees to contact the PCP office for questions related to their healthcare. Medication reconciliation was performed with patient, who verbalizes understanding of administration of home medications. Advised obtaining a 90-day supply of all daily and as-needed medications. Referral to Pharm D needed: no     Home Health/Outpatient orders at discharge: [address]: na  Date of initial visit: na    Durable Medical Equipment ordered at discharge: none  [address] [facility] [address] received: na    Covid Risk Education    Educated patient about risk for severe COVID-19 due to risk factors according to CDC guidelines. CTN reviewed discharge instructions, medical action plan and red flag symptoms with the patient who verbalized understanding. Discussed COVID vaccination status: no. Education provided on COVID-19 vaccination as appropriate. Discussed exposure protocols and quarantine with CDC Guidelines. Patient was given an opportunity to verbalize any questions and concerns and agrees to contact CTN or health care provider for questions related to their healthcare. Was patient discharged with a pulse oximeter? no.     Discussed follow-up appointments. If no appointment was previously scheduled, appointment scheduling offered: yes. Is follow up appointment scheduled within 7 days of discharge? yes.    [facility] follow up appointment(s):   Future Appointments   Date Time Provider Skye Canada 6/7/2021 11:15 AM [facility]   6/10/2021  1:00 PM Rubi Simon, NP [facility]   7/5/2021 10:30 AM Jamar HOOD, NP [facility]   7/6/2021  1:00 PM [email or web address], RUDY SOUZA [facility]   7/6/2021  1:20 PM MD HARLEY Demarco [facility]   8/23/2021  2:00 PM Dominic Blankenship, VENKATESH [facility]   11/16/2021 10:00 AM ECHOTWO, RUDY SOUZA [facility]   11/16/2021 10:40 AM MD HARLEY Lira [facility] follow up appointment(s): na    Plan for follow-up call in 5-7 days based on severity of symptoms and risk factors. Plan for next call: symptom management-assess current symptoms, self management-following discharge instructions, follow up appointment-attended REYNALDO and f/u with specialist and medication management-taking meds as ordered. CTN provided contact information for future needs. Goals Addressed                 This Visit's Progress     Reduce risk of CHF exacerbations and complications. 6/4/21 [facility] 5/30-6/3 CHF exacerbation   Reviewed discharge instructions with patient   Reviewed meds, no new meds- change made in dosage of one of her meds.  Reviewed red flags: sob, weight gain, swelling in extremities, chest discomfort, fatigue,weakness,fever,nausea,vomiting,diarrhea.  Reminded to check weight q am and record. Notify cardio if gains > 3 lb in 2 days or more than 5 lb in one week.  Stressed importance of following low sodium/diabetic diet.  Encouraged walking q d for exercise.  REYNALDO with pcp 6/7 VENKATESH Amezquita.  F/u with VENKATESH Marcos 6/10 at [facility].  Given CTN contact info if questions/concerns.  CTN to check back in about a week. magnus

## 2021-06-07 ENCOUNTER — VIRTUAL VISIT (OUTPATIENT)
Dept: INTERNAL MEDICINE CLINIC | Age: 71
End: 2021-06-07
Payer: MEDICARE

## 2021-06-07 DIAGNOSIS — F41.9 ANXIETY: ICD-10-CM

## 2021-06-07 DIAGNOSIS — Z95.1 HX OF CABG: ICD-10-CM

## 2021-06-07 DIAGNOSIS — G47.30 SLEEP APNEA TREATED WITH NOCTURNAL BIPAP: ICD-10-CM

## 2021-06-07 DIAGNOSIS — Z95.810 ICD (IMPLANTABLE CARDIOVERTER-DEFIBRILLATOR) IN PLACE: ICD-10-CM

## 2021-06-07 DIAGNOSIS — I50.23 ACUTE ON CHRONIC SYSTOLIC CONGESTIVE HEART FAILURE (HCC): Primary | ICD-10-CM

## 2021-06-07 DIAGNOSIS — N18.32 STAGE 3B CHRONIC KIDNEY DISEASE (HCC): ICD-10-CM

## 2021-06-07 DIAGNOSIS — Z76.89 ENCOUNTER FOR SUPPORT AND COORDINATION OF TRANSITION OF CARE: ICD-10-CM

## 2021-06-07 PROCEDURE — G8427 DOCREV CUR MEDS BY ELIG CLIN: HCPCS | Performed by: NURSE PRACTITIONER

## 2021-06-07 PROCEDURE — 99442 PR PHYS/QHP TELEPHONE EVALUATION 11-20 MIN: CPT | Performed by: NURSE PRACTITIONER

## 2021-06-07 RX ORDER — CLOPIDOGREL BISULFATE 75 MG/1
TABLET ORAL
Qty: 90 TABLET | Refills: 0 | Status: SHIPPED | OUTPATIENT
Start: 2021-06-07 | End: 2021-09-08 | Stop reason: SDUPTHER

## 2021-06-07 NOTE — Clinical Note
Helena Roper, the hospital held her Rella Sleek due to renal function. She seems to have returned to baseline with creat 1.79, but GFR 28-34. Deferring to you for med restart. Advised pt you or the nurse will call to advise her to restart or not. She has a wt disparity from hospital d/c wt to home wt. But only with MARINA. Will see HF clinic Thursday, just wanted you to be aware though. Has appt to see you 7/5.

## 2021-06-07 NOTE — PROGRESS NOTES
Pt is here for   Chief Complaint   Patient presents with   [address]     5/30/2021 [facility] for CHF exacerbation. Sean Lanier PHONE CALL [phone] (     Denies pain at this time    1. Have you been to the ER, urgent care clinic since your last visit? Hospitalized since your last visit? Yes When: [facility] for CHF 5/30/2021    2. Have you seen or consulted any other health care providers outside of the [address] [place] [address] since your last visit? Include any pap smears or colon screening. No    Pt is currently taking Lasix 2 tabs once daily.     And Jardiance and entresto were d/c

## 2021-06-07 NOTE — PATIENT INSTRUCTIONS
Learning About Heart Failure Zones What are heart failure zones? Heart failure zones give you an easy way to see changes in your heart failure symptoms. They also tell you when you need to get help. Check every day to see which zone you are in. Green zone. You are doing well. This is where you want to be. · Your weight is stable. It's not going up or down. · You breathe easily. · You are sleeping well. You are able to lie flat without shortness of breath. · You can do your usual activities. Yellow zone. Be careful. Your symptoms are changing. Call your doctor. · You have new or increased shortness of breath. · You are dizzy or lightheaded, or you feel like you may faint. · You have sudden weight gain, such as more than 2 to 3 pounds in a day or 5 pounds in a week. (Your doctor may suggest a different range of weight gain.) · You have increased swelling in your legs, ankles, or feet. · You are so tired or weak that you can't do your usual activities. · You are not sleeping well. Shortness of breath wakes you up at night. You need extra pillows. Red zone. This is an emergency. Call 911. You have symptoms of sudden heart failure. For example: · You have severe trouble breathing. · You cough up pink, foamy mucus. · You have a new irregular or fast heartbeat. You have symptoms of a heart attack. These may include: · Chest pain or pressure, or a strange feeling in the chest. 
· Sweating. · Shortness of breath. · Nausea or vomiting. · Pain, pressure, or a strange feeling in the back, neck, jaw, or upper belly or in one or both shoulders or arms. · Lightheadedness or sudden weakness. · A fast or irregular heartbeat. If you have symptoms of a heart attack: After you call 911, the  may tell you to chew 1 adult-strength or 2 to 4 low-dose aspirin. Wait for an ambulance. Do not try to drive yourself. Follow-up care is a key part of your treatment and safety.  Be sure to make and go to all appointments, and call your doctor if you are having problems. It's also a good idea to know your test results and keep a list of the medicines you take. Where can you learn more? Go to http://www.gray.com/ Enter T174 in the search box to learn more about \"Learning About Heart Failure Zones. \" Current as of: August 31, 2020               Content Version: 12.8 © 2006-2021 [employer]wise, [employer]. Care instructions adapted under license by [employer] (which disclaims liability or warranty for this information). If you have questions about a medical condition or this instruction, always ask your healthcare professional. [address] any warranty or liability for your use of this information.

## 2021-06-07 NOTE — PROGRESS NOTES
Aliyah Moulton is a 65 y.o. female evaluated via audio only technology on 6/7/2021. Consent: She and/or her health care decision maker is aware that she may receive a bill for this audio only encounter, depending on her insurance coverage, and has provided verbal consent to proceed: Yes    I communicated with the patient and/or health care decision maker about the nature and details of the following:  Assessment & Plan:   Diagnoses and all orders for this visit:    1. Acute on chronic systolic congestive heart failure ([facility] [address]    2. ICD (implantable cardioverter-defibrillator) in place  -     clopidogreL (PLAVIX) 75 mg tab; Take 1 tablet by mouth once daily    3. Hx of CABG  -     clopidogreL (PLAVIX) 75 mg tab; Take 1 tablet by mouth once daily    4. Stage 3b chronic kidney disease ([facility] [address]    5. Anxiety    6. Sleep apnea treated with nocturnal BiPAP    7. Encounter for support and coordination of transition of care      Pt instructed to HOLD on returning to work, deferred to cardio to instruct pt when to return. Reviewed ways to stay calm, as SOB seems to trigger anxiety attacks. Ensured pt IS taking lasix 40 mg as last prescribed. Deferred jardiance restart to PCP, will send msg in CC about med restart. Seems last renal function back to pt baseline at 1.79 creat. Advised pt of concern for possible wt gain of ~ 8 lbs, but also on different scales. Pt will compare to HF clinic reading.   Lab Results   Component Value Date/Time    GFR est AA 34 (L) 06/03/2021 01:44 AM    GFR est non-AA 28 (L) 06/03/2021 01:44 AM    Creatinine 1.79 (H) 06/03/2021 01:44 AM    BUN 38 (H) 06/03/2021 01:44 AM    Sodium 136 06/03/2021 01:44 AM    Potassium 4.1 06/03/2021 01:44 AM    Chloride 104 06/03/2021 01:44 AM    CO2 28 06/03/2021 01:44 AM       Follow-up and Dispositions    · Return for keep appt with Darlene Boas on 7/5.         12  Subjective:   Aliyah Moulton is a 65 y.o. female who was seen for Transitions Of Care (5/30/2021 [facility] for CHF exacerbation. [place] PHONE CALL [phone] ()      Pt is here for hospital follow-up transition of care from 5/30 to 6/3 for SOB/MARINA with hand and abdominal swelling. Associated with tachycardia and diaphoresis. Retrospectively noticed urinating less with diuretic use. Contacted or attempted contact within two days of d/c by NN. Diagnosed with CHF exacerbation. Was given more diuretics and hydralazine. Instructed to schedule appt with PCP and Cardio. Reports feeling BETTER THAN when in hospital. Yesterday 222.2 and today 223.0, did NOT weigh at home initially with d/c. Taking lasix 40 mg and hydralazine 25 mg TID. Has job as  to return. Needs refill of plavix. Still holding jardiance per d/c instructions from hospital pending renal function. Prior to Admission medications    Medication Sig Start Date End Date Taking? Authorizing Provider   clopidogreL (PLAVIX) 75 mg tab Take 1 tablet by mouth once daily 6/7/21  Yes Guido Amezquita NP   furosemide (LASIX) 20 mg tablet Take 2 Tablets by mouth daily. 6/3/21  Yes Sunday Santana NP   gabapentin (NEURONTIN) 100 mg capsule Take 200 mg by mouth two (2) times a day. Yes Provider, Historical   allopurinoL (ZYLOPRIM) 100 mg tablet Take 1 tablet by mouth once daily 4/28/21  Yes Lo Mckeon NP   atorvastatin (LIPITOR) 80 mg tablet TAKE 1 TABLET BY MOUTH AT BEDTIME 4/22/21  Yes Sunday Santana NP   glipiZIDE (GLUCOTROL) 10 mg tablet Take 1 tablet by mouth twice daily with food 4/1/21  Yes Debora Benitez NP   ezetimibe (ZETIA) 10 mg tablet Take 1 tablet by mouth once daily 3/26/21  Yes Sunday Santana NP   clotrimazole-betamethasone (LOTRISONE) topical cream Apply  to affected area two (2) times a day. Patient taking differently: Apply  to affected area two (2) times daily as needed. 9/23/20  Yes Debora Benitez NP   Blood-Glucose Meter monitoring kit Use as directed.  Dx: E11.65 check sugar twice daily 8/25/20  Yes Collin Barrera Xuan [place]., [place]   albuterol (PROVENTIL HFA, VENTOLIN HFA, PROAIR HFA) 90 mcg/actuation inhaler Take 2 Puffs by inhalation every four (4) hours as needed. Yes Provider, Historical   acetaminophen (TYLENOL ARTHRITIS PAIN) 650 mg TbER Take 1,300 mg by mouth two (2) times a day. Yes Provider, Historical   aspirin 81 mg chewable tablet Take 81 mg by mouth daily. Yes Provider, Historical   hydrALAZINE (APRESOLINE) 25 mg tablet Take 1 Tab by mouth three (3) times daily. 5/7/21   Wing [place] Blankenship NP   levothyroxine (SYNTHROID) 100 mcg tablet Take 1 Tab by mouth Daily (before breakfast). 5/5/21   Ernesto Washington NP   Blood-Glucose Meter monitoring kit Check blood sugar daily. May substitute for insurance preferred meter 4/29/21   Ernesto Washington NP   glucose blood VI test strips (blood glucose test) strip Check blood sugar 2 times daily: E11.9 may substitute for insurance preferred strips. 4/29/21   Ernesto Washington NP   lancets misc Use as directed. Dx:check sugar once daily. E11.65 4/29/21   Ernesto Washington NP   clopidogreL (PLAVIX) 75 mg tab Take 1 tablet by mouth once daily 3/4/21 6/7/21  Ernesto Washington NP   lancets misc Check blood sugar 2 times daily. May substitute for insurance preferred lancets. 12/11/20   Lacey Camacho NP   glucose blood VI test strips (ASCENSIA AUTODISC VI, ONE TOUCH ULTRA TEST VI) strip E11.65 check sugar once daily 12/7/20   Arpan Carrasco MD   isosorbide mononitrate ER (IMDUR) 30 mg tablet Take 1 Tab by mouth every morning.  11/30/20   Antonino Mckeon NP   ketorolac (ACULAR) 0.5 % ophthalmic solution INSTILL 1 DROP 4 TIMES DAILY INTO EYE HAVING SURGERY START 2 DAYS PRIOR TO SURGERY 10/22/20   Provider, Historical   ofloxacin (FLOXIN) 0.3 % ophthalmic solution INSTILL 1 DROP 4 TIMES DAILY INTO SURGERY EYE START 2 DAYS PRIOR TO SURGERY 10/22/20   Provider, Historical   prednisoLONE acetate (PRED FORTE) 1 % ophthalmic suspension INSTILL 1 DROP 4 TIMES DAILY INTO SUREGRY EYE TAPER AS DIRECTED 10/22/20   Provider, Historical   metoprolol succinate (TOPROL-XL) 25 mg XL tablet Take 0.5 Tabs by mouth daily.  Hold for systolic BP less than 304 11/13/20   Hieu Schafer NP     Allergies   Allergen Reactions    Crestor [Rosuvastatin] Other (comments)     Causes muscle cramps    Lisinopril Cough    Nsaids (Non-Steroidal Anti-Inflammatory Drug) Other (comments)     Liver and Kidney       Patient Active Problem List   Diagnosis Code    Hx of CABG Z95.1    ICD (implantable cardioverter-defibrillator) in place Z95.810    H/O laparoscopic adjustable gastric banding Z98.84    Obesity, morbid ([facility] [address] E66.01    NYHA class 3 heart failure with reduced ejection fraction (HCC) I50.20    Peripheral vascular disease ([facility]) I73.9    Severe mitral regurgitation I34.0    Ischemic cardiomyopathy I25.5    Chronic heart failure with reduced ejection fraction and diastolic dysfunction ([facility]) I50.42    Sleep apnea treated with nocturnal BiPAP G47.30    Mitral regurgitation I34.0    CHF exacerbation ([facility]) I50.9    Acute on chronic respiratory failure ([facility]) J96.20     Patient Active Problem List    Diagnosis Date Noted    CHF exacerbation ([facility] [address] 05/30/2021    Acute on chronic respiratory failure ([address] 05/30/2021    Mitral regurgitation 11/12/2020    Ischemic cardiomyopathy 09/30/2020    Chronic heart failure with reduced ejection fraction and diastolic dysfunction ([address] 09/30/2020    Sleep apnea treated with nocturnal BiPAP 09/30/2020    Severe mitral regurgitation 08/31/2020    Peripheral vascular disease ([address] 02/25/2020    NYHA class 3 heart failure with reduced ejection fraction ([address] 01/30/2020    Hx of CABG 01/13/2020    ICD (implantable cardioverter-defibrillator) in place 01/13/2020    H/O laparoscopic adjustable gastric banding 01/13/2020    Obesity, morbid ([address] 01/13/2020     Current Outpatient Medications   Medication Sig Dispense Refill    clopidogreL (PLAVIX) 75 mg tab Take 1 tablet by mouth once daily 90 Tablet 0    furosemide (LASIX) 20 mg tablet Take 2 Tablets by mouth daily. 60 Tablet 2    gabapentin (NEURONTIN) 100 mg capsule Take 200 mg by mouth two (2) times a day.  allopurinoL (ZYLOPRIM) 100 mg tablet Take 1 tablet by mouth once daily 90 Tab 1    atorvastatin (LIPITOR) 80 mg tablet TAKE 1 TABLET BY MOUTH AT BEDTIME 90 Tab 3    glipiZIDE (GLUCOTROL) 10 mg tablet Take 1 tablet by mouth twice daily with food 180 Tab 0    ezetimibe (ZETIA) 10 mg tablet Take 1 tablet by mouth once daily 30 Tab 2    clotrimazole-betamethasone (LOTRISONE) topical cream Apply  to affected area two (2) times a day. (Patient taking differently: Apply  to affected area two (2) times daily as needed.) 30 g 0    Blood-Glucose Meter monitoring kit Use as directed. Dx: E11.65 check sugar twice daily 1 Kit 0    albuterol (PROVENTIL HFA, VENTOLIN HFA, PROAIR HFA) 90 mcg/actuation inhaler Take 2 Puffs by inhalation every four (4) hours as needed.  acetaminophen (TYLENOL ARTHRITIS PAIN) 650 mg TbER Take 1,300 mg by mouth two (2) times a day.  aspirin 81 mg chewable tablet Take 81 mg by mouth daily.  hydrALAZINE (APRESOLINE) 25 mg tablet Take 1 Tab by mouth three (3) times daily. 90 Tab 2    levothyroxine (SYNTHROID) 100 mcg tablet Take 1 Tab by mouth Daily (before breakfast). 90 Tab 0    Blood-Glucose Meter monitoring kit Check blood sugar daily. May substitute for insurance preferred meter 1 Kit 0    glucose blood VI test strips (blood glucose test) strip Check blood sugar 2 times daily: E11.9 may substitute for insurance preferred strips. 200 Strip 3    lancets misc Use as directed. Dx:check sugar once daily. E11.65 1 Each 11    lancets misc Check blood sugar 2 times daily. May substitute for insurance preferred lancets.  200 Each 3    glucose blood VI test strips (ASCENSIA AUTODISC VI, ONE TOUCH ULTRA TEST VI) strip E11.65 check sugar once daily 100 Strip 0    isosorbide mononitrate ER (IMDUR) 30 mg tablet Take 1 Tab by mouth every morning. 30 Tab 2    ketorolac (ACULAR) 0.5 % ophthalmic solution INSTILL 1 DROP 4 TIMES DAILY INTO EYE HAVING SURGERY START 2 DAYS PRIOR TO SURGERY      ofloxacin (FLOXIN) 0.3 % ophthalmic solution INSTILL 1 DROP 4 TIMES DAILY INTO SURGERY EYE START 2 DAYS PRIOR TO SURGERY      prednisoLONE acetate (PRED FORTE) 1 % ophthalmic suspension INSTILL 1 DROP 4 TIMES DAILY INTO SUREGRY EYE TAPER AS DIRECTED      metoprolol succinate (TOPROL-XL) 25 mg XL tablet Take 0.5 Tabs by mouth daily.  Hold for systolic BP less than 159 60 Tab 2     Allergies   Allergen Reactions    Crestor [Rosuvastatin] Other (comments)     Causes muscle cramps    Lisinopril Cough    Nsaids (Non-Steroidal Anti-Inflammatory Drug) Other (comments)     Liver and Kidney     Past Medical History:   Diagnosis Date    Adverse effect of anesthesia     hard to wake up/uses BIPAP/\"try to avoid general if possible\"/intubated in past prior to going to sleep and it caused pt to be incontinent    Arthritis     Asthma     CAD (coronary artery disease)     Chronic kidney disease     elevataed creatinine    Chronic obstructive pulmonary disease (HCC)     Chronic pain     arthritis    Coagulation disorder (HCC)     on plavix    Congestive heart failure (HCC)     Diabetes ([address]     Hypertension     Morbid obesity ([address]     Sleep apnea     BIPAP with 2 liters oxygen    Thromboembolus ([address]     after heart surgery - left leg    Thyroid disease      Past Surgical History:   Procedure Laterality Date    COLONOSCOPY N/A 2020    COLONOSCOPY   :- performed by Chayo Bryson MD at [address] HX ARTHROPLASTY  2105    knee - right    HX  SECTION      x3    HX CORONARY ARTERY BYPASS GRAFT  1997    3 vessels    HX CORONARY STENT PLACEMENT  2016    HX HERNIA REPAIR      HX IMPLANTABLE CARDIOVERTER DEFIBRILLATOR  HX KNEE REPLACEMENT Right 2015    once    [place] CARD[place] SUR[facility] PROCEDURE [place]  2018    cardiac cath - Left    [place] LAP GASTRIC BYPASS/KERLINE-EN-Y  2011    lap band per pt and not a gastric bypass     Family History   Problem Relation Age of Onset    Hypertension Mother     Dementia Mother     Coronary Artery Disease Father 58    Sudden Death Father 58    Diabetes Son     Diabetes Brother      Social History     Tobacco Use    Smoking status: Former Smoker     Packs/day: 0.50     Years: 45.00     Pack years: 22.50     Types: Cigarettes     Quit date: 2010     Years since quittin.4    Smokeless tobacco: Never Used    Tobacco comment: quit /started again (smoked 3 yrs) off and on/none since    Substance Use Topics    Alcohol use: Not Currently       ROS    I affirm this is a Patient-Initiated Episode with a Patient who has not had a related appointment within my department in the past 7 days or scheduled within the next 24 hours.     Total Time: minutes: 11-20 minutes    Note: not billable if this call serves to triage the patient into an appointment for the relevant concern      Baldemar Paul NP

## 2021-06-10 ENCOUNTER — OFFICE VISIT (OUTPATIENT)
Dept: CARDIOLOGY CLINIC | Age: 71
End: 2021-06-10

## 2021-06-10 ENCOUNTER — DOCUMENTATION ONLY (OUTPATIENT)
Dept: CARDIOLOGY CLINIC | Age: 71
End: 2021-06-10

## 2021-06-10 VITALS
OXYGEN SATURATION: 98 % | WEIGHT: 225.2 LBS | DIASTOLIC BLOOD PRESSURE: 76 MMHG | BODY MASS INDEX: 39.9 KG/M2 | SYSTOLIC BLOOD PRESSURE: 138 MMHG | HEIGHT: 63 IN | RESPIRATION RATE: 16 BRPM | TEMPERATURE: 98.7 F | HEART RATE: 74 BPM

## 2021-06-10 DIAGNOSIS — Z95.810 ICD (IMPLANTABLE CARDIOVERTER-DEFIBRILLATOR) IN PLACE: ICD-10-CM

## 2021-06-10 DIAGNOSIS — E66.01 OBESITY, MORBID (HCC): ICD-10-CM

## 2021-06-10 DIAGNOSIS — I10 ESSENTIAL HYPERTENSION: ICD-10-CM

## 2021-06-10 DIAGNOSIS — I25.5 ISCHEMIC CARDIOMYOPATHY: ICD-10-CM

## 2021-06-10 DIAGNOSIS — R06.02 SHORTNESS OF BREATH: ICD-10-CM

## 2021-06-10 DIAGNOSIS — I50.20 NYHA CLASS 3 HEART FAILURE WITH REDUCED EJECTION FRACTION (HCC): Primary | ICD-10-CM

## 2021-06-10 NOTE — PROGRESS NOTES
At request  of [facility] RN and patient, APRIL reached out to [employer] for Patient Assistance for Leti bender. Patient received a letter dated 11/6/2020 from [address] [place] [address], stating they were unable to provide the patient with the medication as she has [address] (LIS). SW contacted  [phone] and spoke with Gael Robin, she stated this patient was temporarily approved for patient assistance with   on 11//4/2020 and a supply was mailed to the patient 11/5/20. Their records show that the patient phoned  to discuss LIS and that even though she has the subsidy she still was unable to afford the medication.  informed patient she would need to send them a \"test claim\" from a pharmacy with her name, pharmacy name and medication and cost. Gael Robin stated they never received this document from the patient and after 6 months the case is automatically closed. SW contacted patient and notified her of above, she verbalized understanding. Gael Robin stated the patient can attempt to reapply for [employer] patient assistance now, but the general rule is that the patient has to wait 1 year to reapply, which would be in October 2021. Patient aware, but will attempt to reapply now and see if she is approved, if so she will follow steps above and/or wait until October 2021 to reapply. Patient will contact  if additional assistance is required.

## 2021-06-10 NOTE — LETTER
NOTIFICATION RETURN TO WORK / SCHOOL 
 
6/10/2021 2:43 PM 
 
Ms. Jeanette Ryan [address] [place] [address] [phone] To Whom It May Concern: 
 
Jeanette Ryan is currently under the care of [address] [place] [address]. She will return to work/school on: 6/11/21 with the following restrictions: She may work 4 hours this week, and is to assess her response to this. If she feels ok, then she may slowly increase her amount of work each week. She will need time off for frequent follow up and specialist appointments, diagnostic testing and labs. She should not do any strenuous activity, lift more than 10 lbs, or do any stooping or bending over. If there are questions or concerns please have the patient contact our office.  
 
 
 
Sincerely, 
 
 
Luna Dennis NP

## 2021-06-10 NOTE — Clinical Note
NOTIFICATION RETURN TO WORK / SCHOOL 
 
6/10/2021 2:52 PM 
 
Ms. Jeanette Ryan [address] [place], [place] [address] [phone] To Whom It May Concern: 
 
Jeanette Ryan is currently under the care of [address] [place] [address]. She will return to work/school on: *** If there are questions or concerns please have the patient contact our office.  
 
 
 
Sincerely, 
 
 
Luna Dennis NP

## 2021-06-10 NOTE — PROGRESS NOTES
[facility], [facility] Note    Patient name: Paradise Taylor  Patient : 1950  Patient MRN: [id number]  Date of service: 06/10/21    Primary care physician: Freedom Meeks NP  Primary [facility] cardiologist: Sanjana Spence MD      CHIEF COMPLAINT:  Chronic systolic heart failure  Chief Complaint   Patient presents with   [facility] Follow Up    Leg Swelling     ankles    Shortness of Breath     with exertion        PLAN:    Medical therapy for heart failure   Beta-blocker: Toprol 12.5mg daily   ACE/ARB/ARNi: intolerant due to renal failure   Spironolactone: intolerant due to hyperkalemia   SGLT2 inhibitor: was on jardiance- held on last admission d/t renal function. May consider restarting if Cr improves.     Diuretic: furosemide 40mg daily   Not on allopurinol or uloric, uric acid level 5.0 (21)   Continue ASA 81 mg and Plavix daily   Statin or PCSK9i: Continue current dose of atorvastatin and zetia   Continue IMDUR 30mg daily and hydralazine 25mg TID   Continue CPAP therapy    Diagnostics:   ICD interrogation done to evaluate thoracic impedance- appears stable from hospital discharge   Repeat Echo in August  · Equivocal PYP  · [employer] DSP (VUS)  · Routine HF labs: CBC, CMP, Mg, NT-Pro BNP    Nutrition, Lifestyle, and Home Management:   Reinforced heart healthy, low salt diet   Reinforced fluid intake 6 x 8oz glasses per day   Document in diary BP/HR before and 2 hours after taking medications and daily weights    ACP:    Advanced care plan present on file    Follow-ups and Referrals:   Follow-up with nutritionist for weight loss   Follow-up with primary cardiologist   Follow-up with EP cardiologist   Follow-up with PCP    Recommend flu and pneumonia as well as COVID vaccinations      [facility] in 2 weeks with NP/MD      IMPRESSION:  Fatigue  Shortness of breath  Volume overload  Chronic systolic heart failure  Stage D, NYHA class IIIA symptoms  Ischemic cardiomyopathy, LVEF 35-40%  CAD s/p CABG 1997 s/p PCI to DVG-RCA 2016  H/o severe MR s/p mitral clip in 2020  HTN  H/o VT s/p AICD  WES on Bipap  T2DM with neuropathy  Hypothyroidism  Obesity  HLD  Osteoarthritis   CKD4       CARDIAC IMAGING:  Echo 5/31/21- EF 25-30%, moderate MR post-clip  Echo 5/14/21- LVEF 35-40%, Mild MR post clip, mild TR, LVIDd 5.7cm, TAPSE 1.68, RVIDd 4.05cm   Echo 12/14/20 LVEF 30-35%, mild MR, LVIDd 6.09cm, RVIDd 3.98cm  Echo 6/19/20 LVEF 25-30%, Mod TR, severe MR, LVIDd 5.76cm, TAPSE 1.94cm, RVIDd 4.06cm     EKG 5/30/21 ST with PVCs, QRS 88ms  EKG 11/14/20 V paced, QRS 84     LHC 2/3/20- Severe native 3 vessel CAD. Patent VG to RCA and LIMA to LAD. LCx is collateral dependent and native rPDA and D1 have small disease in small vessels.      NST     ICD interrogation 5/10/21    HEMODYNAMICS:  RHC not done  CPEST not done  6MW not done    OTHER IMAGING:  CXR 5/30/2 Diffuse increased interstitial and airspace opacities with more  confluent airspace opacities in the bilateral lung bases likely representing  pulmonary edema.     CT chest not done     HISTORY OF PRESENT ILLNESS:  I had the pleasure of seeing Kianna Interiano in [facility] at [address] [place] [address]. Briefly, Kianna Interiano is a 65 y.o. female with h/o HTN, HLD, WES, Obesity (BMI 39), s/p gastric sleeve in 2011, T2DM, hypothyroidism, COPD, CAD s/p CABG in 1997, s/p PCI to [address] in 5/2015, ICM, VT, s/p AICD ([employer]), anxiety and depression. She more recently underwent MitraClip on 11/12/2020 and was evaluated for upgrade to [address] with Dr. Oksana Noland, however this was not done as her QRS was too narrow. Her most recent admission was 5/30/21-6/3/21 for CHF exacerbation. She was diuresed and her [facility] was stopped due to renal failure.       INTERVAL HISTORY:  Today, patient presents for post-discharge follow-up visit. She reports feeling well at home. Still have +SOB with exertion, but is able to perform home activities without problem. She walked to our clinic from parking lot, and takes her time, with some rest breaks due to dyspnea. Has been struggling with her weight. Just started weight watchers. Weight at home on day after discharge was 220.2lbs, and today was 222.2lbs. Patient denies abdominal bloating or change of appetite. Patient denies orthopnea, PND or nocturia. Patient denies irregular heart rate or palpitations. No presyncope or syncope. ICD has not fired. Patient denies other cardiac symptoms such as chest pain or leg pain with walking. Patient is compliant with fluid restriction and taking medications as prescribed. Patient manages his own medications. She works as a , 4 hours a day, wants to go back tomorrow. Then 3 days next week, then back to 5 days. REVIEW OF SYSTEMS:  Review of Systems   Constitutional: Negative for chills, diaphoresis, fever, malaise/fatigue and weight loss. HENT: Negative. Eyes: Negative. Respiratory: Positive for shortness of breath. Negative for cough. Cardiovascular: Positive for leg swelling. Negative for chest pain, palpitations, orthopnea, claudication and PND. Gastrointestinal: Negative for abdominal pain, constipation, diarrhea, nausea and vomiting. Genitourinary: Negative. Musculoskeletal: Positive for joint pain. Neurological: Negative for dizziness, weakness and headaches. Psychiatric/Behavioral: Negative.          PHYSICAL EXAM:  Visit Vitals  /76 (BP 1 Location: Right lower arm, BP Patient Position: Sitting, BP Cuff Size: Other (Comment)) Comment: using patient's home BP cuff  Comment (BP Cuff Size): wrist cuff   Pulse 74   Temp 98.7 °F (37.1 °C) (Oral)   Resp 16   Ht 5' 3\" (1.6 m)   Wt 225 lb 3.2 oz (102.2 kg)   SpO2 98%   BMI 39.89 kg/m²     Physical Exam  Constitutional:       General: She is not in acute distress. Appearance: Normal appearance. She is obese. She is not ill-appearing. HENT:      Head: Normocephalic and atraumatic. Eyes:      Conjunctiva/sclera: Conjunctivae normal.      Pupils: Pupils are equal, round, and reactive to light. Neck:      Vascular: No hepatojugular reflux or JVD. Cardiovascular:      Rate and Rhythm: Normal rate and regular rhythm. Pulses: Normal pulses. Heart sounds: Murmur heard. Systolic murmur is present with a grade of 3/6. No friction rub. No gallop. Pulmonary:      Effort: Pulmonary effort is normal. No respiratory distress. Breath sounds: Normal breath sounds. Abdominal:      General: Bowel sounds are normal. There is no distension. Palpations: Abdomen is soft. Tenderness: There is no abdominal tenderness. Musculoskeletal:         General: Normal range of motion. Cervical back: Neck supple. Right lower le+ Edema present. Left lower le+ Edema present. Skin:     General: Skin is warm and dry. Capillary Refill: Capillary refill takes less than 2 seconds. Neurological:      General: No focal deficit present. Mental Status: She is alert and oriented to person, place, and time. Psychiatric:         Mood and Affect: Mood normal.         Behavior: Behavior normal.         Thought Content:  Thought content normal.         Judgment: Judgment normal.          PAST MEDICAL HISTORY:  Past Medical History:   Diagnosis Date    Adverse effect of anesthesia     hard to wake up/uses BIPAP/\"try to avoid general if possible\"/intubated in past prior to going to sleep and it caused pt to be incontinent    Arthritis     Asthma     CAD (coronary artery disease)     Chronic kidney disease     elevataed creatinine    Chronic obstructive pulmonary disease (HCC)     Chronic pain     arthritis    Coagulation disorder ([facility] [address]     on plavix    Congestive heart failure ([facility] [address]     Diabetes ([facility] [address]     Hypertension     Morbid obesity ([facility] [address]     Sleep apnea     BIPAP with 2 liters oxygen    Thromboembolus (HCC)     after heart surgery - left leg    Thyroid disease        PAST SURGICAL HISTORY:  Past Surgical History:   Procedure Laterality Date    COLONOSCOPY N/A 2020    COLONOSCOPY   :- performed by Shaquille Mack MD at \[facility]"" [address]  [address]    knee - right    HX  SECTION      x3    HX CORONARY ARTERY BYPASS GRAFT  1997    3 vessels    HX CORONARY STENT PLACEMENT  2016    HX HERNIA REPAIR  1988    HX IMPLANTABLE CARDIOVERTER DEFIBRILLATOR      HX KNEE REPLACEMENT Right 2015    once    [place] CARDIAC SURG PROCEDURE UNLIST  2018    cardiac cath - Left    [place] LAP GASTRIC BYPASS/KERLINE-EN-Y  2011    lap band per pt and not a gastric bypass       FAMILY HISTORY:  Family History   Problem Relation Age of Onset    Hypertension Mother     Dementia Mother     Coronary Artery Disease Father 58    Sudden Death Father 58    Diabetes Son     Diabetes Brother        SOCIAL HISTORY:  Social History     Socioeconomic History    Marital status:      Spouse name: Not on file    Number of children: Not on file    Years of education: Not on file    Highest education level: Not on file   Tobacco Use    Smoking status: Former Smoker     Packs/day: 0.50     Years: 45.00     Pack years: 22.50     Types: Cigarettes     Quit date: 2010     Years since quittin.4    Smokeless tobacco: Never Used    Tobacco comment: quit /started again (smoked 3 yrs) off and on/none since    Vaping Use    Vaping Use: Never used   Substance and Sexual Activity    Alcohol use: Not Currently    Drug use: Not Currently    Sexual activity: Not Currently   Other Topics Concern     Social Determinants of Health     Financial Resource Strain:     Difficulty of Paying Living Expenses:    Food Insecurity:     Worried About Running Out of Food in the Last Year:     Ran Out of Food in the Last Year:    Transportation Needs:     Lack of Transportation (Medical):  Lack of Transportation (Non-Medical):    Physical Activity:     Days of Exercise per Week:     Minutes of Exercise per Session:    Stress:     Feeling of Stress :    Social Connections:     Frequency of Communication with Friends and Family:     Frequency of Social Gatherings with Friends and Family:     Attends Congregation Services:     Active Member of Clubs or Organizations:     Attends Club or Organization Meetings:     Marital Status:        LABORATORY RESULTS:  Labs Latest Ref Rng & Units 6/3/2021 6/2/2021 6/1/2021 5/31/2021 5/30/2021 5/4/2021   WBC 3.6 - 11.0 K/uL 7.7 - - 11. 4(H) 19. 1(H) -   RBC 3.80 - 5.20 M/uL 3.41(L) - - 3.85 3.98 -   Hemoglobin 11.5 - 16.0 g/dL 11. 0(L) - - 12.3 12.6 -   Hematocrit 35.0 - 47.0 % 34. 0(L) - - 37.8 39.7 -   MCV 80.0 - 99.0 FL 99. 7(H) - - 98.2 99. 7(H) -   Platelets 223 - 896 K/uL 218 - - 231 256 -   Lymphocytes 12 - 49 % - - - 13 24 -   Monocytes 5 - 13 % - - - 4(L) 5 -   Eosinophils 0 - 7 % - - - 0 2 -   Basophils 0 - 1 % - - - 0 1 -   Albumin 3.5 - 5.0 g/dL 3.2(L) 3. 2(L) 2. 5(L) 3. 2(L) 3.8 -   Calcium 8.5 - 10.1 MG/DL 10.1 10. 2(H) 8.7 9.9 10. 6(H) -   Glucose 65 - 100 mg/dL 196(H) 188(H) 150(H) 157(H) 184(H) -   BUN 6 - 20 MG/DL 38(H) 41(H) 42(H) 33(H) 34(H) -   Creatinine 0.55 - 1.02 MG/DL 1.79(H) 2.24(H) 1.79(H) 1.75(H) 1.94(H) -   Sodium 136 - 145 mmol/L 136 139 141 140 137 -   Potassium 3.5 - 5.1 mmol/L 4.1 4.2 4.6 4.6 3.7 -   TSH 0.36 - 3.74 uIU/mL - - - - - 0.64   Some recent data might be hidden       ALLERGY:  Allergies   Allergen Reactions    Crestor [Rosuvastatin] Other (comments)     Causes muscle cramps    Lisinopril Cough    Nsaids (Non-Steroidal Anti-Inflammatory Drug) Other (comments)     Liver and Kidney        CURRENT MEDICATIONS:    Current Outpatient Medications:     clopidogreL (PLAVIX) 75 mg tab, Take 1 tablet by mouth once daily, Disp: 90 Tablet, Rfl: 0    furosemide (LASIX) 20 mg tablet, Take 2 Tablets by mouth daily. , Disp: 60 Tablet, Rfl: 2    gabapentin (NEURONTIN) 100 mg capsule, Take 200 mg by mouth two (2) times a day., Disp: , Rfl:     hydrALAZINE (APRESOLINE) 25 mg tablet, Take 1 Tab by mouth three (3) times daily. , Disp: 90 Tab, Rfl: 2    levothyroxine (SYNTHROID) 100 mcg tablet, Take 1 Tab by mouth Daily (before breakfast). , Disp: 90 Tab, Rfl: 0    Blood-Glucose Meter monitoring kit, Check blood sugar daily. May substitute for insurance preferred meter, Disp: 1 Kit, Rfl: 0    glucose blood VI test strips (blood glucose test) strip, Check blood sugar 2 times daily: E11.9 may substitute for insurance preferred strips. , Disp: 200 Strip, Rfl: 3    lancets misc, Use as directed. Dx:check sugar once daily. E11.65, Disp: 1 Each, Rfl: 11    allopurinoL (ZYLOPRIM) 100 mg tablet, Take 1 tablet by mouth once daily, Disp: 90 Tab, Rfl: 1    atorvastatin (LIPITOR) 80 mg tablet, TAKE 1 TABLET BY MOUTH AT BEDTIME, Disp: 90 Tab, Rfl: 3    glipiZIDE (GLUCOTROL) 10 mg tablet, Take 1 tablet by mouth twice daily with food, Disp: 180 Tab, Rfl: 0    ezetimibe (ZETIA) 10 mg tablet, Take 1 tablet by mouth once daily, Disp: 30 Tab, Rfl: 2    lancets misc, Check blood sugar 2 times daily. May substitute for insurance preferred lancets. , Disp: 200 Each, Rfl: 3    glucose blood VI test strips (ASCENSIA AUTODISC VI, ONE TOUCH ULTRA TEST VI) strip, E11.65 check sugar once daily, Disp: 100 Strip, Rfl: 0    isosorbide mononitrate ER (IMDUR) 30 mg tablet, Take 1 Tab by mouth every morning., Disp: 30 Tab, Rfl: 2    ketorolac (ACULAR) 0.5 % ophthalmic solution, INSTILL 1 DROP 4 TIMES DAILY INTO EYE HAVING SURGERY START 2 DAYS PRIOR TO SURGERY, Disp: , Rfl:     ofloxacin (FLOXIN) 0.3 % ophthalmic solution, INSTILL 1 DROP 4 TIMES DAILY INTO SURGERY EYE START 2 DAYS PRIOR TO SURGERY, Disp: , Rfl:     prednisoLONE acetate (PRED FORTE) 1 % ophthalmic suspension, INSTILL 1 DROP 4 TIMES DAILY INTO SUREGRY EYE TAPER AS DIRECTED, Disp: , Rfl:     metoprolol succinate (TOPROL-XL) 25 mg XL tablet, Take 0.5 Tabs by mouth daily. Hold for systolic BP less than 411, Disp: 60 Tab, Rfl: 2    clotrimazole-betamethasone (LOTRISONE) topical cream, Apply  to affected area two (2) times a day. (Patient taking differently: Apply  to affected area two (2) times daily as needed.), Disp: 30 g, Rfl: 0    Blood-Glucose Meter monitoring kit, Use as directed. Dx: E11.65 check sugar twice daily, Disp: 1 Kit, Rfl: 0    albuterol (PROVENTIL HFA, VENTOLIN HFA, PROAIR HFA) 90 mcg/actuation inhaler, Take 2 Puffs by inhalation every four (4) hours as needed. , Disp: , Rfl:     acetaminophen (TYLENOL ARTHRITIS PAIN) 650 mg TbER, Take 1,300 mg by mouth two (2) times a day., Disp: , Rfl:     aspirin 81 mg chewable tablet, Take 81 mg by mouth daily. , Disp: , Rfl:     Thank you for your referral and allowing me to participate in this patient's care. Brittny Marcos, MSN, [address], [address]  Phone: [phone]  Fax: [phone]    PATIENT CARE TEAM:  Patient Care Team:  Henna Jewell, NP as PCP - General (Nurse Practitioner)  Henna Jewell, NP as PCP - [facility]  Magaly Hart MD (Cardiology)  Aurora Etienne MD (Gastroenterology)  Ivana Curtis MD as Consulting Provider (Cardiology)  Alexandro Retana RN as Care Transitions Nurse (Family Medicine)

## 2021-06-10 NOTE — PATIENT INSTRUCTIONS
Medication changes:    No changes to your medicaitons      Testing Ordered: We will repeat labs today and will call you when we get the results. Other Recommendations: You can return to work tomorrow (6/11/21) for a 4 hr shift, and see how you feel. If you feel ok, then you can do 3 days next week. Ensure your drinking an adequate amount of water with a goal of 6-8 eight ounce glasses (1.5-2 liters) of fluid daily. Your urine should be clear and light yellow straw colored. If your blood pressure begins to consistently run below 90/60 and/or you begin to experience dizziness or lightheadedness, please contact the [place] [address] at [phone]. Follow up in 2 weeks with [facility]      Please monitor your weights daily upon waking and after using the bathroom. Keep a written records of your weights and bring to your next appointment. If you have a weight gain of 3 or more pounds overnight OR 5 or more pounds in one week please contact our office. Thank you for allowing us the privilege of being a part of your healthcare team! Please do not hesitate to contact our office at [phone] with any questions or concerns. Virtual Heart Failure [address] invites you to learn more about heart failure and to share your questions, ideas, and experiences with others. Each month, the Heart Failure Support Group features a new educational topic and a guest speaker, followed by an interactive discussion. Our Heart Failure Nurse Navigator will moderate each session. You will be able to participate by phone, tablet or computer through [address] [place] [address]. This support group takes place on the 3rd Thursday of each month from 6:00-7:30PM. All individuals living with heart failure and their caregivers are welcome to join.      If you are interested in participating, please contact us at [email or web address] and you will be sent the link to join the [facility].

## 2021-06-11 ENCOUNTER — PATIENT MESSAGE (OUTPATIENT)
Dept: CARDIOLOGY CLINIC | Age: 71
End: 2021-06-11

## 2021-06-11 ENCOUNTER — TELEPHONE (OUTPATIENT)
Dept: CARDIOLOGY CLINIC | Age: 71
End: 2021-06-11

## 2021-06-11 RX ORDER — NITROGLYCERIN 0.3 MG/1
0.4 TABLET SUBLINGUAL
Qty: 1 BOTTLE | Refills: 0 | Status: SHIPPED | OUTPATIENT
Start: 2021-06-11

## 2021-06-11 NOTE — TELEPHONE ENCOUNTER
Requested Prescriptions     Signed Prescriptions Disp Refills    nitroglycerin (NITROSTAT) 0.3 mg SL tablet 1 Bottle 0     Si Tablet by SubLINGual route every five (5) minutes as needed for Chest Pain.      Authorizing Provider: Bc Billings     Ordering User: Horace Asif     Last ov 6/10 Next ov

## 2021-06-15 ENCOUNTER — TELEPHONE (OUTPATIENT)
Dept: CARDIOLOGY CLINIC | Age: 71
End: 2021-06-15

## 2021-06-15 NOTE — TELEPHONE ENCOUNTER
Faxed over cardiac clearance for patients upcoming procedure tomorrow 6/15/21. Aleja Lorenzo RN     Fax confirmation received. Aleja Lorenzo RN     Called patient using two patient identifiers. Notified patient that form has been faxed. Patient stated understanding and had no further questions.  Aleja Lorenzo RN

## 2021-06-16 ENCOUNTER — PATIENT OUTREACH (OUTPATIENT)
Dept: CASE MANAGEMENT | Age: 71
End: 2021-06-16

## 2021-06-17 NOTE — PROGRESS NOTES
Called and spoke to patient. Goals      Reduce risk of CHF exacerbations and complications. 6/4/21 [facility] 5/30-6/3 CHF exacerbation   Reviewed discharge instructions with patient   Reviewed meds, no new meds- change made in dosage of one of her meds.  Reviewed red flags: sob, weight gain, swelling in extremities, chest discomfort, fatigue,weakness,fever,nausea,vomiting,diarrhea.  Reminded to check weight q am and record. Notify cardio if gains > 3 lb in 2 days or more than 5 lb in one week.  Stressed importance of following low sodium/diabetic diet.  Encouraged walking q d for exercise.  REYNALDO with pcp 6/7 VENKATESH Amezquita.  F/u with VENKATESH Luna 6/10 at [facility].  Given CTN contact info if questions/concerns.  CTN to check back in about a week. mbt  6/17/21  Reports she feels better, stronger. Walks a little slower, but doesn't get sob. Walking almost 3000 steps/day. Back to work, part time only. Had 2nd cataract surgery yesterday. Lasix was increased to 2 pills qd. Working on diet more- decreasing carbs and low salt. Does still have small amount of edema in lower extremities. Reminded to elevate legs when at rest.  No red flags noted. Call if concerns. magnus

## 2021-06-21 ENCOUNTER — TELEPHONE (OUTPATIENT)
Dept: INTERNAL MEDICINE CLINIC | Age: 71
End: 2021-06-21

## 2021-06-21 NOTE — TELEPHONE ENCOUNTER
Calling to see when/if pt has gotten labs done that cardio Dr. Mac Valenzuela ordered, no answer, LVM for callback

## 2021-06-23 ENCOUNTER — TELEPHONE (OUTPATIENT)
Dept: CARDIOLOGY CLINIC | Age: 71
End: 2021-06-23

## 2021-06-23 NOTE — TELEPHONE ENCOUNTER
I called patient to remind her appt tomorrow. Patient has a schedule conflict and requested to reschedule. She states she feels okay. She is rescheduled to 7/12/21 at 2pm with [facility].

## 2021-06-25 ENCOUNTER — PATIENT OUTREACH (OUTPATIENT)
Dept: CASE MANAGEMENT | Age: 71
End: 2021-06-25

## 2021-06-29 ENCOUNTER — PATIENT OUTREACH (OUTPATIENT)
Dept: CASE MANAGEMENT | Age: 71
End: 2021-06-29

## 2021-06-29 ENCOUNTER — TELEPHONE (OUTPATIENT)
Dept: CARDIOLOGY CLINIC | Age: 71
End: 2021-06-29

## 2021-06-29 NOTE — PROGRESS NOTES
Called and spoke to patient. Goals      Reduce risk of CHF exacerbations and complications. 6/4/21 [facility] 5/30-6/3 CHF exacerbation   Reviewed discharge instructions with patient   Reviewed meds, no new meds- change made in dosage of one of her meds.  Reviewed red flags: sob, weight gain, swelling in extremities, chest discomfort, fatigue,weakness,fever,nausea,vomiting,diarrhea.  Reminded to check weight q am and record. Notify cardio if gains > 3 lb in 2 days or more than 5 lb in one week.  Stressed importance of following low sodium/diabetic diet.  Encouraged walking q d for exercise.  REYNALDO with pcp 6/7 VENKATESH Amezquita.  F/u with NP Leela Vasquez 6/10 at [facility].  Given CTN contact info if questions/concerns.  CTN to check back in about a week. mbt  6/17/21  Reports she feels better, stronger. Walks a little slower, but doesn't get sob. Walking almost 3000 steps/day. Back to work, part time only. Had 2nd cataract surgery yesterday. Lasix was increased to 2 pills qd. Working on diet more- decreasing carbs and low salt. Does still have small amount of edema in lower extremities. Reminded to elevate legs when at rest.  No red flags noted. Call if concerns. mbt  6/29/21  Reports she feels sluggish today with the heat. Worked today. Has to stay hydrated. Weight was up 1/2 lb this am.  Eats low salt diet. Ankles a little puffy. Has changed her diet so eats main meal at noon and lighter meal for dinner. No sob. Reminded to monitor weight carefully. Notify cardio if gains > 3lb in 2 days or if gains > 5 lb in one week. CTN to check back in about a week. mbt

## 2021-06-29 NOTE — TELEPHONE ENCOUNTER
Spoke with talib at [email or web address] they stated they will fax over labs from 6/10/21. Provided [facility] fax number.  Tani Ballard RN

## 2021-06-30 ENCOUNTER — TELEPHONE (OUTPATIENT)
Dept: CARDIOLOGY CLINIC | Age: 71
End: 2021-06-30

## 2021-06-30 DIAGNOSIS — R06.02 SHORTNESS OF BREATH: ICD-10-CM

## 2021-06-30 DIAGNOSIS — I50.20 NYHA CLASS 3 HEART FAILURE WITH REDUCED EJECTION FRACTION (HCC): Primary | ICD-10-CM

## 2021-06-30 DIAGNOSIS — I50.22 SYSTOLIC CHF, CHRONIC (HCC): ICD-10-CM

## 2021-06-30 NOTE — TELEPHONE ENCOUNTER
Oli Webber NP  You 20 hours ago (5:32 PM)   Valente Beckwith  She was originally scheduled to see me for a 2 week follow up on 6/24, but rescheduled that appt and is now scheduled with Gissell Boyer on 7/12. Nani Liu can get repeat labs on her within the next week: whatever is more convenient for her ([facility] slip or orders to [facility] [place] [facility]).  Labs to get: CMP, NT Pro BNP, Mg.        Lab orders placed per Mohsen Rossi NP. Tani Ballard RN     Lab slips mailed to patients home address.  Tani Ballard RN

## 2021-07-02 DIAGNOSIS — E11.42 DIABETIC POLYNEUROPATHY ASSOCIATED WITH TYPE 2 DIABETES MELLITUS (HCC): Primary | ICD-10-CM

## 2021-07-02 NOTE — TELEPHONE ENCOUNTER
Pt left voice message requesting refills. Historical medication: Neurontin 100 mg     Last visit 06/07/2021 Virtual visit NP [place] Irizarry   Next appointment Pt canceled 07/05/2021 - Nothing rescheduled   Previous refill encounter(s)  04/01/2021 Glucotrol #180     No access to      Requested Prescriptions     Pending Prescriptions Disp Refills    glipiZIDE (GLUCOTROL) 10 mg tablet 180 Tablet 0     Sig: Take 1 tablet by mouth twice daily with food    gabapentin (NEURONTIN) 100 mg capsule 120 Capsule 0     Sig: Take 2 Capsules by mouth two (2) times a day. Max Daily Amount: 400 mg.

## 2021-07-05 RX ORDER — GLIPIZIDE 10 MG/1
TABLET ORAL
Qty: 180 TABLET | Refills: 0 | Status: SHIPPED | OUTPATIENT
Start: 2021-07-05 | End: 2021-10-04

## 2021-07-05 RX ORDER — GABAPENTIN 100 MG/1
200 CAPSULE ORAL 2 TIMES DAILY
Qty: 120 CAPSULE | Refills: 0 | Status: SHIPPED | OUTPATIENT
Start: 2021-07-05 | End: 2021-08-02 | Stop reason: SDUPTHER

## 2021-07-06 ENCOUNTER — TELEPHONE (OUTPATIENT)
Dept: INTERNAL MEDICINE CLINIC | Age: 71
End: 2021-07-06

## 2021-07-06 ENCOUNTER — CLINICAL SUPPORT (OUTPATIENT)
Dept: CARDIOLOGY CLINIC | Age: 71
End: 2021-07-06
Payer: MEDICARE

## 2021-07-06 ENCOUNTER — OFFICE VISIT (OUTPATIENT)
Dept: CARDIOLOGY CLINIC | Age: 71
End: 2021-07-06

## 2021-07-06 VITALS
HEART RATE: 76 BPM | WEIGHT: 228 LBS | SYSTOLIC BLOOD PRESSURE: 108 MMHG | DIASTOLIC BLOOD PRESSURE: 52 MMHG | BODY MASS INDEX: 40.4 KG/M2 | HEIGHT: 63 IN

## 2021-07-06 DIAGNOSIS — I50.22 SYSTOLIC CHF, CHRONIC (HCC): ICD-10-CM

## 2021-07-06 DIAGNOSIS — E66.01 OBESITY, MORBID (HCC): ICD-10-CM

## 2021-07-06 DIAGNOSIS — Z95.1 HX OF CABG: ICD-10-CM

## 2021-07-06 DIAGNOSIS — I25.5 ISCHEMIC CARDIOMYOPATHY: ICD-10-CM

## 2021-07-06 DIAGNOSIS — I34.0 NONRHEUMATIC MITRAL VALVE REGURGITATION: ICD-10-CM

## 2021-07-06 DIAGNOSIS — Z95.810 AUTOMATIC IMPLANTABLE CARDIAC DEFIBRILLATOR IN SITU: Primary | ICD-10-CM

## 2021-07-06 PROCEDURE — 1101F PT FALLS ASSESS-DOCD LE1/YR: CPT | Performed by: INTERNAL MEDICINE

## 2021-07-06 PROCEDURE — G8754 DIAS BP LESS 90: HCPCS | Performed by: INTERNAL MEDICINE

## 2021-07-06 PROCEDURE — G9899 SCRN MAM PERF RSLTS DOC: HCPCS | Performed by: INTERNAL MEDICINE

## 2021-07-06 PROCEDURE — 1090F PRES/ABSN URINE INCON ASSESS: CPT | Performed by: INTERNAL MEDICINE

## 2021-07-06 PROCEDURE — 3017F COLORECTAL CA SCREEN DOC REV: CPT | Performed by: INTERNAL MEDICINE

## 2021-07-06 PROCEDURE — G8432 DEP SCR NOT DOC, RNG: HCPCS | Performed by: INTERNAL MEDICINE

## 2021-07-06 PROCEDURE — G8417 CALC BMI ABV UP PARAM F/U: HCPCS | Performed by: INTERNAL MEDICINE

## 2021-07-06 PROCEDURE — G8399 PT W/DXA RESULTS DOCUMENT: HCPCS | Performed by: INTERNAL MEDICINE

## 2021-07-06 PROCEDURE — G8427 DOCREV CUR MEDS BY ELIG CLIN: HCPCS | Performed by: INTERNAL MEDICINE

## 2021-07-06 PROCEDURE — G8536 NO DOC ELDER MAL SCRN: HCPCS | Performed by: INTERNAL MEDICINE

## 2021-07-06 PROCEDURE — 93283 PRGRMG EVAL IMPLANTABLE DFB: CPT | Performed by: INTERNAL MEDICINE

## 2021-07-06 PROCEDURE — 99214 OFFICE O/P EST MOD 30 MIN: CPT | Performed by: INTERNAL MEDICINE

## 2021-07-06 PROCEDURE — G8752 SYS BP LESS 140: HCPCS | Performed by: INTERNAL MEDICINE

## 2021-07-06 NOTE — PROGRESS NOTES
Cardiac Electrophysiology OFFICE Note     Subjective:      Grabiel Lujan is a 66 y.o. patient who is seen for follow up of [employer] dual chamber ICD (implanted at [facility] in 2014). Device check today shows proper lead & generator function. Generator longevity adequate. ICM, LVEF 15-20% via echo in 02/2020. NYHA II-III chronic systolic CHF. On appropriate GDMT. Has known COPD         ECHO ADULT COMPLETE 05/31/2021 5/31/2021    Interpretation Summary  · LV: Estimated LVEF is 25 - 30%. Severely dilated left ventricle. Mild concentric hypertrophy. Severely and globally reduced systolic function. Inconclusive left ventricular diastolic function. · LA: Moderately dilated left atrium. · RA: Mildly dilated right atrium. · MV: Moderate mitral valve regurgitation is present. · Image quality for this study was suboptimal.    Signed by: Linnette Lu MD on 5/31/2021 12:34 PM      Previous:  Echo (02/05/2020): LVEF 15-20%. Severely dilated LA. Mild to mod MR. Mild TR. Nuclear amyloid scan (02/04/2020): Grade 1 activity & H/CL level equivocal for amyloidosis. LHC/RHC (02/03/2020): Severe native 3V disease. Patent SVG-RCA & LIMA-LAD. LCx collateral dependent, native rPDA & D1 have small disease in small vessels. Frequent PVCs. Admitted 01/30/2020-02/05/2020 for acute decompensated systolic CHF. NYHA IV on admit, NYHA III on discharge. Milrinone weaned. Diuresed. MDT single lead ICD implanted at [facility] 07/21/2014 by Dr. Chrissy Thomas. History VT. S/p PCI SVG-RCA 05/2015. CAD s/p CABG 1997. S/p gastric bypass.       Problem List  Date Reviewed: 7/8/2021        Codes Class Noted    CHF exacerbation ([facility] [address] ICD-10-CM: I50.9  ICD-9-CM: 428.0  5/30/2021        Acute on chronic respiratory failure [facility]) ICD-10-CM: J96.20  ICD-9-CM: 518.84  5/30/2021        Mitral regurgitation ICD-10-CM: I34.0  ICD-9-CM: 424.0  11/12/2020        Ischemic cardiomyopathy ICD-10-CM: I25.5  ICD-9-CM: 414.8 9/30/2020        Chronic heart failure with reduced ejection fraction and diastolic dysfunction (HCC) ICD-10-CM: I50.42  ICD-9-CM: 428.42  9/30/2020        Sleep apnea treated with nocturnal BiPAP ICD-10-CM: G47.30  ICD-9-CM: 780.57  9/30/2020        Severe mitral regurgitation ICD-10-CM: I34.0  ICD-9-CM: 424.0  8/31/2020        Peripheral vascular disease ([facility] ICD-10-CM: I73.9  ICD-9-CM: 443.9  2/25/2020        NYHA class 3 heart failure with reduced ejection fraction (HCC) ICD-10-CM: I50.20  ICD-9-CM: 428.9  1/30/2020        Hx of CABG ICD-10-CM: Z95.1  ICD-9-CM: V45.81  1/13/2020        ICD (implantable cardioverter-defibrillator) in place ICD-10-CM: Z95.810  ICD-9-CM: V45.02  1/13/2020        H/O laparoscopic adjustable gastric banding ICD-10-CM: Z98.84  ICD-9-CM: V45.86  1/13/2020        Obesity, morbid ([facility] ICD-10-CM: E66.01  ICD-9-CM: 278.01  1/13/2020              Current Outpatient Medications   Medication Sig Dispense Refill    glipiZIDE (GLUCOTROL) 10 mg tablet Take 1 tablet by mouth twice daily with food 180 Tablet 0    gabapentin (NEURONTIN) 100 mg capsule Take 2 Capsules by mouth two (2) times a day. Max Daily Amount: 400 mg. 120 Capsule 0    nitroglycerin (NITROSTAT) 0.3 mg SL tablet 1 Tablet by SubLINGual route every five (5) minutes as needed for Chest Pain. 1 Bottle 0    clopidogreL (PLAVIX) 75 mg tab Take 1 tablet by mouth once daily 90 Tablet 0    furosemide (LASIX) 20 mg tablet Take 2 Tablets by mouth daily. 60 Tablet 2    hydrALAZINE (APRESOLINE) 25 mg tablet Take 1 Tab by mouth three (3) times daily. 90 Tab 2    levothyroxine (SYNTHROID) 100 mcg tablet Take 1 Tab by mouth Daily (before breakfast). 90 Tab 0    Blood-Glucose Meter monitoring kit Check blood sugar daily. May substitute for insurance preferred meter 1 Kit 0    glucose blood VI test strips (blood glucose test) strip Check blood sugar 2 times daily: E11.9 may substitute for insurance preferred strips.  200 Strip 3    lancets misc Use as directed. Dx:check sugar once daily. E11.65 1 Each 11    allopurinoL (ZYLOPRIM) 100 mg tablet Take 1 tablet by mouth once daily 90 Tab 1    atorvastatin (LIPITOR) 80 mg tablet TAKE 1 TABLET BY MOUTH AT BEDTIME 90 Tab 3    ezetimibe (ZETIA) 10 mg tablet Take 1 tablet by mouth once daily 30 Tab 2    lancets misc Check blood sugar 2 times daily. May substitute for insurance preferred lancets. 200 Each 3    glucose blood VI test strips (ASCENSIA AUTODISC VI, ONE TOUCH ULTRA TEST VI) strip E11.65 check sugar once daily 100 Strip 0    isosorbide mononitrate ER (IMDUR) 30 mg tablet Take 1 Tab by mouth every morning. 30 Tab 2    ketorolac (ACULAR) 0.5 % ophthalmic solution INSTILL 1 DROP 4 TIMES DAILY INTO EYE HAVING SURGERY START 2 DAYS PRIOR TO SURGERY      ofloxacin (FLOXIN) 0.3 % ophthalmic solution INSTILL 1 DROP 4 TIMES DAILY INTO SURGERY EYE START 2 DAYS PRIOR TO SURGERY      prednisoLONE acetate (PRED FORTE) 1 % ophthalmic suspension INSTILL 1 DROP 4 TIMES DAILY INTO SUREGRY EYE TAPER AS DIRECTED      metoprolol succinate (TOPROL-XL) 25 mg XL tablet Take 0.5 Tabs by mouth daily. Hold for systolic BP less than 701 60 Tab 2    clotrimazole-betamethasone (LOTRISONE) topical cream Apply  to affected area two (2) times a day. (Patient taking differently: Apply  to affected area two (2) times daily as needed.) 30 g 0    Blood-Glucose Meter monitoring kit Use as directed. Dx: E11.65 check sugar twice daily 1 Kit 0    albuterol (PROVENTIL HFA, VENTOLIN HFA, PROAIR HFA) 90 mcg/actuation inhaler Take 2 Puffs by inhalation every four (4) hours as needed.  acetaminophen (TYLENOL ARTHRITIS PAIN) 650 mg TbER Take 1,300 mg by mouth two (2) times a day.  aspirin 81 mg chewable tablet Take 81 mg by mouth daily.        Allergies   Allergen Reactions    Crestor [Rosuvastatin] Other (comments)     Causes muscle cramps    Lisinopril Cough    Nsaids (Non-Steroidal Anti-Inflammatory Drug) Other (comments)     Liver and Kidney     Past Medical History:   Diagnosis Date    Adverse effect of anesthesia     hard to wake up/uses BIPAP/\"try to avoid general if possible\"/intubated in past prior to going to sleep and it caused pt to be incontinent    Arthritis     Asthma     CAD (coronary artery disease)     Chronic kidney disease     elevataed creatinine    Chronic obstructive pulmonary disease (HCC)     Chronic pain     arthritis    Coagulation disorder (HCC)     on plavix    Congestive heart failure (HCC)     Diabetes ([address]     Hypertension     Morbid obesity ([address]     Sleep apnea     BIPAP with 2 liters oxygen    Thromboembolus ([address]     after heart surgery - left leg    Thyroid disease      Past Surgical History:   Procedure Laterality Date    COLONOSCOPY N/A 2020    COLONOSCOPY   :- [facility] ENDOSCOPY    HX ARTHROPLASTY  2105    knee - right    HX  SECTION      x3    HX CORONARY ARTERY BYPASS GRAFT  1997    3 vessels    HX CORONARY STENT PLACEMENT  2016    HX HERNIA REPAIR  1988    HX IMPLANTABLE CARDIOVERTER DEFIBRILLATOR      HX KNEE REPLACEMENT Right 2015    once    SD CARDIAC SURG PROCEDURE UNLIST  2018    cardiac cath - Left    SD LAP GASTRIC BYPASS/KERLINE-EN-Y  2011    lap band per pt and not a gastric bypass     Family History   Problem Relation Age of Onset    Hypertension Mother     Dementia Mother     Coronary Artery Disease Father 58    Sudden Death Father 58    Diabetes Son     Diabetes Brother      Social History     Tobacco Use    Smoking status: Former Smoker     Packs/day: 0.50     Years: 45.00     Pack years: 22.50     Types: Cigarettes     Quit date: 2010     Years since quittin.5    Smokeless tobacco: Never Used    Tobacco comment: quit /started again (smoked 3 yrs) off and on/none since    Substance Use Topics    Alcohol use: Not Currently        Review of Systems: Constitutional: Negative for fever, chills, weight loss, + malaise/fatigue. HEENT: Negative for nosebleeds, vision changes. Respiratory: Negative for cough, hemoptysis. + chronic supplemental oxygen prn  Cardiovascular: Negative for chest pain, palpitations, + chronic 2 pillow orthopnea, no claudication, + leg swelling, no syncope, and no PND. + dyspnea with exertion  Gastrointestinal: Negative for nausea, vomiting, diarrhea, blood in stool and melena. Genitourinary: Negative for dysuria, and hematuria. Musculoskeletal: Negative for myalgias, + knee arthralgia. Skin: Negative for rash. Heme: Does not bleed or bruise easily. Neurological: Negative for speech change and focal weakness     Objective:     Visit Vitals  BP (!) 108/52   Pulse 76   Ht 5' 3\" (1.6 m)   Wt 228 lb (103.4 kg)   BMI 40.39 kg/m²        Physical Exam:   Constitutional: Well-developed and well-nourished. No respiratory distress. Head: Normocephalic and atraumatic. Eyes: Pupils are equal, round  ENT: Hearing normal.    Neck: Supple. No JVD present. Cardiovascular: Normal rate, regular rhythm. Exam reveals no gallop and no friction rub. No murmur heard. Pulmonary/Chest: Effort normal and breath sounds normal. No wheezes. Abdominal: Soft, no tenderness. Obese. Musculoskeletal: No edema. Neurological: Alert,oriented. Skin: Skin is warm and dry. Left chest ICD site well healed. Sternal scar well healed. Psychiatric: Normal mood and affect. Behavior is normal. Judgment and thought content normal.      EKG (01/30/2020):  SB 59 with occasional PVCs. Narrow QRS. Assessment/Plan:       ICD-10-CM ICD-9-CM    1. Automatic implantable cardiac defibrillator in situ  Z95.810 V45.02    2. Systolic CHF, chronic (HCC)  I50.22 428.22      428.0    3. Ischemic cardiomyopathy  I25.5 414.8    4. Obesity, morbid ([facility] [address]  E66.01 278.01    5. Hx of CABG  Z95.1 V45.81    6.  Nonrheumatic mitral valve regurgitation  I34.0 424.0        Ms. Barry Sheldon has ICM NYHA II-III chronic systolic CHF. Continue GDMT   toprol and hydralazine  bp is not high to increase more    Dual chamber ICD check today shows proper lead & generator function. Generator longevity adequate. 94% RA   ICD battery 3 years    Previous CABG, PCI. Cardiac cath did not show targets for revascularization. Remote ICD checks q 3 months. EP clinic follow up in 1 year. Follow up with heart failure clinic as previously scheduled. Future Appointments   Date Time Provider [place]   7/12/2021  2:00 PM Mónica Terrell, NP [facility] BS AMB   8/23/2021  2:00 PM Matilda Blankenship, NP [facility] BS AMB   10/11/2021  4:45 PM REMOTE1, RUDY SOUZA BS AMB   11/16/2021 10:00 AM ECHOTWO, RUDY SOUZA BS AMB   11/16/2021 10:40 AM MD HARLEY Wesley BS AMB   1/12/2022  4:00 PM REMOTE1, RUDY SOUZA BS AMB   4/13/2022 10:00 AM REMOTE1, RUDY SOUZA BS AMB   7/14/2022  1:00 PM [email or web address], RUDY SOUZA BS AMB   7/14/2022  1:20 PM MD HARLEY Gaitan BS AMB     Thank you for involving me in this patient's care and please call with further concerns or questions. Stas Alejandro M.D.   Electrophysiology/Cardiology  [facility] [place]  [address] [place] [address]                             [phone]

## 2021-07-07 ENCOUNTER — PATIENT OUTREACH (OUTPATIENT)
Dept: CASE MANAGEMENT | Age: 71
End: 2021-07-07

## 2021-07-07 NOTE — PROGRESS NOTES
Patient resolved from Transition of Care episode on 7/7/21. ACM/CTN was unsuccessful at contacting this patient today. Patient/family was provided the following resources and education related to COVID-19 during the initial call:                         Signs, symptoms and red flags related to COVID-19            CDC exposure and quarantine guidelines            [facility] exposure contact - [phone]            Contact for their local Department of Health                 Patient has not had any additional ED or hospital visits. No further outreach scheduled with this CTN/ACM. Episode of Care resolved. Patient has this CTN/ACM contact information if future needs arise.

## 2021-07-09 ENCOUNTER — TELEPHONE (OUTPATIENT)
Dept: CARDIOLOGY CLINIC | Age: 71
End: 2021-07-09

## 2021-07-09 DIAGNOSIS — I25.10 CORONARY ARTERY DISEASE INVOLVING NATIVE HEART WITHOUT ANGINA PECTORIS, UNSPECIFIED VESSEL OR LESION TYPE: ICD-10-CM

## 2021-07-09 DIAGNOSIS — I25.5 ISCHEMIC CARDIOMYOPATHY: ICD-10-CM

## 2021-07-09 RX ORDER — EZETIMIBE 10 MG/1
TABLET ORAL
Qty: 30 TABLET | Refills: 0 | Status: SHIPPED | OUTPATIENT
Start: 2021-07-09 | End: 2021-08-10

## 2021-07-09 NOTE — TELEPHONE ENCOUNTER
Telephone Call RE:  Appointment reminder     Outcome:     [] Patient confirmed appointment   [] Patient rescheduled appointment for    [] Unable to reach   [x] Left message              [] Other:     Requested a call back if exposure/covid-19 symptoms.     Claudean Dayhoff

## 2021-07-12 RX ORDER — METOPROLOL SUCCINATE 25 MG/1
12.5 TABLET, EXTENDED RELEASE ORAL DAILY
Qty: 60 TABLET | Refills: 2 | Status: SHIPPED | OUTPATIENT
Start: 2021-07-12

## 2021-07-12 NOTE — TELEPHONE ENCOUNTER
Requested Prescriptions     Signed Prescriptions Disp Refills    metoprolol succinate (TOPROL-XL) 25 mg XL tablet 60 Tablet 2     Sig: Take 0.5 Tablets by mouth daily.  Hold for systolic BP less than 212     Authorizing Provider: Jorge Fair     Ordering User: Blanquita Godinez

## 2021-07-13 LAB
ALBUMIN SERPL-MCNC: 4.2 G/DL (ref 3.7–4.7)
ALBUMIN/GLOB SERPL: 1.5 {RATIO} (ref 1.2–2.2)
ALP SERPL-CCNC: 109 IU/L (ref 48–121)
ALT SERPL-CCNC: 20 IU/L (ref 0–32)
AST SERPL-CCNC: 24 IU/L (ref 0–40)
BILIRUB SERPL-MCNC: 0.3 MG/DL (ref 0–1.2)
BUN SERPL-MCNC: 35 MG/DL (ref 8–27)
BUN/CREAT SERPL: 19 (ref 12–28)
CALCIUM SERPL-MCNC: 10.9 MG/DL (ref 8.7–10.3)
CHLORIDE SERPL-SCNC: 100 MMOL/L (ref 96–106)
CO2 SERPL-SCNC: 22 MMOL/L (ref 20–29)
CREAT SERPL-MCNC: 1.85 MG/DL (ref 0.57–1)
GLOBULIN SER CALC-MCNC: 2.8 G/DL (ref 1.5–4.5)
GLUCOSE SERPL-MCNC: 165 MG/DL (ref 65–99)
MAGNESIUM SERPL-MCNC: 2 MG/DL (ref 1.6–2.3)
NT-PROBNP SERPL-MCNC: 642 PG/ML (ref 0–301)
POTASSIUM SERPL-SCNC: 4.3 MMOL/L (ref 3.5–5.2)
PROT SERPL-MCNC: 7 G/DL (ref 6–8.5)
SODIUM SERPL-SCNC: 137 MMOL/L (ref 134–144)

## 2021-07-14 ENCOUNTER — TELEPHONE (OUTPATIENT)
Dept: CARDIOLOGY CLINIC | Age: 71
End: 2021-07-14

## 2021-07-14 NOTE — TELEPHONE ENCOUNTER
Telephone Call RE:  Appointment reminder     Outcome:     [x] Patient confirmed appointment   [] Patient rescheduled appointment for    [] Unable to reach   [] Left message              [] Other:     Patient screened for covid-19.     Jena Gary

## 2021-07-15 ENCOUNTER — OFFICE VISIT (OUTPATIENT)
Dept: CARDIOLOGY CLINIC | Age: 71
End: 2021-07-15
Payer: MEDICARE

## 2021-07-15 VITALS
BODY MASS INDEX: 39.97 KG/M2 | SYSTOLIC BLOOD PRESSURE: 102 MMHG | DIASTOLIC BLOOD PRESSURE: 52 MMHG | HEART RATE: 80 BPM | WEIGHT: 225.6 LBS | HEIGHT: 63 IN | RESPIRATION RATE: 16 BRPM | OXYGEN SATURATION: 98 % | TEMPERATURE: 97.8 F

## 2021-07-15 DIAGNOSIS — I25.5 ISCHEMIC CARDIOMYOPATHY: Primary | ICD-10-CM

## 2021-07-15 DIAGNOSIS — I50.20 NYHA CLASS 3 HEART FAILURE WITH REDUCED EJECTION FRACTION (HCC): ICD-10-CM

## 2021-07-15 DIAGNOSIS — E66.01 SEVERE OBESITY (BMI 35.0-35.9 WITH COMORBIDITY) (HCC): ICD-10-CM

## 2021-07-15 PROCEDURE — G8536 NO DOC ELDER MAL SCRN: HCPCS | Performed by: NURSE PRACTITIONER

## 2021-07-15 PROCEDURE — G8427 DOCREV CUR MEDS BY ELIG CLIN: HCPCS | Performed by: NURSE PRACTITIONER

## 2021-07-15 PROCEDURE — 1090F PRES/ABSN URINE INCON ASSESS: CPT | Performed by: NURSE PRACTITIONER

## 2021-07-15 PROCEDURE — G8754 DIAS BP LESS 90: HCPCS | Performed by: NURSE PRACTITIONER

## 2021-07-15 PROCEDURE — G8399 PT W/DXA RESULTS DOCUMENT: HCPCS | Performed by: NURSE PRACTITIONER

## 2021-07-15 PROCEDURE — G8417 CALC BMI ABV UP PARAM F/U: HCPCS | Performed by: NURSE PRACTITIONER

## 2021-07-15 PROCEDURE — 1101F PT FALLS ASSESS-DOCD LE1/YR: CPT | Performed by: NURSE PRACTITIONER

## 2021-07-15 PROCEDURE — G8432 DEP SCR NOT DOC, RNG: HCPCS | Performed by: NURSE PRACTITIONER

## 2021-07-15 PROCEDURE — 3017F COLORECTAL CA SCREEN DOC REV: CPT | Performed by: NURSE PRACTITIONER

## 2021-07-15 PROCEDURE — G9899 SCRN MAM PERF RSLTS DOC: HCPCS | Performed by: NURSE PRACTITIONER

## 2021-07-15 PROCEDURE — 93280 PM DEVICE PROGR EVAL DUAL: CPT | Performed by: NURSE PRACTITIONER

## 2021-07-15 PROCEDURE — 99214 OFFICE O/P EST MOD 30 MIN: CPT | Performed by: NURSE PRACTITIONER

## 2021-07-15 PROCEDURE — G8752 SYS BP LESS 140: HCPCS | Performed by: NURSE PRACTITIONER

## 2021-07-15 NOTE — PROGRESS NOTES
[facility], [facility] Note    Patient name: Cornell Naidu  Patient : 1950  Patient MRN: [id number]  Date of service: 07/15/21    Primary care physician: Nallely Grace., NP  Primary [facility] cardiologist: Machelle Randall MD      CHIEF COMPLAINT:  Chronic systolic heart failure  Chief Complaint   Patient presents with    CHF    Dizziness     \"mostly in the morning\"    Leg Swelling        PLAN:    Medical therapy for heart failure   Beta-blocker: Toprol 12.5mg daily- recommend changing to evening to help with am dizziness    ACE/ARB/ARNi: intolerant due to renal failure   Spironolactone: intolerant due to hyperkalemia   SGLT2 inhibitor: was on jardiance- held on last admission d/t renal function. May consider restarting if Cr improves.  Diuretic: furosemide 40mg daily   Not on allopurinol or uloric, uric acid level 5.0 (21)   Continue ASA 81 mg and Plavix daily   Statin or PCSK9i: Continue current dose of atorvastatin and zetia   Continue IMDUR 30mg daily and hydralazine 25mg TID   Continue CPAP therapy   Orthostatics today negative   Long discussion about avoiding hospitalizations and potential for cardiomems device. Will give educational material for patient to review.       Diagnostics:   ICD interrogation done to evaluate thoracic impedance   Repeat Echo in August- ordered   · Equivocal PYP  · [employer] DSP (VUS)  · Routine HF labs: CBC, CMP, Mg, NT-Pro BNP at next visit     Nutrition, Lifestyle, and Home Management:   Reinforced heart healthy, low salt diet   Reinforced fluid intake 6 x 8oz glasses per day   Document in diary BP/HR before and 2 hours after taking medications and daily weights   Encouraged patient to discuss her anxiety with her PCP at her upcoming appt    ACP:    Advanced care plan present on file    Follow-ups and Referrals:   Follow-up with primary cardiologist   Follow-up with EP cardiologist   Follow-up with PCP    Up to date on Flu/PNA/COVID vaccinations  Recommend Shingles vaccine with PCP      Return to [address] in 8 weeks with NP/MD      IMPRESSION:  Fatigue  Shortness of breath  Volume overload  Chronic systolic heart failure  Stage D, NYHA class IIIA symptoms  Ischemic cardiomyopathy, LVEF 35-40%  CAD s/p CABG 1997 s/p PCI to DVG-RCA 2016  H/o severe MR s/p mitral clip in 2020  HTN  H/o VT s/p AICD  WES on Bipap  T2DM with neuropathy  Hypothyroidism  Obesity  HLD  Osteoarthritis   CKD4       CARDIAC IMAGING:  Echo 5/31/21- EF 25-30%, moderate MR post-clip  Echo 5/14/21- LVEF 35-40%, Mild MR post clip, mild TR, LVIDd 5.7cm, TAPSE 1.68, RVIDd 4.05cm   Echo 12/14/20 LVEF 30-35%, mild MR, LVIDd 6.09cm, RVIDd 3.98cm  Echo 6/19/20 LVEF 25-30%, Mod TR, severe MR, LVIDd 5.76cm, TAPSE 1.94cm, RVIDd 4.06cm     EKG 5/30/21 ST with PVCs, QRS 88ms  EKG 11/14/20 V paced, QRS 84     LHC 2/3/20- Severe native 3 vessel CAD. Patent VG to RCA and LIMA to LAD. LCx is collateral dependent and native rPDA and D1 have small disease in small vessels.      NST     Medtronic interrogation 7/15/21  No VT/VF  Time in AF/AT 0.0hr/day  Patient Activity 1.2hr/day  Optivol well below threshold  Thoracic impedance trending up    HEMODYNAMICS:  RHC not done  CPEST not done  6MW not done    OTHER IMAGING:  CXR 5/30/2 Diffuse increased interstitial and airspace opacities with more  confluent airspace opacities in the bilateral lung bases likely representing  pulmonary edema.     CT chest not done     HISTORY OF PRESENT ILLNESS:  I had the pleasure of seeing Hardy Witt in [address] [place] [address] at [facility] in [place].     Briefly, Hardy Witt is a 66 y.o. female with h/o HTN, HLD, WES, Obesity (BMI 39), s/p gastric sleeve in 2011, T2DM, hypothyroidism, COPD, CAD s/p CABG in 1997, s/p PCI to [address] in 5/2015, ICM, VT, s/p AICD (Medtronic), anxiety and depression. She more recently underwent MitraClip on 11/12/2020 and was evaluated for upgrade to [address] with Dr. Fortunato Vargas, however this was not done as her QRS was too narrow. Her most recent admission was 5/30/21-6/3/21 for CHF exacerbation. She was diuresed and her Tommye Bon was stopped due to renal failure. INTERVAL HISTORY:  Today, patient presents for HF follow up. She states she feels well, she has been watching her diet but is under stress with her sister being in the hospital. She continues to have chronic MARINA but knows to take her time, she does have some morning dizziness but her orthostatics in the clinic today are negative. She is compliant with her medications and is able to do her normal functions. REVIEW OF SYSTEMS:  Review of Systems   Constitutional: Negative for chills, diaphoresis, fever, malaise/fatigue and weight loss. HENT: Negative. Eyes: Negative. Respiratory: Negative for cough and shortness of breath. MARINA- unchanged    Cardiovascular: Negative for chest pain, palpitations, orthopnea, claudication, leg swelling and PND. Gastrointestinal: Negative for abdominal pain, constipation, diarrhea, nausea and vomiting. Genitourinary: Negative. Musculoskeletal: Positive for joint pain. Neurological: Negative for dizziness, weakness and headaches. Psychiatric/Behavioral: Negative. PHYSICAL EXAM:  Visit Vitals  BP (!) 102/52 (BP 1 Location: Left arm, BP Patient Position: Sitting, BP Cuff Size: Large adult)   Pulse 80   Temp 97.8 °F (36.6 °C) (Oral)   Resp 16   Ht 5' 3\" (1.6 m)   Wt 225 lb 9.6 oz (102.3 kg)   SpO2 98%   BMI 39.96 kg/m²     Physical Exam  Vitals reviewed. Constitutional:       General: She is not in acute distress. Appearance: Normal appearance. She is obese. She is not ill-appearing. HENT:      Head: Normocephalic and atraumatic.    Eyes:      Conjunctiva/sclera: Conjunctivae normal.      Pupils: Pupils are equal, round, and reactive to light. Neck:      Vascular: No hepatojugular reflux or JVD. Cardiovascular:      Rate and Rhythm: Normal rate and regular rhythm. Pulses: Normal pulses. Heart sounds: Murmur heard. Systolic murmur is present with a grade of 3/6. No friction rub. No gallop. Pulmonary:      Effort: Pulmonary effort is normal. No respiratory distress. Breath sounds: Normal breath sounds. Abdominal:      General: Bowel sounds are normal. There is no distension. Palpations: Abdomen is soft. Tenderness: There is no abdominal tenderness. Musculoskeletal:         General: Normal range of motion. Cervical back: Neck supple. Right lower leg: No edema. Left lower leg: No edema. Skin:     General: Skin is warm and dry. Capillary Refill: Capillary refill takes less than 2 seconds. Neurological:      General: No focal deficit present. Mental Status: She is alert and oriented to person, place, and time. Psychiatric:         Mood and Affect: Mood normal.         Behavior: Behavior normal.         Thought Content:  Thought content normal.         Judgment: Judgment normal.          PAST MEDICAL HISTORY:  Past Medical History:   Diagnosis Date    Adverse effect of anesthesia     hard to wake up/uses BIPAP/\"try to avoid general if possible\"/intubated in past prior to going to sleep and it caused pt to be incontinent    Arthritis     Asthma     CAD (coronary artery disease)     Chronic kidney disease     elevataed creatinine    Chronic obstructive pulmonary disease (HCC)     Chronic pain     arthritis    Coagulation disorder (HCC)     on plavix    Congestive heart failure (HCC)     Diabetes ([address]     Hypertension     Morbid obesity ([address]     Sleep apnea 1996    BIPAP with 2 liters oxygen    Thromboembolus ([address]     after heart surgery - left leg    Thyroid disease        PAST SURGICAL HISTORY:  Past Surgical History:   Procedure Laterality Date    COLONOSCOPY N/A 2020    COLONOSCOPY   :- performed by Frannie Oakes MD at [address] HX ARTHROPLASTY  2105    knee - right    HX  SECTION      x3    HX CORONARY ARTERY BYPASS GRAFT  1997    3 vessels    HX CORONARY STENT PLACEMENT  2016    HX HERNIA REPAIR  1988    HX IMPLANTABLE CARDIOVERTER DEFIBRILLATOR      HX KNEE REPLACEMENT Right 2015    once    SD CARDIAC SURG PROCEDURE UNLIST  2018    cardiac cath - Left    SD LAP GASTRIC BYPASS/KERLINE-EN-Y  2011    lap band per pt and not a gastric bypass       FAMILY HISTORY:  Family History   Problem Relation Age of Onset    Hypertension Mother     Dementia Mother     Coronary Artery Disease Father 58    Sudden Death Father 58    Diabetes Son     Diabetes Brother        SOCIAL HISTORY:  Social History     Socioeconomic History    Marital status:      Spouse name: Not on file    Number of children: Not on file    Years of education: Not on file    Highest education level: Not on file   Tobacco Use    Smoking status: Former Smoker     Packs/day: 0.50     Years: 45.00     Pack years: 22.50     Types: Cigarettes     Quit date: 2010     Years since quittin.5    Smokeless tobacco: Never Used    Tobacco comment: quit /started again (smoked 3 yrs) off and on/none since    Vaping Use    Vaping Use: Never used   Substance and Sexual Activity    Alcohol use: Not Currently    Drug use: Not Currently    Sexual activity: Not Currently   Other Topics Concern     Social Determinants of Health     Financial Resource Strain:     Difficulty of Paying Living Expenses:    Food Insecurity:     Worried About Running Out of Food in the Last Year:     [address] in the Last Year:    Transportation Needs:     Lack of Transportation (Medical):      Lack of Transportation (Non-Medical):    Physical Activity:     Days of Exercise per Week:     Minutes of Exercise per Session:    Stress:     Feeling of Stress :    Social Connections:     Frequency of Communication with Friends and Family:     Frequency of Social Gatherings with Friends and Family:     Attends Buddhist Services:     Active Member of Clubs or Organizations:     Attends Club or Organization Meetings:     Marital Status:        LABORATORY RESULTS:  Labs Latest Ref Rng & Units 7/12/2021 6/3/2021 6/2/2021 6/1/2021 5/31/2021 5/30/2021   WBC 3.6 - 11.0 K/uL - 7.7 - - 11. 4(H) 19. 1(H)   RBC 3.80 - 5.20 M/uL - 3.41(L) - - 3.85 3.98   Hemoglobin 11.5 - 16.0 g/dL - 11. 0(L) - - 12.3 12.6   Hematocrit 35.0 - 47.0 % - 34. 0(L) - - 37.8 39.7   MCV 80.0 - 99.0 FL - 99. 7(H) - - 98.2 99. 7(H)   Platelets 231 - 055 K/uL - 218 - - 231 256   Lymphocytes 12 - 49 % - - - - 13 24   Monocytes 5 - 13 % - - - - 4(L) 5   Eosinophils 0 - 7 % - - - - 0 2   Basophils 0 - 1 % - - - - 0 1   Albumin 3.7 - 4.7 g/dL 4.2 3.2(L) 3. 2(L) 2. 5(L) 3. 2(L) 3.8   Calcium 8.7 - 10.3 mg/dL 10. 9(H) 10.1 10. 2(H) 8.7 9.9 10. 6(H)   Glucose 65 - 99 mg/dL 165(H) 196(H) 188(H) 150(H) 157(H) 184(H)   BUN 8 - 27 mg/dL 35(H) 38(H) 41(H) 42(H) 33(H) 34(H)   Creatinine 0.57 - 1.00 mg/dL 1.85(H) 1.79(H) 2.24(H) 1.79(H) 1.75(H) 1.94(H)   Sodium 134 - 144 mmol/L 137 136 139 141 140 137   Potassium 3.5 - 5.2 mmol/L 4.3 4.1 4.2 4.6 4.6 3.7   Some recent data might be hidden       ALLERGY:  Allergies   Allergen Reactions    Crestor [Rosuvastatin] Other (comments)     Causes muscle cramps    Lisinopril Cough    Nsaids (Non-Steroidal Anti-Inflammatory Drug) Other (comments)     Liver and Kidney        CURRENT MEDICATIONS:    Current Outpatient Medications:     metoprolol succinate (TOPROL-XL) 25 mg XL tablet, Take 0.5 Tablets by mouth daily.  Hold for systolic BP less than 337, Disp: 60 Tablet, Rfl: 2    ezetimibe (ZETIA) 10 mg tablet, Take 1 tablet by mouth once daily, Disp: 30 Tablet, Rfl: 0    glipiZIDE (GLUCOTROL) 10 mg tablet, Take 1 tablet by mouth twice daily with food, Disp: 180 Tablet, Rfl: 0    gabapentin (NEURONTIN) 100 mg capsule, Take 2 Capsules by mouth two (2) times a day. Max Daily Amount: 400 mg., Disp: 120 Capsule, Rfl: 0    nitroglycerin (NITROSTAT) 0.3 mg SL tablet, 1 Tablet by SubLINGual route every five (5) minutes as needed for Chest Pain., Disp: 1 Bottle, Rfl: 0    clopidogreL (PLAVIX) 75 mg tab, Take 1 tablet by mouth once daily, Disp: 90 Tablet, Rfl: 0    furosemide (LASIX) 20 mg tablet, Take 2 Tablets by mouth daily. , Disp: 60 Tablet, Rfl: 2    hydrALAZINE (APRESOLINE) 25 mg tablet, Take 1 Tab by mouth three (3) times daily. , Disp: 90 Tab, Rfl: 2    levothyroxine (SYNTHROID) 100 mcg tablet, Take 1 Tab by mouth Daily (before breakfast). , Disp: 90 Tab, Rfl: 0    Blood-Glucose Meter monitoring kit, Check blood sugar daily. May substitute for insurance preferred meter, Disp: 1 Kit, Rfl: 0    glucose blood VI test strips (blood glucose test) strip, Check blood sugar 2 times daily: E11.9 may substitute for insurance preferred strips. , Disp: 200 Strip, Rfl: 3    lancets misc, Use as directed. Dx:check sugar once daily. E11.65, Disp: 1 Each, Rfl: 11    allopurinoL (ZYLOPRIM) 100 mg tablet, Take 1 tablet by mouth once daily, Disp: 90 Tab, Rfl: 1    atorvastatin (LIPITOR) 80 mg tablet, TAKE 1 TABLET BY MOUTH AT BEDTIME, Disp: 90 Tab, Rfl: 3    lancets misc, Check blood sugar 2 times daily. May substitute for insurance preferred lancets. , Disp: 200 Each, Rfl: 3    glucose blood VI test strips (ASCENSIA AUTODISC VI, ONE TOUCH ULTRA TEST VI) strip, E11.65 check sugar once daily, Disp: 100 Strip, Rfl: 0    isosorbide mononitrate ER (IMDUR) 30 mg tablet, Take 1 Tab by mouth every morning., Disp: 30 Tab, Rfl: 2    prednisoLONE acetate (PRED FORTE) 1 % ophthalmic suspension, INSTILL 1 DROP 4 TIMES DAILY INTO SUREGRY EYE TAPER AS DIRECTED, Disp: , Rfl:     clotrimazole-betamethasone (LOTRISONE) topical cream, Apply  to affected area two (2) times a day.  (Patient taking differently: Apply  to affected area two (2) times daily as needed.), Disp: 30 g, Rfl: 0    Blood-Glucose Meter monitoring kit, Use as directed. Dx: E11.65 check sugar twice daily, Disp: 1 Kit, Rfl: 0    albuterol (PROVENTIL HFA, VENTOLIN HFA, PROAIR HFA) 90 mcg/actuation inhaler, Take 2 Puffs by inhalation every four (4) hours as needed. , Disp: , Rfl:     acetaminophen (TYLENOL ARTHRITIS PAIN) 650 mg TbER, Take 1,300 mg by mouth two (2) times a day., Disp: , Rfl:     aspirin 81 mg chewable tablet, Take 81 mg by mouth daily. , Disp: , Rfl:     Thank you for your referral and allowing me to participate in this patient's care.     Simona Gill NP  [facility] [address], [address]  Phone: [phone]      PATIENT CARE TEAM:  Patient Care Team:  Perfecto Ashford NP as PCP - General (Nurse Practitioner)  Perfecto Ashford NP as PCP - [facility] Empaneled Provider  Brenda Pozo MD (Cardiology)  Chioma Rendon MD (Gastroenterology)  Nuno Rodriguez MD as Consulting Provider (Cardiology)

## 2021-07-15 NOTE — PATIENT INSTRUCTIONS
Medication changes:    none    Please take this to your pharmacy to notify them of the change in medications. Testing Ordered: An order for an echocardiogram has been placed to be done in August. You will be receiving an automated call from Coordination of Care to schedule this test. If you are unavailable to receive the call or would like to contact coordination of care yourself you may contact [phone] to schedule. You will need to contact coordination of care yourself if you miss their calls as they will only make 3 attempts to reach you. Other Recommendations:     Please review information on Cardiomems    Please call your PCP for anxiety medication/control     Ensure your drinking an adequate amount of water with a goal of 6-8 eight ounce glasses (1.5-2 liters) of fluid daily. Your urine should be clear and light yellow straw colored. If your blood pressure begins to consistently run below 90/60 and/or you begin to experience dizziness or lightheadedness, please contact the [facility] at [phone]. Follow up in 8 weeks with NP with [facility]      Please monitor your weights daily upon waking and after using the bathroom. Keep a written records of your weights and bring to your next appointment. If you have a weight gain of 3 or more pounds overnight OR 5 or more pounds in one week please contact our office. Thank you for allowing us the privilege of being a part of your healthcare team! Please do not hesitate to contact our office at [phone] with any questions or concerns. Virtual Heart Failure [address] invites you to learn more about heart failure and to share your questions, ideas, and experiences with others. Each month, the Heart Failure Support Group features a new educational topic and a guest speaker, followed by an interactive discussion.  Our Heart Failure Nurse Navigator will moderate each session. You will be able to participate by phone, tablet or computer through [employer]. This support group takes place on the 3rd Thursday of each month from 6:00-7:30PM. All individuals living with heart failure and their caregivers are welcome to join. If you are interested in participating, please contact us at [email or web address] and you will be sent the link to join the [employer].

## 2021-07-30 ENCOUNTER — TELEPHONE (OUTPATIENT)
Dept: CARDIOLOGY CLINIC | Age: 71
End: 2021-07-30

## 2021-07-30 NOTE — TELEPHONE ENCOUNTER
Patient states she had weight gain due to dietary indiscretion , weight today is 226.2 lb, was up to 231 lb from diet. She is taking lasix 40 mg daily but is more short of breath , does not have BP because she is at work. Please advise, thank you. I called patient and advised her per TAYLOR Mckeon:  40mg bid through Monday. Ask her if she has thougt about cardiomems   She states understanding, will call Monday with weights and symptoms. She states she wants to think more about cardiomems.

## 2021-08-01 DIAGNOSIS — I50.42 CHRONIC HEART FAILURE WITH REDUCED EJECTION FRACTION AND DIASTOLIC DYSFUNCTION (HCC): ICD-10-CM

## 2021-08-01 DIAGNOSIS — I10 HYPERTENSION, UNSPECIFIED TYPE: ICD-10-CM

## 2021-08-01 DIAGNOSIS — I25.5 ISCHEMIC CARDIOMYOPATHY: ICD-10-CM

## 2021-08-02 ENCOUNTER — OFFICE VISIT (OUTPATIENT)
Dept: INTERNAL MEDICINE CLINIC | Age: 71
End: 2021-08-02
Payer: MEDICARE

## 2021-08-02 ENCOUNTER — TELEPHONE (OUTPATIENT)
Dept: CARDIOLOGY CLINIC | Age: 71
End: 2021-08-02

## 2021-08-02 VITALS
RESPIRATION RATE: 20 BRPM | HEIGHT: 63 IN | BODY MASS INDEX: 39.69 KG/M2 | WEIGHT: 224 LBS | TEMPERATURE: 98.1 F | SYSTOLIC BLOOD PRESSURE: 115 MMHG | DIASTOLIC BLOOD PRESSURE: 47 MMHG | OXYGEN SATURATION: 97 % | HEART RATE: 71 BPM

## 2021-08-02 DIAGNOSIS — N18.2 TYPE 2 DIABETES MELLITUS WITH STAGE 2 CHRONIC KIDNEY DISEASE, WITHOUT LONG-TERM CURRENT USE OF INSULIN (HCC): Primary | ICD-10-CM

## 2021-08-02 DIAGNOSIS — D22.9 ATYPICAL MOLE: ICD-10-CM

## 2021-08-02 DIAGNOSIS — I10 ESSENTIAL HYPERTENSION: ICD-10-CM

## 2021-08-02 DIAGNOSIS — E11.22 TYPE 2 DIABETES MELLITUS WITH STAGE 2 CHRONIC KIDNEY DISEASE, WITHOUT LONG-TERM CURRENT USE OF INSULIN (HCC): Primary | ICD-10-CM

## 2021-08-02 DIAGNOSIS — E03.9 ACQUIRED HYPOTHYROIDISM: ICD-10-CM

## 2021-08-02 DIAGNOSIS — M17.12 PRIMARY OSTEOARTHRITIS OF LEFT KNEE: ICD-10-CM

## 2021-08-02 DIAGNOSIS — I50.9 CHRONIC CONGESTIVE HEART FAILURE, UNSPECIFIED HEART FAILURE TYPE (HCC): ICD-10-CM

## 2021-08-02 DIAGNOSIS — Z95.810 AICD (AUTOMATIC CARDIOVERTER/DEFIBRILLATOR) PRESENT: ICD-10-CM

## 2021-08-02 LAB
GLUCOSE POC: 278 MG/DL
HBA1C MFR BLD HPLC: 7.6 %

## 2021-08-02 PROCEDURE — 83036 HEMOGLOBIN GLYCOSYLATED A1C: CPT | Performed by: NURSE PRACTITIONER

## 2021-08-02 PROCEDURE — G8536 NO DOC ELDER MAL SCRN: HCPCS | Performed by: NURSE PRACTITIONER

## 2021-08-02 PROCEDURE — 1090F PRES/ABSN URINE INCON ASSESS: CPT | Performed by: NURSE PRACTITIONER

## 2021-08-02 PROCEDURE — 2022F DILAT RTA XM EVC RTNOPTHY: CPT | Performed by: NURSE PRACTITIONER

## 2021-08-02 PROCEDURE — G9899 SCRN MAM PERF RSLTS DOC: HCPCS | Performed by: NURSE PRACTITIONER

## 2021-08-02 PROCEDURE — G8417 CALC BMI ABV UP PARAM F/U: HCPCS | Performed by: NURSE PRACTITIONER

## 2021-08-02 PROCEDURE — G8427 DOCREV CUR MEDS BY ELIG CLIN: HCPCS | Performed by: NURSE PRACTITIONER

## 2021-08-02 PROCEDURE — G8752 SYS BP LESS 140: HCPCS | Performed by: NURSE PRACTITIONER

## 2021-08-02 PROCEDURE — 82962 GLUCOSE BLOOD TEST: CPT | Performed by: NURSE PRACTITIONER

## 2021-08-02 PROCEDURE — 99214 OFFICE O/P EST MOD 30 MIN: CPT | Performed by: NURSE PRACTITIONER

## 2021-08-02 PROCEDURE — G8399 PT W/DXA RESULTS DOCUMENT: HCPCS | Performed by: NURSE PRACTITIONER

## 2021-08-02 PROCEDURE — G8754 DIAS BP LESS 90: HCPCS | Performed by: NURSE PRACTITIONER

## 2021-08-02 PROCEDURE — G8432 DEP SCR NOT DOC, RNG: HCPCS | Performed by: NURSE PRACTITIONER

## 2021-08-02 PROCEDURE — 1101F PT FALLS ASSESS-DOCD LE1/YR: CPT | Performed by: NURSE PRACTITIONER

## 2021-08-02 PROCEDURE — 3017F COLORECTAL CA SCREEN DOC REV: CPT | Performed by: NURSE PRACTITIONER

## 2021-08-02 RX ORDER — GABAPENTIN 100 MG/1
200 CAPSULE ORAL 2 TIMES DAILY
Qty: 120 CAPSULE | Refills: 0 | Status: SHIPPED | OUTPATIENT
Start: 2021-08-02 | End: 2021-09-02

## 2021-08-02 RX ORDER — LANCETS 28 GAUGE
EACH MISCELLANEOUS
COMMUNITY
Start: 2021-07-28

## 2021-08-02 NOTE — PROGRESS NOTES
Subjective: (As above and below)     Chief Complaint   Patient presents with   [address] Follow Up     [facility] 5/2021 CHF    Knee Pain     left knee- pt requesting cortisone inj     Mole     pt reports raised moles on back     Skin Problem     red spots/bumps all over body  x 2-3 months      Maximino Mcmahon is a 66y.o. year old female who presents for     Diabetic Review of Systems - medication compliance: compliant all of the time, diabetic diet compliance: compliant most of the time, home glucose monitoring: is performed regularly. After hospitalization in May for CHF exacerbation her jardiance was held due to increased creatinine  She has continued to hold, she has had fu w/ nephrology and was told labs improving  Still struggles w/ diet- she lives w her sister who is in poor health and it is often difficult for her to find time for healthy meals    Hypertension ROS:  taking medications as instructed, no medication side effects noted, no TIAs, no chest pain on exertion, no dyspnea on exertion, no swelling of ankles    Skin problem: she has a lot of raised moles to her back, reports having normal biopsy many years ago, sites it is more of a cosmetic concern  She also has scattered sun spot throughout  Occ itchy skin but thinks her skin is dry and she can't reach to apply lotion everywhere    Hypothyroidism: taking synthroid w/o problems    Anxiety; she finds it sometimes difficult to keep up w/ her appointments, exercise, diet  She feels like she has a lot on her plate, and gets anxious about this  She is planning on speaking w/ a therapist first but may consider anxiety medication      Reviewed PmHx, RxHx, FmHx, SocHx, AllgHx and updated in chart.   Family History   Problem Relation Age of Onset    Hypertension Mother     Dementia Mother     Coronary Artery Disease Father 58    Sudden Death Father 58    Diabetes Son     Diabetes Brother        Past Medical History:   Diagnosis Date    Adverse effect of anesthesia     hard to wake up/uses BIPAP/\"try to avoid general if possible\"/intubated in past prior to going to sleep and it caused pt to be incontinent    Arthritis     Asthma     CAD (coronary artery disease)     Chronic kidney disease     elevataed creatinine    Chronic obstructive pulmonary disease (HCC)     Chronic pain     arthritis    Coagulation disorder (HCC)     on plavix    Congestive heart failure (HCC)     Diabetes ([facility] [address]     Hypertension     Morbid obesity ([facility] [facility]     Sleep apnea     BIPAP with 2 liters oxygen    Thromboembolus ([facility] [address]     after heart surgery - left leg    Thyroid disease       Social History     Socioeconomic History    Marital status:      Spouse name: Not on file    Number of children: Not on file    Years of education: Not on file    Highest education level: Not on file   Tobacco Use    Smoking status: Former Smoker     Packs/day: 0.50     Years: 45.00     Pack years: 22.50     Types: Cigarettes     Quit date: 2010     Years since quittin.5    Smokeless tobacco: Never Used    Tobacco comment: quit /started again (smoked 3 yrs) off and on/none since    Vaping Use    Vaping Use: Never used   Substance and Sexual Activity    Alcohol use: Not Currently    Drug use: Not Currently    Sexual activity: Not Currently   Other Topics Concern     Social Determinants of Health     Financial Resource Strain:     Difficulty of Paying Living Expenses:    Food Insecurity:     Worried About Running Out of Food in the Last Year:     Salbador of Food in the Last Year:    Transportation Needs:     Lack of Transportation (Medical):      Lack of Transportation (Non-Medical):    Physical Activity:     Days of Exercise per Week:     Minutes of Exercise per Session:    Stress:     Feeling of Stress :    Social Connections:     Frequency of Communication with Friends and Family:     Frequency of Social Gatherings with Friends and Family:     Attends [employer] Services:     Active Member of Clubs or Organizations:     Attends Club or Organization Meetings:     Marital Status:           Current Outpatient Medications   Medication Sig    gabapentin (NEURONTIN) 100 mg capsule Take 2 Capsules by mouth two (2) times a day. Max Daily Amount: 400 mg.    metoprolol succinate (TOPROL-XL) 25 mg XL tablet Take 0.5 Tablets by mouth daily. Hold for systolic BP less than 080    ezetimibe (ZETIA) 10 mg tablet Take 1 tablet by mouth once daily    glipiZIDE (GLUCOTROL) 10 mg tablet Take 1 tablet by mouth twice daily with food    clopidogreL (PLAVIX) 75 mg tab Take 1 tablet by mouth once daily    furosemide (LASIX) 20 mg tablet Take 2 Tablets by mouth daily.  hydrALAZINE (APRESOLINE) 25 mg tablet Take 1 Tab by mouth three (3) times daily.  levothyroxine (SYNTHROID) 100 mcg tablet Take 1 Tab by mouth Daily (before breakfast).  allopurinoL (ZYLOPRIM) 100 mg tablet Take 1 tablet by mouth once daily    atorvastatin (LIPITOR) 80 mg tablet TAKE 1 TABLET BY MOUTH AT BEDTIME    isosorbide mononitrate ER (IMDUR) 30 mg tablet Take 1 Tab by mouth every morning.  clotrimazole-betamethasone (LOTRISONE) topical cream Apply  to affected area two (2) times a day. (Patient taking differently: Apply  to affected area two (2) times daily as needed.)    albuterol (PROVENTIL HFA, VENTOLIN HFA, PROAIR HFA) 90 mcg/actuation inhaler Take 2 Puffs by inhalation every four (4) hours as needed.  acetaminophen (TYLENOL ARTHRITIS PAIN) 650 mg TbER Take 1,300 mg by mouth two (2) times a day.  aspirin 81 mg chewable tablet Take 81 mg by mouth daily.  FreeStyle Lancets 28 gauge misc USE AS DIRECTED    nitroglycerin (NITROSTAT) 0.3 mg SL tablet 1 Tablet by SubLINGual route every five (5) minutes as needed for Chest Pain. (Patient not taking: Reported on 8/2/2021)    Blood-Glucose Meter monitoring kit Check blood sugar daily.  May substitute for insurance preferred meter    glucose blood VI test strips (blood glucose test) strip Check blood sugar 2 times daily: E11.9 may substitute for insurance preferred strips.  lancets misc Use as directed. Dx:check sugar once daily. E11.65    lancets misc Check blood sugar 2 times daily. May substitute for insurance preferred lancets.  glucose blood VI test strips (ASCENSIA AUTODISC VI, ONE TOUCH ULTRA TEST VI) strip E11.65 check sugar once daily    Blood-Glucose Meter monitoring kit Use as directed. Dx: E11.65 check sugar twice daily     No current facility-administered medications for this visit. Review of Systems:   Constitutional:    Negative for fever and chills, negative diaphoresis. HEENT:              Negative for neck pain and stiffness. Eyes:                  Negative for visual disturbance, itching, redness or discharge. Respiratory:        Negative for cough and shortness of breath. Cardiovascular:  Negative for chest pain and palpitations. Gastrointestinal: Negative for nausea, vomiting, abdominal pain, diarrhea or constipation. Genitourinary:     Negative for dysuria and frequency. Musculoskeletal: Negative for falls, tenderness and swelling. Skin:                    Negative for rash, masses or lesions. Neurological:       Negative for dizzyness, seizure, loss of consciousness, weakness and numbness. Objective:     Vitals:    08/02/21 1401   BP: (!) 115/47   Pulse: 71   Resp: 20   Temp: 98.1 °F (36.7 °C)   TempSrc: Temporal   SpO2: 97%   Weight: 224 lb (101.6 kg)   Height: 5' 3\" (1.6 m)     Diabetic foot exam:     Left Foot:   Visual Exam: normal    Pulse DP: 2+ (normal)   Filament test: normal sensation    Vibratory sensation: normal      Right Foot:   Visual Exam: normal    Pulse DP: 2+ (normal)   Filament test: normal sensation    Vibratory sensation: normal        Gen: Oriented to person, place and time and well-developed, well-nourished and in no distress.    HEENT:    Head: normocephalic and atraumatic. Eyes:  EOM are normal. Pupils equal and round. Neck:  Normal range of motion. Neck supple. Cardiovascular: normal rate, regular rhythm and normal heart sounds. Pulmonary/Chest:  Effort normal and breath sounds normal.  No respiratory distress. No wheezes, no rales. Abdominal: soft, normal  bowel sounds. Musculoskeletal:  No edema, no tenderness. No calf tenderness or edema. Neurological:  Alert, oriented to person, place and time. Skin: skin is warm and dry. She has several sun/age spots and large raised moles on her back        Assessment/ Plan:     Follow-up and Dispositions    · Return in about 3 months (around 11/2/2021). 1. Type 2 diabetes mellitus with stage 2 chronic kidney disease, without long-term current use of insulin ([facility] [address]  Will get renal labs and adjust meds accordingly  - AMB POC HEMOGLOBIN A1C  - AMB POC GLUCOSE BLOOD, BY GLUCOSE MONITORING DEVICE  -  DIABETES FOOT EXAM  - gabapentin (NEURONTIN) 100 mg capsule; Take 2 Capsules by mouth two (2) times a day. Max Daily Amount: 400 mg. Dispense: 120 Capsule; Refill: 0    2. Atypical mole  Fair skin, multiple nevi, recc est care w/ derm for surveillance  - REFERRAL TO DERMATOLOGY    3. Primary osteoarthritis of left knee      4. Essential hypertension      5. Chronic congestive heart failure, unspecified heart failure type ([facility] [address]      6. AICD (automatic cardioverter/defibrillator) present      7. Acquired hypothyroidism          I have discussed the diagnosis with the patient and the intended plan as seen in the above orders. The patient has received an after-visit summary and questions were answered concerning future plans. Pt conveyed understanding of plan. Medication Side Effects and Warnings were discussed with patient: yes  Patient Labs were reviewed: yes  Patient Past Records were reviewed:  yes    Rea Ross.  Shirley Galeana NP

## 2021-08-02 NOTE — TELEPHONE ENCOUNTER
Called patient using two patient identifiers. Patient states her weight have been as follows     7/31 227.2 lbs   8/1 226 lbs  8/2 225 lbs     Blood pressures as follows     7/31 144/90 (before meds did not take after meds)  8/1 159/91 (before medications did not take after meds)  8/2 139/79 (before medications did not take after meds)  Patient states that she does not have written down heart rates but that her heart rates have been in the 70's the last couple of days. Patient states that she took 40mg of lassix twice a day through the weekend and is \"feeling better\". She notes that  her sob has decreased back to baseline. Patient does state \" my ankles as still about the same puffiness as Friday\" patient also states that she plans to follow up with PCP about anxiety because when she has increased sob and episodes of worsening hf symptoms that she has been becoming anxious which she feels makes her symptoms worse. Patient denies palpitations, chest pain, or dizziness. Patient has resumed her usual dose of 40mg of lasix once a day this morning. Notified patient that I will call back if NP would like to make any further changes. Donal Blanco RN     Called patient using two patient identifiers. Notified patient per GUERA Blankenship to return to original dosing of lasix 2 20mg tablets daily and that no other changes were needed at this time. Educated patient to take blood pressure in the morning before medication and two hours after medications write them down and keep a log. Also educated patient to weigh herself daily and keep a log. Notified patient to call back if symptoms begin to worsen again. She stated understanding of all instructions and had no further questions.

## 2021-08-03 RX ORDER — ISOSORBIDE MONONITRATE 30 MG/1
TABLET, EXTENDED RELEASE ORAL
Qty: 30 TABLET | Refills: 0 | Status: SHIPPED | OUTPATIENT
Start: 2021-08-03 | End: 2021-09-02

## 2021-08-09 DIAGNOSIS — I25.5 ISCHEMIC CARDIOMYOPATHY: ICD-10-CM

## 2021-08-09 DIAGNOSIS — I25.10 CORONARY ARTERY DISEASE INVOLVING NATIVE HEART WITHOUT ANGINA PECTORIS, UNSPECIFIED VESSEL OR LESION TYPE: ICD-10-CM

## 2021-08-10 RX ORDER — EZETIMIBE 10 MG/1
TABLET ORAL
Qty: 30 TABLET | Refills: 0 | Status: SHIPPED | OUTPATIENT
Start: 2021-08-10 | End: 2021-09-08

## 2021-08-16 ENCOUNTER — TELEPHONE (OUTPATIENT)
Dept: CARDIOLOGY CLINIC | Age: 71
End: 2021-08-16

## 2021-08-16 NOTE — TELEPHONE ENCOUNTER
Aly Sandoval, NP  You 7 minutes ago (2:27 PM)   EP  Please have her double her diuretic until Thursday, have her call Friday with weights    Message text    Called patient and reviewed information per Bernie Bautista. She stated understanding and had no further questions.  Kendall Clark RN

## 2021-08-18 DIAGNOSIS — I50.42 CHRONIC HEART FAILURE WITH REDUCED EJECTION FRACTION AND DIASTOLIC DYSFUNCTION (HCC): ICD-10-CM

## 2021-08-18 DIAGNOSIS — E03.9 ACQUIRED HYPOTHYROIDISM: ICD-10-CM

## 2021-08-18 RX ORDER — FUROSEMIDE 20 MG/1
TABLET ORAL
Qty: 60 TABLET | Refills: 0 | Status: SHIPPED | OUTPATIENT
Start: 2021-08-18 | End: 2021-09-01 | Stop reason: ALTCHOICE

## 2021-08-19 ENCOUNTER — HOSPITAL ENCOUNTER (OUTPATIENT)
Dept: NON INVASIVE DIAGNOSTICS | Age: 71
Discharge: HOME | End: 2021-08-19
Attending: INTERNAL MEDICINE
Payer: MEDICARE

## 2021-08-19 DIAGNOSIS — I34.0 NONRHEUMATIC MITRAL VALVE REGURGITATION: ICD-10-CM

## 2021-08-19 DIAGNOSIS — I34.0 SEVERE MITRAL REGURGITATION: ICD-10-CM

## 2021-08-19 LAB
ECHO AO ROOT DIAM: 3.03 CM
ECHO AV AREA PEAK VELOCITY: 2.03 CM2
ECHO AV PEAK GRADIENT: 5.48 MMHG
ECHO AV PEAK VELOCITY: 117.03 CM/S
ECHO LA AREA 4C: 30.23 CM2
ECHO LA MAJOR AXIS: 5.69 CM
ECHO LA VOL 2C: 145.29 ML (ref 22–52)
ECHO LA VOL 4C: 108.16 ML (ref 22–52)
ECHO LA VOL BP: 129.49 ML (ref 22–52)
ECHO LV EDV A2C: 77.28 ML
ECHO LV EDV A4C: 118.1 ML
ECHO LV EDV BP: 96.78 ML (ref 56–104)
ECHO LV EJECTION FRACTION A2C: 27 PERCENT
ECHO LV EJECTION FRACTION A4C: 43 PERCENT
ECHO LV EJECTION FRACTION BIPLANE: 36.1 PERCENT (ref 55–100)
ECHO LV ESV A2C: 56.32 ML
ECHO LV ESV A4C: 67.05 ML
ECHO LV ESV BP: 61.82 ML (ref 19–49)
ECHO LV INTERNAL DIMENSION DIASTOLIC: 5.35 CM (ref 3.9–5.3)
ECHO LV INTERNAL DIMENSION SYSTOLIC: 3.93 CM
ECHO LV IVSD: 0.85 CM (ref 0.6–0.9)
ECHO LV MASS 2D: 138.3 G (ref 67–162)
ECHO LV POSTERIOR WALL DIASTOLIC: 0.63 CM (ref 0.6–0.9)
ECHO LVOT DIAM: 1.81 CM
ECHO LVOT PEAK GRADIENT: 3.41 MMHG
ECHO LVOT PEAK VELOCITY: 92.26 CM/S
ECHO MV AREA PHT: 2.14 CM2
ECHO MV MAX VELOCITY: 156.29 CM/S
ECHO MV MEAN GRADIENT: 3.12 MMHG
ECHO MV PEAK GRADIENT: 9.77 MMHG
ECHO MV PRESSURE HALF TIME (PHT): 102.8 MS
ECHO MV VTI: 43.12 CM
ECHO PV PEAK INSTANTANEOUS GRADIENT SYSTOLIC: 2.31 MMHG
ECHO RV INTERNAL DIMENSION: 3.77 CM
ECHO TV REGURGITANT MAX VELOCITY: 188.67 CM/S
ECHO TV REGURGITANT PEAK GRADIENT: 14.24 MMHG
LA VOL DISK BP: 125.54 ML (ref 22–52)

## 2021-08-19 PROCEDURE — 93306 TTE W/DOPPLER COMPLETE: CPT | Performed by: INTERNAL MEDICINE

## 2021-08-19 PROCEDURE — 93306 TTE W/DOPPLER COMPLETE: CPT

## 2021-08-19 RX ORDER — LEVOTHYROXINE SODIUM 100 UG/1
TABLET ORAL
Qty: 90 TABLET | Refills: 0 | Status: SHIPPED | OUTPATIENT
Start: 2021-08-19 | End: 2021-10-04

## 2021-08-23 ENCOUNTER — VIRTUAL VISIT (OUTPATIENT)
Dept: CARDIOLOGY CLINIC | Age: 71
End: 2021-08-23
Payer: MEDICARE

## 2021-08-23 DIAGNOSIS — I50.20 NYHA CLASS 3 HEART FAILURE WITH REDUCED EJECTION FRACTION (HCC): Primary | ICD-10-CM

## 2021-08-23 PROCEDURE — 99214 OFFICE O/P EST MOD 30 MIN: CPT | Performed by: NURSE PRACTITIONER

## 2021-08-23 RX ORDER — VERICIGUAT 2.5 MG/1
TABLET, FILM COATED ORAL
Qty: 30 TABLET | Refills: 3 | Status: SHIPPED | OUTPATIENT
Start: 2021-08-23 | End: 2021-08-31 | Stop reason: SDUPTHER

## 2021-08-23 NOTE — PROGRESS NOTES
[address]  Heart Failure Virtual Visit    Patient name: Severiano Hoe  Patient : 1950  Patient MRN: [id number]  Date of service: 21    Primary care physician: Lillie Waddell, NP  Primary AHF cardiologist: Fidel Moulton MD      CHIEF COMPLAINT:  Chronic systolic heart failure  Chief Complaint   Patient presents with    CHF     follow up    Fatigue    Leg Swelling        PLAN:    Medical therapy for heart failure   Beta-blocker: Toprol 12.5mg daily   ACE/ARB/ARNi: intolerant due to renal failure   Spironolactone: intolerant due to hyperkalemia   SGLT2 inhibitor: was on jardiance- held on last admission d/t renal function. May consider restarting if Cr improves.  Diuretic: furosemide 40mg daily   Begin Verquvo and uptitrate per protocol   Not on allopurinol or uloric, uric acid level 5.0 (21)   Continue ASA 81 mg and Plavix daily   Statin or PCSK9i: Continue current dose of atorvastatin and zetia   Continue IMDUR 30mg daily and hydralazine 25mg TID   Continue CPAP therapy   Orthostatics today negative   Long discussion about avoiding hospitalizations and potential for cardiomems device. Alex Pugh Will give educational material for patient to review.       Diagnostics:   ICD interrogation done to evaluate thoracic impedance   Repeat Echo in August-    · Equivocal PYP  · [employer]ae DSP (VUS)  · Routine HF labs: CBC, CMP, Mg, NT-Pro BNP at next visit     Nutrition, Lifestyle, and Home Management:   Reinforced heart healthy, low salt diet   Reinforced fluid intake 6 x 8oz glasses per day   Document in diary BP/HR before and 2 hours after taking medications and daily weights   Encouraged patient to discuss her anxiety with her PCP at her upcoming appt    ACP:    Advanced care plan present on file    Follow-ups and Referrals:   Follow-up with primary cardiologist   Follow-up with EP cardiologist   Follow-up with PCP    Up to date on Flu/PNA/COVID vaccinations  Recommend Shingles vaccine with PCP      Return to [facility] Clinic in 2 weeks with NP/MD      IMPRESSION:  Fatigue  Shortness of breath  Volume overload  Chronic systolic heart failure  Stage D, NYHA class IIIA symptoms  Ischemic cardiomyopathy, LVEF 35-40%  CAD s/p CABG 1997 s/p PCI to DVG-RCA 2016  H/o severe MR s/p mitral clip in 2020  HTN  H/o VT s/p AICD  WES on Bipap  T2DM with neuropathy  Hypothyroidism  Obesity  HLD  Osteoarthritis   CKD4       CARDIAC IMAGING:  Echo 5/31/21- EF 25-30%, moderate MR post-clip  Echo 5/14/21- LVEF 35-40%, Mild MR post clip, mild TR, LVIDd 5.7cm, TAPSE 1.68, RVIDd 4.05cm   Echo 12/14/20 LVEF 30-35%, mild MR, LVIDd 6.09cm, RVIDd 3.98cm  Echo 6/19/20 LVEF 25-30%, Mod TR, severe MR, LVIDd 5.76cm, TAPSE 1.94cm, RVIDd 4.06cm     EKG 5/30/21 ST with PVCs, QRS 88ms  EKG 11/14/20 V paced, QRS 84     LHC 2/3/20- Severe native 3 vessel CAD. Patent VG to RCA and LIMA to LAD. LCx is collateral dependent and native rPDA and D1 have small disease in small vessels.      NST     Medtronic interrogation not done     HEMODYNAMICS:  RHC not done  CPEST not done  6MW not done    OTHER IMAGING:  CXR 5/30/21 Diffuse increased interstitial and airspace opacities with more  confluent airspace opacities in the bilateral lung bases likely representing  pulmonary edema.     CT chest not done     HISTORY OF PRESENT ILLNESS:  I had the pleasure of seeing Jo[facility]quin Huff [facility] in [place]. Briefly, Joaquin Huff is a 66 y.o. female with h/o HTN, HLD, WES, Obesity (BMI 39), s/p gastric sleeve in 2011, T2DM, hypothyroidism, COPD, CAD s/p CABG in 1997, s/p PCI to [address] in 5/2015, ICM, VT, s/p AICD (Medtronic), anxiety and depression.   She more recently underwent MitraClip on 11/12/2020 and was evaluated for upgrade to [address] with Dr. Anjali Collins, however this was not done as her QRS was too narrow. Her most recent admission was 5/30/21-6/3/21 for CHF exacerbation. She was diuresed and her Sheliah Darter was stopped due to renal failure. INTERVAL HISTORY:  Today, patient presents for virtual HF follow up. She states she feels well. She continues to have chronic MARINA but knows to take her time. She is compliant with her medications and is able to do her normal functions. REVIEW OF SYSTEMS:  Review of Systems   Constitutional: Negative for chills, diaphoresis, fever, malaise/fatigue and weight loss. HENT: Negative. Eyes: Negative. Respiratory: Negative for cough and shortness of breath. MARINA- unchanged    Cardiovascular: Negative for chest pain, palpitations, orthopnea, claudication, leg swelling and PND. Gastrointestinal: Negative for abdominal pain, constipation, diarrhea, nausea and vomiting. Genitourinary: Negative. Musculoskeletal: Positive for joint pain. Neurological: Negative for dizziness, weakness and headaches. Psychiatric/Behavioral: Negative.          PHYSICAL EXAM:  Patient-Reported Vitals 8/23/2021   Patient-Reported Weight 230 lb   Patient-Reported Pulse 71   Patient-Reported Temperature 97.6   Patient-Reported Systolic  137   Patient-Reported Diastolic 69          PE not performed due to nature of visit     n  PAST MEDICAL HISTORY:  Past Medical History:   Diagnosis Date    Adverse effect of anesthesia     hard to wake up/uses BIPAP/\"try to avoid general if possible\"/intubated in past prior to going to sleep and it caused pt to be incontinent    Arthritis     Asthma     CAD (coronary artery disease)     Chronic kidney disease     elevataed creatinine    Chronic obstructive pulmonary disease (HCC)     Chronic pain     arthritis    Coagulation disorder (HCC)     on plavix    Congestive heart failure (HCC)     Diabetes (HCC)     Hypertension     Morbid obesity (HCC)     Sleep apnea 1996    BIPAP with 2 liters oxygen    Thromboembolus ([facility] [address]     after heart surgery - left leg    Thyroid disease        PAST SURGICAL HISTORY:  Past Surgical History:   Procedure Laterality Date    COLONOSCOPY N/A 2020    COLONOSCOPY   :- performed by Ward Givens MD at [address] HX ARTHROPLASTY  2105    knee - right    HX  SECTION      x3    HX CORONARY ARTERY BYPASS GRAFT  1997    3 vessels    HX CORONARY STENT PLACEMENT  2016    HX HERNIA REPAIR  1988    HX IMPLANTABLE CARDIOVERTER DEFIBRILLATOR      HX KNEE REPLACEMENT Right 2015    once    SD CARDIAC SURG PROCEDURE UNLIST  2018    cardiac cath - Left    SD LAP GASTRIC BYPASS/KERLINE-EN-Y  2011    lap band per pt and not a gastric bypass       FAMILY HISTORY:  Family History   Problem Relation Age of Onset    Hypertension Mother     Dementia Mother     Coronary Artery Disease Father 58    Sudden Death Father 58    Diabetes Son     Diabetes Brother        SOCIAL HISTORY:  Social History     Socioeconomic History    Marital status:      Spouse name: Not on file    Number of children: Not on file    Years of education: Not on file    Highest education level: Not on file   Tobacco Use    Smoking status: Former Smoker     Packs/day: 0.50     Years: 45.00     Pack years: 22.50     Types: Cigarettes     Quit date: 2010     Years since quittin.6    Smokeless tobacco: Never Used    Tobacco comment: quit /started again (smoked 3 yrs) off and on/none since    Vaping Use    Vaping Use: Never used   Substance and Sexual Activity    Alcohol use: Not Currently    Drug use: Not Currently    Sexual activity: Not Currently   Other Topics Concern     Social Determinants of Health     Financial Resource Strain:     Difficulty of Paying Living Expenses:    Food Insecurity:     Worried About Running Out of Food in the Last Year:     [address] in the Last Year:    Transportation Needs:     Lack of Transportation (Medical):  Lack of Transportation (Non-Medical):    Physical Activity:     Days of Exercise per Week:     Minutes of Exercise per Session:    Stress:     Feeling of Stress :    Social Connections:     Frequency of Communication with Friends and Family:     Frequency of Social Gatherings with Friends and Family:     Attends Confucianist Services:     Active Member of Clubs or Organizations:     Attends Club or Organization Meetings:     Marital Status:        LABORATORY RESULTS:  Labs Latest Ref Rng & Units 7/12/2021   Albumin 3.7 - 4.7 g/dL 4.2   Calcium 8.7 - 10.3 mg/dL 10. 9(H)   Glucose 65 - 99 mg/dL 165(H)   BUN 8 - 27 mg/dL 35(H)   Creatinine 0.57 - 1.00 mg/dL 1.85(H)   Sodium 134 - 144 mmol/L 137   Potassium 3.5 - 5.2 mmol/L 4.3   Some recent data might be hidden       ALLERGY:  Allergies   Allergen Reactions    Crestor [Rosuvastatin] Other (comments)     Causes muscle cramps    Lisinopril Cough    Nsaids (Non-Steroidal Anti-Inflammatory Drug) Other (comments)     Liver and Kidney        CURRENT MEDICATIONS:    Current Outpatient Medications:     Euthyrox 100 mcg tablet, TAKE 1 TABLET BY MOUTH ONCE DAILY BEFORE BREAKFAST, Disp: 90 Tablet, Rfl: 0    furosemide (LASIX) 20 mg tablet, Take 2 tablets by mouth once daily (Patient taking differently: 40 mg two (2) times a day.), Disp: 60 Tablet, Rfl: 0    ezetimibe (ZETIA) 10 mg tablet, Take 1 tablet by mouth once daily, Disp: 30 Tablet, Rfl: 0    isosorbide mononitrate ER (IMDUR) 30 mg tablet, TAKE 1 TABLET BY MOUTH ONCE DAILY IN THE MORNING, Disp: 30 Tablet, Rfl: 0    gabapentin (NEURONTIN) 100 mg capsule, Take 2 Capsules by mouth two (2) times a day. Max Daily Amount: 400 mg., Disp: 120 Capsule, Rfl: 0    FreeStyle Lancets 28 gauge misc, USE AS DIRECTED, Disp: , Rfl:     metoprolol succinate (TOPROL-XL) 25 mg XL tablet, Take 0.5 Tablets by mouth daily.  Hold for systolic BP less than 506, Disp: 60 Tablet, Rfl: 2    glipiZIDE (GLUCOTROL) 10 mg tablet, Take 1 tablet by mouth twice daily with food, Disp: 180 Tablet, Rfl: 0    nitroglycerin (NITROSTAT) 0.3 mg SL tablet, 1 Tablet by SubLINGual route every five (5) minutes as needed for Chest Pain., Disp: 1 Bottle, Rfl: 0    clopidogreL (PLAVIX) 75 mg tab, Take 1 tablet by mouth once daily, Disp: 90 Tablet, Rfl: 0    hydrALAZINE (APRESOLINE) 25 mg tablet, Take 1 Tab by mouth three (3) times daily. , Disp: 90 Tab, Rfl: 2    Blood-Glucose Meter monitoring kit, Check blood sugar daily. May substitute for insurance preferred meter, Disp: 1 Kit, Rfl: 0    glucose blood VI test strips (blood glucose test) strip, Check blood sugar 2 times daily: E11.9 may substitute for insurance preferred strips. , Disp: 200 Strip, Rfl: 3    lancets misc, Use as directed. Dx:check sugar once daily. E11.65, Disp: 1 Each, Rfl: 11    allopurinoL (ZYLOPRIM) 100 mg tablet, Take 1 tablet by mouth once daily, Disp: 90 Tab, Rfl: 1    atorvastatin (LIPITOR) 80 mg tablet, TAKE 1 TABLET BY MOUTH AT BEDTIME, Disp: 90 Tab, Rfl: 3    lancets misc, Check blood sugar 2 times daily. May substitute for insurance preferred lancets. , Disp: 200 Each, Rfl: 3    glucose blood VI test strips (ASCENSIA AUTODISC VI, ONE TOUCH ULTRA TEST VI) strip, E11.65 check sugar once daily, Disp: 100 Strip, Rfl: 0    clotrimazole-betamethasone (LOTRISONE) topical cream, Apply  to affected area two (2) times a day. (Patient taking differently: Apply  to affected area two (2) times daily as needed.), Disp: 30 g, Rfl: 0    Blood-Glucose Meter monitoring kit, Use as directed. Dx: E11.65 check sugar twice daily, Disp: 1 Kit, Rfl: 0    albuterol (PROVENTIL HFA, VENTOLIN HFA, PROAIR HFA) 90 mcg/actuation inhaler, Take 2 Puffs by inhalation every four (4) hours as needed. , Disp: , Rfl:     acetaminophen (TYLENOL ARTHRITIS PAIN) 650 mg TbER, Take 1,300 mg by mouth two (2) times a day., Disp: , Rfl:     aspirin 81 mg chewable tablet, Take 81 mg by mouth daily. , Disp: , Rfl:     Thank you for your referral and allowing me to participate in this patient's care. Funmilayo Fox NP  [address] [place] [address], [address]  Phone: [phone]    Rigoberto Aponte, who was evaluated through a synchronous (real-time) audio-video encounter, and/or her healthcare decision maker, is aware that it is a billable service, with coverage as determined by her insurance carrier. She provided verbal consent to proceed: Yes, and patient identification was verified. This visit was conducted pursuant to the emergency declaration under the [address] [place] [address] authority and the [employer] Ã¨[id number]€˜ and [employer] General Act. A caregiver was present when appropriate. Ability to conduct physical exam was limited. The patient was located in a state where the provider was credentialed to provide care.

## 2021-08-24 ENCOUNTER — PATIENT MESSAGE (OUTPATIENT)
Dept: CARDIOLOGY CLINIC | Age: 71
End: 2021-08-24

## 2021-08-24 ENCOUNTER — TELEPHONE (OUTPATIENT)
Dept: CARDIOLOGY CLINIC | Age: 71
End: 2021-08-24

## 2021-08-24 NOTE — TELEPHONE ENCOUNTER
----- Message from Trudie Kocher, NP sent at 8/24/2021 10:17 AM EDT -----  Regarding: FW: Update Medical Information  Contact: [phone]  Please ask her to take 60 mg this AM and 40 mg this PM of lasix - call with weight and BP tomorrow. ----- Message -----  From: Hugo Roman RN  Sent: 8/24/2021  10:06 AM EDT  To: Trudie Kocher, NP  Subject: FW: Update Medical Information     I called patient, advised her of above, she states understanding, will send a mychart message tomorrow with weight and BP                    ----- Message -----  From: Jamie Phillips  Sent: 8/24/2021   9:17 AM EDT  To: [facility]  Subject: Update Medical Information                       Morning wt (naked) 224.0. I cannot believe I've lost 6 lbs since yesterday but its probably due to yest I was fully dresses. ..but I'll take it. My BP /58 P42 @ 7:15 am.  No symptoms drank 1/2 cup of coffee & 1/2 bottle of water before I left house  at 7:30. Bp at 9:00 am 135/82 P 74.  1st void just now. Less bloating swelling but still there. Do feel better. Ok to call cell phone. ...if  I  cannot answer I will call you back.   Thanks

## 2021-08-24 NOTE — TELEPHONE ENCOUNTER
Jacquelin Medellin approved by [employer] via coverrmymeds. I called pharmacy, copay will be $89.50. Patient states this is a hardship, she will come to office to fill out patient assistance. Patient assistance faxed to [employer] on this date for Jacquelin Medellin patient assistance.

## 2021-08-26 ENCOUNTER — TELEPHONE (OUTPATIENT)
Dept: CARDIOLOGY CLINIC | Age: 71
End: 2021-08-26

## 2021-08-26 NOTE — TELEPHONE ENCOUNTER
After confirming with Annice Pod that patient needs 2 week f/u I called patient to inform her. Patient has existing appointment scheduled 9/9/21 at 1pm with Jenny Humphrey scheduled for follow up.

## 2021-08-27 ENCOUNTER — TELEPHONE (OUTPATIENT)
Dept: CARDIOLOGY CLINIC | Age: 71
End: 2021-08-27

## 2021-08-27 NOTE — TELEPHONE ENCOUNTER
----- Message from Ally Joel NP sent at 8/27/2021  9:58 AM EDT -----  Regarding: FW: Update Medical Information  Contact: [phone]  Yes - cont 60 mg PO BID. If she wants to transition to Bumex for cost, we can - let me know. I called patient and advised her of above per GUERA Blankenship. She states understanding, she states she will stay on lasix for now. Also discussed difference between bumex and verquvo-she will start verquvo after she is approved for patient assistance. ----- Message -----  From: Jude Tovar RN  Sent: 8/27/2021   9:20 AM EDT  To: Ally Joel NP  Subject: FW: Update Medical Information                   Keep with lasix 60 mg bid? I will call her later regarding her questions-I have already addressed the Verquvo several times-will address again. ..... ----- Message -----  From: Ana Britt  Sent: 8/27/2021   9:01 AM EDT  To: [facility]  Subject: Update Medical Information                       Good morning  Wt this am 224.8 /76 P 72. .. TRYING to drink more and being careful with eating. Feel like I'm doing better with choices and portions. I have some questions  about taking Bumex which is cheaper and what is the difference between Bumex and Verquvo and  effects on kidneys. Working until  noon. Should be home by 1:00. Pls call. The rx for Myke Molina is ready. Should  I  pick it up or  wait for reply from assistance? I can certainly wait.

## 2021-08-31 DIAGNOSIS — I50.20 NYHA CLASS 3 HEART FAILURE WITH REDUCED EJECTION FRACTION (HCC): ICD-10-CM

## 2021-08-31 RX ORDER — VERICIGUAT 2.5 MG/1
TABLET, FILM COATED ORAL
Qty: 30 EACH | Refills: 11 | Status: SHIPPED | OUTPATIENT
Start: 2021-08-31 | End: 2021-10-28

## 2021-08-31 NOTE — TELEPHONE ENCOUNTER
Requested Prescriptions     Signed Prescriptions Disp Refills    vericiguat (Verquvo) 2.5 mg tab 30 Each 11     Sig: Take 2.5 mg by mouth daily for 14 days, THEN 5 mg daily for 14 days, THEN 10 mg daily for 30 days.  Indications: heart failure with reduced ejection fraction     Authorizing Provider: Governor Peterson     Ordering User: Angeles Pedro

## 2021-09-01 ENCOUNTER — TELEPHONE (OUTPATIENT)
Dept: CARDIOLOGY CLINIC | Age: 71
End: 2021-09-01

## 2021-09-01 DIAGNOSIS — E11.22 TYPE 2 DIABETES MELLITUS WITH STAGE 2 CHRONIC KIDNEY DISEASE, WITHOUT LONG-TERM CURRENT USE OF INSULIN (HCC): ICD-10-CM

## 2021-09-01 DIAGNOSIS — N18.2 TYPE 2 DIABETES MELLITUS WITH STAGE 2 CHRONIC KIDNEY DISEASE, WITHOUT LONG-TERM CURRENT USE OF INSULIN (HCC): ICD-10-CM

## 2021-09-01 RX ORDER — BUMETANIDE 1 MG/1
TABLET ORAL
Qty: 90 TABLET | Refills: 1 | Status: SHIPPED | OUTPATIENT
Start: 2021-09-01 | End: 2021-09-30 | Stop reason: SDUPTHER

## 2021-09-01 NOTE — TELEPHONE ENCOUNTER
----- Message from Trudie Kocher, NP sent at 8/31/2021  2:56 PM EDT -----  Regarding: FW: Update Medical Information  Contact: [phone]  We can try 2 mg Bumex qAM and 1 mg qPM and see how she does     I called patient and reviewed instructions per GUERA Blankenship. .  She states understanding. Also gave number for [employer] to call to follow up on Verquvo application.   ----- Message -----  From: Hugo Romna RN  Sent: 8/31/2021   9:56 AM EDT  To: Trudie Kocher, NP  Subject: FW: Update Medical Information                     ----- Message -----  From: Jamie Phillips  Sent: 8/31/2021   8:57 AM EDT  To: [facility]  Subject: Update Medical Information                       Good morning  Feeling myself. Rossana Ness ... just frustrated. Wt this am 224.3  I  am in this WAR with my body trying  to hold on to fluids. I am starting to see very gradual improvement  which tells me how consistant I  need to be. Just wondering if it might be a good time to try the Bumex when this bottle of lasex is gone which maybe over the holiday weekend. The Lasix rx was filled for #60 but I'm taking  3 2xday. ...just a thought. Rossana Ness My bp is usually  130's-150's/mid 70's - to low 90's. Rossana Ness I  sometimes get a little light headed,but  usually need to drink more. Staying focused. Working until noon but I  have my cell.

## 2021-09-02 ENCOUNTER — TELEPHONE (OUTPATIENT)
Dept: CARDIOLOGY CLINIC | Age: 71
End: 2021-09-02

## 2021-09-02 DIAGNOSIS — I50.42 CHRONIC HEART FAILURE WITH REDUCED EJECTION FRACTION AND DIASTOLIC DYSFUNCTION (HCC): ICD-10-CM

## 2021-09-02 DIAGNOSIS — I10 HYPERTENSION, UNSPECIFIED TYPE: ICD-10-CM

## 2021-09-02 DIAGNOSIS — I25.5 ISCHEMIC CARDIOMYOPATHY: ICD-10-CM

## 2021-09-02 RX ORDER — ISOSORBIDE MONONITRATE 30 MG/1
TABLET, EXTENDED RELEASE ORAL
Qty: 30 TABLET | Refills: 0 | Status: SHIPPED | OUTPATIENT
Start: 2021-09-02 | End: 2021-09-30

## 2021-09-02 RX ORDER — GABAPENTIN 100 MG/1
CAPSULE ORAL
Qty: 120 CAPSULE | Refills: 0 | Status: SHIPPED | OUTPATIENT
Start: 2021-09-02 | End: 2021-09-30 | Stop reason: SDUPTHER

## 2021-09-02 NOTE — TELEPHONE ENCOUNTER
----- Message from Igor Eye sent at 9/2/2021  2:57 PM EDT -----  Regarding: RE: Non-Urgent Medical Question  Contact: [phone]  Picked up Bumex yesterday. Started to day. .2 tabs this am..just took. 1 pill now. My wt 226.4 bp 157/95 p75. I woke up dizzy/lightheaded extremely nauseous. Did not work today. Symptoms  subsided after laying down. Had oatmeal around noon. Still a little lightheaded. /56 P 69. DRINKING water. Going to work tomorrow. Contacted [employer]. .... supposed to get pkg tomorrow. How do I  take med. I called patient, she states her BP is now down to 113/56, she no longer has nausea or dizziness. She will send a [employer] message tomorrow with weight since starting bumex today. I also advised her how to take verquvo tomorrow when she receives it. She states understanding.

## 2021-09-07 DIAGNOSIS — Z95.1 HX OF CABG: ICD-10-CM

## 2021-09-07 DIAGNOSIS — I10 HYPERTENSION, UNSPECIFIED TYPE: ICD-10-CM

## 2021-09-07 DIAGNOSIS — Z95.810 ICD (IMPLANTABLE CARDIOVERTER-DEFIBRILLATOR) IN PLACE: ICD-10-CM

## 2021-09-07 DIAGNOSIS — I25.5 ISCHEMIC CARDIOMYOPATHY: ICD-10-CM

## 2021-09-07 DIAGNOSIS — I25.10 CORONARY ARTERY DISEASE INVOLVING NATIVE HEART WITHOUT ANGINA PECTORIS, UNSPECIFIED VESSEL OR LESION TYPE: ICD-10-CM

## 2021-09-07 DIAGNOSIS — I50.42 CHRONIC HEART FAILURE WITH REDUCED EJECTION FRACTION AND DIASTOLIC DYSFUNCTION (HCC): ICD-10-CM

## 2021-09-07 RX ORDER — CLOPIDOGREL BISULFATE 75 MG/1
TABLET ORAL
Qty: 90 TABLET | Refills: 0 | Status: CANCELLED | OUTPATIENT
Start: 2021-09-07

## 2021-09-08 ENCOUNTER — TELEPHONE (OUTPATIENT)
Dept: CARDIOLOGY CLINIC | Age: 71
End: 2021-09-08

## 2021-09-08 DIAGNOSIS — I25.10 CORONARY ARTERY DISEASE INVOLVING NATIVE HEART WITHOUT ANGINA PECTORIS, UNSPECIFIED VESSEL OR LESION TYPE: ICD-10-CM

## 2021-09-08 DIAGNOSIS — Z95.1 HX OF CABG: ICD-10-CM

## 2021-09-08 DIAGNOSIS — I25.5 ISCHEMIC CARDIOMYOPATHY: ICD-10-CM

## 2021-09-08 DIAGNOSIS — Z95.810 ICD (IMPLANTABLE CARDIOVERTER-DEFIBRILLATOR) IN PLACE: ICD-10-CM

## 2021-09-08 RX ORDER — HYDRALAZINE HYDROCHLORIDE 25 MG/1
TABLET, FILM COATED ORAL
Qty: 90 TABLET | Refills: 0 | Status: SHIPPED | OUTPATIENT
Start: 2021-09-08 | End: 2021-10-08 | Stop reason: SDUPTHER

## 2021-09-08 RX ORDER — CLOPIDOGREL BISULFATE 75 MG/1
TABLET ORAL
Qty: 90 TABLET | Refills: 0 | Status: SHIPPED | OUTPATIENT
Start: 2021-09-08 | End: 2021-12-08

## 2021-09-08 RX ORDER — EZETIMIBE 10 MG/1
TABLET ORAL
Qty: 30 TABLET | Refills: 0 | Status: SHIPPED | OUTPATIENT
Start: 2021-09-08 | End: 2021-10-08 | Stop reason: SDUPTHER

## 2021-09-08 NOTE — TELEPHONE ENCOUNTER
Telephone Call RE:  Appointment reminder     Outcome:     [x] Patient confirmed appointment   [] Patient rescheduled appointment for    [] Unable to reach   [] Left message              [] Other:     Screened for covid 19. Patient informed of visitor restrictions.       Clyde Syed

## 2021-09-09 ENCOUNTER — TELEPHONE (OUTPATIENT)
Dept: CARDIOLOGY CLINIC | Age: 71
End: 2021-09-09

## 2021-09-09 ENCOUNTER — OFFICE VISIT (OUTPATIENT)
Dept: CARDIOLOGY CLINIC | Age: 71
End: 2021-09-09
Payer: MEDICARE

## 2021-09-09 VITALS
HEIGHT: 63 IN | RESPIRATION RATE: 16 BRPM | WEIGHT: 227.8 LBS | BODY MASS INDEX: 40.36 KG/M2 | TEMPERATURE: 97.8 F | OXYGEN SATURATION: 98 % | SYSTOLIC BLOOD PRESSURE: 110 MMHG | HEART RATE: 76 BPM | DIASTOLIC BLOOD PRESSURE: 68 MMHG

## 2021-09-09 DIAGNOSIS — R60.9 SWELLING: ICD-10-CM

## 2021-09-09 DIAGNOSIS — I25.5 ISCHEMIC CARDIOMYOPATHY: Primary | ICD-10-CM

## 2021-09-09 DIAGNOSIS — R06.02 SHORTNESS OF BREATH: ICD-10-CM

## 2021-09-09 PROCEDURE — 3017F COLORECTAL CA SCREEN DOC REV: CPT | Performed by: NURSE PRACTITIONER

## 2021-09-09 PROCEDURE — G9899 SCRN MAM PERF RSLTS DOC: HCPCS | Performed by: NURSE PRACTITIONER

## 2021-09-09 PROCEDURE — G8399 PT W/DXA RESULTS DOCUMENT: HCPCS | Performed by: NURSE PRACTITIONER

## 2021-09-09 PROCEDURE — 1101F PT FALLS ASSESS-DOCD LE1/YR: CPT | Performed by: NURSE PRACTITIONER

## 2021-09-09 PROCEDURE — G8536 NO DOC ELDER MAL SCRN: HCPCS | Performed by: NURSE PRACTITIONER

## 2021-09-09 PROCEDURE — 1090F PRES/ABSN URINE INCON ASSESS: CPT | Performed by: NURSE PRACTITIONER

## 2021-09-09 PROCEDURE — G8427 DOCREV CUR MEDS BY ELIG CLIN: HCPCS | Performed by: NURSE PRACTITIONER

## 2021-09-09 PROCEDURE — 99215 OFFICE O/P EST HI 40 MIN: CPT | Performed by: NURSE PRACTITIONER

## 2021-09-09 PROCEDURE — G8432 DEP SCR NOT DOC, RNG: HCPCS | Performed by: NURSE PRACTITIONER

## 2021-09-09 PROCEDURE — G8752 SYS BP LESS 140: HCPCS | Performed by: NURSE PRACTITIONER

## 2021-09-09 PROCEDURE — G8754 DIAS BP LESS 90: HCPCS | Performed by: NURSE PRACTITIONER

## 2021-09-09 PROCEDURE — G8417 CALC BMI ABV UP PARAM F/U: HCPCS | Performed by: NURSE PRACTITIONER

## 2021-09-09 NOTE — PATIENT INSTRUCTIONS
Medication changes:    CONTINUE your Verquvo as prescribed. Please call the company for further dosing instructions. Please take this to your pharmacy to notify them of the change in medications. Testing Ordered:    Labs today. An order for ultrasound has been placed to be done. You will be receiving an automated call from Coordination of Care to schedule this test. If you are unavailable to receive the call or would like to contact coordination of care yourself you may contact [phone] to schedule. You will need to contact coordination of care yourself if you miss their calls as they will only make 3 attempts to reach you. Other Recommendations: We will facilitate an appointment for you to see Dr. Hugo Johns regarding CardioMEMS procedure. Ensure your drinking an adequate amount of water with a goal of 6-8 eight ounce glasses (1.5-2 liters) of fluid daily. Your urine should be clear and light yellow straw colored. If your blood pressure begins to consistently run below 90/60 and/or you begin to experience dizziness or lightheadedness, please contact the [facility] at [phone]. Follow up in 1 month with [facility]. Please monitor your weights daily upon waking and after using the bathroom. Keep a written records of your weights and bring to your next appointment. If you have a weight gain of 3 or more pounds overnight OR 5 or more pounds in one week please contact our office. Thank you for allowing us the privilege of being a part of your healthcare team! Please do not hesitate to contact our office at [phone] with any questions or concerns. Virtual Heart Failure [address] invites you to learn more about heart failure and to share your questions, ideas, and experiences with others.  Each month, the Heart Failure Support Group features a new educational topic and a guest speaker, followed by an interactive discussion. Our Heart Failure Nurse Navigator will moderate each session. You will be able to participate by phone, tablet or computer through [address] [place] [address]. This support group takes place on the 3rd Thursday of each month from 6:00-7:30PM. All individuals living with heart failure and their caregivers are welcome to join. If you are interested in participating, please contact us at [email or web address] and you will be sent the link to join the [employer].

## 2021-09-09 NOTE — TELEPHONE ENCOUNTER
Called [facility] and spoke with edvin downey states that soonest available appointment with Dr. Krupa Bergeron is 10/12 at 2:40 pm to discuss cardiomems placement. Scheduled patient GAVIN Peck RN       Called patient using two patient identifiers. Advised patient on above information. She stated that she has a scheduling conflict with her appt with [facility] on 10/12. Transferred her upfront to [facility] to reschedule appt.  Nick Ballard RN

## 2021-09-10 LAB
ALBUMIN SERPL-MCNC: 3.8 G/DL (ref 3.5–5)
ALBUMIN/GLOB SERPL: 1.1 {RATIO} (ref 1.1–2.2)
ALP SERPL-CCNC: 120 U/L (ref 45–117)
ALT SERPL-CCNC: 36 U/L (ref 12–78)
ANION GAP SERPL CALC-SCNC: 7 MMOL/L (ref 5–15)
AST SERPL-CCNC: 26 U/L (ref 15–37)
BILIRUB SERPL-MCNC: 0.3 MG/DL (ref 0.2–1)
BNP SERPL-MCNC: 622 PG/ML
BUN SERPL-MCNC: 45 MG/DL (ref 6–20)
BUN/CREAT SERPL: 21 (ref 12–20)
CALCIUM SERPL-MCNC: 10.3 MG/DL (ref 8.5–10.1)
CHLORIDE SERPL-SCNC: 101 MMOL/L (ref 97–108)
CO2 SERPL-SCNC: 28 MMOL/L (ref 21–32)
CREAT SERPL-MCNC: 2.12 MG/DL (ref 0.55–1.02)
ERYTHROCYTE [DISTWIDTH] IN BLOOD BY AUTOMATED COUNT: 14.3 % (ref 11.5–14.5)
GLOBULIN SER CALC-MCNC: 3.6 G/DL (ref 2–4)
GLUCOSE SERPL-MCNC: 166 MG/DL (ref 65–100)
HCT VFR BLD AUTO: 39.6 % (ref 35–47)
HGB BLD-MCNC: 12.5 G/DL (ref 11.5–16)
MAGNESIUM SERPL-MCNC: 2.4 MG/DL (ref 1.6–2.4)
MCH RBC QN AUTO: 32.2 PG (ref 26–34)
MCHC RBC AUTO-ENTMCNC: 31.6 G/DL (ref 30–36.5)
MCV RBC AUTO: 102.1 FL (ref 80–99)
NRBC # BLD: 0 K/UL (ref 0–0.01)
NRBC BLD-RTO: 0 PER 100 WBC
PLATELET # BLD AUTO: 253 K/UL (ref 150–400)
PMV BLD AUTO: 10.5 FL (ref 8.9–12.9)
POTASSIUM SERPL-SCNC: 3.7 MMOL/L (ref 3.5–5.1)
PROT SERPL-MCNC: 7.4 G/DL (ref 6.4–8.2)
RBC # BLD AUTO: 3.88 M/UL (ref 3.8–5.2)
SODIUM SERPL-SCNC: 136 MMOL/L (ref 136–145)
WBC # BLD AUTO: 7.3 K/UL (ref 3.6–11)

## 2021-09-17 ENCOUNTER — HOSPITAL ENCOUNTER (OUTPATIENT)
Dept: VASCULAR SURGERY | Age: 71
Discharge: HOME | End: 2021-09-17
Attending: NURSE PRACTITIONER
Payer: MEDICARE

## 2021-09-17 DIAGNOSIS — R60.9 SWELLING: ICD-10-CM

## 2021-09-17 PROCEDURE — 93971 EXTREMITY STUDY: CPT

## 2021-09-20 ENCOUNTER — TELEPHONE (OUTPATIENT)
Dept: CARDIOLOGY CLINIC | Age: 71
End: 2021-09-20

## 2021-09-30 ENCOUNTER — PATIENT MESSAGE (OUTPATIENT)
Dept: INTERNAL MEDICINE CLINIC | Age: 71
End: 2021-09-30

## 2021-09-30 DIAGNOSIS — I10 HYPERTENSION, UNSPECIFIED TYPE: ICD-10-CM

## 2021-09-30 DIAGNOSIS — N18.2 TYPE 2 DIABETES MELLITUS WITH STAGE 2 CHRONIC KIDNEY DISEASE, WITHOUT LONG-TERM CURRENT USE OF INSULIN (HCC): ICD-10-CM

## 2021-09-30 DIAGNOSIS — I25.5 ISCHEMIC CARDIOMYOPATHY: ICD-10-CM

## 2021-09-30 DIAGNOSIS — E11.22 TYPE 2 DIABETES MELLITUS WITH STAGE 2 CHRONIC KIDNEY DISEASE, WITHOUT LONG-TERM CURRENT USE OF INSULIN (HCC): ICD-10-CM

## 2021-09-30 DIAGNOSIS — I50.42 CHRONIC HEART FAILURE WITH REDUCED EJECTION FRACTION AND DIASTOLIC DYSFUNCTION (HCC): ICD-10-CM

## 2021-09-30 RX ORDER — ISOSORBIDE MONONITRATE 30 MG/1
TABLET, EXTENDED RELEASE ORAL
Qty: 30 TABLET | Refills: 0 | Status: SHIPPED | OUTPATIENT
Start: 2021-09-30 | End: 2021-11-01 | Stop reason: SDUPTHER

## 2021-10-01 RX ORDER — GABAPENTIN 100 MG/1
CAPSULE ORAL
Qty: 120 CAPSULE | Refills: 0 | Status: SHIPPED | OUTPATIENT
Start: 2021-10-01 | End: 2021-11-01 | Stop reason: SDUPTHER

## 2021-10-03 DIAGNOSIS — E03.9 ACQUIRED HYPOTHYROIDISM: ICD-10-CM

## 2021-10-04 RX ORDER — LEVOTHYROXINE SODIUM 100 UG/1
TABLET ORAL
Qty: 90 TABLET | Refills: 0 | Status: SHIPPED | OUTPATIENT
Start: 2021-10-04 | End: 2021-10-05 | Stop reason: SDUPTHER

## 2021-10-04 RX ORDER — GLIPIZIDE 10 MG/1
TABLET ORAL
Qty: 180 TABLET | Refills: 0 | Status: SHIPPED | OUTPATIENT
Start: 2021-10-04 | End: 2021-11-24

## 2021-10-05 ENCOUNTER — TELEPHONE (OUTPATIENT)
Dept: INTERNAL MEDICINE CLINIC | Age: 71
End: 2021-10-05

## 2021-10-05 DIAGNOSIS — E03.9 ACQUIRED HYPOTHYROIDISM: ICD-10-CM

## 2021-10-05 RX ORDER — LEVOTHYROXINE SODIUM 100 UG/1
100 TABLET ORAL
Qty: 90 TABLET | Refills: 0 | Status: SHIPPED | OUTPATIENT
Start: 2021-10-05

## 2021-10-05 NOTE — TELEPHONE ENCOUNTER
Pt is calling for rx refill for levothyroxine to be sent to [facility]. She is also requestinG ins referral for SHIMON Harley (dermatologist0. her appt is 10/13/2021.  His fax # [phone]  Pt # [phone]

## 2021-10-11 ENCOUNTER — OFFICE VISIT (OUTPATIENT)
Dept: CARDIOLOGY CLINIC | Age: 71
End: 2021-10-11
Payer: MEDICARE

## 2021-10-11 DIAGNOSIS — Z95.810 AUTOMATIC IMPLANTABLE CARDIAC DEFIBRILLATOR IN SITU: Primary | ICD-10-CM

## 2021-10-11 PROCEDURE — 93295 DEV INTERROG REMOTE 1/2/MLT: CPT | Performed by: INTERNAL MEDICINE

## 2021-10-11 PROCEDURE — 93296 REM INTERROG EVL PM/IDS: CPT | Performed by: INTERNAL MEDICINE

## 2021-10-11 NOTE — LETTER
10/12/2021 9:05 AM    Ms. Ilan Funk [id number]            This letter confirms that we have received your scheduled remote check of your implanted     device on 10-11-21  . Our EP team will contact you via phone if there are significant abnormal    findings. Your next remote check from home is scheduled for 1-12-22  . If you have any questions, please call [address] at [phone].                Sincerely,    Dejan Brown MD [facility]

## 2021-10-12 ENCOUNTER — OFFICE VISIT (OUTPATIENT)
Dept: CARDIOLOGY CLINIC | Age: 71
End: 2021-10-12
Payer: MEDICARE

## 2021-10-12 VITALS
BODY MASS INDEX: 40.75 KG/M2 | HEIGHT: 63 IN | HEART RATE: 88 BPM | WEIGHT: 230 LBS | OXYGEN SATURATION: 98 % | RESPIRATION RATE: 16 BRPM | SYSTOLIC BLOOD PRESSURE: 120 MMHG | DIASTOLIC BLOOD PRESSURE: 80 MMHG

## 2021-10-12 DIAGNOSIS — I50.42 CHRONIC HEART FAILURE WITH REDUCED EJECTION FRACTION AND DIASTOLIC DYSFUNCTION (HCC): Primary | ICD-10-CM

## 2021-10-12 DIAGNOSIS — I50.20 NYHA CLASS 3 HEART FAILURE WITH REDUCED EJECTION FRACTION (HCC): ICD-10-CM

## 2021-10-12 PROCEDURE — G8432 DEP SCR NOT DOC, RNG: HCPCS | Performed by: INTERNAL MEDICINE

## 2021-10-12 PROCEDURE — G8754 DIAS BP LESS 90: HCPCS | Performed by: INTERNAL MEDICINE

## 2021-10-12 PROCEDURE — G9899 SCRN MAM PERF RSLTS DOC: HCPCS | Performed by: INTERNAL MEDICINE

## 2021-10-12 PROCEDURE — 1090F PRES/ABSN URINE INCON ASSESS: CPT | Performed by: INTERNAL MEDICINE

## 2021-10-12 PROCEDURE — 1101F PT FALLS ASSESS-DOCD LE1/YR: CPT | Performed by: INTERNAL MEDICINE

## 2021-10-12 PROCEDURE — 93000 ELECTROCARDIOGRAM COMPLETE: CPT | Performed by: INTERNAL MEDICINE

## 2021-10-12 PROCEDURE — G8427 DOCREV CUR MEDS BY ELIG CLIN: HCPCS | Performed by: INTERNAL MEDICINE

## 2021-10-12 PROCEDURE — G8752 SYS BP LESS 140: HCPCS | Performed by: INTERNAL MEDICINE

## 2021-10-12 PROCEDURE — G8536 NO DOC ELDER MAL SCRN: HCPCS | Performed by: INTERNAL MEDICINE

## 2021-10-12 PROCEDURE — 3017F COLORECTAL CA SCREEN DOC REV: CPT | Performed by: INTERNAL MEDICINE

## 2021-10-12 PROCEDURE — G8417 CALC BMI ABV UP PARAM F/U: HCPCS | Performed by: INTERNAL MEDICINE

## 2021-10-12 PROCEDURE — 99214 OFFICE O/P EST MOD 30 MIN: CPT | Performed by: INTERNAL MEDICINE

## 2021-10-12 PROCEDURE — G8399 PT W/DXA RESULTS DOCUMENT: HCPCS | Performed by: INTERNAL MEDICINE

## 2021-10-12 RX ORDER — SERTRALINE HYDROCHLORIDE 25 MG/1
50 TABLET, FILM COATED ORAL DAILY
Status: ON HOLD | COMMUNITY
Start: 2021-09-15 | End: 2022-05-16

## 2021-10-12 NOTE — LETTER
10/12/2021    Patient: Krystian Adame   YOB: 1950   Date of Visit: 10/12/2021     Mildred Junior. Khalif Salazar NP  [address] [facility] [place]    Dear Mildred Junior. Khalif Salazar NP,      Thank you for referring Ms. Marielle Broderick to [facility] for evaluation. My notes for this consultation are attached. If you have questions, please do not hesitate to call me. I look forward to following your patient along with you.       Sincerely,    Patito Campbell MD

## 2021-10-12 NOTE — PATIENT INSTRUCTIONS
[facility] address:  [address] [place] [address], [address]    Procedure:Cardiomem    Your procedure is scheduled for 10/22/21. You need to arrive about 2 hours prior to procedure, so please arrive by 6:30 am to [address] will proceed to the main entrance of the hospital where you will be screened and checked in. Bring your insurance information and list of current medications. You may not have anything to eat or drink after midnight, except for sips of water to take medications. Medication instructions:take all medications with sips of water     COVID testing 3-4 day prior to procedure. (10/19)  [facility]:  [facility] location: Patient discharge area at the corner of [address] [place] [address] 200: [address] [facility] [address]  Hours: Monday-Friday 7 am to 3 pm    *Have lab work completed no more then 30 days prior to the procedure but at least 2-3 days prior to the procedure. *You will need someone to drive you home after the procedure. Plan to be at [facility] a total of 6-8 hours. *Arrange for a responsible adult to help you at home for at least 24 hours,  *Wear comfortable clothing. Leave jewelry, money, and other valuables at home. You may wear dentures, eyeglasses, and/or hearing aids. *Bring an overnight bag with you (just incase you need to spend the night.)    Post Procedure Instructions:  *No driving for 24 hours post procedure. *No heavy lifting (over 10 lbs) or strenuous activity for 48 hours. *No tub baths, swimming, hot tubs, or spas for 1 week. The band aid over cath site may be removed the day after procedure and site washed gently with soap and water. *The site may appear bruised/ discolored for a couple of weeks. A small knot may be present. You may experience tenderness or soreness in groin area. This may be relieved with the use of Tylenol. *If there is any visible blood at the site, hold pressure for 20 minutes.    *Call the office if you should notice numbness, tingling, coldness, or loss of feeling in the area. Call the office if you have a fever within 2-3 days after procedure. Remember, when you begin lifting things, use proper body mechanics and bend with your knees, centering the weight on your legs. Please call with any questions: [phone].  You may also contact the cath lab directly at [phone]

## 2021-10-12 NOTE — PROGRESS NOTES
[facility] [place]: Ned Campos  [phone]          Cardiology valvular heart disease consult/Progress Note      Requesting/referring provider: Vandana Daniels., Kareem. Bina Kolb  Reason for Consult: Mitral valve disease    HPI: Tiffanie Boss, a 66y.o. year-old who presents for evaluation of mitral valve disease .70y. o.female with PMHx of CAD with CAB x 3 in 1997 then subsequent stents X 4, HTN, HLD, palpitations, ICD 2007, DM, hypothyroid, COPD, WES-wears BiPAP, CKD, DVT 1997 left leg, right TKR, Lap band 2011. Ms. Steven Lawrence was previously seen by me for underlying ischemic cardiomyopathy and prior inferior myocardial infarction. She is status post coronary bypass grafting. A cardiac catheterization was performed in February 2020 demonstrated patent LIMA to LAD and vein graft to RCA with collateral dependent circumflex and native right PDA. She has history of coronary disease as well as secondary mitral regurgitation for which she underwent a MitraClip procedure in November 2020. She has reported improved functional status since then. Intermittently however she will have some phases when she feels more bloated and short of breath. She also has underlying chronic kidney disease which makes managing her diuretics will challenging. Her heart failure therapies overall managed by heart failure clinic. I have been asked to see the patient to consider possible CardioMEMS placement. Investigations personally reviewed by me  Cardiac catheterization February 2020: Severe native three-vessel disease. Patent LIMA to LAD and vein graft to RCA. Right PDA small diffusely diseased vessel. Left circumflex is chronically occluded and branches are filling by collaterals. Small diagonal is severely diffusely diseased. ECG January 2020: Sinus rhythm, prior inferior infarct, narrow QRS complex, single PVC noted. Echo June 2020: Severe LV systolic dysfunction.   Overall EF about 30%. Global hypokinesis. Mid and basal inferolateral walls appear to be severely hypokinetic with possible prior infarct based on hyperechoic nature of this wall. Based on color Doppler it appears to be significant mitral regurgitation which is predominantly central with some degree of posteriorly directed nature of the jet. Mechanism of mitral regurgitation appears to be annular dilatation and lack of Mal coaptation secondary to predominantly posterior leaflet tethering. Moderate pulmonary hypertension is noted. Echocardiogram December 2020: Status post MitraClip, mitral regurgitation reduced from severe to mild to moderate. Clip seen expected position and secured. Overall LV systolic function about 25 to 30%. Moderate biatrial enlargement. Inferolateral wall appears to be severely hypokinetic and the rest of the areas are moderate ly hypokinetic.mean transmitral gradient was 3 mmHg    She is being followed by heart failure clinic who has had difficulty with managing her blood pressures as well as her volume status. Particularly given her underlying chronic kidney disease, information regarding her filling pressure would be useful in managing her diuretic therapy. Assessment/Plan:  1. Coronary disease manifested in the form of prior inferior MI, status post coronary bypass grafting with patent LIMA to LAD and vein graft to RCA and chronically occluded left circumflex with collaterals  2. Severe LV systolic dysfunction/heart failure reduced ejection fraction acute on chronic likely secondary to 1 with progressive adverse LV remodeling  3. Severe mitral regurgitation appears functional in nature secondary to progressive adverse LV remodeling and possible papillary muscle dysfunction from posterior myocardial infarction--status post MitraClip NT W placement in November 2020  4. Severe NYHA class III symptomatic limitations  5.   History of lower extremity thromboembolism post coronary bypass grafting  6. CKD with baseline creatinine about 2  7. Morbid obesity with suspected sleep apnea    Ms. Blanka Mead was seen in follow-up . It has been difficult to manage her fluid status because at times she gets worsening renal failure and at other times she gets increased fluid buildup. She seems to be a reasonable candidate for considering CardioMEMS placement. Assuming her insurance can approve it, it will help in managing her volume status and guiding her diuretic therapy given fluctuation in renal function from her chronic kidney disease. We will set her up for CardioMEMS procedure    I discussed the risks/benefits/alternatives of the procedure with the patient. Risks include (but are not limited to) bleeding, infection,stroke,heart attack, need for emergency surgery/pericardiocentesis, need for dialysis or death. The patient understands and agrees to proceed. We will see her back in November 2021 during her first year anniversary status post MitraClip in an echocardiogram will be performed for reassessment of degree of mitral regurgitation and this will also be a follow-up visit following CardioMEMS placement. .    [x]    High complexity decision making was performed  []    Patient is at high-risk of decompensation with multiple organ involvement  She  has a past medical history of Adverse effect of anesthesia, Arthritis, Asthma, CAD (coronary artery disease), Chronic kidney disease, Chronic obstructive pulmonary disease ([address] Chronic pain, Coagulation disorder ([address] Congestive heart failure ([address] Diabetes ([address] Hypertension, Morbid obesity ([address] Sleep apnea (1996), Thromboembolus ([address] and Thyroid disease. Review of system:Patient reports no PND/Orthpnea/CP. sHe reports no cough/fever/focal neurological deficits/abdominal pain. All other systems negative except as above.    Family History   Problem Relation Age of Onset    Hypertension Mother     Dementia Mother     Coronary Artery Disease Father 58    Sudden Death Father 58    Diabetes Son     Diabetes Brother       Social History     Socioeconomic History    Marital status:      Spouse name: Not on file    Number of children: Not on file    Years of education: Not on file    Highest education level: Not on file   Tobacco Use    Smoking status: Former Smoker     Packs/day: 0.50     Years: 45.00     Pack years: 22.50     Types: Cigarettes     Quit date: 2010     Years since quittin.7    Smokeless tobacco: Never Used    Tobacco comment: quit /started again (smoked 3 yrs) off and on/none since    Vaping Use    Vaping Use: Never used   Substance and Sexual Activity    Alcohol use: Not Currently    Drug use: Not Currently    Sexual activity: Not Currently   Other Topics Concern     Social Determinants of Health     Financial Resource Strain:     Difficulty of Paying Living Expenses:    Food Insecurity:     Worried About Running Out of Food in the Last Year:     [address] in the Last Year:    Transportation Needs:     Lack of Transportation (Medical):  Lack of Transportation (Non-Medical):    Physical Activity:     Days of Exercise per Week:     Minutes of Exercise per Session:    Stress:     Feeling of Stress :    Social Connections:     Frequency of Communication with Friends and Family:     Frequency of Social Gatherings with Friends and Family:     Attends [employer] Services:     Active Member of Clubs or Organizations:     Attends Club or Organization Meetings:     Marital Status:       PE  Vitals:    10/12/21 1452   BP: 120/80   Pulse: 82   Resp: 16   SpO2: 98%   Weight: 230 lb (104.3 kg)   Height: 5' 3\" (1.6 m)    Body mass index is 40.74 kg/m².     Recent Labs:  Lab Results   Component Value Date/Time    Cholesterol, total 144 2021 03:51 AM    HDL Cholesterol 72 2021 03:51 AM    LDL, calculated 55.8 2021 03:51 AM    Triglyceride 81 2021 03:51 AM    CHOL/HDL Ratio 2.0 2021 03:51 AM     Lab Results   Component Value Date/Time    Creatinine 2.12 (H) 2021 10:00 AM     Lab Results   Component Value Date/Time    BUN 45 (H) 2021 10:00 AM     Lab Results   Component Value Date/Time    Potassium 3.7 2021 10:00 AM     Lab Results   Component Value Date/Time    Hemoglobin A1c 7.3 (H) 2021 04:23 AM     Lab Results   Component Value Date/Time    HGB 12.5 2021 10:00 AM     Lab Results   Component Value Date/Time    PLATELET 632 1738 10:00 AM       Reviewed:  Past Medical History:   Diagnosis Date    Adverse effect of anesthesia     hard to wake up/uses BIPAP/\"try to avoid general if possible\"/intubated in past prior to going to sleep and it caused pt to be incontinent    Arthritis     Asthma     CAD (coronary artery disease)     Chronic kidney disease     elevataed creatinine    Chronic obstructive pulmonary disease (HCC)     Chronic pain     arthritis    Coagulation disorder (HCC)     on plavix    Congestive heart failure (HCC)     Diabetes ([address]     Hypertension     Morbid obesity ([address]     Sleep apnea     BIPAP with 2 liters oxygen    Thromboembolus ([address]     after heart surgery - left leg    Thyroid disease      Social History     Tobacco Use   Smoking Status Former Smoker    Packs/day: 0.50    Years: 45.00    Pack years: 22.50    Types: Cigarettes    Quit date: 2010    Years since quittin.7   Smokeless Tobacco Never Used   Tobacco Comment    quit /started again (smoked 3 yrs) off and on/none since      Social History     Substance and Sexual Activity   Alcohol Use Not Currently     Allergies   Allergen Reactions    Crestor [Rosuvastatin] Other (comments)     Causes muscle cramps    Lisinopril Cough    Nsaids (Non-Steroidal Anti-Inflammatory Drug) Other (comments)     Liver and Kidney     Family History   Problem Relation Age of Onset    Hypertension Mother     Dementia Mother     Coronary Artery Disease Father 58    Sudden Death Father 58    Diabetes Son     Diabetes Brother         Current Outpatient Medications   Medication Sig    sertraline (ZOLOFT) 25 mg tablet Take 50 mg by mouth daily.  hydrALAZINE (APRESOLINE) 25 mg tablet Take 1 Tablet by mouth three (3) times daily.  ezetimibe (ZETIA) 10 mg tablet Take 1 Tablet by mouth daily.  levothyroxine (Euthyrox) 100 mcg tablet Take 1 Tablet by mouth Daily (before breakfast).  bumetanide (BUMEX) 1 mg tablet Take 2 mg in the morning and 1 mg in the afternoon    glipiZIDE (GLUCOTROL) 10 mg tablet Take 1 tablet by mouth twice daily with food    gabapentin (NEURONTIN) 100 mg capsule TAKE 2 CAPSULES BY MOUTH TWICE DAILY. *MAX DAILY AMOUNT 400 MG*    isosorbide mononitrate ER (IMDUR) 30 mg tablet TAKE 1 TABLET BY MOUTH ONCE DAILY IN THE MORNING    clopidogreL (PLAVIX) 75 mg tab Take 1 tablet by mouth once daily    vericiguat (Verquvo) 2.5 mg tab Take 2.5 mg by mouth daily for 14 days, THEN 5 mg daily for 14 days, THEN 10 mg daily for 30 days. Indications: heart failure with reduced ejection fraction    FreeStyle Lancets 28 gauge misc USE AS DIRECTED    metoprolol succinate (TOPROL-XL) 25 mg XL tablet Take 0.5 Tablets by mouth daily. Hold for systolic BP less than 359    nitroglycerin (NITROSTAT) 0.3 mg SL tablet 1 Tablet by SubLINGual route every five (5) minutes as needed for Chest Pain.  Blood-Glucose Meter monitoring kit Check blood sugar daily. May substitute for insurance preferred meter    glucose blood VI test strips (blood glucose test) strip Check blood sugar 2 times daily: E11.9 may substitute for insurance preferred strips.  lancets misc Use as directed. Dx:check sugar once daily. E11.65    allopurinoL (ZYLOPRIM) 100 mg tablet Take 1 tablet by mouth once daily    atorvastatin (LIPITOR) 80 mg tablet TAKE 1 TABLET BY MOUTH AT BEDTIME    lancets misc Check blood sugar 2 times daily.  May substitute for insurance preferred lancets.  glucose blood VI test strips (ASCENSIA AUTODISC VI, ONE TOUCH ULTRA TEST VI) strip E11.65 check sugar once daily    clotrimazole-betamethasone (LOTRISONE) topical cream Apply  to affected area two (2) times a day. (Patient taking differently: Apply  to affected area two (2) times daily as needed.)    Blood-Glucose Meter monitoring kit Use as directed. Dx: E11.65 check sugar twice daily    albuterol (PROVENTIL HFA, VENTOLIN HFA, PROAIR HFA) 90 mcg/actuation inhaler Take 2 Puffs by inhalation every four (4) hours as needed.  acetaminophen (TYLENOL ARTHRITIS PAIN) 650 mg TbER Take 1,300 mg by mouth two (2) times a day.  aspirin 81 mg chewable tablet Take 81 mg by mouth daily. No current facility-administered medications for this visit. /80   Pulse 82   Resp 16   Ht 5' 3\" (1.6 m)   Wt 230 lb (104.3 kg)   SpO2 98%   BMI 40.74 kg/m²   General:    Alert, cooperative, no distress. Psychiatric:    Normal Mood and affect    Eye/ENT:      Pupils equal, No asymmetry, Conjunctival pink. Able to hear voice at normal amplitude   Lungs:      Visibly symmetric chest expansion, No palpable tenderness. Clear to auscultation bilaterally. Heart[de-identified]    Regular rate and rhythm, S1, S2 normal, no murmur, click, rub or gallop. No JVD, Normal palpable peripheral pulses. No cyanosis   Abdomen:     Soft, non-tender. Bowel sounds normal. No masses,  No      organomegaly. Extremities:   Extremities normal, atraumatic, no edema. Neurologic:   CN II-XII grossly intact. No gross focal deficits         Regine Gaxioal MD10/12/21     ATTENTION:   This medical record was transcribed using an electronic medical records/speech recognition system. Although proofread, it may and can contain electronic, spelling and other errors. Corrections may be executed at a later time. Please feel free to contact us for any clarifications as needed.     [facility] [place]  [address] [facility] [address] [facility], [address] [place] [address]

## 2021-10-13 ENCOUNTER — TELEPHONE (OUTPATIENT)
Dept: CARDIOLOGY CLINIC | Age: 71
End: 2021-10-13

## 2021-10-13 LAB
BUN SERPL-MCNC: 34 MG/DL (ref 8–27)
BUN/CREAT SERPL: 17 (ref 12–28)
CALCIUM SERPL-MCNC: 10.8 MG/DL (ref 8.7–10.3)
CHLORIDE SERPL-SCNC: 97 MMOL/L (ref 96–106)
CO2 SERPL-SCNC: 27 MMOL/L (ref 20–29)
CREAT SERPL-MCNC: 1.96 MG/DL (ref 0.57–1)
ERYTHROCYTE [DISTWIDTH] IN BLOOD BY AUTOMATED COUNT: 13.6 % (ref 11.7–15.4)
GLUCOSE SERPL-MCNC: 183 MG/DL (ref 65–99)
HCT VFR BLD AUTO: 38.2 % (ref 34–46.6)
HGB BLD-MCNC: 12.9 G/DL (ref 11.1–15.9)
INTERPRETATION: NORMAL
MCH RBC QN AUTO: 33.2 PG (ref 26.6–33)
MCHC RBC AUTO-ENTMCNC: 33.8 G/DL (ref 31.5–35.7)
MCV RBC AUTO: 98 FL (ref 79–97)
PLATELET # BLD AUTO: 249 X10E3/UL (ref 150–450)
POTASSIUM SERPL-SCNC: 4.2 MMOL/L (ref 3.5–5.2)
RBC # BLD AUTO: 3.89 X10E6/UL (ref 3.77–5.28)
SODIUM SERPL-SCNC: 139 MMOL/L (ref 134–144)
WBC # BLD AUTO: 7.7 X10E3/UL (ref 3.4–10.8)

## 2021-10-13 NOTE — TELEPHONE ENCOUNTER
Telephone Call RE:  Appointment reminder     Outcome:     [] Patient confirmed appointment   [] Patient rescheduled appointment for    [] Unable to reach   [x] Left message              [] Other:     Requested a call back if exposure/covid 19 symptoms, informed visitor restrictions.     Linda Vitale

## 2021-10-14 ENCOUNTER — OFFICE VISIT (OUTPATIENT)
Dept: CARDIOLOGY CLINIC | Age: 71
End: 2021-10-14
Payer: MEDICARE

## 2021-10-14 VITALS
WEIGHT: 230.2 LBS | HEART RATE: 89 BPM | HEIGHT: 63 IN | TEMPERATURE: 99 F | DIASTOLIC BLOOD PRESSURE: 72 MMHG | OXYGEN SATURATION: 97 % | SYSTOLIC BLOOD PRESSURE: 104 MMHG | RESPIRATION RATE: 16 BRPM | BODY MASS INDEX: 40.79 KG/M2

## 2021-10-14 DIAGNOSIS — I25.5 ISCHEMIC CARDIOMYOPATHY: ICD-10-CM

## 2021-10-14 DIAGNOSIS — Z95.1 HX OF CABG: ICD-10-CM

## 2021-10-14 DIAGNOSIS — I50.20 SYSTOLIC HEART FAILURE, UNSPECIFIED HF CHRONICITY (HCC): Primary | ICD-10-CM

## 2021-10-14 PROCEDURE — G8399 PT W/DXA RESULTS DOCUMENT: HCPCS | Performed by: NURSE PRACTITIONER

## 2021-10-14 PROCEDURE — 3017F COLORECTAL CA SCREEN DOC REV: CPT | Performed by: NURSE PRACTITIONER

## 2021-10-14 PROCEDURE — G8754 DIAS BP LESS 90: HCPCS | Performed by: NURSE PRACTITIONER

## 2021-10-14 PROCEDURE — 1101F PT FALLS ASSESS-DOCD LE1/YR: CPT | Performed by: NURSE PRACTITIONER

## 2021-10-14 PROCEDURE — 99214 OFFICE O/P EST MOD 30 MIN: CPT | Performed by: NURSE PRACTITIONER

## 2021-10-14 PROCEDURE — 1090F PRES/ABSN URINE INCON ASSESS: CPT | Performed by: NURSE PRACTITIONER

## 2021-10-14 PROCEDURE — G8432 DEP SCR NOT DOC, RNG: HCPCS | Performed by: NURSE PRACTITIONER

## 2021-10-14 PROCEDURE — G8428 CUR MEDS NOT DOCUMENT: HCPCS | Performed by: NURSE PRACTITIONER

## 2021-10-14 PROCEDURE — G8536 NO DOC ELDER MAL SCRN: HCPCS | Performed by: NURSE PRACTITIONER

## 2021-10-14 PROCEDURE — G9899 SCRN MAM PERF RSLTS DOC: HCPCS | Performed by: NURSE PRACTITIONER

## 2021-10-14 PROCEDURE — G8752 SYS BP LESS 140: HCPCS | Performed by: NURSE PRACTITIONER

## 2021-10-14 PROCEDURE — G8417 CALC BMI ABV UP PARAM F/U: HCPCS | Performed by: NURSE PRACTITIONER

## 2021-10-14 RX ORDER — HYDRALAZINE HYDROCHLORIDE 10 MG/1
10 TABLET, FILM COATED ORAL 3 TIMES DAILY
Qty: 90 TABLET | Refills: 1 | Status: SHIPPED | OUTPATIENT
Start: 2021-10-14 | End: 2022-09-29

## 2021-10-14 RX ORDER — KETOCONAZOLE 20 MG/ML
SHAMPOO TOPICAL
COMMUNITY
Start: 2021-10-13

## 2021-10-14 NOTE — PROGRESS NOTES
[facility], [address] [place] [address] Note    Patient name: Joaquin Huff  Patient : 1950  Patient MRN: [id number]  Date of service: 10/14/21    Primary care physician: Agustin Salmeron NP  Primary F cardiologist: Leonard Almonte MD      CHIEF COMPLAINT:  Chronic systolic heart failure  Chief Complaint   Patient presents with    CHF    Dizziness     \"usually when standing up from sitting in a chair\"    Leg Swelling    Bloated        PLAN:    Medical therapy for heart failure   Beta-blocker: Toprol 12.5mg daily- change to taking at night to see if this helps dizziness   ACE/ARB/ARNi: intolerant due to renal failure   Spironolactone: intolerant due to hyperkalemia   SGLT2 inhibitor: was on jardiance- held on last admission d/t renal function. May consider restarting if Cr improves.     Continue Verquvo 5 mg PO    Diuretic: Bumex 2mg in the am and 1mg in pm- adjustments post cardiomems   Not on allopurinol or uloric, uric acid level 5.0 (21)   Continue ASA 81 mg and Plavix daily   Statin or PCSK9i: Continue current dose of atorvastatin and zetia   Continue IMDUR 30mg daily   Decrease hydralazine to 10mg TID for dizziness   Continue CPAP therapy   Plan for cardiomems next week     Diagnostics:   Repeat Echo in August- EF 30-35%  · Equivocal PYP  · [employer] DSP (VUS)  · Routine HF labs: December     Nutrition, Lifestyle, and Home Management:   Reinforced heart healthy, low salt diet   Reinforced fluid intake 6 x 8oz glasses per day   Document in diary BP/HR before and 2 hours after taking medications and daily weights   Encouraged patient to discuss her anxiety with her PCP at her upcoming appt    ACP:    Advanced care plan present on file    Follow-ups and Referrals:   Follow-up with primary cardiologist   Follow-up with EP cardiologist   Follow-up with PCP    Up to date on Flu/PNA/COVID vaccinations  Recommend Shingles vaccine with PCP      Return to [facility] in 4 weeks with NP/MD      IMPRESSION:  Fatigue  Shortness of breath  Volume overload  Chronic systolic heart failure  Stage D, NYHA class IIIA symptoms  Ischemic cardiomyopathy, LVEF 35-40%  CAD s/p CABG 1997 s/p PCI to DVG-RCA 2016  H/o severe MR s/p mitral clip in 2020  HTN  H/o VT s/p AICD  WES on Bipap  T2DM with neuropathy  Hypothyroidism  Obesity  HLD  Osteoarthritis   CKD4       CARDIAC IMAGING:  Echo 5/31/21- EF 25-30%, moderate MR post-clip  Echo 5/14/21- LVEF 35-40%, Mild MR post clip, mild TR, LVIDd 5.7cm, TAPSE 1.68, RVIDd 4.05cm   Echo 12/14/20 LVEF 30-35%, mild MR, LVIDd 6.09cm, RVIDd 3.98cm  Echo 6/19/20 LVEF 25-30%, Mod TR, severe MR, LVIDd 5.76cm, TAPSE 1.94cm, RVIDd 4.06cm     EKG 5/30/21 ST with PVCs, QRS 88ms  EKG 11/14/20 V paced, QRS 84     LHC 2/3/20- Severe native 3 vessel CAD. Patent VG to RCA and LIMA to LAD. LCx is collateral dependent and native rPDA and D1 have small disease in small vessels.      NST     Medtronic interrogation 7/15/21  No VT/VF  Time in AF/AT 0.0hr/day  Patient Activity 1.2hr/day  Optivol well below threshold  Thoracic impedance trending up    HEMODYNAMICS:  RHC not done  CPEST not done  6MW not done    OTHER IMAGING:  CXR 5/30/2 Diffuse increased interstitial and airspace opacities with more  confluent airspace opacities in the bilateral lung bases likely representing  pulmonary edema.     CT chest not done     HISTORY OF PRESENT ILLNESS:  I had the pleasure of seeing Laurie Almonte in [facility] in [place]. Briefly, Laurie Almonte is a 66 y.o. female with h/o HTN, HLD, WES, Obesity (BMI 39), s/p gastric sleeve in 2011, T2DM, hypothyroidism, COPD, CAD s/p CABG in 1997, s/p PCI to [address] [facility] [address] in 5/2015, ICM, VT, s/p AICD (Medtronic), anxiety and depression.   She more recently underwent MitraClip on 11/12/2020 and was evaluated for upgrade to [address] with Dr. Marcio Mendoza, however this was not done as her QRS was too narrow. Her most recent admission was 5/30/21-6/3/21 for CHF exacerbation. She was diuresed and her Juan Pablo Means was stopped due to renal failure. INTERVAL HISTORY:  Today, patient presents for HF follow up. She states she feels well, she has been watching her diet and started counseling for her anxiety. She continues to have chronic MARINA but knows to take her time, she does still have some dizziness but she recovers quickly. She is compliant with her medications and is able to do her normal functions. REVIEW OF SYSTEMS:  Review of Systems   Constitutional: Negative for chills, diaphoresis, fever, malaise/fatigue and weight loss. HENT: Negative. Eyes: Negative. Respiratory: Negative for cough and shortness of breath. MARINA- unchanged    Cardiovascular: Negative for chest pain, palpitations, orthopnea, claudication, leg swelling and PND. Gastrointestinal: Negative for abdominal pain, constipation, diarrhea, nausea and vomiting. Genitourinary: Negative. Musculoskeletal: Positive for joint pain. Neurological: Negative for dizziness, weakness and headaches. Psychiatric/Behavioral: Negative. PHYSICAL EXAM:  Visit Vitals  /72 (BP 1 Location: Right arm, BP Patient Position: Sitting, BP Cuff Size: Large adult)   Pulse 89   Temp 99 °F (37.2 °C) (Oral)   Resp 16   Ht 5' 3\" (1.6 m)   Wt 230 lb 3.2 oz (104.4 kg)   SpO2 97%   BMI 40.78 kg/m²     Physical Exam  Vitals reviewed. Constitutional:       General: She is not in acute distress. Appearance: Normal appearance. She is obese. She is not ill-appearing. HENT:      Head: Normocephalic and atraumatic. Eyes:      Conjunctiva/sclera: Conjunctivae normal.      Pupils: Pupils are equal, round, and reactive to light. Neck:      Vascular: No hepatojugular reflux or JVD. Cardiovascular:      Rate and Rhythm: Normal rate and regular rhythm. Pulses: Normal pulses. Heart sounds: Murmur heard. Systolic murmur is present with a grade of 3/6. No friction rub. No gallop. Pulmonary:      Effort: Pulmonary effort is normal. No respiratory distress. Breath sounds: Normal breath sounds. Abdominal:      General: Bowel sounds are normal. There is no distension. Palpations: Abdomen is soft. Tenderness: There is no abdominal tenderness. Musculoskeletal:         General: Normal range of motion. Cervical back: Neck supple. Right lower leg: No edema. Left lower leg: No edema. Skin:     General: Skin is warm and dry. Capillary Refill: Capillary refill takes less than 2 seconds. Neurological:      General: No focal deficit present. Mental Status: She is alert and oriented to person, place, and time. Psychiatric:         Mood and Affect: Mood normal.         Behavior: Behavior normal.         Thought Content:  Thought content normal.         Judgment: Judgment normal.          PAST MEDICAL HISTORY:  Past Medical History:   Diagnosis Date    Adverse effect of anesthesia     hard to wake up/uses BIPAP/\"try to avoid general if possible\"/intubated in past prior to going to sleep and it caused pt to be incontinent    Arthritis     Asthma     CAD (coronary artery disease)     Chronic kidney disease     elevataed creatinine    Chronic obstructive pulmonary disease (HCC)     Chronic pain     arthritis    Coagulation disorder (HCC)     on plavix    Congestive heart failure (HCC)     Diabetes ([address]     Hypertension     Morbid obesity ([address]     Sleep apnea 1996    BIPAP with 2 liters oxygen    Thromboembolus ([address]     after heart surgery - left leg    Thyroid disease        PAST SURGICAL HISTORY:  Past Surgical History:   Procedure Laterality Date    COLONOSCOPY N/A 7/22/2020    COLONOSCOPY   :- performed by Colleen Julio MD at [address] HX ARTHROPLASTY  12/03/2105    knee - right    HX  SECTION      x3    HX CORONARY ARTERY BYPASS GRAFT  1997    3 vessels    HX CORONARY STENT PLACEMENT  2016    HX HERNIA REPAIR  1988    HX IMPLANTABLE CARDIOVERTER DEFIBRILLATOR      HX KNEE REPLACEMENT Right 2015    once    [facility] CARDIAC SUR[facility] PROCEDURE UNLIST  2018    cardiac cath - Left    [facility] LAP GASTRIC BYPASS/KERLINE-EN-Y  2011    lap band per pt and not a gastric bypass       FAMILY HISTORY:  Family History   Problem Relation Age of Onset    Hypertension Mother     Dementia Mother     Coronary Artery Disease Father 58    Sudden Death Father 58    Diabetes Son     Diabetes Brother        SOCIAL HISTORY:  Social History     Socioeconomic History    Marital status:      Spouse name: Not on file    Number of children: Not on file    Years of education: Not on file    Highest education level: Not on file   Tobacco Use    Smoking status: Former Smoker     Packs/day: 0.50     Years: 45.00     Pack years: 22.50     Types: Cigarettes     Quit date: 2010     Years since quittin.7    Smokeless tobacco: Never Used    Tobacco comment: quit /started again (smoked 3 yrs) off and on/none since    Vaping Use    Vaping Use: Never used   Substance and Sexual Activity    Alcohol use: Not Currently    Drug use: Not Currently    Sexual activity: Not Currently   Other Topics Concern     Social Determinants of Health     Financial Resource Strain:     Difficulty of Paying Living Expenses:    Food Insecurity:     Worried About Running Out of Food in the Last Year:     [address] in the Last Year:    Transportation Needs:     Lack of Transportation (Medical):      Lack of Transportation (Non-Medical):    Physical Activity:     Days of Exercise per Week:     Minutes of Exercise per Session:    Stress:     Feeling of Stress :    Social Connections:     Frequency of Communication with Friends and Family:     Frequency of Social Gatherings with Friends and Family:     Attends Rastafarian Services:     Active Member of Clubs or Organizations:     Attends Club or Organization Meetings:     Marital Status:        LABORATORY RESULTS:  Labs Latest Ref Rng & Units 10/12/2021 9/9/2021   WBC 3.4 - 10.8 x10E3/uL 7.7 7.3   RBC 3.77 - 5.28 x10E6/uL 3.89 3.88   Hemoglobin 11.1 - 15.9 g/dL 12.9 12.5   Hematocrit 34.0 - 46.6 % 38.2 39.6   MCV 79 - 97 fL 98(H) 102. 1(H)   Platelets 281 - 292 V55D8/ 253   Albumin 3.5 - 5.0 g/dL - 3.8   Calcium 8.7 - 10.3 mg/dL 10. 8(H) 10. 3(H)   Glucose 65 - 99 mg/dL 183(H) 166(H)   BUN 8 - 27 mg/dL 34(H) 45(H)   Creatinine 0.57 - 1.00 mg/dL 1.96(H) 2.12(H)   Sodium 134 - 144 mmol/L 139 136   Potassium 3.5 - 5.2 mmol/L 4.2 3.7   Some recent data might be hidden       ALLERGY:  Allergies   Allergen Reactions    Crestor [Rosuvastatin] Other (comments)     Causes muscle cramps    Lisinopril Cough    Nsaids (Non-Steroidal Anti-Inflammatory Drug) Other (comments)     Liver and Kidney        CURRENT MEDICATIONS:    Current Outpatient Medications:     sertraline (ZOLOFT) 25 mg tablet, Take 50 mg by mouth daily. , Disp: , Rfl:     hydrALAZINE (APRESOLINE) 25 mg tablet, Take 1 Tablet by mouth three (3) times daily. , Disp: 90 Tablet, Rfl: 1    ezetimibe (ZETIA) 10 mg tablet, Take 1 Tablet by mouth daily. , Disp: 30 Tablet, Rfl: 1    levothyroxine (Euthyrox) 100 mcg tablet, Take 1 Tablet by mouth Daily (before breakfast). , Disp: 90 Tablet, Rfl: 0    bumetanide (BUMEX) 1 mg tablet, Take 2 mg in the morning and 1 mg in the afternoon, Disp: 90 Tablet, Rfl: 1    glipiZIDE (GLUCOTROL) 10 mg tablet, Take 1 tablet by mouth twice daily with food, Disp: 180 Tablet, Rfl: 0    gabapentin (NEURONTIN) 100 mg capsule, TAKE 2 CAPSULES BY MOUTH TWICE DAILY.  *MAX DAILY AMOUNT 400 MG*, Disp: 120 Capsule, Rfl: 0    isosorbide mononitrate ER (IMDUR) 30 mg tablet, TAKE 1 TABLET BY MOUTH ONCE DAILY IN THE MORNING, Disp: 30 Tablet, Rfl: 0    clopidogreL (PLAVIX) 75 mg tab, Take 1 tablet by mouth once daily, Disp: 90 Tablet, Rfl: 0    vericiguat (Verquvo) 2.5 mg tab, Take 2.5 mg by mouth daily for 14 days, THEN 5 mg daily for 14 days, THEN 10 mg daily for 30 days. Indications: heart failure with reduced ejection fraction, Disp: 30 Each, Rfl: 11    FreeStyle Lancets 28 gauge misc, USE AS DIRECTED, Disp: , Rfl:     metoprolol succinate (TOPROL-XL) 25 mg XL tablet, Take 0.5 Tablets by mouth daily. Hold for systolic BP less than 833, Disp: 60 Tablet, Rfl: 2    nitroglycerin (NITROSTAT) 0.3 mg SL tablet, 1 Tablet by SubLINGual route every five (5) minutes as needed for Chest Pain., Disp: 1 Bottle, Rfl: 0    Blood-Glucose Meter monitoring kit, Check blood sugar daily. May substitute for insurance preferred meter, Disp: 1 Kit, Rfl: 0    glucose blood VI test strips (blood glucose test) strip, Check blood sugar 2 times daily: E11.9 may substitute for insurance preferred strips. , Disp: 200 Strip, Rfl: 3    lancets misc, Use as directed. Dx:check sugar once daily. E11.65, Disp: 1 Each, Rfl: 11    allopurinoL (ZYLOPRIM) 100 mg tablet, Take 1 tablet by mouth once daily, Disp: 90 Tab, Rfl: 1    atorvastatin (LIPITOR) 80 mg tablet, TAKE 1 TABLET BY MOUTH AT BEDTIME, Disp: 90 Tab, Rfl: 3    lancets misc, Check blood sugar 2 times daily. May substitute for insurance preferred lancets. , Disp: 200 Each, Rfl: 3    glucose blood VI test strips (ASCENSIA AUTODISC VI, ONE TOUCH ULTRA TEST VI) strip, E11.65 check sugar once daily, Disp: 100 Strip, Rfl: 0    clotrimazole-betamethasone (LOTRISONE) topical cream, Apply  to affected area two (2) times a day. (Patient taking differently: Apply  to affected area two (2) times daily as needed.), Disp: 30 g, Rfl: 0    Blood-Glucose Meter monitoring kit, Use as directed.  Dx: E11.65 check sugar twice daily, Disp: 1 Kit, Rfl: 0    albuterol (PROVENTIL HFA, VENTOLIN HFA, PROAIR HFA) 90 mcg/actuation inhaler, Take 2 Puffs by inhalation every four (4) hours as needed. , Disp: , Rfl:     acetaminophen (TYLENOL ARTHRITIS PAIN) 650 mg TbER, Take 1,300 mg by mouth two (2) times a day., Disp: , Rfl:     aspirin 81 mg chewable tablet, Take 81 mg by mouth daily. , Disp: , Rfl:     ketoconazole (NIZORAL) 2 % shampoo, , Disp: , Rfl:     Thank you for your referral and allowing me to participate in this patient's care.     Balbir Aguilar NP  [facility]  [address] [facility], [address]  Phone: [phone]      PATIENT CARE TEAM:  Patient Care Team:  Leisa Francis., NP as PCP - General (Nurse Practitioner)  Leisa Francis., NP as PCP - [facility] Empaneled Provider  Hola Pretty MD (Cardiology)  Jeffrey Parker MD (Gastroenterology)  Amadeo Stanley MD as Consulting Provider (Cardiology)

## 2021-10-14 NOTE — PATIENT INSTRUCTIONS
Medication changes:    Decrease Hydralazine to 10mg three times daily     Please take Toprol xl at night     Please take this to your pharmacy to notify them of the change in medications. Testing Ordered: Other Recommendations:      Ensure your drinking an adequate amount of water with a goal of 6-8 eight ounce glasses (1.5-2 liters) of fluid daily. Your urine should be clear and light yellow straw colored. If your blood pressure begins to consistently run below 90/60 and/or you begin to experience dizziness or lightheadedness, please contact the [facility] [address] at [phone]. Follow up 4 week follow up with NP with [facility]      Please monitor your weights daily upon waking and after using the bathroom. Keep a written records of your weights and bring to your next appointment. If you have a weight gain of 3 or more pounds overnight OR 5 or more pounds in one week please contact our office. Thank you for allowing us the privilege of being a part of your healthcare team! Please do not hesitate to contact our office at [phone] with any questions or concerns. Virtual Heart Failure [address] invites you to learn more about heart failure and to share your questions, ideas, and experiences with others. Each month, the Heart Failure Support Group features a new educational topic and a guest speaker, followed by an interactive discussion. Our Heart Failure Nurse Navigator will moderate each session. You will be able to participate by phone, tablet or computer through [address] [place] [address]. This support group takes place on the 3rd Thursday of each month from 6:00-7:30PM. All individuals living with heart failure and their caregivers are welcome to join. If you are interested in participating, please contact us at [email or web address] and you will be sent the link to join the [employer].

## 2021-10-15 ENCOUNTER — TELEPHONE (OUTPATIENT)
Dept: CARDIOLOGY CLINIC | Age: 71
End: 2021-10-15

## 2021-10-15 RX ORDER — SODIUM CHLORIDE 0.9 % (FLUSH) 0.9 %
5-40 SYRINGE (ML) INJECTION EVERY 8 HOURS
Status: CANCELLED | OUTPATIENT
Start: 2021-10-15

## 2021-10-15 NOTE — TELEPHONE ENCOUNTER
Patient wanted to know if she should stop taking the clopidogreL (PLAVIX) 75 mg tab before her procedure on 10/22. Patient also stated her device lost transmission due to a power outage.        Phone: [phone]

## 2021-10-15 NOTE — TELEPHONE ENCOUNTER
Spoke with patient. Two patient indentifiers verified. Pt was informed that she will not hold Plavix prior to the procedure. Pt verbalized understanding and denies any further questions at this time.      Future Appointments   Date Time Provider Skye Nancie   11/9/2021  2:00 PM Lillie Butler., NP ROMY BS AMB   11/16/2021 10:00 AM ECHOTWHANDY, RUDY SOUZA BS AMB   11/16/2021 10:40 AM MD HARLEY Fontenot BS AMB   1/12/2022  4:00 PM REMOTE1, RUDY DAVE AMB   4/13/2022 10:00 AM REMOTE1, RUDY DAVE AMB   7/14/2022  1:00 PM PACEMAKER3, RUDY DAVE AMB   7/14/2022  1:20 PM MD HARLEY Bal BS AMB

## 2021-10-15 NOTE — TELEPHONE ENCOUNTER
Patient called to report that she has been exposed to [place] [facility] is fully vaccinated but she has been sent home due to her cardiomems procedure next week. I advised that she should monitor for symptoms but not panic, she is already scheduled for COVID test pre-procedure, call if she develops symptoms. We reviewed all pre-procedure instructions. She has no further questions.

## 2021-10-19 ENCOUNTER — TRANSCRIBE ORDER (OUTPATIENT)
Dept: REGISTRATION | Age: 71
End: 2021-10-19

## 2021-10-19 ENCOUNTER — HOSPITAL ENCOUNTER (OUTPATIENT)
Dept: PREADMISSION TESTING | Age: 71
Discharge: HOME | End: 2021-10-19
Payer: MEDICARE

## 2021-10-19 DIAGNOSIS — Z01.812 PRE-PROCEDURE LAB EXAM: ICD-10-CM

## 2021-10-19 DIAGNOSIS — Z01.812 PRE-PROCEDURE LAB EXAM: Primary | ICD-10-CM

## 2021-10-19 PROCEDURE — U0005 INFEC AGEN DETEC AMPLI PROBE: HCPCS

## 2021-10-20 ENCOUNTER — TELEPHONE (OUTPATIENT)
Dept: CARDIOLOGY CLINIC | Age: 71
End: 2021-10-20

## 2021-10-20 LAB
SARS-COV-2, XPLCVT: NOT DETECTED
SOURCE, COVRS: NORMAL

## 2021-10-20 NOTE — TELEPHONE ENCOUNTER
Called patient to reschedule procedure due to insurance auth. Rescheduled to 11/5 @ 12pm. Pt verbalized understanding and denies any further questions at this time.      Future Appointments   Date Time Provider Skye [place]   11/9/2021  2:00 PM Netta Hodgson, NP [facility] BS AMB   11/16/2021 10:00 AM RUDY WASHINGTON BS AMB   11/16/2021 10:40 AM MD HARLEY Pak BS AMB   11/23/2021  2:00 PM Bon Khan, VENKATESH [facility] BS AMB   1/12/2022  4:00 PM REMOTE1, RUDY SOUZA BS AMB   4/13/2022 10:00 AM REMOTE1, RUDY SOUZA BS AMB   7/14/2022  1:00 PM PACEMAKER3, RUDY SOUZA BS AMB   7/14/2022  1:20 PM MD HARLEY Wadlron BS AMB
(0) swallows foods/liquids without difficulty

## 2021-10-22 ENCOUNTER — TELEPHONE (OUTPATIENT)
Dept: CARDIOLOGY CLINIC | Age: 71
End: 2021-10-22

## 2021-10-22 NOTE — TELEPHONE ENCOUNTER
Patient is calling to make sure her device is connected because she experienced a power outage yesterday and she is not sure if it's properly working. Please give a call back.     Phone [phone]

## 2021-11-01 ENCOUNTER — HOSPITAL ENCOUNTER (OUTPATIENT)
Dept: PREADMISSION TESTING | Age: 71
Discharge: HOME | End: 2021-11-01
Payer: MEDICARE

## 2021-11-01 ENCOUNTER — TRANSCRIBE ORDER (OUTPATIENT)
Dept: REGISTRATION | Age: 71
End: 2021-11-01

## 2021-11-01 ENCOUNTER — PATIENT MESSAGE (OUTPATIENT)
Dept: CARDIOLOGY CLINIC | Age: 71
End: 2021-11-01

## 2021-11-01 DIAGNOSIS — Z01.812 PRE-PROCEDURE LAB EXAM: Primary | ICD-10-CM

## 2021-11-01 DIAGNOSIS — Z01.812 PRE-PROCEDURE LAB EXAM: ICD-10-CM

## 2021-11-01 DIAGNOSIS — E11.22 TYPE 2 DIABETES MELLITUS WITH STAGE 2 CHRONIC KIDNEY DISEASE, WITHOUT LONG-TERM CURRENT USE OF INSULIN (HCC): ICD-10-CM

## 2021-11-01 DIAGNOSIS — N18.2 TYPE 2 DIABETES MELLITUS WITH STAGE 2 CHRONIC KIDNEY DISEASE, WITHOUT LONG-TERM CURRENT USE OF INSULIN (HCC): ICD-10-CM

## 2021-11-01 PROCEDURE — U0005 INFEC AGEN DETEC AMPLI PROBE: HCPCS

## 2021-11-02 RX ORDER — GABAPENTIN 100 MG/1
CAPSULE ORAL
Qty: 120 CAPSULE | Refills: 0 | Status: SHIPPED | OUTPATIENT
Start: 2021-11-02 | End: 2021-12-02 | Stop reason: SDUPTHER

## 2021-11-03 ENCOUNTER — TELEPHONE (OUTPATIENT)
Dept: CARDIOLOGY CLINIC | Age: 71
End: 2021-11-03

## 2021-11-03 LAB
SARS-COV-2, XPLCVT: NOT DETECTED
SOURCE, COVRS: NORMAL

## 2021-11-03 NOTE — TELEPHONE ENCOUNTER
Received Denial for cardiomems. Denial reason: PROVIDER DENIAL DESCRIPTION-The request for an implanted device to treat heart conditions was reviewed by our doctor. Per [place] Guideline Outpatient Cardiac Hemodynamic Monitoring Using a Wireless Sensor for Heart Failure Management Document #: [email or web address], this type of implanted device is considered investigational (not standard care) and not medically necessary. For this reason, the request is denied. 1:31 PM  Left a message for patient stating the procedure was denied and to call back.

## 2021-11-03 NOTE — TELEPHONE ENCOUNTER
Spoke with patient. Two patient indentifiers verified. Pt was informed that insurance has denied the procedure. The patient was informed of the message. Pt verbalized understanding and denies any further questions at this time.

## 2021-11-08 ENCOUNTER — TELEPHONE (OUTPATIENT)
Dept: CARDIOLOGY CLINIC | Age: 71
End: 2021-11-08

## 2021-11-08 NOTE — TELEPHONE ENCOUNTER
TVT Registry 1 year follow-up:  KCCQ- 12 completed over the phone with patient. Forms scanned into EMR. NYHA Class: lll  Medications listed in EMR reviewed with patient: dose adjustments made to heart failure medications by the heart failure team. All reflective in the EMR  Hospitalizations over the past year: 5/30/2021  Last TTE: 8/19/2021. Has one scheduled for 12/29/2021  SBE reviewed with patient. Walk and clinic visit not completed due to COVID-19.

## 2021-11-09 ENCOUNTER — OFFICE VISIT (OUTPATIENT)
Dept: INTERNAL MEDICINE CLINIC | Age: 71
End: 2021-11-09
Payer: MEDICARE

## 2021-11-09 VITALS
DIASTOLIC BLOOD PRESSURE: 60 MMHG | HEIGHT: 63 IN | SYSTOLIC BLOOD PRESSURE: 109 MMHG | BODY MASS INDEX: 41.29 KG/M2 | RESPIRATION RATE: 18 BRPM | HEART RATE: 73 BPM | OXYGEN SATURATION: 96 % | WEIGHT: 233 LBS | TEMPERATURE: 97.6 F

## 2021-11-09 DIAGNOSIS — E11.65 TYPE 2 DIABETES MELLITUS WITH HYPERGLYCEMIA, WITHOUT LONG-TERM CURRENT USE OF INSULIN (HCC): Primary | ICD-10-CM

## 2021-11-09 DIAGNOSIS — Z00.00 MEDICARE ANNUAL WELLNESS VISIT, SUBSEQUENT: ICD-10-CM

## 2021-11-09 DIAGNOSIS — E78.2 MIXED HYPERLIPIDEMIA: ICD-10-CM

## 2021-11-09 DIAGNOSIS — I73.9 PERIPHERAL VASCULAR DISEASE (HCC): ICD-10-CM

## 2021-11-09 DIAGNOSIS — I50.22 SYSTOLIC CHF, CHRONIC (HCC): ICD-10-CM

## 2021-11-09 DIAGNOSIS — I10 ESSENTIAL HYPERTENSION: ICD-10-CM

## 2021-11-09 DIAGNOSIS — G47.33 OBSTRUCTIVE SLEEP APNEA SYNDROME: ICD-10-CM

## 2021-11-09 LAB
GLUCOSE POC: 216 MG/DL
HBA1C MFR BLD HPLC: 8.5 %

## 2021-11-09 PROCEDURE — 99214 OFFICE O/P EST MOD 30 MIN: CPT | Performed by: NURSE PRACTITIONER

## 2021-11-09 PROCEDURE — G8752 SYS BP LESS 140: HCPCS | Performed by: NURSE PRACTITIONER

## 2021-11-09 PROCEDURE — G8399 PT W/DXA RESULTS DOCUMENT: HCPCS | Performed by: NURSE PRACTITIONER

## 2021-11-09 PROCEDURE — G9899 SCRN MAM PERF RSLTS DOC: HCPCS | Performed by: NURSE PRACTITIONER

## 2021-11-09 PROCEDURE — 83036 HEMOGLOBIN GLYCOSYLATED A1C: CPT | Performed by: NURSE PRACTITIONER

## 2021-11-09 PROCEDURE — 3017F COLORECTAL CA SCREEN DOC REV: CPT | Performed by: NURSE PRACTITIONER

## 2021-11-09 PROCEDURE — G8754 DIAS BP LESS 90: HCPCS | Performed by: NURSE PRACTITIONER

## 2021-11-09 PROCEDURE — 82962 GLUCOSE BLOOD TEST: CPT | Performed by: NURSE PRACTITIONER

## 2021-11-09 PROCEDURE — G8536 NO DOC ELDER MAL SCRN: HCPCS | Performed by: NURSE PRACTITIONER

## 2021-11-09 PROCEDURE — 1090F PRES/ABSN URINE INCON ASSESS: CPT | Performed by: NURSE PRACTITIONER

## 2021-11-09 PROCEDURE — 2022F DILAT RTA XM EVC RTNOPTHY: CPT | Performed by: NURSE PRACTITIONER

## 2021-11-09 PROCEDURE — G8417 CALC BMI ABV UP PARAM F/U: HCPCS | Performed by: NURSE PRACTITIONER

## 2021-11-09 PROCEDURE — G8432 DEP SCR NOT DOC, RNG: HCPCS | Performed by: NURSE PRACTITIONER

## 2021-11-09 PROCEDURE — G8427 DOCREV CUR MEDS BY ELIG CLIN: HCPCS | Performed by: NURSE PRACTITIONER

## 2021-11-09 PROCEDURE — G0439 PPPS, SUBSEQ VISIT: HCPCS | Performed by: NURSE PRACTITIONER

## 2021-11-09 PROCEDURE — 1101F PT FALLS ASSESS-DOCD LE1/YR: CPT | Performed by: NURSE PRACTITIONER

## 2021-11-09 RX ORDER — VERICIGUAT 10 MG/1
TABLET, FILM COATED ORAL
COMMUNITY
End: 2021-12-07 | Stop reason: SDUPTHER

## 2021-11-09 RX ORDER — SERTRALINE HYDROCHLORIDE 50 MG/1
50 TABLET, FILM COATED ORAL DAILY
COMMUNITY
Start: 2021-11-04

## 2021-11-09 NOTE — PROGRESS NOTES
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 Have you been to the ER, urgent care clinic since your last visit? Hospitalized since your last visit? No    2. Have you seen or consulted any other health care providers outside of the [address] [place] [address] since your last visit? Include any pap smears or colon screening.  No

## 2021-11-09 NOTE — PROGRESS NOTES
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 year old female who presents for     Hypertension ROS:  taking medications as instructed, no medication side effects noted, no TIAs, no chest pain on exertion, no dyspnea on exertion, no swelling of ankles    Diabetic Review of Systems - medication compliance: compliant all of the time, diabetic diet compliance: noncompliant some of the time, home glucose monitoring: is performed regularly. Gout; stable    Hyperlipidemia tolerating statin    Systolic CHF' follows w/ cardiolog    slee apnea bipap          Reviewed PmHx, RxHx, FmHx, SocHx, AllgHx and updated in chart.   Family History   Problem Relation Age of Onset    Hypertension Mother     Dementia Mother     Coronary Art Dis Father 60    Sudden Death Father 58    Diabetes Son     Diabetes Brother        Past Medical History:   Diagnosis Date    Adverse effect of anesthesia     hard to wake up/uses BIPAP/\"try to avoid general if possible\"/intubated in past prior to going to sleep and it caused pt to be incontinent    Arthritis     Asthma     CAD (coronary artery disease)     Chronic kidney disease     elevataed creatinine    Chronic obstructive pulmonary disease (HCC)     Chronic pain     arthritis    Coagulation disorder ([address]     on plavix    Congestive heart failure (HCC)     Diabetes ([address]     Hypertension     Morbid obesity ([address]     Sleep apnea 1996    BIPAP with 2 liters oxygen    Thromboembolus ([address]     after heart surgery - left leg    Thyroid disease       Social History     Socioeconomic History    Marital status:    Tobacco Use    Smoking status: Former Smoker     Packs/day: 0.50     Years: 45.00     Pack years: 22.50     Types: Cigarettes     Quit date: 1/1/2010     Years since quittin.8    Smokeless tobacco: Never Used    Tobacco comment: quit /started again (smoked 3 yrs) off and on/none since    Vaping Use    Vaping Use: Never used   Substance and Sexual Activity    Alcohol use: Not Currently    Drug use: Not Currently    Sexual activity: Not Currently          Current Outpatient Medications   Medication Sig    vericiguat (Verquvo) 10 mg tab Take  by mouth.  gabapentin (NEURONTIN) 100 mg capsule TAKE 2 CAPSULES BY MOUTH TWICE DAILY. *MAX DAILY AMOUNT 400 MG*    allopurinoL (ZYLOPRIM) 100 mg tablet Take 1 tablet by mouth once daily    isosorbide mononitrate ER (IMDUR) 30 mg tablet Take 1 Tablet by mouth daily. in the morning    bumetanide (BUMEX) 1 mg tablet Take 2 mg in the morning and 1 mg in the afternoon    hydrALAZINE (APRESOLINE) 10 mg tablet Take 1 Tablet by mouth three (3) times daily.  ezetimibe (ZETIA) 10 mg tablet Take 1 Tablet by mouth daily.  levothyroxine (Euthyrox) 100 mcg tablet Take 1 Tablet by mouth Daily (before breakfast).  glipiZIDE (GLUCOTROL) 10 mg tablet Take 1 tablet by mouth twice daily with food    clopidogreL (PLAVIX) 75 mg tab Take 1 tablet by mouth once daily    metoprolol succinate (TOPROL-XL) 25 mg XL tablet Take 0.5 Tablets by mouth daily. Hold for systolic BP less than 590    nitroglycerin (NITROSTAT) 0.3 mg SL tablet 1 Tablet by SubLINGual route every five (5) minutes as needed for Chest Pain.  atorvastatin (LIPITOR) 80 mg tablet TAKE 1 TABLET BY MOUTH AT BEDTIME    albuterol (PROVENTIL HFA, VENTOLIN HFA, PROAIR HFA) 90 mcg/actuation inhaler Take 2 Puffs by inhalation every four (4) hours as needed.  acetaminophen (TYLENOL ARTHRITIS PAIN) 650 mg TbER Take 1,300 mg by mouth two (2) times a day.  aspirin 81 mg chewable tablet Take 81 mg by mouth daily.  sertraline (ZOLOFT) 50 mg tablet     ketoconazole (NIZORAL) 2 % shampoo     sertraline (ZOLOFT) 25 mg tablet Take 50 mg by mouth daily.     FreeStyle Lancets 28 gauge misc USE AS DIRECTED    Blood-Glucose Meter monitoring kit Check blood sugar daily. May substitute for insurance preferred meter    glucose blood VI test strips (blood glucose test) strip Check blood sugar 2 times daily: E11.9 may substitute for insurance preferred strips.  lancets misc Use as directed. Dx:check sugar once daily. E11.65    lancets misc Check blood sugar 2 times daily. May substitute for insurance preferred lancets.  glucose blood VI test strips (ASCENSIA AUTODISC VI, ONE TOUCH ULTRA TEST VI) strip E11.65 check sugar once daily    clotrimazole-betamethasone (LOTRISONE) topical cream Apply  to affected area two (2) times a day. (Patient taking differently: Apply  to affected area two (2) times daily as needed.)    Blood-Glucose Meter monitoring kit Use as directed. Dx: E11.65 check sugar twice daily     No current facility-administered medications for this visit. Review of Systems:   Constitutional:    Negative for fever and chills, negative diaphoresis. HEENT:              Negative for neck pain and stiffness. Eyes:                  Negative for visual disturbance, itching, redness or discharge. Respiratory:        Negative for cough and shortness of breath. Cardiovascular:  Negative for chest pain and palpitations. Gastrointestinal: Negative for nausea, vomiting, abdominal pain, diarrhea or constipation. Genitourinary:     Negative for dysuria and frequency. Musculoskeletal: Negative for falls, tenderness and swelling. Skin:                    Negative for rash, masses or lesions. Neurological:       Negative for dizzyness, seizure, loss of consciousness, weakness and numbness.      Objective:     Vitals:    11/09/21 1419   BP: 109/60   Pulse: 73   Resp: 18   Temp: 97.6 °F (36.4 °C)   TempSrc: Temporal   SpO2: 96%   Weight: 233 lb (105.7 kg)   Height: 5' 3\" (1.6 m)       Results for orders placed or performed in visit on 11/09/21   AMB POC GLUCOSE BLOOD, BY GLUCOSE MONITORING DEVICE   Result Value Ref Range    Glucose  MG/DL   AMB POC HEMOGLOBIN A1C   Result Value Ref Range    Hemoglobin A1c (POC) 8.5 %       Gen: Oriented to person, place and time and well-developed, well-nourished and in no distress. HEENT:    Head: normocephalic and atraumatic. Eyes:  EOM are normal. Pupils equal and round. Neck:  Normal range of motion. Neck supple. Cardiovascular: normal rate, regular rhythm and normal heart sounds. Pulmonary/Chest:  Effort normal and breath sounds normal.  No respiratory distress. No wheezes, no rales. Abdominal: soft, normal  bowel sounds. Musculoskeletal:  No edema, no tenderness. No calf tenderness or edema. Neurological:  Alert, oriented to person, place and time. Skin: skin is warm and dry. Assessment/ Plan:     1. Medicare annual wellness visit, subsequent      2. Type 2 diabetes mellitus with hyperglycemia, without long-term current use of insulin (HCC)  Add jardiance  - AMB POC GLUCOSE BLOOD, BY GLUCOSE MONITORING DEVICE  - AMB POC HEMOGLOBIN A1C  - empagliflozin (Jardiance) 25 mg tablet; Take 1 Tablet by mouth daily. Dispense: 90 Tablet; Refill: 0  - REFERRAL TO DIABETIC EDUCATION    3. Peripheral vascular disease ([facility]      4. Essential hypertension      5. Mixed hyperlipidemia      6. Obstructive sleep apnea syndrome      7. Systolic CHF, chronic (HCC)    - empagliflozin (Jardiance) 25 mg tablet; Take 1 Tablet by mouth daily. Dispense: 90 Tablet; Refill: 0      I have discussed the diagnosis with the patient and the intended plan as seen in the above orders. The patient has received an after-visit summary and questions were answered concerning future plans. Pt conveyed understanding of plan. Medication Side Effects and Warnings were discussed with patient: yes  Patient Labs were reviewed: yes  Patient Past Records were reviewed:  yes    Timmy Finley.  Pato Barnes NP     This is the Subsequent Medicare Annual Wellness Exam, performed 12 months or more after the Initial AWV or the last Subsequent AWV    I have reviewed the patient's medical history in detail and updated the computerized patient record. Assessment/Plan   Education and counseling provided:  Are appropriate based on today's review and evaluation    1. Type 2 diabetes mellitus with hyperglycemia, without long-term current use of insulin (HCC)  -     AMB POC GLUCOSE BLOOD, BY GLUCOSE MONITORING DEVICE  -     AMB POC HEMOGLOBIN A1C  2. Peripheral vascular disease ([facility]  3. Essential hypertension  4. Mixed hyperlipidemia  5. Medicare annual wellness visit, subsequent  6. Obstructive sleep apnea syndrome  7. Systolic CHF, chronic ([facility])       Depression Risk Factor Screening     3 most recent PHQ Screens 11/9/2021   PHQ Not Done -   Little interest or pleasure in doing things Not at all   Feeling down, depressed, irritable, or hopeless Not at all   Total Score PHQ 2 0   Trouble falling or staying asleep, or sleeping too much -   Feeling tired or having little energy -   Poor appetite, weight loss, or overeating -   Feeling bad about yourself - or that you are a failure or have let yourself or your family down -   Trouble concentrating on things such as school, work, reading, or watching TV -   Moving or speaking so slowly that other people could have noticed; or the opposite being so fidgety that others notice -   Thoughts of being better off dead, or hurting yourself in some way -   PHQ 9 Score -   How difficult have these problems made it for you to do your work, take care of your home and get along with others -       Alcohol Risk Screen    Do you average more than 1 drink per night or more than 7 drinks a week:  No    On any one occasion in the past three months have you have had more than 3 drinks containing alcohol:  No        Functional Ability and Level of Safety    Hearing: Hearing is good. Activities of Daily Living:   The home contains: no safety equipment. Patient does total self care      Ambulation: with no difficulty     Fall Risk:  Fall Risk Assessment, last 12 mths 10/12/2021   Able to walk? Yes   Fall in past 12 months? 0   Do you feel unsteady? 0   Are you worried about falling 0   Number of falls in past 12 months -   Fall with injury?  -      Abuse Screen:  Patient is not abused       Cognitive Screening    Has your family/caregiver stated any concerns about your memory: no     Cognitive Screening: Normal - based on H&P    Health Maintenance Due     Health Maintenance Due   Topic Date Due    Eye Exam Retinal or Dilated  Never done    DTaP/Tdap/Td series (1 - Tdap) Never done    Shingrix Vaccine Age 50> (1 of 2) Never done    Low dose CT lung screening  Never done    COVID-19 Vaccine (3 - Booster for Kim Peter series) 09/03/2021    Medicare Yearly Exam  10/02/2021       Patient Care Team   Patient Care Team:  Belkis Borges, NP as PCP - General (Nurse Practitioner)  Belkis Borges, NP as PCP - [facility] Empaneled Provider  Freedom Aguilar MD (Cardiology)  Nell Garvin MD (Gastroenterology)  Kris Bryan MD as Consulting Provider (Cardiology)    History     Patient Active Problem List   Diagnosis Code    Hx of CABG Z95.1    ICD (implantable cardioverter-defibrillator) in place Z80.65    H/O laparoscopic adjustable gastric banding Z98.84    Obesity, morbid ([address] E66.01    NYHA class 3 heart failure with reduced ejection fraction ([address] I50.20    Peripheral vascular disease ([address] I73.9    Severe mitral regurgitation I34.0    Ischemic cardiomyopathy I25.5    Chronic heart failure with reduced ejection fraction and diastolic dysfunction ([address] I50.42    Sleep apnea treated with nocturnal BiPAP G47.30    Mitral regurgitation I34.0    CHF exacerbation (HCC) I50.9    Acute on chronic respiratory failure ([address] J96.20    Type 2 diabetes mellitus with chronic kidney disease ([address] E11.22     Past Medical History:   Diagnosis Date    Adverse effect of anesthesia     hard to wake up/uses BIPAP/\"try to avoid general if possible\"/intubated in past prior to going to sleep and it caused pt to be incontinent    Arthritis     Asthma     CAD (coronary artery disease)     Chronic kidney disease     elevataed creatinine    Chronic obstructive pulmonary disease (HCC)     Chronic pain     arthritis    Coagulation disorder (HCC)     on plavix    Congestive heart failure (HCC)     Diabetes ([address]     Hypertension     Morbid obesity ([address]     Sleep apnea     BIPAP with 2 liters oxygen    Thromboembolus ([address]     after heart surgery - left leg    Thyroid disease       Past Surgical History:   Procedure Laterality Date    COLONOSCOPY N/A 2020    COLONOSCOPY   :- performed by Randi Purdy MD at [address] HX ARTHROPLASTY  2105    knee - right    HX  SECTION      x3    HX CORONARY ARTERY BYPASS GRAFT  1997    3 vessels    HX CORONARY STENT PLACEMENT  2016    HX HERNIA REPAIR  1988    HX IMPLANTABLE CARDIOVERTER DEFIBRILLATOR      HX KNEE REPLACEMENT Right 2015    once    MT CARDIAC SURG PROCEDURE UNLIST  2018    cardiac cath - Left    MT LAP GASTRIC BYPASS/KERLINE-EN-Y  2011    lap band per pt and not a gastric bypass     Current Outpatient Medications   Medication Sig Dispense Refill    vericiguat (Verquvo) 10 mg tab Take  by mouth.  gabapentin (NEURONTIN) 100 mg capsule TAKE 2 CAPSULES BY MOUTH TWICE DAILY. *MAX DAILY AMOUNT 400 MG* 120 Capsule 0    allopurinoL (ZYLOPRIM) 100 mg tablet Take 1 tablet by mouth once daily 90 Tablet 0    isosorbide mononitrate ER (IMDUR) 30 mg tablet Take 1 Tablet by mouth daily. in the morning 30 Tablet 1    bumetanide (BUMEX) 1 mg tablet Take 2 mg in the morning and 1 mg in the afternoon 90 Tablet 1    hydrALAZINE (APRESOLINE) 10 mg tablet Take 1 Tablet by mouth three (3) times daily.  90 Tablet 1    ezetimibe (Iam Heriberto) 10 mg tablet Take 1 Tablet by mouth daily. 30 Tablet 1    levothyroxine (Euthyrox) 100 mcg tablet Take 1 Tablet by mouth Daily (before breakfast). 90 Tablet 0    glipiZIDE (GLUCOTROL) 10 mg tablet Take 1 tablet by mouth twice daily with food 180 Tablet 0    clopidogreL (PLAVIX) 75 mg tab Take 1 tablet by mouth once daily 90 Tablet 0    metoprolol succinate (TOPROL-XL) 25 mg XL tablet Take 0.5 Tablets by mouth daily. Hold for systolic BP less than 812 60 Tablet 2    nitroglycerin (NITROSTAT) 0.3 mg SL tablet 1 Tablet by SubLINGual route every five (5) minutes as needed for Chest Pain. 1 Bottle 0    atorvastatin (LIPITOR) 80 mg tablet TAKE 1 TABLET BY MOUTH AT BEDTIME 90 Tab 3    albuterol (PROVENTIL HFA, VENTOLIN HFA, PROAIR HFA) 90 mcg/actuation inhaler Take 2 Puffs by inhalation every four (4) hours as needed.  acetaminophen (TYLENOL ARTHRITIS PAIN) 650 mg TbER Take 1,300 mg by mouth two (2) times a day.  aspirin 81 mg chewable tablet Take 81 mg by mouth daily.  sertraline (ZOLOFT) 50 mg tablet       ketoconazole (NIZORAL) 2 % shampoo       sertraline (ZOLOFT) 25 mg tablet Take 50 mg by mouth daily.  FreeStyle Lancets 28 gauge misc USE AS DIRECTED      Blood-Glucose Meter monitoring kit Check blood sugar daily. May substitute for insurance preferred meter 1 Kit 0    glucose blood VI test strips (blood glucose test) strip Check blood sugar 2 times daily: E11.9 may substitute for insurance preferred strips. 200 Strip 3    lancets misc Use as directed. Dx:check sugar once daily. E11.65 1 Each 11    lancets misc Check blood sugar 2 times daily. May substitute for insurance preferred lancets. 200 Each 3    glucose blood VI test strips (ASCENSIA AUTODISC VI, ONE TOUCH ULTRA TEST VI) strip E11.65 check sugar once daily 100 Strip 0    clotrimazole-betamethasone (LOTRISONE) topical cream Apply  to affected area two (2) times a day.  (Patient taking differently: Apply  to affected area two (2) times daily as needed.) 30 g 0    Blood-Glucose Meter monitoring kit Use as directed. Dx: E11.65 check sugar twice daily 1 Kit 0     Allergies   Allergen Reactions    Crestor [Rosuvastatin] Other (comments)     Causes muscle cramps    Lisinopril Cough    Nsaids (Non-Steroidal Anti-Inflammatory Drug) Other (comments)     Liver and Kidney       Family History   Problem Relation Age of Onset    Hypertension Mother     Dementia Mother     Coronary Art Dis Father 60    Sudden Death Father 58    Diabetes Son     Diabetes Brother      Social History     Tobacco Use    Smoking status: Former Smoker     Packs/day: 0.50     Years: 45.00     Pack years: 22.50     Types: Cigarettes     Quit date: 2010     Years since quittin.8    Smokeless tobacco: Never Used    Tobacco comment: quit /started again (smoked 3 yrs) off and on/none since    Substance Use Topics    Alcohol use: Not Currently         Keisha Mota NP     This is the Subsequent Medicare Annual Wellness Exam, performed 12 months or more after the Initial AWV or the last Subsequent AWV    I have reviewed the patient's medical history in detail and updated the computerized patient record. Assessment/Plan   Education and counseling provided:  Are appropriate based on today's review and evaluation    1. Type 2 diabetes mellitus with hyperglycemia, without long-term current use of insulin (HCC)  -     AMB POC GLUCOSE BLOOD, BY GLUCOSE MONITORING DEVICE  -     AMB POC HEMOGLOBIN A1C  2. Peripheral vascular disease ([facility] [address]  3. Essential hypertension  4. Mixed hyperlipidemia  5. Medicare annual wellness visit, subsequent  6. Obstructive sleep apnea syndrome  7.  Systolic CHF, chronic ([facility] [address]       Depression Risk Factor Screening     3 most recent PHQ Screens 2021   PHQ Not Done -   Little interest or pleasure in doing things Not at all   Feeling down, depressed, irritable, or hopeless Not at all   Total Score PHQ 2 0   Trouble falling or staying asleep, or sleeping too much -   Feeling tired or having little energy -   Poor appetite, weight loss, or overeating -   Feeling bad about yourself - or that you are a failure or have let yourself or your family down -   Trouble concentrating on things such as school, work, reading, or watching TV -   Moving or speaking so slowly that other people could have noticed; or the opposite being so fidgety that others notice -   Thoughts of being better off dead, or hurting yourself in some way -   PHQ 9 Score -   How difficult have these problems made it for you to do your work, take care of your home and get along with others -       Alcohol Risk Screen    Do you average more than 1 drink per night or more than 7 drinks a week:  No    On any one occasion in the past three months have you have had more than 3 drinks containing alcohol:  No        Functional Ability and Level of Safety    Hearing: Hearing is good. Activities of Daily Living: The home contains: no safety equipment. Patient does total self care      Ambulation: with no difficulty     Fall Risk:  Fall Risk Assessment, last 12 mths 10/12/2021   Able to walk? Yes   Fall in past 12 months? 0   Do you feel unsteady? 0   Are you worried about falling 0   Number of falls in past 12 months -   Fall with injury?  -      Abuse Screen:  Patient is not abused       Cognitive Screening    Has your family/caregiver stated any concerns about your memory: no     Cognitive Screening: Normal - based on h&P    Health Maintenance Due     Health Maintenance Due   Topic Date Due    Eye Exam Retinal or Dilated  Never done    DTaP/Tdap/Td series (1 - Tdap) Never done    Shingrix Vaccine Age 50> (1 of 2) Never done    Low dose CT lung screening  Never done    COVID-19 Vaccine (3 - Booster for [employer] series) 09/03/2021    Medicare Yearly Exam  10/02/2021       Patient Care Team   Patient Care Team:  Aminah Murdock NP as PCP - General (Nurse Practitioner)  Vishal Rodrigues NP as PCP - [facility]  Kodak Hameed MD (Cardiology)  Debra Dinh MD (Gastroenterology)  Lg Li MD as Consulting Provider (Cardiology)    History     Patient Active Problem List   Diagnosis Code    Hx of CABG Z95.1    ICD (implantable cardioverter-defibrillator) in place Z80.65    H/O laparoscopic adjustable gastric banding Z98.84    Obesity, morbid ([address] E66.01    NYHA class 3 heart failure with reduced ejection fraction ([address] I50.20    Peripheral vascular disease ([address] I73.9    Severe mitral regurgitation I34.0    Ischemic cardiomyopathy I25.5    Chronic heart failure with reduced ejection fraction and diastolic dysfunction ([address] I50.42    Sleep apnea treated with nocturnal BiPAP G47.30    Mitral regurgitation I34.0    CHF exacerbation (HCC) I50.9    Acute on chronic respiratory failure (HCC) J96.20    Type 2 diabetes mellitus with chronic kidney disease ([address] E11.22     Past Medical History:   Diagnosis Date    Adverse effect of anesthesia     hard to wake up/uses BIPAP/\"try to avoid general if possible\"/intubated in past prior to going to sleep and it caused pt to be incontinent    Arthritis     Asthma     CAD (coronary artery disease)     Chronic kidney disease     elevataed creatinine    Chronic obstructive pulmonary disease (HCC)     Chronic pain     arthritis    Coagulation disorder ([address]     on plavix    Congestive heart failure (HCC)     Diabetes ([address]     Hypertension     Morbid obesity ([address]     Sleep apnea     BIPAP with 2 liters oxygen    Thromboembolus ([address]     after heart surgery - left leg    Thyroid disease       Past Surgical History:   Procedure Laterality Date    COLONOSCOPY N/A 2020    COLONOSCOPY   :- performed by Padmini Liz MD at [address] HX ARTHROPLASTY  2105    knee - right    HX  SECTION      x3    HX CORONARY ARTERY BYPASS GRAFT  1997    3 vessels    HX CORONARY STENT PLACEMENT  11/21/2016    HX HERNIA REPAIR  1988    HX IMPLANTABLE CARDIOVERTER DEFIBRILLATOR      HX KNEE REPLACEMENT Right 2015    once    RI CARDIAC SURG PROCEDURE UNLIST  09/13/2018    cardiac cath - Left    RI LAP GASTRIC BYPASS/KERLINE-EN-Y  2011    lap band per pt and not a gastric bypass     Current Outpatient Medications   Medication Sig Dispense Refill    vericiguat (Verquvo) 10 mg tab Take  by mouth.  gabapentin (NEURONTIN) 100 mg capsule TAKE 2 CAPSULES BY MOUTH TWICE DAILY. *MAX DAILY AMOUNT 400 MG* 120 Capsule 0    allopurinoL (ZYLOPRIM) 100 mg tablet Take 1 tablet by mouth once daily 90 Tablet 0    isosorbide mononitrate ER (IMDUR) 30 mg tablet Take 1 Tablet by mouth daily. in the morning 30 Tablet 1    bumetanide (BUMEX) 1 mg tablet Take 2 mg in the morning and 1 mg in the afternoon 90 Tablet 1    hydrALAZINE (APRESOLINE) 10 mg tablet Take 1 Tablet by mouth three (3) times daily. 90 Tablet 1    ezetimibe (ZETIA) 10 mg tablet Take 1 Tablet by mouth daily. 30 Tablet 1    levothyroxine (Euthyrox) 100 mcg tablet Take 1 Tablet by mouth Daily (before breakfast). 90 Tablet 0    glipiZIDE (GLUCOTROL) 10 mg tablet Take 1 tablet by mouth twice daily with food 180 Tablet 0    clopidogreL (PLAVIX) 75 mg tab Take 1 tablet by mouth once daily 90 Tablet 0    metoprolol succinate (TOPROL-XL) 25 mg XL tablet Take 0.5 Tablets by mouth daily. Hold for systolic BP less than 311 60 Tablet 2    nitroglycerin (NITROSTAT) 0.3 mg SL tablet 1 Tablet by SubLINGual route every five (5) minutes as needed for Chest Pain. 1 Bottle 0    atorvastatin (LIPITOR) 80 mg tablet TAKE 1 TABLET BY MOUTH AT BEDTIME 90 Tab 3    albuterol (PROVENTIL HFA, VENTOLIN HFA, PROAIR HFA) 90 mcg/actuation inhaler Take 2 Puffs by inhalation every four (4) hours as needed.  acetaminophen (TYLENOL ARTHRITIS PAIN) 650 mg TbER Take 1,300 mg by mouth two (2) times a day.       aspirin 81 mg chewable tablet Take 81 mg by mouth daily.  sertraline (ZOLOFT) 50 mg tablet       ketoconazole (NIZORAL) 2 % shampoo       sertraline (ZOLOFT) 25 mg tablet Take 50 mg by mouth daily.  FreeStyle Lancets 28 gauge misc USE AS DIRECTED      Blood-Glucose Meter monitoring kit Check blood sugar daily. May substitute for insurance preferred meter 1 Kit 0    glucose blood VI test strips (blood glucose test) strip Check blood sugar 2 times daily: E11.9 may substitute for insurance preferred strips. 200 Strip 3    lancets misc Use as directed. Dx:check sugar once daily. E11.65 1 Each 11    lancets misc Check blood sugar 2 times daily. May substitute for insurance preferred lancets. 200 Each 3    glucose blood VI test strips (ASCENSIA AUTODISC VI, ONE TOUCH ULTRA TEST VI) strip E11.65 check sugar once daily 100 Strip 0    clotrimazole-betamethasone (LOTRISONE) topical cream Apply  to affected area two (2) times a day. (Patient taking differently: Apply  to affected area two (2) times daily as needed.) 30 g 0    Blood-Glucose Meter monitoring kit Use as directed. Dx: E11.65 check sugar twice daily 1 Kit 0     Allergies   Allergen Reactions    Crestor [Rosuvastatin] Other (comments)     Causes muscle cramps    Lisinopril Cough    Nsaids (Non-Steroidal Anti-Inflammatory Drug) Other (comments)     Liver and Kidney       Family History   Problem Relation Age of Onset    Hypertension Mother     Dementia Mother     Coronary Art Dis Father 60    Sudden Death Father 58    Diabetes Son     Diabetes Brother      Social History     Tobacco Use    Smoking status: Former Smoker     Packs/day: 0.50     Years: 45.00     Pack years: 22.50     Types: Cigarettes     Quit date: 2010     Years since quittin.8    Smokeless tobacco: Never Used    Tobacco comment: quit /started again (smoked 3 yrs) off and on/none since    Substance Use Topics    Alcohol use: Not Currently         Keisha Strong NP

## 2021-11-15 ENCOUNTER — CLINICAL SUPPORT (OUTPATIENT)
Dept: DIABETES SERVICES | Age: 71
End: 2021-11-15
Payer: MEDICARE

## 2021-11-15 DIAGNOSIS — E11.9 TYPE 2 DIABETES MELLITUS WITHOUT COMPLICATION, WITHOUT LONG-TERM CURRENT USE OF INSULIN (HCC): Primary | ICD-10-CM

## 2021-11-15 PROCEDURE — G0108 DIAB MANAGE TRN  PER INDIV: HCPCS | Performed by: DIETITIAN, REGISTERED

## 2021-11-15 NOTE — PROGRESS NOTES
[facility]  Diabetes Self-Management Education & Support Program  Pre-program Assessment    Reason for Referral: Type 2 DM   Referral Source: Catalina García., *  Services requested: DSMES    ASSESSMENT    From my perspective, the participant would benefit from [facility] specifically related to Healthy eating, Monitoring, Physical activity, Taking medications, Healthy coping and Problem solving. Will adapt DSMES program to build on participant's skills score and preparedness score as noted in the Diabetes Skills, Confidence, and Preparedness Index. During the program, we will focus on providing DSMES that specifically addresses participant's interest in Reducing risks, Healthy eating, Monitoring, Physical activity, Taking medications and Problem solving, as shown by their reported readiness to change. The participant would be best served by attending weekly group class series. Diabetes Self-Management Education Follow-up Visit: pending insurance discussion with SOLIS Wise        Clinical Presentation  Jeffie Hodgkin is a 66 y.o.  female referred for diabetes self-management education. Participant has Type 2 DM not on insulin for 21-30 years. Family history positive for diabetes. Patient reports receiving DSMES services in the past prior to starting Medicare benefits. Most recent A1c value:   Lab Results   Component Value Date/Time    Hemoglobin A1c 7.3 (H) 05/31/2021 04:23 AM    Hemoglobin A1c (POC) 8.5 11/09/2021 03:19 PM       Diabetes-related medical history:  Neurological complications  Peripheral neuropathy    Macrovascular disease  CAD, Myocardial infarction and Peripheral vascular disease  Other associated conditions     Depression    Diabetes-related medications:  Current dosing:   Key Antihyperglycemic Medications             empagliflozin (Jardiance) 25 mg tablet Take 1 Tablet by mouth daily.     glipiZIDE (GLUCOTROL) 10 mg tablet Take 1 tablet by mouth twice daily with food          Blood Pressure Management  Key ACE/ARB Medications     Patient is on no ACE or ARB meds. Lipid Management  Key Antihyperlipidemia Meds             ezetimibe (ZETIA) 10 mg tablet Take 1 Tablet by mouth daily. atorvastatin (LIPITOR) 80 mg tablet TAKE 1 TABLET BY MOUTH AT BEDTIME          Clot Prevention  Key Anti-Platelet Anticoagulant Meds             clopidogreL (PLAVIX) 75 mg tab Take 1 tablet by mouth once daily    aspirin 81 mg chewable tablet Take 81 mg by mouth daily. Learning Assessment  Learning objectives Educator assessment (11/15/2021)   Diabetes Disease Process  The participant can   A) describe diabetes in basic terms;   B) state the type of diabetes they have; &   C) state accepted blood glucose targets. Healthy Eating  The participant can   A) identify carbohydrate foods; &   B) accurately read food labels. Being Active  The participant can  A) state the benefits of physical activity;  B) report their current PA practices;  C) identify PA they would consider incorporating in their lives; &  D) develop an implementation plan. Monitoring  The participant can  A) operate their blood glucose meter; &  B) describe how they log their blood glucoses to share with their provider. Taking Medications  The participant can  A) name their diabetes medications;  B) state the purpose and dose;  C) note side effects; &  D) describe proper storage, disposal & transport (if appropriate). Healthy Coping  The participant can    A) describe their response to diabetes diagnosis; B) describe their specific coping mechanisms;  C) identify supportive people and/or other resources that positively support their diabetes self-care and health. Reducing Risks  The participant can describe the preventive measures used by providers to promote health and prevent diabetes complications.      Problem Solving  The participant can   A) identify signs, symptoms & treatment of hypoglycemia;   B) identify signs, symptoms & treatment of hyperglycemia;  C) describe their sick day plan; &  D) identify BG patterns to discuss with their provider. Yes  Yes  No        Yes  No        No  Yes  Yes  No        Yes  Yes          Yes  No  Yes  No      No  Yes  Yes        Yes          No  No  No  Yes     Characteristics to Learning   Barriers to Learning   [] Cognitive loss  [] Mental retardation   [] Intellectual delay/cognitive impairment  [] Psychiatric disorder  [] Visually impaired  [] Hearing loss                 [] Low literacy (difficulty with written text)  [] Low numeracy (difficulty with mathematical information  [] Low health literacy (difficulty with understanding health information & services  [] Language  [] Functional limitation  [] Pain   [] Financial  [] Transportation  [x] None   Favorite Ways to Learn   [] Lecture  [] Slides  [] Reading [x] Video-Internet  [] Cassettes/CDs/MP3's  [] Interactive Small Groups [x] Other- hands on       Behavioral Assessment  Current self-care practices  Educator assessment (11/15/2021)   Healthy Eating  Current practices  24-hour Dietary Recall: using Wt Watchers; cooks meals for self and her sister  Breakfast: Oatmeal with brown sugar and butter +/- blueberries, yogurt Oikos mix berry and fruit. Lunch: 6/7 days skips  3-4 pm Dinner: roast chicken, mashed pot and green beans. Falls asleep - 9-10:30 pm  Snacks: peanuts M&M, veggie/sun chips, potato chips ( x1-2/week); ritz and cheese/PB, buttered popcorn, fruit (will snack evening after main meal)  Beverages: water or coffee ,sf cran g'gómez.  Sf lemonade/raspberry drink mixes  Alcohol:  8-12 works and on Wed 1-4 pm   Would benefit from [facility] related to Healthy Eating: Yes      Eats a carbohydrate controlled diet: No      Stage of change: Action   Being Active  Current practices  How many days during the past week have you performed physical activity where your heart beats faster and your breathing is harder than normal for 30 minutes or more?  0 days    How many days in a typical week do you perform activity such as this?  7 days     Would benefit from [facility] related to Being Active: Yes      Exercises 150 minutes/week: No - trying to walk hallway once a day (6 minutes)    Stage of change: Action     Monitoring  Current practices  Do you monitor your blood sugar? Yes    How often do you monitor? 1x/day    What are the range of readings? Restarted 11/2021  Breakfast: 135-180 mg/dL    Do you know your last A1c measurement? Yes    Do you know the meaning of the A1c? Yes     Would benefit from [facility] related to Monitoring: Yes, review      Uses BG readings to establish trends and understand BG patterns: Yes      Stage of change: Action   Taking Medication  Current practices  Do you understand what your diabetes medications do? Yes    How often do you miss doses of your diabetes medications? Never    Can you afford your diabetes medications? Yes   Would benefit from [facility] related to Taking Medication: Yes, Jardiance coverage to be determined      Takes medications consistently to receive full benefit: Yes      Stage of change: Action       Healthy Coping   Current state  Diabetes Skills, Confidence and Preparedness Index:   Total score: 5.9  Skills: 6.3  Confidence: 4.7  Preparedness: 6.7   Would benefit from DSMES related to Healthy Coping: Yes, review needed      Identifies specific people, organizations,etc, that actively support their diabetes self-care efforts: Yes, sister - butting heads with diet      Stage of change: Action     Reducing Risks  Current state  Vaccines:  Influenza:   Immunization History   Administered Date(s) Administered    Influenza High Dose Vaccine PF 09/28/2021    Influenza Vaccine 10/01/2019    Influenza, Quadrivalent, Adjuvanted (>65 Yrs FLUAD QUAD 09970) 09/18/2020   Flu vaccine: 9/2021    Pneumococcal:   Immunization History   Administered Date(s) Administered    Pneumococcal Polysaccharide (PPSV-23) 01/04/2021        Hepatitis: There is no immunization history for the selected administration types on file for this patient. Examinations:  Diabetic Foot and Eye Exam HM Status   Topic Date Due    Eye Exam  Never done   [facility]  08/02/2022        Dental exam: due  Eye exam: 9/2021  Foot exam: 8/2021    Heart Protection:  BP Readings from Last 2 Encounters:   11/09/21 109/60   10/14/21 104/72        Lab Results   Component Value Date/Time    LDL, calculated 55.8 06/02/2021 03:51 AM        Kidney Protection:  No results found for: Sam Marinoi, MCA2, MCA3, MCAU, MCAU2, MCALPOCT     Would benefit from [facility] related to Reducing Risks: No      Actively participates in decision-making with provider regarding secondary prevention:  Yes      Stage of change: Maintenance   Problem Solving  Current state  Hypoglycemia Management:  What are signs and symptoms of hypoglycemia that you experience? Shaking/trembling, Dizziness/light-headedness, Lick my licks, Slurred speech. Irritability    How do you prevent hypoglycemia? Consistent meals/snack times    How do you treat hypoglycemia? OJ, soda, PB on bread/cracker    Hyperglycemia Management:  What are signs and symptoms of hyperglycemia that you experience? Light-headed, \"crabbiness\"    How can you prevent hyperglycemia? Take medication as instructed, Focus on carbohydrate counting/meal planning, Monitor blood glucose    Sick Day Management:  What do you do differently on sick days? Stay hydrated, Check blood glucose every 2-4 hours, Eat meals, soft foods, or drink caloric beverages every 4 hours    Pattern Management:  Do you notice blood glucose patterns when you look at the readings in your meter or logbook? Yes    How do you use the blood glucose readings from your meter or logbook?  Understand how body responds to meals, Adjust meals based on results     Would benefit from [facility] related to Problem Solving: Yes      Articulates appropriate strategies to address hypoglycemia, hyperglycemia, sick day care and BG pattern: Yes      Stage of change: Action     Note: Content derived from the American Association of Diabetes Educators' Diabetes Education Curriculum: A Guide to Successful Self-Management (3rd edition)      Donnie Navarro RD,[facility] on 11/15/2021 at 1:02 PM    I have personally reviewed the health record, including provider notes, laboratory data and current medications before making these care and education recommendations. The time spent in this effort is included in the total time. Total minutes: 28    [id number] Emergency Adaptations for Telehealth:  Ying Concepcion was evaluated through a synchronous (real-time) audio-video encounter. The patient (or guardian if applicable) is aware that this is a billable service. Verbal consent to proceed has been obtained within the past 12 months. The visit was conducted pursuant to the emergency declaration under the [address] [place] [address], [address] [place] [address] and the [employer] and "[employer] General Act. Patient identification was verified, and a caregiver was present when  appropriate. The patient was located in a state where the provider was credentialed to provide care. Overall SCPI score: 5.9 Skills Score: 6.3  Low: Healthy Eating(Q1),Blood Sugar Monitoring(Q8) Confidence Score: 4.7  Low: Healthy Eating(Q1),Problem IYUWDLS(L2) Preparedness Score: 6.7  Low: Being Active(Q2),Healthy Coping(Q3)  Healthy Eating Score: 5.0  Low: Confidence(Q1) Taking Medication Score: 6.0  Low: Skills(Q2) Blood Sugar Monitoring Score: 6.6  Low: Skills(Q8) Reducing Risks Score: 6.8  Low: Confidence(Q3)  Problem Solving Score: 5.3  Low: Confidence(Q7) Healthy Coping Score: 5.3  Low: Confidence(Q2) Being Active Score: 6.0  Low: Confidence(Q5),Preparedness(Q2)    Skills/Knowledge Questions  1. I know how to plan meals that have the best balance between carbohydrates, proteins and vegetables. 5  2. I know how my diabetes medications (pills, injectables and/or insulin) work in my body. 6  3. I know when to check my blood sugar if I want to see how my body responded to a meal. 7  4. I know when to check my blood sugars to determine if my medication or insulin doses are correct. 7  5. I know what to do to prevent a low blood sugar when I exercise (either before, during, or after). 7  6. When I am sick, I know what to do differently with my diabetes management. 7  7. I know how stress can affect my diabetes management. 6  8. When I look at my blood sugars over a given week, I can explain what my blood sugar pattern is. 5  9. I know what my target levels are for A1c, blood pressure and cholesterol. 7  Confidence Questions  1. I am confident that I can plan balanced meals and snacks. 2  2. I am confident that I can manage my stress. 4  3. I am confident that I can prevent a low blood sugar during or after exercise. 6  4. I am confident that the next time I eat out, I will be able to choose foods that best keep my blood sugars in target. 6  5. I am confident I can include exercise into my schedule. 6  6. I am confident that I can use my daily blood sugars to adjust my diet, my activity, and/or my insulin. 7  7. When something out of my normal routine happens, I am confident that I can problem-solve and keep my diabetes on track. 2  Preparedness Questions  1. Within the next month, I will begin to eat more balanced meals and snacks. 7  2. Within the next month, I will choose an exercise activity and I will start fitting it into my schedule. 6  3. Within the next month, I will make a list of stress management options that work for me. 6  4. Within the next month, I will consistently plan ahead to prevent low blood sugars. 7  5. Within the next month, I will start adjusting my insulin doses on my own. 0  6.  Within the next month, I will begin making changes to my diabetes management based on my daily blood sugars (eg - eating, activity and/or insulin). 8  7. Within the next month, I will begin making changes to my diabetes management to meet my overall goals (eg - eating, activity and/or insulin).  7

## 2021-11-16 DIAGNOSIS — E11.65 TYPE 2 DIABETES MELLITUS WITH HYPERGLYCEMIA, WITHOUT LONG-TERM CURRENT USE OF INSULIN (HCC): Primary | ICD-10-CM

## 2021-11-16 RX ORDER — PEN NEEDLE, DIABETIC 31 GX3/16"
1 NEEDLE, DISPOSABLE MISCELLANEOUS DAILY
Qty: 100 PEN NEEDLE | Refills: 2 | Status: SHIPPED | OUTPATIENT
Start: 2021-11-16 | End: 2021-11-24 | Stop reason: SDUPTHER

## 2021-11-23 ENCOUNTER — OFFICE VISIT (OUTPATIENT)
Dept: CARDIOLOGY CLINIC | Age: 71
End: 2021-11-23
Payer: MEDICARE

## 2021-11-23 ENCOUNTER — DOCUMENTATION ONLY (OUTPATIENT)
Dept: CARDIOLOGY CLINIC | Age: 71
End: 2021-11-23

## 2021-11-23 VITALS
TEMPERATURE: 96.8 F | SYSTOLIC BLOOD PRESSURE: 102 MMHG | OXYGEN SATURATION: 97 % | DIASTOLIC BLOOD PRESSURE: 62 MMHG | RESPIRATION RATE: 12 BRPM | WEIGHT: 227.6 LBS | HEIGHT: 63 IN | HEART RATE: 84 BPM | BODY MASS INDEX: 40.33 KG/M2

## 2021-11-23 DIAGNOSIS — I42.8 NICM (NONISCHEMIC CARDIOMYOPATHY) (HCC): ICD-10-CM

## 2021-11-23 DIAGNOSIS — R06.02 SOB (SHORTNESS OF BREATH): ICD-10-CM

## 2021-11-23 DIAGNOSIS — I50.22 CHRONIC SYSTOLIC CONGESTIVE HEART FAILURE (HCC): ICD-10-CM

## 2021-11-23 DIAGNOSIS — I50.20 NYHA CLASS 3 HEART FAILURE WITH REDUCED EJECTION FRACTION (HCC): Primary | ICD-10-CM

## 2021-11-23 DIAGNOSIS — E55.9 VITAMIN D DEFICIENCY: ICD-10-CM

## 2021-11-23 PROCEDURE — 1090F PRES/ABSN URINE INCON ASSESS: CPT | Performed by: NURSE PRACTITIONER

## 2021-11-23 PROCEDURE — G8536 NO DOC ELDER MAL SCRN: HCPCS | Performed by: NURSE PRACTITIONER

## 2021-11-23 PROCEDURE — G8427 DOCREV CUR MEDS BY ELIG CLIN: HCPCS | Performed by: NURSE PRACTITIONER

## 2021-11-23 PROCEDURE — 3017F COLORECTAL CA SCREEN DOC REV: CPT | Performed by: NURSE PRACTITIONER

## 2021-11-23 PROCEDURE — G8399 PT W/DXA RESULTS DOCUMENT: HCPCS | Performed by: NURSE PRACTITIONER

## 2021-11-23 PROCEDURE — G8752 SYS BP LESS 140: HCPCS | Performed by: NURSE PRACTITIONER

## 2021-11-23 PROCEDURE — G8754 DIAS BP LESS 90: HCPCS | Performed by: NURSE PRACTITIONER

## 2021-11-23 PROCEDURE — G8432 DEP SCR NOT DOC, RNG: HCPCS | Performed by: NURSE PRACTITIONER

## 2021-11-23 PROCEDURE — G8417 CALC BMI ABV UP PARAM F/U: HCPCS | Performed by: NURSE PRACTITIONER

## 2021-11-23 PROCEDURE — 99214 OFFICE O/P EST MOD 30 MIN: CPT | Performed by: NURSE PRACTITIONER

## 2021-11-23 PROCEDURE — 1101F PT FALLS ASSESS-DOCD LE1/YR: CPT | Performed by: NURSE PRACTITIONER

## 2021-11-23 PROCEDURE — G9899 SCRN MAM PERF RSLTS DOC: HCPCS | Performed by: NURSE PRACTITIONER
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
## 2021-11-23 NOTE — PROGRESS NOTES
SW met with patient at her request to discuss change of insurance and questions about Tootie Parent. SW answered questions, patient requested application for [employer]. SW encouraged patient to complete Jardiance application along with prescribing physician. Patient discussed she had received a bill for [employer], which she never had placed. Patient stated she would reach out to SW if she requires additional assistance with this bill.

## 2021-11-23 NOTE — PATIENT INSTRUCTIONS
Medication changes:    none      Testing Ordered:    Lab work has been drawn today. You will be contacted with any abnormal results requiring changes to your current plan of care. We will forward a copy to Dr. Valdemar Romero. Other Recommendations:      Ensure your drinking an adequate amount of water with a goal of 6-8 eight ounce glasses (1.5-2 liters) of fluid daily. Your urine should be clear and light yellow straw colored. If your blood pressure begins to consistently run below 90/60 and/or you begin to experience dizziness or lightheadedness, please contact the [facility] [address] at [phone]. Please monitor your weights daily upon waking and after using the bathroom. Keep a written records of your weights and bring to your next appointment. If you have a weight gain of 3 or more pounds overnight OR 5 or more pounds in one week please contact our office. Follow up in 3 months with [facility] [address]      Thank you for allowing us the privilege of being a part of your healthcare team! Please do not hesitate to contact our office at [phone] with any questions or concerns. Virtual Heart Failure [address] invites you to learn more about heart failure and to share your questions, ideas, and experiences with others. Each month, the Heart Failure Support Group features a new educational topic and a guest speaker, followed by an interactive discussion. Our Heart Failure Nurse Navigator will moderate each session. You will be able to participate by phone, tablet or computer through [address] [place] [address]. This support group takes place on the 3rd Thursday of each month from 6:00-7:30PM. All individuals living with heart failure and their caregivers are welcome to join. If you are interested in participating, please contact us at [email or web address] and you will be sent the link to join the [employer].

## 2021-11-24 DIAGNOSIS — E11.65 TYPE 2 DIABETES MELLITUS WITH HYPERGLYCEMIA, WITHOUT LONG-TERM CURRENT USE OF INSULIN (HCC): ICD-10-CM

## 2021-11-24 LAB
25(OH)D3 SERPL-MCNC: 31.7 NG/ML (ref 30–100)
ALBUMIN SERPL-MCNC: 4.1 G/DL (ref 3.5–5)
ALBUMIN/GLOB SERPL: 1.1 {RATIO} (ref 1.1–2.2)
ALP SERPL-CCNC: 107 U/L (ref 45–117)
ALT SERPL-CCNC: 29 U/L (ref 12–78)
ANION GAP SERPL CALC-SCNC: 8 MMOL/L (ref 5–15)
AST SERPL-CCNC: 28 U/L (ref 15–37)
BASOPHILS # BLD: 0.1 K/UL (ref 0–0.1)
BASOPHILS NFR BLD: 1 % (ref 0–1)
BILIRUB SERPL-MCNC: 0.4 MG/DL (ref 0.2–1)
BNP SERPL-MCNC: 744 PG/ML
BUN SERPL-MCNC: 39 MG/DL (ref 6–20)
BUN/CREAT SERPL: 18 (ref 12–20)
CALCIUM SERPL-MCNC: 10.7 MG/DL (ref 8.5–10.1)
CHLORIDE SERPL-SCNC: 100 MMOL/L (ref 97–108)
CO2 SERPL-SCNC: 27 MMOL/L (ref 21–32)
CREAT SERPL-MCNC: 2.2 MG/DL (ref 0.55–1.02)
DIFFERENTIAL METHOD BLD: ABNORMAL
EOSINOPHIL # BLD: 0.3 K/UL (ref 0–0.4)
EOSINOPHIL NFR BLD: 4 % (ref 0–7)
ERYTHROCYTE [DISTWIDTH] IN BLOOD BY AUTOMATED COUNT: 14.9 % (ref 11.5–14.5)
GLOBULIN SER CALC-MCNC: 3.6 G/DL (ref 2–4)
GLUCOSE SERPL-MCNC: 207 MG/DL (ref 65–100)
HCT VFR BLD AUTO: 39.2 % (ref 35–47)
HGB BLD-MCNC: 12.8 G/DL (ref 11.5–16)
IMM GRANULOCYTES # BLD AUTO: 0 K/UL (ref 0–0.04)
IMM GRANULOCYTES NFR BLD AUTO: 0 % (ref 0–0.5)
LYMPHOCYTES # BLD: 2.3 K/UL (ref 0.8–3.5)
LYMPHOCYTES NFR BLD: 33 % (ref 12–49)
MAGNESIUM SERPL-MCNC: 2.6 MG/DL (ref 1.6–2.4)
MCH RBC QN AUTO: 32.3 PG (ref 26–34)
MCHC RBC AUTO-ENTMCNC: 32.7 G/DL (ref 30–36.5)
MCV RBC AUTO: 99 FL (ref 80–99)
MONOCYTES # BLD: 0.6 K/UL (ref 0–1)
MONOCYTES NFR BLD: 9 % (ref 5–13)
NEUTS SEG # BLD: 3.6 K/UL (ref 1.8–8)
NEUTS SEG NFR BLD: 53 % (ref 32–75)
NRBC # BLD: 0 K/UL (ref 0–0.01)
NRBC BLD-RTO: 0 PER 100 WBC
PLATELET # BLD AUTO: 264 K/UL (ref 150–400)
PMV BLD AUTO: 9.9 FL (ref 8.9–12.9)
POTASSIUM SERPL-SCNC: 3.8 MMOL/L (ref 3.5–5.1)
PROT SERPL-MCNC: 7.7 G/DL (ref 6.4–8.2)
RBC # BLD AUTO: 3.96 M/UL (ref 3.8–5.2)
SODIUM SERPL-SCNC: 135 MMOL/L (ref 136–145)
URATE SERPL-MCNC: 5.1 MG/DL (ref 2.6–6)
WBC # BLD AUTO: 6.8 K/UL (ref 3.6–11)

## 2021-11-24 RX ORDER — ISOPROPYL ALCOHOL 70 ML/100ML
1 SWAB TOPICAL DAILY
Qty: 100 PAD | Refills: 11 | Status: SHIPPED | OUTPATIENT
Start: 2021-11-24

## 2021-11-24 RX ORDER — INSULIN GLARGINE 100 [IU]/ML
10 INJECTION, SOLUTION SUBCUTANEOUS
Qty: 5 ADJUSTABLE DOSE PRE-FILLED PEN SYRINGE | Refills: 1 | Status: SHIPPED | OUTPATIENT
Start: 2021-11-24 | End: 2022-03-03 | Stop reason: ALTCHOICE

## 2021-11-24 RX ORDER — PEN NEEDLE, DIABETIC 31 GX3/16"
1 NEEDLE, DISPOSABLE MISCELLANEOUS DAILY
Qty: 100 PEN NEEDLE | Refills: 2 | Status: SHIPPED | OUTPATIENT
Start: 2021-11-24

## 2021-12-01 ENCOUNTER — TELEPHONE (OUTPATIENT)
Dept: CARDIOLOGY CLINIC | Age: 71
End: 2021-12-01

## 2021-12-01 NOTE — TELEPHONE ENCOUNTER
Emailed patient application for [employer] and she will complete and send back to office, along with application for Novalact.

## 2021-12-02 ENCOUNTER — PATIENT MESSAGE (OUTPATIENT)
Dept: INTERNAL MEDICINE CLINIC | Age: 71
End: 2021-12-02

## 2021-12-02 DIAGNOSIS — N18.2 TYPE 2 DIABETES MELLITUS WITH STAGE 2 CHRONIC KIDNEY DISEASE, WITHOUT LONG-TERM CURRENT USE OF INSULIN (HCC): ICD-10-CM

## 2021-12-02 DIAGNOSIS — E11.22 TYPE 2 DIABETES MELLITUS WITH STAGE 2 CHRONIC KIDNEY DISEASE, WITHOUT LONG-TERM CURRENT USE OF INSULIN (HCC): ICD-10-CM

## 2021-12-03 RX ORDER — GABAPENTIN 100 MG/1
CAPSULE ORAL
Qty: 120 CAPSULE | Refills: 0 | Status: SHIPPED | OUTPATIENT
Start: 2021-12-03 | End: 2021-12-22

## 2021-12-07 ENCOUNTER — PATIENT MESSAGE (OUTPATIENT)
Dept: INTERNAL MEDICINE CLINIC | Age: 71
End: 2021-12-07

## 2021-12-07 DIAGNOSIS — I25.5 ISCHEMIC CARDIOMYOPATHY: ICD-10-CM

## 2021-12-07 DIAGNOSIS — I25.10 CORONARY ARTERY DISEASE INVOLVING NATIVE HEART WITHOUT ANGINA PECTORIS, UNSPECIFIED VESSEL OR LESION TYPE: ICD-10-CM

## 2021-12-07 RX ORDER — INSULIN GLARGINE 100 [IU]/ML
10 INJECTION, SOLUTION SUBCUTANEOUS
Qty: 5 ADJUSTABLE DOSE PRE-FILLED PEN SYRINGE | Refills: 1 | Status: CANCELLED | OUTPATIENT
Start: 2021-12-07

## 2021-12-07 RX ORDER — VERICIGUAT 10 MG/1
10 TABLET, FILM COATED ORAL DAILY
Qty: 30 EACH | Refills: 11 | Status: SHIPPED | OUTPATIENT
Start: 2021-12-07 | End: 2022-08-09

## 2021-12-07 NOTE — TELEPHONE ENCOUNTER
Requested Prescriptions     Signed Prescriptions Disp Refills    vericiguat (Verquvo) 10 mg tab 30 Each 11     Sig: Take 10 mg by mouth daily. Authorizing Provider: Carli Bradley User: Altagracia Carbone     Patient paperwork for assistance for [facility] renewal faxed on this date. Per Keshawn Mg, will hold on sending in [address] [place] [address] application for patient assistance at this time due to worsening renal function. I notified patient, we can send in later if renal function improves.  She states she has not been taking but because she ran out of medication

## 2021-12-08 DIAGNOSIS — Z95.1 HX OF CABG: ICD-10-CM

## 2021-12-08 DIAGNOSIS — Z95.810 ICD (IMPLANTABLE CARDIOVERTER-DEFIBRILLATOR) IN PLACE: ICD-10-CM

## 2021-12-08 RX ORDER — EZETIMIBE 10 MG/1
10 TABLET ORAL DAILY
Qty: 30 TABLET | Refills: 1 | Status: SHIPPED | OUTPATIENT
Start: 2021-12-08

## 2021-12-08 RX ORDER — CLOPIDOGREL BISULFATE 75 MG/1
TABLET ORAL
Qty: 90 TABLET | Refills: 0 | Status: SHIPPED | OUTPATIENT
Start: 2021-12-08

## 2021-12-09 LAB — HBA1C MFR BLD HPLC: 9 %

## 2021-12-10 LAB — HBA1C MFR BLD HPLC: 9 %

## 2021-12-21 DIAGNOSIS — N18.2 TYPE 2 DIABETES MELLITUS WITH STAGE 2 CHRONIC KIDNEY DISEASE, WITHOUT LONG-TERM CURRENT USE OF INSULIN (HCC): ICD-10-CM

## 2021-12-21 DIAGNOSIS — E11.22 TYPE 2 DIABETES MELLITUS WITH STAGE 2 CHRONIC KIDNEY DISEASE, WITHOUT LONG-TERM CURRENT USE OF INSULIN (HCC): ICD-10-CM

## 2021-12-22 RX ORDER — GABAPENTIN 100 MG/1
CAPSULE ORAL
Qty: 120 CAPSULE | Refills: 0 | Status: SHIPPED | OUTPATIENT
Start: 2021-12-22

## 2021-12-30 ENCOUNTER — PATIENT MESSAGE (OUTPATIENT)
Dept: CARDIOLOGY CLINIC | Age: 71
End: 2021-12-30

## 2021-12-30 DIAGNOSIS — I50.42 CHRONIC HEART FAILURE WITH REDUCED EJECTION FRACTION AND DIASTOLIC DYSFUNCTION (HCC): ICD-10-CM

## 2021-12-30 DIAGNOSIS — I10 HYPERTENSION, UNSPECIFIED TYPE: ICD-10-CM

## 2021-12-30 DIAGNOSIS — I25.5 ISCHEMIC CARDIOMYOPATHY: ICD-10-CM

## 2021-12-30 RX ORDER — ISOSORBIDE MONONITRATE 30 MG/1
30 TABLET, EXTENDED RELEASE ORAL DAILY
Qty: 30 TABLET | Refills: 1 | Status: SHIPPED | OUTPATIENT
Start: 2021-12-30 | End: 2022-10-24 | Stop reason: SINTOL

## 2021-12-30 NOTE — TELEPHONE ENCOUNTER
Requested Prescriptions     Signed Prescriptions Disp Refills    isosorbide mononitrate ER (IMDUR) 30 mg tablet 30 Tablet 1     Sig: Take 1 Tablet by mouth daily. in the morning     Authorizing Provider: Arvind Nava User: Steve Sanchez     Last ov 11/23/21. Sent message to  Carolyn Morin to call patient to schedule 3 month follow up.  Katheryn Harley RN

## 2022-01-10 ENCOUNTER — TELEPHONE (OUTPATIENT)
Dept: CARDIOLOGY CLINIC | Age: 72
End: 2022-01-10

## 2022-01-10 NOTE — TELEPHONE ENCOUNTER
Called patient using two patient identifiers. Advised patient that [facility] access left a message stating that they need patients new insurance cards scanned and faxed to them. Patient states that she does not have new insurance cards due to them being sent to the wrong address. Provided her with [employer] program phone number to see if patient can just provide card information. She states she will call back once she has more information from [facility]. She also notes that she is not sure of the process with her new insurance if she is able to be seen by [facility] office. She states that she switched from [employer] to [employer] gen care insurance and has questions about being referred and her coverage with [facility]. Notified  Jr Berrios who will reach out to patient regarding her questions. Patient had no further questions at this time.  Amanda Mehta Rn

## 2022-01-10 NOTE — TELEPHONE ENCOUNTER
Called patient and spoke to sister Shannon rivas (on hippa). Verified using two patient identifiers. She states her sister is at work right now but she will let patient know to call back. Provided [facility] call back number. She stated understanding and will let patient know to call back.  April Diaz RN

## 2022-01-11 ENCOUNTER — TELEPHONE (OUTPATIENT)
Dept: CARDIOLOGY CLINIC | Age: 72
End: 2022-01-11

## 2022-01-11 ENCOUNTER — CLINICAL SUPPORT (OUTPATIENT)
Dept: DIABETES SERVICES | Age: 72
End: 2022-01-11
Payer: MEDICARE

## 2022-01-11 ENCOUNTER — DOCUMENTATION ONLY (OUTPATIENT)
Dept: CARDIOLOGY CLINIC | Age: 72
End: 2022-01-11

## 2022-01-11 DIAGNOSIS — E11.9 TYPE 2 DIABETES MELLITUS WITHOUT COMPLICATION, WITHOUT LONG-TERM CURRENT USE OF INSULIN (HCC): Primary | ICD-10-CM

## 2022-01-11 PROCEDURE — G0109 DIAB MANAGE TRN IND/GROUP: HCPCS | Performed by: DIETITIAN, REGISTERED

## 2022-01-11 NOTE — PROGRESS NOTES
1/10/22 Patient shared with F RNGAVIN during their telephone encounter:    that she does not have new insurance cards due to them being sent to the wrong address. She also notes that she is not sure of the process with her new insurance if she is able to be seen by [facility] office. She states that she switched from [employer] to [facility] [employer], [facility] gen care insurance and has questions about being referred and her coverage with [facility]. Paulette Lerner requested SW contact this patient regarding her questions. 1/11/22 SW contacted patient earlier today and was asked to call back after 3:00 pm.    3:28 1/11/22 SW spoke with patient, she no longer has [place], but has changed insurance to [employer]. Patient would like to continue to see providers in the [facility]. Patient stated she does not have her new insurance cards yet as they were sent to the wrong address, however she does have her member ID. SW encouraged patient to contact her insurance member services department to confirm her providers/facilities are in network. If not, patient could inquire if her insurance will allow out of network exceptions. Patient stated she would contact her insurance on this date and follow up with SW if she has additional questions.

## 2022-01-12 ENCOUNTER — OFFICE VISIT (OUTPATIENT)
Dept: CARDIOLOGY CLINIC | Age: 72
End: 2022-01-12
Payer: MEDICARE

## 2022-01-12 DIAGNOSIS — Z95.810 AUTOMATIC IMPLANTABLE CARDIAC DEFIBRILLATOR IN SITU: Primary | ICD-10-CM

## 2022-01-12 PROCEDURE — 93295 DEV INTERROG REMOTE 1/2/MLT: CPT | Performed by: INTERNAL MEDICINE

## 2022-01-12 PROCEDURE — 93296 REM INTERROG EVL PM/IDS: CPT | Performed by: INTERNAL MEDICINE

## 2022-01-12 NOTE — LETTER
1/12/2022 10:47 AM    Ms. Ilan Funk [address] [id number]            This letter confirms that we have received your scheduled remote check of your implanted     device on 1-12-22  . Our EP team will contact you via phone if there are significant abnormal    findings. Your next remote check from home is scheduled for 4-13-22  . If you have any questions, please call [address] [place] [address] Drive at [phone].                Sincerely,    Kaia Cary MD [facility]

## 2022-01-12 NOTE — PROGRESS NOTES
[facility]  Diabetes Self-Management Education & Support Program    SUMMARY  Diabetes self-care management training was completed related to reducing risks. The participant will return on 1/18/22 to continue DSMES related to healthy eating and monitoring. The participant did not identify SMART Goal(s) and will practice knowledge and skills related to reducing risks to improve diabetes self-management. EVALUATION:  Patricia Gómez attended class today for her education. Shared with the group that her DM has historically been in good control and was easier in the past that if her BG trended up she could troubleshoot and they would respond more timely in the past to her interventions. She is finding that this time it is a struggle. She feels that she needs to revisit education including meal planning to address her values. Recognizes that she needs to increase her activity and has started with changes - walking the burkett. RECOMMENDATIONS:  1) dental care is being addressed    Next provider visit is scheduled for 2/10/22         DATE DSMES TOPIC EVALUATION     1/11/22 WHAT IS DIABETES?   a. Role of the normal pancreas in energy balance and blood glucose control   b. The defect seen in diabetes   c. Signs & symptoms of diabetes   d. Diagnosis of diabetes   e. Types of diabetes   f. Blood glucose targets in non-pregnant & non-pregnant adults       The participant knows   Their type of diabetes: Yes   The basic physiologic defect: Yes   Blood glucose targets: Yes     DATE DSMES TOPIC EVALUATION     1/11/22 HOW DO I STAY HEALTHY?   a. Prevention    Vaccinations    Preconception care (if applicable)  b.  Examinations    Eye     Foot   c. Diabetic complications' prevention   [address]      The participant has a personal diabetes care record to keep abreast of diabetes health Yes     The participant needs to address dental care - she is current with her eye and foot exams. All vaccines are current. [address] [employer] for Telehealth:    Deon River is a 66 y.o. female being evaluated in person for class on 1/11/22. Time in appointment: 120 minutes    [address] [facility] [place] on 1/12/2022  An electronic signature was used to authenticate this note.

## 2022-01-18 ENCOUNTER — CLINICAL SUPPORT (OUTPATIENT)
Dept: DIABETES SERVICES | Age: 72
End: 2022-01-18
Payer: MEDICARE

## 2022-01-18 DIAGNOSIS — E11.9 TYPE 2 DIABETES MELLITUS WITHOUT COMPLICATION, WITHOUT LONG-TERM CURRENT USE OF INSULIN (HCC): Primary | ICD-10-CM

## 2022-01-18 PROCEDURE — G0109 DIAB MANAGE TRN IND/GROUP: HCPCS | Performed by: DIETITIAN, REGISTERED

## 2022-01-18 NOTE — PROGRESS NOTES
[employer] [facility] Diabetes [facility]  Diabetes Self-Management Education & Support Program  Encounter note    SUMMARY  Diabetes self-care management training was completed related to Healthy eating and Monitoring. The participant will return on January 25 to continue DSMES related to Physical activity and Taking medications. The participant did not identify SMART Goal(s): - but shared that she has an [place] of what I need to work on\", and will practice knowledge and skills related to reducing risks and healthy eating and monitoring to improve diabetes self-management. EVALUATION:  Andreia Lopez shared that she has been struggling with her BG management. Identified late evening as a time when she will snack too much and overeat and suspects this is related to her skipping prior meals - breakfast and/or lunch. Demonstrated good understanding of meal planning and reading nutrition labels. Currently testing but feels that she needs to investigate her meals and we discussed how to move testing to determine what is affecting BG values and results. RECOMMENDATIONS:  1) consider breaking up testing times with pre-meal tests (prior to lunch and dinner) to identify the timeframe when BG may be increasing and why that may happen. 2) post meal BG values can help determine how body responded to the meal plan. 3) try to include 30-45g of complex carbohydrate per meal - focusing on good to excellent sources of fiber. 4) continue to watch sodium intakes w/ CHF. Next provider visit is scheduled for 2/10/22         DATE DSMES TOPIC EVALUATION     1/18/2022 WHAT CAN I EAT?   a. General principles   b. Determining a healthy weight   c. Nutritional terms & tools    Healthy Plate method    Carbohydrate Counting    Reading food labels    Free apps   d.  Pregnancy recommendations      The participant    Uses Healthy Plate principles in constructing meals Yes   Reads food labels in choosing acceptable foods Yes    The participant would benefit from not skipping meals. Encouraged to work towards 45 g carbohydrate per meal and adjust accordingly depending on her appetite. Michelle PENA DSMES TOPIC EVALUATION     1/18/2022 HOW CAN BLOOD GLUCOSE MONITORING HELP ME?   a. Value of blood glucose monitoring   b. Realistic expectations   c. Blood glucose monitoring targets   d. Target adjustments   e. Setting a1c & blood glucose targets with provider   f. Meter selection    g. Technique for obtaining blood droplet   h. Blood glucose testing sites   i. Determining best times to test   j. Pregnancy recommendations   k. Data sharing with provider        The participant    Can demonstrate their glucometer procedure Yes   Logs their BG readings Yes    The participant would benefit from varying testing times to determine when her BG may be elevated to better focus her efforts with managing her BG and lowering her A1c. Anirudh Rudolph RD, [facility] [place][note 2]ES on 1/18/2022 at 10:56 AM      Total minutes: 907    [id number] [employer] for [employer]health:  Lydia Szymanski was evaluated in person today for class.

## 2022-01-19 ENCOUNTER — TELEPHONE (OUTPATIENT)
Dept: CARDIOLOGY CLINIC | Age: 72
End: 2022-01-19

## 2022-01-19 NOTE — TELEPHONE ENCOUNTER
Voice message left requesting a return call regarding patients insurance. Per Benjie Lanier she needs to know the current insurance on patient. The one that was listed in no longer in effect.

## 2022-01-25 ENCOUNTER — CLINICAL SUPPORT (OUTPATIENT)
Dept: DIABETES SERVICES | Age: 72
End: 2022-01-25
Payer: MEDICARE

## 2022-01-25 DIAGNOSIS — E11.9 TYPE 2 DIABETES MELLITUS WITHOUT COMPLICATION, WITHOUT LONG-TERM CURRENT USE OF INSULIN (HCC): Primary | ICD-10-CM

## 2022-01-25 PROCEDURE — G0109 DIAB MANAGE TRN IND/GROUP: HCPCS | Performed by: DIETITIAN, REGISTERED

## 2022-01-26 NOTE — PROGRESS NOTES
[address] [place] [address] for Diabetes Health  Diabetes Self-Management Education & Support Program  Encounter note    SUMMARY  Diabetes self-care management training was completed related to Physical activity and Taking medications. The participant will return on February 1 to continue DSMES related to Healthy coping and Problem solving. The participant did not identify SMART Goal(s):  and will practice knowledge and skills related to reducing risks, healthy eating and monitoring and being active and medications to improve diabetes self-management. 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 She acknowledge that she notices her strength and balance are not what they have been in the recent past. She demonstrated exercises presented today. She asked great questions about her medications and how to improve their actions. Again we reviewed SMART goals - setting and areas Pelon Villa is looking to set her goals. RECOMMENDATIONS:  1) complete a prime shot to ensure that new pen needles are working. 2) hold insulin pen in place for 10 count to ensure complete dosing of insulin. 3) look at including small amounts of activity within your limitations with your CHF - shorter times but multiple frequencies in day. 4) very low to no weight for resistance training and cannot hold breath. Next provider visit is scheduled for 2/10/22. DATE DSMES TOPIC EVALUATION     1/25/22 HOW DO MY DIABETES MEDICATIONS WORK?   a. Type 1 medications & methods    Insulin injections    Injection sites   b. Type 2 medications    Oral agents    GLP-1 agonists   c. Hypoglycemia symptoms & treatment    Glucagon emergency kits   d. General guidance regarding insulin use whether Type 1, 2 or gestational diabetes    Storage of insulin    Disposal     Traveling with medications   e.  Barriers to medication adherence      The participant    Can describe the expected action & side effects of prescribed diabetes medications Yes.  Can demonstrate injection technique (if applicable) Yes. The participant needs to address making sure that she is 1) completing a priming shot prior to each injection, 2) not using too much pressure when injecting 3) completing a count to 10 prior to removing needle from injection site. Discussed glucagon kits and to discuss with provider if needed. DATE DSMES TOPIC EVALUATION     1/25/22 HOW DOES PHYSICAL ACTIVITY AFFECT MY DIABETES?   a. Benefits of physical activity   b. Beginning a program of physical activity    Walking    Pedometers    Goal setting   c. Structured physical activity program    Aerobic activity    Resistance    Flexibility    Balance   d. Physical activity program progression   e. Safety issues   f. Barriers to physical activity   g. Facilitators of physical activity        The participant has established a regular physical activity plan No, but participates with chair activities that are ongoing in her work environment. The participant would benefit from establishing short duration and low intensity activity routine within guidelines she has use with her CHF. She shared that she was using sit/stand exercise when participating in cardiac rehab and feels that she needs to return to some of these exercises. She shared that she will complete activities with exercise classes in her workplace while she is seated at her workstation. Mackenzie Yanes feels that smaller increments of time will help her to be more active. Michelle Jim RD, [facility] on 1/26/2022 at 1:51 PM    Total minutes: [address] [employer] for Telehealth:  Naseem Rabago, was evaluated/seen in person for today's class.

## 2022-02-01 ENCOUNTER — CLINICAL SUPPORT (OUTPATIENT)
Dept: DIABETES SERVICES | Age: 72
End: 2022-02-01
Payer: MEDICARE

## 2022-02-01 DIAGNOSIS — E11.9 TYPE 2 DIABETES MELLITUS WITHOUT COMPLICATION, WITHOUT LONG-TERM CURRENT USE OF INSULIN (HCC): Primary | ICD-10-CM

## 2022-02-01 PROCEDURE — G0109 DIAB MANAGE TRN IND/GROUP: HCPCS | Performed by: DIETITIAN, REGISTERED

## 2022-02-01 NOTE — PROGRESS NOTES
[facility] for Diabetes Health  Diabetes Self-Management Education & Support Program  Encounter note    SUMMARY  Diabetes self-care management training was completed related to Healthy coping and Problem solving. The participant will return on March 17 to continue DSMES related to post program assessment and follow up. The participant did identify SMART Goal(s): see below, and will practice knowledge and skills related to reducing risks, healthy eating and monitoring, being active and medications and healthy coping and problem solving to improve diabetes self-management. EVALUATION:  Mark Villanueva participated in class learning activities and discussions - shared her experiences with group and her frustrations. Reports feeling more supported in her diabetes management. She will arrive at a plan for support at her follow up visit. Mark Villanueva identified areas to practice problem solving as having planned menus for breakfast and lunch to prevent skipping these meals. RECOMMENDATIONS:  1) review areas and topics for ongoing support for diabetes management and review with educator at follow up visit in March. 2) consider working on disaster plan. Next provider visit is scheduled for 2/10/22       SMART GOAL(S)  1. I will not miss breakfast - eat breakfast daily. ACHIEVEMENT OF GOAL(S)  [] 0-24%     [] 25-49%     [] 50-74%     [] %  2. I will measure my portions for starchy foods for my largest meal each day. ACHIEVEMENT OF GOAL(S)  [] 0-24%     [] 25-49%     [] 50-74%     [] %         DATE DSMES TOPIC EVALUATION     2/1/2022 HOW DO I FIND SUPPORT TO TACKLE THIS CONDITION?   a. Normal responses to diabetes diagnosis or complication    Shock    Anger & resentment    Guilt/self-blame    Sadness & worry    Depression     Anxiety    Pregnancy   b.  Constructive strategies to normal responses     Exploring feelings & attitudes    Motivation: Cost versus benefits analysis    Problem-solving: Chain analysis    Obtaining support: Communicating   i. Family & friends   ii. Health team   iii. Community   c. Stress    Symptoms    Managing stress    Fill your tool kit        The participant can identify people that support them in caring for their diabetes health:  Sister    The participant would like to pursue the following in keeping themselves healthy after completing the program:  Pending her review and will identify at her next appointment. The participant would benefit from continuing to reach out to support network - family, educators and providers. She is also considering joining CHF support group w/in [facility]. DATE DSMES TOPIC EVALUATION     2/1/2022 HOW DO I FIGURE OUT WHAT'S INFLUENCING MY BLOOD GLUCOSES?   a. Problem solving using SOAR    Set goals    Sort options    Arrive at a plan    Reaffirm plan   b. Common problems in diabetes self-care    Hypoglycemia    Hyperglycemia    Sick days   c. Pattern management   d. Disaster preparedness plan        The participant has an action plan for    Hypoglycemia Yes   Hyperglycemia Yes   Sick days Yes   During disasters Yes    The participant would benefit from developing a sick day plan with consideration for her CHF guidelines and address disaster planning. Yesi Alicea RD, [facility] on 2/1/2022 at 11:24 AM  Total minutes: [address] [employer] for Telehealth:  Yasemin Pump, was evaluated and educated in person during today's class.

## 2022-02-02 ENCOUNTER — TELEPHONE (OUTPATIENT)
Dept: CARDIOLOGY CLINIC | Age: 72
End: 2022-02-02

## 2022-02-02 ENCOUNTER — PATIENT MESSAGE (OUTPATIENT)
Dept: CARDIOLOGY CLINIC | Age: 72
End: 2022-02-02

## 2022-02-02 NOTE — TELEPHONE ENCOUNTER
Patient is calling to verify whether transmissions are being received from her device    Patient states her Internet went out on Monday           PHONE:[phone]

## 2022-02-02 NOTE — TELEPHONE ENCOUNTER
Her last remote was done 1/13/22. She would need to send a transmission to make sure she is connected. We do not get notified until she is disconnected for 15 days. Tried to call patient but not go answer. Sent patient [employer] message since she is very active asking her to send a transmission tonight if she can so we can verify she is connected.

## 2022-02-03 NOTE — TELEPHONE ENCOUNTER
Attempted to reach patient by telephone. A message was left for return call. Notified pt via VM that she was sent a [employer] message from device clinic. 7

## 2022-02-15 ENCOUNTER — TELEPHONE (OUTPATIENT)
Dept: CARDIOLOGY CLINIC | Age: 72
End: 2022-02-15

## 2022-02-15 NOTE — TELEPHONE ENCOUNTER
Patient said she is calling because she is having problems with her pacemaker device and she contacted [employer] and they are sending her a new device so she will be offline for a while. Device should arrive in 6-10 business days as of yesterday when she spoke with them.     [phone]

## 2022-02-17 ENCOUNTER — TELEPHONE (OUTPATIENT)
Dept: CARDIOLOGY CLINIC | Age: 72
End: 2022-02-17

## 2022-02-17 NOTE — TELEPHONE ENCOUNTER
Spoke to patient and told her transmission was received and her new monitor is working appropriately.

## 2022-02-17 NOTE — TELEPHONE ENCOUNTER
Patient stated she sent a manual transmission to the device clinic and want to make sure it was received, patient just received her replacement battery for her defibrillator, please advise        [phone]

## 2022-02-24 ENCOUNTER — TELEPHONE (OUTPATIENT)
Dept: CARDIOLOGY CLINIC | Age: 72
End: 2022-02-24

## 2022-02-24 NOTE — TELEPHONE ENCOUNTER
Left patient a voice message informing her that we did receive her insurance referral from [facility].

## 2022-02-24 NOTE — TELEPHONE ENCOUNTER
Spoke with patient regarding insurance referral.    Appointment has been rescheduled to Thursday March 3, 2022 w/E/VENKATESH Mckeon

## 2022-02-26 NOTE — PROGRESS NOTES
[facility] in [facility], [facility] Note    Patient name: Caity Mina  Patient : 1950  Patient MRN: [id number]  Date of service: 21    Primary care physician: Netta Ashton., NP  Primary F cardiologist: Latrell Valle MD      CHIEF COMPLAINT:  Chronic systolic heart failure  Chief Complaint   Patient presents with    CHF    Dizziness     \"minor\", when turning over in bed    Leg Swelling        PLAN:    Medical therapy for heart failure   Beta-blocker: Toprol 12.5mg daily- recommend changing to evening to help with am dizziness    ACE/ARB/ARNi: intolerant due to renal failure   Spironolactone: intolerant due to hyperkalemia   SGLT2 inhibitor: was on jardiance- held on last admission d/t renal function. May consider restarting if Cr improves.  Increase Verquvo to 5 mg PO    Diuretic: furosemide 40mg daily   Not on allopurinol or uloric, uric acid level 5.0 (21)   Continue ASA 81 mg and Plavix daily   Statin or PCSK9i: Continue current dose of atorvastatin and zetia   Continue IMDUR 30mg daily and hydralazine 25mg TID   Continue CPAP therapy   Orthostatics today negative   Long discussion about avoiding hospitalizations and potential for cardiomems device. Fly Price Will give educational material for patient to review.       Diagnostics:   ICD interrogation done to evaluate thoracic impedance   Repeat Echo in August- ordered   · Equivocal PYP  · [employer]ae DSP (VUS)  · Routine HF labs: CBC, CMP, Mg, NT-Pro BNP at next visit     Nutrition, Lifestyle, and Home Management:   Reinforced heart healthy, low salt diet   Reinforced fluid intake 6 x 8oz glasses per day   Document in diary BP/HR before and 2 hours after taking medications and daily weights   Encouraged patient to discuss her anxiety with her PCP at her upcoming appt    ACP:    Advanced care plan present on file    Follow-ups and Referrals:   Follow-up with primary cardiologist   Follow-up with EP cardiologist   Follow-up with PCP    Up to date on Flu/PNA/COVID vaccinations  Recommend Shingles vaccine with PCP      Return to [address] in 8 weeks with NP/MD      IMPRESSION:  Fatigue  Shortness of breath  Volume overload  Chronic systolic heart failure  Stage D, NYHA class IIIA symptoms  Ischemic cardiomyopathy, LVEF 35-40%  CAD s/p CABG 1997 s/p PCI to DVG-RCA 2016  H/o severe MR s/p mitral clip in 2020  HTN  H/o VT s/p AICD  WES on Bipap  T2DM with neuropathy  Hypothyroidism  Obesity  HLD  Osteoarthritis   CKD4       CARDIAC IMAGING:  Echo 5/31/21- EF 25-30%, moderate MR post-clip  Echo 5/14/21- LVEF 35-40%, Mild MR post clip, mild TR, LVIDd 5.7cm, TAPSE 1.68, RVIDd 4.05cm   Echo 12/14/20 LVEF 30-35%, mild MR, LVIDd 6.09cm, RVIDd 3.98cm  Echo 6/19/20 LVEF 25-30%, Mod TR, severe MR, LVIDd 5.76cm, TAPSE 1.94cm, RVIDd 4.06cm     EKG 5/30/21 ST with PVCs, QRS 88ms  EKG 11/14/20 V paced, QRS 84     LHC 2/3/20- Severe native 3 vessel CAD. Patent VG to RCA and LIMA to LAD. LCx is collateral dependent and native rPDA and D1 have small disease in small vessels.      NST     Medtronic interrogation 7/15/21  No VT/VF  Time in AF/AT 0.0hr/day  Patient Activity 1.2hr/day  Optivol well below threshold  Thoracic impedance trending up    HEMODYNAMICS:  RHC not done  CPEST not done  6MW not done    OTHER IMAGING:  CXR 5/30/2 Diffuse increased interstitial and airspace opacities with more  confluent airspace opacities in the bilateral lung bases likely representing  pulmonary edema.     CT chest not done     HISTORY OF PRESENT ILLNESS:  I had the pleasure of seeing Debbie Pitts in [address] [place] [address] at [facility] in [place].     Briefly, Debbie Pitts is a 66 y.o. female with h/o HTN, HLD, WES, Obesity (BMI 44), s/p gastric sleeve in 2011, T2DM, hypothyroidism, COPD, CAD s/p CABG in 1997, s/p PCI to SVG-RCA in 5/2015, ICM, VT, s/p AICD (Medtronic), anxiety and depression. She more recently underwent MitraClip on 11/12/2020 and was evaluated for upgrade to [address] with Dr. Sondra Craig, however this was not done as her QRS was too narrow. Her most recent admission was 5/30/21-6/3/21 for CHF exacerbation. She was diuresed and her [facility] was stopped due to renal failure. INTERVAL HISTORY:  Today, patient presents for HF follow up. She states she feels well, she has been watching her diet but is under stress with her sister being in the hospital. She continues to have chronic MARINA but knows to take her time, she does have some morning dizziness but her orthostatics in the clinic today are negative. She is compliant with her medications and is able to do her normal functions. REVIEW OF SYSTEMS:  Review of Systems   Constitutional: Negative for chills, diaphoresis, fever, malaise/fatigue and weight loss. HENT: Negative. Eyes: Negative. Respiratory: Negative for cough and shortness of breath. MARINA- unchanged    Cardiovascular: Negative for chest pain, palpitations, orthopnea, claudication, leg swelling and PND. Gastrointestinal: Negative for abdominal pain, constipation, diarrhea, nausea and vomiting. Genitourinary: Negative. Musculoskeletal: Positive for joint pain. Neurological: Negative for dizziness, weakness and headaches. Psychiatric/Behavioral: Negative. PHYSICAL EXAM:  Visit Vitals  /68 (BP 1 Location: Left arm, BP Patient Position: Sitting, BP Cuff Size: Large adult)   Pulse 76   Temp 97.8 °F (36.6 °C) (Oral)   Resp 16   Ht 5' 3\" (1.6 m)   Wt 227 lb 12.8 oz (103.3 kg)   SpO2 98%   BMI 40.35 kg/m²     Physical Exam  Vitals reviewed. Constitutional:       General: She is not in acute distress. Appearance: Normal appearance. She is obese. She is not ill-appearing. HENT:      Head: Normocephalic and atraumatic.    Eyes:      Conjunctiva/sclera: Conjunctivae normal. Pupils: Pupils are equal, round, and reactive to light. Neck:      Vascular: No hepatojugular reflux or JVD. Cardiovascular:      Rate and Rhythm: Normal rate and regular rhythm. Pulses: Normal pulses. Heart sounds: Murmur heard. Systolic murmur is present with a grade of 3/6. No friction rub. No gallop. Pulmonary:      Effort: Pulmonary effort is normal. No respiratory distress. Breath sounds: Normal breath sounds. Abdominal:      General: Bowel sounds are normal. There is no distension. Palpations: Abdomen is soft. Tenderness: There is no abdominal tenderness. Musculoskeletal:         General: Normal range of motion. Cervical back: Neck supple. Right lower leg: No edema. Left lower leg: No edema. Skin:     General: Skin is warm and dry. Capillary Refill: Capillary refill takes less than 2 seconds. Neurological:      General: No focal deficit present. Mental Status: She is alert and oriented to person, place, and time. Psychiatric:         Mood and Affect: Mood normal.         Behavior: Behavior normal.         Thought Content:  Thought content normal.         Judgment: Judgment normal.          PAST MEDICAL HISTORY:  Past Medical History:   Diagnosis Date    Adverse effect of anesthesia     hard to wake up/uses BIPAP/\"try to avoid general if possible\"/intubated in past prior to going to sleep and it caused pt to be incontinent    Arthritis     Asthma     CAD (coronary artery disease)     Chronic kidney disease     elevataed creatinine    Chronic obstructive pulmonary disease (HCC)     Chronic pain     arthritis    Coagulation disorder (HCC)     on plavix    Congestive heart failure (HCC)     Diabetes ([address]     Hypertension     Morbid obesity ([address]     Sleep apnea 1996    BIPAP with 2 liters oxygen    Thromboembolus ([address]     after heart surgery - left leg    Thyroid disease        PAST SURGICAL HISTORY:  Past Surgical History: Procedure Laterality Date    COLONOSCOPY N/A 2020    COLONOSCOPY   :- performed by Alex Camilo MD at [address] HX ARTHROPLASTY  2105    knee - right    HX  SECTION      x3    HX CORONARY ARTERY BYPASS GRAFT  1997    3 vessels    HX CORONARY STENT PLACEMENT  2016    HX HERNIA REPAIR  1988    HX IMPLANTABLE CARDIOVERTER DEFIBRILLATOR      HX KNEE REPLACEMENT Right 2015    once    [place] CARDIAC SURG PROCEDURE UNLIST  2018    cardiac cath - Left    [place] LAP GASTRIC BYPASS/KERLINE-EN-Y  2011    lap band per pt and not a gastric bypass       FAMILY HISTORY:  Family History   Problem Relation Age of Onset    Hypertension Mother     Dementia Mother     Coronary Artery Disease Father 58    Sudden Death Father 58    Diabetes Son     Diabetes Brother        SOCIAL HISTORY:  Social History     Socioeconomic History    Marital status:      Spouse name: Not on file    Number of children: Not on file    Years of education: Not on file    Highest education level: Not on file   Tobacco Use    Smoking status: Former Smoker     Packs/day: 0.50     Years: 45.00     Pack years: 22.50     Types: Cigarettes     Quit date: 2010     Years since quittin.6    Smokeless tobacco: Never Used    Tobacco comment: quit /started again (smoked 3 yrs) off and on/none since    Vaping Use    Vaping Use: Never used   Substance and Sexual Activity    Alcohol use: Not Currently    Drug use: Not Currently    Sexual activity: Not Currently   Other Topics Concern     Social Determinants of Health     Financial Resource Strain:     Difficulty of Paying Living Expenses:    Food Insecurity:     Worried About Running Out of Food in the Last Year:     [address] in the Last Year:    Transportation Needs:     Lack of Transportation (Medical):      Lack of Transportation (Non-Medical):    Physical Activity:     Days of Exercise per Week:     Minutes of Exercise per Session:    Stress:     Feeling of Stress :    Social Connections:     Frequency of Communication with Friends and Family:     Frequency of Social Gatherings with Friends and Family:     Attends [employer] Services:     Active Member of Clubs or Organizations:     Attends Club or Organization Meetings:     Marital Status:        LABORATORY RESULTS:  Labs Latest Ref Rng & Units 7/12/2021   Albumin 3.7 - 4.7 g/dL 4.2   Calcium 8.7 - 10.3 mg/dL 10. 9(H)   Glucose 65 - 99 mg/dL 165(H)   BUN 8 - 27 mg/dL 35(H)   Creatinine 0.57 - 1.00 mg/dL 1.85(H)   Sodium 134 - 144 mmol/L 137   Potassium 3.5 - 5.2 mmol/L 4.3   Some recent data might be hidden       ALLERGY:  Allergies   Allergen Reactions    Crestor [Rosuvastatin] Other (comments)     Causes muscle cramps    Lisinopril Cough    Nsaids (Non-Steroidal Anti-Inflammatory Drug) Other (comments)     Liver and Kidney        CURRENT MEDICATIONS:    Current Outpatient Medications:     hydrALAZINE (APRESOLINE) 25 mg tablet, TAKE 1 TABLET BY MOUTH THREE TIMES DAILY, Disp: 90 Tablet, Rfl: 0    ezetimibe (ZETIA) 10 mg tablet, Take 1 tablet by mouth once daily, Disp: 30 Tablet, Rfl: 0    clopidogreL (PLAVIX) 75 mg tab, Take 1 tablet by mouth once daily, Disp: 90 Tablet, Rfl: 0    gabapentin (NEURONTIN) 100 mg capsule, TAKE 2 CAPSULES BY MOUTH TWICE DAILY. *MAX DAILY AMOUNT 400 MG*, Disp: 120 Capsule, Rfl: 0    isosorbide mononitrate ER (IMDUR) 30 mg tablet, TAKE 1 TABLET BY MOUTH ONCE DAILY IN THE MORNING, Disp: 30 Tablet, Rfl: 0    bumetanide (BUMEX) 1 mg tablet, Take 2 mg in the morning and 1 mg in the afternoon, Disp: 90 Tablet, Rfl: 1    vericiguat (Verquvo) 2.5 mg tab, Take 2.5 mg by mouth daily for 14 days, THEN 5 mg daily for 14 days, THEN 10 mg daily for 30 days.  Indications: heart failure with reduced ejection fraction, Disp: 30 Each, Rfl: 11    Euthyrox 100 mcg tablet, TAKE 1 TABLET BY MOUTH ONCE DAILY BEFORE BREAKFAST, Disp: 90 Tablet, Rfl: 0    FreeStyle Lancets 28 gauge misc, USE AS DIRECTED, Disp: , Rfl:     metoprolol succinate (TOPROL-XL) 25 mg XL tablet, Take 0.5 Tablets by mouth daily. Hold for systolic BP less than 289, Disp: 60 Tablet, Rfl: 2    glipiZIDE (GLUCOTROL) 10 mg tablet, Take 1 tablet by mouth twice daily with food, Disp: 180 Tablet, Rfl: 0    nitroglycerin (NITROSTAT) 0.3 mg SL tablet, 1 Tablet by SubLINGual route every five (5) minutes as needed for Chest Pain., Disp: 1 Bottle, Rfl: 0    Blood-Glucose Meter monitoring kit, Check blood sugar daily. May substitute for insurance preferred meter, Disp: 1 Kit, Rfl: 0    glucose blood VI test strips (blood glucose test) strip, Check blood sugar 2 times daily: E11.9 may substitute for insurance preferred strips. , Disp: 200 Strip, Rfl: 3    lancets misc, Use as directed. Dx:check sugar once daily. E11.65, Disp: 1 Each, Rfl: 11    allopurinoL (ZYLOPRIM) 100 mg tablet, Take 1 tablet by mouth once daily, Disp: 90 Tab, Rfl: 1    atorvastatin (LIPITOR) 80 mg tablet, TAKE 1 TABLET BY MOUTH AT BEDTIME, Disp: 90 Tab, Rfl: 3    lancets misc, Check blood sugar 2 times daily. May substitute for insurance preferred lancets. , Disp: 200 Each, Rfl: 3    glucose blood VI test strips (ASCENSIA AUTODISC VI, ONE TOUCH ULTRA TEST VI) strip, E11.65 check sugar once daily, Disp: 100 Strip, Rfl: 0    clotrimazole-betamethasone (LOTRISONE) topical cream, Apply  to affected area two (2) times a day. (Patient taking differently: Apply  to affected area two (2) times daily as needed.), Disp: 30 g, Rfl: 0    Blood-Glucose Meter monitoring kit, Use as directed. Dx: E11.65 check sugar twice daily, Disp: 1 Kit, Rfl: 0    albuterol (PROVENTIL HFA, VENTOLIN HFA, PROAIR HFA) 90 mcg/actuation inhaler, Take 2 Puffs by inhalation every four (4) hours as needed. , Disp: , Rfl:     acetaminophen (TYLENOL ARTHRITIS PAIN) 650 mg TbER, Take 1,300 mg by mouth two (2) times a day., Disp: , Rfl:     aspirin 81 mg chewable tablet, Take 81 mg by mouth daily. , Disp: , Rfl:     Thank you for your referral and allowing me to participate in this patient's care.     Sophia Paula NP  [address] [place] [address], [address]  Phone: [phone]      PATIENT CARE TEAM:  Patient Care Team:  Ina Villegas., NP as PCP - General (Nurse Practitioner)  Ina Villegas., NP as PCP - [facility] Empaneled Provider  Jazmyne Lovell MD (Cardiology)  Leighton Kellogg MD (Gastroenterology)  Javier Mayo MD as Consulting Provider (Cardiology) No

## 2022-03-02 ENCOUNTER — TELEPHONE (OUTPATIENT)
Dept: CARDIOLOGY CLINIC | Age: 72
End: 2022-03-02

## 2022-03-02 NOTE — TELEPHONE ENCOUNTER
Telephone Call RE:  Appointment reminder     Outcome:     [x] Patient confirmed appointment   [] Patient rescheduled appointment for    [] Unable to reach  [] Left message             [] Other:     ---------------------             [x] Screened for [facility]

## 2022-03-03 ENCOUNTER — OFFICE VISIT (OUTPATIENT)
Dept: CARDIOLOGY CLINIC | Age: 72
End: 2022-03-03
Payer: MEDICARE

## 2022-03-03 VITALS
WEIGHT: 239 LBS | RESPIRATION RATE: 18 BRPM | BODY MASS INDEX: 43.98 KG/M2 | HEIGHT: 62 IN | HEART RATE: 74 BPM | DIASTOLIC BLOOD PRESSURE: 60 MMHG | SYSTOLIC BLOOD PRESSURE: 116 MMHG | TEMPERATURE: 97.3 F | OXYGEN SATURATION: 96 %

## 2022-03-03 DIAGNOSIS — I50.42 CHRONIC HEART FAILURE WITH REDUCED EJECTION FRACTION AND DIASTOLIC DYSFUNCTION (HCC): Primary | ICD-10-CM

## 2022-03-03 PROCEDURE — G8432 DEP SCR NOT DOC, RNG: HCPCS | Performed by: NURSE PRACTITIONER

## 2022-03-03 PROCEDURE — 99214 OFFICE O/P EST MOD 30 MIN: CPT | Performed by: NURSE PRACTITIONER

## 2022-03-03 PROCEDURE — 99000 SPECIMEN HANDLING OFFICE-LAB: CPT | Performed by: NURSE PRACTITIONER

## 2022-03-03 PROCEDURE — G8399 PT W/DXA RESULTS DOCUMENT: HCPCS | Performed by: NURSE PRACTITIONER

## 2022-03-03 PROCEDURE — G8427 DOCREV CUR MEDS BY ELIG CLIN: HCPCS | Performed by: NURSE PRACTITIONER

## 2022-03-03 PROCEDURE — G8417 CALC BMI ABV UP PARAM F/U: HCPCS | Performed by: NURSE PRACTITIONER

## 2022-03-03 PROCEDURE — G8536 NO DOC ELDER MAL SCRN: HCPCS | Performed by: NURSE PRACTITIONER

## 2022-03-03 PROCEDURE — 93000 ELECTROCARDIOGRAM COMPLETE: CPT | Performed by: NURSE PRACTITIONER

## 2022-03-03 PROCEDURE — 1090F PRES/ABSN URINE INCON ASSESS: CPT | Performed by: NURSE PRACTITIONER

## 2022-03-03 PROCEDURE — 1101F PT FALLS ASSESS-DOCD LE1/YR: CPT | Performed by: NURSE PRACTITIONER

## 2022-03-03 PROCEDURE — 3017F COLORECTAL CA SCREEN DOC REV: CPT | Performed by: NURSE PRACTITIONER

## 2022-03-03 RX ORDER — HUMAN INSULIN 100 [IU]/ML
INJECTION, SUSPENSION SUBCUTANEOUS
COMMUNITY
Start: 2022-02-03

## 2022-03-03 NOTE — PATIENT INSTRUCTIONS
Medication changes:    Increase bumex 2mg twice a day     Please take this to your pharmacy to notify them of the change in medications. Testing Ordered:    Labs drawn in clinic. You will be notified of any abnormal results that require a change in medication regimen. Other Recommendations:       Please weight yourself daily and send a my chart message Monday with weight log. We will try to appeal your cardiomems and will be in contact with you. An order for an echo and ekg have been placed. These orders will be faxed to [facility] please follow up with them about scheduling. Ensure your drinking an adequate amount of water with a goal of 6-8 eight ounce glasses (1.5-2 liters) of fluid daily. Your urine should be clear and light yellow straw colored. If your blood pressure begins to consistently run below 90/60 and/or you begin to experience dizziness or lightheadedness, please contact the [facility] [address] at [phone]. Follow up 3 months with [facility]      Please monitor your weights daily upon waking and after using the bathroom. Keep a written records of your weights and bring to your next appointment. If you have a weight gain of 3 or more pounds overnight OR 5 or more pounds in one week please contact our office. Thank you for allowing us the privilege of being a part of your healthcare team! Please do not hesitate to contact our office at [phone] with any questions or concerns. Virtual Heart Failure [address] invites you to learn more about heart failure and to share your questions, ideas, and experiences with others. Each month, the Heart Failure Support Group features a new educational topic and a guest speaker, followed by an interactive discussion. Our Heart Failure Nurse Navigator will moderate each session. You will be able to participate by phone, tablet or computer through [address] [place] [address].  This support group takes place on the 3rd Thursday of each month from 6:00-7:30PM. All individuals living with heart failure and their caregivers are welcome to join. If you are interested in participating, please contact us at [email or web address] and you will be sent the link to join the [employer].    \

## 2022-03-03 NOTE — PROGRESS NOTES
[facility], [facility] Note    Patient name: Francois Granados  Patient : 1950  Patient MRN: [id number]  Date of service: 22      CHIEF COMPLAINT:    Chief Complaint   Patient presents with    Shortness of Breath    Leg Swelling    CHF    Dizziness        RECOMMENDATIONS:  Continue verquvo 10mg daily  Beta-blocker: toprol 12.5mg daily in the evening  ACE/ARB/ARNi: intolerant d/t renal failure  Spironolactone: intolerant d/t hyperkalemia  Continue IMDUR 30mg daily and hydralazine 10mg TID  SGLT2 inhibitor: was on Jardiance 25mg daily- held d/t renal dysfunction, can consider restarting if renal function improves enough  Diuretic: increase Bumex to 2mg BID  Not on allopurinol or uloric, uric acid level 5  Continue ASA and plavix   Statin or PCSK9i: Continue current dose of atorvastatin and zetia  Continue CPAP therapy  ICD interrogation every 3 months   Echocardiogram and EKG quarterly- will do at [facility]   Routine HF labs in office today  Reinforced heart healthy, low salt diet  Reinforced appropriate fluid intake of 6 x 8oz glasses of water per day  Monitor and record daily weights and BPs  Advanced care plan present on file  Follow-up with primary cardiologist  Follow-up with EP cardiologist- Dr. Supa Christy with PCP- [facility]     Return to [facility] Clinic in 3 mos with NP/MD      IMPRESSION:  Fatigue  Shortness of breath  Chronic systolic heart failure  · Stage D, NYHA class IIIA symptoms  · Ischemic cardiomyopathy, LVEF 35-40%  · Invitae with VUS  CAD s/p CABG  s/p PCI to DVG-RCA   H/o severe MR s/p mitral clip in   HTN  H/o VT s/p AICD  WES on Bipap  T2DM with neuropathy  Hypothyroidism  Obesity  HLD  Osteoarthritis   CKD4          CARDIAC IMAGING AND DIAGNOSTICS REVIEWED:  Echo (21)  · LV: Estimated LVEF is 30 - 35%. Mildly dilated left ventricle. Mild concentric hypertrophy.  Moderately reduced systolic function. Moderate (grade 2) left ventricular diastolic dysfunction. · Previously placed mitraclip is noted in secure position with residual trace to mild MR lateral to clip placement. Mean transmitral gradient is 2.6mmHg at heart rate of about 70 bpm.  · LA: Moderately dilated left atrium. · MV: Mild mitral valve regurgitation is present. · LA: Severely dilated left atrium. · RA: Mildly dilated right atrium. Echo (5/31/21)  · LV: Estimated LVEF is 25 - 30%. Severely dilated left ventricle. Mild concentric hypertrophy. Severely and globally reduced systolic function. Inconclusive left ventricular diastolic function. · LA: Moderately dilated left atrium. · RA: Mildly dilated right atrium. · MV: Moderate mitral valve regurgitation is present. · Image quality for this study was suboptimal.       6 Min Walk Report 6/3/2021 4/23/2021 12/28/2020 12/22/2020 8/31/2020 7/1/2020 1/30/2020   (PRE) HR 70 70 71 73 81 71 74   (PRE) O2 Sat 98 - - - 96 - 97   (POST) HR 92 - - - 110 - 124   (POST) O2 Sat 99 - 97 - 97 97 94   Distance in Meters 91.44 - - - 226.77 - 194.95         HISTORY OF PRESENT ILLNESS:  I had the pleasure of seeing Elijah Proctor in [address] [place] [address] at [address] in [place]. Briefly, Elijah Proctor is a 66 y.o. female with h/o HTN, HLD, WES, Obesity (BMI 39), s/p gastric sleeve in 2011, T2DM, hypothyroidism, COPD, CAD s/p CABG in 1997, s/p PCI to [address] in 5/2015, ICM, VT, s/p AICD ([employer]), anxiety and depression. She more recently underwent MitraClip on 11/12/2020 and was evaluated for upgrade to [address] with Dr. Indigo Trejo, however this was not done as her QRS was too narrow. Her most recent admission was 5/30/21-6/3/21 for CHF exacerbation. She was diuresed and her Humphrey Spittle was stopped due to renal failure. INTERVAL HISTORY:  Today, patient presents for HF clinic visit.  She has gained about 9 pounds since her last appointment and she attributes this to eating more since she started taking insulin. She has changed to [facility] and she will get her TTE and EKG through them. She is following with Dr. Segundo Barclay for her renal function and is scheduled to see her in a few weeks. She is compliant with her medications, she does state she has some more leg edema and SOB with her current weight. She has not yet received her new CPAP device but is still using her current one. REVIEW OF SYSTEMS:  Review of Systems   Constitutional: Negative for chills, fever and malaise/fatigue. Respiratory: Positive for shortness of breath. Negative for cough. Slightly worse    Cardiovascular: Negative for chest pain, palpitations, orthopnea and leg swelling. Gastrointestinal: Negative for abdominal pain, nausea and vomiting. Abdominal bloating    Musculoskeletal: Negative for falls and myalgias. Neurological: Negative for dizziness and weakness. Endo/Heme/Allergies: Does not bruise/bleed easily. Psychiatric/Behavioral: Negative for depression. PHYSICAL EXAM:  Visit Vitals  /60   Pulse 74   Temp 97.3 °F (36.3 °C) (Oral)   Resp 18   Ht 5' 2\" (1.575 m)   Wt 239 lb (108.4 kg)   SpO2 96%   BMI 43.71 kg/m²     Physical Exam  Vitals reviewed. Constitutional:       General: She is not in acute distress. Appearance: Normal appearance. She is obese. She is not ill-appearing. HENT:      Head: Normocephalic and atraumatic. Eyes:      Conjunctiva/sclera: Conjunctivae normal.      Pupils: Pupils are equal, round, and reactive to light. Neck:      Vascular: No hepatojugular reflux or JVD. Cardiovascular:      Rate and Rhythm: Normal rate and regular rhythm. Pulses: Normal pulses. Heart sounds: Murmur heard. Systolic murmur is present with a grade of 3/6. No friction rub. No gallop. Pulmonary:      Effort: Pulmonary effort is normal. No respiratory distress. Breath sounds: Normal breath sounds.    Abdominal: General: Bowel sounds are normal. There is no distension. Palpations: Abdomen is soft. Tenderness: There is no abdominal tenderness. Musculoskeletal:         General: Swelling present. Normal range of motion. Cervical back: Neck supple. Skin:     General: Skin is warm and dry. Capillary Refill: Capillary refill takes less than 2 seconds. Neurological:      General: No focal deficit present. Mental Status: She is alert and oriented to person, place, and time. Psychiatric:         Mood and Affect: Mood normal.         Behavior: Behavior normal.         Thought Content:  Thought content normal.         Judgment: Judgment normal.          PAST MEDICAL HISTORY:  Past Medical History:   Diagnosis Date    Adverse effect of anesthesia     hard to wake up/uses BIPAP/\"try to avoid general if possible\"/intubated in past prior to going to sleep and it caused pt to be incontinent    Arthritis     Asthma     CAD (coronary artery disease)     Chronic kidney disease     elevataed creatinine    Chronic obstructive pulmonary disease (HCC)     Chronic pain     arthritis    Coagulation disorder (HCC)     on plavix    Congestive heart failure (HCC)     Diabetes ([address]     Hypertension     Morbid obesity ([address]     Sleep apnea     BIPAP with 2 liters oxygen    Thromboembolus ([address]     after heart surgery - left leg    Thyroid disease        PAST SURGICAL HISTORY:  Past Surgical History:   Procedure Laterality Date    COLONOSCOPY N/A 2020    COLONOSCOPY   :- performed by Julio Anthony MD at [address] HX ARTHROPLASTY  2105    knee - right    HX  SECTION      x3    HX CORONARY ARTERY BYPASS GRAFT  1997    3 vessels    HX CORONARY STENT PLACEMENT  2016    HX HERNIA REPAIR  1988    HX IMPLANTABLE CARDIOVERTER DEFIBRILLATOR      HX KNEE REPLACEMENT Right     once    PA CARDIAC SURG PROCEDURE UNLIST  2018    cardiac cath - Left  CA LAP GASTRIC BYPASS/KERLINE-EN-Y      lap band per pt and not a gastric bypass       FAMILY HISTORY:  Family History   Problem Relation Age of Onset    Hypertension Mother     Dementia Mother     Coronary Art Dis Father 60    Sudden Death Father 58    Diabetes Son     Diabetes Brother        SOCIAL HISTORY:  Social History     Socioeconomic History    Marital status:    Tobacco Use    Smoking status: Former Smoker     Packs/day: 0.50     Years: 45.00     Pack years: 22.50     Types: Cigarettes     Quit date: 2010     Years since quittin.1    Smokeless tobacco: Never Used    Tobacco comment: quit /started again (smoked 3 yrs) off and on/none since    Vaping Use    Vaping Use: Never used   Substance and Sexual Activity    Alcohol use: Yes     Comment: occassionally     Drug use: Not Currently    Sexual activity: Not Currently       LABORATORY RESULTS:  No flowsheet data found. ALLERGY:  Allergies   Allergen Reactions    Crestor [Rosuvastatin] Other (comments)     Causes muscle cramps    Lisinopril Cough    Nsaids (Non-Steroidal Anti-Inflammatory Drug) Other (comments)     Liver and Kidney        CURRENT MEDICATIONS:    Current Outpatient Medications:     NovoLIN 70-30 FlexPen U-100 100 unit/mL (70-30) inpn, 16 units before breakfast and 10 units at dinner, Disp: , Rfl:     isosorbide mononitrate ER (IMDUR) 30 mg tablet, Take 1 Tablet by mouth daily. in the morning, Disp: 30 Tablet, Rfl: 1    gabapentin (NEURONTIN) 100 mg capsule, TAKE 2 CAPSULES BY MOUTH TWICE DAILY MAX  DAILY  AMOUNT  400MG, Disp: 120 Capsule, Rfl: 0    ezetimibe (ZETIA) 10 mg tablet, Take 1 Tablet by mouth daily. , Disp: 30 Tablet, Rfl: 1    clopidogreL (PLAVIX) 75 mg tab, Take 1 tablet by mouth once daily, Disp: 90 Tablet, Rfl: 0    vericiguat (Verquvo) 10 mg tab, Take 10 mg by mouth daily. , Disp: 30 Each, Rfl: 11    Insulin Needles, Disposable, 32 gauge x \" ndle, 1 Each by Does Not Apply route daily. , Disp: 100 Pen Needle, Rfl: 2    alcohol swabs padm, 1 Each by Apply Externally route daily. , Disp: 100 Pad, Rfl: 11    sertraline (ZOLOFT) 50 mg tablet, 50 mg daily. , Disp: , Rfl:     allopurinoL (ZYLOPRIM) 100 mg tablet, Take 1 tablet by mouth once daily, Disp: 90 Tablet, Rfl: 0    bumetanide (BUMEX) 1 mg tablet, Take 2 mg in the morning and 1 mg in the afternoon, Disp: 90 Tablet, Rfl: 1    ketoconazole (NIZORAL) 2 % shampoo, , Disp: , Rfl:     hydrALAZINE (APRESOLINE) 10 mg tablet, Take 1 Tablet by mouth three (3) times daily. , Disp: 90 Tablet, Rfl: 1    levothyroxine (Euthyrox) 100 mcg tablet, Take 1 Tablet by mouth Daily (before breakfast). , Disp: 90 Tablet, Rfl: 0    FreeStyle Lancets 28 gauge misc, USE AS DIRECTED, Disp: , Rfl:     metoprolol succinate (TOPROL-XL) 25 mg XL tablet, Take 0.5 Tablets by mouth daily. Hold for systolic BP less than 702, Disp: 60 Tablet, Rfl: 2    nitroglycerin (NITROSTAT) 0.3 mg SL tablet, 1 Tablet by SubLINGual route every five (5) minutes as needed for Chest Pain., Disp: 1 Bottle, Rfl: 0    Blood-Glucose Meter monitoring kit, Check blood sugar daily. May substitute for insurance preferred meter, Disp: 1 Kit, Rfl: 0    glucose blood VI test strips (blood glucose test) strip, Check blood sugar 2 times daily: E11.9 may substitute for insurance preferred strips. , Disp: 200 Strip, Rfl: 3    lancets misc, Use as directed. Dx:check sugar once daily. E11.65, Disp: 1 Each, Rfl: 11    atorvastatin (LIPITOR) 80 mg tablet, TAKE 1 TABLET BY MOUTH AT BEDTIME, Disp: 90 Tab, Rfl: 3    lancets misc, Check blood sugar 2 times daily. May substitute for insurance preferred lancets. , Disp: 200 Each, Rfl: 3    glucose blood VI test strips (ASCENSIA AUTODISC VI, ONE TOUCH ULTRA TEST VI) strip, E11.65 check sugar once daily, Disp: 100 Strip, Rfl: 0    clotrimazole-betamethasone (LOTRISONE) topical cream, Apply  to affected area two (2) times a day.  (Patient taking differently: Apply  to affected area two (2) times daily as needed.), Disp: 30 g, Rfl: 0    Blood-Glucose Meter monitoring kit, Use as directed. Dx: E11.65 check sugar twice daily, Disp: 1 Kit, Rfl: 0    albuterol (PROVENTIL HFA, VENTOLIN HFA, PROAIR HFA) 90 mcg/actuation inhaler, Take 2 Puffs by inhalation every four (4) hours as needed. , Disp: , Rfl:     acetaminophen (TYLENOL ARTHRITIS PAIN) 650 mg TbER, Take 1,300 mg by mouth two (2) times a day., Disp: , Rfl:     aspirin 81 mg chewable tablet, Take 81 mg by mouth daily. , Disp: , Rfl:     sertraline (ZOLOFT) 25 mg tablet, Take 50 mg by mouth daily.  (Patient not taking: Reported on 11/23/2021), Disp: , Rfl:       Britta Lugo NP  [address] [place] [address], [address]  Phone: [phone]      PATIENT CARE TEAM:  Patient Care Team:  Sunil Casiano NP as PCP - General (Nurse Practitioner)  Sunil Casiano NP as PCP - [facility] Empaneled Provider  Anges Jessica MD (Cardiology)  Justyna Latham MD (Gastroenterology)  Tristan Manning MD as Consulting Provider (Cardiology)

## 2022-03-03 NOTE — LETTER
3/3/2022    Patient: Moni Alegria   YOB: 1950   Date of Visit: 3/3/2022     Keisha eGorge NP  [address] [facility] [place]    Dear Lore Ying. Deisi George NP,      Thank you for referring Ms. Jean Ridley to [address] [place] [address] for evaluation. My notes for this consultation are attached. If you have questions, please do not hesitate to call me. I look forward to following your patient along with you.       Sincerely,    Antonino Hernandez NP

## 2022-03-04 LAB
ALBUMIN SERPL-MCNC: 3.6 G/DL (ref 3.5–5)
ALBUMIN/GLOB SERPL: 1.1 {RATIO} (ref 1.1–2.2)
ALP SERPL-CCNC: 115 U/L (ref 45–117)
ALT SERPL-CCNC: 32 U/L (ref 12–78)
ANION GAP SERPL CALC-SCNC: 7 MMOL/L (ref 5–15)
AST SERPL-CCNC: 30 U/L (ref 15–37)
BILIRUB SERPL-MCNC: 0.4 MG/DL (ref 0.2–1)
BNP SERPL-MCNC: 537 PG/ML
BUN SERPL-MCNC: 34 MG/DL (ref 6–20)
BUN/CREAT SERPL: 16 (ref 12–20)
CALCIUM SERPL-MCNC: 10.1 MG/DL (ref 8.5–10.1)
CHLORIDE SERPL-SCNC: 101 MMOL/L (ref 97–108)
CO2 SERPL-SCNC: 26 MMOL/L (ref 21–32)
COMMENT, HOLDF: NORMAL
CREAT SERPL-MCNC: 2.08 MG/DL (ref 0.55–1.02)
GLOBULIN SER CALC-MCNC: 3.2 G/DL (ref 2–4)
GLUCOSE SERPL-MCNC: 307 MG/DL (ref 65–100)
MAGNESIUM SERPL-MCNC: 2.3 MG/DL (ref 1.6–2.4)
POTASSIUM SERPL-SCNC: 4 MMOL/L (ref 3.5–5.1)
PROT SERPL-MCNC: 6.8 G/DL (ref 6.4–8.2)
SAMPLES BEING HELD,HOLD: NORMAL
SODIUM SERPL-SCNC: 134 MMOL/L (ref 136–145)

## 2022-03-17 ENCOUNTER — CLINICAL SUPPORT (OUTPATIENT)
Dept: DIABETES SERVICES | Age: 72
End: 2022-03-17
Payer: MEDICARE

## 2022-03-17 DIAGNOSIS — E11.9 TYPE 2 DIABETES MELLITUS WITHOUT COMPLICATION, WITHOUT LONG-TERM CURRENT USE OF INSULIN (HCC): Primary | ICD-10-CM

## 2022-03-17 PROCEDURE — G0108 DIAB MANAGE TRN  PER INDIV: HCPCS | Performed by: DIETITIAN, REGISTERED

## 2022-03-17 NOTE — PROGRESS NOTES
[facility]  Diabetes Self-Management Education & Support Program  Post-program Evaluation    EVALUATION @ [address] completed 8 hours of diabetes self-management education. The participant acquired new knowledge and demonstrated new skills during the program.     The participant worked on the following SMART GOAL(s) to improve their diabetes self-management:    1. I will not miss breakfast - eat breakfast daily. ACHIEVEMENT OF GOAL(S)  [] 0-24%     [] 25-49%     [] 50-74%     [x] %  2. I will measure my portions for starchy foods for my largest meal each day. ACHIEVEMENT OF GOAL(S)  [] 0-24%     [] 25-49%     [x] 50-74%     [] %  The participant's pre-program A1c was   Lab Results   Component Value Date/Time    Hemoglobin A1c 7.3 (H) 05/31/2021 04:23 AM    Hemoglobin A1c (POC) 8.5 11/09/2021 03:19 PM   . The post-evaluation A1c is pending. The participant improved their Diabetes Skills, Confidence and Preparedness Index (scored out of 7): Total score: 6.6  Skills: 6.6  Confidence: 6.6  Preparedness: 6.7    FINAL RECOMMENDATIONS:  Krishna Becker shared that she has been noticing improvement in her BG values with the start of her new medications. She shared that she has had some hypoglycemia recently since starting Ozempic. Krishna Becker shared that her eating/meal planning is not on a routine schedule and we discussed how the new insulin may or may match with her meals - strongly suggested she discussed decreasing insulin doses and options to decrease risk for hypoglcyemia and her renal status. She agrees to contact educator with her updated A1c and as she needs more support.       Next provider visit is scheduled for 3/25/22 and sees Nephrologist 3/21/22       Shirley Warren RD on 3/17/2022 at 1:21 PM    Metric Patient's responses (3/17/2022) Areas for improvement     Healthy Eating       The participant is using the Healthy Plate to control carbohydrate intake Yes    The participant reads food labels accurately Yes          Being Active       The participant performs physical activity where your heart beats faster and your breathing is harder than normal for 30 minutes or more?  0 days    In a typical week, the participant performs physical activity 5 days     Walking the hallway at work 4 times a day. Monitoring   The participant is monitoring their blood sugars? Yes    The participant is monitoring 2-4 times a day    Blood glucoses are ranging:   Breakfast:  mg/dL  Lunch:  mg/dL  Dinner: 152-207 mg/dL    The participant improved their A1c pending new A1c in the next week. The participant understands the meaning of the A1c Yes          Taking Medications   The participant understands what their diabetes medications do Yes    The participant can afford your diabetes medications Yes    The participant does not miss doses of their diabetes medications Never     Since our last visit, pt has been started on NPH/Reg 16 units am and 12 units pm and has started Ozempic0.25 mg. Healthy Coping     The participant feels supported by family, friends and others related to their diabetes self-care practices Yes    The participant plans on utilizing the following community resources after completion of the program: Joining CHF support group        Reducing Risks   Vaccines:  Influenza:   Immunization History   Administered Date(s) Administered    Influenza High Dose Vaccine PF 09/28/2021    Influenza Vaccine 10/01/2019    Influenza, Quadrivalent, Adjuvanted (>65 Yrs FLUAD QUAD [id number]) 09/18/2020       Pneumococcal:   Immunization History   Administered Date(s) Administered    Pneumococcal Polysaccharide (PPSV-23) 01/04/2021        Hepatitis: There is no immunization history for the selected administration types on file for this patient.     Examinations:  Diabetic Foot and Eye Exam HM Status   Topic Date Due    Eye Exam Never done   [facility]  08/02/2022        Dental exam: DUE  Eye exam: 9/2021 and due to follow up in June 2022  Foot exam: 8/21/21  Podiatry: 4/1/2022      Heart Protection:  BP Readings from Last 2 Encounters:   03/03/22 116/60   11/23/21 102/62        Lab Results   Component Value Date/Time    LDL, calculated 55.8 06/02/2021 03:51 AM        Key Anti-Platelet Anticoagulant Meds             clopidogreL (PLAVIX) 75 mg tab Take 1 tablet by mouth once daily    aspirin 81 mg chewable tablet Take 81 mg by mouth daily. Kidney Protection:  No results found for: Caryle Saltness, MCA2, [address], MCAU, [address], MCALPOCT   The participant would benefit from additional attention to:    Vaccines:  [] Influenza  [] Pneumococcal  [] Hepatitis    Examinations:  [] Dilated eye exam  [x] Dental exam  [] Foot exam    Other:  [] Reviewing long-term complications     Problem Solving   Hypoglycemia Management:  The participant knows the signs and symptoms of hypoglycemia Shaking/trembling, Clumsy, Confusion and licking lips. The participant knows how to prevent hypoglycemia? Consistent meals/snack times, Take medications as instructed and Monitor blood glucose before exercise    The participant knows how to treat hypoglycemia? Rule of 15    Hyperglycemia Management:  The participant knows their signs and symptoms of hyperglycemia light-headed, feel a rush, confusion, talking fast.    The participant knows how to prevent hyperglycemia? Take medication as instructed, Focus on carbohydrate counting/meal planning, Engage in regular physically active, Monitor blood glucose    Sick Day Management:  The participant knows what to do differently on sick days? Stay hydrated, Check blood glucose every 2-4 hours, Eat meals, soft foods, or drink caloric beverages every 4 hours, Take diabetes medication as instructed by provider, Talk with MD and may take lower medication doses if my BG are too low. Pattern Management:   The participant can notice blood glucose patterns when looking at their blood glucose readings Yes           Note: Content derived from the American Association of Diabetes Educators' Diabetes Education Curriculum: A Guide to Successful Self-Management (3rd edition)      I have personally reviewed the health record, including provider notes, laboratory data and current medications before making these care and education recommendations. The time spent in this effort is included in the total time. Total minutes: 36    [id number] Emergency Adaptations for Telehealth:  Dulce Maria Bhat, was evaluated in person. Overall SCPI score: 6.6 Skills Score: 6.6  Low: Healthy Eating(Q1),Taking Medication(Q2),Blood Sugar Monitoring(Q4),Reducing Risks(Q9) Confidence Score: 6.6  Low: Healthy Coping(Q2),Healthy Eating(Q4),Problem LHABPWS(A8) Preparedness Score: 6.7  Low: Reducing Risks(Q4),Taking Medication(Q5)  Healthy Eating Score: 6.5  Low: Skills(Q1),Confidence(Q4) Taking Medication Score: 6.0  Low: Skills(Q2),Preparedness(Q5) Blood Sugar Monitoring Score: 6.8  Low: LASZOT(X0) Reducing Risks Score: 6.5  Low: Skills(Q9),Preparedness(Q4)  Problem Solving Score: 6.7  Low: Confidence(Q7) Healthy Coping Score: 6.7  Low: Confidence(Q2) Being Active Score: 7.0  Low: Confidence(Q5),Preparedness(Q2)    Skills/Knowledge Questions  1. I know how to plan meals that have the best balance between carbohydrates, proteins and vegetables. 6  2. I know how my diabetes medications (pills, injectables and/or insulin) work in my body. 6  3. I know when to check my blood sugar if I want to see how my body responded to a meal. 7  4. I know when to check my blood sugars to determine if my medication or insulin doses are correct. 6  5. I know what to do to prevent a low blood sugar when I exercise (either before, during, or after). 7  6. When I am sick, I know what to do differently with my diabetes management. 7  7.  I know how stress can affect my diabetes management. 7  8. When I look at my blood sugars over a given week, I can explain what my blood sugar pattern is. 7  9. I know what my target levels are for A1c, blood pressure and cholesterol. 6  Confidence Questions  1. I am confident that I can plan balanced meals and snacks. 7  2. I am confident that I can manage my stress. 6  3. I am confident that I can prevent a low blood sugar during or after exercise. 7  4. I am confident that the next time I eat out, I will be able to choose foods that best keep my blood sugars in target. 6  5. I am confident I can include exercise into my schedule. 7  6. I am confident that I can use my daily blood sugars to adjust my diet, my activity, and/or my insulin. 7  7. When something out of my normal routine happens, I am confident that I can problem-solve and keep my diabetes on track. 6  Preparedness Questions  1. Within the next month, I will begin to eat more balanced meals and snacks. 7  2. Within the next month, I will choose an exercise activity and I will start fitting it into my schedule. 7  3. Within the next month, I will make a list of stress management options that work for me. 8  4. Within the next month, I will consistently plan ahead to prevent low blood sugars. 6  5. Within the next month, I will start adjusting my insulin doses on my own. 6  6. Within the next month, I will begin making changes to my diabetes management based on my daily blood sugars (eg - eating, activity and/or insulin). 8  7. Within the next month, I will begin making changes to my diabetes management to meet my overall goals (eg - eating, activity and/or insulin).  8

## 2022-03-18 PROBLEM — I73.9 PERIPHERAL VASCULAR DISEASE (HCC): Status: ACTIVE | Noted: 2020-02-25

## 2022-03-18 PROBLEM — Z95.1 HX OF CABG: Status: ACTIVE | Noted: 2020-01-13

## 2022-03-18 PROBLEM — I34.0 SEVERE MITRAL REGURGITATION: Status: ACTIVE | Noted: 2020-08-31

## 2022-03-19 PROBLEM — G47.30 SLEEP APNEA TREATED WITH NOCTURNAL BIPAP: Status: ACTIVE | Noted: 2020-09-30

## 2022-03-19 PROBLEM — Z98.84 H/O LAPAROSCOPIC ADJUSTABLE GASTRIC BANDING: Status: ACTIVE | Noted: 2020-01-13

## 2022-03-19 PROBLEM — I50.20 NYHA CLASS 3 HEART FAILURE WITH REDUCED EJECTION FRACTION (HCC): Status: ACTIVE | Noted: 2020-01-30

## 2022-03-19 PROBLEM — Z95.810 ICD (IMPLANTABLE CARDIOVERTER-DEFIBRILLATOR) IN PLACE: Status: ACTIVE | Noted: 2020-01-13

## 2022-03-19 PROBLEM — E11.22 TYPE 2 DIABETES MELLITUS WITH CHRONIC KIDNEY DISEASE (HCC): Status: ACTIVE | Noted: 2021-08-02

## 2022-03-19 PROBLEM — I50.9 CHF EXACERBATION (HCC): Status: ACTIVE | Noted: 2021-05-30

## 2022-03-20 PROBLEM — I50.42 CHRONIC HEART FAILURE WITH REDUCED EJECTION FRACTION AND DIASTOLIC DYSFUNCTION (HCC): Status: ACTIVE | Noted: 2020-09-30

## 2022-03-20 PROBLEM — I34.0 MITRAL REGURGITATION: Status: ACTIVE | Noted: 2020-11-12

## 2022-03-20 PROBLEM — E66.01 OBESITY, MORBID (HCC): Status: ACTIVE | Noted: 2020-01-13

## 2022-03-20 PROBLEM — J96.20 ACUTE ON CHRONIC RESPIRATORY FAILURE (HCC): Status: ACTIVE | Noted: 2021-05-30

## 2022-03-20 PROBLEM — I25.5 ISCHEMIC CARDIOMYOPATHY: Status: ACTIVE | Noted: 2020-09-30

## 2022-04-06 ENCOUNTER — TELEPHONE (OUTPATIENT)
Dept: CARDIOLOGY CLINIC | Age: 72
End: 2022-04-06

## 2022-04-07 ENCOUNTER — TELEPHONE (OUTPATIENT)
Dept: CARDIOLOGY CLINIC | Age: 72
End: 2022-04-07

## 2022-04-12 ENCOUNTER — TELEPHONE (OUTPATIENT)
Dept: CARDIOLOGY CLINIC | Age: 72
End: 2022-04-12

## 2022-04-13 ENCOUNTER — OFFICE VISIT (OUTPATIENT)
Dept: CARDIOLOGY CLINIC | Age: 72
End: 2022-04-13
Payer: MEDICARE

## 2022-04-13 DIAGNOSIS — Z95.810 AUTOMATIC IMPLANTABLE CARDIAC DEFIBRILLATOR IN SITU: Primary | ICD-10-CM

## 2022-04-13 PROCEDURE — 93295 DEV INTERROG REMOTE 1/2/MLT: CPT | Performed by: INTERNAL MEDICINE

## 2022-04-13 PROCEDURE — 93296 REM INTERROG EVL PM/IDS: CPT | Performed by: INTERNAL MEDICINE

## 2022-04-13 NOTE — LETTER
4/13/2022 9:26 AM    [facility] [id number]            This letter confirms that we have received your scheduled remote check of your implanted     device on 4-13-22  . Our EP team will contact you via phone if there are significant abnormal    findings. Your next in-clinic device check is scheduled for 7-14-22 at 1:00pm  .                   If you have any questions, please call [address] [place] [address] at [phone].                Sincerely,    Oanh Folres MD [facility]

## 2022-04-25 ENCOUNTER — TELEPHONE (OUTPATIENT)
Dept: CARDIOLOGY CLINIC | Age: 72
End: 2022-04-25

## 2022-04-25 NOTE — TELEPHONE ENCOUNTER
Patient has a question about her lab work to be done before procedure scheduled on 5/20/22, please advise        [phone]

## 2022-04-27 NOTE — TELEPHONE ENCOUNTER
Patient is inquiring whether she is able to move the date of her procedure to an earlier date, since she will be available.       [facility][phone]

## 2022-04-28 NOTE — TELEPHONE ENCOUNTER
Patient is calling because she would like to discuss her upcoming Implant Heart procedure that she is schedule for on 5/20/22.      [phone]

## 2022-05-13 DIAGNOSIS — I42.9 CARDIOMYOPATHY, UNSPECIFIED TYPE (HCC): Primary | ICD-10-CM

## 2022-05-13 RX ORDER — SODIUM CHLORIDE 9 MG/ML
75 INJECTION, SOLUTION INTRAVENOUS CONTINUOUS
Status: CANCELLED | OUTPATIENT
Start: 2022-05-13 | End: 2022-05-13

## 2022-05-13 RX ORDER — DIPHENHYDRAMINE HYDROCHLORIDE 50 MG/ML
50 INJECTION, SOLUTION INTRAMUSCULAR; INTRAVENOUS
Status: CANCELLED | OUTPATIENT
Start: 2022-05-13 | End: 2022-05-14

## 2022-05-16 ENCOUNTER — HOSPITAL ENCOUNTER (OUTPATIENT)
Age: 72
Setting detail: OUTPATIENT SURGERY
Discharge: HOME | End: 2022-05-16
Attending: INTERNAL MEDICINE | Admitting: INTERNAL MEDICINE
Payer: MEDICARE

## 2022-05-16 VITALS
OXYGEN SATURATION: 96 % | DIASTOLIC BLOOD PRESSURE: 70 MMHG | RESPIRATION RATE: 18 BRPM | HEART RATE: 72 BPM | HEIGHT: 62 IN | SYSTOLIC BLOOD PRESSURE: 131 MMHG | TEMPERATURE: 98.1 F | WEIGHT: 237 LBS | BODY MASS INDEX: 43.61 KG/M2

## 2022-05-16 DIAGNOSIS — I42.9 CARDIOMYOPATHY, UNSPECIFIED TYPE (HCC): ICD-10-CM

## 2022-05-16 LAB
ERYTHROCYTE [DISTWIDTH] IN BLOOD BY AUTOMATED COUNT: 15.3 % (ref 11.5–14.5)
GLUCOSE BLD STRIP.AUTO-MCNC: 115 MG/DL (ref 65–117)
HCT VFR BLD AUTO: 37.5 % (ref 35–47)
HGB BLD-MCNC: 12.1 G/DL (ref 11.5–16)
MCH RBC QN AUTO: 32.5 PG (ref 26–34)
MCHC RBC AUTO-ENTMCNC: 32.3 G/DL (ref 30–36.5)
MCV RBC AUTO: 100.8 FL (ref 80–99)
NRBC # BLD: 0 K/UL (ref 0–0.01)
NRBC BLD-RTO: 0 PER 100 WBC
PLATELET # BLD AUTO: 244 K/UL (ref 150–400)
PMV BLD AUTO: 9.6 FL (ref 8.9–12.9)
RBC # BLD AUTO: 3.72 M/UL (ref 3.8–5.2)
SERVICE CMNT-IMP: NORMAL
WBC # BLD AUTO: 7.8 K/UL (ref 3.6–11)

## 2022-05-16 PROCEDURE — 74011000636 HC RX REV CODE- 636: Performed by: INTERNAL MEDICINE

## 2022-05-16 PROCEDURE — 99152 MOD SED SAME PHYS/QHP 5/>YRS: CPT | Performed by: INTERNAL MEDICINE

## 2022-05-16 PROCEDURE — C1769 GUIDE WIRE: HCPCS | Performed by: INTERNAL MEDICINE

## 2022-05-16 PROCEDURE — C1751 CATH, INF, PER/CENT/MIDLINE: HCPCS | Performed by: INTERNAL MEDICINE

## 2022-05-16 PROCEDURE — 33289 TCAT IMPL WRLS P-ART PRS SNR: CPT | Performed by: INTERNAL MEDICINE

## 2022-05-16 PROCEDURE — 85027 COMPLETE CBC AUTOMATED: CPT

## 2022-05-16 PROCEDURE — 74011250636 HC RX REV CODE- 250/636: Performed by: INTERNAL MEDICINE

## 2022-05-16 PROCEDURE — 99153 MOD SED SAME PHYS/QHP EA: CPT | Performed by: INTERNAL MEDICINE

## 2022-05-16 PROCEDURE — C1894 INTRO/SHEATH, NON-LASER: HCPCS | Performed by: INTERNAL MEDICINE

## 2022-05-16 PROCEDURE — 93451 RIGHT HEART CATH: CPT | Performed by: INTERNAL MEDICINE

## 2022-05-16 PROCEDURE — 74011000250 HC RX REV CODE- 250: Performed by: INTERNAL MEDICINE

## 2022-05-16 PROCEDURE — 36415 COLL VENOUS BLD VENIPUNCTURE: CPT

## 2022-05-16 PROCEDURE — 2709999900 HC NON-CHARGEABLE SUPPLY: Performed by: INTERNAL MEDICINE

## 2022-05-16 PROCEDURE — C1760 CLOSURE DEV, VASC: HCPCS | Performed by: INTERNAL MEDICINE

## 2022-05-16 PROCEDURE — C2624 WIRELESS PRESSURE SENSOR: HCPCS | Performed by: INTERNAL MEDICINE

## 2022-05-16 PROCEDURE — 82962 GLUCOSE BLOOD TEST: CPT

## 2022-05-16 PROCEDURE — 77030013744: Performed by: INTERNAL MEDICINE

## 2022-05-16 PROCEDURE — C1892 INTRO/SHEATH,FIXED,PEEL-AWAY: HCPCS | Performed by: INTERNAL MEDICINE

## 2022-05-16 PROCEDURE — 76937 US GUIDE VASCULAR ACCESS: CPT | Performed by: INTERNAL MEDICINE

## 2022-05-16 RX ORDER — SODIUM CHLORIDE 0.9 % (FLUSH) 0.9 %
5-40 SYRINGE (ML) INJECTION AS NEEDED
Status: DISCONTINUED | OUTPATIENT
Start: 2022-05-16 | End: 2022-05-16 | Stop reason: HOSPADM

## 2022-05-16 RX ORDER — SODIUM CHLORIDE 9 MG/ML
25 INJECTION, SOLUTION INTRAVENOUS CONTINUOUS
Status: DISPENSED | OUTPATIENT
Start: 2022-05-16 | End: 2022-05-16

## 2022-05-16 RX ORDER — SODIUM CHLORIDE 9 MG/ML
500 INJECTION, SOLUTION INTRAVENOUS CONTINUOUS
Status: DISCONTINUED | OUTPATIENT
Start: 2022-05-16 | End: 2022-05-16 | Stop reason: HOSPADM

## 2022-05-16 RX ORDER — ACETAMINOPHEN 325 MG/1
650 TABLET ORAL
Status: DISCONTINUED | OUTPATIENT
Start: 2022-05-16 | End: 2022-05-16 | Stop reason: HOSPADM

## 2022-05-16 RX ORDER — MIDAZOLAM HYDROCHLORIDE 1 MG/ML
INJECTION, SOLUTION INTRAMUSCULAR; INTRAVENOUS AS NEEDED
Status: DISCONTINUED | OUTPATIENT
Start: 2022-05-16 | End: 2022-05-16 | Stop reason: HOSPADM

## 2022-05-16 RX ORDER — LIDOCAINE HYDROCHLORIDE 10 MG/ML
INJECTION INFILTRATION; PERINEURAL AS NEEDED
Status: DISCONTINUED | OUTPATIENT
Start: 2022-05-16 | End: 2022-05-16 | Stop reason: HOSPADM

## 2022-05-16 RX ORDER — FENTANYL CITRATE 50 UG/ML
INJECTION, SOLUTION INTRAMUSCULAR; INTRAVENOUS AS NEEDED
Status: DISCONTINUED | OUTPATIENT
Start: 2022-05-16 | End: 2022-05-16 | Stop reason: HOSPADM

## 2022-05-16 RX ORDER — SODIUM CHLORIDE 0.9 % (FLUSH) 0.9 %
5-40 SYRINGE (ML) INJECTION EVERY 8 HOURS
Status: DISCONTINUED | OUTPATIENT
Start: 2022-05-16 | End: 2022-05-16 | Stop reason: HOSPADM

## 2022-05-16 RX ORDER — DIPHENHYDRAMINE HYDROCHLORIDE 50 MG/ML
50 INJECTION, SOLUTION INTRAMUSCULAR; INTRAVENOUS
Status: DISCONTINUED | OUTPATIENT
Start: 2022-05-16 | End: 2022-05-16 | Stop reason: HOSPADM

## 2022-05-16 RX ADMIN — SODIUM CHLORIDE 25 ML/HR: 9 INJECTION, SOLUTION INTRAVENOUS at 09:20

## 2022-05-16 NOTE — PROGRESS NOTES
Cardiac Cath Lab Recovery Arrival Note:      Luciana Johnson arrived to Cardiac Cath Lab, Recovery Area. Staff introduced to patient. Patient identifiers verified with NAME and DATE OF BIRTH. Procedure verified with patient. Consent forms reviewed and signed by patient or authorized representative and verified. Allergies verified. Patient and family oriented to department. Patient and family informed of procedure and plan of care. Questions answered with review. Patient prepped for procedure, per orders from physician, prior to arrival.    Patient on cardiac monitor, non-invasive blood pressure, SPO2 monitor. On room air. Patient is A&Ox 4. Patient reports no pain. Patient in stretcher, in low position, with side rails up, call bell within reach, patient instructed to call if assistance as needed. Patient prep in: [address] , [place].   Patient family has pager # [phone]  Family in: Sister-in-law-Oqjqey-[phone].    Prep by: PENNY and MS

## 2022-05-16 NOTE — PROCEDURES
Procedure performed: Right heart catheterization, CardioMEMS placement in left lower pulmonary artery, calibration of CardioMEMS device    Indication for the procedure: Congestive heart failure    Right heart catheterization findings:     Procedural details: Venous access was achieved in right femoral vein using ultrasound guidance. Sheath was subsequently upsized to 11 [place] sheath. Right heart catheterization was performed with measurement of pressures in the right atrium, ventricle, pulmonary artery and capillary wedge pressures. Left lower pulmonary artery was selectively engaged using wedge catheter. Pulmonary angiogram was performed to identify proper location for CardioMEMS placement. [place]Black catheter was subsequently exchanged for the CardioMEMS delivery system over 0.018 inch wire. After confirming adequate device placement, device was released and delivery catheter was removed. Stability of the device was inserted and device was calibrated using right heart cath measurements. Once calibration was excessively achieved, all equipment was removed and patient was transferred out of the Cath Lab in a stable condition.     Blood loss minimal    Complications none

## 2022-05-16 NOTE — DISCHARGE INSTRUCTIONS
DISCHARGE INSTRUCTIONS    If possible, have someone stay with you for the first night. It is normal to feel tired for the first couple of days. Take it easy and follow your physicians instructions on activity. CHECK THE CATHETER INSERTION SITE DAILY:    If bleeding at the cath site occurs, take a clean washcloth and apply direct pressure just above the puncture site for at least 15 minutes. Call 911 immediately if the bleeding is not controlled. Continue to apply direct pressure until an ambulance gets to your location. Do not try to drive yourself or have someone else drive you to the hospital.  Have the ambulance bring you to the emergency room. You may shower 24 hours after your procedure. Gently remove the bandage during showering. Wash with soap and water and pat dry. To prevent infection, keep the groin area/insertion site clean and dry. Do not apply powders, creams, lotions, or ointments to the site for 5 days. You may cover the site with a fresh Band-Aid each day until well healed. You may notice a small lump at the site. This is normal and may last up to 6 weeks. CALL THE PHYSICIAN:  ? If the site becomes red, swollen, or feels warm to the touch, or is healing poorly    ? If you note any large/extending bruise, fever >101.0 or chills  ? If your extremity has numbness, tingling, discoloration, abnormal swelling, tightness or pain   ? If you have difficulty with urination or develop nausea, vomiting, or severe abdominal pain    ACTIVITY for the first 24-48 hours, or as instructed by your physician:  ? No lifting, pushing or pulling over 10 pounds and no straining the insertion site. Do not lift grocery bags or the garbage can; do not run the vacuum  or  for 7 days. ? You may start walking short distances the day of your procedure. Gradually increase as tolerated each day. Current activity recommendations are 30 minutes of exercise at least 5 days a week.  Work up to this as you recover. ? Avoid walking outside in extremes of heat or cold. Walk inside (at home, at the mall, or at a large store) when it is cold and windy or hot and humid. THINGS TO KEEP IN MIND:   ? Do not drive, operate any machinery, or sign any legal documents for 24 hours after your procedure, or as directed by your physician. You must have someone drive you home after your procedure. ? Limit the number of times you go up and down the stairs  ? Take rests and pace yourself with activity  ? Be careful and do not strain with bowel movements    MEDICATIONS:  ? Take all medications as prescribed  ? Call your physician if you have any questions  ? Keep an updated list of your medications with you at all times and give a list to your primary physician and pharmacist  ? You may use Tylenol 325mg 1-2 tablets every 6 hours as needed for pain or discomfort, unless otherwise instructed. If you have significant discomfort more than 48 hours after your procedure, please call your physicians office.

## 2022-05-16 NOTE — PROGRESS NOTES
Cardiac Cath Lab Procedure Area Arrival Note:    Cleve Cummings arrived to Cardiac Cath Lab, Procedure Area. Patient identifiers verified with NAME and DATE OF BIRTH. Procedure verified with patient. Consent forms verified. Allergies verified. Patient informed of procedure and plan of care. Questions answered with review. Patient voiced understanding of procedure and plan of care. Patient on cardiac monitor, non-invasive blood pressure, SPO2 monitor. On RA. Patient status doing well without problems. Patient is A&Ox 4. Patient reports no complaints. Patient medicated during procedure with orders obtained and verified by Dr. Litzy Adan. Refer to patients Cardiac Cath Lab PROCEDURE REPORT for vital signs, assessment, status, and response during procedure, printed at end of case. Printed report on chart or scanned into chart.

## 2022-05-16 NOTE — Clinical Note
TRANSFER - OUT REPORT:     Verbal report given to: (at bedside). Report consisted of patient's Situation, Background, Assessment and   Recommendations(SBAR). Opportunity for questions and clarification was provided. Patient transported with a Cardiac Cath Tech / Patient Care Tech.

## 2022-05-16 NOTE — H&P
[facility] [place]: Genevieve Leavitt  [phone]          Cardiology valvular heart disease consult/Progress Note      Requesting/referring provider: Katherin Hogue  Reason for Consult: Mitral valve disease    HPI: Isatu Escalante, a 66y.o. year-old who presents for evaluation of mitral valve disease .70y. o.female with PMHx of CAD with CAB x 3 in 1997 then subsequent stents X 4, HTN, HLD, palpitations, ICD 2007, DM, hypothyroid, COPD, WES-wears BiPAP, CKD, DVT 1997 left leg, right TKR, Lap band 2011. Ms. Paola Rodriguez was previously seen by me for underlying ischemic cardiomyopathy and prior inferior myocardial infarction. She is status post coronary bypass grafting. A cardiac catheterization was performed in February 2020 demonstrated patent LIMA to LAD and vein graft to RCA with collateral dependent circumflex and native right PDA. She has history of coronary disease as well as secondary mitral regurgitation for which she underwent a MitraClip procedure in November 2020. She has reported improved functional status since then. Intermittently however she will have some phases when she feels more bloated and short of breath. She also has underlying chronic kidney disease which makes managing her diuretics will challenging. Her heart failure therapies overall managed by heart failure clinic. I have been asked to see the patient to consider possible CardioMEMS placement. Investigations personally reviewed by me  Cardiac catheterization February 2020: Severe native three-vessel disease. Patent LIMA to LAD and vein graft to RCA. Right PDA small diffusely diseased vessel. Left circumflex is chronically occluded and branches are filling by collaterals. Small diagonal is severely diffusely diseased. ECG January 2020: Sinus rhythm, prior inferior infarct, narrow QRS complex, single PVC noted. Echo June 2020: Severe LV systolic dysfunction.   Overall EF about 30%. Global hypokinesis. Mid and basal inferolateral walls appear to be severely hypokinetic with possible prior infarct based on hyperechoic nature of this wall. Based on color Doppler it appears to be significant mitral regurgitation which is predominantly central with some degree of posteriorly directed nature of the jet. Mechanism of mitral regurgitation appears to be annular dilatation and lack of Mal coaptation secondary to predominantly posterior leaflet tethering. Moderate pulmonary hypertension is noted. Echocardiogram December 2020: Status post MitraClip, mitral regurgitation reduced from severe to mild to moderate. Clip seen expected position and secured. Overall LV systolic function about 25 to 30%. Moderate biatrial enlargement. Inferolateral wall appears to be severely hypokinetic and the rest of the areas are moderate ly hypokinetic.mean transmitral gradient was 3 mmHg    She is being followed by heart failure clinic who has had difficulty with managing her blood pressures as well as her volume status. Particularly given her underlying chronic kidney disease, information regarding her filling pressure would be useful in managing her diuretic therapy. Assessment/Plan:  1. Coronary disease manifested in the form of prior inferior MI, status post coronary bypass grafting with patent LIMA to LAD and vein graft to RCA and chronically occluded left circumflex with collaterals  2. Severe LV systolic dysfunction/heart failure reduced ejection fraction acute on chronic likely secondary to 1 with progressive adverse LV remodeling  3. Severe mitral regurgitation appears functional in nature secondary to progressive adverse LV remodeling and possible papillary muscle dysfunction from posterior myocardial infarction--status post MitraClip NT W placement in November 2020  4. Severe NYHA class III symptomatic limitations  5.   History of lower extremity thromboembolism post coronary bypass grafting  6. CKD with baseline creatinine about 2  7. Morbid obesity with suspected sleep apnea    Ms. Cari Layton was seen in follow-up . It has been difficult to manage her fluid status because at times she gets worsening renal failure and at other times she gets increased fluid buildup. She seems to be a reasonable candidate for considering CardioMEMS placement. Assuming her insurance can approve it, it will help in managing her volume status and guiding her diuretic therapy given fluctuation in renal function from her chronic kidney disease. We will set her up for CardioMEMS procedure    I discussed the risks/benefits/alternatives of the procedure with the patient. Risks include (but are not limited to) bleeding, infection,stroke,heart attack, need for emergency surgery/pericardiocentesis, need for dialysis or death. The patient understands and agrees to proceed. [x]    High complexity decision making was performed  []    Patient is at high-risk of decompensation with multiple organ involvement  She  has a past medical history of Adverse effect of anesthesia, Arthritis, Asthma, CAD (coronary artery disease), Chronic kidney disease, Chronic obstructive pulmonary disease ([address] Chronic pain, Coagulation disorder ([address] Congestive heart failure ([address] Diabetes ([address] Hypertension, Morbid obesity ([address] Sleep apnea (1996), Thromboembolus ([address] and Thyroid disease. Review of system:Patient reports no PND/Orthpnea/CP. She reports no cough/fever/focal neurological deficits/abdominal pain. All other systems negative except as above.    Family History   Problem Relation Age of Onset    Hypertension Mother     Dementia Mother     Coronary Art Dis Father 58    Sudden Death Father 58    Diabetes Son     Diabetes Brother       Social History     Socioeconomic History    Marital status:    Tobacco Use    Smoking status: Former Smoker     Packs/day: 0.50     Years: 45.00     Pack years: 22.50     Types: Cigarettes     Quit date: 2010     Years since quittin.3    Smokeless tobacco: Never Used    Tobacco comment: quit /started again (smoked 3 yrs) off and on/none since    Vaping Use    Vaping Use: Never used   Substance and Sexual Activity    Alcohol use: Yes     Comment: occassionally     Drug use: Not Currently    Sexual activity: Not Currently      PE  Vitals:    22 0850   BP: (!) 152/82   Resp: 14   Temp: 98.2 °F (36.8 °C)   SpO2: 99%   Weight: 237 lb (107.5 kg)   Height: 5' 2\" (1.575 m)    Body mass index is 43.35 kg/m².     Recent Labs:  Lab Results   Component Value Date/Time    Cholesterol, total 144 2021 03:51 AM    HDL Cholesterol 72 2021 03:51 AM    LDL, calculated 55.8 2021 03:51 AM    Triglyceride 81 2021 03:51 AM    CHOL/HDL Ratio 2.0 2021 03:51 AM     Lab Results   Component Value Date/Time    Creatinine 1.92 (H) 05/10/2022 01:51 PM     Lab Results   Component Value Date/Time    BUN 22 (H) 05/10/2022 01:51 PM     Lab Results   Component Value Date/Time    Potassium 4.2 05/10/2022 01:51 PM     Lab Results   Component Value Date/Time    Hemoglobin A1c 7.3 (H) 2021 04:23 AM     Lab Results   Component Value Date/Time    HGB 12.1 2022 09:16 AM     Lab Results   Component Value Date/Time    PLATELET 549 6446 09:16 AM       Reviewed:  Past Medical History:   Diagnosis Date    Adverse effect of anesthesia     hard to wake up/uses BIPAP/\"try to avoid general if possible\"/intubated in past prior to going to sleep and it caused pt to be incontinent    Arthritis     Asthma     CAD (coronary artery disease)     Chronic kidney disease     elevataed creatinine    Chronic obstructive pulmonary disease (HCC)     Chronic pain     arthritis    Coagulation disorder ([facility]     on plavix    Congestive heart failure (HCC)     Diabetes ([facility]     Hypertension     Morbid obesity ([facility]     Sleep apnea     BIPAP with 2 liters oxygen    Th[facility]     after heart surgery - left leg    Thyroid disease      Social History     Tobacco Use   Smoking Status Former Smoker    Packs/day: 0.50    Years: 45.00    Pack years: 22.50    Types: Cigarettes    Quit date: 2010    Years since quittin.3   Smokeless Tobacco Never Used   Tobacco Comment    quit /started again (smoked 3 yrs) off and on/none since      Social History     Substance and Sexual Activity   Alcohol Use Yes    Comment: occassionally      Allergies   Allergen Reactions    Crestor [Rosuvastatin] Other (comments)     Causes muscle cramps    Lisinopril Cough    Nsaids (Non-Steroidal Anti-Inflammatory Drug) Other (comments)     Liver and Kidney     Family History   Problem Relation Age of Onset    Hypertension Mother     Dementia Mother     Coronary Art Dis Father 58    Sudden Death Father 58    Diabetes Son     Diabetes Brother         Current Facility-Administered Medications   Medication Dose Route Frequency    0.9% sodium chloride infusion  25 mL/hr IntraVENous CONTINUOUS    diphenhydrAMINE (BENADRYL) injection 50 mg  50 mg IntraVENous ONCE PRN    midazolam (VERSED) injection    PRN    fentaNYL citrate (PF) injection    PRN    lidocaine (XYLOCAINE) 10 mg/mL (1 %) injection    PRN     BP (!) 152/82 (BP 1 Location: Right upper arm, BP Patient Position: At rest)   Temp 98.2 °F (36.8 °C)   Resp 14   Ht 5' 2\" (1.575 m)   Wt 237 lb (107.5 kg)   SpO2 99%   Breastfeeding No   BMI 43.35 kg/m²   General:    Alert, cooperative, no distress. Psychiatric:    Normal Mood and affect    Eye/ENT:      Pupils equal, No asymmetry, Conjunctival pink. Able to hear voice at normal amplitude   Lungs:      Visibly symmetric chest expansion, No palpable tenderness. Clear to auscultation bilaterally. Heart[de-identified]    Regular rate and rhythm, S1, S2 normal, no murmur, click, rub or gallop. No JVD, Normal palpable peripheral pulses.  No cyanosis   Abdomen:     Soft, non-tender. Bowel sounds normal. No masses,  No      organomegaly. Extremities:   Extremities normal, atraumatic, no edema. Neurologic:   CN II-XII grossly intact. No gross focal deficits           ATTENTION:   This medical record was transcribed using an electronic medical records/speech recognition system. Although proofread, it may and can contain electronic, spelling and other errors. Corrections may be executed at a later time. Please feel free to contact us for any clarifications as needed.     [facility] [place]  [address] [facility], [address] [place] [address] Avenue

## 2022-05-16 NOTE — PROGRESS NOTES
Transfer to [facility] RR from Procedure Area    Verbal report given to recovery RN on Cleve Cummings being transferred to Cardiac Cath Lab RR for routine post - op   Patient is post RHC/cardiomens implant procedure. Patient stable upon transfer to . Report consisted of patients Situation, Background, Assessment and   Recommendations(SBAR). Information from the following report(s) Procedure Summary and MAR was reviewed with the receiving nurse. Opportunity for questions and clarification was provided. Patient medicated during procedure with orders obtained and verified by Dr. iLtzy Adan. Refer to patient PROCEDURE REPORT for vital signs, assessment, status, and response during procedure.

## 2022-05-16 NOTE — PROGRESS NOTES
TRANSFER - IN REPORT:    Verbal report received from Kalina Villa on Rosa Cain  being received from Cath for routine progression of care. Report consisted of patients Situation, Background, Assessment and Recommendations(SBAR). Information from the following report(s) SBAR and Procedure Summary was reviewed with the receiving clinician. Opportunity for questions and clarification was provided. Assessment completed upon patients arrival to [address] [place] [address] and care assumed. Cardiac Cath Lab Recovery Arrival Note:    Rosa Cain arrived to [facility] recovery area. Patient procedure= Cardiomems. Patient on cardiac monitor, non-invasive blood pressure, SPO2 monitor. On room air. IV  of Normal saline on pump at 25 ml/hr. Patient status doing well without problems. Patient is A&Ox 4. Patient reports no pain. PROCEDURE SITE CHECK:    Procedure site:without any bleeding and no hematoma, no pain/discomfort reported at procedure site. No change in patient status. Continue to monitor patient and status.

## 2022-05-16 NOTE — PROGRESS NOTES
1326:  Bedside report received from GUERA Malik RN. Right groin site is without bleeding and no hematoma. 1435:  Jacklyn Maxime ambulated @ 1752 (time) approximately 20 feet. Patient tolerated ambulation without adverse advents. right groin (right/left, groin/arm)  without bleeding or hematoma noted. 1455: I have reviewed discharge instructions with the patient. The patient verbalized understanding. 1505:  Patient wheeled out via wheelchair for discharge.

## 2022-05-16 NOTE — Clinical Note
TRANSFER - IN REPORT:     Verbal report received from: Perry Patton RN . Report consisted of patient's Situation, Background, Assessment and   Recommendations(SBAR). Opportunity for questions and clarification was provided. Assessment completed upon patient's arrival to unit and care assumed. Patient transported with a Registered Nurse.

## 2022-05-20 ENCOUNTER — OFFICE VISIT (OUTPATIENT)
Dept: CARDIOLOGY CLINIC | Age: 72
End: 2022-05-20

## 2022-05-20 DIAGNOSIS — I50.22 CHRONIC SYSTOLIC CONGESTIVE HEART FAILURE (HCC): Primary | ICD-10-CM

## 2022-05-26 ENCOUNTER — TELEPHONE (OUTPATIENT)
Dept: CARDIOLOGY CLINIC | Age: 72
End: 2022-05-26

## 2022-05-26 NOTE — TELEPHONE ENCOUNTER
----- Message from Willie Francis NP sent at 5/26/2022  9:11 AM EDT -----  Regarding: FW: CardioMMS/Covid   I would increase her diuretics- she can double up her morning dose since her PAD on her cath was 27. I would keep her on the increased dose until she is down to 230lb. If she gets dizzy then return to normal diuretic dosing and call office. I called patient, advised her of above, she states understanding and has no further questions. Will continue to monitor cardiomems. ----- Message -----  From: Eber Schilder, RN  Sent: 5/26/2022   8:29 AM EDT  To: Willie Francis NP  Subject: FW: CardioMMS/Covid                              Please see below, PAD is running close to 37. Please advise, thank you  ----- Message -----  From: Isatu Escalante  Sent: 5/24/2022   3:52 PM EDT  To: [facility] Nurses  Subject: CardioMMS/Covid                                  My weight is down a little, but I am a bit more winded. I have been resting most of the day. My y weight today is 234.8. Jarrod Awkward I try to stay hydrated. No runny nose, just occasional cough with very little phlegm,temp 97. 4. my heart rate 70,sat 93, bp 121/70. I try to just rest, watch my vitals and not panic. I am doing my best to stay calm. My PCP wants to see me on Fri. I hope I'm not still contagious by then. Quince Car will be day 7. Thanks for message. ..it helps me stay calm.

## 2022-05-27 ENCOUNTER — OFFICE VISIT (OUTPATIENT)
Dept: CARDIOLOGY CLINIC | Age: 72
End: 2022-05-27

## 2022-05-27 DIAGNOSIS — I50.42 CHRONIC HEART FAILURE WITH REDUCED EJECTION FRACTION AND DIASTOLIC DYSFUNCTION (HCC): Primary | ICD-10-CM

## 2022-05-31 ENCOUNTER — TELEPHONE (OUTPATIENT)
Dept: CARDIOLOGY CLINIC | Age: 72
End: 2022-05-31

## 2022-05-31 NOTE — TELEPHONE ENCOUNTER
Left message for patient to return call to check weight and symptoms. PAD today 33.      Patient returned message via [facility]

## 2022-06-06 ENCOUNTER — OFFICE VISIT (OUTPATIENT)
Dept: CARDIOLOGY CLINIC | Age: 72
End: 2022-06-06

## 2022-06-06 DIAGNOSIS — I50.42 CHRONIC HEART FAILURE WITH REDUCED EJECTION FRACTION AND DIASTOLIC DYSFUNCTION (HCC): Primary | ICD-10-CM

## 2022-06-10 ENCOUNTER — PATIENT MESSAGE (OUTPATIENT)
Dept: CARDIOLOGY CLINIC | Age: 72
End: 2022-06-10

## 2022-06-10 RX ORDER — METOLAZONE 2.5 MG/1
2.5 TABLET ORAL DAILY
Qty: 1 TABLET | Refills: 0 | Status: SHIPPED | OUTPATIENT
Start: 2022-06-10 | End: 2022-06-11

## 2022-06-10 NOTE — TELEPHONE ENCOUNTER
----- Message from Brennen Gonzales NP sent at 6/10/2022 10:40 AM EDT -----  Regarding: FW: Today's readings  She can have a one time dose of metolazone 2.5mg and see if this works for her. I called patient and advised her of above, reviewed goals of care for weight and PAD and how to take bumex and metolazone. She states understanding.     ----- Message -----  From: Julio Cesar Escudero RN  Sent: 6/10/2022  10:24 AM EDT  To: Brennen Gonzales NP  Subject: FW: Today's readings                             PAD has been running 32-34, she has been taking bumex 4 mg twice per day. Please advise. See all messages, she is sending updates every day.   ----- Message -----  From: Amada Viramontes  Sent: 6/10/2022  10:03 AM EDT  To: [facility] Nurses  Subject: Today's readings                                 Feeling well hoping to just continue walking pacing myself being careful not to overdo it. My wt has been the same for 3 days which frustrates me but I know it will get better if I just keep at it. This AM  Wt 233.8 P73 SAT 93 /78. My breathing is about the same and I'm wondering if Covid may have impacted this. As long as I pace myself, I seem to be fine. I try to get my water in as well. I can do better with that. Pls let me know my number and what I can do to improve them    By the way,what is the target numbers? Have a good weekend.

## 2022-06-13 ENCOUNTER — OFFICE VISIT (OUTPATIENT)
Dept: CARDIOLOGY CLINIC | Age: 72
End: 2022-06-13

## 2022-06-13 ENCOUNTER — TELEPHONE (OUTPATIENT)
Dept: CARDIOLOGY CLINIC | Age: 72
End: 2022-06-13

## 2022-06-13 DIAGNOSIS — I50.42 CHRONIC HEART FAILURE WITH REDUCED EJECTION FRACTION AND DIASTOLIC DYSFUNCTION (HCC): Primary | ICD-10-CM

## 2022-06-13 NOTE — TELEPHONE ENCOUNTER
----- Message from Cortes Quinonez sent at 6/13/2022  9:21 AM EDT -----  Regarding: Today's readings  Good morning Carlie  I made it!!!! My weight this morning was 228.8 (underwear). BP @7:/56  Now 125/55 P40 fit bit P 72. Have been dizzy yest and also today. Feeling a little sluggish but otherwise ok. I took 2 Bumex this AM and will take 2 this PM unless you tell me otherwise. Ankles and Abdom less bloated. My appetite is down but that's fine. With this heat I know I have to be careful. I called patient, advised her that her PAD is 26 today, goal is 27 or less. I advised her not to take her afternoon dose of bumex, further instructions per NP. Per patient, weight back up to 231, PAD on cardiomems 33, patient admits to dietary indiscretion, please advise to bumex dosing. Per NP GUERA Blankenship, have advised patient to go back up to BID dosing on bumex until weight and PAD back down. She states understanding.

## 2022-06-14 RX ORDER — BUMETANIDE 1 MG/1
TABLET ORAL
Qty: 120 TABLET | Refills: 1 | Status: SHIPPED | OUTPATIENT
Start: 2022-06-14 | End: 2022-08-01 | Stop reason: SDUPTHER

## 2022-06-14 NOTE — TELEPHONE ENCOUNTER
Requested Prescriptions     Signed Prescriptions Disp Refills    bumetanide (BUMEX) 1 mg tablet 120 Tablet 1     Sig: Take 2 mg in the morning and 2 mg in the afternoon as directed by [facility]     Authorizing Provider: Onofre Gibson User: Miguelito Doyle

## 2022-06-21 ENCOUNTER — TELEPHONE (OUTPATIENT)
Dept: CARDIOLOGY CLINIC | Age: 72
End: 2022-06-21

## 2022-06-21 ENCOUNTER — OFFICE VISIT (OUTPATIENT)
Dept: CARDIOLOGY CLINIC | Age: 72
End: 2022-06-21

## 2022-06-21 DIAGNOSIS — I50.42 CHRONIC HEART FAILURE WITH REDUCED EJECTION FRACTION AND DIASTOLIC DYSFUNCTION (HCC): Primary | ICD-10-CM

## 2022-06-21 NOTE — TELEPHONE ENCOUNTER
Telephone Call RE:  Appointment reminder     Outcome:     [x] Patient confirmed appointment   [] Patient rescheduled appointment for    [] Unable to reach  [] Left message             [] Other:     ---------------------             [x] Screened for [facility]

## 2022-06-22 ENCOUNTER — OFFICE VISIT (OUTPATIENT)
Dept: CARDIOLOGY CLINIC | Age: 72
End: 2022-06-22
Payer: MEDICARE

## 2022-06-22 VITALS
HEIGHT: 62 IN | DIASTOLIC BLOOD PRESSURE: 70 MMHG | BODY MASS INDEX: 43.36 KG/M2 | OXYGEN SATURATION: 97 % | TEMPERATURE: 97.5 F | WEIGHT: 235.6 LBS | HEART RATE: 71 BPM | SYSTOLIC BLOOD PRESSURE: 110 MMHG | RESPIRATION RATE: 16 BRPM

## 2022-06-22 DIAGNOSIS — I25.5 ISCHEMIC CARDIOMYOPATHY: ICD-10-CM

## 2022-06-22 DIAGNOSIS — I50.42 CHRONIC HEART FAILURE WITH REDUCED EJECTION FRACTION AND DIASTOLIC DYSFUNCTION (HCC): Primary | ICD-10-CM

## 2022-06-22 DIAGNOSIS — R06.02 SHORTNESS OF BREATH: ICD-10-CM

## 2022-06-22 DIAGNOSIS — E66.01 OBESITY, CLASS III, BMI 40-49.9 (MORBID OBESITY) (HCC): ICD-10-CM

## 2022-06-22 PROCEDURE — 1123F ACP DISCUSS/DSCN MKR DOCD: CPT | Performed by: NURSE PRACTITIONER

## 2022-06-22 PROCEDURE — 3017F COLORECTAL CA SCREEN DOC REV: CPT | Performed by: NURSE PRACTITIONER

## 2022-06-22 PROCEDURE — G8427 DOCREV CUR MEDS BY ELIG CLIN: HCPCS | Performed by: NURSE PRACTITIONER

## 2022-06-22 PROCEDURE — G8432 DEP SCR NOT DOC, RNG: HCPCS | Performed by: NURSE PRACTITIONER

## 2022-06-22 PROCEDURE — G8536 NO DOC ELDER MAL SCRN: HCPCS | Performed by: NURSE PRACTITIONER

## 2022-06-22 PROCEDURE — G8417 CALC BMI ABV UP PARAM F/U: HCPCS | Performed by: NURSE PRACTITIONER

## 2022-06-22 PROCEDURE — 1101F PT FALLS ASSESS-DOCD LE1/YR: CPT | Performed by: NURSE PRACTITIONER

## 2022-06-22 PROCEDURE — 1090F PRES/ABSN URINE INCON ASSESS: CPT | Performed by: NURSE PRACTITIONER

## 2022-06-22 PROCEDURE — 99214 OFFICE O/P EST MOD 30 MIN: CPT | Performed by: NURSE PRACTITIONER

## 2022-06-22 PROCEDURE — G8399 PT W/DXA RESULTS DOCUMENT: HCPCS | Performed by: NURSE PRACTITIONER

## 2022-06-22 NOTE — PROGRESS NOTES
[facility], [facility] Note    Patient name: Avery Crump  Patient : 1950  Patient MRN: [id number]  Date of service: 22      CHIEF COMPLAINT:    Chief Complaint   Patient presents with    CHF      f/; pt states some light headed, some sob, some bilateral feet sweeling, and stomache bloating;     Fatigue        RECOMMENDATIONS:  Continue verquvo 10mg daily  Beta-blocker: toprol 12.5mg daily in the evening  ACE/ARB/ARNi: intolerant d/t renal failure  Spironolactone: intolerant d/t hyperkalemia  Continue IMDUR 30mg daily and hydralazine 10mg TID  SGLT2 inhibitor: was on Jardiance 25mg daily- held d/t renal dysfunction, can consider restarting if renal function improves enough  Diuretic: Continue Bumex 4mg BID until PAD back down  Not on allopurinol or uloric, uric acid level 5  Continue ASA and plavix   Statin or PCSK9i: Continue current dose of atorvastatin and zetia  Continue CPAP therapy  ICD interrogation every 3 months   Echocardiogram and EKG quarterly- will do at [facility]- scheduled for August  Routine HF labs- will order and give slips to pt to take to [facility]  Reinforced heart healthy, low salt diet  Reinforced appropriate fluid intake of 6 x 8oz glasses of water per day  Monitor and record daily weights and BPs  Advanced care plan present on file  Follow-up with primary cardiologist  Follow-up with EP cardiologist- Dr. Deven Galindo with PCP- [facility] - scheduled in a couple weeks    Return to [facility] in 3 mos with NP/MD      IMPRESSION:  Fatigue  Shortness of breath  Chronic systolic heart failure  · Stage D, NYHA class IIIA symptoms  · Ischemic cardiomyopathy, LVEF 35-40%  · Invitae with VUS  CAD s/p CABG  s/p PCI to DVG-RCA   H/o severe MR s/p mitral clip in   HTN  H/o VT s/p AICD  WES on Bipap  T2DM with neuropathy  Hypothyroidism  Obesity  HLD  Osteoarthritis   CKD4        CARDIAC IMAGING AND DIAGNOSTICS REVIEWED:  Echo (8/19/21)  · LV: Estimated LVEF is 30 - 35%. Mildly dilated left ventricle. Mild concentric hypertrophy. Moderately reduced systolic function. Moderate (grade 2) left ventricular diastolic dysfunction. · Previously placed mitraclip is noted in secure position with residual trace to mild MR lateral to clip placement. Mean transmitral gradient is 2.6mmHg at heart rate of about 70 bpm.  · LA: Moderately dilated left atrium. · MV: Mild mitral valve regurgitation is present. · LA: Severely dilated left atrium. · RA: Mildly dilated right atrium. Echo (5/31/21)  · LV: Estimated LVEF is 25 - 30%. Severely dilated left ventricle. Mild concentric hypertrophy. Severely and globally reduced systolic function. Inconclusive left ventricular diastolic function. · LA: Moderately dilated left atrium. · RA: Mildly dilated right atrium. · MV: Moderate mitral valve regurgitation is present. · Image quality for this study was suboptimal.       6 Min Walk Report 6/3/2021 4/23/2021 12/28/2020 12/22/2020 8/31/2020 7/1/2020 1/30/2020   (PRE) HR 70 70 71 73 81 71 74   (PRE) O2 Sat 98 - - - 96 - 97   (POST) HR 92 - - - 110 - 124   (POST) O2 Sat 99 - 97 - 97 97 94   Distance in Meters 91.44 - - - 226.77 - 194.95         HISTORY OF PRESENT ILLNESS:  I had the pleasure of seeing Rob Moreno in [address] [place] [address] at [address] [place] [address] in [place]. Briefly, Rob Moreno is a 74 y.o. female with h/o HTN, HLD, WES, Obesity (BMI 39), s/p gastric sleeve in 2011, T2DM, hypothyroidism, COPD, CAD s/p CABG in 1997, s/p PCI to [address] in 5/2015, ICM, VT, s/p AICD ([employer]), anxiety and depression. She more recently underwent MitraClip on 11/12/2020 and was evaluated for upgrade to [address] with Dr. Clare Coreas, however this was not done as her QRS was too narrow. Her most recent admission was 5/30/21-6/3/21 for CHF exacerbation.   She was diuresed and her Marcellina Brunt was stopped due to renal failure. INTERVAL HISTORY:  Today, patient presents for routine clinic visit. she reports doing well.  she had her Cardiomems placed and reports doing well since. No major changes  Since last visit. She has been working on eating better and slowly increasing her activity and walking. Patient can perform home activities without problem. she denies other cardiac symptoms such as chest pain or leg pain with walking. Patient denies symptoms of volume overload or leg edema. Reports a normal appetite. she denies abdominal bloating, nausea or early satiety. Patient's weight remained stable. she denies orthopnea, PND or nocturia. Denies irregular heart rate or palpitations. No presyncope or syncope. ICD has not fired. she is compliant with fluid restriction and taking medications as prescribed. Patient manages his own medications. REVIEW OF SYSTEMS:  Review of Systems   Constitutional: Negative for chills, fever and malaise/fatigue. Respiratory: Positive for shortness of breath. Negative for cough. Cardiovascular: Negative for chest pain, palpitations, orthopnea and leg swelling. Gastrointestinal: Negative for abdominal pain, nausea and vomiting. Musculoskeletal: Negative for falls and myalgias. Neurological: Negative for dizziness and weakness. Endo/Heme/Allergies: Does not bruise/bleed easily. Psychiatric/Behavioral: Negative for depression. PHYSICAL EXAM:  Visit Vitals  /70 (BP 1 Location: Left upper arm, BP Patient Position: Sitting, BP Cuff Size: Adult)   Pulse 71   Temp 97.5 °F (36.4 °C) (Oral)   Resp 16   Ht 5' 2\" (1.575 m)   Wt 235 lb 9.6 oz (106.9 kg)   SpO2 97%   BMI 43.09 kg/m²     Physical Exam  Vitals reviewed. Constitutional:       General: She is not in acute distress. Appearance: Normal appearance. She is obese. She is not ill-appearing. HENT:      Head: Normocephalic and atraumatic.    Eyes: Conjunctiva/sclera: Conjunctivae normal.      Pupils: Pupils are equal, round, and reactive to light. Neck:      Vascular: No hepatojugular reflux or JVD. Cardiovascular:      Rate and Rhythm: Normal rate and regular rhythm. Pulses: Normal pulses. Heart sounds: Murmur heard. Systolic murmur is present with a grade of 3/6. No friction rub. No gallop. Pulmonary:      Effort: Pulmonary effort is normal. No respiratory distress. Breath sounds: Normal breath sounds. Abdominal:      General: Bowel sounds are normal. There is no distension. Palpations: Abdomen is soft. Tenderness: There is no abdominal tenderness. Musculoskeletal:         General: Swelling present. Normal range of motion. Cervical back: Neck supple. Skin:     General: Skin is warm and dry. Capillary Refill: Capillary refill takes less than 2 seconds. Neurological:      General: No focal deficit present. Mental Status: She is alert and oriented to person, place, and time. Psychiatric:         Mood and Affect: Mood normal.         Behavior: Behavior normal.         Thought Content:  Thought content normal.         Judgment: Judgment normal.          PAST MEDICAL HISTORY:  Past Medical History:   Diagnosis Date    Adverse effect of anesthesia     hard to wake up/uses BIPAP/\"try to avoid general if possible\"/intubated in past prior to going to sleep and it caused pt to be incontinent    Arthritis     Asthma     CAD (coronary artery disease)     Chronic kidney disease     elevataed creatinine    Chronic obstructive pulmonary disease (HCC)     Chronic pain     arthritis    Coagulation disorder (HCC)     on plavix    Congestive heart failure (HCC)     Diabetes ([address]     Hypertension     Morbid obesity ([address]     Sleep apnea 1996    BIPAP with 2 liters oxygen    Thromboembolus ([address]     after heart surgery - left leg    Thyroid disease        PAST SURGICAL HISTORY:  Past Surgical History: Procedure Laterality Date    COLONOSCOPY N/A 2020    COLONOSCOPY   :- performed by Lo Edward MD at [address] HX ARTHROPLASTY  2105    knee - right    HX  SECTION      x3    HX CORONARY ARTERY BYPASS GRAFT  1997    3 vessels    HX CORONARY STENT PLACEMENT  2016    HX HERNIA REPAIR  1988    HX IMPLANTABLE CARDIOVERTER DEFIBRILLATOR      HX KNEE REPLACEMENT Right 2015    once    MS CARDIAC SURG PROCEDURE UNLIST  2018    cardiac cath - Left    MS LAP GASTRIC BYPASS/KERLINE-EN-Y  2011    lap band per pt and not a gastric bypass       FAMILY HISTORY:  Family History   Problem Relation Age of Onset    Hypertension Mother     Dementia Mother     Coronary Art Dis Father 58    Sudden Death Father 58    Diabetes Son     Diabetes Brother        SOCIAL HISTORY:  Social History     Socioeconomic History    Marital status:    Tobacco Use    Smoking status: Former Smoker     Packs/day: 0.50     Years: 45.00     Pack years: 22.50     Types: Cigarettes     Quit date: 2010     Years since quittin.4    Smokeless tobacco: Never Used    Tobacco comment: quit /started again (smoked 3 yrs) off and on/none since    Vaping Use    Vaping Use: Never used   Substance and Sexual Activity    Alcohol use: Yes     Comment: occassionally     Drug use: Not Currently    Sexual activity: Not Currently       LABORATORY RESULTS:  Labs Latest Ref Rng & Units 2022 5/10/2022   WBC 3.6 - 11.0 K/uL 7.8 -   RBC 3.80 - 5.20 M/uL 3.72(L) -   Hemoglobin 11.5 - 16.0 g/dL 12.1 -   Hematocrit 35.0 - 47.0 % 37.5 -   MCV 80.0 - 99.0 . 8(H) -   Platelets 269 - 792 K/uL 244 -   Albumin 3.5 - 5.0 g/dL - 3.7   Calcium 8.5 - 10.1 MG/DL - 10. 5(H)   Glucose 65 - 100 mg/dL - 90   BUN 6 - 20 MG/DL - 22(H)   Creatinine 0.55 - 1.02 MG/DL - 1.92(H)   Sodium 136 - 145 mmol/L - 138   Potassium 3.5 - 5.1 mmol/L - 4.2   Some recent data might be hidden ALLERGY:  Allergies   Allergen Reactions    Crestor [Rosuvastatin] Other (comments)     Causes muscle cramps    Lisinopril Cough    Nsaids (Non-Steroidal Anti-Inflammatory Drug) Other (comments)     Liver and Kidney        CURRENT MEDICATIONS:    Current Outpatient Medications:     semaglutide (Ozempic) 0.25 mg or 0.5 mg/dose (2 mg/1.5 ml) subq pen, 0.5 mg by SubCUTAneous route. Once weekly, Disp: , Rfl:     bumetanide (BUMEX) 1 mg tablet, Take 2 mg in the morning and 2 mg in the afternoon as directed by [facility], Disp: 120 Tablet, Rfl: 1    NovoLIN 70-30 FlexPen U-100 100 unit/mL (70-30) inpn, 16 units before breakfast and 10 units at dinner, Disp: , Rfl:     isosorbide mononitrate ER (IMDUR) 30 mg tablet, Take 1 Tablet by mouth daily. in the morning, Disp: 30 Tablet, Rfl: 1    gabapentin (NEURONTIN) 100 mg capsule, TAKE 2 CAPSULES BY MOUTH TWICE DAILY MAX  DAILY  AMOUNT  400MG, Disp: 120 Capsule, Rfl: 0    ezetimibe (ZETIA) 10 mg tablet, Take 1 Tablet by mouth daily. , Disp: 30 Tablet, Rfl: 1    clopidogreL (PLAVIX) 75 mg tab, Take 1 tablet by mouth once daily, Disp: 90 Tablet, Rfl: 0    vericiguat (Verquvo) 10 mg tab, Take 10 mg by mouth daily. , Disp: 30 Each, Rfl: 11    Insulin Needles, Disposable, 32 gauge x 5/32\" ndle, 1 Each by Does Not Apply route daily. , Disp: 100 Pen Needle, Rfl: 2    alcohol swabs padm, 1 Each by Apply Externally route daily. , Disp: 100 Pad, Rfl: 11    sertraline (ZOLOFT) 50 mg tablet, 50 mg daily. , Disp: , Rfl:     ketoconazole (NIZORAL) 2 % shampoo, , Disp: , Rfl:     hydrALAZINE (APRESOLINE) 10 mg tablet, Take 1 Tablet by mouth three (3) times daily. , Disp: 90 Tablet, Rfl: 1    levothyroxine (Euthyrox) 100 mcg tablet, Take 1 Tablet by mouth Daily (before breakfast). , Disp: 90 Tablet, Rfl: 0    FreeStyle Lancets 28 gauge misc, USE AS DIRECTED, Disp: , Rfl:     metoprolol succinate (TOPROL-XL) 25 mg XL tablet, Take 0.5 Tablets by mouth daily.  Hold for systolic BP less than 100, Disp: 60 Tablet, Rfl: 2    nitroglycerin (NITROSTAT) 0.3 mg SL tablet, 1 Tablet by SubLINGual route every five (5) minutes as needed for Chest Pain., Disp: 1 Bottle, Rfl: 0    Blood-Glucose Meter monitoring kit, Check blood sugar daily. May substitute for insurance preferred meter, Disp: 1 Kit, Rfl: 0    glucose blood VI test strips (blood glucose test) strip, Check blood sugar 2 times daily: E11.9 may substitute for insurance preferred strips. , Disp: 200 Strip, Rfl: 3    atorvastatin (LIPITOR) 80 mg tablet, TAKE 1 TABLET BY MOUTH AT BEDTIME, Disp: 90 Tab, Rfl: 3    lancets misc, Check blood sugar 2 times daily. May substitute for insurance preferred lancets. , Disp: 200 Each, Rfl: 3    glucose blood VI test strips (ASCENSIA AUTODISC VI, ONE TOUCH ULTRA TEST VI) strip, E11.65 check sugar once daily, Disp: 100 Strip, Rfl: 0    clotrimazole-betamethasone (LOTRISONE) topical cream, Apply  to affected area two (2) times a day. (Patient taking differently: Apply  to affected area two (2) times daily as needed.), Disp: 30 g, Rfl: 0    Blood-Glucose Meter monitoring kit, Use as directed. Dx: E11.65 check sugar twice daily, Disp: 1 Kit, Rfl: 0    albuterol (PROVENTIL HFA, VENTOLIN HFA, PROAIR HFA) 90 mcg/actuation inhaler, Take 2 Puffs by inhalation every four (4) hours as needed. , Disp: , Rfl:     acetaminophen (TYLENOL ARTHRITIS PAIN) 650 mg TbER, Take 1,300 mg by mouth two (2) times a day., Disp: , Rfl:     aspirin 81 mg chewable tablet, Take 81 mg by mouth daily. , Disp: , Rfl:     lancets misc, Use as directed. Dx:check sugar once daily.  E11.65 (Patient not taking: Reported on 6/22/2022), Disp: 1 Each, Rfl: [facility],[address], [place]  [address], [facility] [address]  Phone: [phone]      PATIENT CARE TEAM:  Patient Care Team:  Trina Chatman MD as PCP - General (Internal Medicine Physician)  Kendra Adan MD (Cardiovascular Disease Physician)  Berline Meigs, MD (Gastroenterology)  Bronwyn Holter, MD as Consulting Provider (Cardiovascular Disease Physician)

## 2022-06-22 NOTE — PROGRESS NOTES
Room 8     Identified pt with two pt identifiers(name and ). Reviewed record in preparation for visit and have obtained necessary documentation. All patient medications has been reviewed. Chief Complaint   Patient presents with    CHF     /u; pt states some light headed, some sob, some bilateral feet sweeling, and stomache bloating;     Fatigue       3 most recent PHQ Screens 2022   PHQ Not Done -   Little interest or pleasure in doing things Not at all   Feeling down, depressed, irritable, or hopeless Not at all   Total Score PHQ 2 0   Trouble falling or staying asleep, or sleeping too much -   Feeling tired or having little energy -   Poor appetite, weight loss, or overeating -   Feeling bad about yourself - or that you are a failure or have let yourself or your family down -   Trouble concentrating on things such as school, work, reading, or watching TV -   Moving or speaking so slowly that other people could have noticed; or the opposite being so fidgety that others notice -   Thoughts of being better off dead, or hurting yourself in some way -   PHQ 9 Score -   How difficult have these problems made it for you to do your work, take care of your home and get along with others -     Abuse Screening Questionnaire 2020   Do you ever feel afraid of your partner? N   Are you in a relationship with someone who physically or mentally threatens you? N   Is it safe for you to go home? Y       Health Maintenance Due   Topic    Eye Exam Retinal or Dilated     DTaP/Tdap/Td series (1 - Tdap)    Shingrix Vaccine Age 50> (1 of 2)    Low dose CT lung screening     COVID-19 Vaccine (3 - Booster for Kim Peter series)    Pneumococcal 65+ years (2 - PCV)    MICROALBUMIN Q1     Lipid Screen     Breast Cancer Screen Mammogram          Health Maintenance Review: Patient reminded of \"due or due soon\" health maintenance.  I have asked the patient to contact his/her primary care provider (PCP) for follow-up on his/her health maintenance. Vitals:    06/22/22 1332   BP: 110/70   Pulse: 71   Resp: 16   Temp: 97.5 °F (36.4 °C)   TempSrc: Oral   SpO2: 97%   Weight: 235 lb 9.6 oz (106.9 kg)   Height: 5' 2\" (1.575 m)   PainSc:   0 - No pain       Wt Readings from Last 3 Encounters:   06/22/22 235 lb 9.6 oz (106.9 kg)   05/16/22 237 lb (107.5 kg)   03/03/22 239 lb (108.4 kg)     Temp Readings from Last 3 Encounters:   06/22/22 97.5 °F (36.4 °C) (Oral)   05/16/22 98.1 °F (36.7 °C)   03/03/22 97.3 °F (36.3 °C) (Oral)     BP Readings from Last 3 Encounters:   06/22/22 110/70   05/16/22 131/70   03/03/22 116/60     Pulse Readings from Last 3 Encounters:   06/22/22 71   05/16/22 72   03/03/22 74       Coordination of Care Questionnaire:   1) Have you been to an emergency room, urgent care, or hospitalized since your last visit?   no       2. Have seen or consulted any other health care provider since your last visit? NO     Patient is accompanied by self I have received verbal consent from Sonido Larkin to discuss any/all medical information while they are present in the room.

## 2022-06-22 NOTE — PATIENT INSTRUCTIONS
Medication changes:    Continue bumex 4 mg twice daily until cardiomems and weight come down    Please take this to your pharmacy to notify them of the change in medications. Testing Ordered:    Lab work has been ordered. Please present to a [facility] location of your choice with the orders provided to have lab work done. Please wear a mask when you present to [facility] and practice diligent hand hygeine before and after your visit. Our office will notify you of any abnormal results requiring changes to your current plan of care. Other Recommendations:      Ensure your drinking an adequate amount of water with a goal of 6-8 eight ounce glasses (1.5-2 liters) of fluid daily. Your urine should be clear and light yellow straw colored. If your blood pressure begins to consistently run below 90/60 and/or you begin to experience dizziness or lightheadedness, please contact the [facility] at [phone]. Follow up in 3 months with NP with [facility]      Please monitor your weights daily upon waking and after using the bathroom. Keep a written records of your weights and bring to your next appointment. If you have a weight gain of 3 or more pounds overnight OR 5 or more pounds in one week please contact our office. Thank you for allowing us the privilege of being a part of your healthcare team! Please do not hesitate to contact our office at [phone] with any questions or concerns. Virtual Heart Failure [address] invites you to learn more about heart failure and to share your questions, ideas, and experiences with others. Each month, the Heart Failure Support Group features a new educational topic and a guest speaker, followed by an interactive discussion. Our Heart Failure Nurse Navigator will moderate each session. You will be able to participate by phone, tablet or computer through [employer].  This support group takes place on the 3rd Thursday of each month from 6:00-7:30PM. All individuals living with heart failure and their caregivers are welcome to join. If you are interested in participating, please contact us at [email or web address] and you will be sent the link to join the [employer].

## 2022-06-27 ENCOUNTER — OFFICE VISIT (OUTPATIENT)
Dept: CARDIOLOGY CLINIC | Age: 72
End: 2022-06-27
Payer: MEDICARE

## 2022-06-27 DIAGNOSIS — I50.42 CHRONIC HEART FAILURE WITH REDUCED EJECTION FRACTION AND DIASTOLIC DYSFUNCTION (HCC): Primary | ICD-10-CM

## 2022-06-27 PROCEDURE — 93264 REM MNTR WRLS P-ART PRS SNR: CPT | Performed by: INTERNAL MEDICINE

## 2022-07-01 ENCOUNTER — OFFICE VISIT (OUTPATIENT)
Dept: CARDIOLOGY CLINIC | Age: 72
End: 2022-07-01

## 2022-07-01 DIAGNOSIS — I50.42 CHRONIC HEART FAILURE WITH REDUCED EJECTION FRACTION AND DIASTOLIC DYSFUNCTION (HCC): Primary | ICD-10-CM

## 2022-07-08 ENCOUNTER — OFFICE VISIT (OUTPATIENT)
Dept: CARDIOLOGY CLINIC | Age: 72
End: 2022-07-08

## 2022-07-08 DIAGNOSIS — I50.42 CHRONIC HEART FAILURE WITH REDUCED EJECTION FRACTION AND DIASTOLIC DYSFUNCTION (HCC): Primary | ICD-10-CM

## 2022-07-15 ENCOUNTER — OFFICE VISIT (OUTPATIENT)
Dept: CARDIOLOGY CLINIC | Age: 72
End: 2022-07-15

## 2022-07-15 DIAGNOSIS — I50.42 CHRONIC HEART FAILURE WITH REDUCED EJECTION FRACTION AND DIASTOLIC DYSFUNCTION (HCC): Primary | ICD-10-CM

## 2022-07-20 ENCOUNTER — TRANSCRIBE ORDER (OUTPATIENT)
Dept: SCHEDULING | Age: 72
End: 2022-07-20

## 2022-07-20 DIAGNOSIS — M81.0 SENILE OSTEOPOROSIS: Primary | ICD-10-CM

## 2022-07-22 ENCOUNTER — OFFICE VISIT (OUTPATIENT)
Dept: CARDIOLOGY CLINIC | Age: 72
End: 2022-07-22

## 2022-07-22 DIAGNOSIS — I50.42 CHRONIC HEART FAILURE WITH REDUCED EJECTION FRACTION AND DIASTOLIC DYSFUNCTION (HCC): Primary | ICD-10-CM

## 2022-07-29 ENCOUNTER — OFFICE VISIT (OUTPATIENT)
Dept: CARDIOLOGY CLINIC | Age: 72
End: 2022-07-29
Payer: MEDICARE

## 2022-07-29 DIAGNOSIS — I50.42 CHRONIC HEART FAILURE WITH REDUCED EJECTION FRACTION AND DIASTOLIC DYSFUNCTION (HCC): Primary | ICD-10-CM

## 2022-07-29 PROCEDURE — 93264 REM MNTR WRLS P-ART PRS SNR: CPT | Performed by: INTERNAL MEDICINE

## 2022-08-01 ENCOUNTER — CLINICAL SUPPORT (OUTPATIENT)
Dept: DIABETES SERVICES | Age: 72
End: 2022-08-01
Payer: MEDICARE

## 2022-08-01 ENCOUNTER — PATIENT MESSAGE (OUTPATIENT)
Dept: CARDIOLOGY CLINIC | Age: 72
End: 2022-08-01

## 2022-08-01 DIAGNOSIS — E11.9 TYPE 2 DIABETES MELLITUS WITHOUT COMPLICATION, WITHOUT LONG-TERM CURRENT USE OF INSULIN (HCC): Primary | ICD-10-CM

## 2022-08-01 PROCEDURE — G0108 DIAB MANAGE TRN  PER INDIV: HCPCS

## 2022-08-01 RX ORDER — BUMETANIDE 1 MG/1
4 TABLET ORAL 2 TIMES DAILY
Qty: 240 TABLET | Refills: 1 | Status: SHIPPED | OUTPATIENT
Start: 2022-08-01 | End: 2022-10-06 | Stop reason: SDUPTHER

## 2022-08-01 RX ORDER — POTASSIUM CHLORIDE 20 MEQ/1
20 TABLET, EXTENDED RELEASE ORAL ONCE
Qty: 1 TABLET | Refills: 0 | Status: SHIPPED | OUTPATIENT
Start: 2022-08-01 | End: 2022-08-01

## 2022-08-01 RX ORDER — METOLAZONE 2.5 MG/1
2.5 TABLET ORAL ONCE
Qty: 1 TABLET | Refills: 0 | Status: SHIPPED | OUTPATIENT
Start: 2022-08-01 | End: 2022-08-01

## 2022-08-01 NOTE — TELEPHONE ENCOUNTER
Requested Prescriptions     Signed Prescriptions Disp Refills    bumetanide (BUMEX) 1 mg tablet 240 Tablet 1     Sig: Take 4 Tablets by mouth two (2) times a day. Authorizing Provider: Constantino Solitario     Ordering User: Memory Dessert     Last ov 6/24/22 next ov 9/21/22 GAVIN Cox RN

## 2022-08-01 NOTE — TELEPHONE ENCOUNTER
Norm Tierney NP 8/1/2022 3:38 PM EDT    One time dose of metolazone 2.5mg with 20 meq of potassium. Keep the bumex going    Requested Prescriptions     Signed Prescriptions Disp Refills    metOLazone (ZAROXOLYN) 2.5 mg tablet 1 Tablet 0     Sig: Take 1 Tablet by mouth once for 1 dose. Authorizing Provider: Baltazar Perales     Ordering User: Hollis Moss    potassium chloride (K-DUR, KLOR-CON M20) 20 mEq tablet 1 Tablet 0     Sig: Take 1 Tablet by mouth once for 1 dose.      Authorizing Provider: Baltazar Perales     Ordering User: Hollis Moss       My chart message sent with above information per [place] NP. Alvaro Lopez RN

## 2022-08-02 NOTE — PROGRESS NOTES
[facility] Program for Diabetes Health  Diabetes Self-Management Education & Support Program  Encounter Note    SUMMARY  Diabetes self-care management training was completed related to healthy eating. he participant will return on August 7 to continue DSMES related to healthy eating. The participant did not identify SMART Goal(s) and will practice knowledge and skills related to     EVALUATION:  - Patient it's been seen Karey Resendez RD,[facility], whom its been out of the office. Pt informed she returned today , and she will follow up  with Ms Osvaldo Carroll. - Appt today to talk about her healthy eating , states she still need help on how to eat , having difficulty. - Patient with difficulty with food portions. States she get frustrated keeping food logs. RECOMMENDATIONS:  - Need to count CHO for meals   - Keep a food log and bring to Ms Osvaldo Carroll to next appt       TOPICS DISCUSSED TODAY:  WHAT CAN I EAT? 60      Next provider visit is scheduled : not scheduled        SMART GOAL(S)  Measure portion sizes at least 3 times over the next week. ACHIEVEMENT OF GOAL(S) : 0-24%         DATE DSMES TOPIC EVALUATION     8/2/2022 WHAT IS DIABETES? Role of the normal pancreas in energy balance and blood glucose control   The defect seen in diabetes   Signs & symptoms of diabetes   Diagnosis of diabetes   Types of diabetes   Blood glucose targets in non-pregnant & non-pregnant adults       The participant knows  Their type of diabetes: Yes  The basic physiologic defect: Yes  Blood glucose targets: Yes    Taking meds:   - Semaglutide ( ozempic)   - Novolin  70/30 Flexpen- U-100 18 units in AM and 14 units in PM     Pt states she weight herself everyday . She use Milk 1 %   Discussed resources for her to have updated information , like Diabetes magazines.      Food log:   Breakfast: 9 AM - yogurt, apple 1-2  or banana or strawberries, green or red grapes   Lunch 12:30 PM - sometimes skip - sugar free, BBQ sauce, left overs chicken, vegetables, asparagus, broccoli , corn in a cup, greens , carrots, cauliflower, brussels sprouts. Dinner= 5 pm - chicken/steak, bake potatoes, cheese , vegetables  Snacks= no snack at HS , take fruit at times  Takes grapes to avoid low blood sugars. Patient states she cooked. Food log discussed. - Advised about portions/ also not eating enough for breakfast, had low bs of 69 at 2pm , July 2.   - advised about meal times. Lab Results   Component Value Date/Time    Creatinine 1.92 (H) 05/10/2022 01:51 PM   GFR= 31-      5/20/22        Please see blood sugar log , weight log. Patient will also show her logs to her provider. Log scan in chart . Kassie Riley RN on 8/2/2022 at 2:29 PM    I have personally reviewed the health record, including provider notes, laboratory data and current medications before making these care and education recommendations. The time spent in this effort is included in the total time. Total minutes: 60    Patient will follow up with her [address] , DM Educator . Appt with Ms [facility] RAGHU HELMS, 9/1/22. Patient advised to call if she have any question. [facility] [employer] for Telehealth:  Nik Dorman, was evaluated through a synchronous (real-time) audio-video encounter. The patient (or guardian if applicable) is aware that this is a billable service, which includes applicable co-pays. This Virtual Visit was conducted with patient's (and/or legal guardian's) consent. The visit was conducted pursuant to the emergency declaration under the [facility], [address] [place] [address] [facility] authority and the [employer] and [employer] General Act. Patient identification was verified, and a caregiver was present when appropriate. The patient was located in a state where the provider was licensed to provide care.

## 2022-08-03 ENCOUNTER — OFFICE VISIT (OUTPATIENT)
Dept: CARDIOLOGY CLINIC | Age: 72
End: 2022-08-03
Payer: MEDICARE

## 2022-08-03 DIAGNOSIS — Z95.810 AUTOMATIC IMPLANTABLE CARDIAC DEFIBRILLATOR IN SITU: Primary | ICD-10-CM

## 2022-08-03 PROCEDURE — 93295 DEV INTERROG REMOTE 1/2/MLT: CPT | Performed by: INTERNAL MEDICINE

## 2022-08-03 PROCEDURE — 93296 REM INTERROG EVL PM/IDS: CPT | Performed by: INTERNAL MEDICINE

## 2022-08-03 NOTE — LETTER
8/7/2022 8:45 AM    Ms. Ilan Funk [address] [place]      Dear Ms. Laurie Almonte,    We have received your recent remote monitor check of your implanted device on  8/3/22. Your remaining estimated battery life is 2.3 years and your device is working normally & appropriately. You had several fast heart rates on 6/13/22 between 5:00 pm - 6:30 pm that did not require intervention from your device. We will continue to monitor for those. Your next remote monitor check is scheduled for  11/9/22. This is NOT an in-clinic appointment. This transmission is sent from your home monitor. Please make sure your home monitor is plugged into power and within 10 feet of where you sleep. If you are using the phone applications, please make sure it is open on your smart phone. If you have difficulty sending a transmission, please do NOT call our office. Instead, call tech support for your device as they are better able to assist.    [employer] ([employer])   [phone]    If you have any questions, please call the Pacemaker/ICD clinic that follows you. We appreciate you staying remotely connected!     Sincerely,    Lidia Valdez RN, BSN  Device Coordinator RN  [facility]  [phone]   [id number]  [phone]

## 2022-08-07 NOTE — PROGRESS NOTES
Chargeable dc icd remote    Normal device function with <10% RA & RV pacing. See scanned report in  for details.

## 2022-08-08 ENCOUNTER — OFFICE VISIT (OUTPATIENT)
Dept: CARDIOLOGY CLINIC | Age: 72
End: 2022-08-08

## 2022-08-08 DIAGNOSIS — I50.42 CHRONIC HEART FAILURE WITH REDUCED EJECTION FRACTION AND DIASTOLIC DYSFUNCTION (HCC): Primary | ICD-10-CM

## 2022-08-15 ENCOUNTER — OFFICE VISIT (OUTPATIENT)
Dept: CARDIOLOGY CLINIC | Age: 72
End: 2022-08-15

## 2022-08-15 DIAGNOSIS — I50.42 CHRONIC HEART FAILURE WITH REDUCED EJECTION FRACTION AND DIASTOLIC DYSFUNCTION (HCC): Primary | ICD-10-CM

## 2022-08-22 ENCOUNTER — OFFICE VISIT (OUTPATIENT)
Dept: CARDIOLOGY CLINIC | Age: 72
End: 2022-08-22

## 2022-08-22 DIAGNOSIS — I50.42 CHRONIC HEART FAILURE WITH REDUCED EJECTION FRACTION AND DIASTOLIC DYSFUNCTION (HCC): Primary | ICD-10-CM

## 2022-08-29 ENCOUNTER — OFFICE VISIT (OUTPATIENT)
Dept: CARDIOLOGY CLINIC | Age: 72
End: 2022-08-29
Payer: MEDICARE

## 2022-08-29 DIAGNOSIS — I50.42 CHRONIC HEART FAILURE WITH REDUCED EJECTION FRACTION AND DIASTOLIC DYSFUNCTION (HCC): Primary | ICD-10-CM

## 2022-08-29 PROCEDURE — 93264 REM MNTR WRLS P-ART PRS SNR: CPT | Performed by: INTERNAL MEDICINE

## 2022-09-06 ENCOUNTER — OFFICE VISIT (OUTPATIENT)
Dept: CARDIOLOGY CLINIC | Age: 72
End: 2022-09-06

## 2022-09-06 DIAGNOSIS — I50.42 CHRONIC HEART FAILURE WITH REDUCED EJECTION FRACTION AND DIASTOLIC DYSFUNCTION (HCC): Primary | ICD-10-CM

## 2022-09-07 ENCOUNTER — TRANSCRIBE ORDER (OUTPATIENT)
Dept: SCHEDULING | Age: 72
End: 2022-09-07

## 2022-09-07 ENCOUNTER — CLINICAL SUPPORT (OUTPATIENT)
Dept: DIABETES SERVICES | Age: 72
End: 2022-09-07
Payer: MEDICARE

## 2022-09-07 DIAGNOSIS — Z12.31 ENCOUNTER FOR SCREENING MAMMOGRAM FOR BREAST CANCER: Primary | ICD-10-CM

## 2022-09-07 DIAGNOSIS — E11.9 TYPE 2 DIABETES MELLITUS WITHOUT COMPLICATION, WITHOUT LONG-TERM CURRENT USE OF INSULIN (HCC): Primary | ICD-10-CM

## 2022-09-07 DIAGNOSIS — E11.22 TYPE 2 DIABETES MELLITUS WITH DIABETIC CHRONIC KIDNEY DISEASE, UNSPECIFIED CKD STAGE, UNSPECIFIED WHETHER LONG TERM INSULIN USE (HCC): ICD-10-CM

## 2022-09-07 PROCEDURE — 97802 MEDICAL NUTRITION INDIV IN: CPT | Performed by: DIETITIAN, REGISTERED

## 2022-09-08 DIAGNOSIS — E11.65 TYPE 2 DIABETES MELLITUS WITH HYPERGLYCEMIA, WITHOUT LONG-TERM CURRENT USE OF INSULIN (HCC): Primary | ICD-10-CM

## 2022-09-12 ENCOUNTER — OFFICE VISIT (OUTPATIENT)
Dept: CARDIOLOGY CLINIC | Age: 72
End: 2022-09-12

## 2022-09-12 DIAGNOSIS — I50.42 CHRONIC HEART FAILURE WITH REDUCED EJECTION FRACTION AND DIASTOLIC DYSFUNCTION (HCC): Primary | ICD-10-CM

## 2022-09-19 ENCOUNTER — OFFICE VISIT (OUTPATIENT)
Dept: CARDIOLOGY CLINIC | Age: 72
End: 2022-09-19

## 2022-09-19 DIAGNOSIS — I50.42 CHRONIC HEART FAILURE WITH REDUCED EJECTION FRACTION AND DIASTOLIC DYSFUNCTION (HCC): Primary | ICD-10-CM

## 2022-09-21 ENCOUNTER — OFFICE VISIT (OUTPATIENT)
Dept: CARDIOLOGY CLINIC | Age: 72
End: 2022-09-21
Payer: MEDICARE

## 2022-09-21 VITALS
BODY MASS INDEX: 42.47 KG/M2 | OXYGEN SATURATION: 96 % | DIASTOLIC BLOOD PRESSURE: 60 MMHG | SYSTOLIC BLOOD PRESSURE: 106 MMHG | TEMPERATURE: 96.6 F | RESPIRATION RATE: 16 BRPM | WEIGHT: 230.8 LBS | HEIGHT: 62 IN | HEART RATE: 71 BPM

## 2022-09-21 DIAGNOSIS — I50.42 CHRONIC HEART FAILURE WITH REDUCED EJECTION FRACTION AND DIASTOLIC DYSFUNCTION (HCC): Primary | ICD-10-CM

## 2022-09-21 DIAGNOSIS — I73.9 PERIPHERAL VASCULAR DISEASE (HCC): ICD-10-CM

## 2022-09-21 DIAGNOSIS — N18.4 CKD (CHRONIC KIDNEY DISEASE) STAGE 4, GFR 15-29 ML/MIN (HCC): ICD-10-CM

## 2022-09-21 PROCEDURE — 1123F ACP DISCUSS/DSCN MKR DOCD: CPT | Performed by: NURSE PRACTITIONER

## 2022-09-21 PROCEDURE — G8399 PT W/DXA RESULTS DOCUMENT: HCPCS | Performed by: NURSE PRACTITIONER

## 2022-09-21 PROCEDURE — G8432 DEP SCR NOT DOC, RNG: HCPCS | Performed by: NURSE PRACTITIONER

## 2022-09-21 PROCEDURE — 3017F COLORECTAL CA SCREEN DOC REV: CPT | Performed by: NURSE PRACTITIONER

## 2022-09-21 PROCEDURE — G9899 SCRN MAM PERF RSLTS DOC: HCPCS | Performed by: NURSE PRACTITIONER

## 2022-09-21 PROCEDURE — 1090F PRES/ABSN URINE INCON ASSESS: CPT | Performed by: NURSE PRACTITIONER

## 2022-09-21 PROCEDURE — 99214 OFFICE O/P EST MOD 30 MIN: CPT | Performed by: NURSE PRACTITIONER

## 2022-09-21 PROCEDURE — 1101F PT FALLS ASSESS-DOCD LE1/YR: CPT | Performed by: NURSE PRACTITIONER

## 2022-09-21 PROCEDURE — G8427 DOCREV CUR MEDS BY ELIG CLIN: HCPCS | Performed by: NURSE PRACTITIONER

## 2022-09-21 PROCEDURE — G8417 CALC BMI ABV UP PARAM F/U: HCPCS | Performed by: NURSE PRACTITIONER

## 2022-09-21 PROCEDURE — G8536 NO DOC ELDER MAL SCRN: HCPCS | Performed by: NURSE PRACTITIONER

## 2022-09-21 RX ORDER — TIZANIDINE 2 MG/1
TABLET ORAL
COMMUNITY
Start: 2022-08-05

## 2022-09-21 NOTE — PROGRESS NOTES
Met with pt. After her visit to introduce SW services. Pt. Discussed a history of mood difficulties, but stated that at present, she is improved emotionally and physically. She has concerns abt. Bills and would like to talk by phone Monday after contacting pt. Assistance. During visit discussed pt. Coping and provided supportive coaching for mood management. Pt. Enjoys watching TV. She is taking Zoloft and has seen a counselor in the past, but is not sure if she is satisfied with the therapist in her network (not seeing anyone currently). Pt. May want to discuss in more detail at a later time.

## 2022-09-21 NOTE — PROGRESS NOTES
[facility], [facility] Note    Patient name: Manju Gray  Patient : 1950  Patient MRN: [id number]  Date of service: 22      CHIEF COMPLAINT:    Chief Complaint   Patient presents with    CHF     Follow up for CHF    Shortness of Breath     W/ exertion    Leg Swelling     Right leg swelling        RECOMMENDATIONS:  Continue verquvo 10mg daily  Beta-blocker: toprol 12.5mg daily in the evening  ACE/ARB/ARNi: intolerant d/t renal failure  Spironolactone: intolerant d/t hyperkalemia  Continue IMDUR 30mg daily and hydralazine 10mg TID  SGLT2 inhibitor: Jardiance held d/t renal dysfunction, can consider restarting if renal function improves enough  Diuretic: Continue Bumex 4mg BID  Not on allopurinol or uloric, uric acid level 5  Continue ASA and plavix   Statin or PCSK9i: Continue current dose of atorvastatin and zetia  Continue CPAP therapy  ICD interrogation every 3 months   Echocardiogram and EKG quarterly  Routine HF labs-next in December   Reinforced heart healthy, low salt diet  Reinforced appropriate fluid intake of 6 x 8oz glasses of water per day  Monitor and record daily weights and BPs  Advanced care plan present on file  Follow-up with primary cardiologist  Follow-up with EP cardiologist- Dr. Mirna Robb with PCP- [facility]   Note from nephrology    Follow-up and Dispositions    Return in about 4 months (around 2023). IMPRESSION:  Fatigue  Shortness of breath  Chronic systolic heart failure  Stage D, NYHA class IIIA symptoms  Ischemic cardiomyopathy, LVEF 35-40%  Invitae with VUS  CAD s/p CABG  s/p PCI to DVG-RCA   H/o severe MR s/p mitral clip in   HTN  H/o VT s/p AICD  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   HLD  Osteoarthritis   CKD4        CARDIAC IMAGING AND DIAGNOSTICS REVIEWED:  Echo 2022  LVEF 45%, mild MR, LVIDd 5.54cm  Echo (8/19/21)  LV: Estimated LVEF is 30 - 35%. Mildly dilated left ventricle. Mild concentric hypertrophy. Moderately reduced systolic function. Moderate (grade 2) left ventricular diastolic dysfunction. Previously placed mitraclip is noted in secure position with residual trace to mild MR lateral to clip placement. Mean transmitral gradient is 2.6mmHg at heart rate of about 70 bpm.  LA: Moderately dilated left atrium. MV: Mild mitral valve regurgitation is present. LA: Severely dilated left atrium. RA: Mildly dilated right atrium. Echo (5/31/21)  LV: Estimated LVEF is 25 - 30%. Severely dilated left ventricle. Mild concentric hypertrophy. Severely and globally reduced systolic function. Inconclusive left ventricular diastolic function. LA: Moderately dilated left atrium. RA: Mildly dilated right atrium. MV: Moderate mitral valve regurgitation is present. Image quality for this study was suboptimal.       6 Min Walk Report 6/3/2021 4/23/2021 12/28/2020 12/22/2020 8/31/2020 7/1/2020 1/30/2020   (PRE) HR 70 70 71 73 81 71 74   (PRE) O2 Sat 98 - - - 96 - 97   (POST) HR 92 - - - 110 - 124   (POST) O2 Sat 99 - 97 - 97 97 94   Distance in Meters 91.44 - - - 226.77 - 194.95         HISTORY OF PRESENT ILLNESS:  I had the pleasure of seeing Harley Walker in [facility] in [place]. Briefly, Harley Walker is a 74 y.o. female with h/o HTN, HLD, WES, Obesity (BMI 39), s/p gastric sleeve in 2011, T2DM, hypothyroidism, COPD, CAD s/p CABG in 1997, s/p PCI to [address] in 5/2015, ICM, [place], s/p AICD ([employer]), anxiety and depression. She more recently underwent MitraClip on 11/12/2020 and was evaluated for upgrade to [address] with Dr. Billy Walter, however this was not done as her QRS was too narrow. Her most recent admission was 5/30/21-6/3/21 for CHF exacerbation. She was diuresed and her Judye Smith was stopped due to renal failure.           INTERVAL HISTORY:  Today, patient presents for HF clinic visit. She states she feels well today, she has lost 5 lbs and her repeat TTE from August shows an EF of 45%. She has been doing PT for her knee and can do household work but otherwise does not do much. She is compliant with her medications and her cardiomems readings. REVIEW OF SYSTEMS:  Review of Systems   Constitutional:  Positive for malaise/fatigue. Negative for chills and fever. Respiratory:  Negative for cough and shortness of breath. Cardiovascular:  Negative for chest pain, palpitations, orthopnea and leg swelling. Gastrointestinal:  Negative for abdominal pain, nausea and vomiting. Musculoskeletal:  Positive for joint pain. Negative for falls and myalgias. Neurological:  Negative for dizziness, weakness and headaches. Endo/Heme/Allergies:  Does not bruise/bleed easily. Psychiatric/Behavioral:  Negative for depression. PHYSICAL EXAM:  Visit Vitals  /60 (BP 1 Location: Left upper arm, BP Patient Position: Sitting, BP Cuff Size: Large adult)   Pulse 71   Temp (!) 96.6 °F (35.9 °C) (Oral)   Resp 16   Ht 5' 2\" (1.575 m)   Wt 230 lb 12.8 oz (104.7 kg)   SpO2 96%   BMI 42.21 kg/m²     Physical Exam  Vitals reviewed. Constitutional:       General: She is not in acute distress. Appearance: Normal appearance. She is obese. She is not ill-appearing. HENT:      Head: Normocephalic and atraumatic. Eyes:      Conjunctiva/sclera: Conjunctivae normal.      Pupils: Pupils are equal, round, and reactive to light. Neck:      Vascular: No hepatojugular reflux or JVD. Cardiovascular:      Rate and Rhythm: Normal rate and regular rhythm. Pulses: Normal pulses. Heart sounds: Murmur heard. Systolic murmur is present with a grade of 3/6. No friction rub. No gallop. Pulmonary:      Effort: Pulmonary effort is normal. No respiratory distress. Breath sounds: Normal breath sounds.    Abdominal:      General: Bowel sounds are normal. There is no distension. Palpations: Abdomen is soft. Tenderness: There is no abdominal tenderness. Musculoskeletal:         General: Swelling present. Normal range of motion. Cervical back: Neck supple. Comments: +1BLE   Skin:     General: Skin is warm and dry. Capillary Refill: Capillary refill takes less than 2 seconds. Neurological:      General: No focal deficit present. Mental Status: She is alert and oriented to person, place, and time. Psychiatric:         Mood and Affect: Mood normal.         Behavior: Behavior normal.         Thought Content:  Thought content normal.         Judgment: Judgment normal.        PAST MEDICAL HISTORY:  Past Medical History:   Diagnosis Date    Adverse effect of anesthesia     hard to wake up/uses BIPAP/\"try to avoid general if possible\"/intubated in past prior to going to sleep and it caused pt to be incontinent    Arthritis     Asthma     CAD (coronary artery disease)     Chronic kidney disease     elevataed creatinine    Chronic obstructive pulmonary disease (HCC)     Chronic pain     arthritis    Coagulation disorder (HCC)     on plavix    Congestive heart failure (HCC)     Diabetes ([address]     Hypertension     Morbid obesity (HCC)     Sleep apnea     BIPAP with 2 liters oxygen    Thromboembolus ([address]     after heart surgery - left leg    Thyroid disease        PAST SURGICAL HISTORY:  Past Surgical History:   Procedure Laterality Date    COLONOSCOPY N/A 2020    COLONOSCOPY   :- performed by Bob Harvey MD at [address] [place] [address]  2105    knee - right    HX  SECTION      x3    HX CORONARY ARTERY BYPASS GRAFT  1997    3 vessels    HX CORONARY STENT PLACEMENT  2016    HX HERNIA REPAIR  1988    HX IMPLANTABLE CARDIOVERTER DEFIBRILLATOR      HX KNEE REPLACEMENT Right 2015    once    CA CARDIAC SURG PROCEDURE UNLIST  2018    cardiac cath - Left    CA LAP GASTRIC BYPASS/KERLINE-EN-Y      lap band per pt and not a gastric bypass       FAMILY HISTORY:  Family History   Problem Relation Age of Onset    Hypertension Mother     Dementia Mother     Coronary Art Dis Father 58    Sudden Death Father 58    Diabetes Son     Diabetes Brother        SOCIAL HISTORY:  Social History     Socioeconomic History    Marital status:    Tobacco Use    Smoking status: Former     Packs/day: 0.50     Years: 45.00     Pack years: 22.50     Types: Cigarettes     Quit date: 2010     Years since quittin.7    Smokeless tobacco: Never    Tobacco comments:     quit /started again (smoked 3 yrs) off and on/none since    Vaping Use    Vaping Use: Never used   Substance and Sexual Activity    Alcohol use: Yes     Comment: occassionally     Drug use: Not Currently    Sexual activity: Not Currently       LABORATORY RESULTS:  No flowsheet data found. ALLERGY:  Allergies   Allergen Reactions    Crestor [Rosuvastatin] Other (comments)     Causes muscle cramps    Lisinopril Cough    Nsaids (Non-Steroidal Anti-Inflammatory Drug) Other (comments)     Liver and Kidney        CURRENT MEDICATIONS:    Current Outpatient Medications:     tiZANidine (ZANAFLEX) 2 mg tablet, TAKE 1 TABLET BY MOUTH TWICE DAILY AS NEEDED FOR MUSCLE RELAXANT, Disp: , Rfl:     Verquvo 10 mg tab, TAKE ONE TABLET BY MOUTH DAILY, Disp: 30 Each, Rfl: 11    bumetanide (BUMEX) 1 mg tablet, Take 4 Tablets by mouth two (2) times a day., Disp: 240 Tablet, Rfl: 1    semaglutide (OZEMPIC) 1 mg/dose (2 mg/1.5 mL) sub-q pen, 1 mg by SubCUTAneous route every seven (7) days. Once weekly, Disp: , Rfl:     NovoLIN 70-30 FlexPen U-100 100 unit/mL (70-30) inpn, 18 units before breakfast and 14 units at dinner, Disp: , Rfl:     isosorbide mononitrate ER (IMDUR) 30 mg tablet, Take 1 Tablet by mouth daily.  in the morning, Disp: 30 Tablet, Rfl: 1    gabapentin (NEURONTIN) 100 mg capsule, TAKE 2 CAPSULES BY MOUTH TWICE DAILY MAX  DAILY AMOUNT  400MG, Disp: 120 Capsule, Rfl: 0    ezetimibe (ZETIA) 10 mg tablet, Take 1 Tablet by mouth daily. , Disp: 30 Tablet, Rfl: 1    clopidogreL (PLAVIX) 75 mg tab, Take 1 tablet by mouth once daily, Disp: 90 Tablet, Rfl: 0    Insulin Needles, Disposable, 32 gauge x 5/32\" ndle, 1 Each by Does Not Apply route daily. , Disp: 100 Pen Needle, Rfl: 2    alcohol swabs padm, 1 Each by Apply Externally route daily. , Disp: 100 Pad, Rfl: 11    sertraline (ZOLOFT) 50 mg tablet, 50 mg daily. , Disp: , Rfl:     ketoconazole (NIZORAL) 2 % shampoo, , Disp: , Rfl:     hydrALAZINE (APRESOLINE) 10 mg tablet, Take 1 Tablet by mouth three (3) times daily. , Disp: 90 Tablet, Rfl: 1    levothyroxine (Euthyrox) 100 mcg tablet, Take 1 Tablet by mouth Daily (before breakfast). , Disp: 90 Tablet, Rfl: 0    FreeStyle Lancets 28 gauge misc, USE AS DIRECTED, Disp: , Rfl:     metoprolol succinate (TOPROL-XL) 25 mg XL tablet, Take 0.5 Tablets by mouth daily. Hold for systolic BP less than 778, Disp: 60 Tablet, Rfl: 2    nitroglycerin (NITROSTAT) 0.3 mg SL tablet, 1 Tablet by SubLINGual route every five (5) minutes as needed for Chest Pain., Disp: 1 Bottle, Rfl: 0    Blood-Glucose Meter monitoring kit, Check blood sugar daily. May substitute for insurance preferred meter, Disp: 1 Kit, Rfl: 0    glucose blood VI test strips (blood glucose test) strip, Check blood sugar 2 times daily: E11.9 may substitute for insurance preferred strips. , Disp: 200 Strip, Rfl: 3    atorvastatin (LIPITOR) 80 mg tablet, TAKE 1 TABLET BY MOUTH AT BEDTIME, Disp: 90 Tab, Rfl: 3    lancets misc, Check blood sugar 2 times daily. May substitute for insurance preferred lancets. , Disp: 200 Each, Rfl: 3    glucose blood VI test strips (ASCENSIA AUTODISC VI, ONE TOUCH ULTRA TEST VI) strip, E11.65 check sugar once daily, Disp: 100 Strip, Rfl: 0    clotrimazole-betamethasone (LOTRISONE) topical cream, Apply  to affected area two (2) times a day.  (Patient taking differently: Apply  to affected area two (2) times daily as needed.), Disp: 30 g, Rfl: 0    Blood-Glucose Meter monitoring kit, Use as directed. Dx: E11.65 check sugar twice daily, Disp: 1 Kit, Rfl: 0    albuterol (PROVENTIL HFA, VENTOLIN HFA, PROAIR HFA) 90 mcg/actuation inhaler, Take 2 Puffs by inhalation every four (4) hours as needed. , Disp: , Rfl:     acetaminophen (TYLENOL) 650 mg TbER, Take 1,300 mg by mouth two (2) times a day., Disp: , Rfl:     aspirin 81 mg chewable tablet, Take 81 mg by mouth daily. , Disp: , Rfl:     lancets misc, Use as directed. Dx:check sugar once daily.  E11.65 (Patient not taking: No sig reported), Disp: 1 Each, Rfl: [facility], [address], [address]  Phone: [phone]      PATIENT CARE TEAM:  Patient Care Team:  Kay Santos MD as PCP - General (Family Medicine)  Jessica Vaca MD (Cardiovascular Disease Physician)  Fabiano Mace MD (Gastroenterology)  Soni Travis MD as Consulting Provider (Cardiovascular Disease Physician)

## 2022-09-21 NOTE — LETTER
9/21/2022    Patient: Helene Ghosh   YOB: 1950   Date of Visit: 9/21/2022     Yuly Bennett, [address] [address]  Via Fax: [phone]     [place] [address] [place] [address]  Via Fax: [phone]    Dear MD Connie Duarte MD,      Thank you for referring Ms. Cathy Humphrey to [address] [place] [address] for evaluation. My notes for this consultation are attached. If you have questions, please do not hesitate to call me. I look forward to following your patient along with you.       Sincerely,    Brooklynn Washington NP

## 2022-09-21 NOTE — PATIENT INSTRUCTIONS
Medication changes:    No medication changes     Please take this to your pharmacy to notify them of the change in medications. Testing Ordered:    Labs work to be completed in December. Please contact our office at [phone] if lab work is drawn before then. You will receive a reminder call in December. You will be notified of any abnormal results that require a change in medication regimen. Other Recommendations:     Please have echo completed in February      Ensure your drinking an adequate amount of water with a goal of 6-8 eight ounce glasses (1.5-2 liters) of fluid daily. Your urine should be clear and light yellow straw colored. If your blood pressure begins to consistently run below 90/60 and/or you begin to experience dizziness or lightheadedness, please contact the [facility] at [phone]. Follow up 4 months with [facility]      Please monitor your weights daily upon waking and after using the bathroom. Keep a written records of your weights and bring to your next appointment. If you have a weight gain of 3 or more pounds overnight OR 5 or more pounds in one week please contact our office. Thank you for allowing us the privilege of being a part of your healthcare team! Please do not hesitate to contact our office at [phone] with any questions or concerns. Virtual Heart Failure [address] invites you to learn more about heart failure and to share your questions, ideas, and experiences with others. Each month, the Heart Failure Support Group features a new educational topic and a guest speaker, followed by an interactive discussion. Our Heart Failure Nurse Navigator will moderate each session. You will be able to participate by phone, tablet or computer through [address] [place] [address].  This support group takes place on the 3rd Thursday of each month from 6:00-7:30PM. All individuals living with heart failure and their caregivers are welcome to join. If you are interested in participating, please contact us at [email or web address] and you will be sent the link to join the [employer].

## 2022-09-28 ENCOUNTER — PATIENT MESSAGE (OUTPATIENT)
Dept: CARDIOLOGY CLINIC | Age: 72
End: 2022-09-28

## 2022-09-29 ENCOUNTER — OFFICE VISIT (OUTPATIENT)
Dept: CARDIOLOGY CLINIC | Age: 72
End: 2022-09-29
Payer: MEDICARE

## 2022-09-29 ENCOUNTER — TELEPHONE (OUTPATIENT)
Dept: CARDIOLOGY CLINIC | Age: 72
End: 2022-09-29

## 2022-09-29 DIAGNOSIS — I50.42 CHRONIC HEART FAILURE WITH REDUCED EJECTION FRACTION AND DIASTOLIC DYSFUNCTION (HCC): Primary | ICD-10-CM

## 2022-09-29 PROCEDURE — 93264 REM MNTR WRLS P-ART PRS SNR: CPT | Performed by: INTERNAL MEDICINE

## 2022-09-29 RX ORDER — HYDRALAZINE HYDROCHLORIDE 10 MG/1
5 TABLET, FILM COATED ORAL 3 TIMES DAILY
Qty: 90 TABLET | Refills: 1
Start: 2022-09-29 | End: 2022-10-24 | Stop reason: SINTOL

## 2022-09-29 NOTE — TELEPHONE ENCOUNTER
Returned call to patient, she states she has been drinking some water, she did take hydralazine 10 mg this am at 10:30, so has not decreased dose yet, she rechecked BP while we were on phone, HR 78, /61. She will take 5 mg of Hydralazine at her next doses, she will call back if BP continues to run low.

## 2022-09-29 NOTE — TELEPHONE ENCOUNTER
----- Message from Dwight Cardenas NP sent at 9/28/2022  4:07 PM EDT -----  Regarding: FW: Wed 9/28  I think it would be better to have her decrease hydralazine to 5mg TID and have her continue the toprol. If needed, we can stop the hydralazine, but I would start with reducing the dose, monitor BP, and then if BP still too low, then can stop hydralazine. In terms of her fluid status, we may need to consider doing a RHC. Maybe we should try to get her an appt with Dr. Aminta St in Oct/Nov?      Left message for patient to return call with message as noted per Yves Cervantes. Attempted to call patient again, will send mychart message. Patient returned call, I advised her per Yves Cervantes of Hydralazine, will follow up Monday on BP readings, she states understanding, has no further questions.     ----- Message -----  From: Sherine Riggins RN  Sent: 9/28/2022   2:00 PM EDT  To: Dwight Cardenas NP  Subject: FW: Wed 9/28                                     Please see below, her cardiomems today was 34-first reading in 4 days. Please advise, thank you  ----- Message -----  From: Sariah Anderson  Sent: 9/28/2022  12:01 PM EDT  To: [facility]  Subject: Wed 9/28                                         I definitely sent in reading on Tues with a note about having low BP. MY numbers today were BS 85 Wt. 227.0 /66 P76. I had 2-3 days Sat-Mon with BP's 90/66. 100/60 Sunday 80/58. I stopped my metopolol on Monday. I was previously taking metopolol 1/2 tap 25mg at bedtime. Since stopping the med, I have felt better. Nodizziness/lightheadedness or unsteady. I saw my Primary Dr. Chrissy Yu (,Dr Gerri Thomas has left) today and  my BP was 110/66. I see her again in 2 wks. She wants me to inform her about the Metopolol  . pls let me know if you have not received reading today.

## 2022-09-29 NOTE — TELEPHONE ENCOUNTER
Voice message received from patient. She is stating that her BP 90/40. She has cancelled rehab and is laying down. She said its not an emergency to call her back. Attempted to transfer to nursing. No answer. Will text nursing with other day.

## 2022-09-30 ENCOUNTER — TELEPHONE (OUTPATIENT)
Dept: CARDIOLOGY CLINIC | Age: 72
End: 2022-09-30

## 2022-09-30 NOTE — TELEPHONE ENCOUNTER
Called patient to schedule follow up appt in November with Kevin Rudolph.     Scheduled for 11/11/22 @ 11:30am

## 2022-10-06 RX ORDER — BUMETANIDE 1 MG/1
4 TABLET ORAL 2 TIMES DAILY
Qty: 240 TABLET | Refills: 1 | Status: SHIPPED | OUTPATIENT
Start: 2022-10-06

## 2022-10-06 NOTE — TELEPHONE ENCOUNTER
Requested Prescriptions     Signed Prescriptions Disp Refills    bumetanide (BUMEX) 1 mg tablet 240 Tablet 1     Sig: Take 4 Tablets by mouth two (2) times a day.      Authorizing Provider: Kieran Barber     Ordering User: Karolina Beasley

## 2022-10-10 ENCOUNTER — OFFICE VISIT (OUTPATIENT)
Dept: CARDIOLOGY CLINIC | Age: 72
End: 2022-10-10

## 2022-10-10 ENCOUNTER — CLINICAL SUPPORT (OUTPATIENT)
Dept: DIABETES SERVICES | Age: 72
End: 2022-10-10
Payer: MEDICARE

## 2022-10-10 DIAGNOSIS — E11.22 TYPE 2 DIABETES MELLITUS WITH DIABETIC CHRONIC KIDNEY DISEASE, UNSPECIFIED CKD STAGE, UNSPECIFIED WHETHER LONG TERM INSULIN USE (HCC): Primary | ICD-10-CM

## 2022-10-10 DIAGNOSIS — I50.42 CHRONIC HEART FAILURE WITH REDUCED EJECTION FRACTION AND DIASTOLIC DYSFUNCTION (HCC): Primary | ICD-10-CM

## 2022-10-10 PROCEDURE — 97803 MED NUTRITION INDIV SUBSEQ: CPT | Performed by: DIETITIAN, REGISTERED

## 2022-10-11 NOTE — PROGRESS NOTES
[facility] Program for Diabetes Health  Diabetes Self-Management Education & Support Program  Follow-up Encounter note      Patient's Name: Stephanie Luna  Date: 10/10/2022  Reason for Referral: Type 2 DM w/ CKD stage 4   Referral Source: Cassius Coulter., *    Nutrition Assessment  Pertinent Medical History:  Patient Active Problem List   Diagnosis Code    Hx of CABG Z95.1    ICD (implantable cardioverter-defibrillator) in place Z95.810    H/O laparoscopic adjustable gastric banding Z98.84    Obesity, morbid ([facility] [address] E66.01    NYHA class 3 heart failure with reduced ejection fraction ([facility]) I50.20    Peripheral vascular disease ([facility] [address] I73.9    Severe mitral regurgitation I34.0    Ischemic cardiomyopathy I25.5    Chronic heart failure with reduced ejection fraction and diastolic dysfunction ([facility]) I50.42    Sleep apnea treated with nocturnal BiPAP G47.30    Mitral regurgitation I34.0    CHF exacerbation ([facility]) I50.9    Acute on chronic respiratory failure ([facility]) J96.20    Type 2 diabetes mellitus with chronic kidney disease ([facility]) E11.22    CKD (chronic kidney disease) stage 4, GFR 15-29 ml/min ([facility]) N18.4       New Pertinent Medications/Supplements:  No new info    New Pertinent Biochemical Data:  No new data    Anthropometrics:  Height:   Ht Readings from Last 1 Encounters:   09/21/22 5' 2\" (1.575 m)     Weight hx: Wt Readings from Last 3 Encounters:   09/21/22 230 lb 12.8 oz (104.7 kg)   06/22/22 235 lb 9.6 oz (106.9 kg)   05/16/22 237 lb (107.5 kg)     Food- and nutrition-related history:  Lizzette Reyes shared that she has lost 5 lb since her last visit - she continues with slow progressive wt loss. She continues to eat 3 meals per day. She requests help with meal planning for both herself and her sisters diet needs. They live together and she is the primary person cooking and shopping for foods. She does worry that her sister, Luis Miguel Blackwell, is not consistent with her personal meal planning needs.  Lizzette Reyes also shared that there are increasing resources and prices that are impacting their disposable income for purchasing foods. As food prices are increasing, Neil Allen is trying to make the best food choices for both their dietary needs to fit into her food budget. Activity level:  not addressed today. Estimated Nutrition Needs: no changes from last visit. Nutrition Diagnosis:(9/7/2022): Food and nutrition-related  knowledge deficit related to carbohydrate counting as evidenced by recall Improved, continued    Nutrition Intervention:  1) consider using frozen items - especially vegetables and no sodium canned items as available   2) choose grains/breads/cereal and pastas based on \"what is on sale\" from the healthiest food choices. 3) consider making soups/stews and casseroles that can meet all diet restrictions/concerns   4) if tolerated, use dried peas/beans/lentils to help extend meals. 5) purchase seasonal produce  6) consider using food bank/pantries for \"staples\"  7) might you be eligible for SNAP benefits    Patient Goals: to plan meals for myself and my sister that meet our health and dietary needs    Resources Provided/Reviewed:  hand written tips   Information reviewed with: Patient Pt was receptive and exhibited understanding of the material presented    Nutrition Monitoring/Evaluation:  Implementation of other food resources - adjusted due to patient's changing food security. MNT Follow-up Visit: pending on patient's needs      --Polo Kemp RD, CDCES on 10/11/2022 at 11:33 AM    An electronic signature was used to authenticate this note.  I was in the office for the appointment on 10/10/2022  and time spent: 35 minutes

## 2022-10-12 ENCOUNTER — HOSPITAL ENCOUNTER (OUTPATIENT)
Dept: MAMMOGRAPHY | Age: 72
Discharge: HOME | End: 2022-10-12
Attending: STUDENT IN AN ORGANIZED HEALTH CARE EDUCATION/TRAINING PROGRAM
Payer: MEDICARE

## 2022-10-12 ENCOUNTER — HOSPITAL ENCOUNTER (OUTPATIENT)
Dept: BONE DENSITY | Age: 72
Discharge: HOME | End: 2022-10-12
Attending: STUDENT IN AN ORGANIZED HEALTH CARE EDUCATION/TRAINING PROGRAM
Payer: MEDICARE

## 2022-10-12 DIAGNOSIS — M81.0 SENILE OSTEOPOROSIS: ICD-10-CM

## 2022-10-12 DIAGNOSIS — Z12.31 ENCOUNTER FOR SCREENING MAMMOGRAM FOR BREAST CANCER: ICD-10-CM

## 2022-10-12 PROCEDURE — 77067 SCR MAMMO BI INCL CAD: CPT

## 2022-10-12 PROCEDURE — 77080 DXA BONE DENSITY AXIAL: CPT

## 2022-10-17 ENCOUNTER — OFFICE VISIT (OUTPATIENT)
Dept: CARDIOLOGY CLINIC | Age: 72
End: 2022-10-17

## 2022-10-17 DIAGNOSIS — I50.42 CHRONIC HEART FAILURE WITH REDUCED EJECTION FRACTION AND DIASTOLIC DYSFUNCTION (HCC): Primary | ICD-10-CM

## 2022-10-24 ENCOUNTER — OFFICE VISIT (OUTPATIENT)
Dept: CARDIOLOGY CLINIC | Age: 72
End: 2022-10-24

## 2022-10-24 ENCOUNTER — TELEPHONE (OUTPATIENT)
Dept: CARDIOLOGY CLINIC | Age: 72
End: 2022-10-24

## 2022-10-24 DIAGNOSIS — I50.42 CHRONIC HEART FAILURE WITH REDUCED EJECTION FRACTION AND DIASTOLIC DYSFUNCTION (HCC): Primary | ICD-10-CM

## 2022-10-24 NOTE — TELEPHONE ENCOUNTER
Patient left message stating this morning blood pressure was 88/47 this morning. She states that she has had some dizziness and has been noticing low blood pressures several times. Patient requested call back. Discussed above message with Mayela Oropeza Np who recommended verbal order read back discontinue isosorbide mononitrate and keep log to report bp. Called patient using two patient identifiers. Confirmed patient was using cuff that goes around bicep. Advised patient on above information per Mayela Oropeza Np. Patient stated understanding of instructions and will send message with blood pressure tomorrow.  Yaquelin Paniagua RN

## 2022-10-31 ENCOUNTER — OFFICE VISIT (OUTPATIENT)
Dept: CARDIOLOGY CLINIC | Age: 72
End: 2022-10-31
Payer: MEDICARE

## 2022-10-31 DIAGNOSIS — I50.42 CHRONIC HEART FAILURE WITH REDUCED EJECTION FRACTION AND DIASTOLIC DYSFUNCTION (HCC): Primary | ICD-10-CM

## 2022-10-31 PROCEDURE — 93264 REM MNTR WRLS P-ART PRS SNR: CPT | Performed by: INTERNAL MEDICINE

## 2022-11-09 ENCOUNTER — OFFICE VISIT (OUTPATIENT)
Dept: CARDIOLOGY CLINIC | Age: 72
End: 2022-11-09
Payer: MEDICARE

## 2022-11-09 ENCOUNTER — TELEPHONE (OUTPATIENT)
Dept: CARDIOLOGY CLINIC | Age: 72
End: 2022-11-09

## 2022-11-09 DIAGNOSIS — Z95.810 PRESENCE OF CARDIOVERTER DEFIBRILLATOR: Primary | ICD-10-CM

## 2022-11-09 PROCEDURE — 93295 DEV INTERROG REMOTE 1/2/MLT: CPT | Performed by: INTERNAL MEDICINE

## 2022-11-09 PROCEDURE — 93296 REM INTERROG EVL PM/IDS: CPT | Performed by: INTERNAL MEDICINE

## 2022-11-09 NOTE — TELEPHONE ENCOUNTER
Telephone Call RE:  Appointment reminder     Outcome:     [] Patient confirmed appointment   [] Patient rescheduled appointment for    [x] Unable to reach  [x] Left message              [] Other:           Jadyn Corley

## 2022-11-09 NOTE — PROGRESS NOTES
C/ MDT ICD REMOTE   Scheduled remote transmission. Device functioning appropriately as programmed. See scanned docments.  Chargeable visit

## 2022-11-11 ENCOUNTER — OFFICE VISIT (OUTPATIENT)
Dept: CARDIOLOGY CLINIC | Age: 72
End: 2022-11-11
Payer: MEDICARE

## 2022-11-11 VITALS
WEIGHT: 223 LBS | TEMPERATURE: 98.6 F | DIASTOLIC BLOOD PRESSURE: 62 MMHG | BODY MASS INDEX: 41.04 KG/M2 | HEART RATE: 71 BPM | SYSTOLIC BLOOD PRESSURE: 114 MMHG | HEIGHT: 62 IN | OXYGEN SATURATION: 97 % | RESPIRATION RATE: 18 BRPM

## 2022-11-11 DIAGNOSIS — I25.5 ISCHEMIC CARDIOMYOPATHY: Primary | ICD-10-CM

## 2022-11-11 DIAGNOSIS — I25.10 CORONARY ARTERY DISEASE INVOLVING NATIVE HEART WITHOUT ANGINA PECTORIS, UNSPECIFIED VESSEL OR LESION TYPE: ICD-10-CM

## 2022-11-11 DIAGNOSIS — R06.02 SHORTNESS OF BREATH: ICD-10-CM

## 2022-11-11 DIAGNOSIS — Z95.810 PRESENCE OF CARDIOVERTER DEFIBRILLATOR: ICD-10-CM

## 2022-11-11 DIAGNOSIS — I42.9 CARDIOMYOPATHY, UNSPECIFIED TYPE (HCC): ICD-10-CM

## 2022-11-11 DIAGNOSIS — I50.42 CHRONIC HEART FAILURE WITH REDUCED EJECTION FRACTION AND DIASTOLIC DYSFUNCTION (HCC): ICD-10-CM

## 2022-11-11 PROCEDURE — 1123F ACP DISCUSS/DSCN MKR DOCD: CPT | Performed by: INTERNAL MEDICINE

## 2022-11-11 PROCEDURE — G9899 SCRN MAM PERF RSLTS DOC: HCPCS | Performed by: INTERNAL MEDICINE

## 2022-11-11 PROCEDURE — 3017F COLORECTAL CA SCREEN DOC REV: CPT | Performed by: INTERNAL MEDICINE

## 2022-11-11 PROCEDURE — G8399 PT W/DXA RESULTS DOCUMENT: HCPCS | Performed by: INTERNAL MEDICINE

## 2022-11-11 PROCEDURE — G8510 SCR DEP NEG, NO PLAN REQD: HCPCS | Performed by: INTERNAL MEDICINE

## 2022-11-11 PROCEDURE — G8536 NO DOC ELDER MAL SCRN: HCPCS | Performed by: INTERNAL MEDICINE

## 2022-11-11 PROCEDURE — 1101F PT FALLS ASSESS-DOCD LE1/YR: CPT | Performed by: INTERNAL MEDICINE

## 2022-11-11 PROCEDURE — G8427 DOCREV CUR MEDS BY ELIG CLIN: HCPCS | Performed by: INTERNAL MEDICINE

## 2022-11-11 PROCEDURE — 93000 ELECTROCARDIOGRAM COMPLETE: CPT | Performed by: INTERNAL MEDICINE

## 2022-11-11 PROCEDURE — 94618 PULMONARY STRESS TESTING: CPT | Performed by: INTERNAL MEDICINE

## 2022-11-11 PROCEDURE — G8417 CALC BMI ABV UP PARAM F/U: HCPCS | Performed by: INTERNAL MEDICINE

## 2022-11-11 PROCEDURE — 99215 OFFICE O/P EST HI 40 MIN: CPT | Performed by: INTERNAL MEDICINE

## 2022-11-11 PROCEDURE — 1090F PRES/ABSN URINE INCON ASSESS: CPT | Performed by: INTERNAL MEDICINE

## 2022-11-11 RX ORDER — METOLAZONE 2.5 MG/1
2.5 TABLET ORAL EVERY OTHER DAY
Qty: 6 TABLET | Refills: 0 | Status: SHIPPED | OUTPATIENT
Start: 2022-11-11 | End: 2022-11-22 | Stop reason: ALTCHOICE

## 2022-11-11 NOTE — PROGRESS NOTES
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, LVEF improved to 45% (by echo 2022) from 25-30% (by echo 2021), stage C, NYHA class IIIB, on GDMT limited by hypotension with persistently elevated filling pressures by Cardiomems  Most likely etiology of cardiomyopathy includes: obesity +/- WES +/- tachycardia +/- wt amyloidosis (PYP positive on vyndamax)    RECOMMENDATIONS:  Discontinue verquvo 10mg daily due to hypotension  D/w patient she may need midodrine to raise BP  Intolerant to beta-blockers due to hypotension  Intolerant to ACEi/ARB/ARNi due to CKD  Intolerant to spironolactone due to hyperkalemia  Intolerant to jardiance due to CKD  Continue bumex 4mg twice daily   Start metolazone 2.5mg with 20meq K+ every other day; goal 6-8lbs weight loss  Cardiomem goal DPA at least 18 (today 26)  Not on allopurinol or uloric, uric acid level 5  Continue ASA and plavix   Statin or PCSK9i: Continue current dose of atorvastatin and zetia  Continue CPAP therapy  ICD interrogation every 3 months   Routine HF labs now and in 2 weeks after diuresis  Reinforced heart healthy, low salt diet  Reinforced appropriate fluid intake of 6 x 8oz glasses of water per day  Monitor and record daily weights and BPs  Advanced care plan present on file  Follow-up with primary cardiologist  Follow-up with EP cardiologist- Dr. Yenifer Deluna with [facility]CP- [facility]   Follow-up with nephrologist sooner, perhaps next month  Patient concerned about dialysis; she does not want to be placed on dialysis, and we discussed that she may not tolerate it considering chronic hypotension - discussed limits of care and offered contact to palliative care team  Return to [facility] in 2 weeks     IMPRESSION:  Fatigue  Shortness of breath  Aucte on chronic systolic heart failure  Stage D, NYHA class IIIA symptoms  Ischemic cardiomyopathy, LVEF 45% (by echo 8/2022)  Invitae with VUS  CAD s/p CABG 1997 s/p PCI to DVG-RCA 2016  H/o severe MR s/p mitral clip in 2020  HTN  H/o VT s/p AICD  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 HLD  Osteoarthritis   CKD4       CARDIAC IMAGING:  Echo 8/2022  LVEF 45%, mild MR, LVIDd 5.54cm    Echo (8/19/21)  LV: Estimated LVEF is 30 - 35%. Mildly dilated left ventricle. Mild concentric hypertrophy. Moderately reduced systolic function. Moderate (grade 2) left ventricular diastolic dysfunction. Previously placed mitraclip is noted in secure position with residual trace to mild MR lateral to clip placement. Mean transmitral gradient is 2.6mmHg at heart rate of about 70 bpm.  LA: Moderately dilated left atrium. MV: Mild mitral valve regurgitation is present. LA: Severely dilated left atrium. RA: Mildly dilated right atrium. Echo (5/31/21)  LV: Estimated LVEF is 25 - 30%. Severely dilated left ventricle. Mild concentric hypertrophy. Severely and globally reduced systolic function. Inconclusive left ventricular diastolic function. LA: Moderately dilated left atrium. RA: Mildly dilated right atrium. MV: Moderate mitral valve regurgitation is present.   Image quality for this study was suboptimal.     6 Min Walk Report 11/11/2022 6/3/2021 4/23/2021 12/28/2020 12/22/2020 8/31/2020 7/1/2020   (PRE) HR 69 70 70 71 73 81 71   (PRE) O2 Sat 98 98 - - - 96 -   (POST)  92 - - - 110 -   (POST) O2 Sat 93 99 - 97 - 97 97   Distance in Meters 205.74 91.44 - - - 226.77 -          HISTORY OF PRESENT ILLNESS:  I had the pleasure of seeing Brittny Mercedes in [facility] in [place]. Briefly, Brittny Mercedes is a 74 y.o. female with h/o HTN, HLD, WES, Obesity (BMI 39), s/p gastric sleeve in 2011, T2DM, hypothyroidism, COPD, CAD s/p CABG in 1997, s/p PCI to [address] in 5/2015, ICM, VT, s/p AICD ([employer]), anxiety and depression. She more recently underwent MitraClip on 11/12/2020 and was evaluated for upgrade to [address] with Dr. Robe Alba, however this was not done as her QRS was too narrow. Her most recent admission was 5/30/21-Repeat TTE from August shows an EF of 45%. She has been doing PT for her knee and can do household work but otherwise does not do much. She is compliant with her medications and her cardiomems readings. LIFE GOALS:  Lifestyle goals discussed with the patient. INTERVAL HISTORY:  Today, patient presents for routine clinic visit. Patient is doing poorly. Patient walked to our clinic from parking garage slowly and had to stop. She can do very little at home, constantly tired and short of breath. Does not use stairs. Patient still can perform home activities but has to rest.     Patient has noticed ongoing volume overload and at least 1+ BLE leg edema. Patient denies abdominal bloating or change of appetite. Patient's weight remains stable. Patient denies orthopnea, PND or nocturia. Patient denies irregular heart rate or palpitations. No presyncope or syncope. ICD has not fired. Patient denies other cardiac symptoms such as chest pain or leg pain with walking. Patient is compliant with fluid restriction and taking medications as prescribed. Patient manages his own medications.      PHYSICAL EXAM:  Visit Vitals  /62 (BP 1 Location: Left upper arm, BP Patient Position: Sitting, BP Cuff Size: Large adult)   Pulse 71   Temp 98.6 °F (37 °C) (Oral)   Resp 18   Ht 5' 2\" (1.575 m)   Wt 223 lb (101.2 kg)   SpO2 97% BMI 40.79 kg/m²     General: Patient is obese in no acute distress  HEENT: Unremarkable   Neck: Supple. No evidence of thyroid enlargements or lymphadenopathy. JVD: Cannot be appreciated   Lungs: Breath sounds are equal and clear bilaterally. No wheezes, rhonchi, or rales. Heart: Regular rate and rhythm with normal S1 and S2. No murmurs, gallops or rubs. Abdomen: Soft, no mass or tenderness. No organomegaly or hernia. Bowel sounds present. Genitourinary and rectal: deferred  Extremities: No cyanosis, clubbing, or edema. Neurologic: No focal sensory or motor deficits are noted. Grossly intact. Psychiatric: Awake, alert an doriented x 3. Appropriate mood and affect. Skin: Warm, dry and well perfused. REVIEW OF SYSTEMS:  General: Denies fever. Ear, nose and throat: Denies difficulty hearing, sinus problems, nosebleeds  Cardiovascular: see above in the interval history  Respiratory: Denies cough, wheezing, sputum production, hemoptysis.   Gastrointestinal: Denies heartburn, constipation, diarrhea, abdominal pain, nausea, blood in stool  Kidney and bladder: Denies painful urination, frequent urination  Musculoskeletal: Denies joint pain, muscle weakness  Skin and hair: Denies change in existing skin lesions    PAST MEDICAL HISTORY:  Past Medical History:   Diagnosis Date    Adverse effect of anesthesia     hard to wake up/uses BIPAP/\"try to avoid general if possible\"/intubated in past prior to going to sleep and it caused pt to be incontinent    Arthritis     Asthma     CAD (coronary artery disease)     Chronic kidney disease     elevataed creatinine    Chronic obstructive pulmonary disease (HCC)     Chronic pain     arthritis    Coagulation disorder ([address]     on plavix    Congestive heart failure (HCC)     Diabetes ([address]     Hypertension     Morbid obesity ([address]     Sleep apnea 1996    BIPAP with 2 liters oxygen    Thromboembolus ([address]     after heart surgery - left leg    Thyroid disease        PAST SURGICAL HISTORY:  Past Surgical History:   Procedure Laterality Date    COLONOSCOPY N/A 2020    COLONOSCOPY   :- performed by Elaine Resendez MD at [address] [place] [address]  2105    knee - right    HX  SECTION      x3    HX CORONARY ARTERY BYPASS GRAFT  1997    3 vessels    HX CORONARY STENT PLACEMENT  2016    HX HERNIA REPAIR  1988    HX IMPLANTABLE CARDIOVERTER DEFIBRILLATOR      HX KNEE REPLACEMENT Right 2015    once    CO CARDIAC SURG PROCEDURE UNLIST  2018    cardiac cath - Left    CO LAP GASTRIC BYPASS/KERLINE-EN-Y  2011    lap band per pt and not a gastric bypass       FAMILY HISTORY:  Family History   Problem Relation Age of Onset    Hypertension Mother     Dementia Mother     Coronary Art Dis Father 58    Sudden Death Father 58    Diabetes Son     Diabetes Brother        SOCIAL HISTORY:  Social History     Socioeconomic History    Marital status:    Tobacco Use    Smoking status: Former     Packs/day: 0.50     Years: 45.00     Pack years: 22.50     Types: Cigarettes     Quit date: 2010     Years since quittin.8    Smokeless tobacco: Never    Tobacco comments:     quit /started again (smoked 3 yrs) off and on/none since    Vaping Use    Vaping Use: Never used   Substance and Sexual Activity    Alcohol use: Yes     Comment: Once every 6 months/ Special occasionans    Drug use: Not Currently    Sexual activity: Not Currently       LABORATORY RESULTS:  No flowsheet data found.     ALLERGY:  Allergies   Allergen Reactions    Crestor [Rosuvastatin] Other (comments)     Causes muscle cramps    Lisinopril Cough    Nsaids (Non-Steroidal Anti-Inflammatory Drug) Other (comments)     Liver and Kidney        CURRENT MEDICATIONS:    Current Outpatient Medications:     bumetanide (BUMEX) 1 mg tablet, Take 4 Tablets by mouth two (2) times a day., Disp: 240 Tablet, Rfl: 1    tiZANidine (ZANAFLEX) 2 mg tablet, TAKE 1 TABLET BY MOUTH TWICE DAILY AS NEEDED FOR MUSCLE RELAXANT, Disp: , Rfl:     Verquvo 10 mg tab, TAKE ONE TABLET BY MOUTH DAILY, Disp: 30 Each, Rfl: 11    semaglutide (OZEMPIC) 1 mg/dose (2 mg/1.5 mL) sub-q pen, 1 mg by SubCUTAneous route every seven (7) days. Once weekly, Disp: , Rfl:     NovoLIN 70-30 FlexPen U-100 100 unit/mL (70-30) inpn, 18 units before breakfast and 14 units at dinner, Disp: , Rfl:     gabapentin (NEURONTIN) 100 mg capsule, TAKE 2 CAPSULES BY MOUTH TWICE DAILY MAX  DAILY  AMOUNT  400MG, Disp: 120 Capsule, Rfl: 0    ezetimibe (ZETIA) 10 mg tablet, Take 1 Tablet by mouth daily. , Disp: 30 Tablet, Rfl: 1    clopidogreL (PLAVIX) 75 mg tab, Take 1 tablet by mouth once daily, Disp: 90 Tablet, Rfl: 0    Insulin Needles, Disposable, 32 gauge x 5/32\" ndle, 1 Each by Does Not Apply route daily. , Disp: 100 Pen Needle, Rfl: 2    alcohol swabs padm, 1 Each by Apply Externally route daily. , Disp: 100 Pad, Rfl: 11    sertraline (ZOLOFT) 50 mg tablet, 50 mg daily. , Disp: , Rfl:     ketoconazole (NIZORAL) 2 % shampoo, , Disp: , Rfl:     levothyroxine (Euthyrox) 100 mcg tablet, Take 1 Tablet by mouth Daily (before breakfast). , Disp: 90 Tablet, Rfl: 0    FreeStyle Lancets 28 gauge misc, USE AS DIRECTED, Disp: , Rfl:     nitroglycerin (NITROSTAT) 0.3 mg SL tablet, 1 Tablet by SubLINGual route every five (5) minutes as needed for Chest Pain., Disp: 1 Bottle, Rfl: 0    Blood-Glucose Meter monitoring kit, Check blood sugar daily. May substitute for insurance preferred meter, Disp: 1 Kit, Rfl: 0    glucose blood VI test strips (blood glucose test) strip, Check blood sugar 2 times daily: E11.9 may substitute for insurance preferred strips. , Disp: 200 Strip, Rfl: 3    lancets misc, Use as directed. Dx:check sugar once daily. E11.65, Disp: 1 Each, Rfl: 11    atorvastatin (LIPITOR) 80 mg tablet, TAKE 1 TABLET BY MOUTH AT BEDTIME, Disp: 90 Tab, Rfl: 3    lancets misc, Check blood sugar 2 times daily. May substitute for insurance preferred lancets. , Disp: 200 Each, Rfl: 3    glucose blood VI test strips (ASCENSIA AUTODISC VI, ONE TOUCH ULTRA TEST VI) strip, E11.65 check sugar once daily, Disp: 100 Strip, Rfl: 0    clotrimazole-betamethasone (LOTRISONE) topical cream, Apply  to affected area two (2) times a day. (Patient taking differently: Apply  to affected area two (2) times daily as needed.), Disp: 30 g, Rfl: 0    Blood-Glucose Meter monitoring kit, Use as directed. Dx: E11.65 check sugar twice daily, Disp: 1 Kit, Rfl: 0    albuterol (PROVENTIL HFA, VENTOLIN HFA, PROAIR HFA) 90 mcg/actuation inhaler, Take 2 Puffs by inhalation every four (4) hours as needed. , Disp: , Rfl:     acetaminophen (TYLENOL) 650 mg TbER, Take 1,300 mg by mouth two (2) times a day., Disp: , Rfl:     aspirin 81 mg chewable tablet, Take 81 mg by mouth daily. , Disp: , Rfl:     metoprolol succinate (TOPROL-XL) 25 mg XL tablet, Take 0.5 Tablets by mouth daily. Hold for systolic BP less than 667 (Patient not taking: Reported on 11/11/2022), Disp: 60 Tablet, Rfl: 2    Thank you for your referral and allowing me to participate in this patient's care.     Riya Young MD PhD  [address] [place] [address], [address]  Phone: [phone]  Fax: [phone]    PATIENT CARE TEAM:  Patient Care Team:  Sagar Moreno MD as PCP - General (Family Medicine)  Tracy Guevara MD (Cardiovascular Disease Physician)  Rebecca Clemons MD (Gastroenterology)  Samantha Hyde MD as Consulting Provider (Cardiovascular Disease Physician)  Jac Vinson MD (Nephrology)     Total visit time: 40 minutes  (> 50% spent face-to-face counseling)

## 2022-11-11 NOTE — PATIENT INSTRUCTIONS
Medication changes: Take Metolazone 2.5 mg three times weekly  (30 minutes prior to taking your morning bumex ) until weight loss of 6-8 lb: GOAL PAD is 18    Stop Verquvo    Please take this to your pharmacy to notify them of the change in medications. Testing Ordered:    Lab work has been ordered to be completed today and in 2 weeks prior to your next appt. Please present to a [facility] location of your choice with the orders provided to have lab work done. Please wear a mask when you present to [facility] and practice diligent hand hygeine before and after your visit. Our office will notify you of any abnormal results requiring changes to your current plan of care. Other Recommendations:     Please see your Nephrologist Dr. Vianey Linares your drinking an adequate amount of water with a goal of 6-8 eight ounce glasses (1.5-2 liters) of fluid daily. Your urine should be clear and light yellow straw colored. If your blood pressure begins to consistently run below 90/60 and/or you begin to experience dizziness or lightheadedness, please contact the [place] [address] at [phone]. Follow up in 2 weeks with [facility]      Please monitor your weights daily upon waking and after using the bathroom. Keep a written records of your weights and bring to your next appointment. If you have a weight gain of 3 or more pounds overnight OR 5 or more pounds in one week please contact our office. Thank you for allowing us the privilege of being a part of your healthcare team! Please do not hesitate to contact our office at [phone] with any questions or concerns. Virtual Heart Failure [facility] [address] invites you to learn more about heart failure and to share your questions, ideas, and experiences with others.  Each month, the Heart Failure Support Group features a new educational topic and a guest speaker, followed by an interactive discussion. Our Heart Failure Nurse Navigator will moderate each session. You will be able to participate by phone, tablet or computer through [address] [place] [address]. This support group takes place on the 3rd Thursday of each month from 6:00-7:30PM. All individuals living with heart failure and their caregivers are welcome to join. If you are interested in participating, please contact us at [email or web address] and you will be sent the link to join the [employer].

## 2022-11-12 LAB
ALBUMIN SERPL-MCNC: 4.3 G/DL (ref 3.7–4.7)
ALBUMIN/GLOB SERPL: 1.7 {RATIO} (ref 1.2–2.2)
ALP SERPL-CCNC: 123 IU/L (ref 44–121)
ALT SERPL-CCNC: 18 IU/L (ref 0–32)
AST SERPL-CCNC: 26 IU/L (ref 0–40)
BILIRUB SERPL-MCNC: 0.5 MG/DL (ref 0–1.2)
BUN SERPL-MCNC: 24 MG/DL (ref 8–27)
BUN/CREAT SERPL: 14 (ref 12–28)
CALCIUM SERPL-MCNC: 10.7 MG/DL (ref 8.7–10.3)
CHLORIDE SERPL-SCNC: 100 MMOL/L (ref 96–106)
CO2 SERPL-SCNC: 27 MMOL/L (ref 20–29)
CREAT SERPL-MCNC: 1.76 MG/DL (ref 0.57–1)
EGFR: 30 ML/MIN/1.73
ERYTHROCYTE [DISTWIDTH] IN BLOOD BY AUTOMATED COUNT: 14.6 % (ref 11.7–15.4)
GLOBULIN SER CALC-MCNC: 2.6 G/DL (ref 1.5–4.5)
GLUCOSE SERPL-MCNC: 129 MG/DL (ref 70–99)
HCT VFR BLD AUTO: 39.4 % (ref 34–46.6)
HGB BLD-MCNC: 12.7 G/DL (ref 11.1–15.9)
MAGNESIUM SERPL-MCNC: 1.9 MG/DL (ref 1.6–2.3)
MCH RBC QN AUTO: 30.9 PG (ref 26.6–33)
MCHC RBC AUTO-ENTMCNC: 32.2 G/DL (ref 31.5–35.7)
MCV RBC AUTO: 96 FL (ref 79–97)
NT-PROBNP SERPL-MCNC: 378 PG/ML (ref 0–301)
PLATELET # BLD AUTO: 260 X10E3/UL (ref 150–450)
POTASSIUM SERPL-SCNC: 4 MMOL/L (ref 3.5–5.2)
PROT SERPL-MCNC: 6.9 G/DL (ref 6–8.5)
PTH-INTACT SERPL-MCNC: 192 PG/ML (ref 15–65)
RBC # BLD AUTO: 4.11 X10E6/UL (ref 3.77–5.28)
SODIUM SERPL-SCNC: 142 MMOL/L (ref 134–144)
TROPONIN T SERPL HS-MCNC: 19 NG/L (ref 0–14)
URATE SERPL-MCNC: 8.5 MG/DL (ref 3.1–7.9)
WBC # BLD AUTO: 6.7 X10E3/UL (ref 3.4–10.8)

## 2022-11-17 ENCOUNTER — HOSPITAL ENCOUNTER (INPATIENT)
Age: 72
LOS: 4 days | Discharge: HOME | DRG: 641 | End: 2022-11-21
Attending: STUDENT IN AN ORGANIZED HEALTH CARE EDUCATION/TRAINING PROGRAM | Admitting: STUDENT IN AN ORGANIZED HEALTH CARE EDUCATION/TRAINING PROGRAM
Payer: MEDICARE

## 2022-11-17 ENCOUNTER — APPOINTMENT (OUTPATIENT)
Dept: GENERAL RADIOLOGY | Age: 72
DRG: 641 | End: 2022-11-17
Attending: STUDENT IN AN ORGANIZED HEALTH CARE EDUCATION/TRAINING PROGRAM
Payer: MEDICARE

## 2022-11-17 ENCOUNTER — TELEPHONE (OUTPATIENT)
Dept: CARDIOLOGY CLINIC | Age: 72
End: 2022-11-17

## 2022-11-17 DIAGNOSIS — M25.562 ACUTE PAIN OF LEFT KNEE: ICD-10-CM

## 2022-11-17 DIAGNOSIS — I49.3 SYMPTOMATIC PVCS: ICD-10-CM

## 2022-11-17 DIAGNOSIS — E87.6 HYPOKALEMIA: Primary | ICD-10-CM

## 2022-11-17 DIAGNOSIS — N17.9 ACUTE RENAL FAILURE SUPERIMPOSED ON CHRONIC KIDNEY DISEASE, UNSPECIFIED CKD STAGE, UNSPECIFIED ACUTE RENAL FAILURE TYPE (HCC): ICD-10-CM

## 2022-11-17 DIAGNOSIS — N18.9 ACUTE RENAL FAILURE SUPERIMPOSED ON CHRONIC KIDNEY DISEASE, UNSPECIFIED CKD STAGE, UNSPECIFIED ACUTE RENAL FAILURE TYPE (HCC): ICD-10-CM

## 2022-11-17 DIAGNOSIS — R35.89 HYPOVOLEMIA ASSOCIATED WITH DIURESIS: ICD-10-CM

## 2022-11-17 DIAGNOSIS — R42 LIGHTHEADEDNESS: ICD-10-CM

## 2022-11-17 DIAGNOSIS — Z86.79 HISTORY OF CHF (CONGESTIVE HEART FAILURE): ICD-10-CM

## 2022-11-17 DIAGNOSIS — E86.1 HYPOVOLEMIA ASSOCIATED WITH DIURESIS: ICD-10-CM

## 2022-11-17 LAB
ALBUMIN SERPL-MCNC: 3.8 G/DL (ref 3.5–5)
ALBUMIN/GLOB SERPL: 0.9 {RATIO} (ref 1.1–2.2)
ALP SERPL-CCNC: 110 U/L (ref 45–117)
ALT SERPL-CCNC: 28 U/L (ref 12–78)
ANION GAP SERPL CALC-SCNC: 10 MMOL/L (ref 5–15)
AST SERPL-CCNC: 40 U/L (ref 15–37)
ATRIAL RATE: 76 BPM
BASOPHILS # BLD: 0 K/UL (ref 0–0.1)
BASOPHILS NFR BLD: 0 % (ref 0–1)
BILIRUB SERPL-MCNC: 0.7 MG/DL (ref 0.2–1)
BNP SERPL-MCNC: 737 PG/ML
BUN SERPL-MCNC: 51 MG/DL (ref 6–20)
BUN/CREAT SERPL: 18 (ref 12–20)
CALCIUM SERPL-MCNC: 10.7 MG/DL (ref 8.5–10.1)
CALCULATED P AXIS, ECG09: 70 DEGREES
CALCULATED R AXIS, ECG10: -5 DEGREES
CALCULATED T AXIS, ECG11: 140 DEGREES
CHLORIDE SERPL-SCNC: 90 MMOL/L (ref 97–108)
CO2 SERPL-SCNC: 31 MMOL/L (ref 21–32)
COMMENT, HOLDF: NORMAL
CREAT SERPL-MCNC: 2.89 MG/DL (ref 0.55–1.02)
DIAGNOSIS, 93000: NORMAL
DIFFERENTIAL METHOD BLD: NORMAL
EOSINOPHIL # BLD: 0.2 K/UL (ref 0–0.4)
EOSINOPHIL NFR BLD: 2 % (ref 0–7)
ERYTHROCYTE [DISTWIDTH] IN BLOOD BY AUTOMATED COUNT: 14.3 % (ref 11.5–14.5)
GLOBULIN SER CALC-MCNC: 4.2 G/DL (ref 2–4)
GLUCOSE BLD STRIP.AUTO-MCNC: 153 MG/DL (ref 65–117)
GLUCOSE BLD STRIP.AUTO-MCNC: 87 MG/DL (ref 65–117)
GLUCOSE BLD STRIP.AUTO-MCNC: 89 MG/DL (ref 65–117)
GLUCOSE SERPL-MCNC: 152 MG/DL (ref 65–100)
HCT VFR BLD AUTO: 40.5 % (ref 35–47)
HGB BLD-MCNC: 14.1 G/DL (ref 11.5–16)
IMM GRANULOCYTES # BLD AUTO: 0 K/UL (ref 0–0.04)
IMM GRANULOCYTES NFR BLD AUTO: 0 % (ref 0–0.5)
LYMPHOCYTES # BLD: 2.5 K/UL (ref 0.8–3.5)
LYMPHOCYTES NFR BLD: 27 % (ref 12–49)
MAGNESIUM SERPL-MCNC: 2.3 MG/DL (ref 1.6–2.4)
MCH RBC QN AUTO: 31.9 PG (ref 26–34)
MCHC RBC AUTO-ENTMCNC: 34.8 G/DL (ref 30–36.5)
MCV RBC AUTO: 91.6 FL (ref 80–99)
MONOCYTES # BLD: 0.8 K/UL (ref 0–1)
MONOCYTES NFR BLD: 9 % (ref 5–13)
NEUTS SEG # BLD: 5.7 K/UL (ref 1.8–8)
NEUTS SEG NFR BLD: 62 % (ref 32–75)
NRBC # BLD: 0 K/UL (ref 0–0.01)
NRBC BLD-RTO: 0 PER 100 WBC
P-R INTERVAL, ECG05: 126 MS
PLATELET # BLD AUTO: 291 K/UL (ref 150–400)
PMV BLD AUTO: 9.7 FL (ref 8.9–12.9)
POTASSIUM SERPL-SCNC: 2.7 MMOL/L (ref 3.5–5.1)
POTASSIUM SERPL-SCNC: 2.7 MMOL/L (ref 3.5–5.1)
POTASSIUM SERPL-SCNC: 2.8 MMOL/L (ref 3.5–5.1)
PROT SERPL-MCNC: 8 G/DL (ref 6.4–8.2)
Q-T INTERVAL, ECG07: 366 MS
QRS DURATION, ECG06: 92 MS
QTC CALCULATION (BEZET), ECG08: 411 MS
RBC # BLD AUTO: 4.42 M/UL (ref 3.8–5.2)
SAMPLES BEING HELD,HOLD: NORMAL
SERVICE CMNT-IMP: ABNORMAL
SERVICE CMNT-IMP: NORMAL
SERVICE CMNT-IMP: NORMAL
SODIUM SERPL-SCNC: 131 MMOL/L (ref 136–145)
TROPONIN-HIGH SENSITIVITY: 45 NG/L (ref 0–51)
TROPONIN-HIGH SENSITIVITY: 46 NG/L (ref 0–51)
TSH SERPL DL<=0.05 MIU/L-ACNC: 1.68 UIU/ML (ref 0.36–3.74)
VENTRICULAR RATE, ECG03: 76 BPM
WBC # BLD AUTO: 9.3 K/UL (ref 3.6–11)

## 2022-11-17 PROCEDURE — 74011636637 HC RX REV CODE- 636/637: Performed by: STUDENT IN AN ORGANIZED HEALTH CARE EDUCATION/TRAINING PROGRAM

## 2022-11-17 PROCEDURE — 84443 ASSAY THYROID STIM HORMONE: CPT

## 2022-11-17 PROCEDURE — 74011000250 HC RX REV CODE- 250: Performed by: STUDENT IN AN ORGANIZED HEALTH CARE EDUCATION/TRAINING PROGRAM

## 2022-11-17 PROCEDURE — 74011250636 HC RX REV CODE- 250/636: Performed by: STUDENT IN AN ORGANIZED HEALTH CARE EDUCATION/TRAINING PROGRAM

## 2022-11-17 PROCEDURE — 65270000046 HC RM TELEMETRY

## 2022-11-17 PROCEDURE — 96365 THER/PROPH/DIAG IV INF INIT: CPT

## 2022-11-17 PROCEDURE — 83735 ASSAY OF MAGNESIUM: CPT

## 2022-11-17 PROCEDURE — 80053 COMPREHEN METABOLIC PANEL: CPT

## 2022-11-17 PROCEDURE — 84132 ASSAY OF SERUM POTASSIUM: CPT

## 2022-11-17 PROCEDURE — 93005 ELECTROCARDIOGRAM TRACING: CPT

## 2022-11-17 PROCEDURE — 83880 ASSAY OF NATRIURETIC PEPTIDE: CPT

## 2022-11-17 PROCEDURE — 73562 X-RAY EXAM OF KNEE 3: CPT

## 2022-11-17 PROCEDURE — 85025 COMPLETE CBC W/AUTO DIFF WBC: CPT

## 2022-11-17 PROCEDURE — 74011250637 HC RX REV CODE- 250/637: Performed by: STUDENT IN AN ORGANIZED HEALTH CARE EDUCATION/TRAINING PROGRAM

## 2022-11-17 PROCEDURE — 84484 ASSAY OF TROPONIN QUANT: CPT

## 2022-11-17 PROCEDURE — 36415 COLL VENOUS BLD VENIPUNCTURE: CPT

## 2022-11-17 PROCEDURE — 82962 GLUCOSE BLOOD TEST: CPT

## 2022-11-17 PROCEDURE — 99285 EMERGENCY DEPT VISIT HI MDM: CPT

## 2022-11-17 PROCEDURE — 96368 THER/DIAG CONCURRENT INF: CPT

## 2022-11-17 PROCEDURE — 96366 THER/PROPH/DIAG IV INF ADDON: CPT

## 2022-11-17 RX ORDER — ATORVASTATIN CALCIUM 40 MG/1
80 TABLET, FILM COATED ORAL
Status: DISCONTINUED | OUTPATIENT
Start: 2022-11-17 | End: 2022-11-21 | Stop reason: HOSPADM

## 2022-11-17 RX ORDER — GUAIFENESIN 100 MG/5ML
81 LIQUID (ML) ORAL DAILY
Status: DISCONTINUED | OUTPATIENT
Start: 2022-11-17 | End: 2022-11-21 | Stop reason: HOSPADM

## 2022-11-17 RX ORDER — NITROGLYCERIN 0.4 MG/1
0.4 TABLET SUBLINGUAL
Status: DISCONTINUED | OUTPATIENT
Start: 2022-11-17 | End: 2022-11-21 | Stop reason: HOSPADM

## 2022-11-17 RX ORDER — POTASSIUM CHLORIDE 7.45 MG/ML
10 INJECTION INTRAVENOUS
Status: COMPLETED | OUTPATIENT
Start: 2022-11-17 | End: 2022-11-17

## 2022-11-17 RX ORDER — IPRATROPIUM BROMIDE AND ALBUTEROL SULFATE 2.5; .5 MG/3ML; MG/3ML
3 SOLUTION RESPIRATORY (INHALATION)
Status: DISCONTINUED | OUTPATIENT
Start: 2022-11-17 | End: 2022-11-21 | Stop reason: HOSPADM

## 2022-11-17 RX ORDER — CLOTRIMAZOLE AND BETAMETHASONE DIPROPIONATE 10; .64 MG/G; MG/G
CREAM TOPICAL 2 TIMES DAILY
Status: DISCONTINUED | OUTPATIENT
Start: 2022-11-17 | End: 2022-11-19

## 2022-11-17 RX ORDER — SODIUM CHLORIDE 9 MG/ML
50 INJECTION, SOLUTION INTRAVENOUS CONTINUOUS
Status: DISCONTINUED | OUTPATIENT
Start: 2022-11-17 | End: 2022-11-19

## 2022-11-17 RX ORDER — GABAPENTIN 100 MG/1
200 CAPSULE ORAL 2 TIMES DAILY
Status: DISCONTINUED | OUTPATIENT
Start: 2022-11-17 | End: 2022-11-21 | Stop reason: HOSPADM

## 2022-11-17 RX ORDER — SODIUM CHLORIDE 0.9 % (FLUSH) 0.9 %
5-40 SYRINGE (ML) INJECTION EVERY 8 HOURS
Status: DISCONTINUED | OUTPATIENT
Start: 2022-11-17 | End: 2022-11-21 | Stop reason: HOSPADM

## 2022-11-17 RX ORDER — SODIUM CHLORIDE 0.9 % (FLUSH) 0.9 %
5-40 SYRINGE (ML) INJECTION AS NEEDED
Status: DISCONTINUED | OUTPATIENT
Start: 2022-11-17 | End: 2022-11-21 | Stop reason: HOSPADM

## 2022-11-17 RX ORDER — POTASSIUM CHLORIDE 14.9 MG/ML
10 INJECTION INTRAVENOUS
Status: COMPLETED | OUTPATIENT
Start: 2022-11-17 | End: 2022-11-17

## 2022-11-17 RX ORDER — POTASSIUM CHLORIDE 750 MG/1
20 TABLET, FILM COATED, EXTENDED RELEASE ORAL
Status: COMPLETED | OUTPATIENT
Start: 2022-11-17 | End: 2022-11-17

## 2022-11-17 RX ORDER — ACETAMINOPHEN 325 MG/1
650 TABLET ORAL
Status: DISCONTINUED | OUTPATIENT
Start: 2022-11-18 | End: 2022-11-18 | Stop reason: SDUPTHER

## 2022-11-17 RX ORDER — ACETAMINOPHEN 325 MG/1
650 TABLET ORAL
Status: DISCONTINUED | OUTPATIENT
Start: 2022-11-17 | End: 2022-11-21 | Stop reason: HOSPADM

## 2022-11-17 RX ORDER — LEVOTHYROXINE SODIUM 100 UG/1
100 TABLET ORAL
Status: DISCONTINUED | OUTPATIENT
Start: 2022-11-17 | End: 2022-11-21 | Stop reason: HOSPADM

## 2022-11-17 RX ORDER — CLOPIDOGREL BISULFATE 75 MG/1
75 TABLET ORAL DAILY
Status: DISCONTINUED | OUTPATIENT
Start: 2022-11-18 | End: 2022-11-21 | Stop reason: HOSPADM

## 2022-11-17 RX ORDER — POTASSIUM CHLORIDE 7.45 MG/ML
10 INJECTION INTRAVENOUS
Status: DISCONTINUED | OUTPATIENT
Start: 2022-11-17 | End: 2022-11-17

## 2022-11-17 RX ORDER — POTASSIUM CHLORIDE 750 MG/1
40 TABLET, FILM COATED, EXTENDED RELEASE ORAL
Status: COMPLETED | OUTPATIENT
Start: 2022-11-17 | End: 2022-11-17

## 2022-11-17 RX ORDER — ONDANSETRON 4 MG/1
4 TABLET, ORALLY DISINTEGRATING ORAL
Status: DISCONTINUED | OUTPATIENT
Start: 2022-11-17 | End: 2022-11-21 | Stop reason: HOSPADM

## 2022-11-17 RX ORDER — MAGNESIUM SULFATE 100 %
4 CRYSTALS MISCELLANEOUS AS NEEDED
Status: DISCONTINUED | OUTPATIENT
Start: 2022-11-17 | End: 2022-11-21 | Stop reason: HOSPADM

## 2022-11-17 RX ORDER — KETOCONAZOLE 20 MG/ML
SHAMPOO, SUSPENSION TOPICAL AS NEEDED
Status: DISCONTINUED | OUTPATIENT
Start: 2022-11-17 | End: 2022-11-19

## 2022-11-17 RX ORDER — TRAMADOL HYDROCHLORIDE 50 MG/1
50 TABLET ORAL
Status: DISCONTINUED | OUTPATIENT
Start: 2022-11-17 | End: 2022-11-21 | Stop reason: HOSPADM

## 2022-11-17 RX ORDER — ONDANSETRON 2 MG/ML
4 INJECTION INTRAMUSCULAR; INTRAVENOUS
Status: DISCONTINUED | OUTPATIENT
Start: 2022-11-17 | End: 2022-11-21 | Stop reason: HOSPADM

## 2022-11-17 RX ORDER — POLYETHYLENE GLYCOL 3350 17 G/17G
17 POWDER, FOR SOLUTION ORAL DAILY PRN
Status: DISCONTINUED | OUTPATIENT
Start: 2022-11-17 | End: 2022-11-21 | Stop reason: HOSPADM

## 2022-11-17 RX ORDER — INSULIN LISPRO 100 [IU]/ML
INJECTION, SOLUTION INTRAVENOUS; SUBCUTANEOUS
Status: DISCONTINUED | OUTPATIENT
Start: 2022-11-17 | End: 2022-11-21 | Stop reason: HOSPADM

## 2022-11-17 RX ORDER — EZETIMIBE 10 MG/1
10 TABLET ORAL DAILY
Status: DISCONTINUED | OUTPATIENT
Start: 2022-11-18 | End: 2022-11-21 | Stop reason: HOSPADM

## 2022-11-17 RX ORDER — SERTRALINE HYDROCHLORIDE 50 MG/1
50 TABLET, FILM COATED ORAL DAILY
Status: DISCONTINUED | OUTPATIENT
Start: 2022-11-17 | End: 2022-11-21 | Stop reason: HOSPADM

## 2022-11-17 RX ORDER — ENOXAPARIN SODIUM 100 MG/ML
30 INJECTION SUBCUTANEOUS DAILY
Status: DISCONTINUED | OUTPATIENT
Start: 2022-11-18 | End: 2022-11-21 | Stop reason: HOSPADM

## 2022-11-17 RX ORDER — ACETAMINOPHEN 650 MG/1
650 SUPPOSITORY RECTAL
Status: DISCONTINUED | OUTPATIENT
Start: 2022-11-17 | End: 2022-11-21 | Stop reason: HOSPADM

## 2022-11-17 RX ORDER — TIZANIDINE 4 MG/1
2 TABLET ORAL
Status: DISCONTINUED | OUTPATIENT
Start: 2022-11-17 | End: 2022-11-21 | Stop reason: HOSPADM

## 2022-11-17 RX ADMIN — Medication 10 ML: at 17:30

## 2022-11-17 RX ADMIN — GABAPENTIN 200 MG: 100 CAPSULE ORAL at 18:00

## 2022-11-17 RX ADMIN — POTASSIUM CHLORIDE 40 MEQ: 750 TABLET, FILM COATED, EXTENDED RELEASE ORAL at 18:00

## 2022-11-17 RX ADMIN — POTASSIUM CHLORIDE 10 MEQ: 7.46 INJECTION, SOLUTION INTRAVENOUS at 14:38

## 2022-11-17 RX ADMIN — POTASSIUM CHLORIDE 20 MEQ: 750 TABLET, FILM COATED, EXTENDED RELEASE ORAL at 17:37

## 2022-11-17 RX ADMIN — SERTRALINE 50 MG: 50 TABLET, FILM COATED ORAL at 18:01

## 2022-11-17 RX ADMIN — ATORVASTATIN CALCIUM 80 MG: 40 TABLET, FILM COATED ORAL at 22:46

## 2022-11-17 RX ADMIN — POTASSIUM CHLORIDE 40 MEQ: 750 TABLET, FILM COATED, EXTENDED RELEASE ORAL at 14:32

## 2022-11-17 RX ADMIN — Medication 3 UNITS: at 22:52

## 2022-11-17 RX ADMIN — SODIUM CHLORIDE 50 ML/HR: 9 INJECTION, SOLUTION INTRAVENOUS at 18:59

## 2022-11-17 RX ADMIN — SODIUM CHLORIDE 500 ML: 9 INJECTION, SOLUTION INTRAVENOUS at 14:33

## 2022-11-17 RX ADMIN — ASPIRIN 81 MG 81 MG: 81 TABLET ORAL at 18:00

## 2022-11-17 RX ADMIN — POTASSIUM CHLORIDE 10 MEQ: 14.9 INJECTION, SOLUTION INTRAVENOUS at 15:40

## 2022-11-17 NOTE — ED TRIAGE NOTES
Pt comes to ED from home with reports of dizziness and lightheaded. She reports having CHF and is followed by the [place] [facility]. Pt reports last night at home without change in LOC. Denies hitting her head. She reports left knee pain. Pulse ox HR reading 38-76bpm. Pt to get EKG and charge RN notified.

## 2022-11-17 NOTE — ED PROVIDER NOTES
EMERGENCY DEPARTMENT HISTORY AND PHYSICAL EXAM      Date: 11/17/2022  Patient Name: Raudel Parrish    History of Presenting Illness     HPI: Raudel Parrish, 74 y.o. female with past medical history of ischemic cardiomyopathy, CHF, CKD, status post CABG, ICD, PVD, mitral regurgitation, WES, type 2 diabetes, COPD, presents to the ED with cc of intermittent bradycardia, low blood pressures, and lightheadedness/dizziness over the past few days. Patient reports noticing at home that her pulse rate has been measuring between the 30s to the 50s intermittently. Patient reports lightheadedness led to a fall last night. She denies hitting her head or injuring herself anywhere from this fall. She denies loss of consciousness associated with the fall. She had no associated chest pain or shortness of breath. States that she has also been experiencing intermittent palpitations described as a \"heart fluttering\" sensation. She denies recent ICD discharge. Patient tells me that she normally has baseline low normal BP, however, has never been as low as it is now. Reports her systolic blood pressure at home has been in the 07X, with diastolic in the 28A. States normal blood pressure is in the 110s. She has no prior history of bradycardia or arrhythmias that she knows of. Patient reports being compliant with all of her usual meds. States that she recently was placed on an additional medication-metolazone-which she takes 3 times a week. Patient continues to take 4 mg of Bumex twice a day along with metolazone. States since starting the metolazone, she has noted significantly increased urinary output. Reports losing at least 6 to 8 pounds over the past week alone. PCP: Renae, MD Fernando    No current facility-administered medications on file prior to encounter.      Current Outpatient Medications on File Prior to Encounter   Medication Sig Dispense Refill    metOLazone (ZAROXOLYN) 2.5 mg tablet Take 1 Tablet by mouth every other day. 6 Tablet 0    bumetanide (BUMEX) 1 mg tablet Take 4 Tablets by mouth two (2) times a day. 240 Tablet 1    tiZANidine (ZANAFLEX) 2 mg tablet TAKE 1 TABLET BY MOUTH TWICE DAILY AS NEEDED FOR MUSCLE RELAXANT      semaglutide (OZEMPIC) 1 mg/dose (2 mg/1.5 mL) sub-q pen 1 mg by SubCUTAneous route every seven (7) days. Once weekly      NovoLIN 70-30 FlexPen U-100 100 unit/mL (70-30) inpn 18 units before breakfast and 14 units at dinner      gabapentin (NEURONTIN) 100 mg capsule TAKE 2 CAPSULES BY MOUTH TWICE DAILY MAX  DAILY  AMOUNT  400MG 120 Capsule 0    ezetimibe (ZETIA) 10 mg tablet Take 1 Tablet by mouth daily. 30 Tablet 1    clopidogreL (PLAVIX) 75 mg tab Take 1 tablet by mouth once daily 90 Tablet 0    Insulin Needles, Disposable, 32 gauge x 5/32\" ndle 1 Each by Does Not Apply route daily. 100 Pen Needle 2    alcohol swabs padm 1 Each by Apply Externally route daily. 100 Pad 11    sertraline (ZOLOFT) 50 mg tablet 50 mg daily. ketoconazole (NIZORAL) 2 % shampoo       levothyroxine (Euthyrox) 100 mcg tablet Take 1 Tablet by mouth Daily (before breakfast). 90 Tablet 0    FreeStyle Lancets 28 gauge misc USE AS DIRECTED      nitroglycerin (NITROSTAT) 0.3 mg SL tablet 1 Tablet by SubLINGual route every five (5) minutes as needed for Chest Pain. 1 Bottle 0    Blood-Glucose Meter monitoring kit Check blood sugar daily. May substitute for insurance preferred meter 1 Kit 0    glucose blood VI test strips (blood glucose test) strip Check blood sugar 2 times daily: E11.9 may substitute for insurance preferred strips. 200 Strip 3    lancets misc Use as directed. Dx:check sugar once daily. E11.65 1 Each 11    atorvastatin (LIPITOR) 80 mg tablet TAKE 1 TABLET BY MOUTH AT BEDTIME 90 Tab 3    lancets misc Check blood sugar 2 times daily. May substitute for insurance preferred lancets.  200 Each 3    glucose blood VI test strips (ASCENSIA AUTODISC VI, ONE TOUCH ULTRA TEST VI) strip E11.65 check sugar once daily 100 Strip 0    clotrimazole-betamethasone (LOTRISONE) topical cream Apply  to affected area two (2) times a day. (Patient taking differently: Apply  to affected area two (2) times daily as needed.) 30 g 0    Blood-Glucose Meter monitoring kit Use as directed. Dx: E11.65 check sugar twice daily 1 Kit 0    albuterol (PROVENTIL HFA, VENTOLIN HFA, PROAIR HFA) 90 mcg/actuation inhaler Take 2 Puffs by inhalation every four (4) hours as needed. acetaminophen (TYLENOL) 650 mg TbER Take 1,300 mg by mouth two (2) times a day. aspirin 81 mg chewable tablet Take 81 mg by mouth daily.          Past History     Past Medical History:  Past Medical History:   Diagnosis Date    Adverse effect of anesthesia     hard to wake up/uses BIPAP/\"try to avoid general if possible\"/intubated in past prior to going to sleep and it caused pt to be incontinent    Arthritis     Asthma     CAD (coronary artery disease)     Chronic kidney disease     elevataed creatinine    Chronic obstructive pulmonary disease (HCC)     Chronic pain     arthritis    Coagulation disorder (HCC)     on plavix    Congestive heart failure (HCC)     Diabetes ([address]     Hypertension     Morbid obesity ([address]     Sleep apnea     BIPAP with 2 liters oxygen    Thromboembolus ([address]     after heart surgery - left leg    Thyroid disease        Past Surgical History:  Past Surgical History:   Procedure Laterality Date    COLONOSCOPY N/A 2020    COLONOSCOPY   :- performed by Meryle Bing, MD at [address] [place] [address]  2105    knee - right    HX  SECTION      x3    HX CORONARY ARTERY BYPASS GRAFT  1997    3 vessels    HX CORONARY STENT PLACEMENT  2016    HX HERNIA REPAIR  1988    HX IMPLANTABLE CARDIOVERTER DEFIBRILLATOR      HX KNEE REPLACEMENT Right     once    MT CARDIAC SURG PROCEDURE UNLIST  2018    cardiac cath - Left    MT LAP GASTRIC BYPASS/KERLINE-EN-Y      lap band per pt and not a gastric bypass Family History:  Family History   Problem Relation Age of Onset    Hypertension Mother     Dementia Mother     Coronary Art Dis Father 58    Sudden Death Father 58    Diabetes Son     Diabetes Brother        Social History:  Social History     Tobacco Use    Smoking status: Former     Packs/day: 0.50     Years: 45.00     Pack years: 22.50     Types: Cigarettes     Quit date: 2010     Years since quittin.8    Smokeless tobacco: Never    Tobacco comments:     quit /started again (smoked 3 yrs) off and on/none since    Vaping Use    Vaping Use: Never used   Substance Use Topics    Alcohol use: Yes     Comment: Once every 6 months/ Special occasionans    Drug use: Not Currently       Allergies: Allergies   Allergen Reactions    Crestor [Rosuvastatin] Other (comments)     Causes muscle cramps    Lisinopril Cough    Nsaids (Non-Steroidal Anti-Inflammatory Drug) Other (comments)     Liver and Kidney         Review of Systems   Review of Systems   Constitutional:  Negative for chills and fever. HENT:  Negative for congestion and rhinorrhea. Eyes:  Negative for visual disturbance. Respiratory:  Negative for cough, chest tightness and shortness of breath. Cardiovascular:  Positive for palpitations. Negative for chest pain and leg swelling. Gastrointestinal:  Negative for abdominal pain, diarrhea, nausea and vomiting. Genitourinary:  Negative for dysuria, flank pain and hematuria. Musculoskeletal:  Negative for back pain and neck pain. Skin:  Negative for rash. Allergic/Immunologic: Negative for immunocompromised state. Neurological:  Positive for dizziness and light-headedness. Negative for speech difficulty, weakness and headaches. Hematological:  Negative for adenopathy. Psychiatric/Behavioral:  Negative for dysphoric mood and suicidal ideas. Physical Exam   Physical Exam  Vitals and nursing note reviewed. Constitutional:       General: She is not in acute distress. Appearance: She is not ill-appearing or toxic-appearing. HENT:      Head: Normocephalic and atraumatic. Nose: Nose normal.      Mouth/Throat:      Mouth: Mucous membranes are moist.   Eyes:      Extraocular Movements: Extraocular movements intact. Pupils: Pupils are equal, round, and reactive to light. Cardiovascular:      Rate and Rhythm: Normal rate. Rhythm irregular. Pulses: Normal pulses. Pulmonary:      Effort: Pulmonary effort is normal.      Breath sounds: No stridor. No wheezing or rhonchi. Abdominal:      General: Abdomen is flat. There is no distension. Tenderness: There is no abdominal tenderness. Musculoskeletal:         General: Normal range of motion. Cervical back: Normal range of motion and neck supple. Right lower leg: No edema. Left lower leg: No edema. Skin:     General: Skin is warm and dry. Neurological:      General: No focal deficit present. Mental Status: She is alert and oriented to person, place, and time.    Psychiatric:         Judgment: Judgment normal.       Diagnostic Study Results     Labs -     Recent Results (from the past 24 hour(s))   EKG, 12 LEAD, INITIAL    Collection Time: 11/17/22 12:38 PM   Result Value Ref Range    Ventricular Rate 76 BPM    Atrial Rate 76 BPM    P-R Interval 126 ms    QRS Duration 92 ms    Q-T Interval 366 ms    QTC Calculation (Bezet) 411 ms    Calculated P Axis 70 degrees    Calculated R Axis -5 degrees    Calculated T Axis 140 degrees    Diagnosis       Atrial-paced rhythm with frequent premature ventricular complexes  Minimal voltage criteria for LVH, may be normal variant ( R in aVL )  Inferior infarct (cited on or before 30-MAY-2021)  Anterolateral infarct , age undetermined  Abnormal ECG  When compared with ECG of 30-MAY-2021 13:40,  Significant changes have occurred  Confirmed by Jamel Strauss MD ([id number]) on 11/17/2022 3:01:38 PM     CBC WITH AUTOMATED DIFF    Collection Time: 11/17/22 12:50 PM Result Value Ref Range    WBC 9.3 3.6 - 11.0 K/uL    RBC 4.42 3.80 - 5.20 M/uL    HGB 14.1 11.5 - 16.0 g/dL    HCT 40.5 35.0 - 47.0 %    MCV 91.6 80.0 - 99.0 FL    MCH 31.9 26.0 - 34.0 PG    MCHC 34.8 30.0 - 36.5 g/dL    RDW 14.3 11.5 - 14.5 %    PLATELET 655 729 - 765 K/uL    MPV 9.7 8.9 - 12.9 FL    NRBC 0.0 0  WBC    ABSOLUTE NRBC 0.00 0.00 - 0.01 K/uL    NEUTROPHILS 62 32 - 75 %    LYMPHOCYTES 27 12 - 49 %    MONOCYTES 9 5 - 13 %    EOSINOPHILS 2 0 - 7 %    BASOPHILS 0 0 - 1 %    IMMATURE GRANULOCYTES 0 0.0 - 0.5 %    ABS. NEUTROPHILS 5.7 1.8 - 8.0 K/UL    ABS. LYMPHOCYTES 2.5 0.8 - 3.5 K/UL    ABS. MONOCYTES 0.8 0.0 - 1.0 K/UL    ABS. EOSINOPHILS 0.2 0.0 - 0.4 K/UL    ABS. BASOPHILS 0.0 0.0 - 0.1 K/UL    ABS. IMM. GRANS. 0.0 0.00 - 0.04 K/UL    DF AUTOMATED     NT-PRO BNP    Collection Time: 11/17/22 12:50 PM   Result Value Ref Range    NT pro- (H) <404 PG/ML   METABOLIC PANEL, COMPREHENSIVE    Collection Time: 11/17/22 12:50 PM   Result Value Ref Range    Sodium 131 (L) 136 - 145 mmol/L    Potassium 2.7 (LL) 3.5 - 5.1 mmol/L    Chloride 90 (L) 97 - 108 mmol/L    CO2 31 21 - 32 mmol/L    Anion gap 10 5 - 15 mmol/L    Glucose 152 (H) 65 - 100 mg/dL    BUN 51 (H) 6 - 20 MG/DL    Creatinine 2.89 (H) 0.55 - 1.02 MG/DL    BUN/Creatinine ratio 18 12 - 20      eGFR 17 (L) >60 ml/min/1.73m2    Calcium 10.7 (H) 8.5 - 10.1 MG/DL    Bilirubin, total 0.7 0.2 - 1.0 MG/DL    ALT (SGPT) 28 12 - 78 U/L    AST (SGOT) 40 (H) 15 - 37 U/L    Alk. phosphatase 110 45 - 117 U/L    Protein, total 8.0 6.4 - 8.2 g/dL    Albumin 3.8 3.5 - 5.0 g/dL    Globulin 4.2 (H) 2.0 - 4.0 g/dL    A-G Ratio 0.9 (L) 1.1 - 2.2     SAMPLES BEING HELD    Collection Time: 11/17/22 12:50 PM   Result Value Ref Range    SAMPLES BEING HELD 1RED 1BLUE     COMMENT        Add-on orders for these samples will be processed based on acceptable specimen integrity and analyte stability, which may vary by analyte.    TROPONIN-HIGH SENSITIVITY Collection Time: 11/17/22 12:50 PM   Result Value Ref Range    Troponin-High Sensitivity 45 0 - 51 ng/L   MAGNESIUM    Collection Time: 11/17/22 12:50 PM   Result Value Ref Range    Magnesium 2.3 1.6 - 2.4 mg/dL   POTASSIUM    Collection Time: 11/17/22  2:37 PM   Result Value Ref Range    Potassium 2.7 (LL) 3.5 - 5.1 mmol/L   TROPONIN-HIGH SENSITIVITY    Collection Time: 11/17/22  3:40 PM   Result Value Ref Range    Troponin-High Sensitivity 46 0 - 51 ng/L     Radiologic Studies -   XR KNEE LT MIN 4 V    (Results Pending)     CT Results  (Last 48 hours)      None          CXR Results  (Last 48 hours)      None            Medical Decision Making   I, Joceline Urban MD-- am the first provider for this patient, and I am the attending of record for this patient encounter. I reviewed the vital signs, available nursing notes, past medical history, past surgical history, family history and social history. Vital Signs-Reviewed the patient's vital signs. Patient Vitals for the past 24 hrs:   Pulse Resp BP SpO2   11/17/22 1402 77 -- 115/60 95 %   11/17/22 1308 73 -- -- 94 %   11/17/22 1237 (!) 38 20 (!) 150/91 97 %     EKG interpretation: (Preliminary)  Paced rhythm with frequent PVCs, 76 bpm, nonspecific ST changes, no STEMI. Normal QTC. EKG interpretation by Joceline Urban MD    Records Reviewed: Nursing Notes and Old Medical Records    Provider Notes (Medical Decision Making): Suspect patient's symptoms today are secondary to possible hypovolemia from overdiuresis, possible electrolyte derangement such as hypokalemia triggering onset of symptomatic pvcs. Patient's effective HR is in the 70s on arrival to the ER, however, with frequent pvcs- suspect this is why pulse was registered as bradycardic for her at home. Will check EKG, CBC, CMP, magnesium, proBNP, troponin. ED Course as of 11/17/22 1727   Thu Nov 17, 2022   1442 Work-up remarkable for potassium of 2.7. KEYONNA on CKD with creatinine of 2.89, BUN of 51.   We will treat with 40 mEq of p.o. KCl and 20 mill equivalents of IV KCl. We will also administer 500 cc of IVF for KEYONNA most likely secondary to prerenal causes from overdiuresis. [JM]   6655 EDMD EKG interpretation: There is a sinus rhythm at 76 beats a minute. Some multifocal PVCs are noted. Poor R wave progression is noted. Left axis shift is noted. No acute ischemic change appreciated. [RP]   7129 Patient reevaluated- BP much improved at this time. Her PVCs also appear to be resolving by looking at her cardiac monitor-- now with rare PVCs vs initial frequent pvcs. Patient confirms no longer experiencing palpitations. However, states she still feels lightheaded/dizzy and not at her baseline. Will page heart failure team to discuss. [JM]   [id number] Spoke to Dr. Angie Quispe with heart failure team, recommended admission to hospitalist team, and Dr. Rozina Kyle can evaluate tomorrow AM.  [JM]   [id number] Patient now complaining of L knee pain, reports this is related to fall yesterday. She has been able to bear weight onto the LLE. Will check XR of L knee. [JM]      ED Course User Index  [JM] Mitchell Puri MD  [RP] Eben Fitzgerald MD     Perfect Serve Consult for Admission  5:18 PM    ED Room Number: ER24/24  Patient Name and age:  Francois Granados 74 y.o.  female  Working Diagnosis:   1. Hypokalemia    2. Symptomatic PVCs    3. Acute renal failure superimposed on chronic kidney disease, unspecified CKD stage, unspecified acute renal failure type ([address]    4. History of CHF (congestive heart failure)    5. Lightheadedness    6. Hypovolemia associated with diuresis        COVID-19 Suspicion:  no  Sepsis present:  no  Reassessment needed: no  Code Status:  Full Code  Readmission: no  Isolation Requirements:  no  Recommended Level of Care:  telemetry  Department:[facility] Adult ED - 21         ED Course:   Initial assessment performed.  The patient's presenting problems have been discussed, and they are in agreement with the care plan formulated and outlined with them. I have encouraged them to ask questions as they arise throughout their visit. Kike Prince MD      Disposition:  Admit    Diagnosis     Clinical Impression:   1. Hypokalemia    2. Symptomatic PVCs    3. Acute renal failure superimposed on chronic kidney disease, unspecified CKD stage, unspecified acute renal failure type ([facility]    4. History of CHF (congestive heart failure)    5. Lightheadedness    6. Hypovolemia associated with diuresis        Attestations:    Kike Prince MD    Please note that this dictation was completed with Myla, the [employer] voice recognition software. Quite often unanticipated grammatical, syntax, homophones, and other interpretive errors are inadvertently transcribed by the computer software. Please disregard these errors. Please excuse any errors that have escaped final proofreading. Thank you.

## 2022-11-17 NOTE — H&P
[address] [facility] Adult  Hospitalist Group  History and Physical    Date of Service:  11/17/2022  Primary Care Provider: Other, MD Fernando  Source of information: The patient and Chart review    Chief Complaint: Dizziness      History of Presenting Illness:   Erika Dang is a 74 y.o. female ischemic CM s/p ICD and CABG, CKD stage IIIb, hypothyroidism PVD, WES on BiPAP nightly, type 2 diabetes, COPD who presents with lightheadedness and dizziness and reported low blood pressures with intermittent bradycardia. She has been followed by acute heart failure team outpatient last seen 1 week ago at that time concern for volume overload and metolazone was started scribed to take every other day she took approximately 2 doses of this medication states she has significant weight loss but also had significant increase in her symptoms of dizziness lightheadedness and low blood pressures to the point where she can safely stand. She fell today. Recommended admission to the hospital per heart failure team recommendations. The patient denies any headache, blurry vision, sore throat, trouble swallowing, trouble with speech, chest pain, SOB, cough, fever, chills, N/V/D, abd pain, urinary symptoms, constipation, recent travels, sick contacts, focal or generalized neurological symptoms, falls, injuries, rashes, contact with COVID-19 diagnosed patients, hematemesis, melena, hemoptysis, hematuria, rashes, denies starting any new medications and denies any other concerns or problems besides as mentioned above. Po kalemia K of 2.7. Blood pressure stable. Creatinine 2.89 with a baseline of 1.7s       REVIEW OF SYSTEMS:  A comprehensive review of systems was negative except for that written in the History of Present Illness.      Past Medical History:   Diagnosis Date    Adverse effect of anesthesia     hard to wake up/uses BIPAP/\"try to avoid general if possible\"/intubated in past prior to going to sleep and it caused pt to be incontinent    Arthritis     Asthma     CAD (coronary artery disease)     Chronic kidney disease     elevataed creatinine    Chronic obstructive pulmonary disease (HCC)     Chronic pain     arthritis    Coagulation disorder ([address]     on plavix    Congestive heart failure (HCC)     Diabetes ([address]     Hypertension     Morbid obesity ([address]     Sleep apnea     BIPAP with 2 liters oxygen    Thromboembolus ([address]     after heart surgery - left leg    Thyroid disease       Past Surgical History:   Procedure Laterality Date    COLONOSCOPY N/A 2020    COLONOSCOPY   :- performed by Virginia Guevara MD at [address] [place] [address]  2105    knee - right    HX  SECTION      x3    HX CORONARY ARTERY BYPASS GRAFT  1997    3 vessels    HX CORONARY STENT PLACEMENT  2016    HX HERNIA REPAIR      HX IMPLANTABLE CARDIOVERTER DEFIBRILLATOR      HX KNEE REPLACEMENT Right 2015    once    ID CARDIAC SURG PROCEDURE UNLIST  2018    cardiac cath - Left    ID LAP GASTRIC BYPASS/KERLINE-EN-Y  2011    lap band per pt and not a gastric bypass     Prior to Admission medications    Medication Sig Start Date End Date Taking? Authorizing Provider   metOLazone (ZAROXOLYN) 2.5 mg tablet Take 1 Tablet by mouth every other day. 22   True Johnson MD   bumetanide (BUMEX) 1 mg tablet Take 4 Tablets by mouth two (2) times a day. 10/6/22   Lakesha Mckeon NP   tiZANidine (ZANAFLEX) 2 mg tablet TAKE 1 TABLET BY MOUTH TWICE DAILY AS NEEDED FOR MUSCLE RELAXANT 22   Provider, Historical   semaglutide (OZEMPIC) 1 mg/dose (2 mg/1.5 mL) sub-q pen 1 mg by SubCUTAneous route every seven (7) days.  Once weekly 22   Provider, Historical   NovoLIN 70-30 FlexPen U-100 100 unit/mL (70-30) inpn 18 units before breakfast and 14 units at dinner 2/3/22   Provider, Historical   gabapentin (NEURONTIN) 100 mg capsule TAKE 2 CAPSULES BY MOUTH TWICE DAILY MAX  DAILY  AMOUNT  400MG 21 Mackenzie Simmons NP   ezetimibe (ZETIA) 10 mg tablet Take 1 Tablet by mouth daily. 12/8/21   Mackenzie Simmons NP   clopidogreL (PLAVIX) 75 mg tab Take 1 tablet by mouth once daily 12/8/21   Mackenzie Simmons, VENKATESH   Insulin Needles, Disposable, 32 gauge x 5/32\" ndle 1 Each by Does Not Apply route daily. 11/24/21   Mackenzie Simmons NP   alcohol swabs padm 1 Each by Apply Externally route daily. 11/24/21   Mackenzie Simmons NP   sertraline (ZOLOFT) 50 mg tablet 50 mg daily. 11/4/21   Provider, Historical   ketoconazole (NIZORAL) 2 % shampoo  10/13/21   Provider, Historical   levothyroxine (Euthyrox) 100 mcg tablet Take 1 Tablet by mouth Daily (before breakfast). 10/5/21   Mackenzie Simmons NP   FreeStyle Lancets 28 gauge misc USE AS DIRECTED 7/28/21   Provider, Historical   nitroglycerin (NITROSTAT) 0.3 mg SL tablet 1 Tablet by SubLINGual route every five (5) minutes as needed for Chest Pain. 6/11/21   Chelita Blankenship NP   Blood-Glucose Meter monitoring kit Check blood sugar daily. May substitute for insurance preferred meter 4/29/21   Mackenzie Simmons NP   glucose blood VI test strips (blood glucose test) strip Check blood sugar 2 times daily: E11.9 may substitute for insurance preferred strips. 4/29/21   Mackenzie Simmons NP   lancets misc Use as directed. Dx:check sugar once daily. E11.65 4/29/21   Dylan HOOD NP   atorvastatin (LIPITOR) 80 mg tablet TAKE 1 TABLET BY MOUTH AT BEDTIME 4/22/21   Chelita Blankenship NP   lancets misc Check blood sugar 2 times daily. May substitute for insurance preferred lancets. 12/11/20   Jak Alexander NP   glucose blood VI test strips (ASCENSIA AUTODISC VI, ONE TOUCH ULTRA TEST VI) strip E11.65 check sugar once daily 12/7/20   Cleo Woodruff MD   clotrimazole-betamethasone (LOTRISONE) topical cream Apply  to affected area two (2) times a day.   Patient taking differently: Apply  to affected area two (2) times daily as needed. 9/23/20   Pricila Urena NP   Blood-Glucose Meter monitoring kit Use as directed. Dx: E11.65 check sugar twice daily 8/25/20   Pricila Urena NP   albuterol (PROVENTIL HFA, VENTOLIN HFA, PROAIR HFA) 90 mcg/actuation inhaler Take 2 Puffs by inhalation every four (4) hours as needed. Provider, Historical   acetaminophen (TYLENOL) 650 mg TbER Take 1,300 mg by mouth two (2) times a day. Provider, Historical   aspirin 81 mg chewable tablet Take 81 mg by mouth daily. Provider, Historical     Allergies   Allergen Reactions    Crestor [Rosuvastatin] Other (comments)     Causes muscle cramps    Lisinopril Cough    Nsaids (Non-Steroidal Anti-Inflammatory Drug) Other (comments)     Liver and Kidney      Family History   Problem Relation Age of Onset    Hypertension Mother     Dementia Mother     Coronary Art Dis Father 58    Sudden Death Father 58    Diabetes Son     Diabetes Brother       Social History:  reports that she quit smoking about 12 years ago. Her smoking use included cigarettes. She has a 22.50 pack-year smoking history. She has never used smokeless tobacco. She reports current alcohol use. She reports that she does not currently use drugs. Family and social history were personally reviewed, all pertinent and relevant details are outlined as above.     Objective:   Visit Vitals  /60   Pulse 77   Resp 20   Ht 5' 6\" (1.676 m)   Wt 96.6 kg (213 lb)   SpO2 95%   BMI 34.38 kg/m²      O2 Device: None (Room air)    PHYSICAL EXAM:   General: Alert x oriented x 3, awake, no acute distress, resting in bed, pleasant female, appears to be stated age  HEENT: PEERL, EOMI, moist mucus membranes  Neck: Supple, no JVD, no meningeal signs  Chest: Clear to auscultation bilaterally   CVS: RRR, S1 S2 heard, no murmurs/rubs/gallops  Abd: Soft, non-tender, non-distended, +bowel sounds   Ext: No clubbing, no cyanosis, no edema, L knee midly swollen no sig signs of effusion   Neuro/Psych: Pleasant mood and affect, CN 2-12 grossly intact, sensory grossly within normal limit, Strength 5/5 in all extremities, DTR 1+ x 4  Cap refill: Brisk, less than 3 seconds  Pulses: 2+, symmetric in all extremities  Skin: Warm, dry, without rashes or lesions    Data Review: All diagnostic labs and studies have been reviewed.     Abnormal Labs Reviewed   NT-PRO BNP - Abnormal; Notable for the following components:       Result Value    NT pro- (*)     All other components within normal limits   METABOLIC PANEL, COMPREHENSIVE - Abnormal; Notable for the following components:    Sodium 131 (*)     Potassium 2.7 (*)     Chloride 90 (*)     Glucose 152 (*)     BUN 51 (*)     Creatinine 2.89 (*)     eGFR 17 (*)     Calcium 10.7 (*)     AST (SGOT) 40 (*)     Globulin 4.2 (*)     A-G Ratio 0.9 (*)     All other components within normal limits   POTASSIUM - Abnormal; Notable for the following components:    Potassium 2.7 (*)     All other components within normal limits       All Micro Results       None            IMAGING:   XR KNEE LT 3 V    (Results Pending)        ECG/ECHO:    Results for orders placed or performed during the hospital encounter of 11/17/22   EKG, 12 LEAD, INITIAL   Result Value Ref Range    Ventricular Rate 76 BPM    Atrial Rate 76 BPM    P-R Interval 126 ms    QRS Duration 92 ms    Q-T Interval 366 ms    QTC Calculation (Bezet) 411 ms    Calculated P Axis 70 degrees    Calculated R Axis -5 degrees    Calculated T Axis 140 degrees    Diagnosis       Atrial-paced rhythm with frequent premature ventricular complexes  Minimal voltage criteria for LVH, may be normal variant ( R in aVL )  Inferior infarct (cited on or before 30-MAY-2021)  Anterolateral infarct , age undetermined  Abnormal ECG  When compared with ECG of 30-MAY-2021 13:40,  Significant changes have occurred  Confirmed by Laura Montelongo MD ([id number]) on 11/17/2022 3:01:38 PM          Assessment + plan    Given the patient's current clinical presentation, there is a high level of concern for decompensation if discharged from the emergency department. Complex decision making was performed, which includes reviewing the patient's available past medical records, laboratory results, and imaging studies. Lightheadedness dizziness  Bradycardia  Hypotension  Severe hypokalemia  KEYONNA on CKD stage IIIb   ischemic cardiomyopathy status post ICD with CABG  WES on BiPAP nightly  Type 2 diabetes  Peripheral neuropathy  COPD  -All of the above are likely secondary to overdiuresis  - Hold diuretics  -Replete potassium aggressively IV and p.o., repeat K post repletion  - Consulted acute heart failure team further diuretic adjustments moving forward per them  -Continue gentle IV hydration 50 cc NS  - Consulted nephrology for aid in diuretic management/monitoring of kidney function  -BiPAP nightly  - Nebs as needed not in exacerbation  - NPH, sliding scale readjust as needed  - Continue other home meds  - Check TSH    DIET: ADULT DIET Regular; 3 carb choices (45 gm/meal); Low Fat/Low Chol/High Fiber/2 gm Na   ISOLATION PRECAUTIONS: There are currently no Active Isolations  CODE STATUS: Full Code   DVT PROPHYLAXIS: Lovenox  FUNCTIONAL STATUS PRIOR TO HOSPITALIZATION: Ambulatory and capable of self-care but relies on assistive devices (rolling walker/cane). Ambulatory status/function: Ambulates with assistance:  Cane   EARLY MOBILITY ASSESSMENT: Recommend an assessment from physical therapy and/or occupational therapy  ANTICIPATED DISCHARGE: 24-48 hours. ANTICIPATED DISPOSITION: Home with Home Healthcare    CRITICAL CARE WAS PERFORMED FOR THIS ENCOUNTER: YES. I had a face to face encounter with the patient, reviewed and interpreted patient data including clinical events, labs, images, vital signs, I/O's, and examined patient.   I have discussed the case and the plan and management of the patient's care with the consulting services, the bedside nurses and necessary ancillary providers. This patient has a high probability of imminent, clinically significant deterioration, which requires the highest level of preparedness to intervene urgently. I participated in the decision-making and personally managed or directed the management of the following life and organ supporting interventions that required my frequent assessment to treat or prevent imminent deterioration. I personally spent 35 minutes of critical care time. This is time spent at this critically ill patient's bedside actively involved in patient care as well as the coordination of care and discussions with the patient's family. This does not include any procedural time which has been billed separately. Signed By: María Kang MD     November 17, 2022         Please note that this dictation may have been completed with Dragon, the computer voice recognition software. Quite often unanticipated grammatical, syntax, homophones, and other interpretive errors are inadvertently transcribed by the computer software. Please disregard these errors. Please excuse any errors that have escaped final proofreading.

## 2022-11-17 NOTE — TELEPHONE ENCOUNTER
Patient called, stating she is very dizzy, had a fall last night, did not hit her head or get injured. Her BP this am is 95/72, HR 54, weight 215. 6. PAD is 21 with HR 44 on merlin. I advised patient to present to ED for EKG and STAT labs. She states she is at work, I advised she have someone drive her to ED. She states understanding and will go now.

## 2022-11-18 LAB
ANION GAP SERPL CALC-SCNC: 7 MMOL/L (ref 5–15)
APPEARANCE UR: CLEAR
BACTERIA URNS QL MICRO: ABNORMAL /HPF
BASOPHILS # BLD: 0 K/UL (ref 0–0.1)
BASOPHILS NFR BLD: 1 % (ref 0–1)
BILIRUB UR QL: NEGATIVE
BUN SERPL-MCNC: 51 MG/DL (ref 6–20)
BUN/CREAT SERPL: 23 (ref 12–20)
CALCIUM SERPL-MCNC: 10.4 MG/DL (ref 8.5–10.1)
CHLORIDE SERPL-SCNC: 96 MMOL/L (ref 97–108)
CO2 SERPL-SCNC: 33 MMOL/L (ref 21–32)
COLOR UR: ABNORMAL
COMMENT, HOLDF: NORMAL
CREAT SERPL-MCNC: 2.21 MG/DL (ref 0.55–1.02)
DIFFERENTIAL METHOD BLD: NORMAL
EOSINOPHIL # BLD: 0.4 K/UL (ref 0–0.4)
EOSINOPHIL NFR BLD: 6 % (ref 0–7)
EPITH CASTS URNS QL MICRO: ABNORMAL /LPF
ERYTHROCYTE [DISTWIDTH] IN BLOOD BY AUTOMATED COUNT: 14.5 % (ref 11.5–14.5)
GLUCOSE BLD STRIP.AUTO-MCNC: 126 MG/DL (ref 65–117)
GLUCOSE BLD STRIP.AUTO-MCNC: 170 MG/DL (ref 65–117)
GLUCOSE BLD STRIP.AUTO-MCNC: 190 MG/DL (ref 65–117)
GLUCOSE SERPL-MCNC: 90 MG/DL (ref 65–100)
GLUCOSE UR STRIP.AUTO-MCNC: NEGATIVE MG/DL
HCT VFR BLD AUTO: 37.1 % (ref 35–47)
HGB BLD-MCNC: 13.3 G/DL (ref 11.5–16)
HGB UR QL STRIP: ABNORMAL
HYALINE CASTS URNS QL MICRO: ABNORMAL /LPF (ref 0–5)
IMM GRANULOCYTES # BLD AUTO: 0 K/UL (ref 0–0.04)
IMM GRANULOCYTES NFR BLD AUTO: 0 % (ref 0–0.5)
KETONES UR QL STRIP.AUTO: NEGATIVE MG/DL
LEUKOCYTE ESTERASE UR QL STRIP.AUTO: ABNORMAL
LYMPHOCYTES # BLD: 3 K/UL (ref 0.8–3.5)
LYMPHOCYTES NFR BLD: 39 % (ref 12–49)
MAGNESIUM SERPL-MCNC: 2.3 MG/DL (ref 1.6–2.4)
MCH RBC QN AUTO: 32 PG (ref 26–34)
MCHC RBC AUTO-ENTMCNC: 35.8 G/DL (ref 30–36.5)
MCV RBC AUTO: 89.2 FL (ref 80–99)
MONOCYTES # BLD: 1 K/UL (ref 0–1)
MONOCYTES NFR BLD: 13 % (ref 5–13)
NEUTS SEG # BLD: 3.2 K/UL (ref 1.8–8)
NEUTS SEG NFR BLD: 41 % (ref 32–75)
NITRITE UR QL STRIP.AUTO: NEGATIVE
NRBC # BLD: 0 K/UL (ref 0–0.01)
NRBC BLD-RTO: 0 PER 100 WBC
PH UR STRIP: 6.5 [PH] (ref 5–8)
PLATELET # BLD AUTO: 252 K/UL (ref 150–400)
PMV BLD AUTO: 9.7 FL (ref 8.9–12.9)
POTASSIUM SERPL-SCNC: 2.6 MMOL/L (ref 3.5–5.1)
POTASSIUM SERPL-SCNC: 3.4 MMOL/L (ref 3.5–5.1)
PROT UR STRIP-MCNC: NEGATIVE MG/DL
RBC # BLD AUTO: 4.16 M/UL (ref 3.8–5.2)
RBC #/AREA URNS HPF: ABNORMAL /HPF (ref 0–5)
SAMPLES BEING HELD,HOLD: NORMAL
SERVICE CMNT-IMP: ABNORMAL
SODIUM SERPL-SCNC: 136 MMOL/L (ref 136–145)
SP GR UR REFRACTOMETRY: 1.01 (ref 1–1.03)
UA: UC IF INDICATED,UAUC: ABNORMAL
UROBILINOGEN UR QL STRIP.AUTO: 0.2 EU/DL (ref 0.2–1)
WBC # BLD AUTO: 7.6 K/UL (ref 3.6–11)
WBC URNS QL MICRO: ABNORMAL /HPF (ref 0–4)

## 2022-11-18 PROCEDURE — 83735 ASSAY OF MAGNESIUM: CPT

## 2022-11-18 PROCEDURE — 36415 COLL VENOUS BLD VENIPUNCTURE: CPT

## 2022-11-18 PROCEDURE — 74011250637 HC RX REV CODE- 250/637: Performed by: INTERNAL MEDICINE

## 2022-11-18 PROCEDURE — 99223 1ST HOSP IP/OBS HIGH 75: CPT | Performed by: NURSE PRACTITIONER

## 2022-11-18 PROCEDURE — 74011000250 HC RX REV CODE- 250: Performed by: STUDENT IN AN ORGANIZED HEALTH CARE EDUCATION/TRAINING PROGRAM

## 2022-11-18 PROCEDURE — 65270000046 HC RM TELEMETRY

## 2022-11-18 PROCEDURE — 74011250637 HC RX REV CODE- 250/637: Performed by: STUDENT IN AN ORGANIZED HEALTH CARE EDUCATION/TRAINING PROGRAM

## 2022-11-18 PROCEDURE — 74011250636 HC RX REV CODE- 250/636: Performed by: STUDENT IN AN ORGANIZED HEALTH CARE EDUCATION/TRAINING PROGRAM

## 2022-11-18 PROCEDURE — 84132 ASSAY OF SERUM POTASSIUM: CPT

## 2022-11-18 PROCEDURE — 74011250637 HC RX REV CODE- 250/637: Performed by: NURSE PRACTITIONER

## 2022-11-18 PROCEDURE — 97161 PT EVAL LOW COMPLEX 20 MIN: CPT

## 2022-11-18 PROCEDURE — 82962 GLUCOSE BLOOD TEST: CPT

## 2022-11-18 PROCEDURE — 74011250636 HC RX REV CODE- 250/636: Performed by: INTERNAL MEDICINE

## 2022-11-18 PROCEDURE — 94660 CPAP INITIATION&MGMT: CPT

## 2022-11-18 PROCEDURE — 80048 BASIC METABOLIC PNL TOTAL CA: CPT

## 2022-11-18 PROCEDURE — 97530 THERAPEUTIC ACTIVITIES: CPT

## 2022-11-18 PROCEDURE — 85025 COMPLETE CBC W/AUTO DIFF WBC: CPT

## 2022-11-18 PROCEDURE — 81001 URINALYSIS AUTO W/SCOPE: CPT

## 2022-11-18 PROCEDURE — 5A09457 ASSISTANCE WITH RESPIRATORY VENTILATION, 24-96 CONSECUTIVE HOURS, CONTINUOUS POSITIVE AIRWAY PRESSURE: ICD-10-PCS | Performed by: STUDENT IN AN ORGANIZED HEALTH CARE EDUCATION/TRAINING PROGRAM

## 2022-11-18 RX ORDER — POTASSIUM CHLORIDE 7.45 MG/ML
10 INJECTION INTRAVENOUS
Status: COMPLETED | OUTPATIENT
Start: 2022-11-18 | End: 2022-11-18

## 2022-11-18 RX ORDER — POTASSIUM CHLORIDE 750 MG/1
40 TABLET, FILM COATED, EXTENDED RELEASE ORAL
Status: COMPLETED | OUTPATIENT
Start: 2022-11-18 | End: 2022-11-18

## 2022-11-18 RX ORDER — POTASSIUM CHLORIDE 750 MG/1
40 TABLET, FILM COATED, EXTENDED RELEASE ORAL 2 TIMES DAILY
Status: DISCONTINUED | OUTPATIENT
Start: 2022-11-18 | End: 2022-11-20

## 2022-11-18 RX ADMIN — POTASSIUM CHLORIDE 10 MEQ: 7.46 INJECTION, SOLUTION INTRAVENOUS at 08:52

## 2022-11-18 RX ADMIN — ASPIRIN 81 MG 81 MG: 81 TABLET ORAL at 09:57

## 2022-11-18 RX ADMIN — SODIUM CHLORIDE 50 ML/HR: 9 INJECTION, SOLUTION INTRAVENOUS at 03:26

## 2022-11-18 RX ADMIN — ATORVASTATIN CALCIUM 80 MG: 40 TABLET, FILM COATED ORAL at 22:08

## 2022-11-18 RX ADMIN — GABAPENTIN 200 MG: 100 CAPSULE ORAL at 09:57

## 2022-11-18 RX ADMIN — POTASSIUM CHLORIDE 40 MEQ: 750 TABLET, FILM COATED, EXTENDED RELEASE ORAL at 03:26

## 2022-11-18 RX ADMIN — POTASSIUM BICARBONATE 40 MEQ: 782 TABLET, EFFERVESCENT ORAL at 15:17

## 2022-11-18 RX ADMIN — EZETIMIBE 10 MG: 10 TABLET ORAL at 09:48

## 2022-11-18 RX ADMIN — Medication 10 ML: at 21:51

## 2022-11-18 RX ADMIN — ENOXAPARIN SODIUM 30 MG: 100 INJECTION SUBCUTANEOUS at 09:57

## 2022-11-18 RX ADMIN — Medication 10 ML: at 14:00

## 2022-11-18 RX ADMIN — SERTRALINE 50 MG: 50 TABLET, FILM COATED ORAL at 09:57

## 2022-11-18 RX ADMIN — POTASSIUM CHLORIDE 10 MEQ: 7.46 INJECTION, SOLUTION INTRAVENOUS at 11:37

## 2022-11-18 RX ADMIN — POTASSIUM CHLORIDE 10 MEQ: 7.46 INJECTION, SOLUTION INTRAVENOUS at 09:58

## 2022-11-18 RX ADMIN — LEVOTHYROXINE SODIUM 100 MCG: 0.1 TABLET ORAL at 09:48

## 2022-11-18 RX ADMIN — CLOPIDOGREL BISULFATE 75 MG: 75 TABLET ORAL at 11:20

## 2022-11-18 RX ADMIN — POTASSIUM CHLORIDE 10 MEQ: 7.46 INJECTION, SOLUTION INTRAVENOUS at 09:52

## 2022-11-18 NOTE — CONSULTS
CKD pt followed by Dr Ari Navas   I have notified her about the consult as well as Kcl replacement that I ordered      Marquez Arreguin MD  [facility]  Office :[phone]  Fax: [phone]'

## 2022-11-18 NOTE — PROGRESS NOTES
[address] [facility] Adult  Hospitalist Group                                                                                          Hospitalist Progress Note  Aston Delgado MD  Answering service: [phone] from in house phone        Date of Service:  2022  NAME:  Mari Escobar  :  1950  MRN:  [id number]      Admission Summary:   HPI: Yesi Toth is a 74 y.o. female ischemic CM s/p ICD and CABG, CKD stage IIIb, hypothyroidism PVD, WES on BiPAP nightly, type 2 diabetes, COPD who presents with lightheadedness and dizziness and reported low blood pressures with intermittent bradycardia. She has been followed by acute heart failure team outpatient last seen 1 week ago at that time concern for volume overload and metolazone was started scribed to take every other day she took approximately 2 doses of this medication states she has significant weight loss but also had significant increase in her symptoms of dizziness lightheadedness and low blood pressures to the point where she can safely stand. She fell today. Recommended admission to the hospital per heart failure team recommendations. \"       Interval history / Subjective:   Patient seen examined at bedside earlier. Feels well less dizziness .  K still persistently low      Assessment & Plan:     Orthostatic hypotension  Bradycardia  Fall 2/2 to above   Severe hypokalemia  KEYONNA on CKD stage IIIb   ischemic cardiomyopathy status post ICD with CABG  WES on BiPAP nightly  Type 2 diabetes  Peripheral neuropathy  COPD  -All of the above are likely secondary to overdiuresis  - cont to hold diuretics  -cont to Replete potassium aggressively IV and p.o., repeat K post repletion  - appreciate ahf team recs, plan to investigate cardiomems on Monday, further recs for diuretics per nephro/ahf team   -Continue gentle IVF 50 cc NS, cr improving   - Appreciate nephrology recommendations  -BiPAP nightly  - Nebs as needed not in exacerbation  - NPH, sliding scale readjust as needed  - Continue other home meds  - tsh wnl   -X ray of left knee neg   -PT/OT > no needs       critically ill high risk for decomp, CCT 35 min        Code status: full   Prophylaxis: Heparin subcu  Care Plan discussed with: Patient/family, nurse, case management  Anticipated Disposition: >48 hours     Hospital Problems  Date Reviewed: 6/24/2022            Codes Class Noted POA    Hypokalemia ICD-10-CM: E87.6  ICD-9-CM: 276.8  11/17/2022 Unknown             Review of Systems:   A comprehensive review of systems was negative except for that written in the HPI. Vital Signs:    Last 24hrs VS reviewed since prior progress note. Most recent are:  Visit Vitals  /87 (BP Patient Position: Standing)   Pulse 84   Temp 97.4 °F (36.3 °C)   Resp 18   Ht 5' 6\" (1.676 m)   Wt 96.6 kg (213 lb)   SpO2 93%   BMI 34.38 kg/m²         Intake/Output Summary (Last 24 hours) at 11/18/2022 1440  Last data filed at 11/18/2022 1300  Gross per 24 hour   Intake 850 ml   Output --   Net 850 ml        Physical Examination:     I had a face to face encounter with this patient and independently examined them on 11/18/2022 as outlined below:          Constitutional:  No acute distress, cooperative, pleasant    ENT:  Oral mucosa moist, oropharynx benign. Resp:  CTA bilaterally. No wheezing/rhonchi/rales. No accessory muscle use. CV:  Regular rhythm, normal rate, no murmurs, gallops, rubs    GI:  Soft, non distended, non tender. normoactive bowel sounds, no hepatosplenomegaly     Musculoskeletal:  No edema, warm, 2+ pulses throughout    Neurologic:  Moves all extremities.  L knee slightly more swollen, AAOx3, CN II-XII reviewed            Data Review:    Review and/or order of clinical lab test  Review and/or order of tests in the radiology section of CPT  Review and/or order of tests in the medicine section of CPT      Labs:     Recent Labs     11/18/22  0109 11/17/22  1250   WBC 7.6 9. 3   HGB 13.3 14.1   HCT 37.1 40.5    291     Recent Labs     11/18/22  0109 11/17/22  1900 11/17/22  1437 11/17/22  1250     --   --  131*   K 2.6* 2.8* 2.7* 2.7*   CL 96*  --   --  90*   CO2 33*  --   --  31   BUN 51*  --   --  51*   CREA 2.21*  --   --  2.89*   GLU 90  --   --  152*   CA 10.4*  --   --  10.7*   MG 2.3  --   --  2.3     Recent Labs     11/17/22  1250   ALT 28      TBILI 0.7   TP 8.0   ALB 3.8   GLOB 4.2*     No results for input(s): INR, PTP, APTT, INREXT in the last 72 hours. No results for input(s): FE, TIBC, PSAT, FERR in the last 72 hours. No results found for: FOL, RBCF   No results for input(s): PH, PCO2, PO2 in the last 72 hours. No results for input(s): CPK, CKNDX, TROIQ in the last 72 hours.     No lab exists for component: CPKMB  Lab Results   Component Value Date/Time    Cholesterol, total 144 06/02/2021 03:51 AM    HDL Cholesterol 72 06/02/2021 03:51 AM    LDL, calculated 55.8 06/02/2021 03:51 AM    Triglyceride 81 06/02/2021 03:51 AM    CHOL/HDL Ratio 2.0 06/02/2021 03:51 AM     Lab Results   Component Value Date/Time    Glucose (POC) 190 (H) 11/18/2022 12:07 AM    Glucose (POC) 153 (H) 11/17/2022 10:45 PM    Glucose (POC) 89 11/17/2022 07:12 PM    Glucose (POC) 87 11/17/2022 05:59 PM    Glucose (POC) 115 05/16/2022 09:13 AM     Lab Results   Component Value Date/Time    Color YELLOW/STRAW 06/01/2021 10:27 AM    Appearance CLEAR 06/01/2021 10:27 AM    Specific gravity 1.008 06/01/2021 10:27 AM    pH (UA) 6.5 06/01/2021 10:27 AM    Protein Negative 06/01/2021 10:27 AM    Glucose 500 (A) 06/01/2021 10:27 AM    Ketone Negative 06/01/2021 10:27 AM    Bilirubin Negative 06/01/2021 10:27 AM    Urobilinogen 0.2 06/01/2021 10:27 AM    Nitrites Negative 06/01/2021 10:27 AM    Leukocyte Esterase Negative 06/01/2021 10:27 AM    Epithelial cells FEW 06/01/2021 10:27 AM    Bacteria Negative 06/01/2021 10:27 AM    WBC 0-4 06/01/2021 10:27 AM    RBC 0-5 06/01/2021 10:27 AM Medications Reviewed:     Current Facility-Administered Medications   Medication Dose Route Frequency    potassium bicarb-citric acid (EFFER-K) tablet 40 mEq  40 mEq Oral NOW    potassium chloride SR (KLOR-CON 10) tablet 40 mEq  40 mEq Oral BID    sodium chloride (NS) flush 5-40 mL  5-40 mL IntraVENous Q8H    sodium chloride (NS) flush 5-40 mL  5-40 mL IntraVENous PRN    acetaminophen (TYLENOL) tablet 650 mg  650 mg Oral Q6H PRN    Or    acetaminophen (TYLENOL) suppository 650 mg  650 mg Rectal Q6H PRN    polyethylene glycol (MIRALAX) packet 17 g  17 g Oral DAILY PRN    ondansetron (ZOFRAN ODT) tablet 4 mg  4 mg Oral Q8H PRN    Or    ondansetron (ZOFRAN) injection 4 mg  4 mg IntraVENous Q6H PRN    enoxaparin (LOVENOX) injection 30 mg  30 mg SubCUTAneous DAILY    . PHARMACY TO SUBSTITUTE PER PROTOCOL (Reordered from: acetaminophen (TYLENOL) 650 mg TbER)    Per Protocol    albuterol-ipratropium (DUO-NEB) 2.5 MG-0.5 MG/3 ML  3 mL Nebulization Q4H PRN    aspirin chewable tablet 81 mg  81 mg Oral DAILY    atorvastatin (LIPITOR) tablet 80 mg  80 mg Oral QHS    clopidogreL (PLAVIX) tablet 75 mg  75 mg Oral DAILY    clotrimazole-betamethasone (LOTRISONE) 1-0.05 % cream   Topical BID    ezetimibe (ZETIA) tablet 10 mg  10 mg Oral DAILY    gabapentin (NEURONTIN) capsule 200 mg  200 mg Oral BID    ketoconazole (NIZORAL) 2 % shampoo   Topical PRN    levothyroxine (SYNTHROID) tablet 100 mcg  100 mcg Oral ACB    nitroglycerin (NITROSTAT) tablet 0.4 mg  0.4 mg SubLINGual Q5MIN PRN    sertraline (ZOLOFT) tablet 50 mg  50 mg Oral DAILY    tiZANidine (ZANAFLEX) tablet 2 mg  2 mg Oral BID PRN    insulin NPH (NOVOLIN N, HUMULIN N) injection 14 Units  14 Units SubCUTAneous BID    insulin lispro (HUMALOG) injection   SubCUTAneous AC&HS    glucose chewable tablet 16 g  4 Tablet Oral PRN    glucagon (GLUCAGEN) injection 1 mg  1 mg IntraMUSCular PRN    dextrose 10 % infusion 0-250 mL  0-250 mL IntraVENous PRN    0.9% sodium chloride infusion  50 mL/hr IntraVENous CONTINUOUS    traMADoL (ULTRAM) tablet 50 mg  50 mg Oral Q6H PRN     Current Outpatient Medications   Medication Sig    metOLazone (ZAROXOLYN) 2.5 mg tablet Take 1 Tablet by mouth every other day. bumetanide (BUMEX) 1 mg tablet Take 4 Tablets by mouth two (2) times a day. tiZANidine (ZANAFLEX) 2 mg tablet TAKE 1 TABLET BY MOUTH TWICE DAILY AS NEEDED FOR MUSCLE RELAXANT    semaglutide (OZEMPIC) 1 mg/dose (2 mg/1.5 mL) sub-q pen 1 mg by SubCUTAneous route every seven (7) days. Once weekly    NovoLIN 70-30 FlexPen U-100 100 unit/mL (70-30) inpn 18 units before breakfast and 14 units at dinner    gabapentin (NEURONTIN) 100 mg capsule TAKE 2 CAPSULES BY MOUTH TWICE DAILY MAX  DAILY  AMOUNT  400MG    ezetimibe (ZETIA) 10 mg tablet Take 1 Tablet by mouth daily. clopidogreL (PLAVIX) 75 mg tab Take 1 tablet by mouth once daily    Insulin Needles, Disposable, 32 gauge x 5/32\" ndle 1 Each by Does Not Apply route daily. alcohol swabs padm 1 Each by Apply Externally route daily. sertraline (ZOLOFT) 50 mg tablet 50 mg daily. ketoconazole (NIZORAL) 2 % shampoo     levothyroxine (Euthyrox) 100 mcg tablet Take 1 Tablet by mouth Daily (before breakfast). FreeStyle Lancets 28 gauge misc USE AS DIRECTED    nitroglycerin (NITROSTAT) 0.3 mg SL tablet 1 Tablet by SubLINGual route every five (5) minutes as needed for Chest Pain. Blood-Glucose Meter monitoring kit Check blood sugar daily. May substitute for insurance preferred meter    glucose blood VI test strips (blood glucose test) strip Check blood sugar 2 times daily: E11.9 may substitute for insurance preferred strips. lancets misc Use as directed. Dx:check sugar once daily. E11.65    atorvastatin (LIPITOR) 80 mg tablet TAKE 1 TABLET BY MOUTH AT BEDTIME    lancets misc Check blood sugar 2 times daily. May substitute for insurance preferred lancets.     glucose blood VI test strips (ASCENSIA AUTODISC VI, ONE TOUCH ULTRA TEST VI) strip E11.65 check sugar once daily    clotrimazole-betamethasone (LOTRISONE) topical cream Apply  to affected area two (2) times a day. (Patient taking differently: Apply  to affected area two (2) times daily as needed.)    Blood-Glucose Meter monitoring kit Use as directed. Dx: E11.65 check sugar twice daily    albuterol (PROVENTIL HFA, VENTOLIN HFA, PROAIR HFA) 90 mcg/actuation inhaler Take 2 Puffs by inhalation every four (4) hours as needed. acetaminophen (TYLENOL) 650 mg TbER Take 1,300 mg by mouth two (2) times a day. aspirin 81 mg chewable tablet Take 81 mg by mouth daily.      ______________________________________________________________________  EXPECTED LENGTH OF STAY: 2d 14h  ACTUAL LENGTH OF STAY:          1                 Eboni Berry MD

## 2022-11-18 NOTE — PROGRESS NOTES
Orders received, chart reviewed and patient evaluated and discharged by physical therapy. Pending progression with skilled acute physical therapy, recommend:  No skilled physical therapy/ follow up rehabilitation needs identified at this time. Recommend with nursing OOB to chair 3x/day and walking daily with supervision assist and cane. Supv assist d/t recent fall w/ h/o light headedness/ dizziness, none today. Thank you for completing as able in order to maintain patient strength, endurance and independence. Full evaluation to follow. Vitals:    11/18/22 1000 11/18/22 1006 11/18/22 1013 11/18/22 1023   BP: (!) 140/82 (!) 146/85 119/83  119/87   BP 1 Location:       BP Patient Position: Semi fowlers; At rest Sitting Standing Standing  Comment: after walking 150 ft   Pulse: 72 77 79 84

## 2022-11-18 NOTE — CONSULTS
NEPHROLOGY CONSULT NOTE     Patient: Florentino Lerma MRN: [id number]  PCP: Evin Bergeron MD   :     1950  Age:   74 y.o. Sex:  female      Referring physician: Renate Ibrahim MD  Reason for consultation: 74 y.o. female with Hypokalemia [A91.9] complicated by KEYONNA   Admission Date: 2022  1:05 PM  LOS: 1 day     DISCUSSION / PLAN :      KEYONNA on CKD4 due to intravascular volume depletion  CKD4 with subnephrotic proteinuria due to DKD/HTN-baseline cr 1.8-2  Hypokalemia  Dysuria    Plan:  Replete potassium  Check Mg  Decrease IVF to 50 ml/hr  Hold diuretics today       Active Problems / Assessment AAActive  : Active Problems:    Hypokalemia (2022)         Subjective:   HPI: Florentino Lerma is a 74 y.o.  female who has been admitted to the hospital for dizziness. She has h/o CKD4 with baseline cr 1.8-2 and has been followed by Dr Julia Duvall. She has h/o DM and CMP s/p ICD, WES and COPD. She is on high dose bumex 4 mg bid as outpatient. Her BP has been low recently and her hydralazine, metoprolol and isosorbide were stopped. She was placed on metolazone 1 mg EOD last week for fluid overload. She was admitted with worsening dizziness, hypotension, KEYONNA and severe hypokalemia. Her cr on admission was 2.8 and is down to 2.2 today. Her BP has improved. Has some dysuria/feeling pressure on urination. Denies N?V?D. Not SOB at this time.      Past Medical Hx:   Past Medical History:   Diagnosis Date    Adverse effect of anesthesia     hard to wake up/uses BIPAP/\"try to avoid general if possible\"/intubated in past prior to going to sleep and it caused pt to be incontinent    Arthritis     Asthma     CAD (coronary artery disease)     Chronic kidney disease     elevataed creatinine    Chronic obstructive pulmonary disease (HCC)     Chronic pain     arthritis    Coagulation disorder (HCC)     on plavix    Congestive heart failure (HCC)     Diabetes ([facility] [address]     Hypertension     Morbid obesity ([facility] [address]     Sleep apnea     BIPAP with 2 liters oxygen    Thromboembolus (HCC)     after heart surgery - left leg    Thyroid disease         Past Surgical Hx:     Past Surgical History:   Procedure Laterality Date    COLONOSCOPY N/A 2020    COLONOSCOPY   :- performed by Lois Og MD at [address] [place] [address]  2105    knee - right    HX  SECTION      x3    HX CORONARY ARTERY BYPASS GRAFT  1997    3 vessels    HX CORONARY STENT PLACEMENT  2016    HX HERNIA REPAIR  1988    HX IMPLANTABLE CARDIOVERTER DEFIBRILLATOR      HX KNEE REPLACEMENT Right 2015    once    ME CARDIAC SURG PROCEDURE UNLIST  2018    cardiac cath - Left    ME LAP GASTRIC BYPASS/KERLINE-EN-Y      lap band per pt and not a gastric bypass       Medications:  Prior to Admission medications    Medication Sig Start Date End Date Taking? Authorizing Provider   metOLazone (ZAROXOLYN) 2.5 mg tablet Take 1 Tablet by mouth every other day. 22   Efrain Bumpers, MD   bumetanide (BUMEX) 1 mg tablet Take 4 Tablets by mouth two (2) times a day. 10/6/22   Farhat Mckeon NP   tiZANidine (ZANAFLEX) 2 mg tablet TAKE 1 TABLET BY MOUTH TWICE DAILY AS NEEDED FOR MUSCLE RELAXANT 22   Provider, Historical   semaglutide (OZEMPIC) 1 mg/dose (2 mg/1.5 mL) sub-q pen 1 mg by SubCUTAneous route every seven (7) days. Once weekly 22   Provider, Historical   NovoLIN 70-30 FlexPen U-100 100 unit/mL (70-30) inpn 18 units before breakfast and 14 units at dinner 2/3/22   Provider, Historical   gabapentin (NEURONTIN) 100 mg capsule TAKE 2 CAPSULES BY MOUTH TWICE DAILY MAX  DAILY  AMOUNT  400MG 21   Ramses Jaime HOOD NP   ezetimibe (ZETIA) 10 mg tablet Take 1 Tablet by mouth daily. 21   Cassius Celaya NP   clopidogreL (PLAVIX) 75 mg tab Take 1 tablet by mouth once daily 21   Cassius Celaya NP   Insulin Needles, Disposable, 32 gauge x \" ndle 1 Each by Does Not Apply route daily.  21 Az Santoyo, VENKATESH   alcohol swabs padm 1 Each by Apply Externally route daily. 11/24/21   Az Santoyo, VENKATESH   sertraline (ZOLOFT) 50 mg tablet 50 mg daily. 11/4/21   Provider, Historical   ketoconazole (NIZORAL) 2 % shampoo  10/13/21   Provider, Historical   levothyroxine (Euthyrox) 100 mcg tablet Take 1 Tablet by mouth Daily (before breakfast). 10/5/21   Az Santoyo, VENKATESH   FreeStyle Lancets 28 gauge misc USE AS DIRECTED 7/28/21   Provider, Historical   nitroglycerin (NITROSTAT) 0.3 mg SL tablet 1 Tablet by SubLINGual route every five (5) minutes as needed for Chest Pain. 6/11/21   Makenzie Blankenship, NP   Blood-Glucose Meter monitoring kit Check blood sugar daily. May substitute for insurance preferred meter 4/29/21   Az Santoyo, VENKATESH   glucose blood VI test strips (blood glucose test) strip Check blood sugar 2 times daily: E11.9 may substitute for insurance preferred strips. 4/29/21   Az Santoyo, NP   lancets misc Use as directed. Dx:check sugar once daily. E11.65 4/29/21   Henny HOOD NP   atorvastatin (LIPITOR) 80 mg tablet TAKE 1 TABLET BY MOUTH AT BEDTIME 4/22/21   Makenzie Blankenship, NP   lancets misc Check blood sugar 2 times daily. May substitute for insurance preferred lancets. 12/11/20   Loida Queen NP   glucose blood VI test strips (ASCENSIA AUTODISC VI, ONE TOUCH ULTRA TEST VI) strip E11.65 check sugar once daily 12/7/20   Ryland Bobo MD   clotrimazole-betamethasone (LOTRISONE) topical cream Apply  to affected area two (2) times a day. Patient taking differently: Apply  to affected area two (2) times daily as needed. 9/23/20   Az Santoyo, NP   Blood-Glucose Meter monitoring kit Use as directed. Dx: E11.65 check sugar twice daily 8/25/20   Az Santoyo, VENKATESH   albuterol (PROVENTIL HFA, VENTOLIN HFA, PROAIR HFA) 90 mcg/actuation inhaler Take 2 Puffs by inhalation every four (4) hours as needed.     Provider, Historical acetaminophen (TYLENOL) 650 mg TbER Take 1,300 mg by mouth two (2) times a day. Provider, Historical   aspirin 81 mg chewable tablet Take 81 mg by mouth daily. Provider, Historical       Allergies   Allergen Reactions    Crestor [Rosuvastatin] Other (comments)     Causes muscle cramps    Lisinopril Cough    Nsaids (Non-Steroidal Anti-Inflammatory Drug) Other (comments)     Liver and Kidney       Social Hx:  reports that she quit smoking about 12 years ago. Her smoking use included cigarettes. She has a 22.50 pack-year smoking history. She has never used smokeless tobacco. She reports current alcohol use. She reports that she does not currently use drugs. Family History   Problem Relation Age of Onset    Hypertension Mother     Dementia Mother     Coronary Art Dis Father 58    Sudden Death Father 58    Diabetes Son     Diabetes Brother        Review of Systems:  A twelve point review of system was performed today. Pertinent positives and negatives are mentioned in the HPI. The reminder of the ROS is negative and noncontributory. Objective:    Vitals:    Vitals:    11/18/22 1000 11/18/22 1006 11/18/22 1013 11/18/22 1023   BP: (!) 140/82 (!) 146/85 119/83 119/87   Pulse: 72 77 79 84   Resp:       Temp:       SpO2:       Weight:       Height:         I&O's:  11/17 0701 - 11/18 0700  In: 650 [I.V.:650]  Out: -   Visit Vitals  /87 (BP Patient Position: Standing) Comment (BP Patient Position): after walking 150 ft   Pulse 84   Temp 97.4 °F (36.3 °C)   Resp 18   Ht 5' 6\" (1.676 m)   Wt 96.6 kg (213 lb)   SpO2 93%   BMI 34.38 kg/m²       Physical Exam:  General:Alert, No distress,   HEENT: Eyes are PERRL. Conjunctiva without pallor ,erythema. The sclerae without icterus.  .   Neck:Supple,no JVD  Lungs : Clears to auscultation Bilaterally, Normal respiratory effort  CVS: RRR, S1 S2 normal, No rub, no LE edema  Abdomen: Soft, Non tender, No hepatosplenomegaly, bowel sounds present  Extremities: No edema  Skin: No rash   Neurologic: non focal, AAO x 3  Psych: normal affect    Laboratory Results:    Recent Results (from the past 24 hour(s))   EKG, 12 LEAD, INITIAL    Collection Time: 11/17/22 12:38 PM   Result Value Ref Range    Ventricular Rate 76 BPM    Atrial Rate 76 BPM    P-R Interval 126 ms    QRS Duration 92 ms    Q-T Interval 366 ms    QTC Calculation (Bezet) 411 ms    Calculated P Axis 70 degrees    Calculated R Axis -5 degrees    Calculated T Axis 140 degrees    Diagnosis       Atrial-paced rhythm with frequent premature ventricular complexes  Minimal voltage criteria for LVH, may be normal variant ( R in aVL )  Inferior infarct (cited on or before 30-MAY-2021)  Anterolateral infarct , age undetermined  Abnormal ECG  When compared with ECG of 30-MAY-2021 13:40,  Significant changes have occurred  Confirmed by Hasmukh oFx MD ([id number]) on 11/17/2022 3:01:38 PM     CBC WITH AUTOMATED DIFF    Collection Time: 11/17/22 12:50 PM   Result Value Ref Range    WBC 9.3 3.6 - 11.0 K/uL    RBC 4.42 3.80 - 5.20 M/uL    HGB 14.1 11.5 - 16.0 g/dL    HCT 40.5 35.0 - 47.0 %    MCV 91.6 80.0 - 99.0 FL    MCH 31.9 26.0 - 34.0 PG    MCHC 34.8 30.0 - 36.5 g/dL    RDW 14.3 11.5 - 14.5 %    PLATELET 447 920 - 921 K/uL    MPV 9.7 8.9 - 12.9 FL    NRBC 0.0 0  WBC    ABSOLUTE NRBC 0.00 0.00 - 0.01 K/uL    NEUTROPHILS 62 32 - 75 %    LYMPHOCYTES 27 12 - 49 %    MONOCYTES 9 5 - 13 %    EOSINOPHILS 2 0 - 7 %    BASOPHILS 0 0 - 1 %    IMMATURE GRANULOCYTES 0 0.0 - 0.5 %    ABS. NEUTROPHILS 5.7 1.8 - 8.0 K/UL    ABS. LYMPHOCYTES 2.5 0.8 - 3.5 K/UL    ABS. MONOCYTES 0.8 0.0 - 1.0 K/UL    ABS. EOSINOPHILS 0.2 0.0 - 0.4 K/UL    ABS. BASOPHILS 0.0 0.0 - 0.1 K/UL    ABS. IMM.  GRANS. 0.0 0.00 - 0.04 K/UL    DF AUTOMATED     NT-PRO BNP    Collection Time: 11/17/22 12:50 PM   Result Value Ref Range    NT pro- (H) <592 PG/ML   METABOLIC PANEL, COMPREHENSIVE    Collection Time: 11/17/22 12:50 PM   Result Value Ref Range Sodium 131 (L) 136 - 145 mmol/L    Potassium 2.7 (LL) 3.5 - 5.1 mmol/L    Chloride 90 (L) 97 - 108 mmol/L    CO2 31 21 - 32 mmol/L    Anion gap 10 5 - 15 mmol/L    Glucose 152 (H) 65 - 100 mg/dL    BUN 51 (H) 6 - 20 MG/DL    Creatinine 2.89 (H) 0.55 - 1.02 MG/DL    BUN/Creatinine ratio 18 12 - 20      eGFR 17 (L) >60 ml/min/1.73m2    Calcium 10.7 (H) 8.5 - 10.1 MG/DL    Bilirubin, total 0.7 0.2 - 1.0 MG/DL    ALT (SGPT) 28 12 - 78 U/L    AST (SGOT) 40 (H) 15 - 37 U/L    Alk. phosphatase 110 45 - 117 U/L    Protein, total 8.0 6.4 - 8.2 g/dL    Albumin 3.8 3.5 - 5.0 g/dL    Globulin 4.2 (H) 2.0 - 4.0 g/dL    A-G Ratio 0.9 (L) 1.1 - 2.2     SAMPLES BEING HELD    Collection Time: 11/17/22 12:50 PM   Result Value Ref Range    SAMPLES BEING HELD 1RED 1BLUE     COMMENT        Add-on orders for these samples will be processed based on acceptable specimen integrity and analyte stability, which may vary by analyte.    [address] SENSITIVITY    Collection Time: 11/17/22 12:50 PM   Result Value Ref Range    Troponin-High Sensitivity 45 0 - 51 ng/L   MAGNESIUM    Collection Time: 11/17/22 12:50 PM   Result Value Ref Range    Magnesium 2.3 1.6 - 2.4 mg/dL   POTASSIUM    Collection Time: 11/17/22  2:37 PM   Result Value Ref Range    Potassium 2.7 (LL) 3.5 - 5.1 mmol/L   TROPONIN-HIGH SENSITIVITY    Collection Time: 11/17/22  3:40 PM   Result Value Ref Range    Troponin-High Sensitivity 46 0 - 51 ng/L   GLUCOSE, POC    Collection Time: 11/17/22  5:59 PM   Result Value Ref Range    Glucose (POC) 87 65 - 117 mg/dL    Performed by Sherri Villalpando    POTASSIUM    Collection Time: 11/17/22  7:00 PM   Result Value Ref Range    Potassium 2.8 (L) 3.5 - 5.1 mmol/L   TSH 3RD GENERATION    Collection Time: 11/17/22  7:00 PM   Result Value Ref Range    TSH 1.68 0.36 - 3.74 uIU/mL   GLUCOSE, POC    Collection Time: 11/17/22  7:12 PM   Result Value Ref Range    Glucose (POC) 89 65 - 117 mg/dL    Performed by Rodolfo Campos, POC    Collection Time: 11/17/22 10:45 PM   Result Value Ref Range    Glucose (POC) 153 (H) 65 - 117 mg/dL    Performed by Carolyn Heaton RN    GLUCOSE, POC    Collection Time: 11/18/22 12:07 AM   Result Value Ref Range    Glucose (POC) 190 (H) 65 - 117 mg/dL    Performed by Zahra Rosado    CBC WITH AUTOMATED DIFF    Collection Time: 11/18/22  1:09 AM   Result Value Ref Range    WBC 7.6 3.6 - 11.0 K/uL    RBC 4.16 3.80 - 5.20 M/uL    HGB 13.3 11.5 - 16.0 g/dL    HCT 37.1 35.0 - 47.0 %    MCV 89.2 80.0 - 99.0 FL    MCH 32.0 26.0 - 34.0 PG    MCHC 35.8 30.0 - 36.5 g/dL    RDW 14.5 11.5 - 14.5 %    PLATELET 741 841 - 631 K/uL    MPV 9.7 8.9 - 12.9 FL    NRBC 0.0 0  WBC    ABSOLUTE NRBC 0.00 0.00 - 0.01 K/uL    NEUTROPHILS 41 32 - 75 %    LYMPHOCYTES 39 12 - 49 %    MONOCYTES 13 5 - 13 %    EOSINOPHILS 6 0 - 7 %    BASOPHILS 1 0 - 1 %    IMMATURE GRANULOCYTES 0 0.0 - 0.5 %    ABS. NEUTROPHILS 3.2 1.8 - 8.0 K/UL    ABS. LYMPHOCYTES 3.0 0.8 - 3.5 K/UL    ABS. MONOCYTES 1.0 0.0 - 1.0 K/UL    ABS. EOSINOPHILS 0.4 0.0 - 0.4 K/UL    ABS. BASOPHILS 0.0 0.0 - 0.1 K/UL    ABS. IMM. GRANS. 0.0 0.00 - 0.04 K/UL    DF AUTOMATED     METABOLIC PANEL, BASIC    Collection Time: 11/18/22  1:09 AM   Result Value Ref Range    Sodium 136 136 - 145 mmol/L    Potassium 2.6 (LL) 3.5 - 5.1 mmol/L    Chloride 96 (L) 97 - 108 mmol/L    CO2 33 (H) 21 - 32 mmol/L    Anion gap 7 5 - 15 mmol/L    Glucose 90 65 - 100 mg/dL    BUN 51 (H) 6 - 20 MG/DL    Creatinine 2.21 (H) 0.55 - 1.02 MG/DL    BUN/Creatinine ratio 23 (H) 12 - 20      eGFR 23 (L) >60 ml/min/1.73m2    Calcium 10.4 (H) 8.5 - 10.1 MG/DL   SAMPLES BEING HELD    Collection Time: 11/18/22  1:09 AM   Result Value Ref Range    SAMPLES BEING HELD 1RED,1BLUE     COMMENT        Add-on orders for these samples will be processed based on acceptable specimen integrity and analyte stability, which may vary by analyte.         Lab Results   Component Value Date    BUN 51 (H) 11/18/2022     11/18/2022    K 2.6 (LL) 11/18/2022    CL 96 (L) 11/18/2022    CO2 33 (H) 11/18/2022       Lab Results   Component Value Date    BUN 51 (H) 11/18/2022    BUN 51 (H) 11/17/2022    BUN 24 11/11/2022    BUN 22 (H) 05/10/2022    BUN 34 (H) 03/03/2022    K 2.6 (LL) 11/18/2022    K 2.8 (L) 11/17/2022    K 2.7 (LL) 11/17/2022    K 2.7 (LL) 11/17/2022    K 4.0 11/11/2022       Lab Results   Component Value Date    WBC 7.6 11/18/2022    RBC 4.16 11/18/2022    HGB 13.3 11/18/2022    HCT 37.1 11/18/2022    MCV 89.2 11/18/2022    MCH 32.0 11/18/2022    RDW 14.5 11/18/2022     11/18/2022       Lab Results   Component Value Date    PHOS 3.6 05/31/2021       Urine dipstick:   Lab Results   Component Value Date/Time    Color YELLOW/STRAW 06/01/2021 10:27 AM    Appearance CLEAR 06/01/2021 10:27 AM    Specific gravity 1.008 06/01/2021 10:27 AM    pH (UA) 6.5 06/01/2021 10:27 AM    Protein Negative 06/01/2021 10:27 AM    Glucose 500 (A) 06/01/2021 10:27 AM    Ketone Negative 06/01/2021 10:27 AM    Bilirubin Negative 06/01/2021 10:27 AM    Urobilinogen 0.2 06/01/2021 10:27 AM    Nitrites Negative 06/01/2021 10:27 AM    Leukocyte Esterase Negative 06/01/2021 10:27 AM    Epithelial cells FEW 06/01/2021 10:27 AM    Bacteria Negative 06/01/2021 10:27 AM    WBC 0-4 06/01/2021 10:27 AM    RBC 0-5 06/01/2021 10:27 AM       I have reviewed the following: All pertinent labs, microbiology data, radiology imaging for my assessment     ECG- Rev: Yes / No  Xray/CT/US/MRI REV: Yes/ No    Care Plan discussed with:  patient     Chart reviewed. Total time spent with patient:    Medications list Personally Reviewed   [x]      Yes     []               No      Thank you for allowing us to participate in the care of this patient. We will follow patient.  Please dont hesitate to call with any questions    Maliha Buck MD  11/18/2022    [address] [facility]

## 2022-11-18 NOTE — PROGRESS NOTES
PHYSICAL THERAPY EVALUATION/DISCHARGE  Patient: Ferny Pascual (35 y.o. female)  Date: 11/18/2022  Primary Diagnosis: Hypokalemia [E87.6]       Precautions:  Fall      ASSESSMENT  Based on the objective data described below, the patient presents at her functional baseline of independent with transfers and ambulation. Her gait was mildly antalgic when first starting to walk. Pt attributes this to left knee discomfort related to her fall. Her pain and gait quality improved after a few feet of walking. Her blood pressure was orthostatic supine to stand however pt voiced no symptoms and able to walk 150 ft with her cane w/o symptom onset and stable BP. Her strength, ROM, and coordination are functional.       Patient has no acute therapy needs at this time. Will sign off. Completed education and recommended she sit in a chair throughout the day and walk in the burkett with supervision of staff (supv only d/t recent h/o light headedness/ dizziness w/ a fall). Vitals:     11/18/22 1000 11/18/22 1006 11/18/22 1013 11/18/22 1023   BP: (!) 140/82 (!) 146/85 119/83  119/87   BP 1 Location:           BP Patient Position: Semi fowlers; At rest Sitting Standing Standing  Comment: after walking 150 ft   Pulse: 72 77 79 84     Other factors to consider for discharge:      Further skilled acute physical therapy is not indicated at this time. PLAN :  Recommendation for discharge: (in order for the patient to meet his/her long term goals)  No skilled physical therapy/ follow up rehabilitation needs identified at this time. This discharge recommendation:  Has not yet been discussed the attending provider and/or case management    IF patient discharges home will need the following DME: patient owns DME required for discharge       SUBJECTIVE:   Patient stated It's my knee from where I fell on it.      OBJECTIVE DATA SUMMARY:   HISTORY:    Past Medical History:   Diagnosis Date    Adverse effect of anesthesia hard to wake up/uses BIPAP/\"try to avoid general if possible\"/intubated in past prior to going to sleep and it caused pt to be incontinent    Arthritis     Asthma     CAD (coronary artery disease)     Chronic kidney disease     elevataed creatinine    Chronic obstructive pulmonary disease (HCC)     Chronic pain     arthritis    Coagulation disorder ([facility] [address]     on plavix    Congestive heart failure (HCC)     Diabetes ([facility] [address]     Hypertension     Morbid obesity ([facility] [address]     Sleep apnea     BIPAP with 2 liters oxygen    Thromboembolus ([facility] [address]     after heart surgery - left leg    Thyroid disease      Past Surgical History:   Procedure Laterality Date    COLONOSCOPY N/A 2020    COLONOSCOPY   :- performed by Cristina Mota MD at [address] [place] [address]  2105    knee - right    HX  SECTION      x3    HX CORONARY ARTERY BYPASS GRAFT  1997    3 vessels    HX CORONARY STENT PLACEMENT  2016    HX HERNIA REPAIR  1988    HX IMPLANTABLE CARDIOVERTER DEFIBRILLATOR      HX KNEE REPLACEMENT Right 2015    once    UT CARDIAC SURG PROCEDURE UNLIST  2018    cardiac cath - Left    [facility] LAP GASTRIC BYPASS/KERLINE-EN-Y  2011    lap band per pt and not a gastric bypass       Prior level of function: Independent with a cane, driving, working. Lives with her sister.     Personal factors and/or comorbidities impacting plan of care: h/o light headedness resulting in a fall yesterday    Home Situation  Home Environment: Apartment  # Steps to Enter: 0 (elevator)  One/Two Story Residence: One story  Living Alone: No (sister)  Support Systems: Other Family Member(s)  Patient Expects to be Discharged to[de-identified] Home  Current DME Used/Available at Home: bailey Marshall Anastacio Au, rollator, Shower chair  Tub or Shower Type: Tub/Shower combination    EXAMINATION/PRESENTATION/DECISION MAKING:   Critical Behavior:  Neurologic State: Alert  Orientation Level: Oriented X4  Cognition: Appropriate decision making, Appropriate for age attention/concentration, Appropriate safety awareness, Follows commands  Safety/Judgement: Awareness of environment    Range Of Motion:  Grossly functional                       Strength:    Grossly functional                    Tone & Sensation: Tone - normal  Sensation - intact                              Coordination:  Functional  Vision:   Voiced no dbl vision, blurred vision, able to read employee name badge and track in all planes  Functional Mobility:  Bed Mobility:  Rolling: Modified independent  Supine to Sit: Modified independent  Sit to Supine: Modified independent  Scooting: Modified independent  Transfers:  Sit to Stand: Modified independent  Stand to Sit: Modified independent                       Balance:   Sitting: Intact; Without support  Standing: Without support; Impaired  Standing - Static: Good  Standing - Dynamic : Good;Constant support  Ambulation/Gait Training:  Distance (ft): 150 Feet (ft)  Assistive Device: Cane, straight;Gait belt  Ambulation - Level of Assistance: Modified independent;Supervision     Gait Description (WDL): Exceptions to WDL           Base of Support: Widened                          Therapeutic Activity and Education:   Educated pt in paced activity taking rest breaks when fatigued, fall prevention with light headedness (call for staff assist before getting up, sitting if light headed/ dizzy), and activity to maintain strength and endurance with this hospitalization (sitting in chair throughout the day, walks in the burkett w/ staff supervision d/t h/o light headedness with fall).              Physical Therapy Evaluation Charge Determination   History Examination Presentation Decision-Making   LOW Complexity : Zero comorbidities / personal factors that will impact the outcome / POC LOW Complexity : 1-2 Standardized tests and measures addressing body structure, function, activity limitation and / or participation in recreation  LOW Complexity : Stable, uncomplicated  LOW Complexity : FOTO score of       Based on the above components, the patient evaluation is determined to be of the following complexity level: LOW     Pain Rating:  Voiced left knee discomfort when first starting to walk - attributes to her fall - improved with time up    Activity Tolerance:   Good    After treatment patient left in no apparent distress:   Supine in bed, Call bell within reach, Side rails x 2 (ED plinth), and RN in the room    COMMUNICATION/EDUCATION:   The patients plan of care was discussed with: Registered nurse. Fall prevention education was provided and the patient/caregiver indicated understanding. and Patient participated in plan of care and in agreement with PT DC.     Thank you for this referral.  Sreekanth Ness, PT   Time Calculation: 35 mins

## 2022-11-18 NOTE — PROGRESS NOTES
Occupational Therapy    Chart reviewed and orders acknowledged. Discussed with PT, patient with no current acute therapy needs at this time and is likely at or close to baseline LOF in regards to Adl completion. Will sign off. Please reconsult if needs arise. Thank you.      DANYELL Agustin, OTR/L

## 2022-11-18 NOTE — ED NOTES
Verbal shift change report given to DHARMESH Butterfield  (oncoming nurse) by Dominic Roth RN (offgoing nurse). Report included the following information SBAR, ED Summary, Intake/Output, MAR and Recent Results.

## 2022-11-18 NOTE — ED NOTES
Per Nephrology potassium order will be modified to include the potassium dose given at 0330.      RN is awaiting modified orders

## 2022-11-18 NOTE — PROGRESS NOTES
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, LVEF improved to 45% (by echo 2022) from 25-30% (by echo 2021), stage C, NYHA class IIIB, on GDMT limited by hypotension with persistently elevated filling pressures by Cardiomems  Most likely etiology of cardiomyopathy includes: obesity +/- WES +/- tachycardia +/- wt amyloidosis (PYP positive on vyndamax)    RECOMMENDATIONS:  Intolerant to beta-blockers due to hypotension  Intolerant to ACEi/ARB/ARNi due to CKD  Intolerant to spironolactone due to hyperkalemia  Intolerant to jardiance due to CKD  Hold diuretics today  Keep K > 4 and Mag > 2  Nephrology recommendations appreciated   Will obtain cardiomems reading on Monday   Not on allopurinol or uloric, uric acid level 5  Continue ASA and plavix   Statin or PCSK9i: Continue current dose of atorvastatin and zetia  Continue CPAP therapy while admitted  ICD interrogation every 3 months   Advanced care plan present on file    All other care per primary team     IMPRESSION:  Fatigue  Shortness of breath  Aucte on chronic systolic heart failure  Stage D, NYHA class IIIA symptoms  Ischemic cardiomyopathy, LVEF 45% (by echo 2022)  Invitae with VUS  CAD s/p CABG  s/p PCI to DVG-RCA   H/o severe MR s/p mitral clip in   HTN  H/o VT s/p AICD  WES on Bipap  T2DM with neuropathy  Hypothyroidism  Obesity- Body mass index is 34.38 kg/m².   HLD  Osteoarthritis   CKD4       CARDIAC IMAGING:  Echo 2022  LVEF 45%, mild MR, LVIDd 5.54cm    Echo (8/19/21)  LV: Estimated LVEF is 30 - 35%. Mildly dilated left ventricle. Mild concentric hypertrophy. Moderately reduced systolic function. Moderate (grade 2) left ventricular diastolic dysfunction. Previously placed mitraclip is noted in secure position with residual trace to mild MR lateral to clip placement. Mean transmitral gradient is 2.6mmHg at heart rate of about 70 bpm.  LA: Moderately dilated left atrium. MV: Mild mitral valve regurgitation is present. LA: Severely dilated left atrium. RA: Mildly dilated right atrium. Echo (5/31/21)  LV: Estimated LVEF is 25 - 30%. Severely dilated left ventricle. Mild concentric hypertrophy. Severely and globally reduced systolic function. Inconclusive left ventricular diastolic function. LA: Moderately dilated left atrium. RA: Mildly dilated right atrium. MV: Moderate mitral valve regurgitation is present. Image quality for this study was suboptimal.     6 Min Walk Report 11/11/2022 6/3/2021 4/23/2021 12/28/2020 12/22/2020 8/31/2020 7/1/2020   (PRE) HR 69 70 70 71 73 81 71   (PRE) O2 Sat 98 98 - - - 96 -   (POST)  92 - - - 110 -   (POST) O2 Sat 93 99 - 97 - 97 97   Distance in Meters 205.74 91.44 - - - 226.77 -          HISTORY OF PRESENT ILLNESS:  I had the pleasure of seeing Francois Granados in [address] [place] [address] at [address] [place] [address]. Briefly, Francois Granados is a 74 y.o. female with h/o HTN, HLD, WES, Obesity (BMI 39), s/p gastric sleeve in 2011, T2DM, hypothyroidism, COPD, CAD s/p CABG in 1997, s/p PCI to [address] in 5/2015, ICM, VT, s/p AICD ([employer]), anxiety and depression. She more recently underwent MitraClip on 11/12/2020 and was evaluated for upgrade to [address] with Dr. Lorraine Tanner, however this was not done as her QRS was too narrow. Her most recent admission was 5/30/21-Repeat TTE from August shows an EF of 45%.  She has been doing PT for her knee and can do household work but otherwise does not do much. She is compliant with her medications and her cardiomems readings. LIFE GOALS:  Lifestyle goals discussed with the patient. INTERVAL HISTORY:  Patient presented today from ED wit complaints of dizzines and a fall. She was recently started on metolazone for volume overload, she had lost weight but her K was 2.2 on admission. Nephrology is also consulted. PHYSICAL EXAM:  Visit Vitals  /79   Pulse 72   Temp 97.4 °F (36.3 °C)   Resp 18   Ht 5' 6\" (1.676 m)   Wt 213 lb (96.6 kg)   SpO2 93%   BMI 34.38 kg/m²     Physical Exam  Constitutional:       Appearance: Normal appearance. She is obese. Cardiovascular:      Rate and Rhythm: Normal rate and regular rhythm. Pulses: Normal pulses. Heart sounds: Normal heart sounds. Pulmonary:      Effort: No respiratory distress. Breath sounds: Normal breath sounds. Abdominal:      General: There is no distension. Palpations: Abdomen is soft. Musculoskeletal:         General: Swelling present. Skin:     General: Skin is warm and dry. Neurological:      General: No focal deficit present. Mental Status: She is alert and oriented to person, place, and time. Psychiatric:         Mood and Affect: Mood normal.        REVIEW OF SYSTEMS:  Review of Systems   Constitutional:  Negative for chills, fever and malaise/fatigue. Respiratory:  Negative for cough and shortness of breath. Cardiovascular:  Positive for leg swelling. Negative for chest pain and palpitations. Gastrointestinal:  Negative for heartburn and nausea. Musculoskeletal:  Positive for falls. Negative for myalgias. Neurological:  Negative for dizziness, weakness and headaches. Psychiatric/Behavioral:  The patient is nervous/anxious.        PAST MEDICAL HISTORY:  Past Medical History:   Diagnosis Date    Adverse effect of anesthesia     hard to wake up/uses BIPAP/\"try to avoid general if possible\"/intubated in past prior to going to sleep and it caused pt to be incontinent    Arthritis     Asthma     CAD (coronary artery disease)     Chronic kidney disease     elevataed creatinine    Chronic obstructive pulmonary disease (HCC)     Chronic pain     arthritis    Coagulation disorder (HCC)     on plavix    Congestive heart failure (HCC)     Diabetes ([address]     Hypertension     Morbid obesity (HCC)     Sleep apnea     BIPAP with 2 liters oxygen    Thromboembolus ([address]     after heart surgery - left leg    Thyroid disease        PAST SURGICAL HISTORY:  Past Surgical History:   Procedure Laterality Date    COLONOSCOPY N/A 2020    COLONOSCOPY   :- performed by Barry Moctezuma MD at [address] [place] [address]  2105    knee - right    HX  SECTION      x3    HX CORONARY ARTERY BYPASS GRAFT  1997    3 vessels    HX CORONARY STENT PLACEMENT  2016    HX HERNIA REPAIR      HX IMPLANTABLE CARDIOVERTER DEFIBRILLATOR      HX KNEE REPLACEMENT Right 2015    once    ND CARDIAC SURG PROCEDURE UNLIST  2018    cardiac cath - Left    ND LAP GASTRIC BYPASS/KERLINE-EN-Y  2011    lap band per pt and not a gastric bypass       FAMILY HISTORY:  Family History   Problem Relation Age of Onset    Hypertension Mother     Dementia Mother     Coronary Art Dis Father 58    Sudden Death Father 58    Diabetes Son     Diabetes Brother        SOCIAL HISTORY:  Social History     Socioeconomic History    Marital status:    Tobacco Use    Smoking status: Former     Packs/day: 0.50     Years: 45.00     Pack years: 22.50     Types: Cigarettes     Quit date: 2010     Years since quittin.8    Smokeless tobacco: Never    Tobacco comments:     quit /started again (smoked 3 yrs) off and on/none since    Vaping Use    Vaping Use: Never used   Substance and Sexual Activity    Alcohol use: Yes     Comment: Once every 6 months/ Special occasionans    Drug use: Not Currently    Sexual activity: Not Currently       LABORATORY RESULTS:  Labs Latest Ref Rng & Units 11/18/2022 11/17/2022 11/17/2022 11/17/2022 11/11/2022   WBC 3.6 - 11.0 K/uL 7.6 - - 9.3 6.7   RBC 3.80 - 5.20 M/uL 4.16 - - 4.42 4.11   Hemoglobin 11.5 - 16.0 g/dL 13.3 - - 14.1 12.7   Hematocrit 35.0 - 47.0 % 37.1 - - 40.5 39.4   MCV 80.0 - 99.0 FL 89.2 - - 91.6 96   Platelets 147 - 940 K/uL 252 - - 291 260   Lymphocytes 12 - 49 % 39 - - 27 -   Monocytes 5 - 13 % 13 - - 9 -   Eosinophils 0 - 7 % 6 - - 2 -   Basophils 0 - 1 % 1 - - 0 -   Albumin 3.5 - 5.0 g/dL - - - 3.8 4.3   Calcium 8.5 - 10.1 MG/DL 10. 4(H) - - 10. 7(H) 10. 7(H)   Glucose 65 - 100 mg/dL 90 - - 152(H) 129(H)   BUN 6 - 20 MG/DL 51(H) - - 51(H) 24   Creatinine 0.55 - 1.02 MG/DL 2.21(H) - - 2.89(H) 1.76(H)   Sodium 136 - 145 mmol/L 136 - - 131(L) 142   Potassium 3.5 - 5.1 mmol/L 2. 6(LL) 2. 8(L) 2. 7(LL) 2. 7(LL) 4.0   TSH 0.36 - 3.74 uIU/mL - 1.68 - - -   Some recent data might be hidden       ALLERGY:  Allergies   Allergen Reactions    Crestor [Rosuvastatin] Other (comments)     Causes muscle cramps    Lisinopril Cough    Nsaids (Non-Steroidal Anti-Inflammatory Drug) Other (comments)     Liver and Kidney        CURRENT MEDICATIONS:    Current Facility-Administered Medications:     potassium chloride 10 mEq in 100 ml IVPB, 10 mEq, IntraVENous, Q1H, Piero Desai MD    potassium bicarb-citric acid (EFFER-K) tablet 40 mEq, 40 mEq, Oral, NOW, Piero Desai MD    sodium chloride (NS) flush 5-40 mL, 5-40 mL, IntraVENous, Q8H, Enedelia Barrett MD, 10 mL at 11/17/22 1730    sodium chloride (NS) flush 5-40 mL, 5-40 mL, IntraVENous, PRN, Ranjit Peraza MD    acetaminophen (TYLENOL) tablet 650 mg, 650 mg, Oral, Q6H PRN **OR** acetaminophen (TYLENOL) suppository 650 mg, 650 mg, Rectal, Q6H PRN, Ranjit Peraza MD    polyethylene glycol (MIRALAX) packet 17 g, 17 g, Oral, DAILY PRN, Ranjit Peraza MD    ondansetron (ZOFRAN ODT) tablet 4 mg, 4 mg, Oral, Q8H PRN **OR** ondansetron (ZOFRAN) injection 4 mg, 4 mg, IntraVENous, Q6H PRN, Daryl Carvajal MD    enoxaparin (LOVENOX) injection 30 mg, 30 mg, SubCUTAneous, DAILY, Daryl Carvajal MD    .PHARMACY TO SUBSTITUTE PER PROTOCOL (Reordered from: acetaminophen (TYLENOL) 650 mg TbER), , , Per Protocol, Daryl Carvajal MD    albuterol-ipratropium (DUO-NEB) 2.5 MG-0.5 MG/3 ML, 3 mL, Nebulization, Q4H PRN, Daryl Carvajal MD    aspirin chewable tablet 81 mg, 81 mg, Oral, DAILY, Enedelia Barrett MD, 81 mg at 11/17/22 1800    atorvastatin (LIPITOR) tablet 80 mg, 80 mg, Oral, QHS, Enedelia Barrett MD, 80 mg at 11/17/22 2246    clopidogreL (PLAVIX) tablet 75 mg, 75 mg, Oral, DAILY, Daryl Carvajal MD    clotrimazole-betamethasone (LOTRISONE) 1-0.05 % cream, , Topical, BID, Daryl Carvajal MD    ezetimibe (ZETIA) tablet 10 mg, 10 mg, Oral, DAILY, Daryl Carvajal MD    gabapentin (NEURONTIN) capsule 200 mg, 200 mg, Oral, BID, Daryl Carvajal MD, 200 mg at 11/17/22 1800    ketoconazole (NIZORAL) 2 % shampoo, , Topical, PRN, Daryl Carvajal MD    levothyroxine (SYNTHROID) tablet 100 mcg, 100 mcg, Oral, ACB, Daryl Carvajal MD    nitroglycerin (NITROSTAT) tablet 0.4 mg, 0.4 mg, SubLINGual, Q5MIN PRN, Daryl Carvajal MD    sertraline (ZOLOFT) tablet 50 mg, 50 mg, Oral, DAILY, Daryl Carvajal MD, 50 mg at 11/17/22 1801    tiZANidine (ZANAFLEX) tablet 2 mg, 2 mg, Oral, BID PRN, Daryl Carvajal MD    insulin NPH (NOVOLIN N, HUMULIN N) injection 14 Units, 14 Units, SubCUTAneous, BID, Daryl Carvajal MD    insulin lispro (HUMALOG) injection, , SubCUTAneous, AC&HS, Daryl Carvajal MD, 3 Units at 11/17/22 1557    glucose chewable tablet 16 g, 4 Tablet, Oral, PRN, Daryl Carvajal MD    glucagon (GLUCAGEN) injection 1 mg, 1 mg, IntraMUSCular, PRN, Daryl Carvajal MD    dextrose 10 % infusion 0-250 mL, 0-250 mL, IntraVENous, PRN, Daryl Carvajal MD    0.9% sodium chloride infusion, 50 mL/hr, IntraVENous, CONTINUOUS, Daryl Carvajal MD, Last Rate: 50 mL/hr at 11/18/22 0326, 50 mL/hr at 11/18/22 0326    traMADoL (ULTRAM) tablet 50 mg, 50 mg, Oral, Q6H PRN, Daryl Carvajal MD    Current Outpatient Medications:     metOLazone (ZAROXOLYN) 2.5 mg tablet, Take 1 Tablet by mouth every other day., Disp: 6 Tablet, Rfl: 0    bumetanide (BUMEX) 1 mg tablet, Take 4 Tablets by mouth two (2) times a day., Disp: 240 Tablet, Rfl: 1    tiZANidine (ZANAFLEX) 2 mg tablet, TAKE 1 TABLET BY MOUTH TWICE DAILY AS NEEDED FOR MUSCLE RELAXANT, Disp: , Rfl:     semaglutide (OZEMPIC) 1 mg/dose (2 mg/1.5 mL) sub-q pen, 1 mg by SubCUTAneous route every seven (7) days. Once weekly, Disp: , Rfl:     NovoLIN 70-30 FlexPen U-100 100 unit/mL (70-30) inpn, 18 units before breakfast and 14 units at dinner, Disp: , Rfl:     gabapentin (NEURONTIN) 100 mg capsule, TAKE 2 CAPSULES BY MOUTH TWICE DAILY MAX  DAILY  AMOUNT  400MG, Disp: 120 Capsule, Rfl: 0    ezetimibe (ZETIA) 10 mg tablet, Take 1 Tablet by mouth daily. , Disp: 30 Tablet, Rfl: 1    clopidogreL (PLAVIX) 75 mg tab, Take 1 tablet by mouth once daily, Disp: 90 Tablet, Rfl: 0    Insulin Needles, Disposable, 32 gauge x 5/32\" ndle, 1 Each by Does Not Apply route daily. , Disp: 100 Pen Needle, Rfl: 2    alcohol swabs padm, 1 Each by Apply Externally route daily. , Disp: 100 Pad, Rfl: 11    sertraline (ZOLOFT) 50 mg tablet, 50 mg daily. , Disp: , Rfl:     ketoconazole (NIZORAL) 2 % shampoo, , Disp: , Rfl:     levothyroxine (Euthyrox) 100 mcg tablet, Take 1 Tablet by mouth Daily (before breakfast). , Disp: 90 Tablet, Rfl: 0    FreeStyle Lancets 28 gauge misc, USE AS DIRECTED, Disp: , Rfl:     nitroglycerin (NITROSTAT) 0.3 mg SL tablet, 1 Tablet by SubLINGual route every five (5) minutes as needed for Chest Pain., Disp: 1 Bottle, Rfl: 0    Blood-Glucose Meter monitoring kit, Check blood sugar daily.  May substitute for insurance preferred meter, Disp: 1 Kit, Rfl: 0    glucose blood VI test strips (blood glucose test) strip, Check blood sugar 2 times daily: E11.9 may substitute for insurance preferred strips. , Disp: 200 Strip, Rfl: 3    lancets misc, Use as directed. Dx:check sugar once daily. E11.65, Disp: 1 Each, Rfl: 11    atorvastatin (LIPITOR) 80 mg tablet, TAKE 1 TABLET BY MOUTH AT BEDTIME, Disp: 90 Tab, Rfl: 3    lancets misc, Check blood sugar 2 times daily. May substitute for insurance preferred lancets. , Disp: 200 Each, Rfl: 3    glucose blood VI test strips (ASCENSIA AUTODISC VI, ONE TOUCH ULTRA TEST VI) strip, E11.65 check sugar once daily, Disp: 100 Strip, Rfl: 0    clotrimazole-betamethasone (LOTRISONE) topical cream, Apply  to affected area two (2) times a day. (Patient taking differently: Apply  to affected area two (2) times daily as needed.), Disp: 30 g, Rfl: 0    Blood-Glucose Meter monitoring kit, Use as directed. Dx: E11.65 check sugar twice daily, Disp: 1 Kit, Rfl: 0    albuterol (PROVENTIL HFA, VENTOLIN HFA, PROAIR HFA) 90 mcg/actuation inhaler, Take 2 Puffs by inhalation every four (4) hours as needed. , Disp: , Rfl:     acetaminophen (TYLENOL) 650 mg TbER, Take 1,300 mg by mouth two (2) times a day., Disp: , Rfl:     aspirin 81 mg chewable tablet, Take 81 mg by mouth daily. , Disp: , Rfl:     Thank you for your referral and allowing me to participate in this patient's care.     Libra Caballero NP  [facility] [address] [place] [address], [address]  Phone: [phone] TEAM:  Patient Care Team:  Renae, MD Fernando as PCP - General  Lloyd Ruffin MD (Cardiovascular Disease Physician)  Erica Zamorano MD (Gastroenterology)  Marquis Adhikari MD as Consulting Provider (Cardiovascular Disease Physician)  Max Son MD (Nephrology)

## 2022-11-18 NOTE — ED NOTES
TRANSFER - OUT REPORT:    Verbal report given to DHARMESH Reddy(name) on Yamila Kellogg  being transferred to [facility]) for routine progression of care       Report consisted of patients Situation, Background, Assessment and   Recommendations(SBAR). Information from the following report(s) SBAR, ED Summary, Procedure Summary, Intake/Output, MAR, Recent Results, Med Rec Status and Cardiac Rhythm nsr was reviewed with the receiving nurse. Lines:   Peripheral IV 11/17/22 Right Antecubital (Active)       Peripheral IV 11/17/22 Antecubital (Active)   Site Assessment Clean, dry, & intact 11/17/22 1446   Phlebitis Assessment 0 11/17/22 1446   Infiltration Assessment 0 11/17/22 1446   Dressing Status Clean, dry, & intact 11/17/22 1446   Dressing Type 4 X 4 11/17/22 1446        Opportunity for questions and clarification was provided.       Patient transported with:   Monitor  Registered Nurse

## 2022-11-18 NOTE — ED NOTES
Verbal shift change report given to Sheri Santana RN (oncoming nurse) by Gaurang Viramontes RN (offgoing nurse). Report included the following information SBAR, ED Summary, Intake/Output, MAR and Recent Results.

## 2022-11-19 LAB
ANION GAP SERPL CALC-SCNC: 6 MMOL/L (ref 5–15)
BASOPHILS # BLD: 0 K/UL (ref 0–0.1)
BASOPHILS NFR BLD: 0 % (ref 0–1)
BNP SERPL-MCNC: 577 PG/ML
BUN SERPL-MCNC: 47 MG/DL (ref 6–20)
BUN/CREAT SERPL: 23 (ref 12–20)
CALCIUM SERPL-MCNC: 10 MG/DL (ref 8.5–10.1)
CHLORIDE SERPL-SCNC: 102 MMOL/L (ref 97–108)
CO2 SERPL-SCNC: 30 MMOL/L (ref 21–32)
CREAT SERPL-MCNC: 2.08 MG/DL (ref 0.55–1.02)
DIFFERENTIAL METHOD BLD: ABNORMAL
EOSINOPHIL # BLD: 0.4 K/UL (ref 0–0.4)
EOSINOPHIL NFR BLD: 5 % (ref 0–7)
ERYTHROCYTE [DISTWIDTH] IN BLOOD BY AUTOMATED COUNT: 14.7 % (ref 11.5–14.5)
GLUCOSE BLD STRIP.AUTO-MCNC: 138 MG/DL (ref 65–117)
GLUCOSE BLD STRIP.AUTO-MCNC: 144 MG/DL (ref 65–117)
GLUCOSE BLD STRIP.AUTO-MCNC: 145 MG/DL (ref 65–117)
GLUCOSE BLD STRIP.AUTO-MCNC: 163 MG/DL (ref 65–117)
GLUCOSE SERPL-MCNC: 172 MG/DL (ref 65–100)
HCT VFR BLD AUTO: 39.1 % (ref 35–47)
HGB BLD-MCNC: 13 G/DL (ref 11.5–16)
IMM GRANULOCYTES # BLD AUTO: 0 K/UL (ref 0–0.04)
IMM GRANULOCYTES NFR BLD AUTO: 0 % (ref 0–0.5)
LYMPHOCYTES # BLD: 2.2 K/UL (ref 0.8–3.5)
LYMPHOCYTES NFR BLD: 30 % (ref 12–49)
MCH RBC QN AUTO: 32.1 PG (ref 26–34)
MCHC RBC AUTO-ENTMCNC: 33.2 G/DL (ref 30–36.5)
MCV RBC AUTO: 96.5 FL (ref 80–99)
MONOCYTES # BLD: 0.8 K/UL (ref 0–1)
MONOCYTES NFR BLD: 11 % (ref 5–13)
NEUTS SEG # BLD: 3.9 K/UL (ref 1.8–8)
NEUTS SEG NFR BLD: 54 % (ref 32–75)
NRBC # BLD: 0 K/UL (ref 0–0.01)
NRBC BLD-RTO: 0 PER 100 WBC
PLATELET # BLD AUTO: 238 K/UL (ref 150–400)
PMV BLD AUTO: 9.6 FL (ref 8.9–12.9)
POTASSIUM SERPL-SCNC: 3.2 MMOL/L (ref 3.5–5.1)
RBC # BLD AUTO: 4.05 M/UL (ref 3.8–5.2)
RBC MORPH BLD: ABNORMAL
SERVICE CMNT-IMP: ABNORMAL
SODIUM SERPL-SCNC: 138 MMOL/L (ref 136–145)
WBC # BLD AUTO: 7.3 K/UL (ref 3.6–11)

## 2022-11-19 PROCEDURE — 74011636637 HC RX REV CODE- 636/637: Performed by: STUDENT IN AN ORGANIZED HEALTH CARE EDUCATION/TRAINING PROGRAM

## 2022-11-19 PROCEDURE — 82962 GLUCOSE BLOOD TEST: CPT

## 2022-11-19 PROCEDURE — 74011250636 HC RX REV CODE- 250/636: Performed by: STUDENT IN AN ORGANIZED HEALTH CARE EDUCATION/TRAINING PROGRAM

## 2022-11-19 PROCEDURE — 94660 CPAP INITIATION&MGMT: CPT

## 2022-11-19 PROCEDURE — 83880 ASSAY OF NATRIURETIC PEPTIDE: CPT

## 2022-11-19 PROCEDURE — 99232 SBSQ HOSP IP/OBS MODERATE 35: CPT | Performed by: NURSE PRACTITIONER

## 2022-11-19 PROCEDURE — 36415 COLL VENOUS BLD VENIPUNCTURE: CPT

## 2022-11-19 PROCEDURE — 65270000046 HC RM TELEMETRY

## 2022-11-19 PROCEDURE — 74011000250 HC RX REV CODE- 250: Performed by: STUDENT IN AN ORGANIZED HEALTH CARE EDUCATION/TRAINING PROGRAM

## 2022-11-19 PROCEDURE — 74011250637 HC RX REV CODE- 250/637: Performed by: NURSE PRACTITIONER

## 2022-11-19 PROCEDURE — 74011250637 HC RX REV CODE- 250/637: Performed by: STUDENT IN AN ORGANIZED HEALTH CARE EDUCATION/TRAINING PROGRAM

## 2022-11-19 PROCEDURE — 80048 BASIC METABOLIC PNL TOTAL CA: CPT

## 2022-11-19 PROCEDURE — 85025 COMPLETE CBC W/AUTO DIFF WBC: CPT

## 2022-11-19 RX ADMIN — Medication 10 ML: at 21:33

## 2022-11-19 RX ADMIN — Medication 14 UNITS: at 17:53

## 2022-11-19 RX ADMIN — ATORVASTATIN CALCIUM 80 MG: 40 TABLET, FILM COATED ORAL at 21:33

## 2022-11-19 RX ADMIN — ASPIRIN 81 MG 81 MG: 81 TABLET ORAL at 09:18

## 2022-11-19 RX ADMIN — LEVOTHYROXINE SODIUM 100 MCG: 0.1 TABLET ORAL at 09:18

## 2022-11-19 RX ADMIN — TIZANIDINE 2 MG: 4 TABLET ORAL at 17:59

## 2022-11-19 RX ADMIN — Medication 3 UNITS: at 17:53

## 2022-11-19 RX ADMIN — POTASSIUM CHLORIDE 40 MEQ: 750 TABLET, FILM COATED, EXTENDED RELEASE ORAL at 17:53

## 2022-11-19 RX ADMIN — ENOXAPARIN SODIUM 30 MG: 100 INJECTION SUBCUTANEOUS at 09:17

## 2022-11-19 RX ADMIN — POLYETHYLENE GLYCOL 3350 17 G: 17 POWDER, FOR SOLUTION ORAL at 10:09

## 2022-11-19 RX ADMIN — CLOPIDOGREL BISULFATE 75 MG: 75 TABLET ORAL at 09:18

## 2022-11-19 RX ADMIN — SODIUM CHLORIDE 50 ML/HR: 9 INJECTION, SOLUTION INTRAVENOUS at 08:59

## 2022-11-19 RX ADMIN — GABAPENTIN 200 MG: 100 CAPSULE ORAL at 09:18

## 2022-11-19 RX ADMIN — GABAPENTIN 200 MG: 100 CAPSULE ORAL at 17:52

## 2022-11-19 RX ADMIN — Medication 10 ML: at 17:53

## 2022-11-19 RX ADMIN — EZETIMIBE 10 MG: 10 TABLET ORAL at 09:18

## 2022-11-19 RX ADMIN — Medication 14 UNITS: at 09:17

## 2022-11-19 RX ADMIN — POTASSIUM CHLORIDE 40 MEQ: 750 TABLET, FILM COATED, EXTENDED RELEASE ORAL at 09:17

## 2022-11-19 RX ADMIN — SERTRALINE 50 MG: 50 TABLET, FILM COATED ORAL at 09:18

## 2022-11-19 RX ADMIN — Medication 10 ML: at 08:48

## 2022-11-19 NOTE — CONSULTS
NEPHROLOGY CONSULT NOTE     Patient: Sanjiv Muñoz MRN: [id number]  PCP: Julia Taylor MD   :     1950  Age:   74 y.o. Sex:  female      Referring physician: Gloriajean Aschoff, MD  Reason for consultation: 74 y.o. female with Hypokalemia [V57.0] complicated by KEYONNA   Admission Date: 2022  1:05 PM  LOS: 2 days     DISCUSSION / PLAN :      KEYONNA on CKD4 due to intravascular volume depletion--resolving  CKD4 with subnephrotic proteinuria due to DKD/HTN-baseline cr 1.8-2  Hypokalemia  Dysuria    Plan:  Replete potassium  Check Mg  D/c IVF, replete orally  Hold diuretics today       Active Problems / Assessment AAActive  : Active Problems:    Hypokalemia (2022)       Subjective:   HPI: Sanjiv Muñoz is a 74 y.o.  female who has been admitted to the hospital for dizziness. She has h/o CKD4 with baseline cr 1.8-2 and has been followed by Dr Sienna Al. She has h/o DM and CMP s/p ICD, WES and COPD. She is on high dose bumex 4 mg bid as outpatient. Her BP has been low recently and her hydralazine, metoprolol and isosorbide were stopped. She was placed on metolazone 1 mg EOD last week for fluid overload. She was admitted with worsening dizziness, hypotension, KEYONNA and severe hypokalemia. Her cr on admission was 2.8 and is down to 2.2 today. Her BP has improved. Has some dysuria/feeling pressure on urination. Denies N?V?D. Not SOB at this time.      Past Medical Hx:   Past Medical History:   Diagnosis Date    Adverse effect of anesthesia     hard to wake up/uses BIPAP/\"try to avoid general if possible\"/intubated in past prior to going to sleep and it caused pt to be incontinent    Arthritis     Asthma     CAD (coronary artery disease)     Chronic kidney disease     elevataed creatinine    Chronic obstructive pulmonary disease (HCC)     Chronic pain     arthritis    Coagulation disorder ([address]     on plavix    Congestive heart failure ([address]     Diabetes ([address]     Hypertension     Morbid obesity ([facility] Sleep apnea     BIPAP with 2 liters oxygen    Thromboembolus (HCC)     after heart surgery - left leg    Thyroid disease         Past Surgical Hx:     Past Surgical History:   Procedure Laterality Date    COLONOSCOPY N/A 2020    COLONOSCOPY   :- performed by Jamia Acosta MD at [address] [place] [address]  2105    knee - right    HX  SECTION      x3    HX CORONARY ARTERY BYPASS GRAFT  1997    3 vessels    HX CORONARY STENT PLACEMENT  2016    HX HERNIA REPAIR  1988    HX IMPLANTABLE CARDIOVERTER DEFIBRILLATOR      HX KNEE REPLACEMENT Right 2015    once    FL CARDIAC SURG PROCEDURE UNLIST  2018    cardiac cath - Left    FL LAP GASTRIC BYPASS/KERLINE-EN-Y      lap band per pt and not a gastric bypass       Medications:  Prior to Admission medications    Medication Sig Start Date End Date Taking? Authorizing Provider   metOLazone (ZAROXOLYN) 2.5 mg tablet Take 1 Tablet by mouth every other day. 22   Agnes Miller MD   bumetanide (BUMEX) 1 mg tablet Take 4 Tablets by mouth two (2) times a day. 10/6/22   Ally Mckeon NP   tiZANidine (ZANAFLEX) 2 mg tablet TAKE 1 TABLET BY MOUTH TWICE DAILY AS NEEDED FOR MUSCLE RELAXANT 22   Provider, Historical   semaglutide (OZEMPIC) 1 mg/dose (2 mg/1.5 mL) sub-q pen 1 mg by SubCUTAneous route every seven (7) days. Once weekly 22   Provider, Historical   NovoLIN 70-30 FlexPen U-100 100 unit/mL (70-30) inpn 18 units before breakfast and 14 units at dinner 2/3/22   Provider, Historical   gabapentin (NEURONTIN) 100 mg capsule TAKE 2 CAPSULES BY MOUTH TWICE DAILY MAX  DAILY  AMOUNT  400MG 21   Malika HOOD NP   ezetimibe (ZETIA) 10 mg tablet Take 1 Tablet by mouth daily. 21   Faby Ramesh NP   clopidogreL (PLAVIX) 75 mg tab Take 1 tablet by mouth once daily 21   Faby Ramesh NP   Insulin Needles, Disposable, 32 gauge x \" ndle 1 Each by Does Not Apply route daily. 11/24/21   Faby Ramesh, VENKATESH   alcohol swabs padm 1 Each by Apply Externally route daily. 11/24/21   Faby Ramesh, VENKATESH   sertraline (ZOLOFT) 50 mg tablet 50 mg daily. 11/4/21   Provider, Historical   ketoconazole (NIZORAL) 2 % shampoo  10/13/21   Provider, Historical   levothyroxine (Euthyrox) 100 mcg tablet Take 1 Tablet by mouth Daily (before breakfast). 10/5/21   Faby Ramesh, VENKATESH   FreeStyle Lancets 28 gauge misc USE AS DIRECTED 7/28/21   Provider, Historical   nitroglycerin (NITROSTAT) 0.3 mg SL tablet 1 Tablet by SubLINGual route every five (5) minutes as needed for Chest Pain. 6/11/21   Jaime Blankenship, NP   Blood-Glucose Meter monitoring kit Check blood sugar daily. May substitute for insurance preferred meter 4/29/21   Faby Ramesh, VENKATESH   glucose blood VI test strips (blood glucose test) strip Check blood sugar 2 times daily: E11.9 may substitute for insurance preferred strips. 4/29/21   Faby Ramesh, VENKATESH   lancets misc Use as directed. Dx:check sugar once daily. E11.65 4/29/21   Malika HOOD NP   atorvastatin (LIPITOR) 80 mg tablet TAKE 1 TABLET BY MOUTH AT BEDTIME 4/22/21   Jaime Blankenship NP   lancets misc Check blood sugar 2 times daily. May substitute for insurance preferred lancets. 12/11/20   Ivana Garrison NP   glucose blood VI test strips (ASCENSIA AUTODISC VI, ONE TOUCH ULTRA TEST VI) strip E11.65 check sugar once daily 12/7/20   Sarika Ayala MD   clotrimazole-betamethasone (LOTRISONE) topical cream Apply  to affected area two (2) times a day. Patient taking differently: Apply  to affected area two (2) times daily as needed. 9/23/20   Faby Ramesh, VENKATESH   Blood-Glucose Meter monitoring kit Use as directed. Dx: E11.65 check sugar twice daily 8/25/20   Faby Ramesh, VENKATESH   albuterol (PROVENTIL HFA, VENTOLIN HFA, PROAIR HFA) 90 mcg/actuation inhaler Take 2 Puffs by inhalation every four (4) hours as needed.     Provider, Historical   acetaminophen (TYLENOL) 650 mg TbER Take 1,300 mg by mouth two (2) times a day. Provider, Historical   aspirin 81 mg chewable tablet Take 81 mg by mouth daily. Provider, Historical       Allergies   Allergen Reactions    Crestor [Rosuvastatin] Other (comments)     Causes muscle cramps    Lisinopril Cough    Nsaids (Non-Steroidal Anti-Inflammatory Drug) Other (comments)     Liver and Kidney       Social Hx:  reports that she quit smoking about 12 years ago. Her smoking use included cigarettes. She has a 22.50 pack-year smoking history. She has never used smokeless tobacco. She reports current alcohol use. She reports that she does not currently use drugs. Family History   Problem Relation Age of Onset    Hypertension Mother     Dementia Mother     Coronary Art Dis Father 58    Sudden Death Father 58    Diabetes Son     Diabetes Brother        Review of Systems:  A twelve point review of system was performed today. Pertinent positives and negatives are mentioned in the HPI. The reminder of the ROS is negative and noncontributory. 11-19: able to eat, mild transient nausea post breakfast but no vomiting. Constipated, started Myralax. Not SOB, no chest pain      Objective:    Vitals:    Vitals:    11/19/22 0455 11/19/22 0915 11/19/22 1000 11/19/22 1207   BP: (!) 118/99 136/69  103/64   Pulse: 72 76 74 71   Resp: 14 21  17   Temp: 98.4 °F (36.9 °C) 98.5 °F (36.9 °C)  98.2 °F (36.8 °C)   SpO2: 96% 94%  98%   Weight:       Height:         I&O's:  11/18 0701 - 11/19 0700  In: 200 [I.V.:200]  Out: -   Visit Vitals  /64 (BP 1 Location: Right upper arm, BP Patient Position: Sitting)   Pulse 71   Temp 98.2 °F (36.8 °C)   Resp 17   Ht 5' 6\" (1.676 m)   Wt 98.7 kg (217 lb 11.2 oz)   SpO2 98%   BMI 35.14 kg/m²       Physical Exam:  General:Alert, No distress,   HEENT: Eyes anicteric. Conjunctiva without pallor ,erythema.  Neck:Supple,no JVD  Lungs : Clears to auscultation Bilaterally, Normal respiratory effort  CVS: RRR, S1 S2 normal, No rub, no LE edema  Abdomen: Soft, Non tender, No hepatosplenomegaly, bowel sounds present  Extremities: No edema  Skin: No rash   Neurologic: non focal, AAO x 3  Psych: normal affect    Laboratory Results:    Recent Results (from the past 24 hour(s))   GLUCOSE, POC    Collection Time: 11/18/22  3:22 PM   Result Value Ref Range    Glucose (POC) 126 (H) 65 - 117 mg/dL    Performed by Naa Ying (GAVIN BARROSO)    POTASSIUM    Collection Time: 11/18/22  4:25 PM   Result Value Ref Range    Potassium 3.4 (L) 3.5 - 5.1 mmol/L   URINALYSIS W/ REFLEX CULTURE    Collection Time: 11/18/22  4:25 PM    Specimen: Urine   Result Value Ref Range    Color YELLOW/STRAW      Appearance CLEAR CLEAR      Specific gravity 1.012 1.003 - 1.030      pH (UA) 6.5 5.0 - 8.0      Protein Negative NEG mg/dL    Glucose Negative NEG mg/dL    Ketone Negative NEG mg/dL    Bilirubin Negative NEG      Blood TRACE (A) NEG      Urobilinogen 0.2 0.2 - 1.0 EU/dL    Nitrites Negative NEG      Leukocyte Esterase MODERATE (A) NEG      UA:UC IF INDICATED CULTURE NOT INDICATED BY UA RESULT      WBC 5-10 0 - 4 /hpf    RBC 0-5 0 - 5 /hpf    Epithelial cells FEW FEW /lpf    Bacteria 4+ (A) NEG /hpf    Hyaline cast 0-2 0 - 5 /lpf   GLUCOSE, POC    Collection Time: 11/18/22 10:12 PM   Result Value Ref Range    Glucose (POC) 170 (H) 65 - 117 mg/dL    Performed by Meera Arauz    CBC WITH AUTOMATED DIFF    Collection Time: 11/19/22  4:34 AM   Result Value Ref Range    WBC 7.3 3.6 - 11.0 K/uL    RBC 4.05 3.80 - 5.20 M/uL    HGB 13.0 11.5 - 16.0 g/dL    HCT 39.1 35.0 - 47.0 %    MCV 96.5 80.0 - 99.0 FL    MCH 32.1 26.0 - 34.0 PG    MCHC 33.2 30.0 - 36.5 g/dL    RDW 14.7 (H) 11.5 - 14.5 %    PLATELET 511 219 - 539 K/uL    MPV 9.6 8.9 - 12.9 FL    NRBC 0.0 0  WBC    ABSOLUTE NRBC 0.00 0.00 - 0.01 K/uL    NEUTROPHILS 54 32 - 75 %    LYMPHOCYTES 30 12 - 49 %    MONOCYTES 11 5 - 13 %    EOSINOPHILS 5 0 - 7 % BASOPHILS 0 0 - 1 %    IMMATURE GRANULOCYTES 0 0.0 - 0.5 %    ABS. NEUTROPHILS 3.9 1.8 - 8.0 K/UL    ABS. LYMPHOCYTES 2.2 0.8 - 3.5 K/UL    ABS. MONOCYTES 0.8 0.0 - 1.0 K/UL    ABS. EOSINOPHILS 0.4 0.0 - 0.4 K/UL    ABS. BASOPHILS 0.0 0.0 - 0.1 K/UL    ABS. IMM.  GRANS. 0.0 0.00 - 0.04 K/UL    DF SMEAR SCANNED      RBC COMMENTS DION CELLS  PRESENT        RBC COMMENTS POIKILOCYTOSIS  PRESENT        RBC COMMENTS ANISOCYTOSIS  1+       METABOLIC PANEL, BASIC    Collection Time: 11/19/22  4:34 AM   Result Value Ref Range    Sodium 138 136 - 145 mmol/L    Potassium 3.2 (L) 3.5 - 5.1 mmol/L    Chloride 102 97 - 108 mmol/L    CO2 30 21 - 32 mmol/L    Anion gap 6 5 - 15 mmol/L    Glucose 172 (H) 65 - 100 mg/dL    BUN 47 (H) 6 - 20 MG/DL    Creatinine 2.08 (H) 0.55 - 1.02 MG/DL    BUN/Creatinine ratio 23 (H) 12 - 20      eGFR 25 (L) >60 ml/min/1.73m2    Calcium 10.0 8.5 - 10.1 MG/DL   NT-PRO BNP    Collection Time: 11/19/22  5:21 AM   Result Value Ref Range    NT pro- (H) <125 PG/ML   GLUCOSE, POC    Collection Time: 11/19/22  8:30 AM   Result Value Ref Range    Glucose (POC) 138 (H) 65 - 117 mg/dL    Performed by Shelby MURPHY         Lab Results   Component Value Date    BUN 47 (H) 11/19/2022     11/19/2022    K 3.2 (L) 11/19/2022     11/19/2022    CO2 30 11/19/2022       Lab Results   Component Value Date    BUN 47 (H) 11/19/2022    BUN 51 (H) 11/18/2022    BUN 51 (H) 11/17/2022    BUN 24 11/11/2022    BUN 22 (H) 05/10/2022    K 3.2 (L) 11/19/2022    K 3.4 (L) 11/18/2022    K 2.6 (LL) 11/18/2022    K 2.8 (L) 11/17/2022    K 2.7 (LL) 11/17/2022       Lab Results   Component Value Date    WBC 7.3 11/19/2022    RBC 4.05 11/19/2022    HGB 13.0 11/19/2022    HCT 39.1 11/19/2022    MCV 96.5 11/19/2022    MCH 32.1 11/19/2022    RDW 14.7 (H) 11/19/2022     11/19/2022       Lab Results   Component Value Date    PHOS 3.6 05/31/2021       Urine dipstick:   Lab Results   Component Value Date/Time Color YELLOW/STRAW 11/18/2022 04:25 PM    Appearance CLEAR 11/18/2022 04:25 PM    Specific gravity 1.012 11/18/2022 04:25 PM    pH (UA) 6.5 11/18/2022 04:25 PM    Protein Negative 11/18/2022 04:25 PM    Glucose Negative 11/18/2022 04:25 PM    Ketone Negative 11/18/2022 04:25 PM    Bilirubin Negative 11/18/2022 04:25 PM    Urobilinogen 0.2 11/18/2022 04:25 PM    Nitrites Negative 11/18/2022 04:25 PM    Leukocyte Esterase MODERATE (A) 11/18/2022 04:25 PM    Epithelial cells FEW 11/18/2022 04:25 PM    Bacteria 4+ (A) 11/18/2022 04:25 PM    WBC 5-10 11/18/2022 04:25 PM    RBC 0-5 11/18/2022 04:25 PM       I have reviewed the following: All pertinent labs, microbiology data, radiology imaging for my assessment     ECG- Rev: Yes / No  Xray/CT/US/MRI REV: Yes/ No    Care Plan discussed with:  patient     Chart reviewed. Total time spent with patient:    Medications list Personally Reviewed   [x]      Yes     []               No      Thank you for allowing us to participate in the care of this patient. We will follow patient.  Please dont hesitate to call with any questions    Mary Marie MD  11/19/2022    [address] [employer]  [phone] cell

## 2022-11-19 NOTE — PROGRESS NOTES
Bedside shift change report given to José Obregon (oncoming nurse) by Everardo Menjivar RN (offgoing nurse). Report included the following information SBAR, MAR, and Cardiac Rhythm A Paced .

## 2022-11-19 NOTE — PROGRESS NOTES
2014: Per report from [facility] RN the ED nurse stated she gave 6pm meds however the [facility] shows that they werent scanned. This nurse doesn't feel safe giving meds because I am not sure if the meds were given or not.

## 2022-11-19 NOTE — PROGRESS NOTES
[facility]                                                                                          Hospitalist Progress Note  Luis Drake MD  Answering service: [phone] from in house phone        Date of Service:  2022  NAME:  Woo Odonnell  :  1950  MRN:  [id number]      Admission Summary:   HPI: Sulaiman Uribe is a 74 y.o. female ischemic CM s/p ICD and CABG, CKD stage IIIb, hypothyroidism PVD, WES on BiPAP nightly, type 2 diabetes, COPD who presents with lightheadedness and dizziness and reported low blood pressures with intermittent bradycardia. She has been followed by acute heart failure team outpatient last seen 1 week ago at that time concern for volume overload and metolazone was started scribed to take every other day she took approximately 2 doses of this medication states she has significant weight loss but also had significant increase in her symptoms of dizziness lightheadedness and low blood pressures to the point where she can safely stand. She fell today. Recommended admission to the hospital per heart failure team recommendations. \"       Interval history / Subjective:   Patient seen examined at bedside earlier.   Feels well K and Cr improving      Assessment & Plan:     Orthostatic hypotension  Bradycardia  Fall 2/2 to above   Severe hypokalemia  KEYONNA on CKD stage IIIb   ischemic cardiomyopathy status post ICD with CABG  WES on BiPAP nightly  Type 2 diabetes  Peripheral neuropathy  COPD  -All of the above are likely secondary to overdiuresis  - cont to hold diuretics  -s/p IV and PO repletion of K, better, replete po prn follow  - appreciate ahf team recs, plan to investigate cardiomems on Monday, further recs for diuretics per nephro/ahf  -appreciate nephro, stop IVF today   -BiPAP nightly  - Nebs as needed not in exacerbation  - NPH, sliding scale readjust as needed  - Continue other home meds  - tsh wnl   -X ray of left knee neg   -PT/OT > no needs           Code status: full   Prophylaxis: Heparin subcu  Care Plan discussed with: Patient/family, nurse, case management  Anticipated Disposition: >48 hours home when ready      Hospital Problems  Date Reviewed: 6/24/2022            Codes Class Noted POA    Hypokalemia ICD-10-CM: E87.6  ICD-9-CM: 276.8  11/17/2022 Unknown           Review of Systems:   A comprehensive review of systems was negative except for that written in the HPI. Vital Signs:    Last 24hrs VS reviewed since prior progress note. Most recent are:  Visit Vitals  /64 (BP 1 Location: Right upper arm, BP Patient Position: Sitting)   Pulse 71   Temp 98.2 °F (36.8 °C)   Resp 17   Ht 5' 6\" (1.676 m)   Wt 98.7 kg (217 lb 11.2 oz)   SpO2 98%   BMI 35.14 kg/m²         Intake/Output Summary (Last 24 hours) at 11/19/2022 1231  Last data filed at 11/19/2022 0846  Gross per 24 hour   Intake 100 ml   Output 700 ml   Net -600 ml          Physical Examination:     I had a face to face encounter with this patient and independently examined them on 11/19/2022 as outlined below:          Constitutional:  No acute distress, cooperative, pleasant    ENT:  Oral mucosa moist, oropharynx benign. Resp:  CTA bilaterally. No wheezing/rhonchi/rales. No accessory muscle use. CV:  Regular rhythm, normal rate, no murmurs, gallops, rubs    GI:  Soft, non distended, non tender. normoactive bowel sounds, no hepatosplenomegaly     Musculoskeletal:  No edema, warm, 2+ pulses throughout    Neurologic:  Moves all extremities.  L knee slightly more swollen, AAOx3, CN II-XII reviewed            Data Review:    Review and/or order of clinical lab test  Review and/or order of tests in the radiology section of CPT  Review and/or order of tests in the medicine section of CPT      Labs:     Recent Labs     11/19/22 0434 11/18/22  0109   WBC 7.3 7.6   HGB 13.0 13.3   HCT 39.1 37.1    252       Recent Labs     11/19/22 0434 11/18/22  1625 11/18/22  0109 11/17/22  1437 11/17/22  1250     --  136  --  131*   K 3.2* 3.4* 2.6*   < > 2.7*     --  96*  --  90*   CO2 30  --  33*  --  31   BUN 47*  --  51*  --  51*   CREA 2.08*  --  2.21*  --  2.89*   *  --  90  --  152*   CA 10.0  --  10.4*  --  10.7*   MG  --   --  2.3  --  2.3    < > = values in this interval not displayed. Recent Labs     11/17/22  1250   ALT 28      TBILI 0.7   TP 8.0   ALB 3.8   GLOB 4.2*       No results for input(s): INR, PTP, APTT, INREXT, INREXT in the last 72 hours. No results for input(s): FE, TIBC, PSAT, FERR in the last 72 hours. No results found for: FOL, RBCF   No results for input(s): PH, PCO2, PO2 in the last 72 hours. No results for input(s): CPK, CKNDX, TROIQ in the last 72 hours.     No lab exists for component: CPKMB  Lab Results   Component Value Date/Time    Cholesterol, total 144 06/02/2021 03:51 AM    HDL Cholesterol 72 06/02/2021 03:51 AM    LDL, calculated 55.8 06/02/2021 03:51 AM    Triglyceride 81 06/02/2021 03:51 AM    CHOL/HDL Ratio 2.0 06/02/2021 03:51 AM     Lab Results   Component Value Date/Time    Glucose (POC) 144 (H) 11/19/2022 12:25 PM    Glucose (POC) 138 (H) 11/19/2022 08:30 AM    Glucose (POC) 170 (H) 11/18/2022 10:12 PM    Glucose (POC) 126 (H) 11/18/2022 03:22 PM    Glucose (POC) 190 (H) 11/18/2022 12:07 AM     Lab Results   Component Value Date/Time    Color YELLOW/STRAW 11/18/2022 04:25 PM    Appearance CLEAR 11/18/2022 04:25 PM    Specific gravity 1.012 11/18/2022 04:25 PM    pH (UA) 6.5 11/18/2022 04:25 PM    Protein Negative 11/18/2022 04:25 PM    Glucose Negative 11/18/2022 04:25 PM    Ketone Negative 11/18/2022 04:25 PM    Bilirubin Negative 11/18/2022 04:25 PM    Urobilinogen 0.2 11/18/2022 04:25 PM    Nitrites Negative 11/18/2022 04:25 PM    Leukocyte Esterase MODERATE (A) 11/18/2022 04:25 PM    Epithelial cells FEW 11/18/2022 04:25 PM    Bacteria 4+ (A) 11/18/2022 04:25 PM    WBC 5-10 11/18/2022 04:25 PM    RBC 0-5 11/18/2022 04:25 PM         Medications Reviewed:     Current Facility-Administered Medications   Medication Dose Route Frequency    potassium chloride SR (KLOR-CON 10) tablet 40 mEq  40 mEq Oral BID    sodium chloride (NS) flush 5-40 mL  5-40 mL IntraVENous Q8H    sodium chloride (NS) flush 5-40 mL  5-40 mL IntraVENous PRN    acetaminophen (TYLENOL) tablet 650 mg  650 mg Oral Q6H PRN    Or    acetaminophen (TYLENOL) suppository 650 mg  650 mg Rectal Q6H PRN    polyethylene glycol (MIRALAX) packet 17 g  17 g Oral DAILY PRN    ondansetron (ZOFRAN ODT) tablet 4 mg  4 mg Oral Q8H PRN    Or    ondansetron (ZOFRAN) injection 4 mg  4 mg IntraVENous Q6H PRN    enoxaparin (LOVENOX) injection 30 mg  30 mg SubCUTAneous DAILY    albuterol-ipratropium (DUO-NEB) 2.5 MG-0.5 MG/3 ML  3 mL Nebulization Q4H PRN    aspirin chewable tablet 81 mg  81 mg Oral DAILY    atorvastatin (LIPITOR) tablet 80 mg  80 mg Oral QHS    clopidogreL (PLAVIX) tablet 75 mg  75 mg Oral DAILY    clotrimazole-betamethasone (LOTRISONE) 1-0.05 % cream   Topical BID    ezetimibe (ZETIA) tablet 10 mg  10 mg Oral DAILY    gabapentin (NEURONTIN) capsule 200 mg  200 mg Oral BID    ketoconazole (NIZORAL) 2 % shampoo   Topical PRN    levothyroxine (SYNTHROID) tablet 100 mcg  100 mcg Oral ACB    nitroglycerin (NITROSTAT) tablet 0.4 mg  0.4 mg SubLINGual Q5MIN PRN    sertraline (ZOLOFT) tablet 50 mg  50 mg Oral DAILY    tiZANidine (ZANAFLEX) tablet 2 mg  2 mg Oral BID PRN    insulin NPH (NOVOLIN N, HUMULIN N) injection 14 Units  14 Units SubCUTAneous BID    insulin lispro (HUMALOG) injection   SubCUTAneous AC&HS    glucose chewable tablet 16 g  4 Tablet Oral PRN    glucagon (GLUCAGEN) injection 1 mg  1 mg IntraMUSCular PRN    dextrose 10 % infusion 0-250 mL  0-250 mL IntraVENous PRN    traMADoL (ULTRAM) tablet 50 mg  50 mg Oral Q6H PRN     ______________________________________________________________________  EXPECTED LENGTH OF STAY: 2d 14h  ACTUAL LENGTH OF STAY:          2                 Kishan Pierre MD

## 2022-11-20 LAB
ANION GAP SERPL CALC-SCNC: 5 MMOL/L (ref 5–15)
BASOPHILS # BLD: 0 K/UL (ref 0–0.1)
BASOPHILS NFR BLD: 0 % (ref 0–1)
BNP SERPL-MCNC: 535 PG/ML
BUN SERPL-MCNC: 39 MG/DL (ref 6–20)
BUN/CREAT SERPL: 22 (ref 12–20)
CALCIUM SERPL-MCNC: 9.5 MG/DL (ref 8.5–10.1)
CHLORIDE SERPL-SCNC: 105 MMOL/L (ref 97–108)
CO2 SERPL-SCNC: 29 MMOL/L (ref 21–32)
CREAT SERPL-MCNC: 1.76 MG/DL (ref 0.55–1.02)
DIFFERENTIAL METHOD BLD: ABNORMAL
EOSINOPHIL # BLD: 0.4 K/UL (ref 0–0.4)
EOSINOPHIL NFR BLD: 5 % (ref 0–7)
ERYTHROCYTE [DISTWIDTH] IN BLOOD BY AUTOMATED COUNT: 14.6 % (ref 11.5–14.5)
GLUCOSE BLD STRIP.AUTO-MCNC: 114 MG/DL (ref 65–117)
GLUCOSE BLD STRIP.AUTO-MCNC: 115 MG/DL (ref 65–117)
GLUCOSE BLD STRIP.AUTO-MCNC: 116 MG/DL (ref 65–117)
GLUCOSE BLD STRIP.AUTO-MCNC: 156 MG/DL (ref 65–117)
GLUCOSE BLD STRIP.AUTO-MCNC: 96 MG/DL (ref 65–117)
GLUCOSE SERPL-MCNC: 128 MG/DL (ref 65–100)
HCT VFR BLD AUTO: 34.2 % (ref 35–47)
HGB BLD-MCNC: 11.4 G/DL (ref 11.5–16)
IMM GRANULOCYTES # BLD AUTO: 0 K/UL (ref 0–0.04)
IMM GRANULOCYTES NFR BLD AUTO: 0 % (ref 0–0.5)
LYMPHOCYTES # BLD: 2.6 K/UL (ref 0.8–3.5)
LYMPHOCYTES NFR BLD: 34 % (ref 12–49)
MAGNESIUM SERPL-MCNC: 2 MG/DL (ref 1.6–2.4)
MCH RBC QN AUTO: 31.9 PG (ref 26–34)
MCHC RBC AUTO-ENTMCNC: 33.3 G/DL (ref 30–36.5)
MCV RBC AUTO: 95.8 FL (ref 80–99)
MONOCYTES # BLD: 0.8 K/UL (ref 0–1)
MONOCYTES NFR BLD: 10 % (ref 5–13)
NEUTS SEG # BLD: 3.7 K/UL (ref 1.8–8)
NEUTS SEG NFR BLD: 51 % (ref 32–75)
NRBC # BLD: 0 K/UL (ref 0–0.01)
NRBC BLD-RTO: 0 PER 100 WBC
PHOSPHATE SERPL-MCNC: 2.4 MG/DL (ref 2.6–4.7)
PLATELET # BLD AUTO: 221 K/UL (ref 150–400)
POTASSIUM SERPL-SCNC: 5 MMOL/L (ref 3.5–5.1)
RBC # BLD AUTO: 3.57 M/UL (ref 3.8–5.2)
RBC MORPH BLD: ABNORMAL
SERVICE CMNT-IMP: ABNORMAL
SERVICE CMNT-IMP: NORMAL
SODIUM SERPL-SCNC: 139 MMOL/L (ref 136–145)
WBC # BLD AUTO: 7.5 K/UL (ref 3.6–11)

## 2022-11-20 PROCEDURE — 74011000250 HC RX REV CODE- 250: Performed by: STUDENT IN AN ORGANIZED HEALTH CARE EDUCATION/TRAINING PROGRAM

## 2022-11-20 PROCEDURE — 74011250637 HC RX REV CODE- 250/637: Performed by: STUDENT IN AN ORGANIZED HEALTH CARE EDUCATION/TRAINING PROGRAM

## 2022-11-20 PROCEDURE — 82962 GLUCOSE BLOOD TEST: CPT

## 2022-11-20 PROCEDURE — 94660 CPAP INITIATION&MGMT: CPT

## 2022-11-20 PROCEDURE — 74011250636 HC RX REV CODE- 250/636: Performed by: STUDENT IN AN ORGANIZED HEALTH CARE EDUCATION/TRAINING PROGRAM

## 2022-11-20 PROCEDURE — 36415 COLL VENOUS BLD VENIPUNCTURE: CPT

## 2022-11-20 PROCEDURE — 84100 ASSAY OF PHOSPHORUS: CPT

## 2022-11-20 PROCEDURE — 74011636637 HC RX REV CODE- 636/637: Performed by: STUDENT IN AN ORGANIZED HEALTH CARE EDUCATION/TRAINING PROGRAM

## 2022-11-20 PROCEDURE — 85025 COMPLETE CBC W/AUTO DIFF WBC: CPT

## 2022-11-20 PROCEDURE — 65270000046 HC RM TELEMETRY

## 2022-11-20 PROCEDURE — 83880 ASSAY OF NATRIURETIC PEPTIDE: CPT

## 2022-11-20 PROCEDURE — 80048 BASIC METABOLIC PNL TOTAL CA: CPT

## 2022-11-20 PROCEDURE — 83735 ASSAY OF MAGNESIUM: CPT

## 2022-11-20 RX ADMIN — Medication 14 UNITS: at 09:40

## 2022-11-20 RX ADMIN — LACTULOSE 30 ML: 20 SOLUTION ORAL at 13:10

## 2022-11-20 RX ADMIN — CLOPIDOGREL BISULFATE 75 MG: 75 TABLET ORAL at 09:41

## 2022-11-20 RX ADMIN — Medication 10 ML: at 13:10

## 2022-11-20 RX ADMIN — Medication 5 ML: at 22:00

## 2022-11-20 RX ADMIN — ENOXAPARIN SODIUM 30 MG: 100 INJECTION SUBCUTANEOUS at 09:40

## 2022-11-20 RX ADMIN — Medication 3 UNITS: at 13:10

## 2022-11-20 RX ADMIN — SERTRALINE 50 MG: 50 TABLET, FILM COATED ORAL at 09:41

## 2022-11-20 RX ADMIN — Medication 14 UNITS: at 18:12

## 2022-11-20 RX ADMIN — ATORVASTATIN CALCIUM 80 MG: 40 TABLET, FILM COATED ORAL at 21:57

## 2022-11-20 RX ADMIN — Medication 10 ML: at 09:41

## 2022-11-20 RX ADMIN — LEVOTHYROXINE SODIUM 100 MCG: 0.1 TABLET ORAL at 10:20

## 2022-11-20 RX ADMIN — EZETIMIBE 10 MG: 10 TABLET ORAL at 09:40

## 2022-11-20 RX ADMIN — GABAPENTIN 200 MG: 100 CAPSULE ORAL at 09:40

## 2022-11-20 RX ADMIN — GABAPENTIN 200 MG: 100 CAPSULE ORAL at 18:12

## 2022-11-20 RX ADMIN — ASPIRIN 81 MG 81 MG: 81 TABLET ORAL at 09:41

## 2022-11-20 NOTE — PROGRESS NOTES
Chart reviewed, potassium and Creatinine improved. No changed to current regimen. Diuretics per nephrology. Will see tomorrow.

## 2022-11-20 NOTE — ACP (ADVANCE CARE PLANNING)
Advance Care Planning     Advance Care Planning (ACP) Physician/NP/PA Conversation      Date of Conversation: 11/17/2022  Conducted with: Patient with Decision Making Capacity    Healthcare Decision Maker:   Ami Current young (sister)  Today we documented Decision Maker(s) consistent with Legal Next of Kin hierarchy. Care Preferences:    Hospitalization: \"If your health worsens and it becomes clear that your chance of recovery is unlikely, what would be your preference regarding hospitalization? \"  The patient is unsure. Ventilation: \"If you were unable to breathe on your own and your chance of recovery was unlikely, what would be your preference about the use of a ventilator (breathing machine) if it was available to you? \"   The patient would desire the use of a ventilator. Resuscitation: \"In the event your heart stopped as a result of an underlying serious health condition, would you want attempts to be made to restart your heart, or would you prefer a natural death? \"   Yes, attempt to resuscitate.     Additional topics discussed: treatment goals, benefit/burden of treatment options, ventilation preferences, hospitalization preferences, and resuscitation preferences    Conversation Outcomes / Follow-Up Plan:   ACP complete - no further action today  Reviewed DNR/DNI and patient elects Full Code (Attempt Resuscitation)     Length of Voluntary ACP Conversation in minutes:  16 minutes    Satish Meek MD

## 2022-11-20 NOTE — PROGRESS NOTES
T.O.C. · RUR- 16% Medium  · DISPOSITION: Pending medical progression, stability, and PT/OT recommendations. · F/U with PCP/Specialist    · Transport: Self, her car is parked in the lot    Reason for Admission: Hypokalemia      RUR Score:     16% Medium             PCP: First and Last name:   OtherFernando MD   Name of Practice: [facility]   Are you a current patient: Yes/No: Yes   Approximate date of last visit: End of September  She was going once a month, but her PCP retired and she has been waiting to get an appointment with her new PCP  Saw her heart doctor last week   Can you participate in a virtual visit if needed:     Do you (patient/family) have any concerns for transition/discharge? None expressed              Plan for utilizing home health:  TBD     Current Advanced Directive/Advance Care Plan:  Full Code    Healthcare Decision Maker:   Tejinder Barrera Paty emanuel, [phone]    Transition of Care Plan:  Pending medical stability, progression, and PT/OT recs        CM met with patient at bedside to introduce self and role. Living situation: Lives with her sister in an apartment. There is an elevator in the building, but patient noted that it is a long walk to their unit. No steps  ADLs: independent  DME: cane, shower chair, bipap  Previous IPR/SNF: None  Previous home health: none, has had outpatient PT following a fall  Demographics: confirmed  Pharmacy: [address] [place] [address] point of contact: Paty emanuel, [phone]     Care Management Interventions  PCP Verified by CM:  Yes ([facility]athi Castro (PCP retired, awaiting new one))  Palliative Care Criteria Met (RRAT>21 & CHF Dx)?: No  Mode of Transport at Discharge: Self (Her car is in the parking lot)  MyChart Signup: Yes  Discharge Durable Medical Equipment: No  Health Maintenance Reviewed: Yes  Physical Therapy Consult: Yes  Occupational Therapy Consult: Yes  Speech Therapy Consult: No  Support Systems: Other Family Member(s) (Sister)  Confirm Follow Up Transport: Self  The Plan for Transition of Care is Related to the Following Treatment Goals : REYNALDO pending medical progression, stability, and PT/OT recs  Discharge Location  Patient Expects to be Discharged to[de-identified] Home with home health    CM notes that IM was already given to patient.      ISIAH Desai, Care Manager

## 2022-11-20 NOTE — PROGRESS NOTES
[facility]     Renal Daily Progress Note:     Admission Date: 2022     Subjective: looks good, comfortable, not SOB. Had a BM and starting to work with PT. Creatinine trending down, off IVF      Review of Systems  Pertinent items are noted in HPI.     Objective:     Visit Vitals  BP (!) 105/59   Pulse 75   Temp 98.6 °F (37 °C)   Resp 15   Ht 5' 6\" (1.676 m)   Wt 98.7 kg (217 lb 11.2 oz)   SpO2 97%   BMI 35.14 kg/m²     Temp (24hrs), Av.9 °F (36.6 °C), Min:97.3 °F (36.3 °C), Max:98.6 °F (37 °C)        Intake/Output Summary (Last 24 hours) at 2022 1315  Last data filed at 2022 0941  Gross per 24 hour   Intake 260 ml   Output 950 ml   Net -690 ml     Current Facility-Administered Medications   Medication Dose Route Frequency    phosphorus (K PHOS NEUTRAL) 250 mg tablet 1 Tablet  1 Tablet Oral BID    sodium chloride (NS) flush 5-40 mL  5-40 mL IntraVENous Q8H    sodium chloride (NS) flush 5-40 mL  5-40 mL IntraVENous PRN    acetaminophen (TYLENOL) tablet 650 mg  650 mg Oral Q6H PRN    Or    acetaminophen (TYLENOL) suppository 650 mg  650 mg Rectal Q6H PRN    polyethylene glycol (MIRALAX) packet 17 g  17 g Oral DAILY PRN    ondansetron (ZOFRAN ODT) tablet 4 mg  4 mg Oral Q8H PRN    Or    ondansetron (ZOFRAN) injection 4 mg  4 mg IntraVENous Q6H PRN    enoxaparin (LOVENOX) injection 30 mg  30 mg SubCUTAneous DAILY    albuterol-ipratropium (DUO-NEB) 2.5 MG-0.5 MG/3 ML  3 mL Nebulization Q4H PRN    aspirin chewable tablet 81 mg  81 mg Oral DAILY    atorvastatin (LIPITOR) tablet 80 mg  80 mg Oral QHS    clopidogreL (PLAVIX) tablet 75 mg  75 mg Oral DAILY    ezetimibe (ZETIA) tablet 10 mg  10 mg Oral DAILY    gabapentin (NEURONTIN) capsule 200 mg  200 mg Oral BID    levothyroxine (SYNTHROID) tablet 100 mcg  100 mcg Oral ACB    nitroglycerin (NITROSTAT) tablet 0.4 mg  0.4 mg SubLINGual Q5MIN PRN    sertraline (ZOLOFT) tablet 50 mg  50 mg Oral DAILY    tiZANidine (ZANAFLEX) tablet 2 mg  2 mg Oral BID PRN    insulin NPH (NOVOLIN N, HUMULIN N) injection 14 Units  14 Units SubCUTAneous BID    insulin lispro (HUMALOG) injection   SubCUTAneous AC&HS    glucose chewable tablet 16 g  4 Tablet Oral PRN    glucagon (GLUCAGEN) injection 1 mg  1 mg IntraMUSCular PRN    dextrose 10 % infusion 0-250 mL  0-250 mL IntraVENous PRN    traMADoL (ULTRAM) tablet 50 mg  50 mg Oral Q6H PRN       Physical Exam:  General:Alert, No distress,   HEENT: Eyes anicteric. Conjunctiva without pallor ,erythema. Neck:Supple,no JVD  Lungs : Clears to auscultation Bilaterally, Normal respiratory effort  CVS: RRR, S1 S2 normal, No rub, no LE edema  Abdomen: Soft, Non tender, No hepatosplenomegaly, bowel sounds present  Extremities: No edema  Skin: No rash   Neurologic: non focal, AAO x 3  Psych: normal affect    Data Review:     LABS:  Recent Labs     11/20/22  0249 11/19/22  0434 11/18/22  1625 11/18/22  0109    138  --  136   K 5.0 3.2* 3.4* 2.6*    102  --  96*   CO2 29 30  --  33*   BUN 39* 47*  --  51*   CREA 1.76* 2.08*  --  2.21*   CA 9.5 10.0  --  10.4*   PHOS 2.4*  --   --   --    MG 2.0  --   --  2.3     Recent Labs     11/20/22  0249 11/19/22  0434 11/18/22  0109   WBC 7.5 7.3 7.6   HGB 11.4* 13.0 13.3   HCT 34.2* 39.1 37.1    238 252     No results for input(s): CRESCENCIO, KU, CLU, CREAU in the last 72 hours.     No lab exists for component: PROU    Assessment:   Renal Specific Problems  KEYONNA on CKD4 due to intravascular volume depletion--resolving  CKD4 with subnephrotic proteinuria due to DKD/HTN-baseline cr 1.8-2  Hypokalemia- resolved  Dysuria- resolved     Plan:  Change NeutraPhos to daily  Check Mg  D/c IVF, replete orally  Hold diuretics today but will ultimately need to restart at a reduced dose relative to pre-admission  dosing     Berto Keene MD    [phone]

## 2022-11-20 NOTE — PROGRESS NOTES
[facility]  Hospitalist Group                                                                                          Hospitalist Progress Note  Kishan Pierre MD  Answering service: [phone] from in house phone        Date of Service:  2022  NAME:  Jo Lopez  :  1950  MRN:  [id number]      Admission Summary:   HPI: Costa Hilliard is a 74 y.o. female ischemic CM s/p ICD and CABG, CKD stage IIIb, hypothyroidism PVD, WES on BiPAP nightly, type 2 diabetes, COPD who presents with lightheadedness and dizziness and reported low blood pressures with intermittent bradycardia. She has been followed by acute heart failure team outpatient last seen 1 week ago at that time concern for volume overload and metolazone was started scribed to take every other day she took approximately 2 doses of this medication states she has significant weight loss but also had significant increase in her symptoms of dizziness lightheadedness and low blood pressures to the point where she can safely stand. She fell today. Recommended admission to the hospital per heart failure team recommendations. \"       Interval history / Subjective:   Patient seen examined at bedside earlier.   No complaints Now hyper K stop std potassium order      Assessment & Plan:     Orthostatic hypotension  Bradycardia  Fall 2/2 to above   Severe hypokalemia > now slight hyper K   Hypophos  KEYONNA on CKD stage IIIb   ischemic cardiomyopathy status post ICD with CABG  WES on BiPAP nightly  Type 2 diabetes  Peripheral neuropathy  COPD  -All of the above are likely secondary to overdiuresis  diuretics still on hold  -s/p IV and PO repletion of K, better, stop std potassium order K uptrending, follow and replete prn   -order phos   - appreciate ahf team recs, plan to investigate cardiomems on Monday, further recs for diuretics per nephro/ahf  -appreciate nephro, stopped IVF   -BiPAP nightly  - Nebs as needed not in exacerbation  - NPH, sliding scale readjust as needed  - Continue other home meds  - tsh wnl   -X ray of left knee neg   -PT/OT > no needs           Code status: full   Prophylaxis: Heparin subcu  Care Plan discussed with: Patient/family, nurse, case management  Anticipated Disposition: 24-48 hrs once diuretics finalized by [facility]/neph      Hospital Problems  Date Reviewed: 6/24/2022            Codes Class Noted POA    Hypokalemia ICD-10-CM: E87.6  ICD-9-CM: 276.8  11/17/2022 Unknown         Review of Systems:   A comprehensive review of systems was negative except for that written in the HPI. Vital Signs:    Last 24hrs VS reviewed since prior progress note. Most recent are:  Visit Vitals  BP (!) 105/59   Pulse 73   Temp 98.6 °F (37 °C)   Resp 15   Ht 5' 6\" (1.676 m)   Wt 98.7 kg (217 lb 11.2 oz)   SpO2 97%   BMI 35.14 kg/m²         Intake/Output Summary (Last 24 hours) at 11/20/2022 1203  Last data filed at 11/20/2022 0941  Gross per 24 hour   Intake 260 ml   Output 950 ml   Net -690 ml          Physical Examination:     I had a face to face encounter with this patient and independently examined them on 11/20/2022 as outlined below:          Constitutional:  No acute distress, cooperative, pleasant    ENT:  Oral mucosa moist, oropharynx benign. Resp:  CTA bilaterally. No wheezing/rhonchi/rales. No accessory muscle use. CV:  Regular rhythm, normal rate, no murmurs, gallops, rubs    GI:  Soft, non distended, non tender. normoactive bowel sounds, no hepatosplenomegaly     Musculoskeletal:  No edema, warm, 2+ pulses throughout    Neurologic:  Moves all extremities.  L knee slightly more swollen but improving, AAOx3, CN II-XII reviewed            Data Review:    Review and/or order of clinical lab test  Review and/or order of tests in the radiology section of CPT  Review and/or order of tests in the medicine section of CPT      Labs:     Recent Labs     11/20/22  0249 11/19/22  0434   WBC 7.5 7.3   HGB 11.4* 13.0   HCT 34.2* 39.1    238       Recent Labs     11/20/22  0249 11/19/22  0434 11/18/22  1625 11/18/22  0109 11/17/22  1437 11/17/22  1250    138  --  136  --  131*   K 5.0 3.2* 3.4* 2.6*   < > 2.7*    102  --  96*  --  90*   CO2 29 30  --  33*  --  31   BUN 39* 47*  --  51*  --  51*   CREA 1.76* 2.08*  --  2.21*  --  2.89*   * 172*  --  90  --  152*   CA 9.5 10.0  --  10.4*  --  10.7*   MG 2.0  --   --  2.3  --  2.3   PHOS 2.4*  --   --   --   --   --     < > = values in this interval not displayed. Recent Labs     11/17/22  1250   ALT 28      TBILI 0.7   TP 8.0   ALB 3.8   GLOB 4.2*       No results for input(s): INR, PTP, APTT, INREXT, INREXT in the last 72 hours. No results for input(s): FE, TIBC, PSAT, FERR in the last 72 hours. No results found for: FOL, RBCF   No results for input(s): PH, PCO2, PO2 in the last 72 hours. No results for input(s): CPK, CKNDX, TROIQ in the last 72 hours.     No lab exists for component: CPKMB  Lab Results   Component Value Date/Time    Cholesterol, total 144 06/02/2021 03:51 AM    HDL Cholesterol 72 06/02/2021 03:51 AM    LDL, calculated 55.8 06/02/2021 03:51 AM    Triglyceride 81 06/02/2021 03:51 AM    CHOL/HDL Ratio 2.0 06/02/2021 03:51 AM     Lab Results   Component Value Date/Time    Glucose (POC) 156 (H) 11/20/2022 11:41 AM    Glucose (POC) 116 11/20/2022 08:29 AM    Glucose (POC) 145 (H) 11/19/2022 09:45 PM    Glucose (POC) 163 (H) 11/19/2022 05:31 PM    Glucose (POC) 144 (H) 11/19/2022 12:25 PM     Lab Results   Component Value Date/Time    Color YELLOW/STRAW 11/18/2022 04:25 PM    Appearance CLEAR 11/18/2022 04:25 PM    Specific gravity 1.012 11/18/2022 04:25 PM    pH (UA) 6.5 11/18/2022 04:25 PM    Protein Negative 11/18/2022 04:25 PM    Glucose Negative 11/18/2022 04:25 PM    Ketone Negative 11/18/2022 04:25 PM    Bilirubin Negative 11/18/2022 04:25 PM    Urobilinogen 0.2 11/18/2022 04:25 PM    Nitrites Negative 11/18/2022 04:25 PM    Leukocyte Esterase MODERATE (A) 11/18/2022 04:25 PM    Epithelial cells FEW 11/18/2022 04:25 PM    Bacteria 4+ (A) 11/18/2022 04:25 PM    WBC 5-10 11/18/2022 04:25 PM    RBC 0-5 11/18/2022 04:25 PM         Medications Reviewed:     Current Facility-Administered Medications   Medication Dose Route Frequency    lactulose (CHRONULAC) 10 gram/15 mL solution 30 mL  30 mL Oral ONCE    sodium chloride (NS) flush 5-40 mL  5-40 mL IntraVENous Q8H    sodium chloride (NS) flush 5-40 mL  5-40 mL IntraVENous PRN    acetaminophen (TYLENOL) tablet 650 mg  650 mg Oral Q6H PRN    Or    acetaminophen (TYLENOL) suppository 650 mg  650 mg Rectal Q6H PRN    polyethylene glycol (MIRALAX) packet 17 g  17 g Oral DAILY PRN    ondansetron (ZOFRAN ODT) tablet 4 mg  4 mg Oral Q8H PRN    Or    ondansetron (ZOFRAN) injection 4 mg  4 mg IntraVENous Q6H PRN    enoxaparin (LOVENOX) injection 30 mg  30 mg SubCUTAneous DAILY    albuterol-ipratropium (DUO-NEB) 2.5 MG-0.5 MG/3 ML  3 mL Nebulization Q4H PRN    aspirin chewable tablet 81 mg  81 mg Oral DAILY    atorvastatin (LIPITOR) tablet 80 mg  80 mg Oral QHS    clopidogreL (PLAVIX) tablet 75 mg  75 mg Oral DAILY    ezetimibe (ZETIA) tablet 10 mg  10 mg Oral DAILY    gabapentin (NEURONTIN) capsule 200 mg  200 mg Oral BID    levothyroxine (SYNTHROID) tablet 100 mcg  100 mcg Oral ACB    nitroglycerin (NITROSTAT) tablet 0.4 mg  0.4 mg SubLINGual Q5MIN PRN    sertraline (ZOLOFT) tablet 50 mg  50 mg Oral DAILY    tiZANidine (ZANAFLEX) tablet 2 mg  2 mg Oral BID PRN    insulin NPH (NOVOLIN N, HUMULIN N) injection 14 Units  14 Units SubCUTAneous BID    insulin lispro (HUMALOG) injection   SubCUTAneous AC&HS    glucose chewable tablet 16 g  4 Tablet Oral PRN    glucagon (GLUCAGEN) injection 1 mg  1 mg IntraMUSCular PRN    dextrose 10 % infusion 0-250 mL  0-250 mL IntraVENous PRN    traMADoL (ULTRAM) tablet 50 mg  50 mg Oral Q6H PRN ______________________________________________________________________  EXPECTED LENGTH OF STAY: 2d 14h  ACTUAL LENGTH OF STAY:          3                 Meet Villalta MD

## 2022-11-21 VITALS
WEIGHT: 219.8 LBS | DIASTOLIC BLOOD PRESSURE: 60 MMHG | HEIGHT: 66 IN | RESPIRATION RATE: 16 BRPM | HEART RATE: 71 BPM | BODY MASS INDEX: 35.32 KG/M2 | OXYGEN SATURATION: 96 % | TEMPERATURE: 98.4 F | SYSTOLIC BLOOD PRESSURE: 128 MMHG

## 2022-11-21 PROBLEM — M25.569 KNEE PAIN: Status: ACTIVE | Noted: 2022-11-21

## 2022-11-21 LAB
ANION GAP SERPL CALC-SCNC: 7 MMOL/L (ref 5–15)
BASOPHILS # BLD: 0 K/UL (ref 0–0.1)
BASOPHILS NFR BLD: 1 % (ref 0–1)
BNP SERPL-MCNC: 648 PG/ML
BUN SERPL-MCNC: 34 MG/DL (ref 6–20)
BUN/CREAT SERPL: 19 (ref 12–20)
CALCIUM SERPL-MCNC: 10.2 MG/DL (ref 8.5–10.1)
CHLORIDE SERPL-SCNC: 103 MMOL/L (ref 97–108)
CO2 SERPL-SCNC: 29 MMOL/L (ref 21–32)
CREAT SERPL-MCNC: 1.8 MG/DL (ref 0.55–1.02)
DIFFERENTIAL METHOD BLD: ABNORMAL
EOSINOPHIL # BLD: 0.4 K/UL (ref 0–0.4)
EOSINOPHIL NFR BLD: 5 % (ref 0–7)
ERYTHROCYTE [DISTWIDTH] IN BLOOD BY AUTOMATED COUNT: 14.6 % (ref 11.5–14.5)
GLUCOSE BLD STRIP.AUTO-MCNC: 112 MG/DL (ref 65–117)
GLUCOSE BLD STRIP.AUTO-MCNC: 128 MG/DL (ref 65–117)
GLUCOSE SERPL-MCNC: 124 MG/DL (ref 65–100)
HCT VFR BLD AUTO: 37.5 % (ref 35–47)
HGB BLD-MCNC: 12.3 G/DL (ref 11.5–16)
IMM GRANULOCYTES # BLD AUTO: 0 K/UL (ref 0–0.04)
IMM GRANULOCYTES NFR BLD AUTO: 0 % (ref 0–0.5)
LYMPHOCYTES # BLD: 2.8 K/UL (ref 0.8–3.5)
LYMPHOCYTES NFR BLD: 37 % (ref 12–49)
MAGNESIUM SERPL-MCNC: 2.1 MG/DL (ref 1.6–2.4)
MCH RBC QN AUTO: 31.5 PG (ref 26–34)
MCHC RBC AUTO-ENTMCNC: 32.8 G/DL (ref 30–36.5)
MCV RBC AUTO: 96.2 FL (ref 80–99)
MONOCYTES # BLD: 0.7 K/UL (ref 0–1)
MONOCYTES NFR BLD: 9 % (ref 5–13)
NEUTS SEG # BLD: 3.6 K/UL (ref 1.8–8)
NEUTS SEG NFR BLD: 48 % (ref 32–75)
NRBC # BLD: 0 K/UL (ref 0–0.01)
NRBC BLD-RTO: 0 PER 100 WBC
PHOSPHATE SERPL-MCNC: 2.8 MG/DL (ref 2.6–4.7)
PLATELET # BLD AUTO: 225 K/UL (ref 150–400)
PMV BLD AUTO: 9.5 FL (ref 8.9–12.9)
POTASSIUM SERPL-SCNC: 3.8 MMOL/L (ref 3.5–5.1)
RBC # BLD AUTO: 3.9 M/UL (ref 3.8–5.2)
SERVICE CMNT-IMP: ABNORMAL
SERVICE CMNT-IMP: NORMAL
SODIUM SERPL-SCNC: 139 MMOL/L (ref 136–145)
WBC # BLD AUTO: 7.5 K/UL (ref 3.6–11)

## 2022-11-21 PROCEDURE — 99232 SBSQ HOSP IP/OBS MODERATE 35: CPT | Performed by: NURSE PRACTITIONER

## 2022-11-21 PROCEDURE — 36415 COLL VENOUS BLD VENIPUNCTURE: CPT

## 2022-11-21 PROCEDURE — 80048 BASIC METABOLIC PNL TOTAL CA: CPT

## 2022-11-21 PROCEDURE — 74011250637 HC RX REV CODE- 250/637: Performed by: STUDENT IN AN ORGANIZED HEALTH CARE EDUCATION/TRAINING PROGRAM

## 2022-11-21 PROCEDURE — 74011000250 HC RX REV CODE- 250: Performed by: STUDENT IN AN ORGANIZED HEALTH CARE EDUCATION/TRAINING PROGRAM

## 2022-11-21 PROCEDURE — 82962 GLUCOSE BLOOD TEST: CPT

## 2022-11-21 PROCEDURE — 85025 COMPLETE CBC W/AUTO DIFF WBC: CPT

## 2022-11-21 PROCEDURE — 74011250637 HC RX REV CODE- 250/637: Performed by: INTERNAL MEDICINE

## 2022-11-21 PROCEDURE — 74011250636 HC RX REV CODE- 250/636: Performed by: STUDENT IN AN ORGANIZED HEALTH CARE EDUCATION/TRAINING PROGRAM

## 2022-11-21 PROCEDURE — 84100 ASSAY OF PHOSPHORUS: CPT

## 2022-11-21 PROCEDURE — 74011636637 HC RX REV CODE- 636/637: Performed by: STUDENT IN AN ORGANIZED HEALTH CARE EDUCATION/TRAINING PROGRAM

## 2022-11-21 PROCEDURE — 83735 ASSAY OF MAGNESIUM: CPT

## 2022-11-21 PROCEDURE — 83880 ASSAY OF NATRIURETIC PEPTIDE: CPT

## 2022-11-21 RX ORDER — BUMETANIDE 1 MG/1
1 TABLET ORAL 2 TIMES DAILY
Qty: 60 TABLET | Refills: 0 | Status: SHIPPED | OUTPATIENT
Start: 2022-11-21 | End: 2022-11-25 | Stop reason: SDUPTHER

## 2022-11-21 RX ORDER — POTASSIUM CHLORIDE 750 MG/1
10 CAPSULE, EXTENDED RELEASE ORAL DAILY
Qty: 30 CAPSULE | Refills: 0 | Status: SHIPPED | OUTPATIENT
Start: 2022-11-21 | End: 2022-11-25 | Stop reason: SDUPTHER

## 2022-11-21 RX ORDER — BUMETANIDE 1 MG/1
1 TABLET ORAL 2 TIMES DAILY
Status: DISCONTINUED | OUTPATIENT
Start: 2022-11-21 | End: 2022-11-21 | Stop reason: HOSPADM

## 2022-11-21 RX ORDER — TRAMADOL HYDROCHLORIDE 50 MG/1
50 TABLET ORAL
Qty: 9 TABLET | Refills: 0 | Status: SHIPPED | OUTPATIENT
Start: 2022-11-21 | End: 2022-11-24

## 2022-11-21 RX ORDER — CLOTRIMAZOLE AND BETAMETHASONE DIPROPIONATE 10; .64 MG/G; MG/G
CREAM TOPICAL 2 TIMES DAILY
Qty: 30 G | Refills: 0 | Status: SHIPPED | OUTPATIENT
Start: 2022-11-21

## 2022-11-21 RX ADMIN — CLOPIDOGREL BISULFATE 75 MG: 75 TABLET ORAL at 09:54

## 2022-11-21 RX ADMIN — LEVOTHYROXINE SODIUM 100 MCG: 0.1 TABLET ORAL at 07:21

## 2022-11-21 RX ADMIN — DIBASIC SODIUM PHOSPHATE, MONOBASIC POTASSIUM PHOSPHATE AND MONOBASIC SODIUM PHOSPHATE 1 TABLET: 852; 155; 130 TABLET ORAL at 10:07

## 2022-11-21 RX ADMIN — EZETIMIBE 10 MG: 10 TABLET ORAL at 09:53

## 2022-11-21 RX ADMIN — Medication 10 ML: at 05:31

## 2022-11-21 RX ADMIN — ENOXAPARIN SODIUM 30 MG: 100 INJECTION SUBCUTANEOUS at 09:53

## 2022-11-21 RX ADMIN — Medication 14 UNITS: at 09:52

## 2022-11-21 RX ADMIN — ASPIRIN 81 MG 81 MG: 81 TABLET ORAL at 09:53

## 2022-11-21 RX ADMIN — SERTRALINE 50 MG: 50 TABLET, FILM COATED ORAL at 09:53

## 2022-11-21 RX ADMIN — GABAPENTIN 200 MG: 100 CAPSULE ORAL at 09:54

## 2022-11-21 RX ADMIN — TRAMADOL HYDROCHLORIDE 50 MG: 50 TABLET, COATED ORAL at 10:07

## 2022-11-21 NOTE — PROGRESS NOTES
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, LVEF improved to 45% (by echo 2022) from 25-30% (by echo 2021), stage C, NYHA class IIIB, on GDMT limited by hypotension with persistently elevated filling pressures by Cardiomems  Most likely etiology of cardiomyopathy includes: obesity +/- WES +/- tachycardia +/- wt amyloidosis (PYP positive on vyndamax)    INTERVAL HISTORY:  K replacement, 3.8 today  Creatinine trending down 1.9  PBNP stable  No acute overnight events       RECOMMENDATIONS:  Intolerant to beta-blockers due to hypotension  Intolerant to ACEi/ARB/ARNi due to CKD  Intolerant to spironolactone due to hyperkalemia  Intolerant to jardiance due to CKD  Resume Vyndamax as OP   Resume Bumex 1mg BID per nephrology, will try to keep weight 217 to 220lb  Keep K > 4 and Mag > 2  Nephrology recommendations appreciated   Will obtain cardiomems reading on Monday- can be done at home   Not on allopurinol or uloric, uric acid level 5  Continue ASA and plavix   Statin or PCSK9i: Continue current dose of atorvastatin and zetia  Continue CPAP therapy while admitted  ICD interrogation every 3 months   Advanced care plan present on file    All other care per primary team, ok to go home today, has appt on      IMPRESSION:  Fatigue  Shortness of breath  Aucte on chronic systolic heart failure  Stage D, NYHA class IIIA symptoms  Ischemic cardiomyopathy, LVEF 45% (by echo 2022)  Invitae with VUS  CAD s/p CABG 1997 s/p PCI to DVG-RCA 2016  H/o severe MR s/p mitral clip in 2020  HTN  H/o VT s/p AICD  WES on Bipap  T2DM with neuropathy  Hypothyroidism  Obesity- Body mass index is 35.48 kg/m². HLD  Osteoarthritis   CKD4       CARDIAC IMAGING:  Echo 8/2022  LVEF 45%, mild MR, LVIDd 5.54cm    Echo (8/19/21)  LV: Estimated LVEF is 30 - 35%. Mildly dilated left ventricle. Mild concentric hypertrophy. Moderately reduced systolic function. Moderate (grade 2) left ventricular diastolic dysfunction. Previously placed mitraclip is noted in secure position with residual trace to mild MR lateral to clip placement. Mean transmitral gradient is 2.6mmHg at heart rate of about 70 bpm.  LA: Moderately dilated left atrium. MV: Mild mitral valve regurgitation is present. LA: Severely dilated left atrium. RA: Mildly dilated right atrium. Echo (5/31/21)  LV: Estimated LVEF is 25 - 30%. Severely dilated left ventricle. Mild concentric hypertrophy. Severely and globally reduced systolic function. Inconclusive left ventricular diastolic function. LA: Moderately dilated left atrium. RA: Mildly dilated right atrium. MV: Moderate mitral valve regurgitation is present. Image quality for this study was suboptimal.     6 Min Walk Report 11/11/2022 6/3/2021 4/23/2021 12/28/2020 12/22/2020 8/31/2020 7/1/2020   (PRE) HR 69 70 70 71 73 81 71   (PRE) O2 Sat 98 98 - - - 96 -   (POST)  92 - - - 110 -   (POST) O2 Sat 93 99 - 97 - 97 97   Distance in Meters 205.74 91.44 - - - 226.77 -          HISTORY OF PRESENT ILLNESS:  I had the pleasure of seeing Sirisha Bedoya in [facility] at [facility] in [place]. Briefly, Sirisha Bedoya is a 74 y.o. female with h/o HTN, HLD, WES, Obesity (BMI 39), s/p gastric sleeve in 2011, T2DM, hypothyroidism, COPD, CAD s/p CABG in 1997, s/p PCI to [address] in 5/2015, ICM, VT, s/p AICD ([employer]), anxiety and depression.   She more recently underwent MitraClip on 11/12/2020 and was evaluated for upgrade to BiV ICD with Dr. Alvarado Dillon, however this was not done as her QRS was too narrow. Her most recent admission was 5/30/21-Repeat TTE from August shows an EF of 45%. She has been doing PT for her knee and can do household work but otherwise does not do much. She is compliant with her medications and her cardiomems readings. Patient presented today from ED wit complaints of dizzines and a fall. She was recently started on metolazone for volume overload, she had lost weight but her K was 2.2 on admission. Nephrology is also consulted. LIFE GOALS:  Lifestyle goals discussed with the patient. PHYSICAL EXAM:  Visit Vitals  /60 (BP 1 Location: Right upper arm, BP Patient Position: At rest)   Pulse 71   Temp 98.4 °F (36.9 °C)   Resp 16   Ht 5' 6\" (1.676 m)   Wt 219 lb 12.8 oz (99.7 kg)   SpO2 96%   BMI 35.48 kg/m²     Physical Exam  Constitutional:       Appearance: Normal appearance. She is obese. Cardiovascular:      Rate and Rhythm: Normal rate and regular rhythm. Pulses: Normal pulses. Heart sounds: Normal heart sounds. Pulmonary:      Effort: No respiratory distress. Breath sounds: Normal breath sounds. Abdominal:      General: There is no distension. Palpations: Abdomen is soft. Musculoskeletal:         General: Swelling present. Skin:     General: Skin is warm and dry. Neurological:      General: No focal deficit present. Mental Status: She is alert and oriented to person, place, and time. Psychiatric:         Mood and Affect: Mood normal.        REVIEW OF SYSTEMS:  Review of Systems   Constitutional:  Negative for chills, fever and malaise/fatigue. Respiratory:  Negative for cough and shortness of breath. Cardiovascular:  Negative for chest pain, palpitations and leg swelling. Gastrointestinal:  Negative for heartburn and nausea. Musculoskeletal:  Negative for falls and myalgias.    Neurological:  Negative for dizziness, weakness and headaches. Psychiatric/Behavioral:  The patient is nervous/anxious.        PAST MEDICAL HISTORY:  Past Medical History:   Diagnosis Date    Adverse effect of anesthesia     hard to wake up/uses BIPAP/\"try to avoid general if possible\"/intubated in past prior to going to sleep and it caused pt to be incontinent    Arthritis     Asthma     CAD (coronary artery disease)     Chronic kidney disease     elevataed creatinine    Chronic obstructive pulmonary disease (HCC)     Chronic pain     arthritis    Coagulation disorder (HCC)     on plavix    Congestive heart failure (HCC)     Diabetes (HCC)     Hypertension     Morbid obesity (HCC)     Sleep apnea     BIPAP with 2 liters oxygen    Thromboembolus ([address]     after heart surgery - left leg    Thyroid disease        PAST SURGICAL HISTORY:  Past Surgical History:   Procedure Laterality Date    COLONOSCOPY N/A 2020    COLONOSCOPY   :- performed by Dina Fuller MD at [address] [place] [address]  2105    knee - right    HX  SECTION      x3    HX CORONARY ARTERY BYPASS GRAFT  1997    3 vessels    HX CORONARY STENT PLACEMENT  2016    HX HERNIA REPAIR  1988    HX IMPLANTABLE CARDIOVERTER DEFIBRILLATOR      HX KNEE REPLACEMENT Right 2015    once    GA CARDIAC SURG PROCEDURE UNLIST  2018    cardiac cath - Left    GA LAP GASTRIC BYPASS/KERLINE-EN-Y  2011    lap band per pt and not a gastric bypass       FAMILY HISTORY:  Family History   Problem Relation Age of Onset    Hypertension Mother     Dementia Mother     Coronary Art Dis Father 58    Sudden Death Father 58    Diabetes Son     Diabetes Brother        SOCIAL HISTORY:  Social History     Socioeconomic History    Marital status:    Tobacco Use    Smoking status: Former     Packs/day: 0.50     Years: 45.00     Pack years: 22.50     Types: Cigarettes     Quit date: 2010     Years since quittin.8    Smokeless tobacco: Never    Tobacco comments:     quit 2001/started again (smoked 3 yrs) off and on/none since 2012   Vaping Use    Vaping Use: Never used   Substance and Sexual Activity    Alcohol use: Yes     Comment: Once every 6 months/ Special occasionans    Drug use: Not Currently    Sexual activity: Not Currently       LABORATORY RESULTS:  Labs Latest Ref Rng & Units 11/21/2022 11/20/2022 11/19/2022 11/18/2022 11/18/2022 11/17/2022 11/17/2022   WBC 3.6 - 11.0 K/uL 7.5 7.5 7.3 - 7.6 - -   RBC 3.80 - 5.20 M/uL 3.90 3.57(L) 4.05 - 4.16 - -   Hemoglobin 11.5 - 16.0 g/dL 12.3 11. 4(L) 13.0 - 13.3 - -   Hematocrit 35.0 - 47.0 % 37.5 34. 2(L) 39.1 - 37.1 - -   MCV 80.0 - 99.0 FL 96.2 95.8 96.5 - 89.2 - -   Platelets 177 - 759 K/uL 225 221 238 - 252 - -   Lymphocytes 12 - 49 % 37 34 30 - 39 - -   Monocytes 5 - 13 % 9 10 11 - 13 - -   Eosinophils 0 - 7 % 5 5 5 - 6 - -   Basophils 0 - 1 % 1 0 0 - 1 - -   Albumin 3.5 - 5.0 g/dL - - - - - - -   Calcium 8.5 - 10.1 MG/DL 10. 2(H) 9.5 10.0 - 10. 4(H) - -   Glucose 65 - 100 mg/dL 124(H) 128(H) 172(H) - 90 - -   BUN 6 - 20 MG/DL 34(H) 39(H) 47(H) - 51(H) - -   Creatinine 0.55 - 1.02 MG/DL 1.80(H) 1.76(H) 2.08(H) - 2.21(H) - -   Sodium 136 - 145 mmol/L 139 139 138 - 136 - -   Potassium 3.5 - 5.1 mmol/L 3.8 5.0 3.2(L) 3.4(L) 2. 6(LL) 2. 8(L) 2. 7(LL)   TSH 0.36 - 3.74 uIU/mL - - - - - 1.68 -   Some recent data might be hidden       ALLERGY:  Allergies   Allergen Reactions    Crestor [Rosuvastatin] Other (comments)     Causes muscle cramps    Lisinopril Cough    Nsaids (Non-Steroidal Anti-Inflammatory Drug) Other (comments)     Liver and Kidney        CURRENT MEDICATIONS:    Current Facility-Administered Medications:     bumetanide (BUMEX) tablet 1 mg, 1 mg, Oral, BID, Saloni Griffith MD    phosphorus (K PHOS NEUTRAL) 250 mg tablet 1 Tablet, 1 Tablet, Oral, DAILY, Saloni Griffith MD, 1 Tablet at 11/21/22 1007    sodium chloride (NS) flush 5-40 mL, 5-40 mL, IntraVENous, Q8H, Enedelia Barrett MD, 10 mL at 11/21/22 0531    sodium chloride (NS) flush 5-40 mL, 5-40 mL, IntraVENous, PRN, Marycarmen Landers MD    acetaminophen (TYLENOL) tablet 650 mg, 650 mg, Oral, Q6H PRN **OR** acetaminophen (TYLENOL) suppository 650 mg, 650 mg, Rectal, Q6H PRN, Marycarmen Landers MD    polyethylene glycol (MIRALAX) packet 17 g, 17 g, Oral, DAILY PRN, Marycarmen Landers MD, 17 g at 11/19/22 1009    ondansetron (ZOFRAN ODT) tablet 4 mg, 4 mg, Oral, Q8H PRN **OR** ondansetron (ZOFRAN) injection 4 mg, 4 mg, IntraVENous, Q6H PRN, Marycarmen Landers MD    enoxaparin (LOVENOX) injection 30 mg, 30 mg, SubCUTAneous, DAILY, Enedelia Barrett MD, 30 mg at 11/21/22 0953    albuterol-ipratropium (DUO-NEB) 2.5 MG-0.5 MG/3 ML, 3 mL, Nebulization, Q4H PRN, Marycarmen Landers MD    aspirin chewable tablet 81 mg, 81 mg, Oral, DAILY, Marycarmen Landers MD, 81 mg at 11/21/22 0953    atorvastatin (LIPITOR) tablet 80 mg, 80 mg, Oral, QHS, Enedelia Barrett MD, 80 mg at 11/20/22 2157    clopidogreL (PLAVIX) tablet 75 mg, 75 mg, Oral, DAILY, Marycarmen Landers MD, 75 mg at 11/21/22 0954    ezetimibe (ZETIA) tablet 10 mg, 10 mg, Oral, DAILY, Marycarmen Landers MD, 10 mg at 11/21/22 0953    gabapentin (NEURONTIN) capsule 200 mg, 200 mg, Oral, BID, Marycarmen Landers MD, 200 mg at 11/21/22 0954    levothyroxine (SYNTHROID) tablet 100 mcg, 100 mcg, Oral, ACB, Marycarmen Landers MD, 100 mcg at 11/21/22 0721    nitroglycerin (NITROSTAT) tablet 0.4 mg, 0.4 mg, SubLINGual, Q5MIN PRN, Marycarmen Landers MD    sertraline (ZOLOFT) tablet 50 mg, 50 mg, Oral, DAILY, Marycarmen Landers MD, 50 mg at 11/21/22 0953    tiZANidine (ZANAFLEX) tablet 2 mg, 2 mg, Oral, BID PRN, Marycarmen Landers MD, 2 mg at 11/19/22 1759    insulin NPH (NOVOLIN N, HUMULIN N) injection 14 Units, 14 Units, SubCUTAneous, BID, Marycarmen Landers MD, 14 Units at 11/21/22 0952    insulin lispro (HUMALOG) injection, , SubCUTAneous, AC&HS, Marycarmen Landers MD, 3 Units at 11/20/22 1310    glucose chewable tablet 16 g, 4 Tablet, Oral, PRN, Davis Townsend, MD Enedelia    glucagon (GLUCAGEN) injection 1 mg, 1 mg, IntraMUSCular, PRN, Jenna Prakash MD    dextrose 10 % infusion 0-250 mL, 0-250 mL, IntraVENous, PRN, Jenna Prakash MD    traMADoL (ULTRAM) tablet 50 mg, 50 mg, Oral, Q6H PRN, Gleda MD Dwain, 50 mg at 11/21/22 1007    Thank you for your referral and allowing me to participate in this patient's care.     Evin Albarran NP  [facility] [address], [place] [address]  Phone: [phone] TEAM:  Patient Care Team:  Other, MD Fernando as PCP - General  Sascha Strong MD (Cardiovascular Disease Physician)  Flavia Miller MD (Gastroenterology)  Elizabeth Rinaldi MD as Consulting Provider (Cardiovascular Disease Physician)  Aide Ugalde MD (Nephrology)

## 2022-11-21 NOTE — PROGRESS NOTES
[facility] KIDNEY     Renal Daily Progress Note:     Admission Date: 2022     Subjective: looks good, comfortable, not SOB. Had a BM and starting to work with PT. Creatinine trending down, off IVF    22-patient feels well, denies SOBV, has not ambulated yet. Sat 97% on RA, cr at baseline      Review of Systems  Pertinent items are noted in HPI.     Objective:     Visit Vitals  /66 (BP 1 Location: Right upper arm, BP Patient Position: At rest)   Pulse 72   Temp 98 °F (36.7 °C)   Resp 18   Ht 5' 6\" (1.676 m)   Wt 99.7 kg (219 lb 12.8 oz)   SpO2 95%   BMI 35.48 kg/m²     Temp (24hrs), Av.2 °F (36.8 °C), Min:98 °F (36.7 °C), Max:98.6 °F (37 °C)        Intake/Output Summary (Last 24 hours) at 2022 1012  Last data filed at 2022 0815  Gross per 24 hour   Intake 120 ml   Output 850 ml   Net -730 ml       Current Facility-Administered Medications   Medication Dose Route Frequency    phosphorus (K PHOS NEUTRAL) 250 mg tablet 1 Tablet  1 Tablet Oral DAILY    sodium chloride (NS) flush 5-40 mL  5-40 mL IntraVENous Q8H    sodium chloride (NS) flush 5-40 mL  5-40 mL IntraVENous PRN    acetaminophen (TYLENOL) tablet 650 mg  650 mg Oral Q6H PRN    Or    acetaminophen (TYLENOL) suppository 650 mg  650 mg Rectal Q6H PRN    polyethylene glycol (MIRALAX) packet 17 g  17 g Oral DAILY PRN    ondansetron (ZOFRAN ODT) tablet 4 mg  4 mg Oral Q8H PRN    Or    ondansetron (ZOFRAN) injection 4 mg  4 mg IntraVENous Q6H PRN    enoxaparin (LOVENOX) injection 30 mg  30 mg SubCUTAneous DAILY    albuterol-ipratropium (DUO-NEB) 2.5 MG-0.5 MG/3 ML  3 mL Nebulization Q4H PRN    aspirin chewable tablet 81 mg  81 mg Oral DAILY    atorvastatin (LIPITOR) tablet 80 mg  80 mg Oral QHS    clopidogreL (PLAVIX) tablet 75 mg  75 mg Oral DAILY    ezetimibe (ZETIA) tablet 10 mg  10 mg Oral DAILY    gabapentin (NEURONTIN) capsule 200 mg  200 mg Oral BID    levothyroxine (SYNTHROID) tablet 100 mcg  100 mcg Oral ACB    nitroglycerin (NITROSTAT) tablet 0.4 mg  0.4 mg SubLINGual Q5MIN PRN    sertraline (ZOLOFT) tablet 50 mg  50 mg Oral DAILY    tiZANidine (ZANAFLEX) tablet 2 mg  2 mg Oral BID PRN    insulin NPH (NOVOLIN N, HUMULIN N) injection 14 Units  14 Units SubCUTAneous BID    insulin lispro (HUMALOG) injection   SubCUTAneous AC&HS    glucose chewable tablet 16 g  4 Tablet Oral PRN    glucagon (GLUCAGEN) injection 1 mg  1 mg IntraMUSCular PRN    dextrose 10 % infusion 0-250 mL  0-250 mL IntraVENous PRN    traMADoL (ULTRAM) tablet 50 mg  50 mg Oral Q6H PRN       Physical Exam:  General:Alert, No distress,   HEENT: Eyes anicteric. Conjunctiva without pallor ,erythema. Neck:Supple,no JVD  Lungs : Clears to auscultation Bilaterally, Normal respiratory effort  CVS: RRR, S1 S2 normal, No rub, no LE edema  Abdomen: Soft, Non tender, No hepatosplenomegaly, bowel sounds present  Extremities: No edema  Skin: No rash   Neurologic: non focal, AAO x 3  Psych: normal affect    Data Review:     LABS:  Recent Labs     11/21/22 0338 11/20/22 0249 11/19/22  0434    139 138   K 3.8 5.0 3.2*    105 102   CO2 29 29 30   BUN 34* 39* 47*   CREA 1.80* 1.76* 2.08*   CA 10.2* 9.5 10.0   PHOS 2.8 2.4*  --    MG 2.1 2.0  --        Recent Labs     11/21/22 0338 11/20/22 0249 11/19/22  0434   WBC 7.5 7.5 7.3   HGB 12.3 11.4* 13.0   HCT 37.5 34.2* 39.1    221 238       No results for input(s): CRESCENCIO, KU, CLU, CREAU in the last 72 hours.     No lab exists for component: PROU    Assessment:   Renal Specific Problems  KEYONNA on CKD4 due to intravascular volume depletion--resolving  CKD4 with subnephrotic proteinuria due to DKD/HTN-baseline cr 1.8-2  Hypokalemia- resolved  Dysuria- resolved     Plan:  NeutraPhos daily  Off IVF  Start bumex 1 mg BID, will titrate up if needed  F/up with cardiology and heart failure team  F/up  RFT      Discussed with Dr Elizabeth Verdugo and patient

## 2022-11-21 NOTE — ADT AUTH CERT NOTES
Comments  Comment          Patient Demographics    Patient Name   Elvira Foreman   [id number] Legal Sex   Female    1950 Address   [address]   [id number] [facility] Phone   [phone] (Home)   [phone] (Mobile) *Preferred*     Patient Demographics    Patient Name   Elvira Foreman   [id number] Legal Sex   Female    1950 Address   [address]   [address] [place] [id number] Phone   [phone] (Home)   [phone] (Mobile) *Preferred*     CSN:   [id number]     Admit Date: Admit Time Room Bed   2022  1:05 PM [address] [facility] [[id number]]     Attending Providers    Provider Pager From To   Angeles Rowan MD  22   Sanjuana Fisher MD  22      Patient Contacts    Name [address] [facility] Sister [phone]     Utilization Reviews       Dehydration - Care Day 4 (2022) by Con Both       Review Entered Review Status   2022 1235 Completed      Criteria Review      Care Day: 4 Care Date: 2022 Level of Care: Telemetry    Guideline Day 2    Clinical Status    (X) * Hypotension absent    2022 12:35:19 EST by Tollhouse Both      707/25    (X) * Electrolyte abnormalities absent or improved    2022 12:35:19 EST by Con Both      Sodium: 139  Potassium: 5.0  Chloride: 105  CO2: 29  Anion gap: 5  Glucose: 128 (H)    (X) * Cause of dehydration requiring inpatient treatment absent    2022 12:35:19 EST by Tollhouse Both      Improved with holding diuretics.     (X) * Renal function at baseline, stable or improved    2022 12:35:19 EST by Con Both      BUN: 39 (H)  Creatinine: 1.76 (H)  BUN/Creatinine ratio: 22 (H)    (X) * Mental status at baseline or improved    2022 12:35:19 EST by Tollhouse Both      Baseline    (X) * Vomiting absent or improved    2022 12:35:19 EST by Tollhouse Both      Absent    (X) * Diarrhea absent or improved    2022 12:35:19 EST by Tristan Knox      Absent    Activity    (X) Activity as tolerated    11/21/2022 12:35:19 EST by Tristan Knox      Up ad rafael    Routes    (X) IV or oral fluids    11/21/2022 12:35:19 EST by Tristan Knox      PO Fluids per diet    (X) IV or oral medications    11/21/2022 12:35:19 EST by Tristan Knox      Lactulose 30 mL PO x 1.    (X) Diet as tolerated    11/21/2022 12:35:19 EST by Tristan Knox      ADULT DIET Regular; 3 carb choices (45 gm/meal); Low Fat/Low Chol/High Fiber/2 gm Na    Interventions    (X) Electrolytes    11/21/2022 12:35:19 EST by Tristan Knox      Follow electrolytes    * Milestone   Additional Notes    DATE:    11/20/22   IP , Telemetry      Pertinent Updates:   Interval history / Subjective:   Patient seen examined at bedside earlier.   No complaints Now hyper K stop std potassium order        Assessment & Plan:       Orthostatic hypotension   Bradycardia   Fall 2/2 to above    Severe hypokalemia > now slight hyper K    Hypophos   KEYONNA on CKD stage IIIb    ischemic cardiomyopathy status post ICD with CABG   WES on BiPAP nightly   Type 2 diabetes   Peripheral neuropathy   COPD   -All of the above are likely secondary to overdiuresis   diuretics still on hold   -s/p IV and PO repletion of K, better, stop std potassium order K uptrending, follow and replete prn    -order phos    - appreciate ahf team recs, plan to investigate cardiomems on Monday, further recs for diuretics per nephro/ahf   -appreciate nephro, stopped IVF 11/19   -BiPAP nightly   - Nebs as needed not in exacerbation   - NPH, sliding scale readjust as needed   - Continue other home meds   - tsh wnl    -X ray of left knee neg    -PT/OT > no needs       Vitals:   BP (!) 105/59   Pulse 73   Temp 98.6 °F (37 °C)   Resp 15   Ht 5' 6\" (1.676 m)   Wt 98.7 kg (217 lb 11.2 oz)   SpO2 97% on RA      Abnl/Pertinent Labs/Radiology/Diagnostic Studies:   NT pro-BNP: 535 (H)      WBC: 7.5   RBC: 3.57 (L)   HGB: 11.4 (L)   HCT: 34.2 (L)   PLATELET: 574      Physical Exam:   Constitutional: No acute distress, cooperative, pleasant    ENT: Oral mucosa moist, oropharynx benign. Resp: CTA bilaterally. No wheezing/rhonchi/rales. No accessory muscle use. CV: Regular rhythm, normal rate, no murmurs, gallops, rubs    GI: Soft, non distended, non tender. normoactive bowel sounds, no hepatosplenomegaly     Musculoskeletal: No edema, warm, 2+ pulses throughout    Neurologic: Moves all extremities. L knee slightly more swollen but improving, AAOx3, CN II-XII reviewed      MD Consults/Assessments & Plans:   CARDIOLOGY CONSULT   Chart reviewed, potassium and Creatinine improved. No changed to current regimen. Diuretics per nephrology. Will see tomorrow. NEPHROLOGY CONSULT   Renal Specific Problems   KEYONNA on CKD4 due to intravascular volume depletion--resolving   CKD4 with subnephrotic proteinuria due to DKD/HTN-baseline cr 1.8-2   Hypokalemia- resolved   Dysuria- resolved       Plan:   Change NeutraPhos to daily   Check Mg   D/c IVF, replete orally   Hold diuretics today but will ultimately need to restart at a reduced dose relative to pre-admission  dosing      PT/OT/SLP/CM Assessments or Notes:   CM ASSESSMENT   Care Management Interventions   PCP Verified by CM:  Yes (Tolu Pate (PCP retired, awaiting new one))   Palliative Care Criteria Met (RRAT>21 & CHF Dx)?: No   Mode of Transport at Discharge: Self (Her car is in the parking lot)   MyChart Signup: Yes   Discharge Durable Medical Equipment: No   Health Maintenance Reviewed: Yes   Physical Therapy Consult: Yes   Occupational Therapy Consult: Yes   Speech Therapy Consult: No   Support Systems: Other Family Member(s) (Sister)   Confirm Follow Up Transport: Self   The Plan for Transition of Care is Related to the Following Treatment Goals : REYNALDO pending medical progression, stability, and PT/OT recs   Discharge Location   Patient Expects to be Discharged to[de-identified] Home with home health CM notes that IM was already given to patient. Dehydration - Care Day 3 (11/19/2022) by Lon Holliday       Review Entered Review Status   11/21/2022 1221 Completed      Criteria Review      Care Day: 3 Care Date: 11/19/2022 Level of Care: Telemetry    Guideline Day 2    Clinical Status    (X) * Hypotension absent    11/21/2022 12:21:01 EST by Lon Holliday      105/59    ( ) * Electrolyte abnormalities absent or improved    11/21/2022 12:21:01 EST by Lon Holliday      Potassium: 3.2 (L)    (X) * Cause of dehydration requiring inpatient treatment absent    11/21/2022 12:21:01 EST by Lon Holliday      All of the above are likely secondary to overdiuresis  - cont to hold diuretics    ( ) * Renal function at baseline, stable or improved    11/21/2022 12:21:01 EST by Lon Holliday      BUN: 47 (H)  Creatinine: 2.08 (H)  BUN/Creatinine ratio: 23 (H)    (X) * Mental status at baseline or improved    11/21/2022 12:21:01 EST by Lon Holliday      Baseline    (X) * Vomiting absent or improved    11/21/2022 12:21:01 EST by Lon Holliday      Absent    (X) * Diarrhea absent or improved    11/21/2022 12:21:01 EST by Lon Holliday      absent    Activity    (X) Activity as tolerated    11/21/2022 12:21:01 EST by Lon Holliday      Up ad rafael    Routes    (X) IV or oral fluids    11/21/2022 12:21:01 EST by Lon Holliday      Oral fluids per diet order    0.9% sodium chloride infusion  Rate: 50 mL/hr   Freq: CONTINUOUS Route: IV  Start: 11/17/22 1745    (X) IV or oral medications    11/21/2022 12:21:01 EST by Lon Holliday      Klor Con 40 mg PO b.i d.    (X) Diet as tolerated    11/21/2022 12:21:01 EST by Lon Holliday      ADULT DIET Regular; 3 carb choices (45 gm/meal); Low Fat/Low Chol/High Fiber/2 gm Na    Interventions    (X) Electrolytes    11/21/2022 12:21:01 EST by Lon Holliday      Sodium 138, Glucose 172.     Medications    ( ) Possible antiemetics    11/21/2022 12:21:01 EST by Lon Holliday None    ( ) Possible antidiarrheal agent    11/21/2022 12:21:01 EST by Daniel Linn      None. * Milestone   Additional Notes    DATE:    11/19/22   IP , Telemetry      Pertinent Updates:   Interval history / Subjective:   Patient seen examined at bedside earlier. Feels well K and Cr improving        Assessment & Plan:       Orthostatic hypotension   Bradycardia   Fall 2/2 to above    Severe hypokalemia   KEYONNA on CKD stage IIIb    ischemic cardiomyopathy status post ICD with CABG   WES on BiPAP nightly   Type 2 diabetes   Peripheral neuropathy   COPD   -All of the above are likely secondary to overdiuresis   - cont to hold diuretics   -s/p IV and PO repletion of K, better, replete po prn follow   - appreciate ahf team recs, plan to investigate cardiomems on Monday, further recs for diuretics per nephro/ahf   -appreciate nephro, stop IVF today    -BiPAP nightly   - Nebs as needed not in exacerbation   - NPH, sliding scale readjust as needed   - Continue other home meds   - tsh wnl    -X ray of left knee neg    -PT/OT > no needs       Vitals:   /64 (BP 1 Location: Right upper arm, BP Patient Position: Sitting)   Pulse 71   Temp 98.2 °F (36.8 °C)   Resp 17   Ht 5' 6\" (1.676 m)   Wt 98.7 kg (217 lb 11.2 oz)   SpO2 98% on RA      Abnl/Pertinent Labs/Radiology/Diagnostic Studies:   WBC: 7.3   NRBC: 0.0   RBC: 4.05   HGB: 13.0   HCT: 39.1   MCV: 96.5   MCH: 32.1   MCHC: 33.2   RDW: 14.7 (H)   PLATELET: 056      NT pro-BNP: 577 (H)      Physical Exam:   Constitutional: No acute distress, cooperative, pleasant    ENT: Oral mucosa moist, oropharynx benign. Resp: CTA bilaterally. No wheezing/rhonchi/rales. No accessory muscle use. CV: Regular rhythm, normal rate, no murmurs, gallops, rubs    GI: Soft, non distended, non tender. normoactive bowel sounds, no hepatosplenomegaly     Musculoskeletal: No edema, warm, 2+ pulses throughout    Neurologic: Moves all extremities.  L knee slightly more swollen, AAOx3, CN II-XII reviewed      MD Consults/Assessments & Plans:   NEPHROLOGY CONSULT   KEYONNA on CKD4 due to intravascular volume depletion--resolving   CKD4 with subnephrotic proteinuria due to DKD/HTN-baseline cr 1.8-2   Hypokalemia   Dysuria       Plan:   Replete potassium   Check Mg   D/c IVF, replete orally   Hold diuretics today      CARDIOLOGY CONSULT   RECOMMENDATIONS:   Intolerant to beta-blockers due to hypotension   Intolerant to ACEi/ARB/ARNi due to CKD   Intolerant to spironolactone due to hyperkalemia   Intolerant to jardiance due to CKD   Resume Vyndamax as OP    Resume diuretics when cleared by nephrology, will try to keep weight 217 to 220lb   Keep K > 4 and Mag > 2   Nephrology recommendations appreciated    Will obtain cardiomems reading on Monday- can be done at home    Not on allopurinol or uloric, uric acid level 5   Continue ASA and plavix    Statin or PCSK9i: Continue current dose of atorvastatin and zetia   Continue CPAP therapy while admitted   ICD interrogation every 3 months    Advanced care plan present on file

## 2022-11-21 NOTE — DISCHARGE SUMMARY
Discharge Summary       PATIENT ID: Stephane Vallecillo  MRN: [id number]   YOB: 1950    DATE OF ADMISSION: 11/17/2022  1:05 PM    DATE OF DISCHARGE: 11/21/22    PRIMARY CARE PROVIDER: Raisa Greenwood MD     ATTENDING PHYSICIAN: Bill Thomas MD   DISCHARGING PROVIDER: Bill Thomas MD    To contact this individual call [phone] and ask the  to page. If unavailable ask to be transferred the Adult Hospitalist Department. CONSULTATIONS: IP CONSULT TO CARDIOLOGY  IP CONSULT TO HOSPITALIST  IP CONSULT TO NEPHROLOGY  IP CONSULT TO NEPHROLOGY    PROCEDURES/SURGERIES: * No surgery found *    ADMITTING DIAGNOSES & HOSPITAL COURSE:   HPI: \"Mi Barrett is a 74 y.o. female ischemic CM s/p ICD and CABG, CKD stage IIIb, hypothyroidism PVD, WES on BiPAP nightly, type 2 diabetes, COPD who presents with lightheadedness and dizziness and reported low blood pressures with intermittent bradycardia. She has been followed by acute heart failure team outpatient last seen 1 week ago at that time concern for volume overload and metolazone was started scribed to take every other day she took approximately 2 doses of this medication states she has significant weight loss but also had significant increase in her symptoms of dizziness lightheadedness and low blood pressures to the point where she can safely stand. She fell today. Recommended admission to the hospital per heart failure team recommendations. \"        DISCHARGE DIAGNOSES / PLAN:      Orthostatic hypotension  Bradycardia  Fall 2/2 to above   Severe hypokalemia > now slight hyper K   Hypophos  KEYONNA on CKD stage IIIb   ischemic cardiomyopathy status post ICD with CABG  WES on BiPAP nightly  Type 2 diabetes  Peripheral neuropathy  COPD  -All of the above are likely secondary to overdiuresis  diuretics still on hold  -s/p IV and PO repletion of K, better, stop std potassium follow and replete prn   -ordered phos   - appreciate ahf team recs, can do cardiomems read op, has fu w/ AHF team this Friday  -appreciate nephro, stopped IVF 11/19, ok to resume bumex 1mg BID on dc, will give lose dose K supplement with this   -BiPAP nightly  - Nebs as needed not in exacerbation  - NPH, sliding scale readjust as needed  - Continue other home meds  - tsh wnl   -X ray of left knee neg   -PT/OT > no needs            Code status: full   Prophylaxis: Heparin subcu  Care Plan discussed with: Patient/family, nurse, case management  Anticipated Disposition: dc home          FOLLOW UP APPOINTMENTS:    Follow-up Information       Follow up With Specialties Details Why Contact Info    Renae, MD [facility]       Shanilexx Desai, [address] [facility] Vascular Surgery, Cardiovascular Disease Physician Call in 1 day(s)  Viri Simmons [address]      Brit Brown MD Nephrology Call in 1 day(s)  Andre  [phone]               ADDITIONAL CARE RECOMMENDATIONS: rpt cbc and bmp 3-5 days    DIET: Resume previous diet    ACTIVITY: Activity as tolerated      DISCHARGE MEDICATIONS:  Current Discharge Medication List        START taking these medications    Details   traMADoL (ULTRAM) 50 mg tablet Take 1 Tablet by mouth every eight (8) hours as needed for Pain for up to 3 days. Max Daily Amount: 150 mg.  Qty: 9 Tablet, Refills: 0  Start date: 11/21/2022, End date: 11/24/2022    Associated Diagnoses: Acute pain of left knee      potassium chloride SA (MICRO-K) 10 mEq capsule Take 1 Capsule by mouth daily for 30 days. Qty: 30 Capsule, Refills: 0  Start date: 11/21/2022, End date: 12/21/2022           CONTINUE these medications which have CHANGED    Details   bumetanide (BUMEX) 1 mg tablet Take 1 Tablet by mouth two (2) times a day for 30 days.   Qty: 60 Tablet, Refills: 0  Start date: 11/21/2022, End date: 12/21/2022           CONTINUE these medications which have NOT CHANGED    Details   metOLazone (ZAROXOLYN) 2.5 mg tablet Take 1 Tablet by mouth every other day. Qty: 6 Tablet, Refills: 0      tiZANidine (ZANAFLEX) 2 mg tablet TAKE 1 TABLET BY MOUTH TWICE DAILY AS NEEDED FOR MUSCLE RELAXANT      semaglutide (OZEMPIC) 1 mg/dose (2 mg/1.5 mL) sub-q pen 1 mg by SubCUTAneous route every seven (7) days. Once weekly      NovoLIN 70-30 FlexPen U-100 100 unit/mL (70-30) inpn 18 units before breakfast and 14 units at dinner      gabapentin (NEURONTIN) 100 mg capsule TAKE 2 CAPSULES BY MOUTH TWICE DAILY MAX  DAILY  AMOUNT  400MG  Qty: 120 Capsule, Refills: 0    Associated Diagnoses: Type 2 diabetes mellitus with stage 2 chronic kidney disease, without long-term current use of insulin (HCC)      ezetimibe (ZETIA) 10 mg tablet Take 1 Tablet by mouth daily. Qty: 30 Tablet, Refills: 1    Associated Diagnoses: Ischemic cardiomyopathy; Coronary artery disease involving native heart without angina pectoris, unspecified vessel or lesion type      clopidogreL (PLAVIX) 75 mg tab Take 1 tablet by mouth once daily  Qty: 90 Tablet, Refills: 0    Associated Diagnoses: ICD (implantable cardioverter-defibrillator) in place; Hx of CABG      Insulin Needles, Disposable, 32 gauge x 5/32\" ndle 1 Each by Does Not Apply route daily. Qty: 100 Pen Needle, Refills: 2    Associated Diagnoses: Type 2 diabetes mellitus with hyperglycemia, without long-term current use of insulin ([facility])      alcohol swabs padm 1 Each by Apply Externally route daily. Qty: 100 Pad, Refills: 11      sertraline (ZOLOFT) 50 mg tablet 50 mg daily. ketoconazole (NIZORAL) 2 % shampoo       levothyroxine (Euthyrox) 100 mcg tablet Take 1 Tablet by mouth Daily (before breakfast). Qty: 90 Tablet, Refills: 0    Associated Diagnoses: Acquired hypothyroidism      FreeStyle Lancets 28 gauge misc USE AS DIRECTED      nitroglycerin (NITROSTAT) 0.3 mg SL tablet 1 Tablet by SubLINGual route every five (5) minutes as needed for Chest Pain. Qty: 1 Bottle, Refills: 0      !!  Blood-Glucose Meter monitoring kit Check blood sugar daily. May substitute for insurance preferred meter  Qty: 1 Kit, Refills: 0      !! glucose blood VI test strips (blood glucose test) strip Check blood sugar 2 times daily: E11.9 may substitute for insurance preferred strips. Qty: 200 Strip, Refills: 3      !! lancets misc Use as directed. Dx:check sugar once daily. E11.65  Qty: 1 Each, Refills: 11      atorvastatin (LIPITOR) 80 mg tablet TAKE 1 TABLET BY MOUTH AT BEDTIME  Qty: 90 Tab, Refills: 3    Associated Diagnoses: NYHA class 3 heart failure with reduced ejection fraction ([facility] [address] Coronary artery disease involving native heart without angina pectoris, unspecified vessel or lesion type      !! lancets misc Check blood sugar 2 times daily. May substitute for insurance preferred lancets. Qty: 200 Each, Refills: 3      !! glucose blood VI test strips (ASCENSIA AUTODISC VI, ONE TOUCH ULTRA TEST VI) strip E11.65 check sugar once daily  Qty: 100 Strip, Refills: 0    Comments: Okay to sub what is covered      clotrimazole-betamethasone (LOTRISONE) topical cream Apply  to affected area two (2) times a day. Qty: 30 g, Refills: 0      !! Blood-Glucose Meter monitoring kit Use as directed. Dx: E11.65 check sugar twice daily  Qty: 1 Kit, Refills: 0    Comments: One touch or whatever her insurance covers      albuterol (PROVENTIL HFA, VENTOLIN HFA, PROAIR HFA) 90 mcg/actuation inhaler Take 2 Puffs by inhalation every four (4) hours as needed. acetaminophen (TYLENOL) 650 mg TbER Take 1,300 mg by mouth two (2) times a day. aspirin 81 mg chewable tablet Take 81 mg by mouth daily. !! - Potential duplicate medications found. Please discuss with provider. NOTIFY YOUR PHYSICIAN FOR ANY OF THE FOLLOWING:   Fever over 101 degrees for 24 hours. Chest pain, shortness of breath, fever, chills, nausea, vomiting, diarrhea, change in mentation, falling, weakness, bleeding. Severe pain or pain not relieved by medications.   Or, any other signs or symptoms that you may have questions about. DISPOSITION:    Home With:   OT  PT  HH  RN       Long term SNF/Inpatient Rehab   x Independent/assisted living    Hospice    Other:       PATIENT CONDITION AT DISCHARGE:     Functional status    Poor     Deconditioned    x Independent      Cognition   x  Lucid     Forgetful     Dementia      Catheters/lines (plus indication)    Duarte     PICC     PEG    x None      Code status   x  Full code     DNR      PHYSICAL EXAMINATION AT DISCHARGE:  I had a face to face encounter with this patient and independently examined them on 11/22/2022 as outlined below:                                                       Constitutional:  No acute distress, cooperative, pleasant    ENT:  Oral mucosa moist, oropharynx benign. Resp:  CTA bilaterally. No wheezing/rhonchi/rales. No accessory muscle use. CV:  Regular rhythm, normal rate, no murmurs, gallops, rubs    GI:  Soft, non distended, non tender. normoactive bowel sounds, no hepatosplenomegaly     Musculoskeletal:  No edema, warm, 2+ pulses throughout    Neurologic:  Moves all extremities.  L knee slightly more swollen but improving, AAOx3, CN II-XII reviewed                                 CHRONIC MEDICAL DIAGNOSES:  Problem List as of 11/21/2022 Date Reviewed: 6/24/2022            Codes Class Noted - Resolved    Knee pain ICD-10-CM: M25.569  ICD-9-CM: 719.46  11/21/2022 - Present        Hypokalemia ICD-10-CM: E87.6  ICD-9-CM: 276.8  11/17/2022 - Present        CKD (chronic kidney disease) stage 4, GFR 15-29 ml/min ([facility]) ICD-10-CM: N18.4  ICD-9-CM: 585.4  9/21/2022 - Present        Type 2 diabetes mellitus with chronic kidney disease ([facility] ICD-10-CM: E11.22  ICD-9-CM: 250.40, 585.9  8/2/2021 - Present        CHF exacerbation ([facility] [address] ICD-10-CM: I50.9  ICD-9-CM: 428.0  5/30/2021 - Present        Acute on chronic respiratory failure ([facility] ICD-10-CM: J96.20  ICD-9-CM: 518.84  5/30/2021 - Present        Mitral regurgitation ICD-10-CM: I34.0  ICD-9-CM: 424.0  11/12/2020 - Present        Ischemic cardiomyopathy ICD-10-CM: I25.5  ICD-9-CM: 414.8  9/30/2020 - Present        Chronic heart failure with reduced ejection fraction and diastolic dysfunction (HCC) ICD-10-CM: I50.42  ICD-9-CM: 428.42  9/30/2020 - Present        Sleep apnea treated with nocturnal BiPAP ICD-10-CM: G47.30  ICD-9-CM: 780.57  9/30/2020 - Present        Severe mitral regurgitation ICD-10-CM: I34.0  ICD-9-CM: 424.0  8/31/2020 - Present        Peripheral vascular disease ([facility] [address] ICD-10-CM: I73.9  ICD-9-CM: 443.9  2/25/2020 - Present        NYHA class 3 heart failure with reduced ejection fraction (HCC) ICD-10-CM: I50.20  ICD-9-CM: 428.9  1/30/2020 - Present        Hx of CABG ICD-10-CM: Z95.1  ICD-9-CM: V45.81  1/13/2020 - Present        ICD (implantable cardioverter-defibrillator) in place ICD-10-CM: Z95.810  ICD-9-CM: V45.02  1/13/2020 - Present        H/O laparoscopic adjustable gastric banding ICD-10-CM: Z98.84  ICD-9-CM: V45.86  1/13/2020 - Present        Obesity, morbid ([facility] [address] ICD-10-CM: E66.01  ICD-9-CM: 278.01  1/13/2020 - Present           Greater than 30 minutes were spent with the patient on counseling and coordination of care    Signed:   Maria Victoria Rizvi MD  11/21/2022  12:42 PM

## 2022-11-21 NOTE — PROGRESS NOTES
Problem: Falls - Risk of  Goal: *Absence of Falls  Description: Document Herman Saul Fall Risk and appropriate interventions in the flowsheet. Outcome: Progressing Towards Goal  Note: Fall Risk Interventions:  Mobility Interventions: Patient to call before getting OOB         Medication Interventions: Patient to call before getting OOB    Elimination Interventions: Call light in reach    History of Falls Interventions:  Investigate reason for fall

## 2022-11-21 NOTE — PROGRESS NOTES
[address] November 21, 2022       RE: Yasemin Pump      To Whom It May Concern,    This is to certify that Yasemin Pump may  return to work on 11/28/2022. Patient was hospitalized at [facility]. Please feel free to contact my office if you have any questions or concerns. Thank you for your assistance in this matter.       Sincerely,  Zachariah Guerra MD

## 2022-11-21 NOTE — PROGRESS NOTES
Transition Plan of Care  RUR 15%-Med  Disposition-plan to discharge home today. Therapy has cleared with no skilled needs. Baseline she is independent and lives in apartment with an elevator with her sister. Family will transport or CM can assist if needed. Medicare pt has received, reviewed, and signed 2nd IM letter informing them of their right to appeal the discharge. Signed copy has been placed on pt bedside chart.

## 2022-11-22 ENCOUNTER — PATIENT MESSAGE (OUTPATIENT)
Dept: CARDIOLOGY CLINIC | Age: 72
End: 2022-11-22

## 2022-11-22 NOTE — TELEPHONE ENCOUNTER
----- Message from Nikolai Paez NP sent at 11/22/2022 11:37 AM EST -----  Regarding: FW: After hospitalation   Bumex 1mg BID for now, we will see what her pressures are on Friday to see if we can resume her hydralazine and isordil. Please make sure the metolazone is off her list. She will need labs Friday in clinic. I called patient, confirmed with her above instructions. She states understanding, medication list updated. ----- Message -----  From: Laya Hernandez RN  Sent: 11/22/2022  11:31 AM EST  To: Nikolai Paez NP  Subject: FW: After hospitalation                          PAD today 23  ----- Message -----  From: Yamila Kellogg  Sent: 11/22/2022  11:11 AM EST  To: [facility] Nurses  Subject: After hospitalation                              Good morning  I sent a reading when I got home. Today's numbers  Wt 218.8 /74 P 75. I had another bout of lightheadedness and nausea lasted about 10-15 mins after I had bent my head down. This happened 2x last night    I also received a call from [facility] telling me that they received a call telling them to cancel the Rx for Bumetanide. Now I am confused about my BP and Fluid pills. I am Not taking metopolol, hydralazine, isosorbide,verquvo.  Pls advise  Thanks

## 2022-11-25 ENCOUNTER — OFFICE VISIT (OUTPATIENT)
Dept: CARDIOLOGY CLINIC | Age: 72
End: 2022-11-25
Payer: MEDICARE

## 2022-11-25 VITALS
BODY MASS INDEX: 36 KG/M2 | SYSTOLIC BLOOD PRESSURE: 108 MMHG | WEIGHT: 224 LBS | DIASTOLIC BLOOD PRESSURE: 64 MMHG | OXYGEN SATURATION: 97 % | RESPIRATION RATE: 16 BRPM | TEMPERATURE: 98 F | HEART RATE: 77 BPM | HEIGHT: 66 IN

## 2022-11-25 DIAGNOSIS — I50.42 CHRONIC HEART FAILURE WITH REDUCED EJECTION FRACTION AND DIASTOLIC DYSFUNCTION (HCC): Primary | ICD-10-CM

## 2022-11-25 DIAGNOSIS — R06.02 SHORTNESS OF BREATH: ICD-10-CM

## 2022-11-25 LAB
ALBUMIN SERPL-MCNC: 3.1 G/DL (ref 3.5–5)
ALBUMIN/GLOB SERPL: 0.9 {RATIO} (ref 1.1–2.2)
ALP SERPL-CCNC: 93 U/L (ref 45–117)
ALT SERPL-CCNC: 29 U/L (ref 12–78)
ANION GAP SERPL CALC-SCNC: 4 MMOL/L (ref 5–15)
AST SERPL-CCNC: 33 U/L (ref 15–37)
BILIRUB SERPL-MCNC: 0.5 MG/DL (ref 0.2–1)
BNP SERPL-MCNC: 638 PG/ML
BUN SERPL-MCNC: 28 MG/DL (ref 6–20)
BUN/CREAT SERPL: 15 (ref 12–20)
CALCIUM SERPL-MCNC: 10 MG/DL (ref 8.5–10.1)
CHLORIDE SERPL-SCNC: 103 MMOL/L (ref 97–108)
CO2 SERPL-SCNC: 28 MMOL/L (ref 21–32)
CREAT SERPL-MCNC: 1.89 MG/DL (ref 0.55–1.02)
GLOBULIN SER CALC-MCNC: 3.6 G/DL (ref 2–4)
GLUCOSE SERPL-MCNC: 161 MG/DL (ref 65–100)
MAGNESIUM SERPL-MCNC: 2 MG/DL (ref 1.6–2.4)
POTASSIUM SERPL-SCNC: 4 MMOL/L (ref 3.5–5.1)
PROT SERPL-MCNC: 6.7 G/DL (ref 6.4–8.2)
SODIUM SERPL-SCNC: 135 MMOL/L (ref 136–145)

## 2022-11-25 PROCEDURE — 1123F ACP DISCUSS/DSCN MKR DOCD: CPT | Performed by: INTERNAL MEDICINE

## 2022-11-25 PROCEDURE — G8427 DOCREV CUR MEDS BY ELIG CLIN: HCPCS | Performed by: INTERNAL MEDICINE

## 2022-11-25 PROCEDURE — G8536 NO DOC ELDER MAL SCRN: HCPCS | Performed by: INTERNAL MEDICINE

## 2022-11-25 PROCEDURE — G8432 DEP SCR NOT DOC, RNG: HCPCS | Performed by: INTERNAL MEDICINE

## 2022-11-25 PROCEDURE — G8399 PT W/DXA RESULTS DOCUMENT: HCPCS | Performed by: INTERNAL MEDICINE

## 2022-11-25 PROCEDURE — 3017F COLORECTAL CA SCREEN DOC REV: CPT | Performed by: INTERNAL MEDICINE

## 2022-11-25 PROCEDURE — 1101F PT FALLS ASSESS-DOCD LE1/YR: CPT | Performed by: INTERNAL MEDICINE

## 2022-11-25 PROCEDURE — 1090F PRES/ABSN URINE INCON ASSESS: CPT | Performed by: INTERNAL MEDICINE

## 2022-11-25 PROCEDURE — G8417 CALC BMI ABV UP PARAM F/U: HCPCS | Performed by: INTERNAL MEDICINE

## 2022-11-25 PROCEDURE — 94729 DIFFUSING CAPACITY: CPT | Performed by: INTERNAL MEDICINE

## 2022-11-25 PROCEDURE — 99215 OFFICE O/P EST HI 40 MIN: CPT | Performed by: INTERNAL MEDICINE

## 2022-11-25 PROCEDURE — G9899 SCRN MAM PERF RSLTS DOC: HCPCS | Performed by: INTERNAL MEDICINE

## 2022-11-25 PROCEDURE — 1111F DSCHRG MED/CURRENT MED MERGE: CPT | Performed by: INTERNAL MEDICINE

## 2022-11-25 RX ORDER — BUMETANIDE 1 MG/1
1 TABLET ORAL 2 TIMES DAILY
Qty: 60 TABLET | Refills: 1 | Status: SHIPPED | OUTPATIENT
Start: 2022-11-25

## 2022-11-25 RX ORDER — POTASSIUM CHLORIDE 750 MG/1
10 CAPSULE, EXTENDED RELEASE ORAL DAILY
Qty: 30 CAPSULE | Refills: 1 | Status: SHIPPED | OUTPATIENT
Start: 2022-11-25

## 2022-11-25 NOTE — PROGRESS NOTES
RN educated patient on home inotrope therapy per direction of Dr Dominic Almonte. Educated/demonstrated on home PICC care, medication bag changes, home health services, IV infusion company. Patient verbalized understanding. Patient given education packet \"getting started with infusion therapy\" to take home and review.

## 2022-11-25 NOTE — PATIENT INSTRUCTIONS
Medication changes:    No medication changes    Please take this to your pharmacy to notify them of the change in medications. Testing Ordered:    Labs performed in clinic today. Our office will notify you of any abnormal results requiring changes to your current plan of care. An order for pulmonary function test has been placed to be done as soon as possible. You will be receiving an automated call from Coordination of Care to schedule this test. If you are unavailable to receive the call or would like to contact coordination of care yourself you may contact [phone] to schedule. You will need to contact coordination of care yourself if you miss their calls as they will only make 3 attempts to reach you. Other Recommendations:     Please make appt with Dr Krysten Blanco [phone]    Please keep weight stable     Ensure your drinking an adequate amount of water with a goal of 6-8 eight ounce glasses (1.5-2 liters) of fluid daily. Your urine should be clear and light yellow straw colored. If your blood pressure begins to consistently run below 90/60 and/or you begin to experience dizziness or lightheadedness, please contact the [facility] at [phone]. Follow up in january with [facility]      Please monitor your weights daily upon waking and after using the bathroom. Keep a written records of your weights and bring to your next appointment. If you have a weight gain of 3 or more pounds overnight OR 5 or more pounds in one week please contact our office. Thank you for allowing us the privilege of being a part of your healthcare team! Please do not hesitate to contact our office at [phone] with any questions or concerns. Virtual Heart Failure [address] invites you to learn more about heart failure and to share your questions, ideas, and experiences with others.  Each month, the Heart Failure Support Group features a new educational topic and a guest speaker, followed by an interactive discussion. Our Heart Failure Nurse Navigator will moderate each session. You will be able to participate by phone, tablet or computer through [address] [place] [address]. This support group takes place on the 3rd Thursday of each month from 6:00-7:30PM. All individuals living with heart failure and their caregivers are welcome to join. If you are interested in participating, please contact us at [email or web address] and you will be sent the link to join the [employer].

## 2022-11-25 NOTE — PROGRESS NOTES
[facility] in [place], [facility] Note    Patient name: Yamila Kellogg  Patient : 1950  Patient MRN: [id number]  Date of service: 22    Primary care physician: Fernando Macdonald MD  Primary general cardiologist: Dr. 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, LVEF improved to 45% (by echo 2022) from 25-30% (by echo 2021), stage C, NYHA class IIIB, unable to tolerate GDMT due to hypotension with persistently elevated filling pressures by Cardiomems; RHC showed low CI 2.1 (2022)  Patient has quite debilitating symptoms and we attempted 6-8lbs diuresis at home with hope to improve distance walked; however, patient lost more than recommended 6-8 lbs and at 213lbs weight became orthostatic with rise of Cr (though still with elevated DPA 21) and admitted shortly to hospital to replete potassium and IVF  At this juncture, given little benefit or narrow therapeutic window of diuretic use to help with dyspnea, my recommendation would be to complete workup for other causes of functional decline: PFT/DLCO/bronchodilator study (and referral to pulmonary if abnormal) and to be seen by Dr. Alexandro Macedo to evaluate if patient needs additional ischemic workup +/- assessment of mitral valve clip.   If all workup complete and options exhausted; we can offer trial of palliative inotropes in-hospital; they may or may not be able to improve symptoms    PLAN:  Continue current medical therapy for heart failure  Intolerant to beta-blockers due to hypotension  Intolerant to ACEi/ARB/ARNi due to CKD  Intolerant to spironolactone due to hyperkalemia  Intolerant to jardiance due to CKD  Continue current dose of diuretics by nephrology, will try to keep weight 217 to 220lb  Keep K > 4 and Mag > 2; check labs today  Continue to monitor cardiomems; cannot go below DPA 22   Not on allopurinol or uloric, uric acid level 5  Continue ASA and plavix   Statin or PCSK9i: Continue current dose of atorvastatin and zetia  Continue CPAP therapy  ICD interrogation every 3 months per routine  Schedule PFT/DLCO with bronchodilator study  Reinforced low salt diet  Reinforced fluid restriction to 6 x 8oz glasses per day  Advanced care plan present on file  Recommended daily walking at least 30-45 minutes divided in 20 min sessions to tolerance  Follow-up with primary cardiologist, Dr. Neetu Elliott  Follow-up with EP cardiologist  Recommend flu, covid and pneumonia vaccinations  Return to [address] in January 2023 with NP/MD    IMPRESSION:  Fatigue  Shortness of breath  Chronic systolic heart failure  Stage D, NYHA class IIIB symptoms (6MW 205m)  Ischemic cardiomyopathy, LVEF 45% (by echo 8/2022)  Invitae with VUS  CAD s/p CABG 1997 s/p PCI to DVG-RCA 2016  H/o severe MR s/p mitral clip in 2020  HTN  H/o VT s/p AICD  WES on Bipap  T2DM with neuropathy  Hypothyroidism  Obesity- Body mass index is 35.14 kg/m². HLD  Osteoarthritis   CKD4       CARDIAC IMAGING:  Echo 8/2022  LVEF 45%, mild MR, LVIDd 5.54cm     Echo (8/19/21)  LV: Estimated LVEF is 30 - 35%. Mildly dilated left ventricle. Mild concentric hypertrophy. Moderately reduced systolic function. Moderate (grade 2) left ventricular diastolic dysfunction. Previously placed mitraclip is noted in secure position with residual trace to mild MR lateral to clip placement. Mean transmitral gradient is 2.6mmHg at heart rate of about 70 bpm.  LA: Moderately dilated left atrium. MV: Mild mitral valve regurgitation is present. LA: Severely dilated left atrium. RA: Mildly dilated right atrium. Echo (5/31/21)  LV: Estimated LVEF is 25 - 30%. Severely dilated left ventricle. Mild concentric hypertrophy. Severely and globally reduced systolic function. Inconclusive left ventricular diastolic function. LA: Moderately dilated left atrium. RA: Mildly dilated right atrium. MV: Moderate mitral valve regurgitation is present. Image quality for this study was suboptimal.     6 Min Walk Report 11/11/2022 6/3/2021 4/23/2021 12/28/2020 12/22/2020 8/31/2020 7/1/2020   (PRE) HR 69 70 70 71 73 81 71   (PRE) O2 Sat 98 98 - - - 96 -   (POST)  92 - - - 110 -   (POST) O2 Sat 93 99 - 97 - 97 97   Distance in Meters 205.74 91.44 - - - 226.77 -          HISTORY OF PRESENT ILLNESS:  I had the pleasure of seeing Kristen Stephenson in [facility] at [facility] in [place]. Briefly, Kristen Stephenson is a 74 y.o. female with h/o HTN, HLD, WES, Obesity (BMI 39), s/p gastric sleeve in 2011, T2DM, hypothyroidism, COPD, CAD s/p CABG in 1997, s/p PCI to [address] in 5/2015, ICM, VT, s/p AICD ([employer]), anxiety and depression. She more recently underwent MitraClip on 11/12/2020 and was evaluated for upgrade to [address] with Dr. Urbano Oglesby, however this was not done as her QRS was too narrow. Her most recent admission was 5/30/21-Repeat TTE from August shows an EF of 45%. She has been doing PT for her knee and can do household work but otherwise does not do much. She is compliant with her medications and her cardiomems readings. Patient continued to have quite debilitating symptoms and we attempted 6-8lbs diuresis at home with hope to improve distance walked; however, patient lost more than recommended 6-8 lbs and at 213lbs weight became orthostatic with rise of Cr (though still with elevated DPA 21) and admitted shortly to hospital with fall and hypokalemia to replete potassium and IVF. LIFE GOALS:  Lifestyle goals discussed with the patient. INTERVAL HISTORY:  Today, patient presents for routine clinic visit. Patient is doing poorly.       Patient walked to our clinic from parking garage slowly and had to stop. She can do very little at home, constantly tired and short of breath. Does not use stairs. Patient still can perform home activities but has to rest.      Patient has noticed ongoing volume overload and at least 1+ BLE leg edema. Patient denies abdominal bloating or change of appetite. Patient's weight remains stable. Patient denies orthopnea, PND or nocturia. Patient denies irregular heart rate or palpitations. No presyncope or syncope. ICD has not fired. Patient denies other cardiac symptoms such as chest pain or leg pain with walking. Patient is compliant with fluid restriction and taking medications as prescribed. Patient manages his own medications. PHYSICAL EXAM:  Visit Vitals  /64 (BP 1 Location: Left upper arm, BP Patient Position: Sitting, BP Cuff Size: Large adult)   Pulse 77   Temp 98 °F (36.7 °C) (Oral)   Resp 16   Ht 5' 6\" (1.676 m)   Wt 224 lb (101.6 kg)   SpO2 97%   BMI 36.15 kg/m²     General: Patient is well developed, well-nourished in no acute distress  HEENT: Unremarkable   Neck: Supple. No evidence of thyroid enlargements or lymphadenopathy. JVD: Cannot be appreciated   Lungs: Breath sounds are equal and clear bilaterally. No wheezes, rhonchi, or rales. Heart: Regular rate and rhythm with normal S1 and S2. No murmurs, gallops or rubs. Abdomen: Soft, no mass or tenderness. No organomegaly or hernia. Bowel sounds present. Genitourinary and rectal: deferred  Extremities: No cyanosis, clubbing, has 1+ BLE edema. Neurologic: No focal sensory or motor deficits are noted. Grossly intact. Psychiatric: Awake, alert an doriented x 3. Appropriate mood and affect. Skin: Warm, dry and well perfused. REVIEW OF SYSTEMS:  General: Denies fever.   Ear, nose and throat: Denies difficulty hearing, sinus problems, nosebleeds  Cardiovascular: see above in the interval history  Respiratory: Denies cough, wheezing, sputum production, hemoptysis.   Gastrointestinal: Denies heartburn, constipation, diarrhea, abdominal pain, nausea, blood in stool  Kidney and bladder: Denies painful urination, frequent urination  Musculoskeletal: Denies joint pain, muscle weakness  Skin and hair: Denies change in existing skin lesions    PAST MEDICAL HISTORY:  Past Medical History:   Diagnosis Date    Adverse effect of anesthesia     hard to wake up/uses BIPAP/\"try to avoid general if possible\"/intubated in past prior to going to sleep and it caused pt to be incontinent    Arthritis     Asthma     CAD (coronary artery disease)     Chronic kidney disease     elevataed creatinine    Chronic obstructive pulmonary disease (HCC)     Chronic pain     arthritis    Coagulation disorder ([address]     on plavix    Congestive heart failure (HCC)     Diabetes ([address]     Hypertension     Morbid obesity (HCC)     Sleep apnea     BIPAP with 2 liters oxygen    Thromboembolus ([address]     after heart surgery - left leg    Thyroid disease        PAST SURGICAL HISTORY:  Past Surgical History:   Procedure Laterality Date    COLONOSCOPY N/A 2020    COLONOSCOPY   :- performed by Anne Norris MD at [address] [place] [address]  2105    knee - right    HX  SECTION      x3    HX CORONARY ARTERY BYPASS GRAFT  1997    3 vessels    HX CORONARY STENT PLACEMENT  2016    HX HERNIA REPAIR  1988    HX IMPLANTABLE CARDIOVERTER DEFIBRILLATOR      HX KNEE REPLACEMENT Right 2015    once    RI CARDIAC SURG PROCEDURE UNLIST  2018    cardiac cath - Left    RI LAP GASTRIC BYPASS/KERLINE-EN-Y  2011    lap band per pt and not a gastric bypass       FAMILY HISTORY:  Family History   Problem Relation Age of Onset    Hypertension Mother     Dementia Mother     Coronary Art Dis Father 58    Sudden Death Father 58    Diabetes Son     Diabetes Brother        SOCIAL HISTORY:  Social History     Socioeconomic History    Marital status:    Tobacco Use    Smoking status: Former     Packs/day: 0.50     Years: 45.00     Pack years: 22.50     Types: Cigarettes     Quit date: 2010     Years since quittin.9    Smokeless tobacco: Never    Tobacco comments:     quit /started again (smoked 3 yrs) off and on/none since    Vaping Use    Vaping Use: Never used   Substance and Sexual Activity    Alcohol use: Yes     Comment: Once every 6 months/ Special occasionans    Drug use: Not Currently    Sexual activity: Not Currently       LABORATORY RESULTS:  Labs Latest Ref Rng & Units 2022   WBC 3.6 - 11.0 K/uL 7.5 7.5 7.3 - 7.6 - -   RBC 3.80 - 5.20 M/uL 3.90 3.57(L) 4.05 - 4.16 - -   Hemoglobin 11.5 - 16.0 g/dL 12.3 11. 4(L) 13.0 - 13.3 - -   Hematocrit 35.0 - 47.0 % 37.5 34. 2(L) 39.1 - 37.1 - -   MCV 80.0 - 99.0 FL 96.2 95.8 96.5 - 89.2 - -   Platelets 409 - 141 K/uL 225 221 238 - 252 - -   Lymphocytes 12 - 49 % 37 34 30 - 39 - -   Monocytes 5 - 13 % 9 10 11 - 13 - -   Eosinophils 0 - 7 % 5 5 5 - 6 - -   Basophils 0 - 1 % 1 0 0 - 1 - -   Albumin 3.5 - 5.0 g/dL - - - - - - -   Calcium 8.5 - 10.1 MG/DL 10. 2(H) 9.5 10.0 - 10. 4(H) - -   Glucose 65 - 100 mg/dL 124(H) 128(H) 172(H) - 90 - -   BUN 6 - 20 MG/DL 34(H) 39(H) 47(H) - 51(H) - -   Creatinine 0.55 - 1.02 MG/DL 1.80(H) 1.76(H) 2.08(H) - 2.21(H) - -   Sodium 136 - 145 mmol/L 139 139 138 - 136 - -   Potassium 3.5 - 5.1 mmol/L 3.8 5.0 3.2(L) 3.4(L) 2. 6(LL) 2. 8(L) 2. 7(LL)   TSH 0.36 - 3.74 uIU/mL - - - - - 1.68 -   Some recent data might be hidden       ALLERGY:  Allergies   Allergen Reactions    Crestor [Rosuvastatin] Other (comments)     Causes muscle cramps    Lisinopril Cough    Nsaids (Non-Steroidal Anti-Inflammatory Drug) Other (comments)     Liver and Kidney        CURRENT MEDICATIONS:    Current Outpatient Medications:     bumetanide (BUMEX) 1 mg tablet, Take 1 Tablet by mouth two (2) times a day for 30 days. , Disp: 60 Tablet, Rfl: 0    potassium chloride SA (MICRO-K) 10 mEq capsule, Take 1 Capsule by mouth daily for 30 days. , Disp: 30 Capsule, Rfl: 0    clotrimazole-betamethasone (LOTRISONE) topical cream, Apply  to affected area two (2) times a day., Disp: 30 g, Rfl: 0    tiZANidine (ZANAFLEX) 2 mg tablet, TAKE 1 TABLET BY MOUTH TWICE DAILY AS NEEDED FOR MUSCLE RELAXANT, Disp: , Rfl:     semaglutide (OZEMPIC) 1 mg/dose (2 mg/1.5 mL) sub-q pen, 1 mg by SubCUTAneous route every seven (7) days. Once weekly, Disp: , Rfl:     NovoLIN 70-30 FlexPen U-100 100 unit/mL (70-30) inpn, 18 units before breakfast and 14 units at dinner, Disp: , Rfl:     gabapentin (NEURONTIN) 100 mg capsule, TAKE 2 CAPSULES BY MOUTH TWICE DAILY MAX  DAILY  AMOUNT  400MG, Disp: 120 Capsule, Rfl: 0    ezetimibe (ZETIA) 10 mg tablet, Take 1 Tablet by mouth daily. , Disp: 30 Tablet, Rfl: 1    clopidogreL (PLAVIX) 75 mg tab, Take 1 tablet by mouth once daily, Disp: 90 Tablet, Rfl: 0    Insulin Needles, Disposable, 32 gauge x 5/32\" ndle, 1 Each by Does Not Apply route daily. , Disp: 100 Pen Needle, Rfl: 2    alcohol swabs padm, 1 Each by Apply Externally route daily. , Disp: 100 Pad, Rfl: 11    sertraline (ZOLOFT) 50 mg tablet, 50 mg daily. , Disp: , Rfl:     ketoconazole (NIZORAL) 2 % shampoo, , Disp: , Rfl:     levothyroxine (Euthyrox) 100 mcg tablet, Take 1 Tablet by mouth Daily (before breakfast). , Disp: 90 Tablet, Rfl: 0    FreeStyle Lancets 28 gauge misc, USE AS DIRECTED, Disp: , Rfl:     nitroglycerin (NITROSTAT) 0.3 mg SL tablet, 1 Tablet by SubLINGual route every five (5) minutes as needed for Chest Pain., Disp: 1 Bottle, Rfl: 0    Blood-Glucose Meter monitoring kit, Check blood sugar daily. May substitute for insurance preferred meter, Disp: 1 Kit, Rfl: 0    glucose blood VI test strips (blood glucose test) strip, Check blood sugar 2 times daily: E11.9 may substitute for insurance preferred strips. , Disp: 200 Strip, Rfl: 3    lancets misc, Use as directed. Dx:check sugar once daily. E11.65, Disp: 1 Each, Rfl: 11    atorvastatin (LIPITOR) 80 mg tablet, TAKE 1 TABLET BY MOUTH AT BEDTIME, Disp: 90 Tab, Rfl: 3    lancets misc, Check blood sugar 2 times daily. May substitute for insurance preferred lancets. , Disp: 200 Each, Rfl: 3    glucose blood VI test strips (ASCENSIA AUTODISC VI, ONE TOUCH ULTRA TEST VI) strip, E11.65 check sugar once daily, Disp: 100 Strip, Rfl: 0    Blood-Glucose Meter monitoring kit, Use as directed. Dx: E11.65 check sugar twice daily, Disp: 1 Kit, Rfl: 0    albuterol (PROVENTIL HFA, VENTOLIN HFA, PROAIR HFA) 90 mcg/actuation inhaler, Take 2 Puffs by inhalation every four (4) hours as needed. , Disp: , Rfl:     acetaminophen (TYLENOL) 650 mg TbER, Take 1,300 mg by mouth two (2) times a day., Disp: , Rfl:     aspirin 81 mg chewable tablet, Take 81 mg by mouth daily. , Disp: , Rfl:     Thank you for your referral and allowing me to participate in this patient's care.     Rajat Sosa MD PhD  [address] [place] [address], [address]  Phone: [phone]  Fax: [phone]    PATIENT CARE TEAM:  Patient Care Team:  Fernando Macdonald MD as PCP - General  Agnes Jessica MD (Cardiovascular Disease Physician)  Justyna Latham MD (Gastroenterology)  Tristan Manning MD as Consulting Provider (Cardiovascular Disease Physician)  Redd Jara MD (Nephrology)     Total visit time: 45 minutes  (> 50% spent face-to-face counseling) To get better and follow your care plan as instructed.

## 2022-11-28 ENCOUNTER — OFFICE VISIT (OUTPATIENT)
Dept: CARDIOLOGY CLINIC | Age: 72
End: 2022-11-28

## 2022-11-28 DIAGNOSIS — I50.42 CHRONIC HEART FAILURE WITH REDUCED EJECTION FRACTION AND DIASTOLIC DYSFUNCTION (HCC): Primary | ICD-10-CM

## 2022-12-05 ENCOUNTER — OFFICE VISIT (OUTPATIENT)
Dept: CARDIOLOGY CLINIC | Age: 72
End: 2022-12-05

## 2022-12-05 ENCOUNTER — HOSPITAL ENCOUNTER (OUTPATIENT)
Dept: PULMONOLOGY | Age: 72
Discharge: HOME | End: 2022-12-05
Attending: INTERNAL MEDICINE
Payer: MEDICARE

## 2022-12-05 DIAGNOSIS — R06.02 SHORTNESS OF BREATH: ICD-10-CM

## 2022-12-05 DIAGNOSIS — I50.42 CHRONIC HEART FAILURE WITH REDUCED EJECTION FRACTION AND DIASTOLIC DYSFUNCTION (HCC): Primary | ICD-10-CM

## 2022-12-05 DIAGNOSIS — I50.42 CHRONIC HEART FAILURE WITH REDUCED EJECTION FRACTION AND DIASTOLIC DYSFUNCTION (HCC): ICD-10-CM

## 2022-12-05 PROCEDURE — 94010 BREATHING CAPACITY TEST: CPT

## 2022-12-05 PROCEDURE — 94729 DIFFUSING CAPACITY: CPT

## 2022-12-06 ENCOUNTER — TELEPHONE (OUTPATIENT)
Dept: CARDIOLOGY CLINIC | Age: 72
End: 2022-12-06

## 2022-12-06 NOTE — TELEPHONE ENCOUNTER
----- Message from Payam Severino NP sent at 12/6/2022 12:12 PM EST -----  Regarding: FW: 12/6  Extra dose of bumex (2mg in am) tomorrow, her weight gain is     217-220, but if she continues to be dizzy tomorrow then just keep her on the 1mg BID. I called patient and reviewed above information. She states understanding.   ----- Message -----  From: Edwin Brown RN  Sent: 12/6/2022  11:55 AM EST  To: Payam Severino NP  Subject: FW: 12/6                                         PAD is up to 36 today, please advise.    ----- Message -----  From: Almaz Scruggs  Sent: 12/6/2022  10:56 AM EST  To: [facility] Nurses  Subject: 12/6                                             Weekend numbers were about the same. My weight has been 221-223 my BP has been around the same 104/60-74. I have not  had any really BP or have been dizzy until today   My numbers. today  are BS 95 BP ,,80/48 at [facility] care office appt. Just a little dizzy but not very much. They took labs today. I am now at work until 2:30 today. I am drinking water and I feel fine. I go back to see Primary in 2 wks. Had PFT'S YEST. NEPH appt on 12/15. Waiting to hear back from Dr Sarah Faustin office about an appt.

## 2022-12-08 ENCOUNTER — TELEPHONE (OUTPATIENT)
Dept: CARDIOLOGY CLINIC | Age: 72
End: 2022-12-08

## 2022-12-08 NOTE — TELEPHONE ENCOUNTER
Patient requested appointment with Dr Jordyn Matt. Called and left message and gave her first available appointment.      Future Appointments   Date Time Provider [place]   [facility]12[facility]20[facility]  [facility]   [facility]  [facility]   1/2/2023  [facility]   [facility]  [facility]   1/11/2023  2:20 PM MD HARLEY Mabry BS AMB   1/16/2023  8[facility]   1/23/2023  8[facility]   1/25/2023  2:00 PM Andrae Quijano MD [facility] BS AMB   3/1/2023 10:15 AM REMOTE1RUDY BS AMB   4/26/2023 10:00 AM ECHORUDY BS AMB   4/26/2023 10:40 AM MD HARLEY Mabry BS AMB   6/5/2023  8:30 AM REMOTE1RUDY BS AMB   9/21/2023 11:00 AM PACEMAKER3RUDY BS AMB   9/21/2023 11:20 AM MD HARLEY Nicholson BS AMB       [facility]

## 2022-12-09 ENCOUNTER — TELEPHONE (OUTPATIENT)
Dept: CARDIOLOGY CLINIC | Age: 72
End: 2022-12-09

## 2022-12-09 DIAGNOSIS — R06.02 SHORTNESS OF BREATH: ICD-10-CM

## 2022-12-09 DIAGNOSIS — I50.42 CHRONIC HEART FAILURE WITH REDUCED EJECTION FRACTION AND DIASTOLIC DYSFUNCTION (HCC): Primary | ICD-10-CM

## 2022-12-09 DIAGNOSIS — G47.30 SLEEP APNEA TREATED WITH NOCTURNAL BIPAP: ICD-10-CM

## 2022-12-09 NOTE — TELEPHONE ENCOUNTER
Referral placed to Pulmonary Disease for abnormal PFTs per request of CHIKA Mercado. Patient notified.

## 2022-12-12 ENCOUNTER — OFFICE VISIT (OUTPATIENT)
Dept: CARDIOLOGY CLINIC | Age: 72
End: 2022-12-12

## 2022-12-12 DIAGNOSIS — I50.42 CHRONIC HEART FAILURE WITH REDUCED EJECTION FRACTION AND DIASTOLIC DYSFUNCTION (HCC): Primary | ICD-10-CM

## 2022-12-19 ENCOUNTER — OFFICE VISIT (OUTPATIENT)
Dept: CARDIOLOGY CLINIC | Age: 72
End: 2022-12-19
Payer: MEDICARE

## 2022-12-19 DIAGNOSIS — I50.42 CHRONIC HEART FAILURE WITH REDUCED EJECTION FRACTION AND DIASTOLIC DYSFUNCTION (HCC): Primary | ICD-10-CM

## 2022-12-19 PROCEDURE — 93264 REM MNTR WRLS P-ART PRS SNR: CPT | Performed by: INTERNAL MEDICINE

## 2023-01-02 ENCOUNTER — OFFICE VISIT (OUTPATIENT)
Dept: CARDIOLOGY CLINIC | Age: 73
End: 2023-01-02

## 2023-01-02 DIAGNOSIS — I50.42 CHRONIC HEART FAILURE WITH REDUCED EJECTION FRACTION AND DIASTOLIC DYSFUNCTION (HCC): Primary | ICD-10-CM

## 2023-01-06 ENCOUNTER — TELEPHONE (OUTPATIENT)
Dept: CARDIOLOGY CLINIC | Age: 73
End: 2023-01-06

## 2023-01-06 RX ORDER — BUDESONIDE AND FORMOTEROL FUMARATE DIHYDRATE 160; 4.5 UG/1; UG/1
2 AEROSOL RESPIRATORY (INHALATION)
COMMUNITY

## 2023-01-06 NOTE — TELEPHONE ENCOUNTER
Patient called, stating she is having trouble with cardiomems pillow, cord needs to be replaced, I gave patient number to [employer] tech support, she will call today. Patient states she is overall feeling well. Taking bumex 2 mg twice daily.    1/2: weight 219 lb, /78, HR 76  1/3: weight 220 lb, /82, HR 76  1/4 weight 218 lb, /84, HR 74  1/5: weight 217.6 lb  1/6: weight 217.4 lb, /74, HR 73

## 2023-01-09 ENCOUNTER — OFFICE VISIT (OUTPATIENT)
Dept: CARDIOLOGY CLINIC | Age: 73
End: 2023-01-09

## 2023-01-09 DIAGNOSIS — I50.42 CHRONIC HEART FAILURE WITH REDUCED EJECTION FRACTION AND DIASTOLIC DYSFUNCTION (HCC): Primary | ICD-10-CM

## 2023-01-11 ENCOUNTER — OFFICE VISIT (OUTPATIENT)
Dept: CARDIOLOGY CLINIC | Age: 73
End: 2023-01-11
Payer: MEDICARE

## 2023-01-11 VITALS
HEIGHT: 66 IN | SYSTOLIC BLOOD PRESSURE: 134 MMHG | WEIGHT: 219 LBS | RESPIRATION RATE: 16 BRPM | OXYGEN SATURATION: 97 % | DIASTOLIC BLOOD PRESSURE: 72 MMHG | BODY MASS INDEX: 35.2 KG/M2 | HEART RATE: 87 BPM

## 2023-01-11 DIAGNOSIS — I34.0 NONRHEUMATIC MITRAL VALVE REGURGITATION: ICD-10-CM

## 2023-01-11 DIAGNOSIS — Z95.818 S/P MITRAL VALVE CLIP IMPLANTATION: Primary | ICD-10-CM

## 2023-01-11 DIAGNOSIS — I50.42 CHRONIC HEART FAILURE WITH REDUCED EJECTION FRACTION AND DIASTOLIC DYSFUNCTION (HCC): ICD-10-CM

## 2023-01-11 DIAGNOSIS — Z98.890 S/P MITRAL VALVE CLIP IMPLANTATION: Primary | ICD-10-CM

## 2023-01-11 DIAGNOSIS — E66.01 SEVERE OBESITY (BMI 35.0-39.9) WITH COMORBIDITY (HCC): ICD-10-CM

## 2023-01-16 ENCOUNTER — OFFICE VISIT (OUTPATIENT)
Dept: CARDIOLOGY CLINIC | Age: 73
End: 2023-01-16

## 2023-01-16 DIAGNOSIS — I50.42 CHRONIC HEART FAILURE WITH REDUCED EJECTION FRACTION AND DIASTOLIC DYSFUNCTION (HCC): Primary | ICD-10-CM

## 2023-01-16 NOTE — PROGRESS NOTES
[facility] [place]: Arloa Fraction  [phone]          Cardiology valvular heart disease consult/Progress Note      Requesting/referring provider: Other, Phys, aJkob Malhotra  Reason for Consult: Mitral valve disease    Assessment/Plan:  1. Coronary disease manifested in the form of prior inferior MI, status post coronary bypass grafting with patent LIMA to LAD and vein graft to RCA and chronically occluded left circumflex with collaterals  2. Severe LV systolic dysfunction/heart failure reduced ejection fraction acute on chronic likely secondary to 1 with progressive adverse LV remodeling  3. Severe mitral regurgitation appears functional in nature secondary to progressive adverse LV remodeling and possible papillary muscle dysfunction from posterior myocardial infarction--status post MitraClip NT W placement in November 2020  4. Severe NYHA class III symptomatic limitations  5. History of lower extremity thromboembolism post coronary bypass grafting  6. CKD with baseline creatinine about 2  7. Morbid obesity with suspected sleep apnea    Ms. Roxanne Bradley was seen in followup status post MitraClip placement. She is generally done well since device placement. Recently in the past year she had CardioMEMS placement as well which is helping immensely optimizing her volume status. Lately with weight loss she is doing even better. Currently seems to be quite well optimized. Recommend continuation of Bumex in current dose. Continue weight loss therapies. Does not have a significant burden of residual coronary artery disease. May continue lipid therapy with atorvastatin and ezetimibe as currently well-tolerated. Plavix in my opinion may be discontinued. She has residual mild to moderate mitral regurgitation which should not impact her hemodynamically. And was ultimately recommended.     []    High complexity decision making was performed  []    Patient is at high-risk of decompensation with multiple organ involvement      Investigations personally reviewed by me  Cardiac catheterization February 2020: Severe native three-vessel disease. Patent LIMA to LAD and vein graft to RCA. Right PDA small diffusely diseased vessel. Left circumflex is chronically occluded and branches are filling by collaterals. Small diagonal is severely diffusely diseased. ECG January 2020: Sinus rhythm, prior inferior infarct, narrow QRS complex, single PVC noted. Echo June 2020: Severe LV systolic dysfunction. Overall EF about 30%. Global hypokinesis. Mid and basal inferolateral walls appear to be severely hypokinetic with possible prior infarct based on hyperechoic nature of this wall. Based on color Doppler it appears to be significant mitral regurgitation which is predominantly central with some degree of posteriorly directed nature of the jet. Mechanism of mitral regurgitation appears to be annular dilatation and lack of Mal coaptation secondary to predominantly posterior leaflet tethering. Moderate pulmonary hypertension is noted. Echocardiogram December 2020: Status post MitraClip, mitral regurgitation reduced from severe to mild to moderate. Clip seen expected position and secured. Overall LV systolic function about 25 to 30%. Moderate biatrial enlargement. Inferolateral wall appears to be severely hypokinetic and the rest of the areas are moderate ly hypokinetic.mean transmitral gradient was 3 mmHg  Outside records reviewed including echocardiogram performed by [facility] in August 2022 which showed overall mildly reduced ejection fraction which was reported as 45% with normal functioning of MitraClip device and significant left atrial enlargement. HPI: Celeste Peres, a 74y.o. year-old who presents for evaluation of mitral valve disease .70y. o.female with PMHx of CAD with CAB x 3 in 1997 then subsequent stents X 4, HTN, HLD, palpitations, ICD 2007, DM, hypothyroid, COPD, WES-wears BiPAP, CKD, DVT  left leg, right TKR, Lap band . Ms. Kyleigh Hackett was previously seen by me for underlying ischemic cardiomyopathy and prior inferior myocardial infarction. She is status post coronary bypass grafting. A cardiac catheterization was performed in 2020 demonstrated patent LIMA to LAD and vein graft to RCA with collateral dependent circumflex and native right PDA. She has history of coronary disease as well as secondary mitral regurgitation for which she underwent a MitraClip procedure in 2020. She is also status post CardioMEMS. Her heart failure therapy is predominantly followed by advanced heart failure clinic and she currently seems to be doing well. She is also inclined towards weight loss and initial attempts at weight loss have been helping her functional tolerance quite significantly. She  has a past medical history of Adverse effect of anesthesia, Arthritis, Asthma, CAD (coronary artery disease), Chronic kidney disease, Chronic obstructive pulmonary disease ([address] Chronic pain, Coagulation disorder ([address] Congestive heart failure ([address] Diabetes ([address] Hypertension, Morbid obesity ([address] Sleep apnea (), Thromboembolus ([address] and Thyroid disease. Review of system:Patient reports no PND/Orthpnea/CP. sHe reports no cough/fever/focal neurological deficits/abdominal pain. All other systems negative except as above.    Family History   Problem Relation Age of Onset    Hypertension Mother     Dementia Mother     Coronary Art Dis Father 58    Sudden Death Father 58    Diabetes Son     Diabetes Brother       Social History     Socioeconomic History    Marital status:    Tobacco Use    Smoking status: Former     Packs/day: 0.50     Years: 45.00     Pack years: 22.50     Types: Cigarettes     Quit date: 2010     Years since quittin.0    Smokeless tobacco: Never    Tobacco comments:     quit /started again (smoked 3 yrs) off and on/none since 2012   Vaping Use    Vaping Use: Never used   Substance and Sexual Activity    Alcohol use: Yes     Comment: Once every 6 months/ Special occasionans    Drug use: Not Currently    Sexual activity: Not Currently      PE  Vitals:    01/11/23 1544   BP: 134/72   Pulse: 87   Resp: 16   SpO2: 97%   Weight: 219 lb (99.3 kg)   Height: 5' 6\" (1.676 m)    Body mass index is 35.35 kg/m².     Recent Labs:  Lab Results   Component Value Date/Time    Cholesterol, total 144 06/02/2021 03:51 AM    HDL Cholesterol 72 06/02/2021 03:51 AM    LDL, calculated 55.8 06/02/2021 03:51 AM    Triglyceride 81 06/02/2021 03:51 AM    CHOL/HDL Ratio 2.0 06/02/2021 03:51 AM     Lab Results   Component Value Date/Time    Creatinine 1.89 (H) 11/25/2022 11:00 AM     Lab Results   Component Value Date/Time    BUN 28 (H) 11/25/2022 11:00 AM     Lab Results   Component Value Date/Time    Potassium 4.0 11/25/2022 11:00 AM     Lab Results   Component Value Date/Time    Hemoglobin A1c 7.3 (H) 05/31/2021 04:23 AM     Lab Results   Component Value Date/Time    HGB 12.3 11/21/2022 03:38 AM     Lab Results   Component Value Date/Time    PLATELET 957 43/16/9053 03:38 AM       Reviewed:  Past Medical History:   Diagnosis Date    Adverse effect of anesthesia     hard to wake up/uses BIPAP/\"try to avoid general if possible\"/intubated in past prior to going to sleep and it caused pt to be incontinent    Arthritis     Asthma     CAD (coronary artery disease)     Chronic kidney disease     elevataed creatinine    Chronic obstructive pulmonary disease (HCC)     Chronic pain     arthritis    Coagulation disorder ([address]     on plavix    Congestive heart failure (HCC)     Diabetes ([address]     Hypertension     Morbid obesity (HCC)     Sleep apnea 1996    BIPAP with 2 liters oxygen    Thromboembolus ([address]     after heart surgery - left leg    Thyroid disease      Social History     Tobacco Use   Smoking Status Former    Packs/day: 0.50    Years: 45.00    Pack years: 22.50 Types: Cigarettes    Quit date: 2010    Years since quittin.0   Smokeless Tobacco Never   Tobacco Comments    quit /started again (smoked 3 yrs) off and on/none since      Social History     Substance and Sexual Activity   Alcohol Use Yes    Comment: Once every 6 months/ Special occasionans     Allergies   Allergen Reactions    Crestor [Rosuvastatin] Other (comments)     Causes muscle cramps    Lisinopril Cough    Nsaids (Non-Steroidal Anti-Inflammatory Drug) Other (comments)     Liver and Kidney     Family History   Problem Relation Age of Onset    Hypertension Mother     Dementia Mother     Coronary Art Dis Father 58    Sudden Death Father 58    Diabetes Son     Diabetes Brother         Current Outpatient Medications   Medication Sig    budesonide-formoteroL (SYMBICORT) 160-4.5 mcg/actuation HFAA Take 2 Puffs by inhalation. bumetanide (BUMEX) 1 mg tablet Take 1 Tablet by mouth two (2) times a day. potassium chloride SA (MICRO-K) 10 mEq capsule Take 1 Capsule by mouth daily. clotrimazole-betamethasone (LOTRISONE) topical cream Apply  to affected area two (2) times a day. tiZANidine (ZANAFLEX) 2 mg tablet TAKE 1 TABLET BY MOUTH TWICE DAILY AS NEEDED FOR MUSCLE RELAXANT    semaglutide (OZEMPIC) 1 mg/dose (2 mg/1.5 mL) sub-q pen 1 mg by SubCUTAneous route every seven (7) days. Once weekly    NovoLIN 70-30 FlexPen U-100 100 unit/mL (70-30) inpn 18 units before breakfast and 14 units at dinner    gabapentin (NEURONTIN) 100 mg capsule TAKE 2 CAPSULES BY MOUTH TWICE DAILY MAX  DAILY  AMOUNT  400MG    ezetimibe (ZETIA) 10 mg tablet Take 1 Tablet by mouth daily. clopidogreL (PLAVIX) 75 mg tab Take 1 tablet by mouth once daily    Insulin Needles, Disposable, 32 gauge x \" ndle 1 Each by Does Not Apply route daily. alcohol swabs padm 1 Each by Apply Externally route daily. sertraline (ZOLOFT) 50 mg tablet 50 mg daily.     ketoconazole (NIZORAL) 2 % shampoo     levothyroxine (Euthyrox) 100 mcg tablet Take 1 Tablet by mouth Daily (before breakfast). FreeStyle Lancets 28 gauge misc USE AS DIRECTED    nitroglycerin (NITROSTAT) 0.3 mg SL tablet 1 Tablet by SubLINGual route every five (5) minutes as needed for Chest Pain. Blood-Glucose Meter monitoring kit Check blood sugar daily. May substitute for insurance preferred meter    glucose blood VI test strips (blood glucose test) strip Check blood sugar 2 times daily: E11.9 may substitute for insurance preferred strips. lancets misc Use as directed. Dx:check sugar once daily. E11.65    atorvastatin (LIPITOR) 80 mg tablet TAKE 1 TABLET BY MOUTH AT BEDTIME    lancets misc Check blood sugar 2 times daily. May substitute for insurance preferred lancets. glucose blood VI test strips (ASCENSIA AUTODISC VI, ONE TOUCH ULTRA TEST VI) strip E11.65 check sugar once daily    Blood-Glucose Meter monitoring kit Use as directed. Dx: E11.65 check sugar twice daily    albuterol (PROVENTIL HFA, VENTOLIN HFA, PROAIR HFA) 90 mcg/actuation inhaler Take 2 Puffs by inhalation every four (4) hours as needed. acetaminophen (TYLENOL) 650 mg TbER Take 1,300 mg by mouth two (2) times a day. aspirin 81 mg chewable tablet Take 81 mg by mouth daily. No current facility-administered medications for this visit. /72 (BP 1 Location: Left upper arm, BP Patient Position: Sitting, BP Cuff Size: Large adult)   Pulse 87   Resp 16   Ht 5' 6\" (1.676 m)   Wt 219 lb (99.3 kg)   SpO2 97%   BMI 35.35 kg/m²   General:    Alert, cooperative, no distress. Psychiatric:    Normal Mood and affect    Eye/ENT:      Pupils equal, No asymmetry, Conjunctival pink. Able to hear voice at normal amplitude   Lungs:      Visibly symmetric chest expansion, No palpable tenderness. Clear to auscultation bilaterally. Heart[de-identified]    Regular rate and rhythm, S1, S2 normal, no murmur, click, rub or gallop. No JVD, Normal palpable peripheral pulses.  No cyanosis   Abdomen:     Soft, non-tender. Bowel sounds normal. No masses,  No      organomegaly. Extremities:   Extremities normal, atraumatic, no edema. Neurologic:   CN II-XII grossly intact. No gross focal deficits         Ann Ozuna MD01/15/23     ATTENTION:   This medical record was transcribed using an electronic medical records/speech recognition system. Although proofread, it may and can contain electronic, spelling and other errors. Corrections may be executed at a later time. Please feel free to contact us for any clarifications as needed.     [facility] [place]  [address], [address], [address] [place] [address] Avenue

## 2023-01-23 ENCOUNTER — OFFICE VISIT (OUTPATIENT)
Dept: CARDIOLOGY CLINIC | Age: 73
End: 2023-01-23
Payer: MEDICARE

## 2023-01-23 DIAGNOSIS — I50.42 CHRONIC HEART FAILURE WITH REDUCED EJECTION FRACTION AND DIASTOLIC DYSFUNCTION (HCC): Primary | ICD-10-CM

## 2023-01-23 PROCEDURE — 93264 REM MNTR WRLS P-ART PRS SNR: CPT | Performed by: INTERNAL MEDICINE

## 2023-01-25 ENCOUNTER — OFFICE VISIT (OUTPATIENT)
Dept: CARDIOLOGY CLINIC | Age: 73
End: 2023-01-25
Payer: MEDICARE

## 2023-01-25 VITALS
OXYGEN SATURATION: 100 % | SYSTOLIC BLOOD PRESSURE: 122 MMHG | HEART RATE: 77 BPM | WEIGHT: 221 LBS | RESPIRATION RATE: 18 BRPM | HEIGHT: 66 IN | DIASTOLIC BLOOD PRESSURE: 74 MMHG | BODY MASS INDEX: 35.52 KG/M2 | TEMPERATURE: 98.1 F

## 2023-01-25 DIAGNOSIS — E61.1 IRON DEFICIENCY: ICD-10-CM

## 2023-01-25 DIAGNOSIS — Z79.899 ENCOUNTER FOR MONITORING DIURETIC THERAPY: ICD-10-CM

## 2023-01-25 DIAGNOSIS — I50.20 NYHA CLASS 3 HEART FAILURE WITH REDUCED EJECTION FRACTION (HCC): Primary | ICD-10-CM

## 2023-01-25 DIAGNOSIS — E55.9 VITAMIN D DEFICIENCY: ICD-10-CM

## 2023-01-25 DIAGNOSIS — Z51.81 ENCOUNTER FOR MONITORING DIURETIC THERAPY: ICD-10-CM

## 2023-01-25 PROCEDURE — G8536 NO DOC ELDER MAL SCRN: HCPCS | Performed by: INTERNAL MEDICINE

## 2023-01-25 PROCEDURE — G9899 SCRN MAM PERF RSLTS DOC: HCPCS | Performed by: INTERNAL MEDICINE

## 2023-01-25 PROCEDURE — 1101F PT FALLS ASSESS-DOCD LE1/YR: CPT | Performed by: INTERNAL MEDICINE

## 2023-01-25 PROCEDURE — 99214 OFFICE O/P EST MOD 30 MIN: CPT | Performed by: INTERNAL MEDICINE

## 2023-01-25 PROCEDURE — G8432 DEP SCR NOT DOC, RNG: HCPCS | Performed by: INTERNAL MEDICINE

## 2023-01-25 PROCEDURE — G8399 PT W/DXA RESULTS DOCUMENT: HCPCS | Performed by: INTERNAL MEDICINE

## 2023-01-25 PROCEDURE — 1123F ACP DISCUSS/DSCN MKR DOCD: CPT | Performed by: INTERNAL MEDICINE

## 2023-01-25 PROCEDURE — G8417 CALC BMI ABV UP PARAM F/U: HCPCS | Performed by: INTERNAL MEDICINE

## 2023-01-25 PROCEDURE — 3017F COLORECTAL CA SCREEN DOC REV: CPT | Performed by: INTERNAL MEDICINE

## 2023-01-25 PROCEDURE — G8427 DOCREV CUR MEDS BY ELIG CLIN: HCPCS | Performed by: INTERNAL MEDICINE

## 2023-01-25 PROCEDURE — 1090F PRES/ABSN URINE INCON ASSESS: CPT | Performed by: INTERNAL MEDICINE

## 2023-01-25 NOTE — PROGRESS NOTES
Labs and echo order sent to PCP Dr Maria Luz Du at [facility] for them to be completed with [facility]. Faxed orders to [phone]. Fax confirmation received.

## 2023-01-25 NOTE — PROGRESS NOTES
[facility] in [place], [facility] Note    Patient name: Ying Concepcion  Patient : 1950  Patient MRN: [id number]  Date of service: 23    Primary care physician: Fernando Macdonald MD  Primary general cardiologist: Dr. Diallo [facility] clinic per patient preference   Primary AHF cardiologist: Franklyn Reese MD     CHIEF COMPLAINT:  Chronic systolic heart failure     PLAN OF CARE:  73 y/o female with chronic systolic/diastolic heart failure with improved LVEF due to likely combined ischemic and non-ischemic cardiomyopathy, LVEF improved to 45% (by echo 2022) from 25-30% (by echo 2021), stage C, NYHA class IIIB, unable to tolerate GDMT due to hypotension with persistently elevated filling pressures by Cardiomems; RHC showed low CI 2.1 (2022)  Patient has quite debilitating symptoms and we attempted 6-8lbs diuresis at home with hope to improve distance walked; however, patient lost more than recommended 6-8 lbs and at 213lbs weight became orthostatic with rise of Cr (though still with elevated DPA 21) and admitted shortly to hospital to replete potassium and IVF  At this juncture, given little benefit or narrow therapeutic window of diuretic use to help with dyspnea, my recommendation would be to complete workup for other causes of functional decline  Patient was seen by Dr. Nery Pena who determined not further need for ischemic workup or assessment of mitral valve clip. PFT/DLCO/bronchodilator study was done showing abnormal results, patient was referred to pulmonologist who started on treatment with improvement of symptoms.   In addition patient received steroid injection in left knee (gets biannually) with improvement of symptoms   If all options exhausted; d/w patient trial of palliative inotropes in-hospital, which may or may not help     PLAN:  Continue current medical therapy for heart failure  Intolerant to beta-blockers due to hypotension  Intolerant to ACEi/ARB/ARNi due to CKD  Intolerant to spironolactone due to hyperkalemia  Intolerant to jardiance due to CKD  Continue current dose of bumex 1mg twice daily, keep weight 217 to 220lb  Keep K > 4 and Mag > 2; check labs today  Continue to monitor cardiomems; cannot go below DPA 22   Not on allopurinol or uloric, uric acid level 5  Continue ASA and plavix   Statin or PCSK9i: Continue current dose of atorvastatin and zetia  Continue CPAP therapy  ICD interrogation every 3 months per routine  Reinforced low salt diet  Reinforced fluid restriction to 6 x 8oz glasses per day  Advanced care plan present on file  Recommended daily walking at least 30-45 minutes divided in 20 min sessions to tolerance  Follow-up with primary cardiologist, Dr. Conner Chery (patient prefers to follow with AHF as primary)  Follow-up with EP cardiologist  Follow-up with pulmonary  Recommend flu, covid and pneumonia vaccinations  Return to [facility] Clinic in 3 months with NP     IMPRESSION:  Fatigue  Shortness of breath  Chronic systolic heart failure  Stage D, NYHA class IIIB symptoms (6MW 205m)  Ischemic cardiomyopathy, LVEF 45% (by echo 8/2022)  Invitae with VUS  CAD s/p CABG 1997 s/p PCI to DVG-RCA 2016  H/o severe MR s/p mitral clip in 2020  HTN  H/o VT s/p AICD  WES on Bipap  T2DM with neuropathy  Hypothyroidism  Obesity- Body mass index is 35.14 kg/m². HLD  Osteoarthritis   CKD4       CARDIAC IMAGING:  Echo 8/2022  LVEF 45%, mild MR, LVIDd 5.54cm     Echo (8/19/21)  LV: Estimated LVEF is 30 - 35%. Mildly dilated left ventricle. Mild concentric hypertrophy. Moderately reduced systolic function. Moderate (grade 2) left ventricular diastolic dysfunction. Previously placed mitraclip is noted in secure position with residual trace to mild MR lateral to clip placement. Mean transmitral gradient is 2.6mmHg at heart rate of about 70 bpm.  LA: Moderately dilated left atrium.   MV: Mild mitral valve regurgitation is present. LA: Severely dilated left atrium. RA: Mildly dilated right atrium. Echo (5/31/21)  LV: Estimated LVEF is 25 - 30%. Severely dilated left ventricle. Mild concentric hypertrophy. Severely and globally reduced systolic function. Inconclusive left ventricular diastolic function. LA: Moderately dilated left atrium. RA: Mildly dilated right atrium. MV: Moderate mitral valve regurgitation is present. Image quality for this study was suboptimal.     6 Min Walk Report 11/11/2022 6/3/2021 4/23/2021 12/28/2020 12/22/2020 8/31/2020 7/1/2020   (PRE) HR 69 70 70 71 73 81 71   (PRE) O2 Sat 98 98 - - - 96 -   (POST)  92 - - - 110 -   (POST) O2 Sat 93 99 - 97 - 97 97   Distance in Meters 205.74 91.44 - - - 226.77 -          HISTORY OF PRESENT ILLNESS:  I had the pleasure of seeing Tiffanie Boss in [address] [place] [address] [facility] [place]. Briefly, Tiffanie Boss is a 74 y.o. female with h/o HTN, HLD, WES, Obesity (BMI 39), s/p gastric sleeve in 2011, T2DM, hypothyroidism, COPD, CAD s/p CABG in 1997, s/p PCI to [address] in 5/2015, ICM, VT, s/p AICD ([employer]), anxiety and depression. She more recently underwent MitraClip on 11/12/2020 and was evaluated for upgrade to [address] with Dr. Wandy Gage, however this was not done as her QRS was too narrow. Her most recent admission was 5/30/21-Repeat TTE from August shows an EF of 45%. She has been doing PT for her knee and can do household work but otherwise does not do much. She is compliant with her medications and her cardiomems readings. Patient continued to have quite debilitating symptoms and we attempted 6-8lbs diuresis at home with hope to improve distance walked; however, patient lost more than recommended 6-8 lbs and at 213lbs weight became orthostatic with rise of Cr (though still with elevated DPA 21) and admitted shortly to hospital with fall and hypokalemia to replete potassium and IVF.      LIFE GOALS:  Lifestyle goals discussed with the patient. INTERVAL HISTORY:  Today, patient presents for routine clinic visit accompanied by her sister. Patient is doing \"okay\" Breathing slightly improved with bronchodilators. Patient walked to our clinic from parking garage slowly and had to stop. She can do very little at home, constantly tired and short of breath. Does not use stairs. Patient still can perform home activities but has to rest.      Patient has noticed ongoing volume overload and at least 1+ BLE leg edema. Patient denies abdominal bloating or change of appetite. Patient's weight remains stable. Patient denies orthopnea, PND or nocturia. Patient denies irregular heart rate or palpitations. No presyncope or syncope. ICD has not fired. Patient denies other cardiac symptoms such as chest pain or leg pain with walking. Patient is compliant with fluid restriction and taking medications as prescribed. Patient manages his own medications. PHYSICAL EXAM:  Visit Vitals  /74 (BP 1 Location: Left upper arm, BP Patient Position: Sitting, BP Cuff Size: Large adult)   Pulse 77   Temp 98.1 °F (36.7 °C) (Oral)   Resp 18   Ht 5' 6\" (1.676 m)   Wt 221 lb (100.2 kg)   SpO2 100%   BMI 35.67 kg/m²     General: Patient is well developed, well-nourished in no acute distress  HEENT: Unremarkable   Neck: Supple. No evidence of thyroid enlargements or lymphadenopathy. JVD: Cannot be appreciated   Lungs: Breath sounds are equal and clear bilaterally. No wheezes, rhonchi, or rales. Heart: Regular rate and rhythm with normal S1 and S2. No murmurs, gallops or rubs. Abdomen: Soft, no mass or tenderness. No organomegaly or hernia. Bowel sounds present. Genitourinary and rectal: deferred  Extremities: No cyanosis, clubbing, or edema. Neurologic: No focal sensory or motor deficits are noted. Grossly intact. Psychiatric: Awake, alert an doriented x 3. Appropriate mood and affect.   Skin: Warm, dry and well perfused. REVIEW OF SYSTEMS:  General: Denies fever. Ear, nose and throat: Denies difficulty hearing, sinus problems, nosebleeds  Cardiovascular: see above in the interval history  Respiratory: Denies cough, wheezing, sputum production, hemoptysis.   Gastrointestinal: Denies heartburn, constipation, diarrhea, abdominal pain, nausea, blood in stool  Kidney and bladder: Denies painful urination, frequent urination  Musculoskeletal: Denies joint pain, muscle weakness  Skin and hair: Denies change in existing skin lesions    PAST MEDICAL HISTORY:  Past Medical History:   Diagnosis Date    Adverse effect of anesthesia     hard to wake up/uses BIPAP/\"try to avoid general if possible\"/intubated in past prior to going to sleep and it caused pt to be incontinent    Arthritis     Asthma     CAD (coronary artery disease)     Chronic kidney disease     elevataed creatinine    Chronic obstructive pulmonary disease (HCC)     Chronic pain     arthritis    Coagulation disorder ([address]     on plavix    Congestive heart failure (HCC)     Diabetes ([address]     Hypertension     Morbid obesity ([address]     Sleep apnea     BIPAP with 2 liters oxygen    Thromboembolus ([address]     after heart surgery - left leg    Thyroid disease        PAST SURGICAL HISTORY:  Past Surgical History:   Procedure Laterality Date    COLONOSCOPY N/A 2020    COLONOSCOPY   :- performed by Jose C Robin MD at [address] [place] [address]  2105    knee - right    HX  SECTION      x3    HX CORONARY ARTERY BYPASS GRAFT  1997    3 vessels    HX CORONARY STENT PLACEMENT  2016    HX HERNIA REPAIR  1988    HX IMPLANTABLE CARDIOVERTER DEFIBRILLATOR      HX KNEE REPLACEMENT Right 2015    once    KY LAPS GSTR RSTCV PX W/BYP KERLINE-EN-Y LIMB <150 CM  2011    lap band per pt and not a gastric bypass    KY UNLISTED PROCEDURE CARDIAC SURGERY  2018    cardiac cath - Left       FAMILY HISTORY:  Family History   Problem Relation Age of Onset    Hypertension Mother     Dementia Mother     Coronary Art Dis Father 58    Sudden Death Father 58    Diabetes Son     Diabetes Brother        SOCIAL HISTORY:  Social History     Socioeconomic History    Marital status:    Tobacco Use    Smoking status: Former     Packs/day: 0.50     Years: 45.00     Pack years: 22.50     Types: Cigarettes     Quit date: 2010     Years since quittin.0    Smokeless tobacco: Never    Tobacco comments:     quit /started again (smoked 3 yrs) off and on/none since    Vaping Use    Vaping Use: Never used   Substance and Sexual Activity    Alcohol use: Yes     Comment: Once every 6 months/ Special occasionans    Drug use: Not Currently    Sexual activity: Not Currently       LABORATORY RESULTS:  No flowsheet data found. ALLERGY:  Allergies   Allergen Reactions    Crestor [Rosuvastatin] Other (comments)     Causes muscle cramps    Lisinopril Cough    Nsaids (Non-Steroidal Anti-Inflammatory Drug) Other (comments)     Liver and Kidney        CURRENT MEDICATIONS:    Current Outpatient Medications:     budesonide-formoteroL (SYMBICORT) 160-4.5 mcg/actuation HFAA, Take 2 Puffs by inhalation. , Disp: , Rfl:     bumetanide (BUMEX) 1 mg tablet, Take 1 Tablet by mouth two (2) times a day. (Patient taking differently: Take 1 mg by mouth two (2) times a day. Two tablets twice a day), Disp: 60 Tablet, Rfl: 1    potassium chloride SA (MICRO-K) 10 mEq capsule, Take 1 Capsule by mouth daily. , Disp: 30 Capsule, Rfl: 1    clotrimazole-betamethasone (LOTRISONE) topical cream, Apply  to affected area two (2) times a day., Disp: 30 g, Rfl: 0    tiZANidine (ZANAFLEX) 2 mg tablet, TAKE 1 TABLET BY MOUTH TWICE DAILY AS NEEDED FOR MUSCLE RELAXANT, Disp: , Rfl:     semaglutide 2 mg/dose (8 mg/3 mL) pnij, 2 mg by SubCUTAneous route every seven (7) days.  Once weekly, Disp: , Rfl:     NovoLIN 70-30 FlexPen U-100 100 unit/mL (70-30) inpn, 18 units before breakfast and 14 units at dinner, Disp: , Rfl:     gabapentin (NEURONTIN) 100 mg capsule, TAKE 2 CAPSULES BY MOUTH TWICE DAILY MAX  DAILY  AMOUNT  400MG, Disp: 120 Capsule, Rfl: 0    ezetimibe (ZETIA) 10 mg tablet, Take 1 Tablet by mouth daily. , Disp: 30 Tablet, Rfl: 1    clopidogreL (PLAVIX) 75 mg tab, Take 1 tablet by mouth once daily, Disp: 90 Tablet, Rfl: 0    Insulin Needles, Disposable, 32 gauge x 5/32\" ndle, 1 Each by Does Not Apply route daily. , Disp: 100 Pen Needle, Rfl: 2    alcohol swabs padm, 1 Each by Apply Externally route daily. , Disp: 100 Pad, Rfl: 11    sertraline (ZOLOFT) 50 mg tablet, 50 mg daily. , Disp: , Rfl:     ketoconazole (NIZORAL) 2 % shampoo, , Disp: , Rfl:     levothyroxine (Euthyrox) 100 mcg tablet, Take 1 Tablet by mouth Daily (before breakfast). , Disp: 90 Tablet, Rfl: 0    FreeStyle Lancets 28 gauge misc, USE AS DIRECTED, Disp: , Rfl:     nitroglycerin (NITROSTAT) 0.3 mg SL tablet, 1 Tablet by SubLINGual route every five (5) minutes as needed for Chest Pain., Disp: 1 Bottle, Rfl: 0    Blood-Glucose Meter monitoring kit, Check blood sugar daily. May substitute for insurance preferred meter, Disp: 1 Kit, Rfl: 0    glucose blood VI test strips (blood glucose test) strip, Check blood sugar 2 times daily: E11.9 may substitute for insurance preferred strips. , Disp: 200 Strip, Rfl: 3    lancets misc, Use as directed. Dx:check sugar once daily. E11.65, Disp: 1 Each, Rfl: 11    atorvastatin (LIPITOR) 80 mg tablet, TAKE 1 TABLET BY MOUTH AT BEDTIME, Disp: 90 Tab, Rfl: 3    lancets misc, Check blood sugar 2 times daily. May substitute for insurance preferred lancets. , Disp: 200 Each, Rfl: 3    glucose blood VI test strips (ASCENSIA AUTODISC VI, ONE TOUCH ULTRA TEST VI) strip, E11.65 check sugar once daily, Disp: 100 Strip, Rfl: 0    Blood-Glucose Meter monitoring kit, Use as directed.  Dx: E11.65 check sugar twice daily, Disp: 1 Kit, Rfl: 0    albuterol (PROVENTIL HFA, VENTOLIN HFA, PROAIR HFA) 90 mcg/actuation inhaler, Take 2 Puffs by inhalation every four (4) hours as needed. , Disp: , Rfl:     acetaminophen (TYLENOL) 650 mg TbER, Take 1,300 mg by mouth two (2) times a day., Disp: , Rfl:     aspirin 81 mg chewable tablet, Take 81 mg by mouth daily. , Disp: , Rfl:     Thank you for your referral and allowing me to participate in this patient's care.     Reyna Virgen MD PhD  [address] [place] [address], [address]  Phone: [phone]  Fax: [phone]    PATIENT CARE TEAM:  Patient Care Team:  Fernando Macdonald MD as PCP - General  Kodak Hameed MD (Cardiovascular Disease Physician)  Debra Dinh MD (Gastroenterology)  Lg Li MD as Consulting Provider (Cardiovascular Disease Physician)  Ambrose Maria MD (Nephrology)     Total visit time: 30 minutes  (> 50% spent face-to-face counseling)

## 2023-01-25 NOTE — PATIENT INSTRUCTIONS
Medication changes:    No medication changes    Please take this to your pharmacy to notify them of the change in medications. Testing Ordered:    Lab work has been ordered. You may have this done with your PCP or lab itzel. Our office will notify you of any abnormal results requiring changes to your current plan of care. An order for echo has been placed to be completed (prior to next appt in 3 months)      Other Recommendations:      Ensure your drinking an adequate amount of water with a goal of 6-8 eight ounce glasses (1.5-2 liters) of fluid daily. Your urine should be clear and light yellow straw colored. If your blood pressure begins to consistently run below 90/60 and/or you begin to experience dizziness or lightheadedness, please contact the [facility] at [phone]. Follow up 3 months (after echo) with [facility]      Please monitor your weights daily upon waking and after using the bathroom. Keep a written records of your weights and bring to your next appointment. If you have a weight gain of 3 or more pounds overnight OR 5 or more pounds in one week please contact our office. Thank you for allowing us the privilege of being a part of your healthcare team! Please do not hesitate to contact our office at [phone] with any questions or concerns. Virtual Heart Failure [address] invites you to learn more about heart failure and to share your questions, ideas, and experiences with others. Each month, the Heart Failure Support Group features a new educational topic and a guest speaker, followed by an interactive discussion. Our Heart Failure Nurse Navigator will moderate each session. You will be able to participate by phone, tablet or computer through [address] [place] [address].  This support group takes place on the 3rd Thursday of each month from 6:00-7:30PM. All individuals living with heart failure and their caregivers are welcome to join. If you are interested in participating, please contact us at [email or web address] and you will be sent the link to join the [employer].

## 2023-02-03 ENCOUNTER — OFFICE VISIT (OUTPATIENT)
Dept: CARDIOLOGY CLINIC | Age: 73
End: 2023-02-03

## 2023-02-03 DIAGNOSIS — I50.20 NYHA CLASS 3 HEART FAILURE WITH REDUCED EJECTION FRACTION (HCC): Primary | ICD-10-CM

## 2023-02-10 ENCOUNTER — OFFICE VISIT (OUTPATIENT)
Dept: CARDIOLOGY CLINIC | Age: 73
End: 2023-02-10

## 2023-02-10 DIAGNOSIS — I50.20 NYHA CLASS 3 HEART FAILURE WITH REDUCED EJECTION FRACTION (HCC): Primary | ICD-10-CM

## 2023-02-16 RX ORDER — POTASSIUM CHLORIDE 750 MG/1
10 CAPSULE, EXTENDED RELEASE ORAL DAILY
Qty: 30 CAPSULE | Refills: 2 | Status: SHIPPED | OUTPATIENT
Start: 2023-02-16

## 2023-02-16 RX ORDER — BUMETANIDE 1 MG/1
1 TABLET ORAL 2 TIMES DAILY
Qty: 60 TABLET | Refills: 2 | Status: SHIPPED | OUTPATIENT
Start: 2023-02-16

## 2023-02-16 NOTE — TELEPHONE ENCOUNTER
Requested Prescriptions     Signed Prescriptions Disp Refills    bumetanide (BUMEX) 1 mg tablet 60 Tablet 2     Sig: Take 1 Tablet by mouth two (2) times a day. Authorizing Provider: Calvin Fermin     Ordering User: Fulton Goodell    potassium chloride SA (MICRO-K) 10 mEq capsule 30 Capsule 2     Sig: Take 1 Capsule by mouth daily.      Authorizing Provider: Calvin Fermin     Ordering User: Fulton Goodell

## 2023-02-17 ENCOUNTER — OFFICE VISIT (OUTPATIENT)
Dept: CARDIOLOGY CLINIC | Age: 73
End: 2023-02-17

## 2023-02-17 DIAGNOSIS — I50.20 NYHA CLASS 3 HEART FAILURE WITH REDUCED EJECTION FRACTION (HCC): Primary | ICD-10-CM

## 2023-02-20 ENCOUNTER — PATIENT MESSAGE (OUTPATIENT)
Dept: CARDIOLOGY CLINIC | Age: 73
End: 2023-02-20

## 2023-02-20 RX ORDER — BUMETANIDE 1 MG/1
2 TABLET ORAL 2 TIMES DAILY
Qty: 120 TABLET | Refills: 1 | Status: SHIPPED | OUTPATIENT
Start: 2023-02-20

## 2023-02-20 NOTE — TELEPHONE ENCOUNTER
Requested Prescriptions     Signed Prescriptions Disp Refills    bumetanide (BUMEX) 1 mg tablet 120 Tablet 1     Sig: Take 2 Tablets by mouth two (2) times a day.      Authorizing Provider: Halima Bocanegra     Ordering User: Chris Minor     Confirmed with Dr. Pooja Hilliard

## 2023-02-24 ENCOUNTER — OFFICE VISIT (OUTPATIENT)
Dept: CARDIOLOGY CLINIC | Age: 73
End: 2023-02-24
Payer: MEDICARE

## 2023-02-24 DIAGNOSIS — R06.02 SHORTNESS OF BREATH: ICD-10-CM

## 2023-02-24 DIAGNOSIS — I50.20 NYHA CLASS 3 HEART FAILURE WITH REDUCED EJECTION FRACTION (HCC): Primary | ICD-10-CM

## 2023-02-28 ENCOUNTER — TELEPHONE (OUTPATIENT)
Dept: CARDIOLOGY CLINIC | Age: 73
End: 2023-02-28

## 2023-02-28 NOTE — TELEPHONE ENCOUNTER
Spoke with pt's sister, requested that pt be here tomorrow (3/1/23) at 8:30 instead of 9:00 a.m. Sister will give pt the message later today.

## 2023-03-01 ENCOUNTER — OFFICE VISIT (OUTPATIENT)
Dept: CARDIOLOGY CLINIC | Age: 73
End: 2023-03-01

## 2023-03-01 ENCOUNTER — OFFICE VISIT (OUTPATIENT)
Dept: CARDIOLOGY CLINIC | Age: 73
End: 2023-03-01
Payer: MEDICARE

## 2023-03-01 VITALS
OXYGEN SATURATION: 100 % | RESPIRATION RATE: 18 BRPM | BODY MASS INDEX: 35.68 KG/M2 | SYSTOLIC BLOOD PRESSURE: 106 MMHG | HEIGHT: 66 IN | WEIGHT: 222 LBS | DIASTOLIC BLOOD PRESSURE: 58 MMHG | TEMPERATURE: 97.7 F | HEART RATE: 76 BPM

## 2023-03-01 DIAGNOSIS — E61.1 IRON DEFICIENCY: ICD-10-CM

## 2023-03-01 DIAGNOSIS — E55.9 VITAMIN D DEFICIENCY: ICD-10-CM

## 2023-03-01 DIAGNOSIS — I50.20 NYHA CLASS 3 HEART FAILURE WITH REDUCED EJECTION FRACTION (HCC): Primary | ICD-10-CM

## 2023-03-01 DIAGNOSIS — R06.02 SHORTNESS OF BREATH: ICD-10-CM

## 2023-03-01 DIAGNOSIS — Z95.810 AUTOMATIC IMPLANTABLE CARDIAC DEFIBRILLATOR IN SITU: Primary | ICD-10-CM

## 2023-03-01 DIAGNOSIS — Z51.81 ENCOUNTER FOR MONITORING DIURETIC THERAPY: ICD-10-CM

## 2023-03-01 DIAGNOSIS — Z79.899 ENCOUNTER FOR MONITORING DIURETIC THERAPY: ICD-10-CM

## 2023-03-01 PROCEDURE — 94618 PULMONARY STRESS TESTING: CPT | Performed by: NURSE PRACTITIONER

## 2023-03-01 PROCEDURE — 99215 OFFICE O/P EST HI 40 MIN: CPT | Performed by: NURSE PRACTITIONER

## 2023-03-01 PROCEDURE — 1123F ACP DISCUSS/DSCN MKR DOCD: CPT | Performed by: NURSE PRACTITIONER

## 2023-03-01 RX ORDER — ALLOPURINOL 100 MG/1
100 TABLET ORAL DAILY
Qty: 30 TABLET | Refills: 5 | Status: SHIPPED | OUTPATIENT
Start: 2023-03-01

## 2023-03-01 NOTE — PROGRESS NOTES
[facility] in [place], [facility] Note    Patient name: Landy Claudio  Patient : 1950  Patient MRN: [id number]  Date of service: 23    Primary care physician: Fernando Macdonald MD  Primary general cardiologist: Dr. 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, LVEF improved to 45% (by echo 2022) from 25-30% (by echo 2021), stage C, NYHA class IIIB, unable to tolerate GDMT due to hypotension with persistently elevated filling pressures by Cardiomems; RHC showed low CI 2.1 (2022)  Patient has quite debilitating symptoms and we attempted 6-8lbs diuresis at home with hope to improve distance walked; however, patient lost more than recommended 6-8 lbs and at 213lbs weight became orthostatic with rise of Cr (though still with elevated DPA 21) and admitted shortly to hospital to replete potassium and IVF  At this juncture, given little benefit or narrow therapeutic window of diuretic use to help with dyspnea, my recommendation would be to complete workup for other causes of functional decline  Patient was seen by Dr. Erendira Rutherford who determined not further need for ischemic workup or assessment of mitral valve clip. PFT/DLCO/bronchodilator study was done showing abnormal results, patient was referred to pulmonologist who started on treatment with improvement of symptoms.   In addition patient received steroid injection in left knee (gets biannually) with improvement of symptoms   If all options exhausted; d/w patient trial of palliative inotropes in-hospital, which may or may not help  Patient planning to move to [facility] next month, so will plan to transfer care soon. INTERVAL HISTORY:  -VSS  -labs 11/22:  creat 1.89; eGFR 28; pBNP 638; uric acid 8.5  -weight slightly up   -multiple adjustments made based on patient's cardiomems data since last visit.   -Ms. Blain Merlin is here for routine HF follow up. She is feeling pretty good. Her breathing is better since the symbicort inhaler, and her knee is better since her cortisone shot. Can walk about 1/2 block to 1 full block without stopping if there is no incline. She becomes very SOB with any incline or stairs. Sleeping well on her bipap. Leg swelling is at her \"baseline\". No chest pain, palpitations, orthopnea, PND, falls, dizziness. She's been working hard on her diet and avoiding excess salt. She says she needs to do more walking than she currently is doing. She is nervous for moving to [place] to be closer to family, because her housing is not yet figured out.           PLAN:  Continue current medical therapy for heart failure  Intolerant to beta-blockers due to hypotension  Intolerant to ACEi/ARB/ARNi due to CKD  Intolerant to spironolactone due to hyperkalemia  Intolerant to jardiance due to CKD  Continue current dose of bumex 1mg twice daily, keep weight 217 to 220lb  Keep K > 4 and Mag > 2; check labs today  Continue to monitor cardiomems; cannot go below DPA 22   Start allopurinol 100mg daily;  uric acid 8.5;  patient states she was on in the past for her gout  Continue ASA and plavix   Statin or PCSK9i: Continue current dose of atorvastatin and zetia  Continue CPAP/bipap therapy  ICD interrogation every 3 months per routine  Due for her ECHO - order already in system   Routine HF labs today   6mw to see if improvement since symbicort and cortisone shot   Reinforced low salt diet  Reinforced fluid restriction to 6 x 8oz glasses per day  Advanced care plan present on file  Recommended daily walking at least 30-45 minutes divided in 20 min sessions to tolerance  Follow-up with primary cardiologist, Dr. Joy Tapia (patient prefers to follow with AHF as primary)  Follow-up with EP cardiologist  Follow-up with pulmonary  Recommend flu, covid and pneumonia vaccinations  Emphasized importance of trying to find new HF and cardiology physicians prior to her move, so that she doesn't have to wait months for follow up care. If patient's move delayed, or new concerns arise, will schedule follow up. IMPRESSION:  Fatigue  Shortness of breath/MARINA  Chronic systolic heart failure  Stage D, NYHA class IIIa symptoms (6MW 205m)  Ischemic cardiomyopathy, LVEF 45% (by echo 8/2022)  Invitae with VUS  CAD s/p CABG 1997 s/p PCI to DVG-RCA 2016  H/o severe MR s/p mitral clip in 2020  HTN  H/o VT s/p AICD  WES on Bipap  T2DM with neuropathy  Hypothyroidism  Obesity- Body mass index is 35.14 kg/m². HLD  Osteoarthritis   CKD4   Asthma       CARDIAC IMAGING:  Echo 8/2022  LVEF 45%, mild MR, LVIDd 5.54cm     Echo (8/19/21)  LV: Estimated LVEF is 30 - 35%. Mildly dilated left ventricle. Mild concentric hypertrophy. Moderately reduced systolic function. Moderate (grade 2) left ventricular diastolic dysfunction. Previously placed mitraclip is noted in secure position with residual trace to mild MR lateral to clip placement. Mean transmitral gradient is 2.6mmHg at heart rate of about 70 bpm.  LA: Moderately dilated left atrium. MV: Mild mitral valve regurgitation is present. LA: Severely dilated left atrium. RA: Mildly dilated right atrium. Echo (5/31/21)  LV: Estimated LVEF is 25 - 30%. Severely dilated left ventricle. Mild concentric hypertrophy. Severely and globally reduced systolic function. Inconclusive left ventricular diastolic function. LA: Moderately dilated left atrium. RA: Mildly dilated right atrium. MV: Moderate mitral valve regurgitation is present.   Image quality for this study was suboptimal.     6 Min Walk Report 11/11/2022 6/3/2021 4/23/2021 12/28/2020 12/22/2020 8/31/2020 7/1/2020   (PRE) HR 69 70 70 71 73 81 71   (PRE) O2 Sat 98 98 - - - 96 -   (POST)  92 - - - 110 -   (POST) O2 Sat 93 99 - 97 - 97 97   Distance in Meters 205.74 91.44 - - - 226.77 -          HISTORY OF PRESENT ILLNESS:  From prior notes,  \"I had the pleasure of seeing Silva Hall in [facility] in [place]. Briefly, Silva Hall is a 74 y.o. female with h/o HTN, HLD, WES, Obesity (BMI 39), s/p gastric sleeve in 2011, T2DM, hypothyroidism, COPD, CAD s/p CABG in 1997, s/p PCI to [address] in 5/2015, ICM, VT, s/p AICD ([employer]), anxiety and depression. She more recently underwent MitraClip on 11/12/2020 and was evaluated for upgrade to [address] with Dr. Junior Britt, however this was not done as her QRS was too narrow. Her most recent admission was 5/30/21-Repeat TTE from August shows an EF of 45%. She has been doing PT for her knee and can do household work but otherwise does not do much. She is compliant with her medications and her cardiomems readings. Patient continued to have quite debilitating symptoms and we attempted 6-8lbs diuresis at home with hope to improve distance walked; however, patient lost more than recommended 6-8 lbs and at 213lbs weight became orthostatic with rise of Cr (though still with elevated DPA 21) and admitted shortly to hospital with fall and hypokalemia to replete potassium and IVF. \"    Some improvement with seeing pulmonary after abnormal PFTs     LIFE GOALS:  Lifestyle goals discussed with the patient. Move to [facility].           PHYSICAL EXAM:  Visit Vitals  BP (!) 106/58 (BP 1 Location: Left upper arm, BP Patient Position: Sitting, BP Cuff Size: Large adult)   Pulse 76   Temp 97.7 °F (36.5 °C) (Oral)   Resp 18   Ht 5' 6\" (1.676 m)   Wt 222 lb (100.7 kg)   SpO2 100%   BMI 35.83 kg/m²       Physical Assessment:   General Appearance: alert, cooperative, obese, elderly AAF sitting in chair in NAD; appears stated age  Eyes: sclera anicteric  Mouth/Throat: moist mucous membranes; oral pharynx clear  Neck: supple; no JVD noted   Pulmonary:  clear to auscultation bilaterally; good effort;   Cardiovascular: regular rate and rhythm; 2/6 systolic murmur  Abdomen: soft, non-tender, non-distended; bowel sounds normal  Musculoskeletal: no swelling or deformity; moves all extremities  Extremities: trace edema bilateral ankles; palpable distal pulses   Skin: warm and dry  Neuro: grossly normal  Psych: normal mood and affect given the setting      REVIEW OF SYSTEMS:  Review of Systems   Constitutional:  Negative for chills, fever and malaise/fatigue. HENT:  Negative for ear pain and sore throat. Dry mouth    Eyes:  Negative for blurred vision and double vision. Respiratory:  Positive for shortness of breath. Negative for cough and wheezing. MARINA after 1/2-1 block. Cannot tolerate inclines or stairs. Cardiovascular:  Positive for leg swelling. Negative for chest pain, palpitations, orthopnea and PND. Gastrointestinal:  Negative for abdominal pain, constipation, diarrhea, nausea and vomiting. Genitourinary:  Negative for dysuria, frequency and urgency. Musculoskeletal:  Negative for falls. Skin:  Negative for rash. Neurological:  Negative for dizziness and headaches. Endo/Heme/Allergies:  Does not bruise/bleed easily. Psychiatric/Behavioral:  Negative for depression. The patient is nervous/anxious. The patient does not have insomnia.          \"Busy anxious\"       PAST MEDICAL HISTORY:  Past Medical History:   Diagnosis Date    Adverse effect of anesthesia     hard to wake up/uses BIPAP/\"try to avoid general if possible\"/intubated in past prior to going to sleep and it caused pt to be incontinent    Arthritis     Asthma     CAD (coronary artery disease)     Chronic kidney disease     elevataed creatinine    Chronic obstructive pulmonary disease (HCC)     Chronic pain     arthritis    Coagulation disorder ([address]     on plavix    Congestive heart failure (HCC)     Diabetes ([address]     Hypertension     Morbid obesity ([facility]     Sleep apnea     BIPAP with 2 liters oxygen    Thromboembolus ([facility]     after heart surgery - left leg    Thyroid disease        PAST SURGICAL HISTORY:  Past Surgical History:   Procedure Laterality Date    COLONOSCOPY N/A 2020    COLONOSCOPY   :- performed by Chrissie Angelucci, MD at [address] [place] [address]  2105    knee - right    HX  SECTION      x3    HX CORONARY ARTERY BYPASS GRAFT  1997    3 vessels    HX CORONARY STENT PLACEMENT  2016    HX HERNIA REPAIR  1988    HX IMPLANTABLE CARDIOVERTER DEFIBRILLATOR      HX KNEE REPLACEMENT Right 2015    once    TN LAPS GSTR RSTCV PX W/BYP KERLINE-EN-Y LIMB <150 CM      lap band per pt and not a gastric bypass    TN UNLISTED PROCEDURE CARDIAC SURGERY  2018    cardiac cath - Left       FAMILY HISTORY:  Family History   Problem Relation Age of Onset    Hypertension Mother     Dementia Mother     Coronary Art Dis Father 58    Sudden Death Father 58    Diabetes Son     Diabetes Brother        SOCIAL HISTORY:  Social History     Socioeconomic History    Marital status:    Tobacco Use    Smoking status: Former     Packs/day: 0.50     Years: 45.00     Pack years: 22.50     Types: Cigarettes     Quit date: 2010     Years since quittin.1    Smokeless tobacco: Never    Tobacco comments:     quit /started again (smoked 3 yrs) off and on/none since    Vaping Use    Vaping Use: Never used   Substance and Sexual Activity    Alcohol use: Yes     Comment: Once every 6 months/ Special occasionans    Drug use: Not Currently    Sexual activity: Not Currently       LABORATORY RESULTS:  No flowsheet data found.     ALLERGY:  Allergies   Allergen Reactions    Crestor [Rosuvastatin] Other (comments)     Causes muscle cramps    Lisinopril Cough    Nsaids (Non-Steroidal Anti-Inflammatory Drug) Other (comments)     Liver and Kidney        CURRENT MEDICATIONS:    Current Outpatient Medications:     allopurinoL (ZYLOPRIM) 100 mg tablet, Take 1 Tablet by mouth daily. , Disp: 30 Tablet, Rfl: 5    bumetanide (BUMEX) 1 mg tablet, Take 2 Tablets by mouth two (2) times a day., Disp: 120 Tablet, Rfl: 1    potassium chloride SA (MICRO-K) 10 mEq capsule, Take 1 Capsule by mouth daily. , Disp: 30 Capsule, Rfl: 2    budesonide-formoteroL (SYMBICORT) 160-4.5 mcg/actuation HFAA, Take 2 Puffs by inhalation. , Disp: , Rfl:     clotrimazole-betamethasone (LOTRISONE) topical cream, Apply  to affected area two (2) times a day., Disp: 30 g, Rfl: 0    semaglutide 2 mg/dose (8 mg/3 mL) pnij, 2 mg by SubCUTAneous route every seven (7) days. Once weekly, Disp: , Rfl:     NovoLIN 70-30 FlexPen U-100 100 unit/mL (70-30) inpn, 18 units before breakfast and 14 units at dinner, Disp: , Rfl:     gabapentin (NEURONTIN) 100 mg capsule, TAKE 2 CAPSULES BY MOUTH TWICE DAILY MAX  DAILY  AMOUNT  400MG, Disp: 120 Capsule, Rfl: 0    ezetimibe (ZETIA) 10 mg tablet, Take 1 Tablet by mouth daily. , Disp: 30 Tablet, Rfl: 1    clopidogreL (PLAVIX) 75 mg tab, Take 1 tablet by mouth once daily, Disp: 90 Tablet, Rfl: 0    Insulin Needles, Disposable, 32 gauge x 5/32\" ndle, 1 Each by Does Not Apply route daily. , Disp: 100 Pen Needle, Rfl: 2    alcohol swabs padm, 1 Each by Apply Externally route daily. , Disp: 100 Pad, Rfl: 11    sertraline (ZOLOFT) 50 mg tablet, 50 mg daily. , Disp: , Rfl:     ketoconazole (NIZORAL) 2 % shampoo, , Disp: , Rfl:     levothyroxine (Euthyrox) 100 mcg tablet, Take 1 Tablet by mouth Daily (before breakfast). , Disp: 90 Tablet, Rfl: 0    FreeStyle Lancets 28 gauge misc, USE AS DIRECTED, Disp: , Rfl:     nitroglycerin (NITROSTAT) 0.3 mg SL tablet, 1 Tablet by SubLINGual route every five (5) minutes as needed for Chest Pain., Disp: 1 Bottle, Rfl: 0    Blood-Glucose Meter monitoring kit, Check blood sugar daily.  May substitute for insurance preferred meter, Disp: 1 Kit, Rfl: 0    glucose blood VI test strips (blood glucose test) strip, Check blood sugar 2 times daily: E11.9 may substitute for insurance preferred strips. , Disp: 200 Strip, Rfl: 3    lancets misc, Use as directed. Dx:check sugar once daily. E11.65, Disp: 1 Each, Rfl: 11    atorvastatin (LIPITOR) 80 mg tablet, TAKE 1 TABLET BY MOUTH AT BEDTIME, Disp: 90 Tab, Rfl: 3    lancets misc, Check blood sugar 2 times daily. May substitute for insurance preferred lancets. , Disp: 200 Each, Rfl: 3    glucose blood VI test strips (ASCENSIA AUTODISC VI, ONE TOUCH ULTRA TEST VI) strip, E11.65 check sugar once daily, Disp: 100 Strip, Rfl: 0    Blood-Glucose Meter monitoring kit, Use as directed. Dx: E11.65 check sugar twice daily, Disp: 1 Kit, Rfl: 0    albuterol (PROVENTIL HFA, VENTOLIN HFA, PROAIR HFA) 90 mcg/actuation inhaler, Take 2 Puffs by inhalation every four (4) hours as needed. , Disp: , Rfl:     acetaminophen (TYLENOL) 650 mg TbER, Take 1,300 mg by mouth two (2) times a day., Disp: , Rfl:     aspirin 81 mg chewable tablet, Take 81 mg by mouth daily. , Disp: , Rfl:     tiZANidine (ZANAFLEX) 2 mg tablet, TAKE 1 TABLET BY MOUTH TWICE DAILY AS NEEDED FOR MUSCLE RELAXANT (Patient not taking: Reported on 3/1/2023), Disp: , Rfl:     Thank you for your referral and allowing me to participate in this patient's care.     Anna Ramsey NP  DNP, RN, [facility]-[address], [address]  Phone: [phone]      PATIENT CARE TEAM:  Patient Care Team:  Sandra Kaye MD as PCP - General (Internal Medicine Physician)  Kvng Calderon MD (Cardiovascular Disease Physician)  Anne Solorzano MD (Gastroenterology)  Christine Thompson MD as Consulting Provider (Cardiovascular Disease Physician)  Sameer Botello MD (Nephrology)     On this date 3/1/2023, I have spent a total time of  40 minutes personally reviewing new vitals, test results, notes from recent visits, face to face encounter/physical exam of patient with counseling, writing orders, performing medical decision making, and documenting.

## 2023-03-01 NOTE — LETTER
3/1/2023    Patient: Aki Santos   YOB: 1950   Date of Visit: 3/1/2023     Rafaela Orellana MD  [address] [place], [place] [id number]  Via Fax: [phone]    Dear Rafaela Orellana MD,      Thank you for referring Ms. Renald Cogan to [facility] for evaluation. My notes for this consultation are attached. If you have questions, please do not hesitate to call me. I look forward to following your patient along with you.       Sincerely,    Malina Pollock NP

## 2023-03-01 NOTE — PATIENT INSTRUCTIONS
Medication changes:    Start allopurinol 100mg daily by mouth. See attached on common side effects. Please take this to your pharmacy to notify them of the change in medications. Testing Ordered:    Labs and 6 min walk performed today. We will call you with any abnormal lab results that require a change in your medication regimen. Please call to schedule your echo for as soon as your able, you maybe call [phone]    Other Recommendations:      Ensure your drinking an adequate amount of water with a goal of 6-8 eight ounce glasses (1.5-2 liters) of fluid daily. Your urine should be clear and light yellow straw colored. If your blood pressure begins to consistently run below 90/60 and/or you begin to experience dizziness or lightheadedness, please contact the [facility] at [phone]. Please call to establish care where you are moving, and let us know once you have an appt so that we can send your records. Please monitor your weights daily upon waking and after using the bathroom. Keep a written records of your weights and bring to your next appointment. If you have a weight gain of 3 or more pounds overnight OR 5 or more pounds in one week please contact our office. Thank you for allowing us the privilege of being a part of your healthcare team! Please do not hesitate to contact our office at [phone] with any questions or concerns. Virtual Heart Failure [address] invites you to learn more about heart failure and to share your questions, ideas, and experiences with others. Each month, the Heart Failure Support Group features a new educational topic and a guest speaker, followed by an interactive discussion. Our Heart Failure Nurse Navigator will moderate each session. You will be able to participate by phone, tablet or computer through [address] [place] [address].  This support group takes place on the 3rd Thursday of each month from 6:00-7:30PM. All individuals living with heart failure and their caregivers are welcome to join. If you are interested in participating, please contact us at [email or web address] and you will be sent the link to join the [employer].

## 2023-03-02 LAB
25(OH)D3+25(OH)D2 SERPL-MCNC: 29.9 NG/ML (ref 30–100)
ALBUMIN SERPL-MCNC: 4.1 G/DL (ref 3.7–4.7)
ALBUMIN/GLOB SERPL: 1.6 {RATIO} (ref 1.2–2.2)
ALP SERPL-CCNC: 112 IU/L (ref 44–121)
ALT SERPL-CCNC: 26 IU/L (ref 0–32)
AST SERPL-CCNC: 32 IU/L (ref 0–40)
BILIRUB SERPL-MCNC: 0.4 MG/DL (ref 0–1.2)
BUN SERPL-MCNC: 30 MG/DL (ref 8–27)
BUN/CREAT SERPL: 18 (ref 12–28)
CALCIUM SERPL-MCNC: 9.9 MG/DL (ref 8.7–10.3)
CHLORIDE SERPL-SCNC: 100 MMOL/L (ref 96–106)
CO2 SERPL-SCNC: 25 MMOL/L (ref 20–29)
CREAT SERPL-MCNC: 1.63 MG/DL (ref 0.57–1)
EGFRCR SERPLBLD CKD-EPI 2021: 33 ML/MIN/1.73
FERRITIN SERPL-MCNC: 53 NG/ML (ref 15–150)
GLOBULIN SER CALC-MCNC: 2.5 G/DL (ref 1.5–4.5)
GLUCOSE SERPL-MCNC: 144 MG/DL (ref 70–99)
IRON SATN MFR SERPL: 20 % (ref 15–55)
IRON SERPL-MCNC: 71 UG/DL (ref 27–139)
MAGNESIUM SERPL-MCNC: 2.1 MG/DL (ref 1.6–2.3)
NT-PROBNP SERPL-MCNC: 656 PG/ML (ref 0–301)
POTASSIUM SERPL-SCNC: 3.5 MMOL/L (ref 3.5–5.2)
PROT SERPL-MCNC: 6.6 G/DL (ref 6–8.5)
SODIUM SERPL-SCNC: 139 MMOL/L (ref 134–144)
TIBC SERPL-MCNC: 347 UG/DL (ref 250–450)
UIBC SERPL-MCNC: 276 UG/DL (ref 118–369)

## 2023-03-03 ENCOUNTER — TELEPHONE (OUTPATIENT)
Dept: CARDIOLOGY CLINIC | Age: 73
End: 2023-03-03

## 2023-03-03 RX ORDER — ACETAMINOPHEN 500 MG
2000 TABLET ORAL DAILY
Qty: 30 CAPSULE | Refills: 3 | Status: SHIPPED | OUTPATIENT
Start: 2023-03-03

## 2023-03-03 NOTE — TELEPHONE ENCOUNTER
Left message for patient to return call with message per Bushra Gill:  Start vit d 2000units daily. Other labs look good    Message sent to patient via [employer].

## 2023-03-06 ENCOUNTER — OFFICE VISIT (OUTPATIENT)
Dept: CARDIOLOGY CLINIC | Age: 73
End: 2023-03-06

## 2023-03-06 DIAGNOSIS — I50.20 NYHA CLASS 3 HEART FAILURE WITH REDUCED EJECTION FRACTION (HCC): Primary | ICD-10-CM

## 2023-03-13 ENCOUNTER — OFFICE VISIT (OUTPATIENT)
Dept: CARDIOLOGY CLINIC | Age: 73
End: 2023-03-13

## 2023-03-13 DIAGNOSIS — I50.20 NYHA CLASS 3 HEART FAILURE WITH REDUCED EJECTION FRACTION (HCC): Primary | ICD-10-CM

## 2023-03-20 ENCOUNTER — OFFICE VISIT (OUTPATIENT)
Dept: CARDIOLOGY CLINIC | Age: 73
End: 2023-03-20

## 2023-03-20 DIAGNOSIS — I50.20 NYHA CLASS 3 HEART FAILURE WITH REDUCED EJECTION FRACTION (HCC): Primary | ICD-10-CM

## 2023-03-27 ENCOUNTER — OFFICE VISIT (OUTPATIENT)
Dept: CARDIOLOGY CLINIC | Age: 73
End: 2023-03-27
Payer: MEDICARE

## 2023-03-27 DIAGNOSIS — I50.20 NYHA CLASS 3 HEART FAILURE WITH REDUCED EJECTION FRACTION (HCC): Primary | ICD-10-CM

## 2023-03-27 PROCEDURE — 93264 REM MNTR WRLS P-ART PRS SNR: CPT | Performed by: INTERNAL MEDICINE

## 2023-03-28 ENCOUNTER — TELEPHONE (OUTPATIENT)
Dept: CARDIOLOGY CLINIC | Age: 73
End: 2023-03-28

## 2023-03-28 NOTE — TELEPHONE ENCOUNTER
2 PATIENT IDS. Patient called asking for direction on transferring clinic when she moves to [place] next month. I made her aware that we will continue following her device until she makes an appt with a new Cardiology group. Patient understands that the new clinic will reach out to transfer records to her pacer when care is established.

## 2023-03-28 NOTE — TELEPHONE ENCOUNTER
Pt is calling because she is moving out of state on 4/19/23 to [facility] and she would like to know what to do with her monitor and more information to assist her with this move.     [phone]

## 2023-04-03 ENCOUNTER — OFFICE VISIT (OUTPATIENT)
Dept: CARDIOLOGY CLINIC | Age: 73
End: 2023-04-03

## 2023-04-03 DIAGNOSIS — I50.20 NYHA CLASS 3 HEART FAILURE WITH REDUCED EJECTION FRACTION (HCC): Primary | ICD-10-CM

## 2023-04-17 ENCOUNTER — OFFICE VISIT (OUTPATIENT)
Dept: CARDIOLOGY CLINIC | Age: 73
End: 2023-04-17

## 2023-04-17 DIAGNOSIS — I50.20 NYHA CLASS 3 HEART FAILURE WITH REDUCED EJECTION FRACTION (HCC): Primary | ICD-10-CM

## 2023-04-21 DIAGNOSIS — R92.8 ABNORMAL MAMMOGRAM OF RIGHT BREAST: Primary | ICD-10-CM

## 2023-04-23 DIAGNOSIS — R92.8 ABNORMAL MAMMOGRAM OF RIGHT BREAST: Primary | ICD-10-CM

## 2023-04-24 ENCOUNTER — TELEPHONE (OUTPATIENT)
Dept: CARDIOLOGY CLINIC | Age: 73
End: 2023-04-24

## 2023-04-24 NOTE — TELEPHONE ENCOUNTER
Please review echo scanned in on 4/11/23-she had a drop in EF-is getting ready to move to [place]-please advise, thank you. Echo results faxed to new MD in [place], patient notified.

## 2023-04-28 ENCOUNTER — OFFICE VISIT (OUTPATIENT)
Dept: CARDIOLOGY CLINIC | Age: 73
End: 2023-04-28

## 2023-04-28 DIAGNOSIS — R06.02 SHORTNESS OF BREATH: ICD-10-CM

## 2023-04-28 DIAGNOSIS — I50.20 NYHA CLASS 3 HEART FAILURE WITH REDUCED EJECTION FRACTION (HCC): Primary | ICD-10-CM

## 2023-05-08 RX ORDER — LANCETS 30 GAUGE
EACH MISCELLANEOUS
COMMUNITY
Start: 2021-04-29

## 2024-10-02 NOTE — PROGRESS NOTES
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, LVEF improved to 45% (by echo 2022) from 25-30% (by echo 2021), stage C, NYHA class IIIB, on GDMT limited by hypotension with persistently elevated filling pressures by Cardiomems  Most likely etiology of cardiomyopathy includes: obesity +/- WES +/- tachycardia +/- wt amyloidosis (PYP positive on vyndamax)    INTERVAL HISTORY:  K replacement, 3.2 today  Creatinine trending down 2.08 today  No acute overnight events       RECOMMENDATIONS:  Intolerant to beta-blockers due to hypotension  Intolerant to ACEi/ARB/ARNi due to CKD  Intolerant to spironolactone due to hyperkalemia  Intolerant to jardiance due to CKD  Resume Vyndamax as OP   Resume diuretics when cleared by nephrology, will try to keep weight 217 to 220lb  Keep K > 4 and Mag > 2  Nephrology recommendations appreciated   Will obtain cardiomems reading on Monday- can be done at home   Not on allopurinol or uloric, uric acid level 5  Continue ASA and plavix   Statin or PCSK9i: Continue current dose of atorvastatin and zetia  Continue CPAP therapy while admitted  ICD interrogation every 3 months   Advanced care plan present on file    All other care per primary team, ok to go home with hypokalemia is resolved, likely Monday      IMPRESSION:  Fatigue  Shortness of breath  Aucte on chronic systolic heart failure  Stage D, NYHA class IIIA symptoms  Ischemic cardiomyopathy, LVEF 45% (by echo 2022)  Invitae with VUS  CAD [FreeTextEntry1] :  I, Krishan Edgar, acted solely as a scribe for Dr. Monroe on this date on 10/02/2024. 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 HLD  Osteoarthritis   CKD4       CARDIAC IMAGING:  Echo 8/2022  LVEF 45%, mild MR, LVIDd 5.54cm    Echo (8/19/21)  LV: Estimated LVEF is 30 - 35%. Mildly dilated left ventricle. Mild concentric hypertrophy. Moderately reduced systolic function. Moderate (grade 2) left ventricular diastolic dysfunction. Previously placed mitraclip is noted in secure position with residual trace to mild MR lateral to clip placement. Mean transmitral gradient is 2.6mmHg at heart rate of about 70 bpm.  LA: Moderately dilated left atrium. MV: Mild mitral valve regurgitation is present. LA: Severely dilated left atrium. RA: Mildly dilated right atrium. Echo (5/31/21)  LV: Estimated LVEF is 25 - 30%. Severely dilated left ventricle. Mild concentric hypertrophy. Severely and globally reduced systolic function. Inconclusive left ventricular diastolic function. LA: Moderately dilated left atrium. RA: Mildly dilated right atrium. MV: Moderate mitral valve regurgitation is present. Image quality for this study was suboptimal.     6 Min Walk Report 11/11/2022 6/3/2021 4/23/2021 12/28/2020 12/22/2020 8/31/2020 7/1/2020   (PRE) HR 69 70 70 71 73 81 71   (PRE) O2 Sat 98 98 - - - 96 -   (POST)  92 - - - 110 -   (POST) O2 Sat 93 99 - 97 - 97 97   Distance in Meters 205.74 91.44 - - - 226.77 -          HISTORY OF PRESENT ILLNESS:  I had the pleasure of seeing Maribell Alba in [facility] at [facility] in [place]. Briefly, Maribell Alba is a 74 y.o. female with h/o HTN, HLD, WES, Obesity (BMI 39), s/p gastric sleeve in 2011, T2DM, hypothyroidism, COPD, CAD s/p CABG in 1997, s/p PCI to [address] in 5/2015, ICM, VT, s/p AICD ([employer]), anxiety and depression.   She more recently underwent MitraClip on 11/12/2020 and was evaluated for upgrade to [address] with Dr. Edu Johnson, however this was not done as her QRS was too narrow. Her most recent admission was 5/30/21-Repeat TTE from August shows an EF of 45%. She has been doing PT for her knee and can do household work but otherwise does not do much. She is compliant with her medications and her cardiomems readings. Patient presented today from ED wit complaints of dizzines and a fall. She was recently started on metolazone for volume overload, she had lost weight but her K was 2.2 on admission. Nephrology is also consulted. LIFE GOALS:  Lifestyle goals discussed with the patient. PHYSICAL EXAM:  Visit Vitals  BP (!) 118/99 (BP 1 Location: Right upper arm, BP Patient Position: At rest)   Pulse 72   Temp 98.4 °F (36.9 °C)   Resp 14   Ht 5' 6\" (1.676 m)   Wt 217 lb 11.2 oz (98.7 kg)   SpO2 96%   BMI 35.14 kg/m²     Physical Exam  Constitutional:       Appearance: Normal appearance. She is obese. Cardiovascular:      Rate and Rhythm: Normal rate and regular rhythm. Pulses: Normal pulses. Heart sounds: Normal heart sounds. Pulmonary:      Effort: No respiratory distress. Breath sounds: Normal breath sounds. Abdominal:      General: There is no distension. Palpations: Abdomen is soft. Musculoskeletal:         General: Swelling present. Skin:     General: Skin is warm and dry. Neurological:      General: No focal deficit present. Mental Status: She is alert and oriented to person, place, and time. Psychiatric:         Mood and Affect: Mood normal.        REVIEW OF SYSTEMS:  Review of Systems   Constitutional:  Negative for chills, fever and malaise/fatigue. Respiratory:  Negative for cough and shortness of breath. Cardiovascular:  Negative for chest pain, palpitations and leg swelling. Gastrointestinal:  Negative for heartburn and nausea. Musculoskeletal:  Negative for falls and myalgias.    Neurological:  Negative for dizziness, weakness and headaches. Psychiatric/Behavioral:  The patient is nervous/anxious.        PAST MEDICAL HISTORY:  Past Medical History:   Diagnosis Date    Adverse effect of anesthesia     hard to wake up/uses BIPAP/\"try to avoid general if possible\"/intubated in past prior to going to sleep and it caused pt to be incontinent    Arthritis     Asthma     CAD (coronary artery disease)     Chronic kidney disease     elevataed creatinine    Chronic obstructive pulmonary disease (HCC)     Chronic pain     arthritis    Coagulation disorder (HCC)     on plavix    Congestive heart failure (HCC)     Diabetes (HCC)     Hypertension     Morbid obesity (HCC)     Sleep apnea     BIPAP with 2 liters oxygen    Thromboembolus ([address]     after heart surgery - left leg    Thyroid disease        PAST SURGICAL HISTORY:  Past Surgical History:   Procedure Laterality Date    COLONOSCOPY N/A 2020    COLONOSCOPY   :- performed by Meryle Bing, MD at [address] [place] [address]  2105    knee - right    HX  SECTION      x3    HX CORONARY ARTERY BYPASS GRAFT  1997    3 vessels    HX CORONARY STENT PLACEMENT  2016    HX HERNIA REPAIR  1988    HX IMPLANTABLE CARDIOVERTER DEFIBRILLATOR      HX KNEE REPLACEMENT Right 2015    once    OK CARDIAC SURG PROCEDURE UNLIST  2018    cardiac cath - Left    OK LAP GASTRIC BYPASS/KERLINE-EN-Y      lap band per pt and not a gastric bypass       FAMILY HISTORY:  Family History   Problem Relation Age of Onset    Hypertension Mother     Dementia Mother     Coronary Art Dis Father 58    Sudden Death Father 58    Diabetes Son     Diabetes Brother        SOCIAL HISTORY:  Social History     Socioeconomic History    Marital status:    Tobacco Use    Smoking status: Former     Packs/day: 0.50     Years: 45.00     Pack years: 22.50     Types: Cigarettes     Quit date: 2010     Years since quittin.8    Smokeless tobacco: Never    Tobacco comments:     quit 2001/started again (smoked 3 yrs) off and on/none since 2012   Vaping Use    Vaping Use: Never used   Substance and Sexual Activity    Alcohol use: Yes     Comment: Once every 6 months/ Special occasionans    Drug use: Not Currently    Sexual activity: Not Currently       LABORATORY RESULTS:  Labs Latest Ref Rng & Units 11/19/2022 11/18/2022 11/18/2022 11/17/2022 11/17/2022 11/17/2022 11/11/2022   WBC 3.6 - 11.0 K/uL - - 7.6 - - 9.3 6.7   RBC 3.80 - 5.20 M/uL - - 4.16 - - 4.42 4.11   Hemoglobin 11.5 - 16.0 g/dL - - 13.3 - - 14.1 12.7   Hematocrit 35.0 - 47.0 % - - 37.1 - - 40.5 39.4   MCV 80.0 - 99.0 FL - - 89.2 - - 91.6 96   Platelets 661 - 183 K/uL - - 252 - - 291 260   Lymphocytes 12 - 49 % - - 39 - - 27 -   Monocytes 5 - 13 % - - 13 - - 9 -   Eosinophils 0 - 7 % - - 6 - - 2 -   Basophils 0 - 1 % - - 1 - - 0 -   Albumin 3.5 - 5.0 g/dL - - - - - 3.8 4.3   Calcium 8.5 - 10.1 MG/DL 10.0 - 10. 4(H) - - 10. 7(H) 10. 7(H)   Glucose 65 - 100 mg/dL 172(H) - 90 - - 152(H) 129(H)   BUN 6 - 20 MG/DL 47(H) - 51(H) - - 51(H) 24   Creatinine 0.55 - 1.02 MG/DL 2.08(H) - 2.21(H) - - 2.89(H) 1.76(H)   Sodium 136 - 145 mmol/L 138 - 136 - - 131(L) 142   Potassium 3.5 - 5.1 mmol/L 3.2(L) 3.4(L) 2. 6(LL) 2. 8(L) 2. 7(LL) 2. 7(LL) 4.0   TSH 0.36 - 3.74 uIU/mL - - - 1.68 - - -   Some recent data might be hidden       ALLERGY:  Allergies   Allergen Reactions    Crestor [Rosuvastatin] Other (comments)     Causes muscle cramps    Lisinopril Cough    Nsaids (Non-Steroidal Anti-Inflammatory Drug) Other (comments)     Liver and Kidney        CURRENT MEDICATIONS:    Current Facility-Administered Medications:     potassium chloride SR (KLOR-CON 10) tablet 40 mEq, 40 mEq, Oral, BID, Lo Mckeon, NP    sodium chloride (NS) flush 5-40 mL, 5-40 mL, IntraVENous, Q8H, Enedelia Barrett MD, 10 mL at 11/18/22 2151    sodium chloride (NS) flush 5-40 mL, 5-40 mL, IntraVENous, PRN, Adolfo Lemus MD    acetaminophen (TYLENOL) tablet 650 mg, 650 mg, Oral, Q6H PRN **OR** acetaminophen (TYLENOL) suppository 650 mg, 650 mg, Rectal, Q6H PRN, Rodney Goodwin MD    polyethylene glycol (MIRALAX) packet 17 g, 17 g, Oral, DAILY PRN, Rodney Goodwin MD    ondansetron (ZOFRAN ODT) tablet 4 mg, 4 mg, Oral, Q8H PRN **OR** ondansetron (ZOFRAN) injection 4 mg, 4 mg, IntraVENous, Q6H PRN, Rodney Goodwin MD    enoxaparin (LOVENOX) injection 30 mg, 30 mg, SubCUTAneous, DAILY, Enedelia Barrett MD, 30 mg at 11/18/22 0957    albuterol-ipratropium (DUO-NEB) 2.5 MG-0.5 MG/3 ML, 3 mL, Nebulization, Q4H PRN, Rodney Goodwin MD    aspirin chewable tablet 81 mg, 81 mg, Oral, DAILY, Rodney Goodwin MD, 81 mg at 11/18/22 0957    atorvastatin (LIPITOR) tablet 80 mg, 80 mg, Oral, QHS, Enedelia Barrett MD, 80 mg at 11/18/22 2208    clopidogreL (PLAVIX) tablet 75 mg, 75 mg, Oral, DAILY, Rodney Goodwin MD, 75 mg at 11/18/22 1120    clotrimazole-betamethasone (LOTRISONE) 1-0.05 % cream, , Topical, BID, Rodney Goodwin MD    ezetimibe (ZETIA) tablet 10 mg, 10 mg, Oral, DAILY, Rodney Goodwin MD, 10 mg at 11/18/22 0948    gabapentin (NEURONTIN) capsule 200 mg, 200 mg, Oral, BID, Rodney Goodwin MD, 200 mg at 11/18/22 0957    ketoconazole (NIZORAL) 2 % shampoo, , Topical, PRN, Rodney Goodwin MD    levothyroxine (SYNTHROID) tablet 100 mcg, 100 mcg, Oral, ACB, Rodney Goodwin MD, 100 mcg at 11/18/22 0948    nitroglycerin (NITROSTAT) tablet 0.4 mg, 0.4 mg, SubLINGual, Q5MIN PRN, Rodney Goodwin MD    sertraline (ZOLOFT) tablet 50 mg, 50 mg, Oral, DAILY, Rodney Goodwin MD, 50 mg at 11/18/22 0957    tiZANidine (ZANAFLEX) tablet 2 mg, 2 mg, Oral, BID PRN, Rodney Goodwin MD    insulin NPH (NOVOLIN N, HUMULIN N) injection 14 Units, 14 Units, SubCUTAneous, BID, Rodney Goodwin MD    insulin lispro (HUMALOG) injection, , SubCUTAneous, AC&HS, Rodney Goodwin MD, 3 Units at 11/17/22 2252    glucose chewable tablet 16 g, 4 Tablet, Oral, PRN, Melinda Campbell, MD Enedelia    glucagon (GLUCAGEN) injection 1 mg, 1 mg, IntraMUSCular, PRN, Umu Kessler MD    dextrose 10 % infusion 0-250 mL, 0-250 mL, IntraVENous, PRN, Umu Kessler MD    0.9% sodium chloride infusion, 50 mL/hr, IntraVENous, CONTINUOUS, Umu Kessler MD, Last Rate: 50 mL/hr at 11/18/22 0326, 50 mL/hr at 11/18/22 0326    traMADoL (ULTRAM) tablet 50 mg, 50 mg, Oral, Q6H PRN, Umu Kessler MD    Thank you for your referral and allowing me to participate in this patient's care.     Jacquie Vaca NP  [facility] [address], [address]  Phone: [phone] TEAM:  Patient Care Team:  Renae, MD Fernando as PCP - General  Chanda Boss MD (Cardiovascular Disease Physician)  Priya Ramires MD (Gastroenterology)  Sulema Jon MD as Consulting Provider (Cardiovascular Disease Physician)  Sharon Noland MD (Nephrology)

## 2024-12-16 NOTE — CARDIO/PULMONARY
Cardiac Rehab Nutrition Assessment - 1:1 Evaluation           NAME: Penny Sun : 1950 AGE: 65 y.o. GENDER: female  CARDIAC REHAB ADMITTING DIAGNOSIS: CHF    Relevant Comorbidites: diabetes, hypertension, dyslipidemia, renal disease, cardiovascular disease and morbid obesity        LABS:   Lab Results   Component Value Date/Time    Hemoglobin A1c 7.8 (H) 2020 11:34 AM     Lab Results   Component Value Date/Time    Cholesterol, total 172 2020 09:12 AM    HDL Cholesterol 75 2020 09:12 AM    LDL, calculated 78 2020 09:12 AM    VLDL, calculated 19 2020 09:12 AM    Triglyceride 93 2020 09:12 AM    CHOL/HDL Ratio 2.6 2020 12:35 AM     SMBG once a day;  today. MEDICATIONS/SUPPLEMENTS:   [unfilled]  Prior to Admission medications    Medication Sig Start Date End Date Taking? Authorizing Provider   gabapentin (NEURONTIN) 100 mg capsule Take 2 Caps by mouth three (3) times daily. Max Daily Amount: 600 mg. 10/13/20   Rachel HOOD, NP   isosorbide mononitrate ER (IMDUR) 30 mg tablet Take 1 tablet by mouth once daily 10/7/20   Bonni Libman., NP   glipiZIDE (GLUCOTROL) 10 mg tablet Take 1 tablet by mouth twice daily with food 10/1/20   Bonni Libman., VENKATESH   furosemide (LASIX) 20 mg tablet Take 2 Tabs by mouth daily. 20   Will Sanchez, VENKATESH   clotrimazole-betamethasone (LOTRISONE) topical cream Apply  to affected area two (2) times a day. 20   Bonni Libman., NP   ezetimibe (ZETIA) 10 mg tablet Take 1 tablet by mouth once daily 20   Isabel Blankenship, VENKATESH   [place] 24-26 mg tablet Take 1 tablet by mouth twice daily 20   Juan MCNEILL NP   clopidogreL (PLAVIX) 75 mg tab Take 1 Tab by mouth daily. 20   Bonni Libman., NP   Blood-Glucose Meter monitoring kit Use as directed.  Dx: E11.65 check sugar twice daily 20   Rachel HOOD NP   levothyroxine (SYNTHROID) 100 mcg tablet Take 1 Tab by mouth Daily (before breakfast). 8/21/20   Radha Torres NP   lancets misc Use as directed. Dx:check sugar once daily. E11.65 8/19/20   Radha Torres NP   glucose blood VI test strips (ASCENSIA AUTODISC VI, ONE TOUCH ULTRA TEST VI) strip E11.65 check sugar once daily 8/19/20   Radha Torres NP   allopurinoL (ZYLOPRIM) 100 mg tablet Take 1 tablet by mouth once daily 5/28/20   Dylan MCNEILL NP   hydrALAZINE (APRESOLINE) 50 mg tablet Take 1 Tab by mouth three (3) times daily. 5/28/20   Mitzy Mckeon NP   atorvastatin (LIPITOR) 80 mg tablet TAKE 1 TABLET BY MOUTH AT BEDTIME 4/10/20   Sanchez Blankenship NP   cholecalciferol (VITAMIN D3) (1000 Units /25 mcg) tablet Take 2 Tabs by mouth daily. 4/10/20   Sanchez Blankenship NP   empagliflozin (JARDIANCE) 10 mg tablet Take 1 Tab by mouth daily. 3/9/20   Sanchez Blankenship NP   albuterol (PROVENTIL HFA, VENTOLIN HFA, PROAIR HFA) 90 mcg/actuation inhaler Take 2 Puffs by inhalation every four (4) hours as needed. Provider, Historical   buPROPion SR (WELLBUTRIN SR) 150 mg SR tablet Take 1 Tab by mouth daily. 2/6/20   Radha Torres NP   metoprolol succinate (TOPROL-XL) 25 mg XL tablet Take 0.5 Tabs by mouth daily. 2/5/20   Sanchez Blankenship NP   acetaminophen (TYLENOL ARTHRITIS PAIN) 650 mg TbER Take 1,300 mg by mouth two (2) times a day. Provider, Historical   aspirin 81 mg chewable tablet Take 81 mg by mouth daily.     Provider, Historical           ANTHROPOMETRICS:    Ht Readings from Last 1 Encounters:   10/02/20 5' 2\" (1.575 m)      Wt Readings from Last 1 Encounters:   10/16/20 103 kg (227 lb)      IBW:110 # +/- 10%  %IBW: 206 % +/- 10%    BMI: 41.5  kg/M2 Category: morbid obesity  Waist: 45.5 inches    Reported Wt Hx: heaviest at 297 lbs prior to lap band surgery in 2011; lost down to the 270s then in 2015 did a medically supervised keto diet and lost down to 208 lbs; has regained up to current weight off [TextEntry] : The lateral aspect of the left hallux nail is healing well.  There is minimal swelling and erythema present.  The right hallux medial and lateral borders are pain-free. keto    Reported Diet Hx:    Rate Your Plate Score: 44  (Score 58-72: Making many healthy choices; 41-57: Some choices need improving 24-40: many choices need improving)     24 Hour Diet Recall  Breakfast oatmeal   Lunch 1/2 chicken salad sandwich   Dinner Homemade chicken vegetable soup   Snacks    Beverages Coffee with half'n'half, water     Rogerio Mcdowell had difficulty recalling diet yesterday. States she often misses a meal due to busy schedule, or will end up eating fast food. She states she has been better about keeping \"junk\" food out of her house (potato chips, Cheetos). She states she needs to be consistent with making healthier choices. Environmental/Social: lives with her sister and is generally her caregiver - providing all transportation, shopping for and preparing all meals; describes a sedentary lifestyle with recent effort to be a bit more active with her sister but they are both very limited due to health & mobility problems        NUTRITION INTERVENTION:  Nutrition 60 minute one-on-one education & goal setting with [facility] [place] with Rogerio Mcdowell relevant labs compared to ideals. Reviewed weight history and patient's verbalized weight goal as well as any real or perceived barriers to obtaining the goal. Collaborated with patient to set a specific short and long term weight goal.     Reviewed Rate Your Plate and conducted a verbal diet recall.   Assessed for environmental, financial, psychosocial, physical and comorbidities that may impact the food and eating patterns / behaviors of [address] [place] [address]. with patient to set specific nutrient goals as well as specific food / behavior changes that will help patient meet the overall goal of following a heart healthy eating pattern (using guidelines as set forth by the American Heart Association and modeled after healthful eating patterns as recognized by the USDA Dietary Guidelines such as DASH, Mediterranean or plant-based). Briefly reviewed with Tomi Morales the nutrition information in the Cardiac Rehab patient education book and encouraged Tomi Morales to read thoroughly, ask questions as needed, and use for future reference for heart healthy nutrition information. Tomi Morales is not scheduled to participate in Cardiac Rehab group nutrition classes. PATIENT GOALS:    Weight Goals:  Short Term Weight Goal:<220 lbs  Long Term Weight Goal:<200 lbs    Nutrition Goals:  Daily Recommendations:  Calories: 1300 /day  (using Denia Rosalba with AF 1.2 and deducting 500 for weight loss)    Saturated Fat: no more than 9 g/day  Trans Fat: 0 g/day  Sodium: no more than 1500 mg/day  Fruit: 1-1.5 cups / day  Vegetables: 1.5 or more cups/day    Other:  - read and compare food labels  - plan and prep in advance  - use the plate for portion control (reviewd the diabetes plate method and provided a divided labeled plate to use)  - keep temptations out of the house  - can use acceptable frozen meals, such as Healthy Choice, rather than fast food or skipped meals      Keeping a food diary was recommended. Questions addressed. Follow-up plans discussed. Tomi Morales verbalized understanding.             Boston Orozco RD

## (undated) DEVICE — PADDLE DEFIB SZ 2.5IN DIA6.4CM INT SPN W/ SWCH

## (undated) DEVICE — PRESSURE MONITORING SET: Brand: TRUWAVE

## (undated) DEVICE — STERILE POLYISOPRENE POWDER-FREE SURGICAL GLOVES WITH EMOLLIENT COATING: Brand: PROTEXIS

## (undated) DEVICE — ANGIOGRAPHIC CATHETER: Brand: IMPULSE™

## (undated) DEVICE — Device

## (undated) DEVICE — 3M™ TEGADERM™ TRANSPARENT FILM DRESSING FRAME STYLE, 1626W, 4 IN X 4-3/4 IN (10 CM X 12 CM), 50/CT 4CT/CASE: Brand: 3M™ TEGADERM™

## (undated) DEVICE — PINNACLE INTRODUCER SHEATH: Brand: PINNACLE

## (undated) DEVICE — CATH DIAG IMPLS MPA1 6FRX100CM -- IMPULSE 16599-117

## (undated) DEVICE — KIT ANGIO W/ AT P65 PREM HND CTRL FOR CNTRST DEL ANGIOTOUCH

## (undated) DEVICE — GUIDEWIRE WITH GLIDEX™ HYDROPHILIC COATING: Brand: PLATINUM PLUS™

## (undated) DEVICE — ANGIOGRAPHY KIT

## (undated) DEVICE — PRESSURE MONITORING ACCESSORY: Brand: TRUWAVE

## (undated) DEVICE — KIT HND CTRL 3 W STPCOCK ROT END 54IN PREM HI PRSS TBNG AT

## (undated) DEVICE — 6FR THERMODILUTION BALLOON CATHETER SWAN (ARROW)

## (undated) DEVICE — COVER,TABLE,60X90,STERILE: Brand: MEDLINE

## (undated) DEVICE — 1000ML,PRESSURE INFUSER W/STOPCOCK: Brand: MEDLINE

## (undated) DEVICE — Device: Brand: NRG TRANSSEPTAL NEEDLE

## (undated) DEVICE — DRAPE,REIN 53X77,STERILE: Brand: MEDLINE

## (undated) DEVICE — CO-SET DELIVERY SYSTEM FOR 123 ROOM TEMPATURE INJECTATE: Brand: CO-SET+

## (undated) DEVICE — KIT MFLD ISOLATN NACL CNTRST PRT TBNG SPIK W/ PRSS TRNSDUC

## (undated) DEVICE — SYS PA SENSOR-DEL IMPL HRT -- CARDIOMEMS HF SYSTEM CM2000

## (undated) DEVICE — Device: Brand: BMC CONNECTOR CABLE

## (undated) DEVICE — PACK PROCEDURE SURG HRT CATH

## (undated) DEVICE — SYNTHETIC CONTROLCATH TD CATHETER: Brand: SWAN-GANZ CONTROLCATH

## (undated) DEVICE — GLIDESHEATH SLENDER STAINLESS STEEL KIT: Brand: GLIDESHEATH SLENDER

## (undated) DEVICE — WASTE KIT - ST MARY: Brand: MEDLINE INDUSTRIES, INC.

## (undated) DEVICE — PERCLOSE PROGLIDE™ SUTURE-MEDIATED CLOSURE SYSTEM: Brand: PERCLOSE PROGLIDE™

## (undated) DEVICE — INTRODUCER SHTH 12FR L12CM DBL DST GWIRE L50CM 0.038IN

## (undated) DEVICE — LUER-LOK 360°: Brand: CONNECTA, LUER-LOK

## (undated) DEVICE — 40418 TRENDELENBURG ONE-STEP ARM PROTECTORS LARGE (1 PAIR): Brand: 40418 TRENDELENBURG ONE-STEP ARM PROTECTORS LARGE (1 PAIR)

## (undated) DEVICE — TR BAND RADIAL ARTERY COMPRESSION DEVICE: Brand: TR BAND

## (undated) DEVICE — Device: Brand: PROTRACK PIGTAIL WIRE

## (undated) DEVICE — AMPLATZ EXTRA STIFF WIRE GUIDE: Brand: AMPLATZ

## (undated) DEVICE — WRAP SURG W1.31XL1.34M CARD FOR PT 165-172CM THERMOWRP

## (undated) DEVICE — SUT SLK 0 30IN CT1 BLK --

## (undated) DEVICE — DRAPE PRB US TRNSDCR 6X96IN --

## (undated) DEVICE — GUIDEWIRE VASC J 3 MM 0.035 INX210 CM FIX COR INQWIRE

## (undated) DEVICE — INFECTION CONTROL KIT SYS

## (undated) DEVICE — MICROPUNCTURE INTRODUCER SET SILHOUETTE TRANSITIONLESS WITH STAINLESS STEEL WIRE GUIDE: Brand: MICROPUNCTURE

## (undated) DEVICE — CHECK-FLO PERFORMER INTRODUCER: Brand: CHECK-FLO

## (undated) DEVICE — KIT MED IMAG CNTRST AGNT W/ IOPAMIDOL REUSE

## (undated) DEVICE — STERILE POLYISOPRENE POWDER-FREE SURGICAL GLOVES: Brand: PROTEXIS

## (undated) DEVICE — SPLINT WR POS F/ARTERIAL ACC -- BX/10

## (undated) DEVICE — PROVE COVER: Brand: UNBRANDED

## (undated) DEVICE — GDWIRE ANGIO SUP STF 0.035IN --

## (undated) DEVICE — SOLUTION SET, MALE LUER LOCK ADAPTER

## (undated) DEVICE — TORFLEX TRANSSEPTAL SHEATH; TRANSSEPTAL DILATOR; J-TIP GUIDEWIRE: Brand: TORFLEX TRANSSEPTAL GUIDING SHEATH

## (undated) DEVICE — SYR 50ML LR LCK 1ML GRAD NSAF --

## (undated) DEVICE — GOWN,SIRUS,FABRNF,XL,20/CS: Brand: MEDLINE

## (undated) DEVICE — INTENDED TO STANDARDIZE OR CAMERAS TO ALLOW FOR THE USE OF THE OR CAMERA COVER: Brand: ASPEN® O.R. CAMERA COVER

## (undated) DEVICE — SPECIAL PROCEDURE DRAPE 32" X 34": Brand: SPECIAL PROCEDURE DRAPE

## (undated) DEVICE — PREP SKN CHLRAPRP APL 26ML STR --